# Patient Record
Sex: FEMALE | Race: WHITE | NOT HISPANIC OR LATINO | URBAN - METROPOLITAN AREA
[De-identification: names, ages, dates, MRNs, and addresses within clinical notes are randomized per-mention and may not be internally consistent; named-entity substitution may affect disease eponyms.]

---

## 2019-01-31 RX ADMIN — HYDROMORPHONE HYDROCHLORIDE 0.5 MILLIGRAM(S): 2 INJECTION INTRAMUSCULAR; INTRAVENOUS; SUBCUTANEOUS at 12:43

## 2019-12-29 ENCOUNTER — INPATIENT (INPATIENT)
Facility: HOSPITAL | Age: 69
LOS: 10 days | Discharge: NURSING, REHAB OR LONG-TERM CARE | DRG: 853 | End: 2020-01-09
Attending: SURGERY | Admitting: SURGERY
Payer: MEDICARE

## 2019-12-29 ENCOUNTER — RESULT REVIEW (OUTPATIENT)
Age: 69
End: 2019-12-29

## 2019-12-29 VITALS
OXYGEN SATURATION: 98 % | DIASTOLIC BLOOD PRESSURE: 73 MMHG | RESPIRATION RATE: 16 BRPM | WEIGHT: 149.91 LBS | HEART RATE: 73 BPM | SYSTOLIC BLOOD PRESSURE: 108 MMHG | TEMPERATURE: 98 F | HEIGHT: 62 IN

## 2019-12-29 LAB
ALBUMIN SERPL ELPH-MCNC: 2.1 G/DL — LOW (ref 3.3–5)
ALBUMIN SERPL ELPH-MCNC: 2.7 G/DL — LOW (ref 3.3–5)
ALBUMIN SERPL ELPH-MCNC: 2.8 G/DL — LOW (ref 3.3–5)
ALP SERPL-CCNC: 159 U/L — HIGH (ref 40–120)
ALP SERPL-CCNC: 191 U/L — HIGH (ref 40–120)
ALP SERPL-CCNC: SIGNIFICANT CHANGE UP U/L (ref 40–120)
ALT FLD-CCNC: 23 U/L — SIGNIFICANT CHANGE UP (ref 10–45)
ALT FLD-CCNC: 27 U/L — SIGNIFICANT CHANGE UP (ref 10–45)
ALT FLD-CCNC: SIGNIFICANT CHANGE UP U/L (ref 10–45)
ANION GAP SERPL CALC-SCNC: 13 MMOL/L — SIGNIFICANT CHANGE UP (ref 5–17)
ANION GAP SERPL CALC-SCNC: 14 MMOL/L — SIGNIFICANT CHANGE UP (ref 5–17)
ANION GAP SERPL CALC-SCNC: 19 MMOL/L — HIGH (ref 5–17)
ANION GAP SERPL CALC-SCNC: 20 MMOL/L — HIGH (ref 5–17)
APPEARANCE UR: CLEAR — SIGNIFICANT CHANGE UP
APTT BLD: 26.9 SEC — LOW (ref 27.5–36.3)
AST SERPL-CCNC: 26 U/L — SIGNIFICANT CHANGE UP (ref 10–40)
AST SERPL-CCNC: 35 U/L — SIGNIFICANT CHANGE UP (ref 10–40)
AST SERPL-CCNC: SIGNIFICANT CHANGE UP U/L (ref 10–40)
B-OH-BUTYR SERPL-SCNC: 1.1 MMOL/L — HIGH
BASE EXCESS BLDA CALC-SCNC: 1 MMOL/L — SIGNIFICANT CHANGE UP (ref -2–3)
BASOPHILS # BLD AUTO: 0.04 K/UL — SIGNIFICANT CHANGE UP (ref 0–0.2)
BASOPHILS # BLD AUTO: 0.05 K/UL — SIGNIFICANT CHANGE UP (ref 0–0.2)
BASOPHILS NFR BLD AUTO: 0.3 % — SIGNIFICANT CHANGE UP (ref 0–2)
BASOPHILS NFR BLD AUTO: 0.3 % — SIGNIFICANT CHANGE UP (ref 0–2)
BILIRUB SERPL-MCNC: 0.3 MG/DL — SIGNIFICANT CHANGE UP (ref 0.2–1.2)
BILIRUB SERPL-MCNC: 0.4 MG/DL — SIGNIFICANT CHANGE UP (ref 0.2–1.2)
BILIRUB SERPL-MCNC: 0.4 MG/DL — SIGNIFICANT CHANGE UP (ref 0.2–1.2)
BILIRUB UR-MCNC: ABNORMAL
BLD GP AB SCN SERPL QL: NEGATIVE — SIGNIFICANT CHANGE UP
BUN SERPL-MCNC: 23 MG/DL — SIGNIFICANT CHANGE UP (ref 7–23)
BUN SERPL-MCNC: 26 MG/DL — HIGH (ref 7–23)
BUN SERPL-MCNC: 28 MG/DL — HIGH (ref 7–23)
BUN SERPL-MCNC: 29 MG/DL — HIGH (ref 7–23)
CALCIUM SERPL-MCNC: 7.9 MG/DL — LOW (ref 8.4–10.5)
CALCIUM SERPL-MCNC: 8.2 MG/DL — LOW (ref 8.4–10.5)
CALCIUM SERPL-MCNC: 8.6 MG/DL — SIGNIFICANT CHANGE UP (ref 8.4–10.5)
CALCIUM SERPL-MCNC: 9.1 MG/DL — SIGNIFICANT CHANGE UP (ref 8.4–10.5)
CHLORIDE SERPL-SCNC: 102 MMOL/L — SIGNIFICANT CHANGE UP (ref 96–108)
CHLORIDE SERPL-SCNC: 104 MMOL/L — SIGNIFICANT CHANGE UP (ref 96–108)
CHLORIDE SERPL-SCNC: 88 MMOL/L — LOW (ref 96–108)
CHLORIDE SERPL-SCNC: 90 MMOL/L — LOW (ref 96–108)
CO2 SERPL-SCNC: 21 MMOL/L — LOW (ref 22–31)
CO2 SERPL-SCNC: 23 MMOL/L — SIGNIFICANT CHANGE UP (ref 22–31)
CO2 SERPL-SCNC: 23 MMOL/L — SIGNIFICANT CHANGE UP (ref 22–31)
CO2 SERPL-SCNC: 24 MMOL/L — SIGNIFICANT CHANGE UP (ref 22–31)
COLOR SPEC: YELLOW — SIGNIFICANT CHANGE UP
CREAT SERPL-MCNC: 1.16 MG/DL — SIGNIFICANT CHANGE UP (ref 0.5–1.3)
CREAT SERPL-MCNC: 1.21 MG/DL — SIGNIFICANT CHANGE UP (ref 0.5–1.3)
CREAT SERPL-MCNC: 1.32 MG/DL — HIGH (ref 0.5–1.3)
CREAT SERPL-MCNC: 1.34 MG/DL — HIGH (ref 0.5–1.3)
DIFF PNL FLD: ABNORMAL
EOSINOPHIL # BLD AUTO: 0.06 K/UL — SIGNIFICANT CHANGE UP (ref 0–0.5)
EOSINOPHIL # BLD AUTO: 0.25 K/UL — SIGNIFICANT CHANGE UP (ref 0–0.5)
EOSINOPHIL NFR BLD AUTO: 0.3 % — SIGNIFICANT CHANGE UP (ref 0–6)
EOSINOPHIL NFR BLD AUTO: 1.6 % — SIGNIFICANT CHANGE UP (ref 0–6)
GAS PNL BLDV: SIGNIFICANT CHANGE UP
GLUCOSE SERPL-MCNC: 224 MG/DL — HIGH (ref 70–99)
GLUCOSE SERPL-MCNC: 241 MG/DL — HIGH (ref 70–99)
GLUCOSE SERPL-MCNC: 457 MG/DL — CRITICAL HIGH (ref 70–99)
GLUCOSE SERPL-MCNC: 461 MG/DL — CRITICAL HIGH (ref 70–99)
GLUCOSE UR QL: 100
HBA1C BLD-MCNC: 11.2 % — HIGH (ref 4–5.6)
HCO3 BLDA-SCNC: 26 MMOL/L — SIGNIFICANT CHANGE UP (ref 21–28)
HCT VFR BLD CALC: 21.3 % — LOW (ref 34.5–45)
HCT VFR BLD CALC: 21.4 % — LOW (ref 34.5–45)
HCT VFR BLD CALC: 27 % — LOW (ref 34.5–45)
HGB BLD-MCNC: 6.5 G/DL — CRITICAL LOW (ref 11.5–15.5)
HGB BLD-MCNC: 6.7 G/DL — CRITICAL LOW (ref 11.5–15.5)
HGB BLD-MCNC: 8.2 G/DL — LOW (ref 11.5–15.5)
IMM GRANULOCYTES NFR BLD AUTO: 1 % — SIGNIFICANT CHANGE UP (ref 0–1.5)
IMM GRANULOCYTES NFR BLD AUTO: 1.1 % — SIGNIFICANT CHANGE UP (ref 0–1.5)
INR BLD: 1.33 — HIGH (ref 0.88–1.16)
KETONES UR-MCNC: ABNORMAL MG/DL
LACTATE SERPL-SCNC: 1.3 MMOL/L — SIGNIFICANT CHANGE UP (ref 0.5–2)
LACTATE SERPL-SCNC: 2.9 MMOL/L — HIGH (ref 0.5–2)
LEUKOCYTE ESTERASE UR-ACNC: ABNORMAL
LYMPHOCYTES # BLD AUTO: 1.57 K/UL — SIGNIFICANT CHANGE UP (ref 1–3.3)
LYMPHOCYTES # BLD AUTO: 15.9 % — SIGNIFICANT CHANGE UP (ref 13–44)
LYMPHOCYTES # BLD AUTO: 2.51 K/UL — SIGNIFICANT CHANGE UP (ref 1–3.3)
LYMPHOCYTES # BLD AUTO: 8.4 % — LOW (ref 13–44)
MAGNESIUM SERPL-MCNC: 1.6 MG/DL — SIGNIFICANT CHANGE UP (ref 1.6–2.6)
MAGNESIUM SERPL-MCNC: 1.6 MG/DL — SIGNIFICANT CHANGE UP (ref 1.6–2.6)
MCHC RBC-ENTMCNC: 23.3 PG — LOW (ref 27–34)
MCHC RBC-ENTMCNC: 23.4 PG — LOW (ref 27–34)
MCHC RBC-ENTMCNC: 24.1 PG — LOW (ref 27–34)
MCHC RBC-ENTMCNC: 30.4 GM/DL — LOW (ref 32–36)
MCHC RBC-ENTMCNC: 30.5 GM/DL — LOW (ref 32–36)
MCHC RBC-ENTMCNC: 31.3 GM/DL — LOW (ref 32–36)
MCV RBC AUTO: 76.6 FL — LOW (ref 80–100)
MCV RBC AUTO: 76.7 FL — LOW (ref 80–100)
MCV RBC AUTO: 77 FL — LOW (ref 80–100)
MONOCYTES # BLD AUTO: 0.99 K/UL — HIGH (ref 0–0.9)
MONOCYTES # BLD AUTO: 1.23 K/UL — HIGH (ref 0–0.9)
MONOCYTES NFR BLD AUTO: 5.3 % — SIGNIFICANT CHANGE UP (ref 2–14)
MONOCYTES NFR BLD AUTO: 7.8 % — SIGNIFICANT CHANGE UP (ref 2–14)
NEUTROPHILS # BLD AUTO: 11.56 K/UL — HIGH (ref 1.8–7.4)
NEUTROPHILS # BLD AUTO: 15.89 K/UL — HIGH (ref 1.8–7.4)
NEUTROPHILS NFR BLD AUTO: 73.4 % — SIGNIFICANT CHANGE UP (ref 43–77)
NEUTROPHILS NFR BLD AUTO: 84.6 % — HIGH (ref 43–77)
NITRITE UR-MCNC: NEGATIVE — SIGNIFICANT CHANGE UP
NRBC # BLD: 0 /100 WBCS — SIGNIFICANT CHANGE UP (ref 0–0)
PCO2 BLDA: 40 MMHG — SIGNIFICANT CHANGE UP (ref 32–45)
PH BLDA: 7.42 — SIGNIFICANT CHANGE UP (ref 7.35–7.45)
PH UR: 6 — SIGNIFICANT CHANGE UP (ref 5–8)
PHOSPHATE SERPL-MCNC: 3 MG/DL — SIGNIFICANT CHANGE UP (ref 2.5–4.5)
PLATELET # BLD AUTO: 686 K/UL — HIGH (ref 150–400)
PLATELET # BLD AUTO: 728 K/UL — HIGH (ref 150–400)
PLATELET # BLD AUTO: 792 K/UL — HIGH (ref 150–400)
PO2 BLDA: 124 MMHG — HIGH (ref 83–108)
POTASSIUM SERPL-MCNC: 2.8 MMOL/L — CRITICAL LOW (ref 3.5–5.3)
POTASSIUM SERPL-MCNC: 3.2 MMOL/L — LOW (ref 3.5–5.3)
POTASSIUM SERPL-MCNC: 4.6 MMOL/L — SIGNIFICANT CHANGE UP (ref 3.5–5.3)
POTASSIUM SERPL-MCNC: SIGNIFICANT CHANGE UP MMOL/L (ref 3.5–5.3)
POTASSIUM SERPL-SCNC: 2.8 MMOL/L — CRITICAL LOW (ref 3.5–5.3)
POTASSIUM SERPL-SCNC: 3.2 MMOL/L — LOW (ref 3.5–5.3)
POTASSIUM SERPL-SCNC: 4.6 MMOL/L — SIGNIFICANT CHANGE UP (ref 3.5–5.3)
POTASSIUM SERPL-SCNC: SIGNIFICANT CHANGE UP MMOL/L (ref 3.5–5.3)
PROT SERPL-MCNC: 6.3 G/DL — SIGNIFICANT CHANGE UP (ref 6–8.3)
PROT SERPL-MCNC: 7.7 G/DL — SIGNIFICANT CHANGE UP (ref 6–8.3)
PROT SERPL-MCNC: 7.9 G/DL — SIGNIFICANT CHANGE UP (ref 6–8.3)
PROT UR-MCNC: 100 MG/DL
PROTHROM AB SERPL-ACNC: 15.2 SEC — HIGH (ref 10–12.9)
RBC # BLD: 2.78 M/UL — LOW (ref 3.8–5.2)
RBC # BLD: 2.78 M/UL — LOW (ref 3.8–5.2)
RBC # BLD: 3.52 M/UL — LOW (ref 3.8–5.2)
RBC # FLD: 17.6 % — HIGH (ref 10.3–14.5)
RBC # FLD: 17.8 % — HIGH (ref 10.3–14.5)
RBC # FLD: 18.1 % — HIGH (ref 10.3–14.5)
RH IG SCN BLD-IMP: POSITIVE — SIGNIFICANT CHANGE UP
SAO2 % BLDA: 99 % — SIGNIFICANT CHANGE UP (ref 95–100)
SODIUM SERPL-SCNC: 130 MMOL/L — LOW (ref 135–145)
SODIUM SERPL-SCNC: 134 MMOL/L — LOW (ref 135–145)
SODIUM SERPL-SCNC: 138 MMOL/L — SIGNIFICANT CHANGE UP (ref 135–145)
SODIUM SERPL-SCNC: 139 MMOL/L — SIGNIFICANT CHANGE UP (ref 135–145)
SP GR SPEC: >=1.03 — SIGNIFICANT CHANGE UP (ref 1–1.03)
TROPONIN T SERPL-MCNC: 0.03 NG/ML — HIGH (ref 0–0.01)
UROBILINOGEN FLD QL: 0.2 E.U./DL — SIGNIFICANT CHANGE UP
WBC # BLD: 15.74 K/UL — HIGH (ref 3.8–10.5)
WBC # BLD: 16.73 K/UL — HIGH (ref 3.8–10.5)
WBC # BLD: 18.77 K/UL — HIGH (ref 3.8–10.5)
WBC # FLD AUTO: 15.74 K/UL — HIGH (ref 3.8–10.5)
WBC # FLD AUTO: 16.73 K/UL — HIGH (ref 3.8–10.5)
WBC # FLD AUTO: 18.77 K/UL — HIGH (ref 3.8–10.5)

## 2019-12-29 PROCEDURE — 88305 TISSUE EXAM BY PATHOLOGIST: CPT | Mod: 26

## 2019-12-29 PROCEDURE — 73590 X-RAY EXAM OF LOWER LEG: CPT | Mod: 26,RT

## 2019-12-29 PROCEDURE — 73630 X-RAY EXAM OF FOOT: CPT | Mod: 26,LT,RT

## 2019-12-29 PROCEDURE — 99232 SBSQ HOSP IP/OBS MODERATE 35: CPT | Mod: GC

## 2019-12-29 PROCEDURE — 71045 X-RAY EXAM CHEST 1 VIEW: CPT | Mod: 26

## 2019-12-29 PROCEDURE — 99291 CRITICAL CARE FIRST HOUR: CPT

## 2019-12-29 PROCEDURE — 99222 1ST HOSP IP/OBS MODERATE 55: CPT | Mod: GC

## 2019-12-29 RX ORDER — KETOROLAC TROMETHAMINE 30 MG/ML
15 SYRINGE (ML) INJECTION ONCE
Refills: 0 | Status: DISCONTINUED | OUTPATIENT
Start: 2019-12-29 | End: 2019-12-29

## 2019-12-29 RX ORDER — CHLORHEXIDINE GLUCONATE 213 G/1000ML
1 SOLUTION TOPICAL
Refills: 0 | Status: DISCONTINUED | OUTPATIENT
Start: 2019-12-29 | End: 2020-01-09

## 2019-12-29 RX ORDER — POTASSIUM CHLORIDE 20 MEQ
40 PACKET (EA) ORAL ONCE
Refills: 0 | Status: COMPLETED | OUTPATIENT
Start: 2019-12-29 | End: 2019-12-29

## 2019-12-29 RX ORDER — MAGNESIUM SULFATE 500 MG/ML
1 VIAL (ML) INJECTION ONCE
Refills: 0 | Status: COMPLETED | OUTPATIENT
Start: 2019-12-29 | End: 2019-12-29

## 2019-12-29 RX ORDER — ACETAMINOPHEN 500 MG
650 TABLET ORAL EVERY 6 HOURS
Refills: 0 | Status: DISCONTINUED | OUTPATIENT
Start: 2019-12-29 | End: 2020-01-05

## 2019-12-29 RX ORDER — HYDROMORPHONE HYDROCHLORIDE 2 MG/ML
1 INJECTION INTRAMUSCULAR; INTRAVENOUS; SUBCUTANEOUS EVERY 4 HOURS
Refills: 0 | Status: DISCONTINUED | OUTPATIENT
Start: 2019-12-29 | End: 2020-01-03

## 2019-12-29 RX ORDER — INSULIN HUMAN 100 [IU]/ML
10 INJECTION, SOLUTION SUBCUTANEOUS ONCE
Refills: 0 | Status: COMPLETED | OUTPATIENT
Start: 2019-12-29 | End: 2019-12-29

## 2019-12-29 RX ORDER — ENOXAPARIN SODIUM 100 MG/ML
40 INJECTION SUBCUTANEOUS EVERY 24 HOURS
Refills: 0 | Status: DISCONTINUED | OUTPATIENT
Start: 2019-12-29 | End: 2019-12-29

## 2019-12-29 RX ORDER — VANCOMYCIN HCL 1 G
1000 VIAL (EA) INTRAVENOUS ONCE
Refills: 0 | Status: COMPLETED | OUTPATIENT
Start: 2019-12-29 | End: 2019-12-29

## 2019-12-29 RX ORDER — PIPERACILLIN AND TAZOBACTAM 4; .5 G/20ML; G/20ML
3.38 INJECTION, POWDER, LYOPHILIZED, FOR SOLUTION INTRAVENOUS EVERY 6 HOURS
Refills: 0 | Status: DISCONTINUED | OUTPATIENT
Start: 2019-12-29 | End: 2020-01-02

## 2019-12-29 RX ORDER — INSULIN HUMAN 100 [IU]/ML
4 INJECTION, SOLUTION SUBCUTANEOUS
Qty: 100 | Refills: 0 | Status: DISCONTINUED | OUTPATIENT
Start: 2019-12-29 | End: 2019-12-30

## 2019-12-29 RX ORDER — SODIUM CHLORIDE 9 MG/ML
1000 INJECTION INTRAMUSCULAR; INTRAVENOUS; SUBCUTANEOUS ONCE
Refills: 0 | Status: COMPLETED | OUTPATIENT
Start: 2019-12-29 | End: 2019-12-29

## 2019-12-29 RX ORDER — SODIUM CHLORIDE 9 MG/ML
2000 INJECTION INTRAMUSCULAR; INTRAVENOUS; SUBCUTANEOUS ONCE
Refills: 0 | Status: COMPLETED | OUTPATIENT
Start: 2019-12-29 | End: 2019-12-29

## 2019-12-29 RX ORDER — PIPERACILLIN AND TAZOBACTAM 4; .5 G/20ML; G/20ML
3.38 INJECTION, POWDER, LYOPHILIZED, FOR SOLUTION INTRAVENOUS ONCE
Refills: 0 | Status: COMPLETED | OUTPATIENT
Start: 2019-12-29 | End: 2019-12-29

## 2019-12-29 RX ORDER — SODIUM CHLORIDE 9 MG/ML
1000 INJECTION, SOLUTION INTRAVENOUS
Refills: 0 | Status: DISCONTINUED | OUTPATIENT
Start: 2019-12-29 | End: 2019-12-30

## 2019-12-29 RX ORDER — ONDANSETRON 8 MG/1
4 TABLET, FILM COATED ORAL ONCE
Refills: 0 | Status: COMPLETED | OUTPATIENT
Start: 2019-12-29 | End: 2019-12-29

## 2019-12-29 RX ORDER — ASPIRIN/CALCIUM CARB/MAGNESIUM 324 MG
81 TABLET ORAL DAILY
Refills: 0 | Status: DISCONTINUED | OUTPATIENT
Start: 2019-12-29 | End: 2020-01-09

## 2019-12-29 RX ORDER — VANCOMYCIN HCL 1 G
1000 VIAL (EA) INTRAVENOUS EVERY 12 HOURS
Refills: 0 | Status: DISCONTINUED | OUTPATIENT
Start: 2019-12-29 | End: 2019-12-31

## 2019-12-29 RX ORDER — HYDROMORPHONE HYDROCHLORIDE 2 MG/ML
0.5 INJECTION INTRAMUSCULAR; INTRAVENOUS; SUBCUTANEOUS EVERY 4 HOURS
Refills: 0 | Status: DISCONTINUED | OUTPATIENT
Start: 2019-12-29 | End: 2020-01-03

## 2019-12-29 RX ADMIN — Medication 40 MILLIEQUIVALENT(S): at 22:11

## 2019-12-29 RX ADMIN — Medication 250 MILLIGRAM(S): at 11:27

## 2019-12-29 RX ADMIN — Medication 250 MILLIGRAM(S): at 23:28

## 2019-12-29 RX ADMIN — Medication 40 MILLIEQUIVALENT(S): at 13:53

## 2019-12-29 RX ADMIN — Medication 100 GRAM(S): at 21:07

## 2019-12-29 RX ADMIN — HYDROMORPHONE HYDROCHLORIDE 1 MILLIGRAM(S): 2 INJECTION INTRAMUSCULAR; INTRAVENOUS; SUBCUTANEOUS at 23:35

## 2019-12-29 RX ADMIN — Medication 40 MILLIEQUIVALENT(S): at 21:06

## 2019-12-29 RX ADMIN — Medication 81 MILLIGRAM(S): at 23:29

## 2019-12-29 RX ADMIN — PIPERACILLIN AND TAZOBACTAM 200 GRAM(S): 4; .5 INJECTION, POWDER, LYOPHILIZED, FOR SOLUTION INTRAVENOUS at 10:58

## 2019-12-29 RX ADMIN — SODIUM CHLORIDE 2000 MILLILITER(S): 9 INJECTION INTRAMUSCULAR; INTRAVENOUS; SUBCUTANEOUS at 10:30

## 2019-12-29 RX ADMIN — Medication 100 MILLIGRAM(S): at 22:45

## 2019-12-29 RX ADMIN — ONDANSETRON 4 MILLIGRAM(S): 8 TABLET, FILM COATED ORAL at 21:26

## 2019-12-29 RX ADMIN — Medication 100 MILLIGRAM(S): at 13:55

## 2019-12-29 RX ADMIN — SODIUM CHLORIDE 1000 MILLILITER(S): 9 INJECTION INTRAMUSCULAR; INTRAVENOUS; SUBCUTANEOUS at 15:01

## 2019-12-29 RX ADMIN — PIPERACILLIN AND TAZOBACTAM 200 GRAM(S): 4; .5 INJECTION, POWDER, LYOPHILIZED, FOR SOLUTION INTRAVENOUS at 19:36

## 2019-12-29 RX ADMIN — SODIUM CHLORIDE 1000 MILLILITER(S): 9 INJECTION INTRAMUSCULAR; INTRAVENOUS; SUBCUTANEOUS at 13:30

## 2019-12-29 RX ADMIN — INSULIN HUMAN 10 UNIT(S): 100 INJECTION, SOLUTION SUBCUTANEOUS at 14:10

## 2019-12-29 NOTE — ED PROVIDER NOTE - GASTROINTESTINAL NEGATIVE STATEMENT, MLM
[Patient] : patient
no abdominal pain, no bloating, no constipation, + loose stools, no nausea and no vomiting.

## 2019-12-29 NOTE — ED PROVIDER NOTE - SKIN, MLM
+ Ulceration to right foot along plantar surface, mid/distally with overlying tenderness to palpation and purulent drainage.  + Ulceration to toes with gangrenous appearing discoloration to distal 2nd toe.   + Ulceration to distal/mid foot along plantar aspect of left foot - no purulent drainage.  No palpable crepitance.

## 2019-12-29 NOTE — CONSULT NOTE ADULT - ASSESSMENT
69 year old F with PMH significant for DM complicated by b/l diabetic foot ulcers, HTN, HLD, asthma, MDD, who presents with RLE ulceration and purulent discharge. Hyperglycemic without DKA      Recommend  1. Diabetic foot ulcer  - C/w broad spectrum coverage vanc/zosyn and flagyl given gas gangrene  - Appreciate podiatry recs, plan for procedure with debridement/amputation  - F/u blood cultures, culture specimen from OR  - Monitor CBC, CRP/ESR  - F/u lactate, additional fluids as per trend    2. Hyperglycemia  - C/w insulin subcutaneous, f/u AG  - A1C    3. Troponin level elevated  - EKG chronic TWI lateral leads, f/u next troponin. No chest pain or acute symptoms to suggest ACS. Does have element of CKD vs ELBERT, may have decreased clearance    Dispo: 69 year old F with PMH significant for DM complicated by b/l diabetic foot ulcers, HTN, HLD, asthma, MDD, who presents with RLE ulceration and purulent discharge. Hyperglycemic without DKA      Recommend  1. Diabetic foot ulcer  - C/w broad spectrum coverage vanc/zosyn and flagyl given gas gangrene  - Appreciate podiatry recs, plan for procedure with debridement/amputation  - F/u blood cultures, culture specimen from OR  - Monitor CBC, CRP/ESR  - F/u lactate, additional fluids as per trend    2. Hyperglycemia  - C/w insulin subcutaneous, f/u AG  - A1C    3. Troponin level elevated  - EKG chronic TWI lateral leads, f/u next troponin. No chest pain or acute symptoms to suggest ACS. Does have element of CKD vs ELBERT, may have decreased clearance    Dispo: SICU for procedure (updated to vascular surgery service since consultation)

## 2019-12-29 NOTE — CONSULT NOTE ADULT - SUBJECTIVE AND OBJECTIVE BOX
69F hx DM, several left toe amps’ in past, presented to ED w/ 4-5days of R foot ulcer w/ purulent drainage. Pt says she started noticing redness/swelling/pain and ulcer to plantar surface of R foot 4-5 days ago, ulcer had gotten progressively worse w/ purulent drainage. Pt c/o subjective fevers yesterday, but did not take her temps. She usually follows w/ doctors at [facility], last seen 1 month ago. Pt also says she usually takes metformin and insulin, but stopped her insulin about 2 wks ago because she lost her insulin pen. In ED, pt found to have ’s w/ anion gap of 20, LA 2.5. otherwise afebrile. Hemodynamically stable.      PMH: DM, several left toe amps’ from different OSH in past, HTN, HL, asthma.      MEDS: meformin (unknown dosage) bid, insulin (?lantus?) 20U qd, unsure what other meds she takes.      ICU Vital Signs Last 24 Hrs  T(C): 36.8 (29 Dec 2019 14:51), Max: 36.8 (29 Dec 2019 09:57)  T(F): 98.2 (29 Dec 2019 14:51), Max: 98.2 (29 Dec 2019 09:57)  HR: 68 (29 Dec 2019 14:51) (68 - 73)  BP: 136/67 (29 Dec 2019 14:51) (108/73 - 136/67)  BP(mean): --  ABP: --  ABP(mean): --  RR: 18 (29 Dec 2019 14:51) (16 - 18)  SpO2: 95% (29 Dec 2019 14:51) (95% - 98%)    EXAM:   Neurological: AAOx3, CNII-XII intact, strength 5/5 b/l   Cardiovascular: RRR   Respiratory: CTA   Gastrointestinal: soft, NT, ND, BS+   Extremities: warm, no dependent edema, L foot w/previous 2-4 toes amputated. R foot plantar ulcer w/ purulent foul smelling drainage. R 3rd toe w/ ulcerations and pus to base of toe and necrosis to tip of toe. Ensure R foot is swollen, red and hot.      LABS:    12-29    134<L>  |  90<L>  |  29<H>  ----------------------------<  457<HH>  3.2<L>   |  24  |  1.34<H>    Ca    8.6      29 Dec 2019 12:42  Mg     1.6     12-29    TPro  7.7  /  Alb  2.7<L>  /  TBili  0.4  /  DBili  x   /  AST  35  /  ALT  27  /  AlkPhos  191<H>  12-29  LIVER FUNCTIONS - ( 29 Dec 2019 12:42 )  Alb: 2.7 g/dL / Pro: 7.7 g/dL / ALK PHOS: 191 U/L / ALT: 27 U/L / AST: 35 U/L / GGT: x                               8.2    18.77 )-----------( 792      ( 29 Dec 2019 10:32 )             27.0   PT/INR - ( 29 Dec 2019 10:32 )   PT: 15.2 sec;   INR: 1.33          PTT - ( 29 Dec 2019 10:32 )  PTT:26.9 secCARDIAC MARKERS ( 29 Dec 2019 10:32 )  x     / 0.03 ng/mL / x     / x     / x        Urinalysis Basic - ( 29 Dec 2019 12:32 )    Color: Yellow / Appearance: Clear / SG: >=1.030 / pH: x  Gluc: x / Ketone: Trace mg/dL  / Bili: Small / Urobili: 0.2 E.U./dL   Blood: x / Protein: 100 mg/dL / Nitrite: NEGATIVE   Leuk Esterase: Trace / RBC: < 5 /HPF / WBC > 10 /HPF   Sq Epi: x / Non Sq Epi: 5-10 /HPF / Bacteria: Present /HPF    CAPILLARY BLOOD GLUCOSE      POCT Blood Glucose.: 450 mg/dL (29 Dec 2019 14:04)  POCT Blood Glucose.: 444 mg/dL (29 Dec 2019 10:00) 69F hx DM, several left toe amps’ in past, presented to ED w/ 4-5days of R foot ulcer w/ purulent drainage. Pt says she started noticing redness/swelling/pain and ulcer to plantar surface of R foot 4-5 days ago, ulcer had gotten progressively worse w/ purulent drainage. Pt c/o subjective fevers yesterday, but did not take her temps. She usually follows w/ doctors at [facility], last seen 1 month ago. Pt also says she usually takes metformin and insulin, but stopped her insulin about 2 wks ago because she lost her insulin pen. In ED, pt found to have ’s w/ anion gap of 20, LA 2.5. otherwise afebrile. Hemodynamically stable.      Meds from ED: vanco 1grm @ 11am. zosyn 3.375@ 10am, clinda 900 at 1pm. regular insulin 10U at 2pm, KCL 40meq at 1pm, NS bolus total 3 liters.     PMH: DM, several left toe amps’ from different OSH in past, HTN, HL, asthma.      MEDS: meformin (unknown dosage) bid, insulin (?lantus?) 20U qd, unsure what other meds she takes.      ICU Vital Signs Last 24 Hrs  T(C): 36.8 (29 Dec 2019 14:51), Max: 36.8 (29 Dec 2019 09:57)  T(F): 98.2 (29 Dec 2019 14:51), Max: 98.2 (29 Dec 2019 09:57)  HR: 68 (29 Dec 2019 14:51) (68 - 73)  BP: 136/67 (29 Dec 2019 14:51) (108/73 - 136/67)  BP(mean): --  ABP: --  ABP(mean): --  RR: 18 (29 Dec 2019 14:51) (16 - 18)  SpO2: 95% (29 Dec 2019 14:51) (95% - 98%)    EXAM:   Neurological: AAOx3, CNII-XII intact, strength 5/5 b/l   Cardiovascular: RRR   Respiratory: CTA   Gastrointestinal: soft, NT, ND, BS+   Extremities: warm, no dependent edema, L foot w/previous 2-4 toes amputated. R foot plantar ulcer w/ purulent foul smelling drainage. R 3rd toe w/ ulcerations and pus to base of toe and necrosis to tip of toe. Ensure R foot is swollen, red and hot.      LABS:    12-29    134<L>  |  90<L>  |  29<H>  ----------------------------<  457<HH>  3.2<L>   |  24  |  1.34<H>    Ca    8.6      29 Dec 2019 12:42  Mg     1.6     12-29    TPro  7.7  /  Alb  2.7<L>  /  TBili  0.4  /  DBili  x   /  AST  35  /  ALT  27  /  AlkPhos  191<H>  12-29  LIVER FUNCTIONS - ( 29 Dec 2019 12:42 )  Alb: 2.7 g/dL / Pro: 7.7 g/dL / ALK PHOS: 191 U/L / ALT: 27 U/L / AST: 35 U/L / GGT: x                               8.2    18.77 )-----------( 792      ( 29 Dec 2019 10:32 )             27.0   PT/INR - ( 29 Dec 2019 10:32 )   PT: 15.2 sec;   INR: 1.33          PTT - ( 29 Dec 2019 10:32 )  PTT:26.9 secCARDIAC MARKERS ( 29 Dec 2019 10:32 )  x     / 0.03 ng/mL / x     / x     / x        Urinalysis Basic - ( 29 Dec 2019 12:32 )    Color: Yellow / Appearance: Clear / SG: >=1.030 / pH: x  Gluc: x / Ketone: Trace mg/dL  / Bili: Small / Urobili: 0.2 E.U./dL   Blood: x / Protein: 100 mg/dL / Nitrite: NEGATIVE   Leuk Esterase: Trace / RBC: < 5 /HPF / WBC > 10 /HPF   Sq Epi: x / Non Sq Epi: 5-10 /HPF / Bacteria: Present /HPF    CAPILLARY BLOOD GLUCOSE      POCT Blood Glucose.: 450 mg/dL (29 Dec 2019 14:04)  POCT Blood Glucose.: 444 mg/dL (29 Dec 2019 10:00)

## 2019-12-29 NOTE — ED PROVIDER NOTE - OBJECTIVE STATEMENT
69 year old female with history of DM, Diabetic foot ulcer presents to ED with concern for generalized weakness with pain to wound on right foot for "at least a month."  Patient states she was following for wound care at another area hospital, however is not happy with the care she was receiving and stopped going.  She notes intermittent loose stools over the past month, however denies abdominal pain, fever, chills, chest pain, shortness of breath, lateralizing weakness, headache, visual changes or any additional acute complaints or concerns at this time.  Patient is an unreliable historian and additional history is limited.

## 2019-12-29 NOTE — BRIEF OPERATIVE NOTE - NSICDXBRIEFPROCEDURE_GEN_ALL_CORE_FT
PROCEDURES:  Ray amputation of right third toe 29-Dec-2019 18:57:51  Edgardo Frost  Ray amputation of right second toe 29-Dec-2019 18:57:32  Edgardo Frost

## 2019-12-29 NOTE — H&P ADULT - NSHPREVIEWOFSYSTEMS_GEN_ALL_CORE
REVIEW OF SYSTEMS      General: reports feeling weak 	    	  Ophthalmologic: decreased vision for several years bilaterally   	      Respiratory and Thorax: denies SOB   	  Cardiovascular:	denies CP     Gastrointestinal:	admits to diarrhea     Musculoskeletal:	 denies pain in feet     Neurological:	decreased sensation in right foot     Endocrine:	elevated blood sugars

## 2019-12-29 NOTE — CONSULT NOTE ADULT - SUBJECTIVE AND OBJECTIVE BOX
Vascular Attending:  Dr. Hutchinson       HPI: 70 YO F with hx of  DM and bilateral diabetic foot wounds presents to ED with Right foot wound and purulent discharge. Patient reports that she has had wounds of her right foot for about 1 month and has been under the care of a wound care Doctor at outside hospital. She was been unhappy with the progress and the care she has been receiving. Recently she states she has been unwell for about 15 days and has not been able to leave her house for wound care. She also reports generalized weakness, decreased appetite, fevers and diarrhea for about two week. She has not been able to locate her insulin for several days and as a result has not taken it. Admits to feeling fatigue and chills. Denies CP, SOB, N,V, admits to decreased sensation in bilateral feet.       PAST MEDICAL & SURGICAL HISTORY:  Local infection of skin and subcutaneous tissue: Toe infection  Type 2 diabetes mellitus with neurological manifestations: Neuropathy in diabetes  Essential hypertension: HTN (hypertension)  Hyperlipidemia: HLD (hyperlipidemia)  Type 2 diabetes mellitus: DM (diabetes mellitus)  Asthma: Asthma  Depressive disorder: Depression  Cytomegalovirus infection not present: No significant past surgical history      REVIEW OF SYSTEMS      General: reports feeling weak 	    	  Ophthalmologic: decreased vision for several years bilaterally   	      Respiratory and Thorax: denies SOB   	  Cardiovascular:	denies CP     Gastrointestinal:	admits to diarrhea     Musculoskeletal:	 denies pain in feet     Neurological:	decreased sensation in right foot     Endocrine:	elevated blood sugars         MEDICATIONS  (STANDING):  clindamycin IVPB 900 milliGRAM(s) IV Intermittent Once  sodium chloride 0.9% Bolus 1000 milliLiter(s) IV Bolus once    MEDICATIONS  (PRN):      Allergies    No Known Allergies    Intolerances        SOCIAL HISTORY:  ex-smoker   FAMILY HISTORY:  Family history of diabetes mellitus: Family history of diabetes mellitus (DM)      Vital Signs Last 24 Hrs  T(C): 36.8 (29 Dec 2019 09:57), Max: 36.8 (29 Dec 2019 09:57)  T(F): 98.2 (29 Dec 2019 09:57), Max: 98.2 (29 Dec 2019 09:57)  HR: 68 (29 Dec 2019 11:08) (68 - 73)  BP: 134/65 (29 Dec 2019 11:08) (108/73 - 134/65)  BP(mean): --  RR: 16 (29 Dec 2019 11:08) (16 - 16)  SpO2: 97% (29 Dec 2019 11:08) (97% - 98%)    PHYSICAL EXAM:      Constitutional: AAOx3    Neck: supple     Respiratory: b/l clear     Cardiovascular: s1 s2 Regular     Extremities: ulceration of right distal to mid right foot (plantar aspect) purulent discharge from plantar ulcer and from between toes 2-3. erythema of right foot noted. edema of distal foot   no edema of right ankle   able to flex right ankle   able to move right 4/5th toes   Bilateral DP/PT pulses palpable   sensation decreased in distal right foot         LABS:                        8.2    18.77 )-----------( 792      ( 29 Dec 2019 10:32 )             27.0     12-29    130<L>  |  88<L>  |  28<H>  ----------------------------<  461<HH>  see note   |  23  |  1.32<H>    Ca    9.1      29 Dec 2019 10:32  Mg     1.6     12-29    TPro  7.9  /  Alb  2.8<L>  /  TBili  0.4  /  DBili  x   /  AST  see note  /  ALT  see note  /  AlkPhos  see note  12-29    PT/INR - ( 29 Dec 2019 10:32 )   PT: 15.2 sec;   INR: 1.33          PTT - ( 29 Dec 2019 10:32 )  PTT:26.9 sec      RADIOLOGY & ADDITIONAL STUDIES

## 2019-12-29 NOTE — PROGRESS NOTE ADULT - SUBJECTIVE AND OBJECTIVE BOX
POST OP NOTE    ZACK GUILLEN  MRN-[id number]    Procedure: R foot 2nd/3rd ray amputations  Surgeon: Swathi  Assistants: Margot    Patient tolerated procedure well without incident.  Patient transferred to PACU in stable condition.       PE / Post Op Check:       GEN: NAD, AAOx3, resting comfortably with pain controlled      LE Focused:  No strikethrough is appreciated on surgical dressings.  Dressings were dry, clean, and intact.  No neuromuscular deficits appreciated.

## 2019-12-29 NOTE — ED PROVIDER NOTE - NEUROLOGICAL, MLM
Alert and oriented, no focal deficits, no motor or sensory deficits. Alert and oriented, no focal deficits, no motor or sensory deficits.  Palpable dorsalis pedis pulses, bilaterally.

## 2019-12-29 NOTE — CONSULT NOTE ADULT - ASSESSMENT
69F PMHx DM presents w/ R foot ulcer. WBC 18.77. LRINEC score >6    Recommendations: 69F PMHx DM presents w/ R foot ulcer. WBC 18.77, Lactate 2.9. LRINEC score >6    Recommendations: 69F PMHx DM presents w/ R foot ulcer. WBC 18.77, Lactate 2.9. LRINEC score >6. Gas seen on X-ray.     Recommendations:  -Vancomycin, Zosyn, Clindamycin  -Added on to OR (class III)  -Pre-op clearance (T&S, CXR, EKG) 69F PMHx DM presents w/ R foot ulcer. WBC 18.77, Lactate 2.9. LRINEC score >6. Gas seen on X-ray.     Recommendations:  -Obtained culture in ED of purulence  -Vancomycin, Zosyn, Clindamycin  -Added on to OR   -Pre-op clearance (T&S, CXR, EKG) 69F PMHx DM presents w/ R foot ulcer. WBC 18.77, Lactate 2.9. LRINEC score >6. Gas seen on X-ray.     Recommendations:  -Obtained culture in ED of purulence  -Vancomycin, Zosyn, Clindamycin  -Added on to OR  -Pre-op clearance (T&S, CXR, EKG)  -Podiatry will follow  -Plan discussed w/ Dr. Echevarria 69F PMHx DM presents w/ R foot ulcer. WBC 18.77, Lactate 2.9. LRINEC score >6. Gas seen on X-ray.     Recommendations:  -Obtained culture in ED of purulence  -Vancomycin, Zosyn, Clindamycin  -Added on to OR for toe amputation(s)  -Pre-op clearance (T&S, CXR, EKG)  -Podiatry following  -Plan discussed w/ Dr. Echevarria

## 2019-12-29 NOTE — CONSULT NOTE ADULT - ASSESSMENT
A/P: 69F hx poorly controlled DM a/w R foot gas gangrene and DKA s/p R 2nd+3rd toe partial ray amputation (12/29) admitted to SICU for postop monitoring    NEURO: dilaudid PRN pain.    CV: stable   PULM: room air.    GI/FEN: CLD. LR@ 125   : voids.    ENDO: DKA , repeat postoperative labs an place on Insulin gtt  ID: R foot gas gangrene: Vanc (12/29--) zosyn (12/29--) clinda (12/29--) .    PPX: SCDs.    LINES: PIV.    WOUNDS/DRAINS: R plantar ulcer w/ pus and 3rd mid toe ulcer/pus/necrosis.

## 2019-12-29 NOTE — ED ADULT TRIAGE NOTE - CHIEF COMPLAINT QUOTE
Pt BIBA from home [place] Weakness x 2 weeks, with Diarrhea x 10 days, and Right Foot Ulcer.  Per EMS, FSBG in field >500.  Pt arrived to ED A&Ox3 and states "I lost my insulin."  Rpt FSBG and EKG in progress.  Pt upgraded to MD Rodriguez.

## 2019-12-29 NOTE — BRIEF OPERATIVE NOTE - OPERATION/FINDINGS
R 2nd and 3rd ray amputations and plantar ulcer excision w/ removal of all noted non-viable soft tissue and bone. Pus expressed from R medial plantar incision. Surgical site flushed w/ 2L of NS w/ pulse lavage. Dirty culture taken. 3rd metatarsal head sent for cx. Resected 2nd/3rd toes sent for pathology. Wound site packed w/ surgicel and iodoform packing. Applied bulky dressing/DSD.

## 2019-12-29 NOTE — CONSULT NOTE ADULT - SUBJECTIVE AND OBJECTIVE BOX
ICU Consult Note    HPI: Patient is a 69 year old F with PMH significant for DM complicated by b/l diabetic foot ulcers, HTN, HLD, asthma, MDD, who presents with RLE ulceration and purulent discharge. She was seen for the past month at an outside hospital for these wounds but was not happy with her care, and stopped getting wound care outside of her home for the past 2 weeks. She has also had 2 weeks of fevers, generalized weakness, decreased PO intake, and diarrhea. Additionally, she has not taken insulin for the past few days. 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 ED VS notable for T36.8, HR 73, 108/73, R16 sat 98% RA. WBC 19, 85% PMN. Hb 8.2, ESR >130, CRP 17.6, glc 444, AG 19, lactate 2.9. Got vanc, zosyn, clinda, 2L NS in ED.       REVIEW OF SYSTEMS:    CONSTITUTIONAL: Generalized weakness, fevers, chills as per HPI  EYES/ENT: Chronic visual changes;  No vertigo  NECK: No pain or stiffness  RESPIRATORY: No cough, wheezing, hemoptysis; No shortness of breath  CARDIOVASCULAR: No chest pain or palpitations  GASTROINTESTINAL: No abdominal or epigastric pain. Reports diarrhea, NB  GENITOURINARY: No dysuria, frequency  NEUROLOGICAL: No new numbness or weakness  SKIN: 1 month of LE ulceration R foot with purulent discharge as per HPI  All other review of systems is negative unless indicated above.    PAST MEDICAL & SURGICAL HISTORY:  Local infection of skin and subcutaneous tissue: Toe infection  Type 2 diabetes mellitus with neurological manifestations: Neuropathy in diabetes  Essential hypertension: HTN (hypertension)  Hyperlipidemia: HLD (hyperlipidemia)  Type 2 diabetes mellitus: DM (diabetes mellitus)  Asthma: Asthma  Depressive disorder: Depression  Cytomegalovirus infection not present: No significant past surgical history      FAMILY HISTORY:  Family history of diabetes mellitus: Family history of diabetes mellitus (DM)      SOCIAL HISTORY:      Home Medications:  insulin:  (29 Dec 2019 10:54)      MEDICATIONS  (STANDING):  clindamycin IVPB 900 milliGRAM(s) IV Intermittent Once  insulin regular  human recombinant. 10 Unit(s) SubCutaneous once  potassium chloride    Tablet ER 40 milliEquivalent(s) Oral once  sodium chloride 0.9% Bolus 1000 milliLiter(s) IV Bolus once  sodium chloride 0.9% Bolus 1000 milliLiter(s) IV Bolus once    MEDICATIONS  (PRN):      Allergies    No Known Allergies    Intolerances        PHYSICAL EXAM:    Constitutional: WDWN resting comfortably in bed; NAD  Head: NC/AT  Eyes: EOMI, anicteric sclera  ENT: no nasal discharge  Neck: supple; no JVD appreciated  Respiratory: CTAB; no W/R/R, no increased work of breathing  Cardiac: +S1/S2; regular rate  Gastrointestinal: soft, NT/ND; no rebound or guarding; +BSx4  Extremities: WWP, no cyanosis; no peripheral edema  Musculoskeletal: NROM x4  Vascular: 2+ radial pulses B/L  Dermatologic: skin warm, dry and intact  Neurologic: Alert and oriented, no focal deficits appreciated  Psychiatric: affect and characteristics of appearance, verbalizations, behaviors are appropriate          LABS:                        8.2    18.77 )-----------( 792      ( 29 Dec 2019 10:32 )             27.0     12-29    134<L>  |  90<L>  |  29<H>  ----------------------------<  457<HH>  3.2<L>   |  24  |  1.34<H>    Ca    8.6      29 Dec 2019 12:42  Mg     1.6     12-29    TPro  7.7  /  Alb  2.7<L>  /  TBili  0.4  /  DBili  x   /  AST  35  /  ALT  27  /  AlkPhos  191<H>  12-29    PT/INR - ( 29 Dec 2019 10:32 )   PT: 15.2 sec;   INR: 1.33          PTT - ( 29 Dec 2019 10:32 )  PTT:26.9 sec      Lactate, Blood: 2.9 mmol/L (12-29-19 @ 10:32)      CARDIAC MARKERS ( 29 Dec 2019 10:32 )  x     / 0.03 ng/mL / x     / x     / x            Urinalysis Basic - ( 29 Dec 2019 12:32 )    Color: Yellow / Appearance: Clear / SG: >=1.030 / pH: x  Gluc: x / Ketone: Trace mg/dL  / Bili: Small / Urobili: 0.2 E.U./dL   Blood: x / Protein: 100 mg/dL / Nitrite: NEGATIVE   Leuk Esterase: Trace / RBC: < 5 /HPF / WBC > 10 /HPF   Sq Epi: x / Non Sq Epi: 5-10 /HPF / Bacteria: Present /HPF            EKG:    RADIOLOGY & ADDITIONAL TESTS:      Assessment:      Plan: ICU Consult Note    HPI: Patient is a 69 year old F with PMH significant for DM complicated by b/l diabetic foot ulcers, HTN, HLD, asthma, MDD, who presents with RLE ulceration and purulent discharge. She was seen for the past month at [facility] for chronic DM wounds and received TMA LLE this year, but was not happy with her care, and stopped getting wound care outside of her home for the past 2 weeks. She has also had 2 weeks of fevers, generalized weakness, decreased PO intake, and diarrhea. She has also had several days of productive cough. Additionally, she has not taken insulin for the past few days. 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 ED VS notable for T36.8, HR 73, 108/73, R16 sat 98% RA. WBC 19, 85% PMN. Hb 8.2, ESR >130, CRP 17.6, glc 444, AG 19, lactate 2.9. Got vanc, zosyn, clinda, 2L NS in ED.       REVIEW OF SYSTEMS:    CONSTITUTIONAL: Generalized weakness, fevers, chills as per HPI  EYES/ENT: Chronic visual changes;  No vertigo  NECK: No pain or stiffness  RESPIRATORY: Cough, wheezing, no hemoptysis  CARDIOVASCULAR: No chest pain or palpitations  GASTROINTESTINAL: No abdominal or epigastric pain. Reports diarrhea, NB  GENITOURINARY: No dysuria, frequency  NEUROLOGICAL: No new numbness or weakness  SKIN: 1 month of LE ulceration R foot with purulent discharge as per HPI  All other review of systems is negative unless indicated above.    PAST MEDICAL & SURGICAL HISTORY:  Local infection of skin and subcutaneous tissue: Toe infection  Type 2 diabetes mellitus with neurological manifestations: Neuropathy in diabetes  Essential hypertension: HTN (hypertension)  Hyperlipidemia: HLD (hyperlipidemia)  Type 2 diabetes mellitus: DM (diabetes mellitus)  Asthma: Asthma  Depressive disorder: Depression  Cytomegalovirus infection not present: No significant past surgical history      FAMILY HISTORY:  Family history of diabetes mellitus: Family history of diabetes mellitus (DM)      SOCIAL HISTORY:      Home Medications:  insulin:  (29 Dec 2019 10:54)      MEDICATIONS  (STANDING):  clindamycin IVPB 900 milliGRAM(s) IV Intermittent Once  insulin regular  human recombinant. 10 Unit(s) SubCutaneous once  potassium chloride    Tablet ER 40 milliEquivalent(s) Oral once  sodium chloride 0.9% Bolus 1000 milliLiter(s) IV Bolus once  sodium chloride 0.9% Bolus 1000 milliLiter(s) IV Bolus once    MEDICATIONS  (PRN):      Allergies    No Known Allergies    Intolerances        PHYSICAL EXAM:    Constitutional: WDWN resting comfortably in bed; NAD  Head: NC/AT  Eyes: EOMI, anicteric sclera  ENT: no nasal discharge  Neck: supple  Respiratory: Diffuse wheeze b/l, no increased work of breathing  Cardiac: +S1/S2; regular rate  Gastrointestinal: soft, NT/ND; no rebound or guarding; +BS  Extremities: LLE s/p TMA, with cracked wound at plantar surface of foot. RLE wrapped, serosanguinous fluid on dressing, mild foul odor emanating from dressing  Musculoskeletal: NROM x4  Neurologic: Alert and oriented, no focal deficits appreciated  Psychiatric: affect and characteristics of appearance, verbalizations, behaviors are appropriate          LABS:                        8.2    18.77 )-----------( 792      ( 29 Dec 2019 10:32 )             27.0     12-29    134<L>  |  90<L>  |  29<H>  ----------------------------<  457<HH>  3.2<L>   |  24  |  1.34<H>    Ca    8.6      29 Dec 2019 12:42  Mg     1.6     12-29    TPro  7.7  /  Alb  2.7<L>  /  TBili  0.4  /  DBili  x   /  AST  35  /  ALT  27  /  AlkPhos  191<H>  12-29    PT/INR - ( 29 Dec 2019 10:32 )   PT: 15.2 sec;   INR: 1.33          PTT - ( 29 Dec 2019 10:32 )  PTT:26.9 sec      Lactate, Blood: 2.9 mmol/L (12-29-19 @ 10:32)      CARDIAC MARKERS ( 29 Dec 2019 10:32 )  x     / 0.03 ng/mL / x     / x     / x            Urinalysis Basic - ( 29 Dec 2019 12:32 )    Color: Yellow / Appearance: Clear / SG: >=1.030 / pH: x  Gluc: x / Ketone: Trace mg/dL  / Bili: Small / Urobili: 0.2 E.U./dL   Blood: x / Protein: 100 mg/dL / Nitrite: NEGATIVE   Leuk Esterase: Trace / RBC: < 5 /HPF / WBC > 10 /HPF   Sq Epi: x / Non Sq Epi: 5-10 /HPF / Bacteria: Present /HPF            EKG: TWI lateral leads, wide QRS LBB    RADIOLOGY & ADDITIONAL TESTS: CXR no acute pathology

## 2019-12-29 NOTE — ED ADULT NURSE NOTE - SKIN INTEGRITY
multiple foot diabetic ulcers , Rt oozing, second toe multiple foot diabetic ulcers , Rt oozing, 3 rd  toe, discoloration ( blue) 2 nd toe

## 2019-12-29 NOTE — CONSULT NOTE ADULT - SUBJECTIVE AND OBJECTIVE BOX
HPI: 69F hx DM (A1c 11.2), several left toe amps’ in past, presented to ED w/ 4-5days of R foot ulcer w/ purulent drainage. Pt says she started noticing redness/swelling/pain and ulcer to plantar surface of R foot 4-5 days ago, ulcer had gotten progressively worse w/ purulent drainage. Pt c/o subjective fevers yesterday, but did not take her temps. She usually follows w/ doctors at [facility], last seen 1 month ago. Pt also says she usually takes metformin and insulin, but stopped her insulin about 2 wks ago because she lost her insulin pen. In ED, pt found to have ’s w/ anion gap of 20, beta hydroxy 1.1, LA 2.9. otherwise afebrile. Hemodynamically stable.  In ED pt received vanco 1grm @ 11am. zosyn 3.375@ 10am, clinda 900 at 1pm. regular insulin 10U at 2pm, KCL 40meq at 1pm, NS bolus total 3 liters. Pt taken to OR for Right 2nd and 3rd toe ray amputation and returned to SICU extubated, AAOX3, afebrile, HD stable for further postoperative monitoring.    PMH: DM, several left toe amps’ from different OSH in past, HTN, HL, asthma.    MEDS: meformin (unknown dosage) bid, insulin (?lantus?) 20U qd, unsure what other meds she takes.      MEDICATIONS  (STANDING):  aspirin  chewable 81 milliGRAM(s) Oral daily  chlorhexidine 2% Cloths 1 Application(s) Topical <User Schedule>  clindamycin IVPB 900 milliGRAM(s) IV Intermittent every 8 hours  dextrose 5%. 1000 milliLiter(s) (50 mL/Hr) IV Continuous <Continuous>  dextrose 50% Injectable 12.5 Gram(s) IV Push once  dextrose 50% Injectable 25 Gram(s) IV Push once  dextrose 50% Injectable 25 Gram(s) IV Push once  insulin glargine Injectable (LANTUS) 30 Unit(s) SubCutaneous at bedtime  insulin lispro (HumaLOG) corrective regimen sliding scale   SubCutaneous three times a day before meals  insulin regular Infusion 4 Unit(s)/Hr (4 mL/Hr) IV Continuous <Continuous>  lactated ringers. 1000 milliLiter(s) (125 mL/Hr) IV Continuous <Continuous>  piperacillin/tazobactam IVPB.. 3.375 Gram(s) IV Intermittent every 6 hours  vancomycin  IVPB 1000 milliGRAM(s) IV Intermittent every 12 hours    MEDICATIONS  (PRN):  acetaminophen   Tablet .. 650 milliGRAM(s) Oral every 6 hours PRN Mild Pain (1 - 3)  dextrose 40% Gel 15 Gram(s) Oral once PRN Blood Glucose LESS THAN 70 milliGRAM(s)/deciliter  glucagon  Injectable 1 milliGRAM(s) IntraMuscular once PRN Glucose LESS THAN 70 milligrams/deciliter  HYDROmorphone  Injectable 1 milliGRAM(s) IV Push every 4 hours PRN Severe Pain (7 - 10)  HYDROmorphone  Injectable 0.5 milliGRAM(s) IV Push every 4 hours PRN Moderate Pain (4 - 6)      PHYSICAL EXAM:    ICU Vital Signs Last 24 Hrs  T(C): 36.1 (29 Dec 2019 22:11), Max: 37 (29 Dec 2019 18:55)  T(F): 97 (29 Dec 2019 22:11), Max: 98.6 (29 Dec 2019 18:55)  HR: 62 (30 Dec 2019 00:00) (58 - 73)  BP: 123/57 (29 Dec 2019 23:00) (108/73 - 136/67)  BP(mean): 75 (29 Dec 2019 23:00) (74 - 94)  ABP: 98/52 (30 Dec 2019 00:00) (98/52 - 126/52)  ABP(mean): 70 (30 Dec 2019 00:00) (62 - 76)  RR: 15 (30 Dec 2019 00:00) (15 - 18)  SpO2: 93% (30 Dec 2019 00:00) (90% - 99%)    I&O's Summary    29 Dec 2019 07:01  -  30 Dec 2019 00:29  --------------------------------------------------------  IN: 1437.5 mL / OUT: 350 mL / NET: 1087.5 mL        GENERAL: AAOX3, resting comfortably  EYES: EOMI, PERRLA, conjunctiva and sclera clear  ENT:  Moist mucous membranes  NECK: Supple, No JVD  CHEST/LUNG: B/L good air entry; No rales, rhonchi, or wheezing  HEART: S1S2 normal, no S3, RRR, No murmurs  ABDOMEN: Soft, Nontender, Nondistended; no rebound, no guarding  EXTREMITIES: WWP, R foot bandage c/d/i without evidence of bleeding or exudates.    LYMPH: No lymphadenopathy noted  SKIN: No rashes or lesions    LABS:                        6.7    16.73 )-----------( 728      ( 29 Dec 2019 23:10 )             21.4     12-29    138  |  104  |  23  ----------------------------<  241<H>  4.6   |  21<L>  |  1.16    Ca    7.9<L>      29 Dec 2019 23:10  Phos  3.0     12-29  Mg     1.6     12-29    TPro  6.3  /  Alb  2.1<L>  /  TBili  0.3  /  DBili  x   /  AST  26  /  ALT  23  /  AlkPhos  159<H>  12-29    PT/INR - ( 29 Dec 2019 10:32 )   PT: 15.2 sec;   INR: 1.33          PTT - ( 29 Dec 2019 10:32 )  PTT:26.9 sec  Urinalysis Basic - ( 29 Dec 2019 12:32 )    Color: Yellow / Appearance: Clear / SG: >=1.030 / pH: x  Gluc: x / Ketone: Trace mg/dL  / Bili: Small / Urobili: 0.2 E.U./dL   Blood: x / Protein: 100 mg/dL / Nitrite: NEGATIVE   Leuk Esterase: Trace / RBC: < 5 /HPF / WBC > 10 /HPF   Sq Epi: x / Non Sq Epi: 5-10 /HPF / Bacteria: Present /HPF      CAPILLARY BLOOD GLUCOSE      POCT Blood Glucose.: 266 mg/dL (30 Dec 2019 00:12)  POCT Blood Glucose.: 450 mg/dL (29 Dec 2019 14:04)  POCT Blood Glucose.: 444 mg/dL (29 Dec 2019 10:00)    ABG - ( 29 Dec 2019 19:43 )  pH, Arterial: 7.42  pH, Blood: x     /  pCO2: 40    /  pO2: 124   / HCO3: 26    / Base Excess: 1.0   /  SaO2: 99                  RADIOLOGY & ADDITIONAL TESTS:    Consultant(s) Notes Reviewed:  [x ] YES  [ ] NO    Care Discussed with Consultants/Other Providers [ x] YES  [ ] NO

## 2019-12-29 NOTE — H&P ADULT - NSHPPHYSICALEXAM_GEN_ALL_CORE
PHYSICAL EXAM:      Constitutional: AAOx3    Neck: supple     Respiratory: b/l clear     Cardiovascular: s1 s2 Regular     Extremities: ulceration of right distal to mid right foot (plantar aspect) purulent discharge from plantar ulcer and from between toes 2-3. erythema of right foot noted. edema of distal foot   no edema of right ankle   able to flex right ankle   able to move right 4/5th toes   Bilateral DP/PT pulses palpable   sensation decreased in distal right foot

## 2019-12-29 NOTE — H&P ADULT - ASSESSMENT
A/P: 70 YO F with right foot diabetic wound     - Admit to SICU under Dr. Hutchinson   - NPO and IVF now, Agree with Podiatry plan for OR today for right foot debridement n setting of foot x-ray with gas   -continue IV Vancomycin/Zosyn/ Clinda with IV abx   - No Vascular Surgery intervention at this time   -Trend CBC, Fevers, Lactate   -glucose control with Insulin drip per SICU

## 2019-12-29 NOTE — H&P ADULT - HISTORY OF PRESENT ILLNESS
68 YO F with hx of  DM and bilateral diabetic foot wounds presents to ED with Right foot wound and purulent discharge. Patient reports that she has had wounds of her right foot for about 1 month and has been under the care of a wound care Doctor at outside hospital. She was been unhappy with the progress and the care she has been receiving. Recently she states she has been unwell for about 15 days and has not been able to leave her house for wound care. She also reports generalized weakness, decreased appetite, fevers and diarrhea for about two week. She has not been able to locate her insulin for several days and as a result has not taken it. Admits to feeling fatigue and chills. Denies CP, SOB, N,V, admits to decreased sensation in bilateral feet. 70 YO F with hx of severe depression, DM and bilateral diabetic foot wounds presents to ED with Right foot wound and purulent discharge. Patient reports that she has had wounds of her right foot for about 1 month and has been under the care of a wound care Doctor at outside hospital. She was been unhappy with the progress and the care she has been receiving. Recently she states she has been unwell for about 15 days and has not been able to leave her house for wound care. She also reports generalized weakness, decreased appetite, fevers and diarrhea for about two week. She has not been able to locate her insulin for several days and as a result has not taken it. Admits to feeling fatigue and chills. Denies CP, SOB, N,V, admits to decreased sensation in bilateral feet.

## 2019-12-29 NOTE — H&P ADULT - ATTENDING COMMENTS
Patient seen and examined    I agree with the findings and statements as documented above    Plan as stated above

## 2019-12-29 NOTE — PROGRESS NOTE ADULT - SUBJECTIVE AND OBJECTIVE BOX
PRE-OP NOTE    Pre-Op Diagnosis: R foot gas gangrene  Planned Procedure: R foot debridement/partial amputation  Scheduled Date / Time: 12/29/31  Indication: R foot gas gangrene    Labs:                       8.2    18.77 )-----------( 792      ( 29 Dec 2019 10:32 )             27.0   12-29    134<L>  |  90<L>  |  29<H>  ----------------------------<  457<HH>  3.2<L>   |  24  |  1.34<H>    Ca    8.6      29 Dec 2019 12:42  Mg     1.6     12-29    TPro  7.7  /  Alb  2.7<L>  /  TBili  0.4  /  DBili  x   /  AST  35  /  ALT  27  /  AlkPhos  191<H>  12-29  LIVER FUNCTIONS - ( 29 Dec 2019 12:42 )  Alb: 2.7 g/dL / Pro: 7.7 g/dL / ALK PHOS: 191 U/L / ALT: 27 U/L / AST: 35 U/L / GGT: x           Vitals: Vital Signs Last 24 Hrs  T(C): 36.8 (29 Dec 2019 09:57), Max: 36.8 (29 Dec 2019 09:57)  T(F): 98.2 (29 Dec 2019 09:57), Max: 98.2 (29 Dec 2019 09:57)  HR: 68 (29 Dec 2019 11:08) (68 - 73)  BP: 134/65 (29 Dec 2019 11:08) (108/73 - 134/65)  BP(mean): --  RR: 16 (29 Dec 2019 11:08) (16 - 16)  SpO2: 97% (29 Dec 2019 11:08) (97% - 98%)  PE: See note  Official CXR reading: (On Chart)  Official EKG reading: (On Chart)  Type and Cross/Screen: Ordered  NPO  Antibiotics: Vanc/Clinda/Zosyn  Anesthesia evaluation - pending  Operative Consent (on chart) PRE-OP NOTE    Pre-Op Diagnosis: R foot gas gangrene  Planned Procedure: R foot debridement/partial amputation  Scheduled Date / Time: 12/29/19  Indication: R foot gas gangrene    Labs:                       8.2    18.77 )-----------( 792      ( 29 Dec 2019 10:32 )             27.0   12-29    134<L>  |  90<L>  |  29<H>  ----------------------------<  457<HH>  3.2<L>   |  24  |  1.34<H>    Ca    8.6      29 Dec 2019 12:42  Mg     1.6     12-29    TPro  7.7  /  Alb  2.7<L>  /  TBili  0.4  /  DBili  x   /  AST  35  /  ALT  27  /  AlkPhos  191<H>  12-29  LIVER FUNCTIONS - ( 29 Dec 2019 12:42 )  Alb: 2.7 g/dL / Pro: 7.7 g/dL / ALK PHOS: 191 U/L / ALT: 27 U/L / AST: 35 U/L / GGT: x           Vitals: Vital Signs Last 24 Hrs  T(C): 36.8 (29 Dec 2019 09:57), Max: 36.8 (29 Dec 2019 09:57)  T(F): 98.2 (29 Dec 2019 09:57), Max: 98.2 (29 Dec 2019 09:57)  HR: 68 (29 Dec 2019 11:08) (68 - 73)  BP: 134/65 (29 Dec 2019 11:08) (108/73 - 134/65)  BP(mean): --  RR: 16 (29 Dec 2019 11:08) (16 - 16)  SpO2: 97% (29 Dec 2019 11:08) (97% - 98%)  PE: See note  Official CXR reading: (On Chart)  Official EKG reading: (On Chart)  Type and Cross/Screen: Ordered  NPO  Antibiotics: Vanc/Clinda/Zosyn  Anesthesia evaluation - pending  Operative Consent (on chart)

## 2019-12-29 NOTE — ED PROVIDER NOTE - CLINICAL SUMMARY MEDICAL DECISION MAKING FREE TEXT BOX
Patient in ED w concern for worsening of right foot infection over the past month.  Afebrile, however patient noted to have significantly elevated WBC ct and purulent drainage from foot.  Vascular and podiatry teams in ED to eval and podiatry planning to take patient to OR.  Blood cx, abx ordered and patient is in agreement to plan for admission.  Will admit at this time. Patient in ED w concern for worsening of right foot infection over the past month.  Afebrile, however patient noted to have significantly elevated WBC ct and purulent drainage from foot.  Vascular and podiatry teams in ED to eval and podiatry planning to take patient to OR.  Blood cx, abx ordered and patient is in agreement to plan for admission.  Will admit at this time to Dr. Hutchinson, SICU.  Patient is aware of plan.

## 2019-12-29 NOTE — PRE-OP CHECKLIST - COMMENTS
Pt stated have not have anything to eat for 17 days , received sip of water with PO meds 2 hours ago 12;:50 pm

## 2019-12-29 NOTE — ED ADULT NURSE NOTE - OBJECTIVE STATEMENT
Pt c/o right foot pain chronic foot ulcer getting worse did not go back for wound care to [facility] , sick with cold like symptoms and diarrhea for 15 days lost medication, Insulin not taken for 15 days . , multiple amputation on left foot Pt c/o right foot pain chronic foot ulcer ( rt foot 3rd toe) getting worse did not go back for wound care to [facility] , sick with cold like symptoms and diarrhea for 15 days lost medication, Insulin not taken for 15 days . , multiple amputation on left foot, 2-4,

## 2019-12-29 NOTE — ED PROVIDER NOTE - PROGRESS NOTE DETAILS
Vascular surgery team consulted and in ED to evaluate patient. Leukocytosis noted.  Blood cultures and abx previously ordered.  [facility] surgery has evaluated patient and now awaiting podiatry evaluation.  Podiatry contacted and en route to ED.  Patient stable and aware of plan.

## 2019-12-29 NOTE — CONSULT NOTE ADULT - ASSESSMENT
A/P: 70 YO F with right foot diabetic wound     1. Patient noted to have 2+ pulses in right foot,   2. Agree with Podiatry plan for OR today for right foot debridement vs. Toe amputation in setting of foot x-ray with gas   3. Agree with IV abx   4. No Vascular Surgery intervention at this time   5. Vascular will continue to follow   6. monitor for fevers, consider MICU admission for elevated FS and lactate?     Case discussed with Chief Resident and Vascular Surgery Attending

## 2019-12-29 NOTE — CONSULT NOTE ADULT - SUBJECTIVE AND OBJECTIVE BOX
Attending: Swathi JOVEL PMx DM presents w/ R foot ulcer     Review of systems negative except per HPI    PAST MEDICAL & SURGICAL HISTORY:  Local infection of skin and subcutaneous tissue: Toe infection  Type 2 diabetes mellitus with neurological manifestations: Neuropathy in diabetes  Essential hypertension: HTN (hypertension)  Hyperlipidemia: HLD (hyperlipidemia)  Type 2 diabetes mellitus: DM (diabetes mellitus)  Asthma: Asthma  Depressive disorder: Depression  Cytomegalovirus infection not present: No significant past surgical history    Home Medications:  insulin:  (29 Dec 2019 10:54)    Allergies    No Known Allergies    Intolerances      FAMILY HISTORY:  Family history of diabetes mellitus: Family history of diabetes mellitus (DM)    Social History:       LABS                        8.2    18.77 )-----------( 792      ( 29 Dec 2019 10:32 )             27.0     12-29    130<L>  |  88<L>  |  28<H>  ----------------------------<  461<HH>  see note   |  23  |  1.32<H>    Ca    9.1      29 Dec 2019 10:32  Mg     1.6     12-29    TPro  7.9  /  Alb  2.8<L>  /  TBili  0.4  /  DBili  x   /  AST  see note  /  ALT  see note  /  AlkPhos  see note  12-29    PT/INR - ( 29 Dec 2019 10:32 )   PT: 15.2 sec;   INR: 1.33          PTT - ( 29 Dec 2019 10:32 )  PTT:26.9 sec    Vital Signs Last 24 Hrs  T(C): 36.8 (29 Dec 2019 09:57), Max: 36.8 (29 Dec 2019 09:57)  T(F): 98.2 (29 Dec 2019 09:57), Max: 98.2 (29 Dec 2019 09:57)  HR: 68 (29 Dec 2019 11:08) (68 - 73)  BP: 134/65 (29 Dec 2019 11:08) (108/73 - 134/65)  BP(mean): --  RR: 16 (29 Dec 2019 11:08) (16 - 16)  SpO2: 97% (29 Dec 2019 11:08) (97% - 98%)    PHYSICAL EXAM  General: NAD, AA0x3    Lower Extremity Focused:  Vasc:  Derm:  Neuro:  MSK:     RADIOLOGY Attending: Swathi JOVEL PMx DM presents w/ R foot ulcer     Review of systems negative except per HPI    PAST MEDICAL & SURGICAL HISTORY:  Local infection of skin and subcutaneous tissue: Toe infection  Type 2 diabetes mellitus with neurological manifestations: Neuropathy in diabetes  Essential hypertension: HTN (hypertension)  Hyperlipidemia: HLD (hyperlipidemia)  Type 2 diabetes mellitus: DM (diabetes mellitus)  Asthma: Asthma  Depressive disorder: Depression  Cytomegalovirus infection not present: No significant past surgical history    Home Medications:  insulin:  (29 Dec 2019 10:54)    Allergies    No Known Allergies    Intolerances      FAMILY HISTORY:  Family history of diabetes mellitus: Family history of diabetes mellitus (DM)    Social History:       LABS                        8.2    18.77 )-----------( 792      ( 29 Dec 2019 10:32 )             27.0     12-29    130<L>  |  88<L>  |  28<H>  ----------------------------<  461<HH>  see note   |  23  |  1.32<H>    Ca    9.1      29 Dec 2019 10:32  Mg     1.6     12-29    TPro  7.9  /  Alb  2.8<L>  /  TBili  0.4  /  DBili  x   /  AST  see note  /  ALT  see note  /  AlkPhos  see note  12-29    PT/INR - ( 29 Dec 2019 10:32 )   PT: 15.2 sec;   INR: 1.33          PTT - ( 29 Dec 2019 10:32 )  PTT:26.9 sec    Vital Signs Last 24 Hrs  T(C): 36.8 (29 Dec 2019 09:57), Max: 36.8 (29 Dec 2019 09:57)  T(F): 98.2 (29 Dec 2019 09:57), Max: 98.2 (29 Dec 2019 09:57)  HR: 68 (29 Dec 2019 11:08) (68 - 73)  BP: 134/65 (29 Dec 2019 11:08) (108/73 - 134/65)  BP(mean): --  RR: 16 (29 Dec 2019 11:08) (16 - 16)  SpO2: 97% (29 Dec 2019 11:08) (97% - 98%)    PHYSICAL EXAM  General: NAD, AA0x3    Lower Extremity Focused:  Vasc: DP & PT 2/4 B/L  Derm:  Neuro: Diminished  MSK: Ambulatory    RADIOLOGY  X-Ray: R foot w/ gas of the 2nd/3rd toes. Attending: Swathi JOVEL PMx DM presents w/ R foot diabetic ulcer who presented for generalized weakness and pain in the right foot for 1 month.  She states she was under care at another facility, but was unhappy with the care she received there so did not follow up.  She denies F/C/N/V/SOB. Endorses diarrhea.    Review of systems negative except per HPI    PAST MEDICAL & SURGICAL HISTORY:  Local infection of skin and subcutaneous tissue: Toe infection  Type 2 diabetes mellitus with neurological manifestations: Neuropathy in diabetes  Essential hypertension: HTN (hypertension)  Hyperlipidemia: HLD (hyperlipidemia)  Type 2 diabetes mellitus: DM (diabetes mellitus)  Asthma: Asthma  Depressive disorder: Depression  Cytomegalovirus infection not present: No significant past surgical history    Home Medications:  insulin:  (29 Dec 2019 10:54)    Allergies    No Known Allergies    Intolerances      FAMILY HISTORY:  Family history of diabetes mellitus: Family history of diabetes mellitus (DM)    Social History:       LABS                        8.2    18.77 )-----------( 792      ( 29 Dec 2019 10:32 )             27.0     12-29    130<L>  |  88<L>  |  28<H>  ----------------------------<  461<HH>  see note   |  23  |  1.32<H>    Ca    9.1      29 Dec 2019 10:32  Mg     1.6     12-29    TPro  7.9  /  Alb  2.8<L>  /  TBili  0.4  /  DBili  x   /  AST  see note  /  ALT  see note  /  AlkPhos  see note  12-29    PT/INR - ( 29 Dec 2019 10:32 )   PT: 15.2 sec;   INR: 1.33          PTT - ( 29 Dec 2019 10:32 )  PTT:26.9 sec    Vital Signs Last 24 Hrs  T(C): 36.8 (29 Dec 2019 09:57), Max: 36.8 (29 Dec 2019 09:57)  T(F): 98.2 (29 Dec 2019 09:57), Max: 98.2 (29 Dec 2019 09:57)  HR: 68 (29 Dec 2019 11:08) (68 - 73)  BP: 134/65 (29 Dec 2019 11:08) (108/73 - 134/65)  BP(mean): --  RR: 16 (29 Dec 2019 11:08) (16 - 16)  SpO2: 97% (29 Dec 2019 11:08) (97% - 98%)    PHYSICAL EXAM  General: NAD, AA0x3    Lower Extremity Focused:  Vasc: DP & PT 2/4 B/L  Derm: R foot submet 3 ulcer w/ probing to bone. Dorsal 2nd toe ulcer w/ probing to bone and 5cc of pus expressed bedside. Dusky appearance of plantar 2nd/3rd toes. Erythema extending to the dorsal midfoot.  Neuro: Diminished  MSK: Ambulatory    RADIOLOGY  X-Ray: R foot w/ gas of the 2nd/3rd toes. Attending: Swathi JOVEL PMx DM presents w/ R foot diabetic ulcer who presented for generalized weakness and pain in the right foot for 1 month.  She states she was under care at another facility, but was unhappy with the care she received there so did not follow up.  She denies F/C/N/V/SOB. Endorses diarrhea.    Review of systems negative except per HPI    PAST MEDICAL & SURGICAL HISTORY:  Local infection of skin and subcutaneous tissue: Toe infection  Type 2 diabetes mellitus with neurological manifestations: Neuropathy in diabetes  Essential hypertension: HTN (hypertension)  Hyperlipidemia: HLD (hyperlipidemia)  Type 2 diabetes mellitus: DM (diabetes mellitus)  Asthma: Asthma  Depressive disorder: Depression  Cytomegalovirus infection not present: No significant past surgical history    Home Medications:  insulin:  (29 Dec 2019 10:54)    Allergies    No Known Allergies    Intolerances      FAMILY HISTORY:  Family history of diabetes mellitus: Family history of diabetes mellitus (DM)    Social History:       LABS                        8.2    18.77 )-----------( 792      ( 29 Dec 2019 10:32 )             27.0     12-29    130<L>  |  88<L>  |  28<H>  ----------------------------<  461<HH>  see note   |  23  |  1.32<H>    Ca    9.1      29 Dec 2019 10:32  Mg     1.6     12-29    TPro  7.9  /  Alb  2.8<L>  /  TBili  0.4  /  DBili  x   /  AST  see note  /  ALT  see note  /  AlkPhos  see note  12-29    PT/INR - ( 29 Dec 2019 10:32 )   PT: 15.2 sec;   INR: 1.33          PTT - ( 29 Dec 2019 10:32 )  PTT:26.9 sec    Vital Signs Last 24 Hrs  T(C): 36.8 (29 Dec 2019 09:57), Max: 36.8 (29 Dec 2019 09:57)  T(F): 98.2 (29 Dec 2019 09:57), Max: 98.2 (29 Dec 2019 09:57)  HR: 68 (29 Dec 2019 11:08) (68 - 73)  BP: 134/65 (29 Dec 2019 11:08) (108/73 - 134/65)  BP(mean): --  RR: 16 (29 Dec 2019 11:08) (16 - 16)  SpO2: 97% (29 Dec 2019 11:08) (97% - 98%)    PHYSICAL EXAM  General: NAD, AA0x3    Lower Extremity Focused:  Vasc: DP & PT 2/4 B/L  Derm: R foot submet 3 ulcer w/ probing to bone. Dorsal 2nd toe ulcer w/ probing to bone and 5cc of pus expressed bedside. Dusky appearance of plantar 2nd/3rd toes. Erythema extending to the dorsal midfoot.  Neuro: Diminished  MSK: Ambulatory    RADIOLOGY  X-Ray: R foot w/ gas of the 2nd/3rd toes w/ destructive changes of the 2nd/3rd MPJ.

## 2019-12-29 NOTE — ED ADULT NURSE NOTE - PMH
Asthma  Asthma  Depressive disorder  Depression  Essential hypertension  HTN (hypertension)  Hyperlipidemia  HLD (hyperlipidemia)  Local infection of skin and subcutaneous tissue  Toe infection  Type 2 diabetes mellitus  DM (diabetes mellitus)  Type 2 diabetes mellitus with neurological manifestations  Neuropathy in diabetes

## 2019-12-29 NOTE — ED ADULT NURSE REASSESSMENT NOTE - NS ED NURSE REASSESS COMMENT FT1
Pt seen Podiatry . Pt signed consent for surgery, was seen ICU consult. Pt awaiting admittance. Pt not in distress

## 2019-12-29 NOTE — CONSULT NOTE ADULT - ASSESSMENT
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      NEURO: dilaudid PRN pain.    CV: hemodynamically stable at this time. Pt unsure what other meds she takes.    PULM: no resp distress. On room air.    GI/FEN: NPO for now pending R foot debridement w/ Podiatry tonight. LR@ 100   : voids.    ENDO: labs suggest DKA w/ gap of 20. Insulin gtts.    ID: R foot infected ulcers: OR w/ podiatry tonight. Vanc/zosyn/clinda.    PPX: SCDs.    LINES: PIV.    WOUNDS/DRAINS: R plantar ulcer w/ pus and 3rd mid toe ulcer/pus/necrosis. 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      NEURO: dilaudid PRN pain.    CV: hemodynamically stable at this time. Pt unsure what other meds she takes.    PULM: no resp distress. On room air.    GI/FEN: NPO for now pending R foot debridement w/ Podiatry tonight. LR@ 100   : voids.    ENDO: labs suggest DKA w/ gap of 20 and beta hydroxy of 1.1, replenish K and start Insulin gtts.    ID: R foot infected ulcers: OR w/ podiatry tonight. Vanc/zosyn/clinda.    PPX: SCDs.    LINES: PIV.    WOUNDS/DRAINS: R plantar ulcer w/ pus and 3rd mid toe ulcer/pus/necrosis.

## 2019-12-30 LAB
ANION GAP SERPL CALC-SCNC: 12 MMOL/L — SIGNIFICANT CHANGE UP (ref 5–17)
BASOPHILS # BLD AUTO: 0.06 K/UL — SIGNIFICANT CHANGE UP (ref 0–0.2)
BASOPHILS NFR BLD AUTO: 0.4 % — SIGNIFICANT CHANGE UP (ref 0–2)
BUN SERPL-MCNC: 23 MG/DL — SIGNIFICANT CHANGE UP (ref 7–23)
CALCIUM SERPL-MCNC: 8.1 MG/DL — LOW (ref 8.4–10.5)
CHLORIDE SERPL-SCNC: 103 MMOL/L — SIGNIFICANT CHANGE UP (ref 96–108)
CO2 SERPL-SCNC: 23 MMOL/L — SIGNIFICANT CHANGE UP (ref 22–31)
CREAT SERPL-MCNC: 1.31 MG/DL — HIGH (ref 0.5–1.3)
CULTURE RESULTS: SIGNIFICANT CHANGE UP
EOSINOPHIL # BLD AUTO: 0.26 K/UL — SIGNIFICANT CHANGE UP (ref 0–0.5)
EOSINOPHIL NFR BLD AUTO: 1.7 % — SIGNIFICANT CHANGE UP (ref 0–6)
GLUCOSE BLDC GLUCOMTR-MCNC: 118 MG/DL — HIGH (ref 70–99)
GLUCOSE BLDC GLUCOMTR-MCNC: 125 MG/DL — HIGH (ref 70–99)
GLUCOSE BLDC GLUCOMTR-MCNC: 169 MG/DL — HIGH (ref 70–99)
GLUCOSE BLDC GLUCOMTR-MCNC: 174 MG/DL — HIGH (ref 70–99)
GLUCOSE BLDC GLUCOMTR-MCNC: 204 MG/DL — HIGH (ref 70–99)
GLUCOSE BLDC GLUCOMTR-MCNC: 258 MG/DL — HIGH (ref 70–99)
GLUCOSE BLDC GLUCOMTR-MCNC: 266 MG/DL — HIGH (ref 70–99)
GLUCOSE BLDC GLUCOMTR-MCNC: 90 MG/DL — SIGNIFICANT CHANGE UP (ref 70–99)
GLUCOSE SERPL-MCNC: 116 MG/DL — HIGH (ref 70–99)
GRAM STN FLD: SIGNIFICANT CHANGE UP
HCT VFR BLD CALC: 23.5 % — LOW (ref 34.5–45)
HCV AB S/CO SERPL IA: 0.09 S/CO — SIGNIFICANT CHANGE UP
HCV AB SERPL-IMP: SIGNIFICANT CHANGE UP
HGB BLD-MCNC: 7.3 G/DL — LOW (ref 11.5–15.5)
IMM GRANULOCYTES NFR BLD AUTO: 1.4 % — SIGNIFICANT CHANGE UP (ref 0–1.5)
LACTATE SERPL-SCNC: 1.3 MMOL/L — SIGNIFICANT CHANGE UP (ref 0.5–2)
LYMPHOCYTES # BLD AUTO: 1.75 K/UL — SIGNIFICANT CHANGE UP (ref 1–3.3)
LYMPHOCYTES # BLD AUTO: 11.3 % — LOW (ref 13–44)
MAGNESIUM SERPL-MCNC: 1.7 MG/DL — SIGNIFICANT CHANGE UP (ref 1.6–2.6)
MCHC RBC-ENTMCNC: 24.2 PG — LOW (ref 27–34)
MCHC RBC-ENTMCNC: 31.1 GM/DL — LOW (ref 32–36)
MCV RBC AUTO: 77.8 FL — LOW (ref 80–100)
MONOCYTES # BLD AUTO: 1.18 K/UL — HIGH (ref 0–0.9)
MONOCYTES NFR BLD AUTO: 7.6 % — SIGNIFICANT CHANGE UP (ref 2–14)
NEUTROPHILS # BLD AUTO: 12.01 K/UL — HIGH (ref 1.8–7.4)
NEUTROPHILS NFR BLD AUTO: 77.6 % — HIGH (ref 43–77)
NRBC # BLD: 0 /100 WBCS — SIGNIFICANT CHANGE UP (ref 0–0)
PHOSPHATE SERPL-MCNC: 2.7 MG/DL — SIGNIFICANT CHANGE UP (ref 2.5–4.5)
PLATELET # BLD AUTO: 564 K/UL — HIGH (ref 150–400)
POTASSIUM SERPL-MCNC: 3.6 MMOL/L — SIGNIFICANT CHANGE UP (ref 3.5–5.3)
POTASSIUM SERPL-SCNC: 3.6 MMOL/L — SIGNIFICANT CHANGE UP (ref 3.5–5.3)
RBC # BLD: 3.02 M/UL — LOW (ref 3.8–5.2)
RBC # FLD: 17.6 % — HIGH (ref 10.3–14.5)
SODIUM SERPL-SCNC: 138 MMOL/L — SIGNIFICANT CHANGE UP (ref 135–145)
SPECIMEN SOURCE: SIGNIFICANT CHANGE UP
WBC # BLD: 15.48 K/UL — HIGH (ref 3.8–10.5)
WBC # FLD AUTO: 15.48 K/UL — HIGH (ref 3.8–10.5)

## 2019-12-30 PROCEDURE — 99233 SBSQ HOSP IP/OBS HIGH 50: CPT | Mod: GC

## 2019-12-30 PROCEDURE — 99231 SBSQ HOSP IP/OBS SF/LOW 25: CPT | Mod: GC

## 2019-12-30 PROCEDURE — 99222 1ST HOSP IP/OBS MODERATE 55: CPT | Mod: GC

## 2019-12-30 RX ORDER — INSULIN GLARGINE 100 [IU]/ML
30 INJECTION, SOLUTION SUBCUTANEOUS AT BEDTIME
Refills: 0 | Status: DISCONTINUED | OUTPATIENT
Start: 2019-12-30 | End: 2019-12-30

## 2019-12-30 RX ORDER — DEXTROSE 50 % IN WATER 50 %
25 SYRINGE (ML) INTRAVENOUS ONCE
Refills: 0 | Status: DISCONTINUED | OUTPATIENT
Start: 2019-12-30 | End: 2020-01-09

## 2019-12-30 RX ORDER — INSULIN LISPRO 100/ML
VIAL (ML) SUBCUTANEOUS
Refills: 0 | Status: DISCONTINUED | OUTPATIENT
Start: 2019-12-30 | End: 2020-01-09

## 2019-12-30 RX ORDER — INSULIN LISPRO 100/ML
6 VIAL (ML) SUBCUTANEOUS ONCE
Refills: 0 | Status: COMPLETED | OUTPATIENT
Start: 2019-12-30 | End: 2019-12-30

## 2019-12-30 RX ORDER — SODIUM CHLORIDE 9 MG/ML
1000 INJECTION, SOLUTION INTRAVENOUS
Refills: 0 | Status: DISCONTINUED | OUTPATIENT
Start: 2019-12-30 | End: 2020-01-09

## 2019-12-30 RX ORDER — INSULIN LISPRO 100/ML
2 VIAL (ML) SUBCUTANEOUS ONCE
Refills: 0 | Status: DISCONTINUED | OUTPATIENT
Start: 2019-12-30 | End: 2019-12-30

## 2019-12-30 RX ORDER — DEXTROSE 50 % IN WATER 50 %
12.5 SYRINGE (ML) INTRAVENOUS ONCE
Refills: 0 | Status: DISCONTINUED | OUTPATIENT
Start: 2019-12-30 | End: 2020-01-09

## 2019-12-30 RX ORDER — INSULIN LISPRO 100/ML
6 VIAL (ML) SUBCUTANEOUS
Refills: 0 | Status: DISCONTINUED | OUTPATIENT
Start: 2019-12-30 | End: 2019-12-31

## 2019-12-30 RX ORDER — INSULIN LISPRO 100/ML
VIAL (ML) SUBCUTANEOUS
Refills: 0 | Status: DISCONTINUED | OUTPATIENT
Start: 2019-12-30 | End: 2019-12-30

## 2019-12-30 RX ORDER — GLUCAGON INJECTION, SOLUTION 0.5 MG/.1ML
1 INJECTION, SOLUTION SUBCUTANEOUS ONCE
Refills: 0 | Status: DISCONTINUED | OUTPATIENT
Start: 2019-12-30 | End: 2020-01-09

## 2019-12-30 RX ORDER — MAGNESIUM SULFATE 500 MG/ML
2 VIAL (ML) INJECTION ONCE
Refills: 0 | Status: COMPLETED | OUTPATIENT
Start: 2019-12-30 | End: 2019-12-30

## 2019-12-30 RX ORDER — ENOXAPARIN SODIUM 100 MG/ML
40 INJECTION SUBCUTANEOUS EVERY 24 HOURS
Refills: 0 | Status: DISCONTINUED | OUTPATIENT
Start: 2019-12-30 | End: 2020-01-09

## 2019-12-30 RX ORDER — DEXTROSE 50 % IN WATER 50 %
15 SYRINGE (ML) INTRAVENOUS ONCE
Refills: 0 | Status: DISCONTINUED | OUTPATIENT
Start: 2019-12-30 | End: 2020-01-09

## 2019-12-30 RX ORDER — INSULIN GLARGINE 100 [IU]/ML
25 INJECTION, SOLUTION SUBCUTANEOUS AT BEDTIME
Refills: 0 | Status: DISCONTINUED | OUTPATIENT
Start: 2019-12-30 | End: 2019-12-31

## 2019-12-30 RX ORDER — POTASSIUM CHLORIDE 20 MEQ
40 PACKET (EA) ORAL ONCE
Refills: 0 | Status: COMPLETED | OUTPATIENT
Start: 2019-12-30 | End: 2019-12-30

## 2019-12-30 RX ADMIN — CHLORHEXIDINE GLUCONATE 1 APPLICATION(S): 213 SOLUTION TOPICAL at 06:32

## 2019-12-30 RX ADMIN — HYDROMORPHONE HYDROCHLORIDE 1 MILLIGRAM(S): 2 INJECTION INTRAMUSCULAR; INTRAVENOUS; SUBCUTANEOUS at 00:00

## 2019-12-30 RX ADMIN — INSULIN GLARGINE 30 UNIT(S): 100 INJECTION, SOLUTION SUBCUTANEOUS at 01:01

## 2019-12-30 RX ADMIN — PIPERACILLIN AND TAZOBACTAM 200 GRAM(S): 4; .5 INJECTION, POWDER, LYOPHILIZED, FOR SOLUTION INTRAVENOUS at 00:41

## 2019-12-30 RX ADMIN — Medication 2: at 22:44

## 2019-12-30 RX ADMIN — SODIUM CHLORIDE 125 MILLILITER(S): 9 INJECTION, SOLUTION INTRAVENOUS at 04:07

## 2019-12-30 RX ADMIN — Medication 6 UNIT(S): at 02:23

## 2019-12-30 RX ADMIN — Medication 50 GRAM(S): at 07:46

## 2019-12-30 RX ADMIN — HYDROMORPHONE HYDROCHLORIDE 0.5 MILLIGRAM(S): 2 INJECTION INTRAMUSCULAR; INTRAVENOUS; SUBCUTANEOUS at 17:08

## 2019-12-30 RX ADMIN — HYDROMORPHONE HYDROCHLORIDE 0.5 MILLIGRAM(S): 2 INJECTION INTRAMUSCULAR; INTRAVENOUS; SUBCUTANEOUS at 22:30

## 2019-12-30 RX ADMIN — PIPERACILLIN AND TAZOBACTAM 200 GRAM(S): 4; .5 INJECTION, POWDER, LYOPHILIZED, FOR SOLUTION INTRAVENOUS at 19:08

## 2019-12-30 RX ADMIN — Medication 100 MILLIGRAM(S): at 06:59

## 2019-12-30 RX ADMIN — Medication 2: at 17:42

## 2019-12-30 RX ADMIN — HYDROMORPHONE HYDROCHLORIDE 0.5 MILLIGRAM(S): 2 INJECTION INTRAMUSCULAR; INTRAVENOUS; SUBCUTANEOUS at 17:18

## 2019-12-30 RX ADMIN — PIPERACILLIN AND TAZOBACTAM 200 GRAM(S): 4; .5 INJECTION, POWDER, LYOPHILIZED, FOR SOLUTION INTRAVENOUS at 14:10

## 2019-12-30 RX ADMIN — Medication 6 UNIT(S): at 00:08

## 2019-12-30 RX ADMIN — PIPERACILLIN AND TAZOBACTAM 200 GRAM(S): 4; .5 INJECTION, POWDER, LYOPHILIZED, FOR SOLUTION INTRAVENOUS at 06:10

## 2019-12-30 RX ADMIN — Medication 250 MILLIGRAM(S): at 11:28

## 2019-12-30 RX ADMIN — Medication 81 MILLIGRAM(S): at 22:15

## 2019-12-30 RX ADMIN — HYDROMORPHONE HYDROCHLORIDE 0.5 MILLIGRAM(S): 2 INJECTION INTRAMUSCULAR; INTRAVENOUS; SUBCUTANEOUS at 22:15

## 2019-12-30 RX ADMIN — Medication 40 MILLIEQUIVALENT(S): at 11:30

## 2019-12-30 RX ADMIN — INSULIN GLARGINE 25 UNIT(S): 100 INJECTION, SOLUTION SUBCUTANEOUS at 22:44

## 2019-12-30 RX ADMIN — ENOXAPARIN SODIUM 40 MILLIGRAM(S): 100 INJECTION SUBCUTANEOUS at 19:07

## 2019-12-30 NOTE — PROGRESS NOTE ADULT - ASSESSMENT
69F PMHx DM presents w/ R foot ulcer. WBC 18.77, Lactate 2.9. LRINEC score >6. Gas seen on X-ray. S/p right partial 2nd and 4rd ray amputation 12/29.    -C/w broad spectrum IV abx  -F/u wound culture from ED and OR  -Wound flushed and packed with iodoform packing followed by DSD  -Podiatry following  -Plan discussed w/ Dr. Echevarria

## 2019-12-30 NOTE — CONSULT NOTE ADULT - ATTENDING COMMENTS
sp toe amputation for gangrene, on abx.  DKA, plan to transition to SQ from IV insulin infusion as per DKA protocol, after anion gap closed x 2, checking bmp q4h.
Pt seen on rounds this afternoon.  Has a 20-year history of insulin-requiring type 2 DM, complicated by retinopathy, neuropathy and recurrent diabetic foot infections which eventuated in multiple toe amputations.  Glycemic control is quite poor as outpatient (A1c 11%). partially due to an inadequate regimen (basal insulin plus Janumet) plus her tendency to eat one large meal a day.  Glucose was 450 on admission, but decreased to 118 this morning after correction doses in ED plus 30 units of Lantus.    Would decrease the Lantus to 25 units, start pre-meal lispro at 6 units.  Emphasized to her that she needs to change her eating patterns to 3 more modest meals daily, and likely needs standing pre-meal insulin

## 2019-12-30 NOTE — PROGRESS NOTE ADULT - ASSESSMENT
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      NEURO: dilaudid PRN pain.    CV: hemodynamically stable. Pt unsure what other meds she takes.    PULM: no resp distress. On room air.    GI/FEN: diabetic diet.   : voids.    ENDO: labs from ED suggest DKA w/ gap of 20 and beta hydroxy of 1.1, got regular insulin, then lantus 30 last night. anion gap closed. fingerstick improved. no need for insulin gtts.   ID: R foot infected ulcers: s/p OR w/ podiatry last night. Vanc/zosyn/clinda. stop clinda per vascular.   PPX: SCDs. hold off on SQH/SQL for now per vascular.   LINES: PIV.    WOUNDS/DRAINS: R foot wounds with bulky dsg and ACE.   transfer to Stepdown Unit

## 2019-12-30 NOTE — PROGRESS NOTE ADULT - SUBJECTIVE AND OBJECTIVE BOX
S: went to OR with Podiatry, s/p right foot debridement and R 2nd and 3rd ray amputations and plantar ulcer excision w/ removal of all noted non-viable soft tissue and bone. Pus expressed from R medial plantar incision. Surgical site flushed w/ 2L of NS w/ pulse lavage. Dirty culture taken. 3rd metatarsal head sent for cx. Resected 2nd/3rd toes sent for pathology. Wound site packed w/ surgicel and iodoform packing. Applied bulky dressing/DSD.    O: ICU Vital Signs Last 24 Hrs  T(F): 98.5 (12-30-19 @ 09:32), Max: 98.7 (12-30-19 @ 06:34)  HR: 58 (12-30-19 @ 09:00) (58 - 72)  BP: 108/57 (12-30-19 @ 09:00) (108/57 - 136/67)  BP(mean): 82 (12-30-19 @ 09:00) (74 - 101)  ABP: 102/76 (12-30-19 @ 09:00)  RR: 18 (12-30-19 @ 09:00) (15 - 18)  SpO2: 97% (12-30-19 @ 09:00) (90% - 99%)    PHYSICAL EXAM:   Neurological: AAOx3, CNII-XII intact,  strength 5/5 b/l  Cardiovascular: RRR  Respiratory: CTA  Gastrointestinal: soft, NT, ND, BS+  Extremities: warm, no dependent edema, L foot w/previous 2-4 toes amputated. right foot in bulky dressing with ACE.   Vascular: no cyanosis/erythema, palpable pulses.     LABS:    12-30    138  |  103  |  23  ----------------------------<  116<H>  3.6   |  23  |  1.31<H>    Ca    8.1<L>      30 Dec 2019 05:42  Phos  2.7     12-30  Mg     1.7     12-30    TPro  6.3  /  Alb  2.1<L>  /  TBili  0.3  /  DBili  x   /  AST  26  /  ALT  23  /  AlkPhos  159<H>  12-29  LIVER FUNCTIONS - ( 29 Dec 2019 19:28 )  Alb: 2.1 g/dL / Pro: 6.3 g/dL / ALK PHOS: 159 U/L / ALT: 23 U/L / AST: 26 U/L / GGT: x                               7.3    15.48 )-----------( 564      ( 30 Dec 2019 05:42 )             23.5   PT/INR - ( 29 Dec 2019 10:32 )   PT: 15.2 sec;   INR: 1.33          PTT - ( 29 Dec 2019 10:32 )  PTT:26.9 secCARDIAC MARKERS ( 29 Dec 2019 10:32 )  x     / 0.03 ng/mL / x     / x     / x        ABG - ( 29 Dec 2019 19:43 )  pH, Arterial: 7.42  pH, Blood: x     /  pCO2: 40    /  pO2: 124   / HCO3: 26    / Base Excess: 1.0   /  SaO2: 99              Urinalysis Basic - ( 29 Dec 2019 12:32 )    Color: Yellow / Appearance: Clear / SG: >=1.030 / pH: x  Gluc: x / Ketone: Trace mg/dL  / Bili: Small / Urobili: 0.2 E.U./dL   Blood: x / Protein: 100 mg/dL / Nitrite: NEGATIVE   Leuk Esterase: Trace / RBC: < 5 /HPF / WBC > 10 /HPF   Sq Epi: x / Non Sq Epi: 5-10 /HPF / Bacteria: Present /HPF    CAPILLARY BLOOD GLUCOSE      POCT Blood Glucose.: 118 mg/dL (30 Dec 2019 06:42)  POCT Blood Glucose.: 125 mg/dL (30 Dec 2019 05:25)  POCT Blood Glucose.: 169 mg/dL (30 Dec 2019 04:12)  POCT Blood Glucose.: 258 mg/dL (30 Dec 2019 02:07)  POCT Blood Glucose.: 266 mg/dL (30 Dec 2019 00:12)  POCT Blood Glucose.: 450 mg/dL (29 Dec 2019 14:04)    MEDICATIONS  (STANDING):  aspirin  chewable 81 milliGRAM(s) Oral daily  chlorhexidine 2% Cloths 1 Application(s) Topical <User Schedule>  insulin glargine Injectable (LANTUS) 30 Unit(s) SubCutaneous at bedtime  insulin lispro (HumaLOG) corrective regimen sliding scale   SubCutaneous three times a day before meals  piperacillin/tazobactam IVPB.. 3.375 Gram(s) IV Intermittent every 6 hours  potassium chloride    Tablet ER 40 milliEquivalent(s) Oral once  vancomycin  IVPB 1000 milliGRAM(s) IV Intermittent every 12 hours    MEDICATIONS  (PRN):  acetaminophen   Tablet .. 650 milliGRAM(s) Oral every 6 hours PRN Mild Pain (1 - 3)  dextrose 40% Gel 15 Gram(s) Oral once PRN Blood Glucose LESS THAN 70 milliGRAM(s)/deciliter  glucagon  Injectable 1 milliGRAM(s) IntraMuscular once PRN Glucose LESS THAN 70 milligrams/deciliter  HYDROmorphone  Injectable 1 milliGRAM(s) IV Push every 4 hours PRN Severe Pain (7 - 10)  HYDROmorphone  Injectable 0.5 milliGRAM(s) IV Push every 4 hours PRN Moderate Pain (4 - 6)      Hernández:	  [ x] None	[ ] Daily Hernández Order Placed	   Indication:	  [ ] Strict I and O's    [ ] Obstruction     [ ] Incontinence + Stage 3 or 4 Decubitus  Central Line:  [x ] None	   [ ]  Medication / TPN Administration     [ ] No Peripheral IV 08-Feb-2019 09:52

## 2019-12-30 NOTE — CONSULT NOTE ADULT - SUBJECTIVE AND OBJECTIVE BOX
HPI: 69yFemale    Age at Dx:  How dx:  Hx and duration of insulin:  Current Therapy:  Hx of hypoglycemia  Hx of DKA/HHS?    Home FSG:  Fasting  Lunch  Dinner  Bed    Hx of other regimens  Complications:  Outpatient Endo:    PMH & Surgical Hx:DIABETIC FOOT ULCER  ADVANCED ILLNESS  Family history of diabetes mellitus  Handoff  MEWS Score  Local infection of skin and subcutaneous tissue  Type 2 diabetes mellitus with neurological manifestations  Essential hypertension  Hyperlipidemia  Type 2 diabetes mellitus  Asthma  Depressive disorder  Gas gangrene of foot  Gas gangrene of foot  Diabetic foot ulcer  Ray amputation of right third toe  Ray amputation of right second toe  Ray amputation of toe  Cytomegalovirus infection not present  WEAKNESS  90+  Leukocytosis, unspecified type      FH:  DM:  Thyroid:  Autoimmune:  Other:    SH:  Smoking  Etoh:  Recreational Drugs:  Social Life:    Current Meds:  acetaminophen   Tablet .. 650 milliGRAM(s) Oral every 6 hours PRN  aspirin  chewable 81 milliGRAM(s) Oral daily  chlorhexidine 2% Cloths 1 Application(s) Topical <User Schedule>  dextrose 40% Gel 15 Gram(s) Oral once PRN  dextrose 5%. 1000 milliLiter(s) IV Continuous <Continuous>  dextrose 50% Injectable 12.5 Gram(s) IV Push once  dextrose 50% Injectable 25 Gram(s) IV Push once  dextrose 50% Injectable 25 Gram(s) IV Push once  glucagon  Injectable 1 milliGRAM(s) IntraMuscular once PRN  HYDROmorphone  Injectable 1 milliGRAM(s) IV Push every 4 hours PRN  HYDROmorphone  Injectable 0.5 milliGRAM(s) IV Push every 4 hours PRN  insulin glargine Injectable (LANTUS) 30 Unit(s) SubCutaneous at bedtime  insulin lispro (HumaLOG) corrective regimen sliding scale   SubCutaneous three times a day before meals  piperacillin/tazobactam IVPB.. 3.375 Gram(s) IV Intermittent every 6 hours  vancomycin  IVPB 1000 milliGRAM(s) IV Intermittent every 12 hours      Allergies:  No Known Allergies      ROS:  Denies the following except as indicated.    General: weight loss/weight gain, decreased appetite, fatigue  Eyes: Blurry vision, double vision, visual changes  ENT: Throat pain, changes in voice,   CV: palpitations, SOB, CP, cough  GI: NVD, difficulty swallowing, abdominal pain  : polyuria, dysuria  Endo: abnormal menses, temperature intolerance, decreased libido  MSK: weakness, joint pain  Skin: rash, dryness, diaphoresis  Heme: Easy bruising,bleeding  Neuro: HA, dizziness, lightheadedness, numbness tingling  Psych: Anxiety, Depression    Vital Signs Last 24 Hrs  T(C): 36.9 (30 Dec 2019 09:32), Max: 37.1 (30 Dec 2019 06:34)  T(F): 98.5 (30 Dec 2019 09:32), Max: 98.7 (30 Dec 2019 06:34)  HR: 64 (30 Dec 2019 12:00) (58 - 72)  BP: 122/68 (30 Dec 2019 12:00) (108/57 - 136/67)  BP(mean): 89 (30 Dec 2019 12:00) (74 - 101)  RR: 18 (30 Dec 2019 12:00) (15 - 18)  SpO2: 98% (30 Dec 2019 12:00) (90% - 100%)  Height (cm): 157.48 (12-29 @ 14:51)  Weight (kg): 68 (12-29 @ 14:51)  BMI (kg/m2): 27.4 (12-29 @ 14:51)      Constitutional: wn/wd in NAD.   HEENT: NCAT, MMM, OP clear, EOMI, , no proptosis or lid retraction  Neck: no thyromegaly or palpable thyroid nodules   Respiratory: lungs CTAB.  Cardiovascular: regular rhythm, normal S1 and S2, no audible murmurs, no peripheral edema  GI: soft, NT/ND, no masses/HSM appreciated.  Neurology: no tremors, DTR 2+  Skin: no visible rashes/lesions  Psychiatric: AAO x 3, normal affect/mood.  Ext: radial pulses intact, DP pulses intact, extremities warm, no cyanosis, clubbing or edema.       LABS:                        7.3    15.48 )-----------( 564      ( 30 Dec 2019 05:42 )             23.5     12-30    138  |  103  |  23  ----------------------------<  116<H>  3.6   |  23  |  1.31<H>    Ca    8.1<L>      30 Dec 2019 05:42  Phos  2.7     12-30  Mg     1.7     12-30    TPro  6.3  /  Alb  2.1<L>  /  TBili  0.3  /  DBili  x   /  AST  26  /  ALT  23  /  AlkPhos  159<H>  12-29    PT/INR - ( 29 Dec 2019 10:32 )   PT: 15.2 sec;   INR: 1.33          PTT - ( 29 Dec 2019 10:32 )  PTT:26.9 sec  Urinalysis Basic - ( 29 Dec 2019 12:32 )    Color: Yellow / Appearance: Clear / SG: >=1.030 / pH: x  Gluc: x / Ketone: Trace mg/dL  / Bili: Small / Urobili: 0.2 E.U./dL   Blood: x / Protein: 100 mg/dL / Nitrite: NEGATIVE   Leuk Esterase: Trace / RBC: < 5 /HPF / WBC > 10 /HPF   Sq Epi: x / Non Sq Epi: 5-10 /HPF / Bacteria: Present /HPF      Hemoglobin A1C, Whole Blood: 11.2 (12-29 @ 10:32)        RADIOLOGY & ADDITIONAL STUDIES:  CAPILLARY BLOOD GLUCOSE      POCT Blood Glucose.: 90 mg/dL (30 Dec 2019 11:44)  POCT Blood Glucose.: 118 mg/dL (30 Dec 2019 06:42)  POCT Blood Glucose.: 125 mg/dL (30 Dec 2019 05:25)  POCT Blood Glucose.: 169 mg/dL (30 Dec 2019 04:12)  POCT Blood Glucose.: 258 mg/dL (30 Dec 2019 02:07)  POCT Blood Glucose.: 266 mg/dL (30 Dec 2019 00:12)  POCT Blood Glucose.: 450 mg/dL (29 Dec 2019 14:04)        A/P:69y Female    1.  DM  Please continue lantus       units at night / morning.  Please continue lispro      units before each meal.  Please continue lispro moderate / low dose sliding scale four times daily with meals and at bedtime    Pt's fingerstick glucose goal is     Will continue to monitor     For discharge, pt can continue    Pt can follow up at discharge with [facility] by calling  to make an appointment.   Will discuss case with     and update primary team HPI: 69F hx DM (A1c 11.2), several left toe amps’ in past, presented to ED w/ 4-5days of R foot ulcer w/ purulent drainage. Pt says she started noticing redness/swelling/pain and ulcer to plantar surface of R foot 4-5 days ago, ulcer had gotten progressively worse w/ purulent drainage. Pt c/o subjective fevers yesterday, but did not take her temps. She usually follows w/ doctors at [place], last seen 1 month ago. Pt also says she usually takes metformin and insulin, but stopped her insulin about 2 wks ago because she lost her insulin pen. In ED, pt found to have ’s w/ anion gap of 20, beta hydroxy 1.1, LA 2.9. otherwise afebrile. Hemodynamically stable.  In ED pt received vanco 1grm @ 11am. zosyn 3.375@ 10am, clinda 900 at 1pm. regular insulin 10U at 2pm, KCL 40meq at 1pm, NS bolus total 3 liters. Pt taken to OR for Right 2nd and 3rd toe ray amputation and returned to SICU extubated, AAOX3, afebrile, HD stable for further postoperative monitoring.      Age at Dx:  How dx:  Hx and duration of insulin:  Current Therapy:  Hx of hypoglycemia  Hx of DKA/HHS?    Home FSG:  Fasting  Lunch  Dinner  Bed    Hx of other regimens  Complications:  Outpatient Endo:    PMH & Surgical Hx:DIABETIC FOOT ULCER  ADVANCED ILLNESS  Family history of diabetes mellitus  Handoff  MEWS Score  Local infection of skin and subcutaneous tissue  Type 2 diabetes mellitus with neurological manifestations  Essential hypertension  Hyperlipidemia  Type 2 diabetes mellitus  Asthma  Depressive disorder  Gas gangrene of foot  Gas gangrene of foot  Diabetic foot ulcer  Ray amputation of right third toe  Ray amputation of right second toe  Ray amputation of toe  Cytomegalovirus infection not present  WEAKNESS  90+  Leukocytosis, unspecified type      FH:  DM:  Thyroid:  Autoimmune:  Other:    SH:  Smoking  Etoh:  Recreational Drugs:  Social Life:    Current Meds:  acetaminophen   Tablet .. 650 milliGRAM(s) Oral every 6 hours PRN  aspirin  chewable 81 milliGRAM(s) Oral daily  chlorhexidine 2% Cloths 1 Application(s) Topical <User Schedule>  dextrose 40% Gel 15 Gram(s) Oral once PRN  dextrose 5%. 1000 milliLiter(s) IV Continuous <Continuous>  dextrose 50% Injectable 12.5 Gram(s) IV Push once  dextrose 50% Injectable 25 Gram(s) IV Push once  dextrose 50% Injectable 25 Gram(s) IV Push once  glucagon  Injectable 1 milliGRAM(s) IntraMuscular once PRN  HYDROmorphone  Injectable 1 milliGRAM(s) IV Push every 4 hours PRN  HYDROmorphone  Injectable 0.5 milliGRAM(s) IV Push every 4 hours PRN  insulin glargine Injectable (LANTUS) 30 Unit(s) SubCutaneous at bedtime  insulin lispro (HumaLOG) corrective regimen sliding scale   SubCutaneous three times a day before meals  piperacillin/tazobactam IVPB.. 3.375 Gram(s) IV Intermittent every 6 hours  vancomycin  IVPB 1000 milliGRAM(s) IV Intermittent every 12 hours      Allergies:  No Known Allergies      ROS:  Denies the following except as indicated.    General: weight loss/weight gain, decreased appetite, fatigue  Eyes: Blurry vision, double vision, visual changes  ENT: Throat pain, changes in voice,   CV: palpitations, SOB, CP, cough  GI: NVD, difficulty swallowing, abdominal pain  : polyuria, dysuria  Endo: abnormal menses, temperature intolerance, decreased libido  MSK: weakness, joint pain  Skin: rash, dryness, diaphoresis  Heme: Easy bruising,bleeding  Neuro: HA, dizziness, lightheadedness, numbness tingling  Psych: Anxiety, Depression    Vital Signs Last 24 Hrs  T(C): 36.9 (30 Dec 2019 09:32), Max: 37.1 (30 Dec 2019 06:34)  T(F): 98.5 (30 Dec 2019 09:32), Max: 98.7 (30 Dec 2019 06:34)  HR: 64 (30 Dec 2019 12:00) (58 - 72)  BP: 122/68 (30 Dec 2019 12:00) (108/57 - 136/67)  BP(mean): 89 (30 Dec 2019 12:00) (74 - 101)  RR: 18 (30 Dec 2019 12:00) (15 - 18)  SpO2: 98% (30 Dec 2019 12:00) (90% - 100%)  Height (cm): 157.48 (12-29 @ 14:51)  Weight (kg): 68 (12-29 @ 14:51)  BMI (kg/m2): 27.4 (12-29 @ 14:51)      Constitutional: wn/wd in NAD.   HEENT: NCAT, MMM, OP clear, EOMI, , no proptosis or lid retraction  Neck: no thyromegaly or palpable thyroid nodules   Respiratory: lungs CTAB.  Cardiovascular: regular rhythm, normal S1 and S2, no audible murmurs, no peripheral edema  GI: soft, NT/ND, no masses/HSM appreciated.  Neurology: no tremors, DTR 2+  Skin: no visible rashes/lesions  Psychiatric: AAO x 3, normal affect/mood.  Ext: radial pulses intact, DP pulses intact, extremities warm, no cyanosis, clubbing or edema.       LABS:                        7.3    15.48 )-----------( 564      ( 30 Dec 2019 05:42 )             23.5     12-30    138  |  103  |  23  ----------------------------<  116<H>  3.6   |  23  |  1.31<H>    Ca    8.1<L>      30 Dec 2019 05:42  Phos  2.7     12-30  Mg     1.7     12-30    TPro  6.3  /  Alb  2.1<L>  /  TBili  0.3  /  DBili  x   /  AST  26  /  ALT  23  /  AlkPhos  159<H>  12-29    PT/INR - ( 29 Dec 2019 10:32 )   PT: 15.2 sec;   INR: 1.33          PTT - ( 29 Dec 2019 10:32 )  PTT:26.9 sec  Urinalysis Basic - ( 29 Dec 2019 12:32 )    Color: Yellow / Appearance: Clear / SG: >=1.030 / pH: x  Gluc: x / Ketone: Trace mg/dL  / Bili: Small / Urobili: 0.2 E.U./dL   Blood: x / Protein: 100 mg/dL / Nitrite: NEGATIVE   Leuk Esterase: Trace / RBC: < 5 /HPF / WBC > 10 /HPF   Sq Epi: x / Non Sq Epi: 5-10 /HPF / Bacteria: Present /HPF      Hemoglobin A1C, Whole Blood: 11.2 (12-29 @ 10:32)        RADIOLOGY & ADDITIONAL STUDIES:  CAPILLARY BLOOD GLUCOSE      POCT Blood Glucose.: 90 mg/dL (30 Dec 2019 11:44)  POCT Blood Glucose.: 118 mg/dL (30 Dec 2019 06:42)  POCT Blood Glucose.: 125 mg/dL (30 Dec 2019 05:25)  POCT Blood Glucose.: 169 mg/dL (30 Dec 2019 04:12)  POCT Blood Glucose.: 258 mg/dL (30 Dec 2019 02:07)  POCT Blood Glucose.: 266 mg/dL (30 Dec 2019 00:12)  POCT Blood Glucose.: 450 mg/dL (29 Dec 2019 14:04)        A/P:69y Female    1.  DM  Please continue lantus       units at night / morning.  Please continue lispro      units before each meal.  Please continue lispro moderate / low dose sliding scale four times daily with meals and at bedtime    Pt's fingerstick glucose goal is     Will continue to monitor     For discharge, pt can continue    Pt can follow up at discharge with [facility] by calling  to make an appointment.   Will discuss case with     and update primary team HPI: 69F hx DM (A1c 11.2), several left toe amps’ in past, presented to ED w/ 4-5days of R foot ulcer w/ purulent drainage. Pt says she started noticing redness/swelling/pain and ulcer to plantar surface of R foot 4-5 days ago, ulcer had gotten progressively worse w/ purulent drainage. Pt c/o subjective fevers yesterday, but did not take her temps. She usually follows w/ doctors at [place], last seen 1 month ago. Pt also says she usually takes metformin and insulin, but stopped her insulin about 2 wks ago because she lost her insulin pen. In ED, pt found to have ’s w/ anion gap of 20, beta hydroxy 1.1, LA 2.9. otherwise afebrile. Hemodynamically stable.  In ED pt received vanco 1grm @ 11am. zosyn 3.375@ 10am, clinda 900 at 1pm. regular insulin 10U at 2pm, KCL 40meq at 1pm, NS bolus total 3 liters. Pt taken to OR for Right 2nd and 3rd toe ray amputation on  and was transferred to the floor. He is currently on vanc and zosyn.  Endocrine was consulted for her DM management.  hba1c was 11.2.. She does c/o polyuria, polydipsia as well but denies any weight loss. On admission, her FSg was 461 with hco3 23 and Ag 19., BOH 1.1, VBG showed PH 7.48/ Hco3 29/ Pco2 40. She was given 10 units regular insulin and was taken for procedure yesterday. She received 30 units Lantus art 100 AM yesterday along with 12 units ( 6+6) lispro at around 200 Am.   FSG & Insulin received:  Today:  pre-breakfast fs, no BF  pre-lunch fs, had salmon, potatoes, coffee    DM history  Age at Dx: She told that she was a diabetic when she was pregnant in  while she had a still birth - but was never on meds at that time. After that, she was diagnosed with DM more than 20 years ago  Hx and duration of insulin: more than 20 years  Current Therapy: Lantus 10 units bID, janumet  BID  Hx of hypoglycemia: denies  Hx of DKA/HHS - denies    Home FSG:  Checks FSG only when she feels high and finds her FSg is in 300s. Her low FSg was in between 200 to 300s.   BF - coffee with toast  Lunch - fried chicken, burgers  Dinner - none - will have crackers, with diet soda and some mixed dry fruits    Hx of other regimens  Complications: neuropathy, retinopathy  [facility]:DIABETIC FOOT ULCER  ADVANCED ILLNESS  Family history of diabetes mellitus  Local infection of skin and subcutaneous tissue  Type 2 diabetes mellitus with neurological manifestations  Essential hypertension  Hyperlipidemia  Type 2 diabetes mellitus  Asthma  Depressive disorder  Gas gangrene of foot  Diabetic foot ulcer  Ray amputation of right third toe  Ray amputation of right second toe      Current Meds:  acetaminophen   Tablet .. 650 milliGRAM(s) Oral every 6 hours PRN  aspirin  chewable 81 milliGRAM(s) Oral daily  chlorhexidine 2% Cloths 1 Application(s) Topical <User Schedule>  dextrose 40% Gel 15 Gram(s) Oral once PRN  dextrose 5%. 1000 milliLiter(s) IV Continuous <Continuous>  dextrose 50% Injectable 12.5 Gram(s) IV Push once  dextrose 50% Injectable 25 Gram(s) IV Push once  dextrose 50% Injectable 25 Gram(s) IV Push once  glucagon  Injectable 1 milliGRAM(s) IntraMuscular once PRN  HYDROmorphone  Injectable 1 milliGRAM(s) IV Push every 4 hours PRN  HYDROmorphone  Injectable 0.5 milliGRAM(s) IV Push every 4 hours PRN  insulin glargine Injectable (LANTUS) 30 Unit(s) SubCutaneous at bedtime  insulin lispro (HumaLOG) corrective regimen sliding scale   SubCutaneous three times a day before meals  piperacillin/tazobactam IVPB.. 3.375 Gram(s) IV Intermittent every 6 hours  vancomycin  IVPB 1000 milliGRAM(s) IV Intermittent every 12 hours      Allergies:  No Known Allergies      ROS:  Denies the following except as indicated.    General: weight loss/weight gain, decreased appetite, fatigue  Eyes: Blurry vision, double vision, visual changes  ENT: Throat pain, changes in voice,   CV: palpitations, SOB, CP, cough  GI: NVD, difficulty swallowing, abdominal pain  : dysuria  Endo: temperature intolerance, decreased libido  MSK: weakness, joint pain  Skin: rash, dryness, diaphoresis  Heme: Easy bruising,bleeding  Neuro: HA, dizziness, lightheadedness, numbness tingling  Psych: Anxiety, Depression    Vital Signs Last 24 Hrs  T(C): 36.9 (30 Dec 2019 09:32), Max: 37.1 (30 Dec 2019 06:34)  T(F): 98.5 (30 Dec 2019 09:32), Max: 98.7 (30 Dec 2019 06:34)  HR: 64 (30 Dec 2019 12:00) (58 - 72)  BP: 122/68 (30 Dec 2019 12:00) (108/57 - 136/67)  BP(mean): 89 (30 Dec 2019 12:00) (74 - 101)  RR: 18 (30 Dec 2019 12:00) (15 - 18)  SpO2: 98% (30 Dec 2019 12:00) (90% - 100%)  Height (cm): 157.48 (- @ 14:51)  Weight (kg): 68 ( @ 14:51)  BMI (kg/m2): 27.4 ( @ 14:51)      Constitutional: wn/wd in NAD.   HEENT: no proptosis or lid retraction  Neck: no thyromegaly or palpable thyroid nodules   Respiratory: lungs CTAB.  Cardiovascular: regular rhythm, normal S1 and S2, no peripheral edema  GI: soft, NT/ND, no masses/HSM appreciated.  Neurology: no tremors, DTR 2+, moves all 4 extremities  Skin: no visible rashes/lesions  Psychiatric: AAO x 3, normal affect/mood.  Ext: no cyanosis, clubbing or edema. Left toe amputation with dark callus on left fore foot. Right footwrapped in andage      LABS:                        7.3    15.48 )-----------( 564      ( 30 Dec 2019 05:42 )             23.5     12-30    138  |  103  |  23  ----------------------------<  116<H>  3.6   |  23  |  1.31<H>    Ca    8.1<L>      30 Dec 2019 05:42  Phos  2.7     12-30  Mg     1.7     12-30    TPro  6.3  /  Alb  2.1<L>  /  TBili  0.3  /  DBili  x   /  AST  26  /  ALT  23  /  AlkPhos  159<H>  12-29    PT/INR - ( 29 Dec 2019 10:32 )   PT: 15.2 sec;   INR: 1.33          PTT - ( 29 Dec 2019 10:32 )  PTT:26.9 sec  Urinalysis Basic - ( 29 Dec 2019 12:32 )    Color: Yellow / Appearance: Clear / SG: >=1.030 / pH: x  Gluc: x / Ketone: Trace mg/dL  / Bili: Small / Urobili: 0.2 E.U./dL   Blood: x / Protein: 100 mg/dL / Nitrite: NEGATIVE   Leuk Esterase: Trace / RBC: < 5 /HPF / WBC > 10 /HPF   Sq Epi: x / Non Sq Epi: 5-10 /HPF / Bacteria: Present /HPF      Hemoglobin A1C, Whole Blood: 11.2 ( @ 10:32)        RADIOLOGY & ADDITIONAL STUDIES:  CAPILLARY BLOOD GLUCOSE      POCT Blood Glucose.: 90 mg/dL (30 Dec 2019 11:44)  POCT Blood Glucose.: 118 mg/dL (30 Dec 2019 06:42)  POCT Blood Glucose.: 125 mg/dL (30 Dec 2019 05:25)  POCT Blood Glucose.: 169 mg/dL (30 Dec 2019 04:12)  POCT Blood Glucose.: 258 mg/dL (30 Dec 2019 02:07)  POCT Blood Glucose.: 266 mg/dL (30 Dec 2019 00:12)  POCT Blood Glucose.: 450 mg/dL (29 Dec 2019 14:04)        A/P: 69F hx DM (A1c 11.2), several left toe amps’ in past, HTN, HLD and asthma got 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  DM Type 2 - uncontrolled  - Hba1c 11.2  - Wt 68 kg with BMI 27.4  - Cr/GFR 1.31/48  No ECHO in system  Please decrease to lantus 25 units at night.  Please start on lispro 6    units before each meal.    Please continue lispro moderate dose sliding scale four times daily with meals and at bedtime  carb consistent diet    Pt's fingerstick glucose goal is 120 to 150    Will continue to monitor     For discharge, TBD    Pt can follow up at discharge with [facility] by calling  to make an appointment.   discussed case with Dr. Tran   and updated  primary team  To Make an appointment at [address] [place] [address] for the patient, either:  1. Send a STAT task via [employer] to Ernestina Wilson or Sumi Rossi (office managers)   OR  2. Call supervisor's line. P: [phone] (do not give this number to patient). VM is checked periodically  In the message, specify that this is a hospital discharge follow-up, and that the appt can be made with a NP if there is no timely MD availability    REMINDERS FOR INSULIN/DIABETES SUPPLIES at DISCHARGE:  INSULIN:   Long actin/Basal Insulin: Examples: Toujeo, Basaglar, Tresiba, Lantus   Short acting/Bolus Insulin: Humalog, Admelog, Novolog  Please ensure that BOTH short acting and long acting insulin are prescribed in the same preparation (Ex: PEN vs VIAL/SOLUTION)     TESTING SUPPLIES:   All glucometer supplies should be written as generic to avoid issues with insurance. Use the free text option in [employer] prescription writer, and type in glucometer test strips, lancets, etc to order.    If sending patient home on insulin PEN, please send:   •	BD paz insulin pen needles for use up to 4 times daily (total quantity 100)  •	Lancets for use up to 4 times daily (total quantity 100)  •	Glucometer Test strips for use up to 4 times daily (total quantity 100)  •	Alcohol swabs for use up to 4 times daily (total quantity 100)  •	Glucometer (If provided by hospital, still provide scripts for lancets, test strips, and swabs)  If sending patient home on insulin VIAL, please send:   •	Insulin syringes (6mm) - for use up to 4 times daily (total quantity 100)  •	Lancets for use up to 4 times daily (total quantity 100)  •	Generic Glucometer Test strips for use up to 4 times daily (total quantity 100)  •	Alcohol swabs for use up to 4 times daily (total quantity 100)  •	Generic Glucometer (If provided by hospital, still provide scripts for lancets, test strips, and swabs)  •	Do not specify brand for testing supplies (such as contour, freestyle, one touch etc) that way the pharmacy has the freedom to pick and change according to what the insurance dictates.  For patients without insurance:   •	Provide social work with appropriate scripts so they may obtain 1 week of samples  •	Provide with glucometer. Glucometers are located at various nursing stations, the nursing office, education, and endocrine fellows office.  •	Please make appointment with Nichole Jama NP or Kaycee Coates RN and JOSE G Bell at the [address] [place] [address] endocrinology clinic. They can see patients without insurance, provide appropriate samples, and assist in getting insurance coverage.     PREFERRED PHARMACY:  [facility] (located on 1st floor next to admitting)  P: [phone]  Hours: M – F 8AM – 8PM, Sat 8AM – 4PM, Sun—closed  If not using [employer], please follow up with chosen pharmacy to ensure insulin prescribed is covered.

## 2019-12-30 NOTE — PROGRESS NOTE ADULT - SUBJECTIVE AND OBJECTIVE BOX
Patient is a 69y old  Female who presents with a chief complaint of Rt Foot Diabetic foot infection (29 Dec 2019 19:31)      INTERVAL HPI/ OVERNIGHT EVENTS: Patient seen and examined bedside. Resting comfortable in chair alert and oriented. Denies f/c/n/v.      LABS                        7.3    15.48 )-----------( 564      ( 30 Dec 2019 05:42 )             23.5     12-30    138  |  103  |  23  ----------------------------<  116<H>  3.6   |  23  |  1.31<H>    Ca    8.1<L>      30 Dec 2019 05:42  Phos  2.7     12-30  Mg     1.7     12-30    TPro  6.3  /  Alb  2.1<L>  /  TBili  0.3  /  DBili  x   /  AST  26  /  ALT  23  /  AlkPhos  159<H>  12-29    PT/INR - ( 29 Dec 2019 10:32 )   PT: 15.2 sec;   INR: 1.33          PTT - ( 29 Dec 2019 10:32 )  PTT:26.9 sec  ESR: >130  CRP: --  12-29 @ 10:32    ICU Vital Signs Last 24 Hrs  T(C): 36.9 (30 Dec 2019 09:32), Max: 37.1 (30 Dec 2019 06:34)  T(F): 98.5 (30 Dec 2019 09:32), Max: 98.7 (30 Dec 2019 06:34)  HR: 58 (30 Dec 2019 09:00) (58 - 72)  BP: 108/57 (30 Dec 2019 09:00) (108/57 - 136/67)  BP(mean): 82 (30 Dec 2019 09:00) (74 - 101)  ABP: 102/76 (30 Dec 2019 09:00) (94/48 - 142/138)  ABP(mean): 86 (30 Dec 2019 09:00) (62 - 140)  RR: 18 (30 Dec 2019 09:00) (15 - 18)  SpO2: 97% (30 Dec 2019 09:00) (90% - 99%)      RADIOLOGY    MICROBIOLOGY    Culture - Blood (collected 29 Dec 2019 14:13)  Source: .Blood Blood  Preliminary Report (30 Dec 2019 03:00):    No growth at 12 hours    Culture - Blood (collected 29 Dec 2019 14:13)  Source: .Blood Blood  Preliminary Report (30 Dec 2019 03:00):    No growth at 12 hours    Culture - Abscess with Gram Stain (collected 29 Dec 2019 12:43)  Source: .Abscess Leg - Right  Gram Stain (30 Dec 2019 09:48):    Numerous Gram Positive Cocci in Pairs and Chains    Moderate Gram Positive Rods    Few Gram Negative Rods    Moderate White blood cells    Culture - Urine (collected 29 Dec 2019 12:42)  Source: .Urine Clean Catch (Midstream)  Final Report (30 Dec 2019 08:50):    Insignificant amount of mixed growth.        PHYSICAL EXAM  Lower Extremity Focused:  Vasc: DP & PT 1/4 B/L  Derm: Right foot s/p partial 2nd and 3rd ray amputation with large open defect. Wound edges appear viable with minor necrotic tissue. No malodor, no fluctuance.  Neuro: Protective sensation diminished b/l  MSK: 5/5 muscle strength in all crural compartments b/l

## 2019-12-30 NOTE — PROGRESS NOTE ADULT - ASSESSMENT
69F hx poorly controlled DM presents with R foot gas gangrene and DKA s/p R 2nd+3rd toe partial ray amputation (12/29) admitted to SICU for postop monitoring. Post operatively, anion gap normalized, glucose well controlled, on sliding scale insulin    Follow up wound cultures  Continue Vancomycin and Zosyn  Dressing changes by podiatry  Okay for stepdown to 5 UR telemetry

## 2019-12-30 NOTE — PROGRESS NOTE ADULT - SUBJECTIVE AND OBJECTIVE BOX
s/p 2nd+3rd toe partial ray amputation and 1 unit of prbc for Hb of 6.7      ICU Vital Signs Last 24 Hrs  T(C): 36.9 (30 Dec 2019 09:32), Max: 37.1 (30 Dec 2019 06:34)  T(F): 98.5 (30 Dec 2019 09:32), Max: 98.7 (30 Dec 2019 06:34)  HR: 58 (30 Dec 2019 09:00) (58 - 72)  BP: 108/57 (30 Dec 2019 09:00) (108/57 - 136/67)  BP(mean): 82 (30 Dec 2019 09:00) (74 - 101)  ABP: 102/76 (30 Dec 2019 09:00) (94/48 - 142/138)  ABP(mean): 86 (30 Dec 2019 09:00) (62 - 140)  RR: 18 (30 Dec 2019 09:00) (15 - 18)  SpO2: 97% (30 Dec 2019 09:00) (90% - 99%)      LABS/RADIOLOGY RESULTS:                          7.3    15.48 )-----------( 564      ( 30 Dec 2019 05:42 )             23.5   12-30    138  |  103  |  23  ----------------------------<  116<H>  3.6   |  23  |  1.31<H>    Ca    8.1<L>      30 Dec 2019 05:42  Phos  2.7     12-30  Mg     1.7     12-30    TPro  6.3  /  Alb  2.1<L>  /  TBili  0.3  /  DBili  x   /  AST  26  /  ALT  23  /  AlkPhos  159<H>  12-29  PT/INR - ( 29 Dec 2019 10:32 )   PT: 15.2 sec;   INR: 1.33          PTT - ( 29 Dec 2019 10:32 )  PTT:26.9 secBlood Cultures    Blood Culture--   12-29 @ 14:13    Results  No growth at 12 hours    Organism--    Organism ID--    Urine Culture   12-29 @ 14:13    --       Results  No growth at 12 hours    Organism--    Organism ID--  Blood Culture--   12-29 @ 12:42    Results  Insignificant amount of mixed growth.    Organism--    Organism ID--    Urine Culture   12-29 @ 12:42    --       Results  Insignificant amount of mixed growth.    Organism--    Organism ID--  Urinalysis Basic - ( 29 Dec 2019 12:32 )    Color: Yellow / Appearance: Clear / SG: >=1.030 / pH:   Gluc:  / Ketone: Trace mg/dL  / Bili: Small / Urobili: 0.2 E.U./dL   Blood:  / Protein: 100 mg/dL / Nitrite: NEGATIVE   Leuk Esterase: Trace / RBC: < 5 /HPF / WBC > 10 /HPF   Sq Epi:  / Non Sq Epi: 5-10 /HPF / Bacteria: Present /HPF    General: No acute distress  CV: RRR  Neurology: Awake  Extremities: right foot open wound with no alisia pus, right DP 2+, right PT biphasic signals

## 2019-12-31 DIAGNOSIS — F43.21 ADJUSTMENT DISORDER WITH DEPRESSED MOOD: ICD-10-CM

## 2019-12-31 LAB
ANION GAP SERPL CALC-SCNC: 10 MMOL/L — SIGNIFICANT CHANGE UP (ref 5–17)
BUN SERPL-MCNC: 20 MG/DL — SIGNIFICANT CHANGE UP (ref 7–23)
CALCIUM SERPL-MCNC: 8 MG/DL — LOW (ref 8.4–10.5)
CHLORIDE SERPL-SCNC: 103 MMOL/L — SIGNIFICANT CHANGE UP (ref 96–108)
CO2 SERPL-SCNC: 24 MMOL/L — SIGNIFICANT CHANGE UP (ref 22–31)
CREAT SERPL-MCNC: 1.54 MG/DL — HIGH (ref 0.5–1.3)
GLUCOSE BLDC GLUCOMTR-MCNC: 102 MG/DL — HIGH (ref 70–99)
GLUCOSE BLDC GLUCOMTR-MCNC: 136 MG/DL — HIGH (ref 70–99)
GLUCOSE BLDC GLUCOMTR-MCNC: 160 MG/DL — HIGH (ref 70–99)
GLUCOSE BLDC GLUCOMTR-MCNC: 161 MG/DL — HIGH (ref 70–99)
GLUCOSE SERPL-MCNC: 159 MG/DL — HIGH (ref 70–99)
HCT VFR BLD CALC: 27.1 % — LOW (ref 34.5–45)
HGB BLD-MCNC: 8.3 G/DL — LOW (ref 11.5–15.5)
MAGNESIUM SERPL-MCNC: 2 MG/DL — SIGNIFICANT CHANGE UP (ref 1.6–2.6)
MCHC RBC-ENTMCNC: 24.7 PG — LOW (ref 27–34)
MCHC RBC-ENTMCNC: 30.6 GM/DL — LOW (ref 32–36)
MCV RBC AUTO: 80.7 FL — SIGNIFICANT CHANGE UP (ref 80–100)
NRBC # BLD: 0 /100 WBCS — SIGNIFICANT CHANGE UP (ref 0–0)
PHOSPHATE SERPL-MCNC: 3.4 MG/DL — SIGNIFICANT CHANGE UP (ref 2.5–4.5)
PLATELET # BLD AUTO: 650 K/UL — HIGH (ref 150–400)
POTASSIUM SERPL-MCNC: 4 MMOL/L — SIGNIFICANT CHANGE UP (ref 3.5–5.3)
POTASSIUM SERPL-SCNC: 4 MMOL/L — SIGNIFICANT CHANGE UP (ref 3.5–5.3)
RBC # BLD: 3.36 M/UL — LOW (ref 3.8–5.2)
RBC # FLD: 18.3 % — HIGH (ref 10.3–14.5)
SODIUM SERPL-SCNC: 137 MMOL/L — SIGNIFICANT CHANGE UP (ref 135–145)
VANCOMYCIN TROUGH SERPL-MCNC: 18.8 UG/ML — SIGNIFICANT CHANGE UP (ref 10–20)
VANCOMYCIN TROUGH SERPL-MCNC: 25.8 UG/ML — CRITICAL HIGH (ref 10–20)
VANCOMYCIN TROUGH SERPL-MCNC: 31.3 UG/ML — CRITICAL HIGH (ref 10–20)
WBC # BLD: 12.36 K/UL — HIGH (ref 3.8–10.5)
WBC # FLD AUTO: 12.36 K/UL — HIGH (ref 3.8–10.5)

## 2019-12-31 PROCEDURE — 99223 1ST HOSP IP/OBS HIGH 75: CPT

## 2019-12-31 PROCEDURE — 99231 SBSQ HOSP IP/OBS SF/LOW 25: CPT | Mod: GC

## 2019-12-31 RX ORDER — INSULIN GLARGINE 100 [IU]/ML
28 INJECTION, SOLUTION SUBCUTANEOUS AT BEDTIME
Refills: 0 | Status: DISCONTINUED | OUTPATIENT
Start: 2019-12-31 | End: 2020-01-01

## 2019-12-31 RX ORDER — NIFEDIPINE 30 MG
90 TABLET, EXTENDED RELEASE 24 HR ORAL DAILY
Refills: 0 | Status: DISCONTINUED | OUTPATIENT
Start: 2019-12-31 | End: 2020-01-09

## 2019-12-31 RX ORDER — ATORVASTATIN CALCIUM 80 MG/1
40 TABLET, FILM COATED ORAL AT BEDTIME
Refills: 0 | Status: DISCONTINUED | OUTPATIENT
Start: 2019-12-31 | End: 2020-01-09

## 2019-12-31 RX ORDER — INSULIN LISPRO 100/ML
7 VIAL (ML) SUBCUTANEOUS
Refills: 0 | Status: DISCONTINUED | OUTPATIENT
Start: 2019-12-31 | End: 2020-01-01

## 2019-12-31 RX ORDER — VANCOMYCIN HCL 1 G
750 VIAL (EA) INTRAVENOUS ONCE
Refills: 0 | Status: COMPLETED | OUTPATIENT
Start: 2019-12-31 | End: 2020-01-01

## 2019-12-31 RX ORDER — DULOXETINE HYDROCHLORIDE 30 MG/1
60 CAPSULE, DELAYED RELEASE ORAL DAILY
Refills: 0 | Status: DISCONTINUED | OUTPATIENT
Start: 2019-12-31 | End: 2020-01-08

## 2019-12-31 RX ADMIN — Medication 90 MILLIGRAM(S): at 20:29

## 2019-12-31 RX ADMIN — HYDROMORPHONE HYDROCHLORIDE 1 MILLIGRAM(S): 2 INJECTION INTRAMUSCULAR; INTRAVENOUS; SUBCUTANEOUS at 19:22

## 2019-12-31 RX ADMIN — Medication 6 UNIT(S): at 15:01

## 2019-12-31 RX ADMIN — PIPERACILLIN AND TAZOBACTAM 200 GRAM(S): 4; .5 INJECTION, POWDER, LYOPHILIZED, FOR SOLUTION INTRAVENOUS at 06:03

## 2019-12-31 RX ADMIN — PIPERACILLIN AND TAZOBACTAM 200 GRAM(S): 4; .5 INJECTION, POWDER, LYOPHILIZED, FOR SOLUTION INTRAVENOUS at 12:25

## 2019-12-31 RX ADMIN — Medication 2: at 15:00

## 2019-12-31 RX ADMIN — PIPERACILLIN AND TAZOBACTAM 200 GRAM(S): 4; .5 INJECTION, POWDER, LYOPHILIZED, FOR SOLUTION INTRAVENOUS at 18:53

## 2019-12-31 RX ADMIN — HYDROMORPHONE HYDROCHLORIDE 0.5 MILLIGRAM(S): 2 INJECTION INTRAMUSCULAR; INTRAVENOUS; SUBCUTANEOUS at 04:25

## 2019-12-31 RX ADMIN — HYDROMORPHONE HYDROCHLORIDE 0.5 MILLIGRAM(S): 2 INJECTION INTRAMUSCULAR; INTRAVENOUS; SUBCUTANEOUS at 04:10

## 2019-12-31 RX ADMIN — HYDROMORPHONE HYDROCHLORIDE 1 MILLIGRAM(S): 2 INJECTION INTRAMUSCULAR; INTRAVENOUS; SUBCUTANEOUS at 19:43

## 2019-12-31 RX ADMIN — ENOXAPARIN SODIUM 40 MILLIGRAM(S): 100 INJECTION SUBCUTANEOUS at 18:53

## 2019-12-31 RX ADMIN — Medication 2: at 07:48

## 2019-12-31 RX ADMIN — HYDROMORPHONE HYDROCHLORIDE 0.5 MILLIGRAM(S): 2 INJECTION INTRAMUSCULAR; INTRAVENOUS; SUBCUTANEOUS at 12:27

## 2019-12-31 RX ADMIN — Medication 6 UNIT(S): at 07:49

## 2019-12-31 RX ADMIN — PIPERACILLIN AND TAZOBACTAM 200 GRAM(S): 4; .5 INJECTION, POWDER, LYOPHILIZED, FOR SOLUTION INTRAVENOUS at 00:41

## 2019-12-31 NOTE — PHYSICAL THERAPY INITIAL EVALUATION ADULT - PERTINENT HX OF CURRENT PROBLEM, REHAB EVAL
69F hx poorly controlled DM presents with R foot gas gangrene and DKA s/p R 2nd+3rd toe partial ray amputation (12/29) admitted to SICU for postop monitoring. Seen for PT Eval on [facility]. Partial WB (heel WB) per [facility] vascular PA.

## 2019-12-31 NOTE — BEHAVIORAL HEALTH ASSESSMENT NOTE - DETAILS
hx of passive SI but no past suicidal attempts or plan parents - DMII father - substance use disorder

## 2019-12-31 NOTE — BEHAVIORAL HEALTH ASSESSMENT NOTE - HPI (INCLUDE ILLNESS QUALITY, SEVERITY, DURATION, TIMING, CONTEXT, MODIFYING FACTORS, ASSOCIATED SIGNS AND SYMPTOMS)
68 yo female, domiciled alone, no children, with long history of MDD, several past inpatient hospitalizations, IPP admission to [place] for SI one month ago, BIB self for diabetic foot ulcer. Pt found to be in DKA and now s/p R 2nd and 3rd ray amputations and plantar ulcer excision w/ removal of all noted non-viable soft tissue and bone. Per vascular team, pt's PMD at [place] reported that pt has a long history of depression and is poorly adherent with meds. Psych service consulted for evaluation of depression.    Pt reports that she has been depressed "since Feb 20th", but cannot identify an inciting event. Pt reports that she has been seeing her outpatient psychiatrist, therapist and PMD at [place] and has been adherent with duloxetine as prescribed, but "it doesn't work as well as it used to". Pt reports that since the R 2nd and 3rd ray amputations, she has been feeling depressed, upset about her decreased mobility and quality of life, and worried for her health and future. Pt reports that she is having a difficult time accepting the amputation "never though life would hit me so hard in the face". However, she reports that she brought herself to the ED and "made a decision to live". Pt reports feeling pessimistic about the future but denies suicidal ideation, intent or plan, citing fear of death and spiritual beliefs as protective factors. Pt denies sx of psychosis or HI. Pt reports that she had an appt with her outpatient team at [place] "today" but plans to continue following up on her appts as scheduled. 68 yo female, domiciled alone, no children, with long history of MDD, several past inpatient hospitalizations, IPP admission to [place] for SI one month ago, BIB self for diabetic foot ulcer. Pt found to be in DKA and now s/p R 2nd and 3rd ray amputations and plantar ulcer excision w/ removal of all noted non-viable soft tissue and bone. Per vascular team, pt's PMD at [place] reported that pt has a long history of depression and is poorly adherent with meds. Psych service consulted for evaluation of depression.    Pt reports that she has been depressed "since Feb 20th", but cannot identify an inciting event. Pt reports that she has been seeing her outpatient psychiatrist, therapist and PMD at [place] and has been taking duloxetine, but "it doesn't work as well as it used to". Pt reports that since the R 2nd and 3rd ray amputations, she has been feeling depressed, upset about her decreased mobility and quality of life, and worried for her health and future. Pt reports that she is having a difficult time accepting the amputation "never though life would hit me so hard in the face" and because she has no family who will care for her "I have sisters but they don't care, I don't need family". However, she reports that she brought herself to the ED and "made a decision to live". Pt reports feeling pessimistic about the future but denies suicidal ideation, intent or plan, citing fear of death and spiritual beliefs as protective factors. Pt denies sx of psychosis or HI. Pt reports that she had an appt with her outpatient team at [place] "today" but plans to continue following up on her appts as scheduled. 68 yo female, domiciled alone, no children, with long history of MDD, several past inpatient hospitalizations, IPP admission to [place] for SI one month ago, BIB self for diabetic foot ulcer. Pt found to be in DKA and now s/p R 2nd and 3rd ray amputations and plantar ulcer excision w/ removal of all noted non-viable soft tissue and bone. Per vascular team, pt's PMD at [place] reported that pt has a long history of depression and is poorly adherent with meds. Psych service consulted for evaluation of depression.    Pt reports that she has been depressed "since Feb 20th", but cannot identify an inciting event. Pt reports that she has been seeing her outpatient psychiatrist, therapist and PMD at [place] and has been taking duloxetine, but "it doesn't work as well as it used to". Pt reports that since the R 2nd and 3rd ray amputations, she has been feeling depressed, upset about her decreased mobility and quality of life, and worried for her health and future. Pt reports that she is having a difficult time accepting the amputation "never though life would hit me so hard in the face" and because she has no family who will care for her "I have sisters but they don't care, I don't need family". However, she reports that she brought herself to the ED and "made a decision to live". Pt reports feeling pessimistic about the future but denies suicidal ideation, intent or plan, citing fear of death and spiritual beliefs as protective factors. Denies sleep or appetite disturbance. Pt denies sx of psychosis or HI. Pt reports that she had an appt with her outpatient team at [place] "today" but plans to continue following up on her appts as scheduled.

## 2019-12-31 NOTE — BEHAVIORAL HEALTH ASSESSMENT NOTE - ACTIVATING EVENTS/STRESSORS
Triggering events leading to humiliation, shame, and/or despair (e.g. Loss of relationship, financial or health status) (real or anticipated)/Inadequate social supports/Non-compliant or not receiving treatment/Acute medical problem

## 2019-12-31 NOTE — BEHAVIORAL HEALTH ASSESSMENT NOTE - DESCRIPTION
Type 2 diabetes mellitus with neurological manifestations, Essential hypertension, Hyperlipidemia, Type 2 diabetes mellitus, Asthma, Depressive disorder Type 2 diabetes mellitus with neurological manifestations, Essential hypertension, Hyperlipidemia, Asthma

## 2019-12-31 NOTE — BEHAVIORAL HEALTH ASSESSMENT NOTE - CASE SUMMARY
For Medical Surgical Hx obtained at Admission, Please see Provider H&P
66 yo female with long history of MDD, several past inpatient hospitalizations, no SA, BIB self for diabetic foot ulcer. Pt found to be in DKA and now s/p R 2nd and 3rd ray amputations and plantar ulcer excision w/ removal of all noted non-viable soft tissue and bone. Per vascular team, pt's PMD at [facility] reported that pt has a long history of depression and is poorly adherent with meds. Psych service consulted for evaluation of depression. Patient currently presents with low mood in context of her current stressors. She does not meet criteria for inpatient hospitalization.

## 2019-12-31 NOTE — PHYSICAL THERAPY INITIAL EVALUATION ADULT - DISCHARGE DISPOSITION, PT EVAL
Recommend home physical therapy however patient not in agreement with home physical therapy, does not want anyone in her home. Agreeable to outpatient PT with [facility].

## 2019-12-31 NOTE — PROGRESS NOTE ADULT - SUBJECTIVE AND OBJECTIVE BOX
Patient is a 69y old  Female who presents with a chief complaint of Rt Foot Diabetic foot infection (31 Dec 2019 05:12)      INTERVAL HPI/ OVERNIGHT EVENTS: Patient seen and examined bedside. Resting comfortably in bed in NAD. Denies f/c/n/v.      LABS                        8.3    12.36 )-----------( 650      ( 31 Dec 2019 06:50 )             27.1     12-31    137  |  103  |  20  ----------------------------<  159<H>  4.0   |  24  |  1.54<H>    Ca    8.0<L>      31 Dec 2019 06:50  Phos  3.4     12-31  Mg     2.0     12-31    TPro  6.3  /  Alb  2.1<L>  /  TBili  0.3  /  DBili  x   /  AST  26  /  ALT  23  /  AlkPhos  159<H>  12-29    PT/INR - ( 29 Dec 2019 10:32 )   PT: 15.2 sec;   INR: 1.33          PTT - ( 29 Dec 2019 10:32 )  PTT:26.9 sec    ICU Vital Signs Last 24 Hrs  T(C): 36.6 (31 Dec 2019 06:22), Max: 37.1 (30 Dec 2019 19:05)  T(F): 97.9 (31 Dec 2019 06:22), Max: 98.7 (30 Dec 2019 19:05)  HR: 74 (31 Dec 2019 08:56) (60 - 78)  BP: 151/70 (31 Dec 2019 08:56) (115/57 - 151/70)  BP(mean): 89 (30 Dec 2019 12:00) (85 - 89)  ABP: 116/78 (30 Dec 2019 12:00) (110/74 - 116/78)  ABP(mean): 92 (30 Dec 2019 12:00) (88 - 92)  RR: 18 (31 Dec 2019 08:56) (17 - 18)  SpO2: 95% (31 Dec 2019 08:56) (95% - 100%)      RADIOLOGY    MICROBIOLOGY    Culture - Surgical Swab (collected 30 Dec 2019 08:11)  Source: .Surgical Swab 3rd Metatarsal Head- Tissue OR  Gram Stain (30 Dec 2019 19:13):    Moderate Gram positive cocci in pairs, chains and clusters    Few Gram Negative Diplococci    Rare White blood cells    Culture - Surgical Swab (collected 30 Dec 2019 08:08)  Source: .Surgical Swab Rt. foot- OR  Gram Stain (30 Dec 2019 18:57):    Moderate Gram positive cocci in pairs, chains and clusters    Rare Gram Positive Rods    Few White blood cells    Culture - Blood (collected 29 Dec 2019 14:13)  Source: .Blood Blood  Preliminary Report (30 Dec 2019 15:00):    No growth at 1 day.    Culture - Blood (collected 29 Dec 2019 14:13)  Source: .Blood Blood  Preliminary Report (30 Dec 2019 15:00):    No growth at 1 day.    Culture - Abscess with Gram Stain (collected 29 Dec 2019 12:43)  Source: .Abscess Leg - Right  Gram Stain (30 Dec 2019 09:48):    Numerous Gram Positive Cocci in Pairs and Chains    Moderate Gram Positive Rods    Few Gram Negative Rods    Moderate White blood cells  Preliminary Report (30 Dec 2019 10:41):    Numerous Streptococcus dysgalactiae (Group C/G)    Numerous Streptococcus agalactiae    Rare Morganella morganii    Rare Staphylococcus aureus    Culture in progress    More than three types of organisms recovered may indicate colonization    and/or contamination.    Please call Microbiology immediately if identification and/or    suceptibility is indicated.    Culture - Urine (collected 29 Dec 2019 12:42)  Source: .Urine Clean Catch (Midstream)  Final Report (30 Dec 2019 08:50):    Insignificant amount of mixed growth.        PHYSICAL EXAM  Lower Extremity Focused:  Vasc: DP & PT 1/4 B/L  Derm: Right foot s/p partial 2nd and 3rd ray amputation with large open defect. Wound edges appear viable with minor necrotic tissue. No malodor, no fluctuance.  Neuro: Protective sensation diminished b/l  MSK: 5/5 muscle strength in all crural compartments b/l

## 2019-12-31 NOTE — PROGRESS NOTE ADULT - SUBJECTIVE AND OBJECTIVE BOX
24hr Events;  O/N: Vanc trough 31.3, dose held, decrease lantus from 30u to 25u and changed ISS from low to moderate per Endo rec, VSS  12/30: 1U pRBC for Hgb 7.3, stopped clindamycin. removed a line. endo consulted           Assessment/Plan;  69F hx poorly controlled DM presents with R foot gas gangrene and DKA s/p R 2nd+3rd toe partial ray amputation (12/29) admitted to SICU for postop monitoring. Post operatively, anion gap normalized, glucose well controlled, on sliding scale insulin    Follow up wound cultures  Continue Vancomycin and Zosyn  Dressing changes by podiatry  Glucose control  Pain control  F/u AM labs 24hr Events;  O/N: Vanc trough 31.3, dose held, decrease lantus from 30u to 25u and changed ISS from low to moderate per Endo rec, VSS  12/30: 1U pRBC for Hgb 7.3, stopped clindamycin. removed a line. endo consulted     piperacillin/tazobactam IVPB.. 3.375  aspirin  chewable 81  enoxaparin Injectable 40  piperacillin/tazobactam IVPB.. 3.375        Vital Signs Last 24 Hrs  T(C): 36.6 (31 Dec 2019 06:22), Max: 37.1 (30 Dec 2019 19:05)  T(F): 97.9 (31 Dec 2019 06:22), Max: 98.7 (30 Dec 2019 19:05)  HR: 67 (31 Dec 2019 04:55) (58 - 78)  BP: 115/57 (31 Dec 2019 04:55) (108/57 - 149/72)  BP(mean): 89 (30 Dec 2019 12:00) (82 - 89)  RR: 17 (31 Dec 2019 04:55) (15 - 18)  SpO2: 95% (31 Dec 2019 04:55) (95% - 100%)  I&O's Summary    30 Dec 2019 07:01  -  31 Dec 2019 07:00  --------------------------------------------------------  IN: 710 mL / OUT: 100 mL / NET: 610 mL        Physical Exam:  General: NAD, resting comfortably in bed  Pulmonary: Nonlabored breathing, no respiratory distress  Extremities: warm, no dependent edema, L foot w/previous 2-4 toes amputated. right foot has dressing with ACE- c/d/i.       LABS:                        8.3    12.36 )-----------( 650      ( 31 Dec 2019 06:50 )             27.1     12-31    137  |  103  |  20  ----------------------------<  159<H>  4.0   |  24  |  1.54<H>    Ca    8.0<L>      31 Dec 2019 06:50  Phos  3.4     12-31  Mg     2.0     12-31    TPro  6.3  /  Alb  2.1<L>  /  TBili  0.3  /  DBili  x   /  AST  26  /  ALT  23  /  AlkPhos  159<H>  12-29    PT/INR - ( 29 Dec 2019 10:32 )   PT: 15.2 sec;   INR: 1.33          PTT - ( 29 Dec 2019 10:32 )  PTT:26.9 sec      Assessment/Plan;  69F hx poorly controlled DM presents with R foot gas gangrene and DKA s/p R 2nd+3rd toe partial ray amputation (12/29) admitted to SICU for postop monitoring. Post operatively, anion gap normalized, glucose well controlled, on sliding scale insulin    Follow up wound cultures  Continue Vancomycin and Zosyn  Dressing changes by podiatry  Glucose control  Pain control  F/u AM labs

## 2019-12-31 NOTE — PHYSICAL THERAPY INITIAL EVALUATION ADULT - GENERAL OBSERVATIONS, REHAB EVAL
Patient received sitting at EOB with + heplock IV, tele, room air, surgical dressing to (R) foot. Patient in no apparent distress.

## 2019-12-31 NOTE — BEHAVIORAL HEALTH ASSESSMENT NOTE - OTHER PAST PSYCHIATRIC HISTORY (INCLUDE DETAILS REGARDING ONSET, COURSE OF ILLNESS, INPATIENT/OUTPATIENT TREATMENT)
Pt reports that she was dx with MDD at age 19-20, had "many" psych hospitalizations and was at "[address] [place] [address]" [[facility]] for 20+ years. Current outpatient at [facility], sees outpatient psych Dr Elizabeth, a therapist and PMD at [facility]

## 2019-12-31 NOTE — BEHAVIORAL HEALTH ASSESSMENT NOTE - AXIS III
Type 2 diabetes mellitus with neurological manifestations, Essential hypertension, Hyperlipidemia, Asthma

## 2019-12-31 NOTE — BEHAVIORAL HEALTH ASSESSMENT NOTE - SUMMARY
66 yo female with long history of MDD, several past inpatient hospitalizations, no SA, BIB self for diabetic foot ulcer. Pt found to be in DKA and now s/p R 2nd and 3rd ray amputations and plantar ulcer excision w/ removal of all noted non-viable soft tissue and bone. Per vascular team, pt's PMD at [facility] reported that pt has a long history of depression and is poorly adherent with meds. Psych service consulted for evaluation of depression.    Pt expresses low mood and worry about her decreased mobility and quality of life, health and future in context of recent amputation and poor social supports. However, she is well engaged, with spontaneous humor and future oriented. Pt adamantly denies suicidal ideation, intent or plan, citing fear of death and spiritual beliefs as protective factors. Pt does not meet criteria for inpatient psychiatric admission. Given her history of non adherence with medication and upcoming residential changes, would not recommend dramatic changes in medication. Pt may follow up with her outpatient psych team at [facility] after discharge.

## 2019-12-31 NOTE — PROGRESS NOTE ADULT - SUBJECTIVE AND OBJECTIVE BOX
INTERVAL HPI/OVERNIGHT EVENTS:    Patient is a 69y old  Female who presents with a chief complaint of Rt Foot Diabetic foot infection (31 Dec 2019 09:27)      Pt reports the following symptoms:    CONSTITUTIONAL:  Negative fever or chills, feels well, good appetite  EYES:  Negative  blurry vision or double vision  CARDIOVASCULAR:  Negative for chest pain or palpitations  RESPIRATORY:  Negative for cough, wheezing, or SOB   GASTROINTESTINAL:  Negative for nausea, vomiting, diarrhea, constipation, or abdominal pain  GENITOURINARY:  Negative frequency, urgency or dysuria  NEUROLOGIC:  No headache, confusion, dizziness, lightheadedness    MEDICATIONS  (STANDING):  aspirin  chewable 81 milliGRAM(s) Oral daily  atorvastatin 40 milliGRAM(s) Oral at bedtime  chlorhexidine 2% Cloths 1 Application(s) Topical <User Schedule>  dextrose 5%. 1000 milliLiter(s) (50 mL/Hr) IV Continuous <Continuous>  dextrose 50% Injectable 12.5 Gram(s) IV Push once  dextrose 50% Injectable 25 Gram(s) IV Push once  dextrose 50% Injectable 25 Gram(s) IV Push once  DULoxetine 60 milliGRAM(s) Oral daily  enoxaparin Injectable 40 milliGRAM(s) SubCutaneous every 24 hours  insulin glargine Injectable (LANTUS) 25 Unit(s) SubCutaneous at bedtime  insulin lispro (HumaLOG) corrective regimen sliding scale   SubCutaneous Before meals and at bedtime  insulin lispro Injectable (HumaLOG) 6 Unit(s) SubCutaneous three times a day before meals  NIFEdipine XL 90 milliGRAM(s) Oral daily  piperacillin/tazobactam IVPB.. 3.375 Gram(s) IV Intermittent every 6 hours    MEDICATIONS  (PRN):  acetaminophen   Tablet .. 650 milliGRAM(s) Oral every 6 hours PRN Mild Pain (1 - 3)  dextrose 40% Gel 15 Gram(s) Oral once PRN Blood Glucose LESS THAN 70 milliGRAM(s)/deciliter  glucagon  Injectable 1 milliGRAM(s) IntraMuscular once PRN Glucose LESS THAN 70 milligrams/deciliter  HYDROmorphone  Injectable 1 milliGRAM(s) IV Push every 4 hours PRN Severe Pain (7 - 10)  HYDROmorphone  Injectable 0.5 milliGRAM(s) IV Push every 4 hours PRN Moderate Pain (4 - 6)      PHYSICAL EXAM  Vital Signs Last 24 Hrs  T(C): 36.7 (31 Dec 2019 11:16), Max: 37.1 (30 Dec 2019 19:05)  T(F): 98 (31 Dec 2019 11:16), Max: 98.7 (30 Dec 2019 19:05)  HR: 74 (31 Dec 2019 08:56) (65 - 78)  BP: 151/70 (31 Dec 2019 08:56) (115/57 - 151/70)  BP(mean): --  RR: 18 (31 Dec 2019 08:56) (17 - 18)  SpO2: 95% (31 Dec 2019 08:56) (95% - 96%)    Constitutional: wn/wd in NAD.   HEENT: NCAT, MMM, OP clear, EOMI, no proptosis or lid retraction  Neck: no thyromegaly or palpable thyroid nodules   Respiratory: lungs CTAB.  Cardiovascular: regular rhythm, normal S1 and S2, no audible murmurs, no peripheral edema  GI: soft, NT/ND, no masses/HSM appreciated.  Neurology: no tremors, DTR 2+  Skin: no visible rashes/lesions  Psychiatric: AAO x 3, normal affect/mood.    LABS:                        8.3    12.36 )-----------( 650      ( 31 Dec 2019 06:50 )             27.1     12-31    137  |  103  |  20  ----------------------------<  159<H>  4.0   |  24  |  1.54<H>    Ca    8.0<L>      31 Dec 2019 06:50  Phos  3.4     12-31  Mg     2.0     12-31    TPro  6.3  /  Alb  2.1<L>  /  TBili  0.3  /  DBili  x   /  AST  26  /  ALT  23  /  AlkPhos  159<H>  12-29            HbA1C: 11.2 % (12-29 @ 10:32)    CAPILLARY BLOOD GLUCOSE      POCT Blood Glucose.: 161 mg/dL (31 Dec 2019 06:51)  POCT Blood Glucose.: 174 mg/dL (30 Dec 2019 22:14)  POCT Blood Glucose.: 204 mg/dL (30 Dec 2019 17:19)      Insulin Sliding Scale requirements X 24 Hours:    RADIOLOGY & ADDITIONAL TESTS:    A/P: 69y Female with history of DM type II presenting for       1.  DM -     Please continue           units lantus at bedtime  / in the morning and        units lispro with meals and lispro moderate / low dose sliding scale 4 times daily with meals and at bedtime.  Please continue consistent carbohydrate diet.      Goal FSG is   Will continue to monitor   For discharge, pt can continue    Pt can follow up at discharge with [facility] by calling  to make an appointment.   Will discuss case with     and update primary team INTERVAL HPI/OVERNIGHT EVENTS:    Patient seen and examined at the bedside. Her appetite was okay. She was tearful as per the primary team and was told that she was taking anti-depressants cymbalta - high dose.  FSG & Insulin received:  Yesterday:  pre-dinner fs, 2  units lispro SS  bedtime fs, 25 lantus   units + 2   units lispro SS    Today:  pre-breakfast fs, 6 nutritional lispro   units+2    units lispro SS  pre-lunch fsg: 160, 6 nutritional lispro   units+  2 units lispro SS      Pt reports the following symptoms:    CONSTITUTIONAL:  Negative fever or chills  EYES:  Negative  blurry vision or double vision  CARDIOVASCULAR:  Negative for chest pain or palpitations  RESPIRATORY:  Negative for cough, wheezing, or SOB   GASTROINTESTINAL:  Negative for nausea, vomiting, diarrhea, constipation, or abdominal pain  GENITOURINARY:  Negative frequency, urgency or dysuria  NEUROLOGIC:  No headache, confusion, dizziness, lightheadedness    MEDICATIONS  (STANDING):  aspirin  chewable 81 milliGRAM(s) Oral daily  atorvastatin 40 milliGRAM(s) Oral at bedtime  chlorhexidine 2% Cloths 1 Application(s) Topical <User Schedule>  dextrose 5%. 1000 milliLiter(s) (50 mL/Hr) IV Continuous <Continuous>  dextrose 50% Injectable 12.5 Gram(s) IV Push once  dextrose 50% Injectable 25 Gram(s) IV Push once  dextrose 50% Injectable 25 Gram(s) IV Push once  DULoxetine 60 milliGRAM(s) Oral daily  enoxaparin Injectable 40 milliGRAM(s) SubCutaneous every 24 hours  insulin glargine Injectable (LANTUS) 25 Unit(s) SubCutaneous at bedtime  insulin lispro (HumaLOG) corrective regimen sliding scale   SubCutaneous Before meals and at bedtime  insulin lispro Injectable (HumaLOG) 6 Unit(s) SubCutaneous three times a day before meals  NIFEdipine XL 90 milliGRAM(s) Oral daily  piperacillin/tazobactam IVPB.. 3.375 Gram(s) IV Intermittent every 6 hours    MEDICATIONS  (PRN):  acetaminophen   Tablet .. 650 milliGRAM(s) Oral every 6 hours PRN Mild Pain (1 - 3)  dextrose 40% Gel 15 Gram(s) Oral once PRN Blood Glucose LESS THAN 70 milliGRAM(s)/deciliter  glucagon  Injectable 1 milliGRAM(s) IntraMuscular once PRN Glucose LESS THAN 70 milligrams/deciliter  HYDROmorphone  Injectable 1 milliGRAM(s) IV Push every 4 hours PRN Severe Pain (7 - 10)  HYDROmorphone  Injectable 0.5 milliGRAM(s) IV Push every 4 hours PRN Moderate Pain (4 - 6)      PHYSICAL EXAM  Vital Signs Last 24 Hrs  T(C): 36.7 (31 Dec 2019 11:16), Max: 37.1 (30 Dec 2019 19:05)  T(F): 98 (31 Dec 2019 11:16), Max: 98.7 (30 Dec 2019 19:05)  HR: 74 (31 Dec 2019 08:56) (65 - 78)  BP: 151/70 (31 Dec 2019 08:56) (115/57 - 151/70)  BP(mean): --  RR: 18 (31 Dec 2019 08:56) (17 - 18)  SpO2: 95% (31 Dec 2019 08:56) (95% - 96%)    Constitutional: wn/wd in NAD.   Respiratory: lungs CTAB.  Cardiovascular: regular rhythm, normal S1 and S2, no peripheral edema  GI: soft, NT/ND, no masses/HSM appreciated.  Neurology: no tremors, DTR 2+, moves all 4 extremities      LABS:                        8.3    12.36 )-----------( 650      ( 31 Dec 2019 06:50 )             27.1         137  |  103  |  20  ----------------------------<  159<H>  4.0   |  24  |  1.54<H>    Ca    8.0<L>      31 Dec 2019 06:50  Phos  3.4       Mg     2.0         TPro  6.3  /  Alb  2.1<L>  /  TBili  0.3  /  DBili  x   /  AST  26  /  ALT  23  /  AlkPhos  159<H>              HbA1C: 11.2 % ( @ 10:32)    CAPILLARY BLOOD GLUCOSE      POCT Blood Glucose.: 161 mg/dL (31 Dec 2019 06:51)  POCT Blood Glucose.: 174 mg/dL (30 Dec 2019 22:14)  POCT Blood Glucose.: 204 mg/dL (30 Dec 2019 17:19)      Insulin Sliding Scale requirements X 24 Hours:    RADIOLOGY & ADDITIONAL TESTS:    A/P:  69F hx DM (A1c 11.2), several left toe amps’ in past, HTN, HLD and asthma got 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  DM Type 2 - uncontrolled  - Hba1c 11.2  - Wt 68 kg with BMI 27.4  - Cr/GFR 1.31/48  No ECHO in system  Please increase to lantus 28 units at night.  Please increase to lispro 7   units before each meal.    Please continue lispro moderate dose sliding scale four times daily with meals and at bedtime  carb consistent diet    Pt's fingerstick glucose goal is 120 to 150    Will continue to monitor     For discharge, TBD    Pt can follow up at discharge with [facility] by calling  to make an appointment.   discussed case with Dr. Tran   and updated  primary team  To Make an appointment at [address] [place] [address] for the patient, either:  1. Send a STAT task via Poken to Ernestina Wilson or Sumi Rossi (office managers)   OR  2. Call supervisor's line. P: [phone] (do not give this number to patient). VM is checked periodically  In the message, specify that this is a hospital discharge follow-up, and that the appt can be made with a NP if there is no timely MD availability    REMINDERS FOR INSULIN/DIABETES SUPPLIES at DISCHARGE:  INSULIN:   Long actin/Basal Insulin: Examples: Toujeo, Basaglar, Tresiba, Lantus   Short acting/Bolus Insulin: Humalog, Admelog, Novolog  Please ensure that BOTH short acting and long acting insulin are prescribed in the same preparation (Ex: PEN vs VIAL/SOLUTION)     TESTING SUPPLIES:   All glucometer supplies should be written as generic to avoid issues with insurance. Use the free text option in sunrise prescription writer, and type in glucometer test strips, lancets, etc to order.    If sending patient home on insulin PEN, please send:   •	BD paz insulin pen needles for use up to 4 times daily (total quantity 100)  •	Lancets for use up to 4 times daily (total quantity 100)  •	Glucometer Test strips for use up to 4 times daily (total quantity 100)  •	Alcohol swabs for use up to 4 times daily (total quantity 100)  •	Glucometer (If provided by hospital, still provide scripts for lancets, test strips, and swabs)  If sending patient home on insulin VIAL, please send:   •	Insulin syringes (6mm) - for use up to 4 times daily (total quantity 100)  •	Lancets for use up to 4 times daily (total quantity 100)  •	Generic Glucometer Test strips for use up to 4 times daily (total quantity 100)  •	Alcohol swabs for use up to 4 times daily (total quantity 100)  •	Generic Glucometer (If provided by hospital, still provide scripts for lancets, test strips, and swabs)  •	Do not specify brand for testing supplies (such as contour, freestyle, one touch etc) that way the pharmacy has the freedom to pick and change according to what the insurance dictates.  For patients without insurance:   •	Provide social work with appropriate scripts so they may obtain 1 week of samples  •	Provide with glucometer. Glucometers are located at various nursing stations, the nursing office, education, and endocrine fellows office.  •	Please make appointment with Nichole Jama NP or Kaycee Coates RN and JOSE G Bell at the [address] [place] [address] endocrinology clinic. They can see patients without insurance, provide appropriate samples, and assist in getting insurance coverage.     PREFERRED PHARMACY:  [facility] (located on 1st floor next to admitting)  P: [phone]  Hours: M – F 8AM – 8PM, Sat 8AM – 4PM, Sun—closed  If not using [facility], please follow up with chosen pharmacy to ensure insulin prescribed is covered.

## 2019-12-31 NOTE — BEHAVIORAL HEALTH ASSESSMENT NOTE - SUICIDE PROTECTIVE FACTORS
Cultural, spiritual and/or moral attitudes against suicide/Has future plans/Fear of death or the actual act of killing self/Positive therapeutic relationships

## 2019-12-31 NOTE — PHYSICAL THERAPY INITIAL EVALUATION ADULT - ADDITIONAL COMMENTS
Patient reports that she lives alone, (+) elevator building. Independent with ADLs and mobility, no assistive device, prior to admission.

## 2019-12-31 NOTE — BEHAVIORAL HEALTH ASSESSMENT NOTE - RISK ASSESSMENT
Low Acute Suicide Risk Pt adamantly denies suicidal ideation, intent or plan, citing fear of death and spiritual beliefs as protective factors

## 2019-12-31 NOTE — BEHAVIORAL HEALTH ASSESSMENT NOTE - NSBHCHARTREVIEWVS_PSY_A_CORE FT
Vital Signs Last 24 Hrs  T(C): 37.3 (31 Dec 2019 14:58), Max: 37.3 (31 Dec 2019 14:58)  T(F): 99.1 (31 Dec 2019 14:58), Max: 99.1 (31 Dec 2019 14:58)  HR: 74 (31 Dec 2019 08:56) (65 - 78)  BP: 151/70 (31 Dec 2019 08:56) (115/57 - 151/70)  BP(mean): --  RR: 18 (31 Dec 2019 08:56) (17 - 18)  SpO2: 95% (31 Dec 2019 08:56) (95% - 96%)

## 2019-12-31 NOTE — BEHAVIORAL HEALTH ASSESSMENT NOTE - NSBHCHARTREVIEWLAB_PSY_A_CORE FT
12-31    137  |  103  |  20  ----------------------------<  159<H>  4.0   |  24  |  1.54<H>    Ca    8.0<L>      31 Dec 2019 06:50  Phos  3.4     12-31  Mg     2.0     12-31    TPro  6.3  /  Alb  2.1<L>  /  TBili  0.3  /  DBili  x   /  AST  26  /  ALT  23  /  AlkPhos  159<H>  12-29 Dr. Guardado, peds pulmonary

## 2020-01-01 LAB
-  AMPICILLIN/SULBACTAM: SIGNIFICANT CHANGE UP
-  AMPICILLIN: SIGNIFICANT CHANGE UP
-  CEFAZOLIN: SIGNIFICANT CHANGE UP
-  CEFEPIME: SIGNIFICANT CHANGE UP
-  CEFTRIAXONE: SIGNIFICANT CHANGE UP
-  CLINDAMYCIN: SIGNIFICANT CHANGE UP
-  ERYTHROMYCIN: SIGNIFICANT CHANGE UP
-  GENTAMICIN: SIGNIFICANT CHANGE UP
-  LEVOFLOXACIN: SIGNIFICANT CHANGE UP
-  LEVOFLOXACIN: SIGNIFICANT CHANGE UP
-  LINEZOLID: SIGNIFICANT CHANGE UP
-  LINEZOLID: SIGNIFICANT CHANGE UP
-  MEROPENEM: SIGNIFICANT CHANGE UP
-  OXACILLIN: SIGNIFICANT CHANGE UP
-  OXACILLIN: SIGNIFICANT CHANGE UP
-  PENICILLIN: SIGNIFICANT CHANGE UP
-  PIPERACILLIN/TAZOBACTAM: SIGNIFICANT CHANGE UP
-  RIFAMPIN: SIGNIFICANT CHANGE UP
-  RIFAMPIN: SIGNIFICANT CHANGE UP
-  TOBRAMYCIN: SIGNIFICANT CHANGE UP
-  TRIMETHOPRIM/SULFAMETHOXAZOLE: SIGNIFICANT CHANGE UP
-  VANCOMYCIN: SIGNIFICANT CHANGE UP
ANION GAP SERPL CALC-SCNC: 14 MMOL/L — SIGNIFICANT CHANGE UP (ref 5–17)
BUN SERPL-MCNC: 15 MG/DL — SIGNIFICANT CHANGE UP (ref 7–23)
CALCIUM SERPL-MCNC: 9 MG/DL — SIGNIFICANT CHANGE UP (ref 8.4–10.5)
CHLORIDE SERPL-SCNC: 104 MMOL/L — SIGNIFICANT CHANGE UP (ref 96–108)
CO2 SERPL-SCNC: 22 MMOL/L — SIGNIFICANT CHANGE UP (ref 22–31)
CREAT SERPL-MCNC: 1.38 MG/DL — HIGH (ref 0.5–1.3)
CULTURE RESULTS: SIGNIFICANT CHANGE UP
GLUCOSE BLDC GLUCOMTR-MCNC: 181 MG/DL — HIGH (ref 70–99)
GLUCOSE BLDC GLUCOMTR-MCNC: 214 MG/DL — HIGH (ref 70–99)
GLUCOSE BLDC GLUCOMTR-MCNC: 232 MG/DL — HIGH (ref 70–99)
GLUCOSE BLDC GLUCOMTR-MCNC: 263 MG/DL — HIGH (ref 70–99)
GLUCOSE SERPL-MCNC: 167 MG/DL — HIGH (ref 70–99)
HCT VFR BLD CALC: 34.6 % — SIGNIFICANT CHANGE UP (ref 34.5–45)
HGB BLD-MCNC: 10.4 G/DL — LOW (ref 11.5–15.5)
MAGNESIUM SERPL-MCNC: 1.6 MG/DL — SIGNIFICANT CHANGE UP (ref 1.6–2.6)
MCHC RBC-ENTMCNC: 24.8 PG — LOW (ref 27–34)
MCHC RBC-ENTMCNC: 30.1 GM/DL — LOW (ref 32–36)
MCV RBC AUTO: 82.6 FL — SIGNIFICANT CHANGE UP (ref 80–100)
METHOD TYPE: SIGNIFICANT CHANGE UP
NRBC # BLD: 0 /100 WBCS — SIGNIFICANT CHANGE UP (ref 0–0)
ORGANISM # SPEC MICROSCOPIC CNT: SIGNIFICANT CHANGE UP
PHOSPHATE SERPL-MCNC: 3.6 MG/DL — SIGNIFICANT CHANGE UP (ref 2.5–4.5)
PLATELET # BLD AUTO: 757 K/UL — HIGH (ref 150–400)
POTASSIUM SERPL-MCNC: 5.5 MMOL/L — HIGH (ref 3.5–5.3)
POTASSIUM SERPL-SCNC: 5.5 MMOL/L — HIGH (ref 3.5–5.3)
RBC # BLD: 4.19 M/UL — SIGNIFICANT CHANGE UP (ref 3.8–5.2)
RBC # FLD: 18.6 % — HIGH (ref 10.3–14.5)
SODIUM SERPL-SCNC: 140 MMOL/L — SIGNIFICANT CHANGE UP (ref 135–145)
SPECIMEN SOURCE: SIGNIFICANT CHANGE UP
VANCOMYCIN TROUGH SERPL-MCNC: 18.9 UG/ML — SIGNIFICANT CHANGE UP (ref 10–20)
VANCOMYCIN TROUGH SERPL-MCNC: 22.2 UG/ML — HIGH (ref 10–20)
WBC # BLD: 11.44 K/UL — HIGH (ref 3.8–10.5)
WBC # FLD AUTO: 11.44 K/UL — HIGH (ref 3.8–10.5)

## 2020-01-01 PROCEDURE — 99231 SBSQ HOSP IP/OBS SF/LOW 25: CPT | Mod: GC

## 2020-01-01 PROCEDURE — 99232 SBSQ HOSP IP/OBS MODERATE 35: CPT

## 2020-01-01 RX ORDER — PANTOPRAZOLE SODIUM 20 MG/1
40 TABLET, DELAYED RELEASE ORAL
Refills: 0 | Status: DISCONTINUED | OUTPATIENT
Start: 2020-01-01 | End: 2020-01-09

## 2020-01-01 RX ORDER — MAGNESIUM SULFATE 500 MG/ML
2 VIAL (ML) INJECTION ONCE
Refills: 0 | Status: COMPLETED | OUTPATIENT
Start: 2020-01-01 | End: 2020-01-01

## 2020-01-01 RX ORDER — SODIUM POLYSTYRENE SULFONATE 4.1 MEQ/G
15 POWDER, FOR SUSPENSION ORAL ONCE
Refills: 0 | Status: COMPLETED | OUTPATIENT
Start: 2020-01-01 | End: 2020-01-01

## 2020-01-01 RX ORDER — VANCOMYCIN HCL 1 G
750 VIAL (EA) INTRAVENOUS ONCE
Refills: 0 | Status: COMPLETED | OUTPATIENT
Start: 2020-01-01 | End: 2020-01-01

## 2020-01-01 RX ORDER — INSULIN LISPRO 100/ML
9 VIAL (ML) SUBCUTANEOUS
Refills: 0 | Status: DISCONTINUED | OUTPATIENT
Start: 2020-01-01 | End: 2020-01-02

## 2020-01-01 RX ORDER — INSULIN GLARGINE 100 [IU]/ML
4 INJECTION, SOLUTION SUBCUTANEOUS ONCE
Refills: 0 | Status: COMPLETED | OUTPATIENT
Start: 2020-01-01 | End: 2020-01-01

## 2020-01-01 RX ORDER — HYDRALAZINE HCL 50 MG
5 TABLET ORAL ONCE
Refills: 0 | Status: COMPLETED | OUTPATIENT
Start: 2020-01-01 | End: 2020-01-01

## 2020-01-01 RX ORDER — INSULIN GLARGINE 100 [IU]/ML
32 INJECTION, SOLUTION SUBCUTANEOUS AT BEDTIME
Refills: 0 | Status: DISCONTINUED | OUTPATIENT
Start: 2020-01-01 | End: 2020-01-02

## 2020-01-01 RX ADMIN — HYDROMORPHONE HYDROCHLORIDE 0.5 MILLIGRAM(S): 2 INJECTION INTRAMUSCULAR; INTRAVENOUS; SUBCUTANEOUS at 22:30

## 2020-01-01 RX ADMIN — PIPERACILLIN AND TAZOBACTAM 200 GRAM(S): 4; .5 INJECTION, POWDER, LYOPHILIZED, FOR SOLUTION INTRAVENOUS at 18:13

## 2020-01-01 RX ADMIN — Medication 7 UNIT(S): at 12:34

## 2020-01-01 RX ADMIN — PIPERACILLIN AND TAZOBACTAM 200 GRAM(S): 4; .5 INJECTION, POWDER, LYOPHILIZED, FOR SOLUTION INTRAVENOUS at 00:02

## 2020-01-01 RX ADMIN — Medication 2: at 07:54

## 2020-01-01 RX ADMIN — Medication 50 GRAM(S): at 09:21

## 2020-01-01 RX ADMIN — Medication 81 MILLIGRAM(S): at 00:02

## 2020-01-01 RX ADMIN — Medication 4: at 22:11

## 2020-01-01 RX ADMIN — INSULIN GLARGINE 28 UNIT(S): 100 INJECTION, SOLUTION SUBCUTANEOUS at 00:02

## 2020-01-01 RX ADMIN — Medication 5 MILLIGRAM(S): at 01:52

## 2020-01-01 RX ADMIN — Medication 81 MILLIGRAM(S): at 22:11

## 2020-01-01 RX ADMIN — Medication 250 MILLIGRAM(S): at 01:23

## 2020-01-01 RX ADMIN — Medication 7 UNIT(S): at 17:41

## 2020-01-01 RX ADMIN — PANTOPRAZOLE SODIUM 40 MILLIGRAM(S): 20 TABLET, DELAYED RELEASE ORAL at 18:14

## 2020-01-01 RX ADMIN — ATORVASTATIN CALCIUM 40 MILLIGRAM(S): 80 TABLET, FILM COATED ORAL at 22:11

## 2020-01-01 RX ADMIN — Medication 7 UNIT(S): at 07:54

## 2020-01-01 RX ADMIN — ATORVASTATIN CALCIUM 40 MILLIGRAM(S): 80 TABLET, FILM COATED ORAL at 00:02

## 2020-01-01 RX ADMIN — HYDROMORPHONE HYDROCHLORIDE 0.5 MILLIGRAM(S): 2 INJECTION INTRAMUSCULAR; INTRAVENOUS; SUBCUTANEOUS at 01:45

## 2020-01-01 RX ADMIN — INSULIN GLARGINE 28 UNIT(S): 100 INJECTION, SOLUTION SUBCUTANEOUS at 23:14

## 2020-01-01 RX ADMIN — HYDROMORPHONE HYDROCHLORIDE 0.5 MILLIGRAM(S): 2 INJECTION INTRAMUSCULAR; INTRAVENOUS; SUBCUTANEOUS at 01:23

## 2020-01-01 RX ADMIN — SODIUM POLYSTYRENE SULFONATE 15 GRAM(S): 4.1 POWDER, FOR SUSPENSION ORAL at 12:18

## 2020-01-01 RX ADMIN — Medication 30 MILLILITER(S): at 18:55

## 2020-01-01 RX ADMIN — Medication 4: at 17:41

## 2020-01-01 RX ADMIN — PIPERACILLIN AND TAZOBACTAM 200 GRAM(S): 4; .5 INJECTION, POWDER, LYOPHILIZED, FOR SOLUTION INTRAVENOUS at 13:00

## 2020-01-01 RX ADMIN — PIPERACILLIN AND TAZOBACTAM 200 GRAM(S): 4; .5 INJECTION, POWDER, LYOPHILIZED, FOR SOLUTION INTRAVENOUS at 06:42

## 2020-01-01 RX ADMIN — HYDROMORPHONE HYDROCHLORIDE 0.5 MILLIGRAM(S): 2 INJECTION INTRAMUSCULAR; INTRAVENOUS; SUBCUTANEOUS at 22:11

## 2020-01-01 RX ADMIN — Medication 6: at 12:33

## 2020-01-01 RX ADMIN — DULOXETINE HYDROCHLORIDE 60 MILLIGRAM(S): 30 CAPSULE, DELAYED RELEASE ORAL at 15:25

## 2020-01-01 RX ADMIN — Medication 90 MILLIGRAM(S): at 06:42

## 2020-01-01 RX ADMIN — ENOXAPARIN SODIUM 40 MILLIGRAM(S): 100 INJECTION SUBCUTANEOUS at 18:14

## 2020-01-01 NOTE — PROGRESS NOTE ADULT - SUBJECTIVE AND OBJECTIVE BOX
24hr Events:  O/N: Hypertensive, systollic BP 120mg, given hydralazine 5mg, Vancomycin trough 18.8 so gave 750mg for pneumonia  12/31: vanco trough in AM 25.8, psych consulted- continue with current care; per endo increased lantus to 28 and lispro to 7        Assessment/Plan;  69F hx poorly controlled DM presents with R foot gas gangrene and DKA s/p R 2nd+3rd toe partial ray amputation (12/29) admitted to SICU for postop monitoring. Post operatively, anion gap normalized, glucose well controlled, on sliding scale insulin    Follow up wound cultures  Continue Vancomycin and Zosyn  Dressing changes by podiatry  Glucose control  Pain control  F/u AM labs  F/u Psych rec  Home medication as appropriate  Diabetic diet 24hr Events:  O/N: Hypertensive, systollic BP 120mg, given hydralazine 5mg, Vancomycin trough 18.8 so gave 750mg for pneumonia  12/31: vanco trough in AM 25.8, psych consulted- continue with current care; per endo increased lantus to 28 and lispro to 7    No acute complaints. Denies pain in legs. Denies CP/SOB, N/V.    Review of Systems: 12 point review of systems otherwise negative    MEDICATIONS  (STANDING):  aspirin  chewable 81 milliGRAM(s) Oral daily  atorvastatin 40 milliGRAM(s) Oral at bedtime  chlorhexidine 2% Cloths 1 Application(s) Topical <User Schedule>  dextrose 5%. 1000 milliLiter(s) (50 mL/Hr) IV Continuous <Continuous>  dextrose 50% Injectable 12.5 Gram(s) IV Push once  dextrose 50% Injectable 25 Gram(s) IV Push once  dextrose 50% Injectable 25 Gram(s) IV Push once  DULoxetine 60 milliGRAM(s) Oral daily  enoxaparin Injectable 40 milliGRAM(s) SubCutaneous every 24 hours  insulin glargine Injectable (LANTUS) 28 Unit(s) SubCutaneous at bedtime  insulin lispro (HumaLOG) corrective regimen sliding scale   SubCutaneous Before meals and at bedtime  insulin lispro Injectable (HumaLOG) 7 Unit(s) SubCutaneous three times a day before meals  NIFEdipine XL 90 milliGRAM(s) Oral daily  piperacillin/tazobactam IVPB.. 3.375 Gram(s) IV Intermittent every 6 hours  sodium polystyrene sulfonate Suspension 15 Gram(s) Oral once    MEDICATIONS  (PRN):  acetaminophen   Tablet .. 650 milliGRAM(s) Oral every 6 hours PRN Mild Pain (1 - 3)  dextrose 40% Gel 15 Gram(s) Oral once PRN Blood Glucose LESS THAN 70 milliGRAM(s)/deciliter  glucagon  Injectable 1 milliGRAM(s) IntraMuscular once PRN Glucose LESS THAN 70 milligrams/deciliter  HYDROmorphone  Injectable 1 milliGRAM(s) IV Push every 4 hours PRN Severe Pain (7 - 10)  HYDROmorphone  Injectable 0.5 milliGRAM(s) IV Push every 4 hours PRN Moderate Pain (4 - 6)    Allergies  No Known Allergies    Vital Signs Last 24 Hrs  T(C): 36.2 (01 Jan 2020 09:00), Max: 37.3 (31 Dec 2019 14:58)  T(F): 97.1 (01 Jan 2020 09:00), Max: 99.1 (31 Dec 2019 14:58)  HR: 83 (01 Jan 2020 08:30) (62 - 83)  BP: 141/69 (01 Jan 2020 08:30) (139/79 - 200/89)  BP(mean): --  RR: 18 (01 Jan 2020 08:30) (15 - 18)  SpO2: 96% (01 Jan 2020 08:30) (96% - 98%)    CAPILLARY BLOOD GLUCOSE  POCT Blood Glucose.: 181 mg/dL (01 Jan 2020 06:58)  POCT Blood Glucose.: 136 mg/dL (31 Dec 2019 21:47)  POCT Blood Glucose.: 102 mg/dL (31 Dec 2019 16:45)  POCT Blood Glucose.: 160 mg/dL (31 Dec 2019 14:52)      12-31 @ 07:01  -  01-01 @ 07:00  --------------------------------------------------------  IN: 420 mL / OUT: 0 mL / NET: 420 mL    Physical Exam:  General: NAD, resting comfortably in bed  Pulmonary: Nonlabored breathing, no respiratory distress  Extremities: warm, no dependent edema, L foot w/previous 2-4 toes amputated. right foot has dressing with ACE- c/d/i, will coordinate dressing change with podiatry    LABS:                      10.4   11.44 )-----------( 757      ( 01 Jan 2020 06:20 )             34.6     01-01    140  |  104  |  15  ----------------------------<  167<H>  5.5<H>   |  22  |  1.38<H>    Ca    9.0      01 Jan 2020 06:20  Phos  3.6     01-01  Mg     1.6     01-01    RADIOLOGY & ADDITIONAL TESTS:  ---------------------------------------------------------------------------  I personally reviewed: [  ]EKG   [  ]CXR    [  ] CT    [  ]Other  ---------------------------------------------------------------------------  PLEASE CHECK WHEN PRESENT:     [  ]Heart Failure     [  ] Acute     [  ] Acute on Chronic     [  ] Chronic  -------------------------------------------------------------------     [  ]Diastolic [HFpEF]     [  ]Systolic [HFrEF]     [  ]Combined [HFpEF & HFrEF]     [  ]Other:  -------------------------------------------------------------------  [ ] Respiratory failure  [ ] Acute cor pulmonale  [ ] Asthma/COPD Exacerbation  [ ] Pleural effusion  [ ] Aspiration pneumonia  -------------------------------------------------------------------  [x ]ELBERT     [x ]ATN     [  ]Reneal Medullary Necrosis     [  ]Renal Cortical Necrosis     [  ]Other Pathological Lesions:    [  ]CKD 1  [  ]CKD 2  [  ]CKD 3  [  ]CKD 4  [  ]CKD 5  [  ]Other  -------------------------------------------------------------------  [ x]Diabetes  [ x] Diabetic PVD Ulcer  [  ] Neuropathic ulcer to DM  [  ] Diabetes with Nephropathy  [ x] Osteomyelitis due to diabetes  --------------------------------------------------------------------  [  ]Malnutrition: See Nutrition Note  [  ]Cachexia  [  ]Other:   [  ]Supplement Ordered:  [  ]Morbid Obesity (BMI >=40]  ---------------------------------------------------------------------  [ ] Sepsis/severe sepsis/septic shock  [ ] UTI  [ ] Pneumonia  -----------------------------------------------------------------------  [ ] Acidosis/alkalosis  [ ] Fluid overload  [ ] Hypokalemia  [ ] Hyperkalemia  [x] Hypomagnesemia  [ ] Hypophosphatemia  [ ] Hyperphosphatemia  ------------------------------------------------------------------------  [ ] Acute blood loss anemia  [ ] Post op blood loss anemia  [ ] Iron deficiency anemia  [ ] Anemia due to chronic disease  [ ] Hypercoagulable state  ----------------------------------------------------------------------  [ ] Cerebral infarction  [ ] Transient ischemia attack  [ ] Encephalopathy      Assessment/Plan;  69F hx poorly controlled DM presents with R foot gas gangrene and DKA s/p R 2nd+3rd toe partial ray amputation (12/29) admitted to SICU for postop monitoring. Post operatively, anion gap normalized, glucose well controlled, on sliding scale insulin    Follow up wound cultures  Continue Vancomycin and Zosyn  Dressing changes by podiatry  Glucose control  Pain control  F/u AM labs  F/u Psych rec  Home medication as appropriate  Diabetic diet

## 2020-01-01 NOTE — PROGRESS NOTE ADULT - SUBJECTIVE AND OBJECTIVE BOX
Patient is a 69y old  Female who presents with a chief complaint of Rt Foot Diabetic foot infection (01 Jan 2020 06:44)      INTERVAL HPI/ OVERNIGHT EVENTS: Pt NAD resting comfortably in chair. Pt states that the pain in her foot is improving.      LABS                        10.4   11.44 )-----------( 757      ( 01 Jan 2020 06:20 )             34.6     01-01    140  |  104  |  15  ----------------------------<  167<H>  5.5<H>   |  22  |  1.38<H>    Ca    9.0      01 Jan 2020 06:20  Phos  3.6     01-01  Mg     1.6     01-01          ICU Vital Signs Last 24 Hrs  T(C): 36.2 (01 Jan 2020 09:00), Max: 37.3 (31 Dec 2019 14:58)  T(F): 97.1 (01 Jan 2020 09:00), Max: 99.1 (31 Dec 2019 14:58)  HR: 83 (01 Jan 2020 08:30) (62 - 83)  BP: 141/69 (01 Jan 2020 08:30) (139/79 - 200/89)  BP(mean): --  ABP: --  ABP(mean): --  RR: 18 (01 Jan 2020 08:30) (15 - 18)  SpO2: 96% (01 Jan 2020 08:30) (96% - 98%)      RADIOLOGY    MICROBIOLOGY  Culture - Surgical Swab (12.30.19 @ 08:11)    Gram Stain:   Moderate Gram positive cocci in pairs, chains and clusters  Few Gram Negative Diplococci  Rare White blood cells    -  Cefazolin: S <=4    -  Clindamycin: R 0.5 This isolate is presumed to be clindamycin resistant based on detection of inducible resistance. Clindamycin may still be effective in some patients.    -  Erythromycin: R >4    -  Linezolid: S 2    -  Oxacillin: S <=0.25    -  RIF- Rifampin: S <=1 Should not be used as monotherapy    -  Trimethoprim/Sulfamethoxazole: S <=0.5/9.5    Specimen Source: .Surgical Swab 3rd Metatarsal Head- Tissue OR    Culture Results:   Numerous Streptococcus dysgalactiae (Group C/G)  Few Streptococcus agalactiae  Moderate Staphylococcus aureus  Moderate Actinomyces species  See previous culture for sensitivity    Organism Identification: Staphylococcus aureus    Organism: Staphylococcus aureus    Method Type: DIANE        PE:  Vasc: DP & PT 1/4 B/L  Derm: Right foot s/p partial 2nd and 3rd ray amputation with large open defect. Wound edges appear viable with minor necrotic tissue. No malodor, no fluctuance.  Neuro: Protective sensation diminished b/l  MSK: 5/5 muscle strength in all crural compartments b/l

## 2020-01-01 NOTE — BEHAVIORAL HEALTH ASSESSMENT NOTE - PROFESSIONAL COLLATERAL NAME
[facility]
Cry with normal variation of amplitude and frequency/Global muscle tone and symmetry normal/Gag reflex present/Periods of alertness noted/Grossly responds to touch light and sound stimuli/Jamarcus and grasp reflexes acceptable

## 2020-01-01 NOTE — PROGRESS NOTE ADULT - SUBJECTIVE AND OBJECTIVE BOX
INTERVAL HPI/OVERNIGHT EVENTS:    Patient is a 69y old  Female who presents with a chief complaint of Rt Foot Diabetic foot infection (01 Jan 2020 12:12)    Was in marked distress from epigastric fullness and ?? heartburn at the time of my visit.  Ate supper, but unclear if it began before or after she ate.  Says that she "can't breathe," but seems to mean that she is unable to take a deep breath.  Abdominal discomfort is in the sub-xiphoid area.  Has minimal pain in her foot.  Per Vascular MDs, wounds apparently looked good at dressing change today.    Current insulin regimen is 28 units Lantus hs, 7 units lispro AC  Fingersticks were 102/136 yesterday evening, 181 pre-breakfast, but then elevated to 263/214 at lunch and supper today.  Pt apparently had a fish sandwich on a roll for lunch, veggie burger for supper.  She also admits to having had two small cans of apple juice today--one between breakfast and lunch, the other between lunch and supper      Pt reports the following symptoms:    CONSTITUTIONAL:  Negative fever or chills, good appetite  EYES:  Negative  blurry vision or double vision  CARDIOVASCULAR:  See comment in the HPI re: upper abdominal/chest pain.    RESPIRATORY:  Negative for cough, wheezing, or SOB   GASTROINTESTINAL:  Has mild nausea at present.  Denies constipation or diarrhea.     GENITOURINARY:  Negative frequency, urgency or dysuria  NEUROLOGIC:  No headache, confusion, dizziness, lightheadedness    MEDICATIONS  (STANDING):  aspirin  chewable 81 milliGRAM(s) Oral daily  atorvastatin 40 milliGRAM(s) Oral at bedtime  chlorhexidine 2% Cloths 1 Application(s) Topical <User Schedule>  dextrose 5%. 1000 milliLiter(s) (50 mL/Hr) IV Continuous <Continuous>  dextrose 50% Injectable 12.5 Gram(s) IV Push once  dextrose 50% Injectable 25 Gram(s) IV Push once  dextrose 50% Injectable 25 Gram(s) IV Push once  DULoxetine 60 milliGRAM(s) Oral daily  enoxaparin Injectable 40 milliGRAM(s) SubCutaneous every 24 hours  insulin glargine Injectable (LANTUS) 28 Unit(s) SubCutaneous at bedtime  insulin lispro (HumaLOG) corrective regimen sliding scale   SubCutaneous Before meals and at bedtime  insulin lispro Injectable (HumaLOG) 7 Unit(s) SubCutaneous three times a day before meals  NIFEdipine XL 90 milliGRAM(s) Oral daily  pantoprazole    Tablet 40 milliGRAM(s) Oral before breakfast  piperacillin/tazobactam IVPB.. 3.375 Gram(s) IV Intermittent every 6 hours    MEDICATIONS  (PRN):  acetaminophen   Tablet .. 650 milliGRAM(s) Oral every 6 hours PRN Mild Pain (1 - 3)  aluminum hydroxide/magnesium hydroxide/simethicone Suspension 30 milliLiter(s) Oral every 6 hours PRN Dyspepsia  dextrose 40% Gel 15 Gram(s) Oral once PRN Blood Glucose LESS THAN 70 milliGRAM(s)/deciliter  glucagon  Injectable 1 milliGRAM(s) IntraMuscular once PRN Glucose LESS THAN 70 milligrams/deciliter  HYDROmorphone  Injectable 1 milliGRAM(s) IV Push every 4 hours PRN Severe Pain (7 - 10)  HYDROmorphone  Injectable 0.5 milliGRAM(s) IV Push every 4 hours PRN Moderate Pain (4 - 6)      PHYSICAL EXAM  Vital Signs Last 24 Hrs  T(C): 36.2 (01 Jan 2020 13:42), Max: 36.7 (31 Dec 2019 22:31)  T(F): 97.2 (01 Jan 2020 13:42), Max: 98 (31 Dec 2019 22:31)  HR: 76 (01 Jan 2020 18:27) (62 - 88)  BP: 123/57 (01 Jan 2020 18:27) (116/54 - 200/89)  BP(mean): --  RR: 18 (01 Jan 2020 18:27) (15 - 18)  SpO2: 96% (01 Jan 2020 08:30) (96% - 98%)    Constitutional: wn/wd in NAD.   HEENT: NCAT, EOMI, no proptosis or lid retraction  Neck: no thyromegaly or palpable thyroid nodules.  No carotid bruits  Respiratory: lungs entirely clear  Cardiovascular: regular rhythm, normal S1 and S2, no audible murmurs, no peripheral edema  GI: Tenderness and mild fullness in the epigastric area.  Neurology: no tremors  Skin: no visible rashes/lesions  Psychiatric: AAO x 3, normal affect/mood.    LABS:                        10.4   11.44 )-----------( 757      ( 01 Jan 2020 06:20 )             34.6     01-01    140  |  104  |  15  ----------------------------<  167<H>  5.5<H>   |  22  |  1.38<H>    Ca    9.0      01 Jan 2020 06:20  Phos  3.6     01-01  Mg     1.6     01-01              HbA1C: 11.2 % (12-29 @ 10:32)    CAPILLARY BLOOD GLUCOSE      POCT Blood Glucose.: 214 mg/dL (01 Jan 2020 17:15)  POCT Blood Glucose.: 263 mg/dL (01 Jan 2020 12:22)  POCT Blood Glucose.: 181 mg/dL (01 Jan 2020 06:58)  POCT Blood Glucose.: 136 mg/dL (31 Dec 2019 21:47)      Insulin Sliding Scale requirements X 24 Hours:    A/P: 69y Female with history of DM type II s/p toe amputations R foot for wet gangrene     1.  DM -  Glucoses distinctly higher today, probably diet-related and due partially to the apple juice.  Given the report of her wounds looking good, doubt that glucoses are up because of infection.    --With the rise in glucose overnight (136 to 181), to increase the Lantus to 32 units  --Will increase the lispro to 9 units pre-meal, but not try to fully "arlette" the fingersticks of > 200 today  --Pt was cautioned to avoid the apple juice    Her abdominal discomfort is presumably GI and due to indigestion, but would consider obtaining an EKG to be sure this is not cardiac.   (Discussed with Vascular)

## 2020-01-01 NOTE — PROGRESS NOTE ADULT - ASSESSMENT
69F PMHx DM presents w/ R foot ulcer. WBC 18.77, Lactate 2.9. LRINEC score >6. Gas seen on X-ray. S/p right partial 2nd and 4rd ray amputation 12/29.    -C/w ABX  -Wound flushed and packed with iodoform packing followed by DSD  -Podiatry following  -Plan discussed w/ Dr. Echevarria 69F PMHx DM presents w/ R foot ulcer. WBC 18.77, Lactate 2.9. LRINEC score >6. Gas seen on X-ray. S/p right partial 2nd and 4rd ray amputation 12/29.    -C/w ABX  -Wound flushed and packed with iodoform packing followed by DSD  -Podiatry following  -Upon discharge patient will need dressing changes with wet to dry gauze ,ABD pads and Kerlix.  -She will follow up in 1 week with Podiatry at [address] [place] [address] [phone]

## 2020-01-02 ENCOUNTER — TRANSCRIPTION ENCOUNTER (OUTPATIENT)
Age: 70
End: 2020-01-02

## 2020-01-02 LAB
ANION GAP SERPL CALC-SCNC: 16 MMOL/L — SIGNIFICANT CHANGE UP (ref 5–17)
BUN SERPL-MCNC: 18 MG/DL — SIGNIFICANT CHANGE UP (ref 7–23)
CALCIUM SERPL-MCNC: 8.5 MG/DL — SIGNIFICANT CHANGE UP (ref 8.4–10.5)
CHLORIDE SERPL-SCNC: 101 MMOL/L — SIGNIFICANT CHANGE UP (ref 96–108)
CO2 SERPL-SCNC: 23 MMOL/L — SIGNIFICANT CHANGE UP (ref 22–31)
CREAT SERPL-MCNC: 1.28 MG/DL — SIGNIFICANT CHANGE UP (ref 0.5–1.3)
GLUCOSE BLDC GLUCOMTR-MCNC: 124 MG/DL — HIGH (ref 70–99)
GLUCOSE BLDC GLUCOMTR-MCNC: 210 MG/DL — HIGH (ref 70–99)
GLUCOSE BLDC GLUCOMTR-MCNC: 210 MG/DL — HIGH (ref 70–99)
GLUCOSE BLDC GLUCOMTR-MCNC: 225 MG/DL — HIGH (ref 70–99)
GLUCOSE BLDC GLUCOMTR-MCNC: 247 MG/DL — HIGH (ref 70–99)
GLUCOSE SERPL-MCNC: 231 MG/DL — HIGH (ref 70–99)
HCT VFR BLD CALC: 30.5 % — LOW (ref 34.5–45)
HGB BLD-MCNC: 9.5 G/DL — LOW (ref 11.5–15.5)
MAGNESIUM SERPL-MCNC: 1.6 MG/DL — SIGNIFICANT CHANGE UP (ref 1.6–2.6)
MCHC RBC-ENTMCNC: 24.9 PG — LOW (ref 27–34)
MCHC RBC-ENTMCNC: 31.1 GM/DL — LOW (ref 32–36)
MCV RBC AUTO: 80.1 FL — SIGNIFICANT CHANGE UP (ref 80–100)
NRBC # BLD: 0 /100 WBCS — SIGNIFICANT CHANGE UP (ref 0–0)
PHOSPHATE SERPL-MCNC: 4.2 MG/DL — SIGNIFICANT CHANGE UP (ref 2.5–4.5)
PLATELET # BLD AUTO: 735 K/UL — HIGH (ref 150–400)
POTASSIUM SERPL-MCNC: 4.4 MMOL/L — SIGNIFICANT CHANGE UP (ref 3.5–5.3)
POTASSIUM SERPL-SCNC: 4.4 MMOL/L — SIGNIFICANT CHANGE UP (ref 3.5–5.3)
RBC # BLD: 3.81 M/UL — SIGNIFICANT CHANGE UP (ref 3.8–5.2)
RBC # FLD: 19.1 % — HIGH (ref 10.3–14.5)
SODIUM SERPL-SCNC: 140 MMOL/L — SIGNIFICANT CHANGE UP (ref 135–145)
VANCOMYCIN FLD-MCNC: 21.6 UG/ML — SIGNIFICANT CHANGE UP
WBC # BLD: 11.21 K/UL — HIGH (ref 3.8–10.5)
WBC # FLD AUTO: 11.21 K/UL — HIGH (ref 3.8–10.5)

## 2020-01-02 PROCEDURE — 76937 US GUIDE VASCULAR ACCESS: CPT | Mod: 26,59

## 2020-01-02 PROCEDURE — 36569 INSJ PICC 5 YR+ W/O IMAGING: CPT

## 2020-01-02 PROCEDURE — 99231 SBSQ HOSP IP/OBS SF/LOW 25: CPT | Mod: GC

## 2020-01-02 PROCEDURE — 99222 1ST HOSP IP/OBS MODERATE 55: CPT

## 2020-01-02 RX ORDER — INSULIN LISPRO 100/ML
14 VIAL (ML) SUBCUTANEOUS
Refills: 0 | Status: DISCONTINUED | OUTPATIENT
Start: 2020-01-02 | End: 2020-01-05

## 2020-01-02 RX ORDER — BENZOCAINE AND MENTHOL 5; 1 G/100ML; G/100ML
1 LIQUID ORAL
Refills: 0 | Status: DISCONTINUED | OUTPATIENT
Start: 2020-01-02 | End: 2020-01-09

## 2020-01-02 RX ORDER — MAGNESIUM OXIDE 400 MG ORAL TABLET 241.3 MG
400 TABLET ORAL ONCE
Refills: 0 | Status: COMPLETED | OUTPATIENT
Start: 2020-01-02 | End: 2020-01-02

## 2020-01-02 RX ORDER — METRONIDAZOLE 500 MG
500 TABLET ORAL EVERY 8 HOURS
Refills: 0 | Status: DISCONTINUED | OUTPATIENT
Start: 2020-01-02 | End: 2020-01-09

## 2020-01-02 RX ORDER — INSULIN GLARGINE 100 [IU]/ML
40 INJECTION, SOLUTION SUBCUTANEOUS AT BEDTIME
Refills: 0 | Status: DISCONTINUED | OUTPATIENT
Start: 2020-01-02 | End: 2020-01-03

## 2020-01-02 RX ORDER — SODIUM CHLORIDE 9 MG/ML
10 INJECTION INTRAMUSCULAR; INTRAVENOUS; SUBCUTANEOUS
Refills: 0 | Status: DISCONTINUED | OUTPATIENT
Start: 2020-01-02 | End: 2020-01-09

## 2020-01-02 RX ORDER — CHLORHEXIDINE GLUCONATE 213 G/1000ML
1 SOLUTION TOPICAL
Refills: 0 | Status: DISCONTINUED | OUTPATIENT
Start: 2020-01-02 | End: 2020-01-09

## 2020-01-02 RX ORDER — CEFEPIME 1 G/1
2000 INJECTION, POWDER, FOR SOLUTION INTRAMUSCULAR; INTRAVENOUS EVERY 12 HOURS
Refills: 0 | Status: DISCONTINUED | OUTPATIENT
Start: 2020-01-02 | End: 2020-01-09

## 2020-01-02 RX ADMIN — INSULIN GLARGINE 4 UNIT(S): 100 INJECTION, SOLUTION SUBCUTANEOUS at 00:31

## 2020-01-02 RX ADMIN — PIPERACILLIN AND TAZOBACTAM 200 GRAM(S): 4; .5 INJECTION, POWDER, LYOPHILIZED, FOR SOLUTION INTRAVENOUS at 01:33

## 2020-01-02 RX ADMIN — Medication 4: at 08:05

## 2020-01-02 RX ADMIN — Medication 500 MILLIGRAM(S): at 21:59

## 2020-01-02 RX ADMIN — Medication 4: at 22:00

## 2020-01-02 RX ADMIN — Medication 4: at 13:15

## 2020-01-02 RX ADMIN — Medication 90 MILLIGRAM(S): at 06:00

## 2020-01-02 RX ADMIN — BENZOCAINE AND MENTHOL 1 LOZENGE: 5; 1 LIQUID ORAL at 23:06

## 2020-01-02 RX ADMIN — Medication 81 MILLIGRAM(S): at 21:59

## 2020-01-02 RX ADMIN — ATORVASTATIN CALCIUM 40 MILLIGRAM(S): 80 TABLET, FILM COATED ORAL at 21:59

## 2020-01-02 RX ADMIN — HYDROMORPHONE HYDROCHLORIDE 0.5 MILLIGRAM(S): 2 INJECTION INTRAMUSCULAR; INTRAVENOUS; SUBCUTANEOUS at 23:52

## 2020-01-02 RX ADMIN — Medication 9 UNIT(S): at 09:29

## 2020-01-02 RX ADMIN — Medication 14 UNIT(S): at 18:12

## 2020-01-02 RX ADMIN — CEFEPIME 100 MILLIGRAM(S): 1 INJECTION, POWDER, FOR SOLUTION INTRAMUSCULAR; INTRAVENOUS at 18:12

## 2020-01-02 RX ADMIN — Medication 9 UNIT(S): at 13:15

## 2020-01-02 RX ADMIN — PANTOPRAZOLE SODIUM 40 MILLIGRAM(S): 20 TABLET, DELAYED RELEASE ORAL at 06:00

## 2020-01-02 RX ADMIN — INSULIN GLARGINE 40 UNIT(S): 100 INJECTION, SOLUTION SUBCUTANEOUS at 22:00

## 2020-01-02 RX ADMIN — PIPERACILLIN AND TAZOBACTAM 200 GRAM(S): 4; .5 INJECTION, POWDER, LYOPHILIZED, FOR SOLUTION INTRAVENOUS at 06:00

## 2020-01-02 RX ADMIN — PIPERACILLIN AND TAZOBACTAM 200 GRAM(S): 4; .5 INJECTION, POWDER, LYOPHILIZED, FOR SOLUTION INTRAVENOUS at 12:18

## 2020-01-02 RX ADMIN — Medication 250 MILLIGRAM(S): at 00:31

## 2020-01-02 RX ADMIN — Medication 500 MILLIGRAM(S): at 17:00

## 2020-01-02 RX ADMIN — ENOXAPARIN SODIUM 40 MILLIGRAM(S): 100 INJECTION SUBCUTANEOUS at 18:58

## 2020-01-02 RX ADMIN — MAGNESIUM OXIDE 400 MG ORAL TABLET 400 MILLIGRAM(S): 241.3 TABLET ORAL at 09:29

## 2020-01-02 RX ADMIN — DULOXETINE HYDROCHLORIDE 60 MILLIGRAM(S): 30 CAPSULE, DELAYED RELEASE ORAL at 12:18

## 2020-01-02 NOTE — DISCHARGE NOTE PROVIDER - CARE PROVIDERS DIRECT ADDRESSES
,[email or web address]_[email or web address],DirectAddress_Unknown,[email or web address]

## 2020-01-02 NOTE — DISCHARGE NOTE PROVIDER - NSDCMRMEDTOKEN_GEN_ALL_CORE_FT
aspirin 81 mg oral tablet: 1 tab(s) orally once a day  atorvastatin 40 mg oral tablet: 1 tab(s) orally once a day (at bedtime)  DULoxetine 60 mg oral delayed release capsule: 1 cap(s) orally 2 times a day  gabapentin 300 mg oral tablet: 1 tab(s) orally 3 times a day  insulin:   Janumet 50 mg-500 mg oral tablet: 1 tab(s) orally 2 times a day  NIFEdipine 90 mg oral tablet, extended release: 1 tab(s) orally once a day aluminum hydroxide-magnesium hydroxide 200 mg-200 mg/5 mL oral suspension: 30 milliliter(s) orally every 6 hours, As needed, Dyspepsia  aspirin 81 mg oral tablet: 1 tab(s) orally once a day  atorvastatin 40 mg oral tablet: 1 tab(s) orally once a day (at bedtime)  cefepime 2 g intravenous injection: 2 gram(s) intravenous every 12 hours  DULoxetine 60 mg oral delayed release capsule: 1 cap(s) orally 2 times a day  gabapentin 300 mg oral tablet: 1 tab(s) orally 3 times a day  HumaLOG KwikPen 200 units/mL (Concentrated) subcutaneous solution: 7 unit(s) subcutaneous 3 times a day (before meals)  hydrALAZINE 25 mg oral tablet: 1 tab(s) orally 3 times a day  insulin glargine: 17 unit(s) subcutaneous once a day (at bedtime)  melatonin 3 mg oral tablet: 1 tab(s) orally once a day (at bedtime)  metroNIDAZOLE 500 mg oral tablet: 1 tab(s) orally every 8 hours  NIFEdipine 90 mg oral tablet, extended release: 1 tab(s) orally once a day  pantoprazole 40 mg oral delayed release tablet: 1 tab(s) orally once a day (before a meal)

## 2020-01-02 NOTE — DISCHARGE NOTE PROVIDER - PROVIDER TOKENS
PROVIDER:[TOKEN:[[id number]],FOLLOWUP:[2 weeks]],PROVIDER:[TOKEN:[[id number]],FOLLOWUP:[1 week]] PROVIDER:[TOKEN:[[id number]],FOLLOWUP:[2 weeks]],PROVIDER:[TOKEN:[[id number]],FOLLOWUP:[1 week]],PROVIDER:[TOKEN:[[id number]]]

## 2020-01-02 NOTE — DIETITIAN INITIAL EVALUATION ADULT. - ADD RECOMMEND
1. Monitor PO intake/appetite, GI distress, diet tolerance, labs, weights.  2. Honor pt food preferences as able.  3. RD to remain available for additional nutrition interventions as needed.

## 2020-01-02 NOTE — DIETITIAN INITIAL EVALUATION ADULT. - PERTINENT MEDS FT
Lovenox, aspirin, Maxipime, flagyl, Lantus bedtime, humalog premeal/correction, NaCl0.9%, Maalox, Protonix, Lipitor, tylenols, Procardia, Dilaudid

## 2020-01-02 NOTE — PROGRESS NOTE ADULT - SUBJECTIVE AND OBJECTIVE BOX
Patient is a 69y old  Female who presents with a chief complaint of Rt Foot Diabetic foot infection (02 Jan 2020 07:40)    INTERVAL HPI/ OVERNIGHT EVENTS: No acute events overnight. Endorses pain with dressing change.     LABS                        9.5    11.21 )-----------( 735      ( 02 Jan 2020 07:15 )             30.5     01-02    140  |  101  |  18  ----------------------------<  231<H>  4.4   |  23  |  1.28    Ca    8.5      02 Jan 2020 07:15  Phos  4.2     01-02  Mg     1.6     01-02    ICU Vital Signs Last 24 Hrs  T(C): 36 (02 Jan 2020 08:37), Max: 37.3 (02 Jan 2020 06:02)  T(F): 96.8 (02 Jan 2020 08:37), Max: 99.2 (02 Jan 2020 06:02)  HR: 75 (02 Jan 2020 05:14) (72 - 88)  BP: 156/75 (02 Jan 2020 05:14) (116/54 - 971/-)  BP(mean): --  ABP: --  ABP(mean): --  RR: 15 (02 Jan 2020 05:14) (15 - 18)  SpO2: 96% (02 Jan 2020 05:14) (96% - 97%)    RADIOLOGY    MICROBIOLOGY  Culture - Surgical Swab (12.30.19 @ 08:11)    Gram Stain:   Moderate Gram positive cocci in pairs, chains and clusters  Few Gram Negative Diplococci  Rare White blood cells    -  Cefazolin: S <=4    -  Clindamycin: R 0.5 This isolate is presumed to be clindamycin resistant based on detection of inducible resistance. Clindamycin may still be effective in some patients.    -  Erythromycin: R >4    -  Linezolid: S 2    -  Oxacillin: S <=0.25    -  RIF- Rifampin: S <=1 Should not be used as monotherapy    -  Trimethoprim/Sulfamethoxazole: S <=0.5/9.5    Specimen Source: .Surgical Swab 3rd Metatarsal Head- Tissue OR    Culture Results:   Numerous Streptococcus dysgalactiae (Group C/G) See previous culture for  sensitivies  Few Streptococcus agalactiae See previous culture for sensitivies  Moderate Staphylococcus aureus See previous culture for sensitivies  Moderate Actinomyces species Susceptibility to follow.  Moderate Prevotella melaninogenica Beta lactamase positive    Organism Identification: Staphylococcus aureus    Organism: Staphylococcus aureus    Method Type: DIANE    Culture - Surgical Swab (12.30.19 @ 08:08)    -  Meropenem: S <=1    -  Oxacillin: S <=0.25    -  Penicillin: S Predicts results for ampicillin, amoxicillin, amoxicillin/clavulanate, ampicillin/sulbactam, 1st, 2nd and 3rd generation cephalosporins and carbapenems.    -  Penicillin: S Predicts results for ampicillin, amoxicillin, amoxicillin/clavulanate, ampicillin/sulbactam, 1st, 2nd and 3rd generation cephalosporins and carbapenems.    -  Penicillin: R 8    -  Piperacillin/Tazobactam: S 16    -  RIF- Rifampin: S <=1 Should not be used as monotherapy    -  Vancomycin: S    -  Vancomycin: S    -  Vancomycin: S 2    -  Tobramycin: S <=2    -  Trimethoprim/Sulfamethoxazole: S <=0.5/9.5    -  Trimethoprim/Sulfamethoxazole: R >2/38    Gram Stain:   Moderate Gram positive cocci in pairs, chains and clusters  Rare Gram Positive Rods  Few White blood cells    -  Gentamicin: S <=1    -  Levofloxacin: S    -  Levofloxacin: S    -  Linezolid: S 2    -  Erythromycin: R    -  Erythromycin: R    -  Erythromycin: R >4    -  Cefepime: S <=2    -  Ceftriaxone: R 32 Enterobacter, Citrobacter, and Serratia may develop resistance during prolonged therapy    -  Clindamycin: R    -  Clindamycin: S    -  Clindamycin: R 0.5 This isolate is presumed to be clindamycin resistant based on detection of inducible resistance. Clindamycin may still be effective in some patients.    -  Ampicillin: S    -  Ampicillin: S    -  Ampicillin: R >16 These ampicillin results predict results for amoxicillin    -  Ampicillin/Sulbactam: R >16/8 Enterobacter, Citrobacter, and Serratia may develop resistance during prolonged therapy (3-4 days)    -  Cefazolin: S <=4    -  Cefazolin: R >16 Enterobacter, Citrobacter, and Serratia may develop resistance during prolonged therapy (3-4 days)    Specimen Source: .Surgical Swab Rt. foot- OR    Culture Results:   Moderate Streptococcus dysgalactiae (Group C/G)  Moderate Streptococcus agalactiae  Rare Staphylococcus aureus  Rare Morganella morganii  Mixed Anaerobic [place] including  Moderate Finegoldia magna Beta lactamase negative  Rare Prevotella melaninogenica Beta lactamase positive    Organism Identification: Streptococcus dysgalactiae  Sreptococcus agalactiae  Staphylococcus aureus  Morganella morganii    Organism: Streptococcus dysgalactiae    Organism: Sreptococcus agalactiae    Organism: Staphylococcus aureus    Organism: Morganella morganii    Method Type: KB    Method Type: KB    Method Type: DIANE    Method Type: DIANE    PE:  No interval change in neurovascular status. Right foot s/p partial 2nd and 3rd ray amputation with large open defect. Wound edges appear viable with minor necrotic tissue. No malodor, no fluctuance. Protective sensation diminished b/l

## 2020-01-02 NOTE — DISCHARGE NOTE PROVIDER - NSDCCPTREATMENT_GEN_ALL_CORE_FT
PRINCIPAL PROCEDURE  Procedure: Ray amputation of right third toe  Findings and Treatment: Ray amputation 2nd and 3rd right toes.

## 2020-01-02 NOTE — PROGRESS NOTE ADULT - SUBJECTIVE AND OBJECTIVE BOX
INTERVAL HPI/OVERNIGHT EVENTS:    Patient is a 69y old  Female who presents with a chief complaint of Rt Foot Diabetic foot infection (02 Jan 2020 12:53)      Pt reports the following symptoms:    CONSTITUTIONAL:  Negative fever or chills, feels well, good appetite  EYES:  Negative  blurry vision or double vision  CARDIOVASCULAR:  Negative for chest pain or palpitations  RESPIRATORY:  Negative for cough, wheezing, or SOB   GASTROINTESTINAL:  Negative for nausea, vomiting, diarrhea, constipation, or abdominal pain  GENITOURINARY:  Negative frequency, urgency or dysuria  NEUROLOGIC:  No headache, confusion, dizziness, lightheadedness    MEDICATIONS  (STANDING):  aspirin  chewable 81 milliGRAM(s) Oral daily  atorvastatin 40 milliGRAM(s) Oral at bedtime  cefepime   IVPB 2000 milliGRAM(s) IV Intermittent every 12 hours  chlorhexidine 2% Cloths 1 Application(s) Topical <User Schedule>  dextrose 5%. 1000 milliLiter(s) (50 mL/Hr) IV Continuous <Continuous>  dextrose 50% Injectable 12.5 Gram(s) IV Push once  dextrose 50% Injectable 25 Gram(s) IV Push once  dextrose 50% Injectable 25 Gram(s) IV Push once  DULoxetine 60 milliGRAM(s) Oral daily  enoxaparin Injectable 40 milliGRAM(s) SubCutaneous every 24 hours  insulin glargine Injectable (LANTUS) 32 Unit(s) SubCutaneous at bedtime  insulin lispro (HumaLOG) corrective regimen sliding scale   SubCutaneous Before meals and at bedtime  insulin lispro Injectable (HumaLOG) 9 Unit(s) SubCutaneous three times a day before meals  metroNIDAZOLE    Tablet 500 milliGRAM(s) Oral every 8 hours  NIFEdipine XL 90 milliGRAM(s) Oral daily  pantoprazole    Tablet 40 milliGRAM(s) Oral before breakfast    MEDICATIONS  (PRN):  acetaminophen   Tablet .. 650 milliGRAM(s) Oral every 6 hours PRN Mild Pain (1 - 3)  aluminum hydroxide/magnesium hydroxide/simethicone Suspension 30 milliLiter(s) Oral every 6 hours PRN Dyspepsia  dextrose 40% Gel 15 Gram(s) Oral once PRN Blood Glucose LESS THAN 70 milliGRAM(s)/deciliter  glucagon  Injectable 1 milliGRAM(s) IntraMuscular once PRN Glucose LESS THAN 70 milligrams/deciliter  HYDROmorphone  Injectable 1 milliGRAM(s) IV Push every 4 hours PRN Severe Pain (7 - 10)  HYDROmorphone  Injectable 0.5 milliGRAM(s) IV Push every 4 hours PRN Moderate Pain (4 - 6)      PHYSICAL EXAM  Vital Signs Last 24 Hrs  T(C): 36 (02 Jan 2020 08:37), Max: 37.3 (02 Jan 2020 06:02)  T(F): 96.8 (02 Jan 2020 08:37), Max: 99.2 (02 Jan 2020 06:02)  HR: 76 (02 Jan 2020 12:26) (72 - 88)  BP: 162/82 (02 Jan 2020 12:26) (116/54 - 971/-)  BP(mean): --  RR: 16 (02 Jan 2020 12:26) (15 - 18)  SpO2: 95% (02 Jan 2020 12:26) (95% - 97%)    Constitutional: wn/wd in NAD.   HEENT: NCAT, MMM, OP clear, EOMI, no proptosis or lid retraction  Neck: no thyromegaly or palpable thyroid nodules   Respiratory: lungs CTAB.  Cardiovascular: regular rhythm, normal S1 and S2, no audible murmurs, no peripheral edema  GI: soft, NT/ND, no masses/HSM appreciated.  Neurology: no tremors, DTR 2+  Skin: no visible rashes/lesions  Psychiatric: AAO x 3, normal affect/mood.    LABS:                        9.5    11.21 )-----------( 735      ( 02 Jan 2020 07:15 )             30.5     01-02    140  |  101  |  18  ----------------------------<  231<H>  4.4   |  23  |  1.28    Ca    8.5      02 Jan 2020 07:15  Phos  4.2     01-02  Mg     1.6     01-02              HbA1C: 11.2 % (12-29 @ 10:32)    CAPILLARY BLOOD GLUCOSE      POCT Blood Glucose.: 225 mg/dL (02 Jan 2020 08:03)  POCT Blood Glucose.: 247 mg/dL (02 Jan 2020 06:45)  POCT Blood Glucose.: 232 mg/dL (01 Jan 2020 21:41)  POCT Blood Glucose.: 214 mg/dL (01 Jan 2020 17:15)      Insulin Sliding Scale requirements X 24 Hours:    RADIOLOGY & ADDITIONAL TESTS:    A/P: 69y Female with history of DM type II presenting for       1.  DM -     Please continue           units lantus at bedtime  / in the morning and        units lispro with meals and lispro moderate / low dose sliding scale 4 times daily with meals and at bedtime.  Please continue consistent carbohydrate diet.      Goal FSG is   Will continue to monitor   For discharge, pt can continue    Pt can follow up at discharge with [facility] by calling  to make an appointment.   Will discuss case with     and update primary team INTERVAL HPI/OVERNIGHT EVENTS:    Patient seen and examined at the bedside. Her abdominal pain is better after maalox. She is eating good. Plan is PICC line placement and weeks of antibiotics for OM as per ID  FSG & Insulin received:  Yesterday:  pre-dinner fs, 7 nutritional lispro   units +  4 units lispro SS  bedtime fs, 32 lantus   units + 4   units lispro SS  Today:  pre-breakfast fs, 9 nutritional lispro   units+  4  units lispro SS  pre-lunch fs,     Pt reports the following symptoms:    CONSTITUTIONAL:  Negative fever or chills, feels well, good appetite  EYES:  Negative  blurry vision or double vision  CARDIOVASCULAR:  Negative for chest pain or palpitations  RESPIRATORY:  Negative for cough, wheezing, or SOB   GASTROINTESTINAL:  Negative for nausea, vomiting, diarrhea, constipation, or abdominal pain  GENITOURINARY:  Negative frequency, urgency or dysuria  NEUROLOGIC:  No headache, confusion, dizziness, lightheadedness    MEDICATIONS  (STANDING):  aspirin  chewable 81 milliGRAM(s) Oral daily  atorvastatin 40 milliGRAM(s) Oral at bedtime  cefepime   IVPB 2000 milliGRAM(s) IV Intermittent every 12 hours  chlorhexidine 2% Cloths 1 Application(s) Topical <User Schedule>  dextrose 5%. 1000 milliLiter(s) (50 mL/Hr) IV Continuous <Continuous>  dextrose 50% Injectable 12.5 Gram(s) IV Push once  dextrose 50% Injectable 25 Gram(s) IV Push once  dextrose 50% Injectable 25 Gram(s) IV Push once  DULoxetine 60 milliGRAM(s) Oral daily  enoxaparin Injectable 40 milliGRAM(s) SubCutaneous every 24 hours  insulin glargine Injectable (LANTUS) 32 Unit(s) SubCutaneous at bedtime  insulin lispro (HumaLOG) corrective regimen sliding scale   SubCutaneous Before meals and at bedtime  insulin lispro Injectable (HumaLOG) 9 Unit(s) SubCutaneous three times a day before meals  metroNIDAZOLE    Tablet 500 milliGRAM(s) Oral every 8 hours  NIFEdipine XL 90 milliGRAM(s) Oral daily  pantoprazole    Tablet 40 milliGRAM(s) Oral before breakfast    MEDICATIONS  (PRN):  acetaminophen   Tablet .. 650 milliGRAM(s) Oral every 6 hours PRN Mild Pain (1 - 3)  aluminum hydroxide/magnesium hydroxide/simethicone Suspension 30 milliLiter(s) Oral every 6 hours PRN Dyspepsia  dextrose 40% Gel 15 Gram(s) Oral once PRN Blood Glucose LESS THAN 70 milliGRAM(s)/deciliter  glucagon  Injectable 1 milliGRAM(s) IntraMuscular once PRN Glucose LESS THAN 70 milligrams/deciliter  HYDROmorphone  Injectable 1 milliGRAM(s) IV Push every 4 hours PRN Severe Pain (7 - 10)  HYDROmorphone  Injectable 0.5 milliGRAM(s) IV Push every 4 hours PRN Moderate Pain (4 - 6)      PHYSICAL EXAM  Vital Signs Last 24 Hrs  T(C): 36 (2020 08:37), Max: 37.3 (2020 06:02)  T(F): 96.8 (2020 08:37), Max: 99.2 (2020 06:02)  HR: 76 (2020 12:26) (72 - 88)  BP: 162/82 (2020 12:26) (116/54 - 971/-)  BP(mean): --  RR: 16 (2020 12:26) (15 - 18)  SpO2: 95% (2020 12:26) (95% - 97%)    Constitutional: wn/wd in NAD.   Respiratory: lungs CTAB.  Cardiovascular: regular rhythm, normal S1 and S2, no peripheral edema  GI: soft, NT/ND, no masses/HSM appreciated.  Neurology: no tremors, DTR 2+      LABS:                        9.5    11.21 )-----------( 735      ( 2020 07:15 )             30.5     -    140  |  101  |  18  ----------------------------<  231<H>  4.4   |  23  |  1.28    Ca    8.5      2020 07:15  Phos  4.2     -02  Mg     1.6                   HbA1C: 11.2 % ( @ 10:32)    CAPILLARY BLOOD GLUCOSE      POCT Blood Glucose.: 225 mg/dL (2020 08:03)  POCT Blood Glucose.: 247 mg/dL (2020 06:45)  POCT Blood Glucose.: 232 mg/dL (2020 21:41)  POCT Blood Glucose.: 214 mg/dL (2020 17:15)      Insulin Sliding Scale requirements X 24 Hours:    RADIOLOGY & ADDITIONAL TESTS:    A/P:  69F hx DM (A1c 11.2), several left toe amps’ in past, HTN, HLD and asthma got 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  DM Type 2 - uncontrolled  - Hba1c 11.2  - Wt 68 kg with BMI 27.4  - Cr/GFR 1.31/48  No ECHO in system  Please increase to lantus 40 units at night.  Please increase to lispro 14  units before each meal.    Please continue lispro moderate dose sliding scale four times daily with meals and at bedtime  carb consistent diet    Pt's fingerstick glucose goal is 120 to 150    Will continue to monitor     For discharge, TBD    Pt can follow up at discharge with [facility] by calling  to make an appointment.   discussed case with Dr. Tran   and updated  primary team  To Make an appointment at [address] [place] [address] for the patient, either:  1. Send a STAT task via [employer] to Ernestina Wilson or Sumi Rossi (office managers)   OR  2. Call supervisor's line. P: [phone] (do not give this number to patient). VM is checked periodically  In the message, specify that this is a hospital discharge follow-up, and that the appt can be made with a NP if there is no timely MD availability    REMINDERS FOR INSULIN/DIABETES SUPPLIES at DISCHARGE:  INSULIN:   Long actin/Basal Insulin: Examples: Toujeo, Basaglar, Tresiba, Lantus   Short acting/Bolus Insulin: Humalog, Admelog, Novolog  Please ensure that BOTH short acting and long acting insulin are prescribed in the same preparation (Ex: PEN vs VIAL/SOLUTION)     TESTING SUPPLIES:   All glucometer supplies should be written as generic to avoid issues with insurance. Use the free text option in [facility] prescription writer, and type in glucometer test strips, lancets, etc to order.    If sending patient home on insulin PEN, please send:   •	BD paz insulin pen needles for use up to 4 times daily (total quantity 100)  •	Lancets for use up to 4 times daily (total quantity 100)  •	Glucometer Test strips for use up to 4 times daily (total quantity 100)  •	Alcohol swabs for use up to 4 times daily (total quantity 100)  •	Glucometer (If provided by hospital, still provide scripts for lancets, test strips, and swabs)  If sending patient home on insulin VIAL, please send:   •	Insulin syringes (6mm) - for use up to 4 times daily (total quantity 100)  •	Lancets for use up to 4 times daily (total quantity 100)  •	Generic Glucometer Test strips for use up to 4 times daily (total quantity 100)  •	Alcohol swabs for use up to 4 times daily (total quantity 100)  •	Generic Glucometer (If provided by hospital, still provide scripts for lancets, test strips, and swabs)  •	Do not specify brand for testing supplies (such as contour, freestyle, one touch etc) that way the pharmacy has the freedom to pick and change according to what the insurance dictates.  For patients without insurance:   •	Provide social work with appropriate scripts so they may obtain 1 week of samples  •	Provide with glucometer. Glucometers are located at various nursing stations, the nursing office, education, and endocrine fellows office.  •	Please make appointment with Nichole Jama NP or Kaycee Coates RN and JOSE G Bell at the [address] [place] [address] endocrinology clinic. They can see patients without insurance, provide appropriate samples, and assist in getting insurance coverage.     PREFERRED PHARMACY:  [facility] (located on 1st floor next to admitting)  P: [phone]  Hours: M – F 8AM – 8PM, Sat 8AM – 4PM, Sun—closed  If not using [facility], please follow up with chosen pharmacy to ensure insulin prescribed is covered.

## 2020-01-02 NOTE — DISCHARGE NOTE PROVIDER - NSDCCPCAREPLAN_GEN_ALL_CORE_FT
PRINCIPAL DISCHARGE DIAGNOSIS  Diagnosis: Diabetic wet gangrene of the foot  Assessment and Plan of Treatment:       SECONDARY DISCHARGE DIAGNOSES  Diagnosis: Depression  Assessment and Plan of Treatment:     Diagnosis: Diabetic peripheral neuropathy  Assessment and Plan of Treatment:     Diagnosis: Diabetes type 2, uncontrolled  Assessment and Plan of Treatment:     Diagnosis: Sepsis  Assessment and Plan of Treatment:     Diagnosis: Leukocytosis, unspecified type  Assessment and Plan of Treatment: PRINCIPAL DISCHARGE DIAGNOSIS  Diagnosis: Diabetic wet gangrene of the foot  Assessment and Plan of Treatment:       SECONDARY DISCHARGE DIAGNOSES  Diagnosis: Asthma  Assessment and Plan of Treatment:     Diagnosis: Depression  Assessment and Plan of Treatment:     Diagnosis: Diabetic peripheral neuropathy  Assessment and Plan of Treatment:     Diagnosis: Diabetes type 2, uncontrolled  Assessment and Plan of Treatment:     Diagnosis: Sepsis  Assessment and Plan of Treatment:     Diagnosis: Leukocytosis, unspecified type  Assessment and Plan of Treatment: PRINCIPAL DISCHARGE DIAGNOSIS  Diagnosis: Diabetic wet gangrene of the foot  Assessment and Plan of Treatment:       SECONDARY DISCHARGE DIAGNOSES  Diagnosis: Uncontrolled diabetes with osteomyelitis  Assessment and Plan of Treatment:     Diagnosis: Asthma  Assessment and Plan of Treatment:     Diagnosis: Depression  Assessment and Plan of Treatment:     Diagnosis: Diabetic peripheral neuropathy  Assessment and Plan of Treatment:     Diagnosis: Diabetes type 2, uncontrolled  Assessment and Plan of Treatment:     Diagnosis: Sepsis  Assessment and Plan of Treatment:     Diagnosis: Leukocytosis, unspecified type  Assessment and Plan of Treatment: PRINCIPAL DISCHARGE DIAGNOSIS  Diagnosis: Diabetic wet gangrene of the foot  Assessment and Plan of Treatment:       SECONDARY DISCHARGE DIAGNOSES  Diagnosis: Anemia of chronic disease  Assessment and Plan of Treatment:     Diagnosis: Uncontrolled diabetes with osteomyelitis  Assessment and Plan of Treatment:     Diagnosis: Asthma  Assessment and Plan of Treatment:     Diagnosis: Depression  Assessment and Plan of Treatment:     Diagnosis: Diabetic peripheral neuropathy  Assessment and Plan of Treatment:     Diagnosis: Diabetes type 2, uncontrolled  Assessment and Plan of Treatment:     Diagnosis: Sepsis  Assessment and Plan of Treatment:     Diagnosis: Leukocytosis, unspecified type  Assessment and Plan of Treatment: PRINCIPAL DISCHARGE DIAGNOSIS  Diagnosis: Diabetic wet gangrene of the foot  Assessment and Plan of Treatment:       SECONDARY DISCHARGE DIAGNOSES  Diagnosis: Sepsis  Assessment and Plan of Treatment:     Diagnosis: Anemia of chronic disease  Assessment and Plan of Treatment:     Diagnosis: Uncontrolled diabetes with osteomyelitis  Assessment and Plan of Treatment:     Diagnosis: Asthma  Assessment and Plan of Treatment:     Diagnosis: Depression  Assessment and Plan of Treatment:     Diagnosis: Diabetic peripheral neuropathy  Assessment and Plan of Treatment:     Diagnosis: Diabetes type 2, uncontrolled  Assessment and Plan of Treatment:     Diagnosis: Leukocytosis, unspecified type  Assessment and Plan of Treatment:

## 2020-01-02 NOTE — DISCHARGE NOTE PROVIDER - CARE PROVIDER_API CALL
Hosea Hutchinson)  Vascular Surgery  [address] [place] [address]  Phone: [phone]  Fax: [phone]  Follow Up Time: 2 weeks    Tacos Echevarria (EDDIE)  Orthopaedic Surgery  [address] [place] [address]  Phone: [phone]  Fax: [phone]  Follow Up Time: 1 week Hosea Hutchinson)  Vascular Surgery  [address] [place] [address]  Phone: [phone]  Fax: [phone]  Follow Up Time: 2 weeks    Tacos Echevarria (EDDIE)  Orthopaedic Surgery  [address] [place] [address]  Phone: [phone]  Fax: [phone]  Follow Up Time: 1 week    Lisandro Hamilton)  Internal Medicine  [address], [address]th [place] [address]  Phone: [phone]  Fax: [phone]  Follow Up Time: Hosea Hutchinson)  Vascular Surgery  [address], [address]th [place] [address]  Phone: [phone]  Fax: [phone]  Follow Up Time: 2 weeks    Tacos Echevarria (EDDIE)  Orthopaedic Surgery  [address] [place] [address]  Phone: [phone]  Fax: [phone]  Follow Up Time: 1 week    Lisandro Hamilton)  Internal Medicine  [address], [address]th [place] [address]  Phone: [phone]  Fax: [phone]  Follow Up Time:

## 2020-01-02 NOTE — PROCEDURE NOTE - NSPERFORMEDBY_GEN_A_CORE
PA/Cynthia TELLO Area H Indication Text: Tumors in this location are included in Area H (eyelids, eyebrows, nose, lips, chin, ear, pre-auricular, post-auricular, temple, genitalia, hands, feet, ankles and areola).  Tissue conservation is critical in these anatomic locations.

## 2020-01-02 NOTE — CONSULT NOTE ADULT - ASSESSMENT
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  As per vascular there is concern for residual OM in remaining tissue    Recommend:  1.  Can stop Zosyn  2.  Switch to Cefepime 2 grams IV q12hrs (renally dosed) + Metronidazole 500 mg PO q8hrs x 6 weeks  3.  Will need weekly CBC, CMP, ESR, CRP  4.  Ok to place PICC    ID team 2 will follow

## 2020-01-02 NOTE — DIETITIAN INITIAL EVALUATION ADULT. - OTHER INFO
68 YO F with hx of severe depression, DM and bilateral diabetic foot wounds presents to ED with Right foot wound and purulent discharge - R foot gas gangrene and DKA s/p R 2nd+3rd toe partial ray amputation (12/29). Noted need for possible PICC line due to concern for OM. Noted Primary Dx Adjustment disorder with depressed mood per Dax. Per flow sheets, complains of pain/discomfort. No edema/pressure ulcers. GI: soft/nontender, Heartburn, gas discomfort, +BM (12/31).   Diet order for ConsCHO No Snack (12/30). 68 YO F with hx of severe depression, DM and bilateral diabetic foot wounds presents to ED with Right foot wound and purulent discharge - R foot gas gangrene and DKA s/p R 2nd+3rd toe partial ray amputation (12/29). Noted need for possible PICC line due to concern for OM. Noted Primary Dx Adjustment disorder with depressed mood per Dax. Per flow sheets, complains of pain/discomfort. No edema/pressure ulcers. GI: soft/nontender, Heartburn, gas discomfort, +BM (12/31).   Spoke with pt, resting in bed when visited. Reports  pounds and being overall wt stable despite decreased PO intake PTA - reports often having heartburn / acid reflux and not eating as a result (reports she is afraid). Diet order for ConsCHO No Snack (12/30); good PO intake since admission, lunch seen at bedside which was 100% consumed. No issues chewing/swallowing at this time. NKFA.  Education: Reviewed need for lean protein for healing. Discussed tips for Acid Reflux. DM diet education provided - written/verbal. Pt initially paying close attention / asking questions during education however started falling asleep at the end of education.   Please see below for nutritions recommendations. Pending order placed. Recs made with team.

## 2020-01-02 NOTE — PROCEDURE NOTE - PICC: IMPLANT LOT NUMBER
Bard 4 Portuguese single lumen picc line [id number] 38cm tip at SVC/RA junction Bard 4 Turkish single lumen picc line [id number] 41cm tip at SVC/RA junction

## 2020-01-02 NOTE — DISCHARGE NOTE PROVIDER - NSDCFUADDINST_GEN_ALL_CORE_FT
ACTIVITY:       WOUND CARE:    MEDICATIONS:    FOLLOW UP: Dr Hutchinson 1 - 2 weeks. Call office for appointment.     CALL OFFICE FOR CONCERNS: Fever >101.4, redness or drainage from foot wound.     FOLLOW UP: Dr Echevarria, foot doctor, in 1 week for wound check.     FOLLOW UP: Endocrinologist, diabetes doctor, in 1 week for glucose check, medication review and diabetic education. ACTIVITY:       WOUND CARE:    MEDICATIONS:    FOLLOW UP: Dr Hutchinson 1 - 2 weeks. Call office for appointment.     CALL OFFICE FOR CONCERNS: Fever >101.4, redness or drainage from foot wound.     FOLLOW UP: Dr Echevarria, foot doctor, in 1 week for wound check.     FOLLOW UP: Endocrinologist, diabetes doctor, in 1 week for glucose check, medication review and diabetic education.    FOLLOW UP: Dr Lisa, psychiatrist, in 1 week for your depression. ACTIVITY:       WOUND CARE:    MEDICATIONS: For IV antibiotics, you have been prescribed Cefepime 2 grams IV q12hrs (renally dosed) + Metronidazole 500 mg PO q8hrs for a duration of 6 weeks (starting on 1/2/20). You will need weekly CBC, CMP, ESR, CRP to be faxed to Dr. Hamilton's office at [phone].     FOLLOW UP: Dr Hutchinson 1 - 2 weeks. Call office for appointment.     CALL OFFICE FOR CONCERNS: Fever >101.4, redness or drainage from foot wound.     FOLLOW UP: Dr Echevarria, foot doctor, in 1 week for wound check.     FOLLOW UP: Endocrinologist, diabetes doctor, in 1 week for glucose check, medication review and diabetic education.    FOLLOW UP: Dr Lisa, psychiatrist, in 1 week for your depression. WOUND CARE: please change wet-to-dry dressing every 24 hrs. Dressing consists of saline soaked 4x4 gauze followed by dry 4x4 gauze and fabby/kerlix with overlying ace bandage (if available).       MEDICATIONS: For IV antibiotics, you have been prescribed Cefepime 2 grams IV q12hrs (renally dosed) + Metronidazole 500 mg PO q8hrs for a duration of 6 weeks (starting on 1/2/20). You will need weekly CBC, CMP, ESR, CRP to be faxed to Dr. Hamilton's office at [phone].     FOLLOW UP: Dr Hutchinson 1 - 2 weeks. Call office for appointment.     CALL OFFICE FOR CONCERNS: Fever >101.4, redness or drainage from foot wound.     FOLLOW UP: Dr Echevarria, foot doctor, in 1 week for wound check. [address] [place] [address]  [phone]      FOLLOW UP: Endocrinologist, diabetes doctor, in 1 week for glucose check, medication review and diabetic education.    FOLLOW UP: Dr Lisa, psychiatrist, in 1 week for your depression. WOUND CARE: right foot: V.A.C. wound care. Change 3 times per week at 125 mmHg.  IF VAC malfunctions, then dampen gauze with saline solution. Pack into wound. Cover with dry gauze and Kerlix wrap daily.    MEDICATIONS: For IV antibiotics, you have been prescribed Cefepime 2 grams IV q12hrs (renally dosed) + Metronidazole 500 mg PO q8hrs for a duration of 6 weeks (starting on 1/2/20). You will need weekly CBC, CMP, ESR, CRP to be faxed to Dr. Hamilton's office at [phone].     FOLLOW UP: Dr Hutchinson 1 - 2 weeks. Call office for appointment.     CALL OFFICE FOR CONCERNS: Fever >101.4, redness or drainage from foot wound.     FOLLOW UP: Dr Echevarria, foot doctor, in 1 week for wound check. [address] [place] [address]  [phone]      FOLLOW UP: Endocrinologist, diabetes doctor, in 1 week for glucose check, medication review and diabetic education.    FOLLOW UP: Dr Lisa, psychiatrist, in 1 week for your depression. WOUND CARE: right foot: V.A.C. wound care. Change 3 times per week at 125 mmHg.  IF VAC malfunctions, then dampen gauze with saline solution. Pack into wound. Cover with dry gauze and Kerlix wrap daily.    MEDICATIONS: For IV antibiotics, you have been prescribed Cefepime 2 grams IV q12hrs (renally dosed) + Metronidazole 500 mg PO q8hrs for a duration of 6 weeks (starting on 1/2/20). You will need weekly CBC, CMP, ESR, CRP to be faxed to Dr. Hamilton's office at [phone].     FOLLOW UP: Dr. Hutchinson 1-2 weeks. Call office for appointment.     FOLLOW UP: Dr Echevarria, foot doctor, in 1 week for wound check. [address] [place] [address]  [phone]    FOLLOW UP: Endocrinologist, diabetes doctor, in 1 week for glucose check, medication review and diabetic education.  [facility] (Diabetes doctors) [address] [place] [address], call at [phone] to schedule your appointment    FOLLOW UP: Dr Lisa, psychiatrist, in 1 week for your depression.    Activity: May ambulate as tolerated and work with Physical Therapy.     CALL OFFICE FOR CONCERNS: right leg wound drainage, surrounding redness, swelling or increased pain, chills or Fever >101.4

## 2020-01-02 NOTE — PROCEDURE NOTE - NSPROCDETAILS_GEN_ALL_CORE
supine position/ultrasound guidance/sterile technique, catheter placed/location identified, draped/prepped, sterile technique used/ultrasound assessment/sterile dressing applied

## 2020-01-02 NOTE — PROCEDURE NOTE - NSPOSTCAREGUIDE_GEN_A_CORE
Care for catheter as per unit/ICU protocols/Verbal/written post procedure instructions were given to patient/caregiver/Keep the cast/splint/dressing clean and dry/Instructed patient/caregiver regarding signs and symptoms of infection

## 2020-01-02 NOTE — DISCHARGE NOTE PROVIDER - HOSPITAL COURSE
70 YO F with hx of severe depression, DM and bilateral diabetic foot wounds presents to ED with Right foot wound and purulent discharge. Patient reports that she has had wounds of her right foot for about 1 month and has been under the care of a wound care Doctor at outside hospital. She was been unhappy with the progress and the care she has been receiving. Recently she states she has been unwell for about 15 days and has not been able to leave her house for wound care. She also reports generalized weakness, decreased appetite, fevers and diarrhea for about two week. She has not been able to locate her insulin for several days and as a result has not taken it. Admits to feeling fatigue and chills. Denies CP, SOB, N,V, admits to decreased sensation in bilateral feet.         Admitted 12/29/19 with uncontrolled DM and diabetic foot infection. On admission she met sepsis criteria with WBC 18,000 and glucose 461. She was started on vanco/zosyn and clinda.  She was taken to OR 12/29/19 and she underwent right 2nd and 3rd toe ray amputation by podiatry. 70 YO F with hx of severe depression, DM and bilateral diabetic foot wounds presents to ED with Right foot wound and purulent discharge. Patient reports that she has had wounds of her right foot for about 1 month and has been under the care of a wound care Doctor at outside hospital. She was been unhappy with the progress and the care she has been receiving. Recently she states she has been unwell for about 15 days and has not been able to leave her house for wound care. She also reports generalized weakness, decreased appetite, fevers and diarrhea for about two week. She has not been able to locate her insulin for several days and as a result has not taken it. Admits to feeling fatigue and chills. Denies CP, SOB, N,V, admits to decreased sensation in bilateral feet.         Admitted 12/29/19 with uncontrolled DM and diabetic foot infection. On admission she met sepsis criteria with WBC 18,000 and glucose 461. She was started on vanco/zosyn and clinda.  She was taken to OR 12/29/19 and she underwent right 2nd and 3rd toe ray amputation by podiatry. Wound Cx grew strep dysgalactiae, strep agalactiae, moganella, and staph aureus. Antibiotics were adjusted to vanc and zosyn.  Pt had psych evaluation. She has h/o of major depression but no suicidal ideations. 70 YO F with hx of severe depression, DM and bilateral diabetic foot wounds presented to ED with right foot wound and purulent discharge. Patient reported that she has had wounds on her right foot for about 1 month and has been under the care of a wound care doctor at an outside hospital. Recently she stated she has been unwell for about 15 days and has not been able to leave her house for wound care. She also reported generalized weakness, decreased appetite, fevers and diarrhea for about two week. She has not been able to locate her insulin for several days and as a result has not taken it. Admits to feeling fatigue and chills.  She was admitted 12/29/19 with uncontrolled DM and diabetic foot infection. On admission she met sepsis criteria with WBC 18,000 and glucose 461. She was started on vanco/zosyn and clinda.  She was taken to OR 12/29/19 and underwent right 2nd and 3rd toe ray amputation by podiatry. Wound Cx grew strep dysgalactiae, strep agalactiae, moganella, and staph aureus. Antibiotics were adjusted to vanc and zosyn.  Due to multi microbial infection and concern for residual OM in the tissue Infectious Disease was consulted and recommended switch to Cefepime 2 grams IV q12hrs (renally dosed) + Metronidazole 500 mg PO q8hrs x 6 weeks (per Dr. Hamilton). Also given her h/o of major depression history she had a psych evaluation which deemed her stable without suicidal ideations and recommended continuing Cymbalta BID on discharge. Endocrine was consulted and managed patient's insulin throughout the admission with most recent recommendations of Lantus 20u at bedtime and lispro 7u TID w/meals. Patient evaluated by Dr. Benjamin Physical Therapy and was recommended for ANT. 70 YO F with hx of severe depression, DM and bilateral diabetic foot wounds presented to ED with right foot wound and purulent discharge. Patient reported that she has had wounds on her right foot for about 1 month and has been under the care of a wound care doctor at an outside hospital. Recently she stated she has been unwell for about 15 days and has not been able to leave her house for wound care. She also reported generalized weakness, decreased appetite, fevers and diarrhea for about two week. She has not been able to locate her insulin for several days and as a result has not taken it. Admits to feeling fatigue and chills.  She was admitted 12/29/19 with uncontrolled DM and diabetic foot infection. On admission she met sepsis criteria with WBC 18,000 and glucose 461. She was started on vanco/zosyn and clinda.  She was taken to OR 12/29/19 and underwent right 2nd and 3rd toe ray amputation by podiatry. Wound Cx grew strep dysgalactiae, strep agalactiae, moganella, and staph aureus. Antibiotics were adjusted to vanc and zosyn.  Due to multi microbial infection and concern for residual OM in the tissue Infectious Disease was consulted and recommended switch to Cefepime 2 grams IV q12hrs (renally dosed) + Metronidazole 500 mg PO q8hrs x 6 weeks (per Dr. Hamilton). Also given her h/o of major depression history she had a psych evaluation which deemed her stable without suicidal ideations and recommended continuing Cymbalta BID on discharge. Endocrine was consulted and managed patient's insulin throughout the admission with most recent recommendations of Lantus 20u at bedtime and lispro 7u TID w/meals. Patient evaluated by Dr. Benjamin Physical Therapy and was recommended for ANT with 6 weeks of antibiotics and outpatient follow up. 70 YO F with hx of severe depression, DM and bilateral diabetic foot wounds presented to ED with right foot wound and purulent discharge. Patient reported that she has had wounds on her right foot for about 1 month and has been under the care of a wound care doctor at an outside hospital. Recently she stated she has been unwell for about 15 days and has not been able to leave her house for wound care. She also reported generalized weakness, decreased appetite, fevers and diarrhea for about two week. She has not been able to locate her insulin for several days and as a result has not taken it. Admits to feeling fatigue and chills.  She was admitted 12/29/19 with uncontrolled DM and diabetic foot infection. On admission she met sepsis criteria with WBC 18,000 and glucose 461. She was started on vanco/zosyn and clinda.  She was taken to OR 12/29/19 and underwent right 2nd and 3rd toe ray amputation by podiatry. Wound Cx grew strep dysgalactiae, strep agalactiae, moganella, and staph aureus. Antibiotics were adjusted to vanc and zosyn.  Due to multi microbial infection and concern for residual OM in the tissue Infectious Disease was consulted and recommended switch to Cefepime 2 grams IV q12hrs (renally dosed) + Metronidazole 500 mg PO q8hrs x 6 weeks (per Dr. Hamilton). Also given her h/o of major depression history she had a psych evaluation which deemed her stable without suicidal ideations and recommended continuing Cymbalta BID on discharge. Endocrine was consulted and managed patient's insulin throughout the admission with most recent recommendations of Lantus 15u at bedtime and lispro 6u TID w/meals. Patient evaluated by Dr. Benjamin Physical Therapy and was recommended for ANT with 6 weeks of antibiotics and outpatient follow up.

## 2020-01-02 NOTE — CONSULT NOTE ADULT - SUBJECTIVE AND OBJECTIVE BOX
Vascular Access Service Consult Note    69yFemaleHEAL ISSUES - PROBLEM Dx:  Adjustment disorder with depressed mood             Diagnosis: Foot infection    Indications for Vascular Access (Check all that apply)  [x  ]  Antibiotic Therapy       Antibiotic Prescribed:     cefepime                                                                        Expected Duration of Therapy:         x 6 weeks      [  ]  IV Hydration  [  ]  Total Parenteral Nutrition  [  ]  Chemotherapy  [  ]  Difficult Venous Access  [  ]  CVP monitoring  [  ]  Medications with high potential for tissue necrosis on extravasation  [  ]  Other    Screening (Check all that apply)  Previous Radiation to chest  [  ] Yes      [ x ]  No  Breast Cancer                          [  ] Left     [  ]  Right    [x  ]  No  Lymph Node Dissection         [  ] Left     [  ]  Right    [x  ]  No  Pacemaker or ICD                   [  ] Left     [  ]  Right    [x  ]  No  Upper Extremity DVT             [  ] Left     [  ]  Right    [ x ]  No  Chronic Kidney Disease         [  ]  Yes     [x  ]  No  Hemodialysis                           [  ]  Yes     [ x ]  No  AV Fistula/ Graft                     [  ]  Left    [  ]  Right    [x  ]  No  Temp>101F in past 24 H       [  ]  Yes     [  x]  No  H/O PICC/Midline                   [ x ]  Yes     [  ]  No    Lab data:                        9.5    11.21 )-----------( 735      ( 02 Jan 2020 07:15 )             30.5     01-02    140  |  101  |  18  ----------------------------<  231<H>  4.4   |  23  |  1.28    Ca    8.5      02 Jan 2020 07:15  Phos  4.2     01-02  Mg     1.6     01-02                I have reviewed the chart, interviewed and examined the patient and determined that this patient:  [x  ] Is a candidate for a PICC line  [  ] Is a candidate for a Midline  [  ] Is not a candidate for vascular access device (reason)    Lumens:    [x  ] Single  [  ] Double

## 2020-01-02 NOTE — CONSULT NOTE ADULT - SUBJECTIVE AND OBJECTIVE BOX
HPI:  68 YO F with hx of severe depression, DM and bilateral diabetic foot wounds presents to ED with Right foot wound and purulent discharge. Patient reports that she has had wounds of her right foot for about 1 month and has been under the care of a wound care Doctor at outside hospital. She was been unhappy with the progress and the care she has been receiving. Recently she states she has been unwell for about 15 days and has not been able to leave her house for wound care. She also reports generalized weakness, decreased appetite, fevers and diarrhea for about two week. She has not been able to locate her insulin for several days and as a result has not taken it. Admits to feeling fatigue and chills. Denies CP, SOB, N,V, admits to decreased sensation in bilateral feet.     Patient went to the OR on 12/29 for right second and third toe amputations and plantar ulcer excision with removal of all non-viable tissue and bone.  5-10 cc of pus was noted.  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  Essential hypertension: HTN (hypertension)  Hyperlipidemia: HLD (hyperlipidemia)  Type 2 diabetes mellitus: DM (diabetes mellitus)  Asthma: Asthma  Depressive disorder: Depression  Cytomegalovirus infection not present: No significant past surgical history        REVIEW OF SYSTEMS:    General:	 no weakness; no fevers, no chills  Skin/Breast: no rash  Respiratory and Thorax: no SOB, no cough  Cardiovascular:	No chest pain  Gastrointestinal:	 no nausea, vomiting , diarrhea  Genitourinary:	no dysuria, no difficulty urinating, no hematuria  Musculoskeletal:	no weakness, no joint swelling/pain  Neurological:	no focal weakness/numbness  Endocrine:	no polyuria, no polydipsia      ANTIBIOTICS:  MEDICATIONS  (STANDING):  aspirin  chewable 81 milliGRAM(s) Oral daily  atorvastatin 40 milliGRAM(s) Oral at bedtime  chlorhexidine 2% Cloths 1 Application(s) Topical <User Schedule>  dextrose 5%. 1000 milliLiter(s) (50 mL/Hr) IV Continuous <Continuous>  dextrose 50% Injectable 12.5 Gram(s) IV Push once  dextrose 50% Injectable 25 Gram(s) IV Push once  dextrose 50% Injectable 25 Gram(s) IV Push once  DULoxetine 60 milliGRAM(s) Oral daily  enoxaparin Injectable 40 milliGRAM(s) SubCutaneous every 24 hours  insulin glargine Injectable (LANTUS) 32 Unit(s) SubCutaneous at bedtime  insulin lispro (HumaLOG) corrective regimen sliding scale   SubCutaneous Before meals and at bedtime  insulin lispro Injectable (HumaLOG) 9 Unit(s) SubCutaneous three times a day before meals  NIFEdipine XL 90 milliGRAM(s) Oral daily  pantoprazole    Tablet 40 milliGRAM(s) Oral before breakfast  piperacillin/tazobactam IVPB.. 3.375 Gram(s) IV Intermittent every 6 hours    MEDICATIONS  (PRN):  acetaminophen   Tablet .. 650 milliGRAM(s) Oral every 6 hours PRN Mild Pain (1 - 3)  aluminum hydroxide/magnesium hydroxide/simethicone Suspension 30 milliLiter(s) Oral every 6 hours PRN Dyspepsia  dextrose 40% Gel 15 Gram(s) Oral once PRN Blood Glucose LESS THAN 70 milliGRAM(s)/deciliter  glucagon  Injectable 1 milliGRAM(s) IntraMuscular once PRN Glucose LESS THAN 70 milligrams/deciliter  HYDROmorphone  Injectable 1 milliGRAM(s) IV Push every 4 hours PRN Severe Pain (7 - 10)  HYDROmorphone  Injectable 0.5 milliGRAM(s) IV Push every 4 hours PRN Moderate Pain (4 - 6)      Allergies    No Known Allergies    Intolerances        SOCIAL HISTORY:    FAMILY HISTORY:  Family history of diabetes mellitus: Family history of diabetes mellitus (DM)      Vital Signs Last 24 Hrs  T(C): 36 (02 Jan 2020 08:37), Max: 37.3 (02 Jan 2020 06:02)  T(F): 96.8 (02 Jan 2020 08:37), Max: 99.2 (02 Jan 2020 06:02)  HR: 75 (02 Jan 2020 05:14) (72 - 88)  BP: 156/75 (02 Jan 2020 05:14) (116/54 - 971/-)  BP(mean): --  RR: 15 (02 Jan 2020 05:14) (15 - 18)  SpO2: 96% (02 Jan 2020 05:14) (96% - 97%)    PHYSICAL EXAM:  Constitutional:  non-toxic, no distress  Eyes:  no icterus   Ear/Nose/Throat: no sinus tenderness on percussion	  Neck:  supple  Respiratory: CTA vicenta  Cardiovascular: S1S2 RRR, no murmurs  Gastrointestinal:soft, (+) BS, no HSM  Extremities:no e/e/c  Vascular: DP Pulse:	right normal; left normal            LABS:                        9.5    11.21 )-----------( 735      ( 02 Jan 2020 07:15 )             30.5     01-02    140  |  101  |  18  ----------------------------<  231<H>  4.4   |  23  |  1.28    Ca    8.5      02 Jan 2020 07:15  Phos  4.2     01-02  Mg     1.6     01-02            MICROBIOLOGY:    Culture - Surgical Swab (12.30.19 @ 08:11)    Gram Stain:   Moderate Gram positive cocci in pairs, chains and clusters  Few Gram Negative Diplococci  Rare White blood cells    -  Cefazolin: S <=4    -  Clindamycin: R 0.5 This isolate is presumed to be clindamycin resistant based on detection of inducible resistance. Clindamycin may still be effective in some patients.    -  Erythromycin: R >4    -  Oxacillin: S <=0.25    -  RIF- Rifampin: S <=1 Should not be used as monotherapy    -  Trimethoprim/Sulfamethoxazole: S <=0.5/9.5    -  Linezolid: S 2    Specimen Source: .Surgical Swab 3rd Metatarsal Head- Tissue OR    Culture Results:   Numerous Streptococcus dysgalactiae (Group C/G) See previous culture for  sensitivies  Few Streptococcus agalactiae See previous culture for sensitivies  Moderate Staphylococcus aureus See previous culture for sensitivies  Moderate Actinomyces species Susceptibility to follow.  Moderate Prevotella melaninogenica Beta lactamase positive    Organism Identification: Staphylococcus aureus    Organism: Staphylococcus aureus    Method Type: DIANE      Culture - Surgical Swab (12.30.19 @ 08:08)    -  Ampicillin: S    -  Ampicillin: S    -  Ampicillin: R >16 These ampicillin results predict results for amoxicillin    -  Ampicillin/Sulbactam: R >16/8 Enterobacter, Citrobacter, and Serratia may develop resistance during prolonged therapy (3-4 days)    -  Cefazolin: S <=4    -  Cefazolin: R >16 Enterobacter, Citrobacter, and Serratia may develop resistance during prolonged therapy (3-4 days)    -  Erythromycin: R    -  Erythromycin: R    -  Erythromycin: R >4    -  Cefepime: S <=2    -  Ceftriaxone: R 32 Enterobacter, Citrobacter, and Serratia may develop resistance during prolonged therapy    -  Clindamycin: R    -  Clindamycin: S    -  Clindamycin: R 0.5 This isolate is presumed to be clindamycin resistant based on detection of inducible resistance. Clindamycin may still be effective in some patients.    -  Meropenem: S <=1    -  Oxacillin: S <=0.25    Gram Stain:   Moderate Gram positive cocci in pairs, chains and clusters  Rare Gram Positive Rods  Few White blood cells    -  Vancomycin: S    -  Vancomycin: S    -  Vancomycin: S 2    -  Tobramycin: S <=2    -  Trimethoprim/Sulfamethoxazole: S <=0.5/9.5    -  Trimethoprim/Sulfamethoxazole: R >2/38    -  Penicillin: S Predicts results for ampicillin, amoxicillin, amoxicillin/clavulanate, ampicillin/sulbactam, 1st, 2nd and 3rd generation cephalosporins and carbapenems.    -  Penicillin: S Predicts results for ampicillin, amoxicillin, amoxicillin/clavulanate, ampicillin/sulbactam, 1st, 2nd and 3rd generation cephalosporins and carbapenems.    -  Penicillin: R 8    -  Piperacillin/Tazobactam: S 16    -  RIF- Rifampin: S <=1 Should not be used as monotherapy    -  Gentamicin: S <=1    -  Levofloxacin: S    -  Levofloxacin: S    -  Linezolid: S 2    Specimen Source: .Surgical Swab Rt. foot- OR    Culture Results:   Moderate Streptococcus dysgalactiae (Group C/G)  Moderate Streptococcus agalactiae  Rare Staphylococcus aureus  Rare Morganella morganii  Mixed Anaerobic Jaycee including  Moderate Finegoldia magna Beta lactamase negative  Rare Prevotella melaninogenica Beta lactamase positive    Organism Identification: Streptococcus dysgalactiae  Sreptococcus agalactiae  Staphylococcus aureus  Morganella morganii    Organism: Streptococcus dysgalactiae    Organism: Sreptococcus agalactiae    Organism: Staphylococcus aureus    Organism: Morganella morganii    Method Type: KB    Method Type: KB    Method Type: DIANE    Method Type: DIANE    RADIOLOGY & ADDITIONAL STUDIES:    < from: Xray Foot AP + Lateral + Oblique, Bilat (12.29.19 @ 11:33) >  Subcutaneous emphysema and destructive changes involving the right second and third metatarsophalangeal joints. Exit osteomyelitis in this area cannot be excluded.    Amputation of the distal phalanx of the first digit as well as the second and third digits. No plain film evidence of osteomyelitis.    < end of copied text >

## 2020-01-02 NOTE — DIETITIAN INITIAL EVALUATION ADULT. - ENERGY NEEDS
Height: 5 feet 2 inches, Weight (Current): 149 pounds,  pounds +/-10%, %IBW %135, BMI 27.4  IBW used to calculate energy needs due to pt's current body weight exceeding 120% of IBW; adjusted for age, post op healing

## 2020-01-02 NOTE — PROGRESS NOTE ADULT - ASSESSMENT
69F PMHx DM presents w/ R foot ulcer. WBC 18.77, Lactate 2.9. LRINEC score >6. Gas seen on X-ray. S/p right partial 2nd and 4rd ray amputation 12/29.    -C/w ABX  -Wound flushed and packed with iodoform packing followed by DSD  -Podiatry following  -Upon discharge patient will need dressing changes with wet to dry gauze ,ABD pads and Kerlix.  -She will follow up in 1 week with Podiatry at [address] [place] [address]. [phone]    Will follow up with Attending and update primary team 69F PMHx DM presents w/ R foot ulcer. WBC 18.77, Lactate 2.9. LRINEC score >6. Gas seen on X-ray. S/p right partial 2nd and 4rd ray amputation 12/29.    -C/w ABX  -Recommend ID consult. Possibly consider PICC line due to concern for OM  -Wound flushed and packed with iodoform packing followed by DSD  - Heel WB to RLE  - If discharged today then follow up with Podiatry at [address] [place] [address]  [phone]  - Plan for WTD dressings changed q24  Podiatry following

## 2020-01-02 NOTE — PROGRESS NOTE ADULT - SUBJECTIVE AND OBJECTIVE BOX
O/N: RASTA, VSS vanc  level 18,  750mg given, Lantus incr to 32 and 8 premeal, chest discomfort after eating, EKG no chg's relieved by maalox                                      Assessment/Plan;  69F hx poorly controlled DM presents with R foot gas gangrene and DKA s/p R 2nd+3rd toe partial ray amputation (12/29) admitted to SICU for postop monitoring. Post operatively, anion gap normalized, glucose well controlled, on sliding scale insulin    Follow up wound cultures  Continue Vancomycin and Zosyn  Dressing changes by podiatry  Glucose control  Pain control  F/u AM labs  F/u Psych rec  Home medication as appropriate  Diabetic diet O/N: RASTA, VSS vanc  level 18,  750mg given, Lantus incr to 32 and 9 premeal, chest discomfort after eating, EKG no chg's relieved by maalox    Denies Right foot pain feves or chills. Denies Chest pain this morning     piperacillin/tazobactam IVPB.. 3.375  aspirin  chewable 81  enoxaparin Injectable 40  NIFEdipine XL 90  piperacillin/tazobactam IVPB.. 3.375      Allergies    No Known Allergies    Intolerances        Vital Signs Last 24 Hrs  T(C): 36 (02 Jan 2020 08:37), Max: 37.3 (02 Jan 2020 06:02)  T(F): 96.8 (02 Jan 2020 08:37), Max: 99.2 (02 Jan 2020 06:02)  HR: 75 (02 Jan 2020 05:14) (72 - 88)  BP: 156/75 (02 Jan 2020 05:14) (116/54 - 971/-)  BP(mean): --  RR: 15 (02 Jan 2020 05:14) (15 - 18)  SpO2: 96% (02 Jan 2020 05:14) (96% - 97%)  I&O's Summary    01 Jan 2020 07:01  -  02 Jan 2020 07:00  --------------------------------------------------------  IN: 220 mL / OUT: 0 mL / NET: 220 mL        Physical Exam:  General: NAD   Pulmonary: b/l Clear   Cardiovascular: s1s2  Extremities: Rt foot wound dressed/clean and dry       LABS:                        9.5    11.21 )-----------( 735      ( 02 Jan 2020 07:15 )             30.5     01-02    140  |  101  |  18  ----------------------------<  231<H>  4.4   |  23  |  1.28    Ca    8.5      02 Jan 2020 07:15  Phos  4.2     01-02  Mg     1.6     01-02          Radiology and Additional Studies:      RADIOLOGY & ADDITIONAL TESTS:  ---------------------------------------------------------------------------  I personally reviewed: [  ]EKG   [  ]CXR    [  ] CT    [  ]Other  ---------------------------------------------------------------------------  PLEASE CHECK WHEN PRESENT:     [  ]Heart Failure     [  ] Acute     [  ] Acute on Chronic     [  ] Chronic  -------------------------------------------------------------------     [  ]Diastolic [HFpEF]     [  ]Systolic [HFrEF]     [  ]Combined [HFpEF & HFrEF]     [  ]Other:  -------------------------------------------------------------------  [ ] Respiratory failure  [ ] Acute cor pulmonale  [ ] Asthma/COPD Exacerbation  [ ] Pleural effusion  [ ] Aspiration pneumonia  -------------------------------------------------------------------  [x ]ELBERT     [x ]ATN     [  ]Reneal Medullary Necrosis     [  ]Renal Cortical Necrosis     [  ]Other Pathological Lesions:    [  ]CKD 1  [  ]CKD 2  [  ]CKD 3  [  ]CKD 4  [  ]CKD 5  [  ]Other  -------------------------------------------------------------------  [ x]Diabetes  [ x] Diabetic PVD Ulcer  [  ] Neuropathic ulcer to DM  [  ] Diabetes with Nephropathy  [ x] Osteomyelitis due to diabetes  --------------------------------------------------------------------  [  ]Malnutrition: See Nutrition Note  [  ]Cachexia  [  ]Other:   [  ]Supplement Ordered:  [  ]Morbid Obesity (BMI >=40]  ---------------------------------------------------------------------  [ ] Sepsis/severe sepsis/septic shock  [ ] UTI  [ ] Pneumonia  -----------------------------------------------------------------------  [ ] Acidosis/alkalosis  [ ] Fluid overload  [ ] Hypokalemia  [ ] Hyperkalemia  [x] Hypomagnesemia  [ ] Hypophosphatemia  [ ] Hyperphosphatemia  ------------------------------------------------------------------------  [ ] Acute blood loss anemia  [ ] Post op blood loss anemia  [ ] Iron deficiency anemia  [ ] Anemia due to chronic disease  [ ] Hypercoagulable state  ----------------------------------------------------------------------  [ ] Cerebral infarction  [ ] Transient ischemia attack  [ ] Encephalopathy            Assessment/Plan;  69F hx poorly controlled DM presents with R foot gas gangrene and DKA s/p R 2nd+3rd toe partial ray amputation (12/29) admitted to SICU for postop monitoring. Post operatively, anion gap normalized, glucose well controlled, on sliding scale insulin    ID:   -Final Abx plan pending   -Continue Vancomycin and Zosyn  -will f/u 11 am Vancomycin level     Wound:   -Dressing changes by podiatry  -will follow up wound care plan for discharge     Endo:   Glucose control  -Insulin increased to lantus 32 and Nutritional 9 units   -will f/u Endocrine for insulin regimen for discharge     psych:   - Psych rec  -patient with known hx of depression     Dispo:   -based on Podiatry plan will arrange for Home PT and Home Wound care

## 2020-01-02 NOTE — DISCHARGE NOTE PROVIDER - NSDCFUADDAPPT_GEN_ALL_CORE_FT
[facility] (Diabetes doctors)  [phone]  [address] [place] [address] [facility] (Diabetes doctors)  [phone]  [address] [place] [address]    Follow up with Dr. Crow Echevarria - Podiatry at [address] [place] [address]  [phone]

## 2020-01-03 LAB
-  AMOXICILLIN/CLAVULANIC ACID: SIGNIFICANT CHANGE UP
-  AMOXICILLIN/CLAVULANIC ACID: SIGNIFICANT CHANGE UP
-  CLINDAMYCIN: SIGNIFICANT CHANGE UP
-  ERTAPENEM: SIGNIFICANT CHANGE UP
-  MEROPENEM: SIGNIFICANT CHANGE UP
-  PIPERACILLIN/TAZOBACTAM: SIGNIFICANT CHANGE UP
-  PIPERACILLIN/TAZOBACTAM: SIGNIFICANT CHANGE UP
ANION GAP SERPL CALC-SCNC: 17 MMOL/L — SIGNIFICANT CHANGE UP (ref 5–17)
BUN SERPL-MCNC: 17 MG/DL — SIGNIFICANT CHANGE UP (ref 7–23)
CALCIUM SERPL-MCNC: 9.3 MG/DL — SIGNIFICANT CHANGE UP (ref 8.4–10.5)
CHLORIDE SERPL-SCNC: 102 MMOL/L — SIGNIFICANT CHANGE UP (ref 96–108)
CO2 SERPL-SCNC: 23 MMOL/L — SIGNIFICANT CHANGE UP (ref 22–31)
CREAT SERPL-MCNC: 1.24 MG/DL — SIGNIFICANT CHANGE UP (ref 0.5–1.3)
CULTURE RESULTS: SIGNIFICANT CHANGE UP
GLUCOSE BLDC GLUCOMTR-MCNC: 105 MG/DL — HIGH (ref 70–99)
GLUCOSE BLDC GLUCOMTR-MCNC: 146 MG/DL — HIGH (ref 70–99)
GLUCOSE BLDC GLUCOMTR-MCNC: 150 MG/DL — HIGH (ref 70–99)
GLUCOSE BLDC GLUCOMTR-MCNC: 170 MG/DL — HIGH (ref 70–99)
GLUCOSE BLDC GLUCOMTR-MCNC: 220 MG/DL — HIGH (ref 70–99)
GLUCOSE SERPL-MCNC: 150 MG/DL — HIGH (ref 70–99)
HCT VFR BLD CALC: 33.6 % — LOW (ref 34.5–45)
HGB BLD-MCNC: 10.1 G/DL — LOW (ref 11.5–15.5)
MAGNESIUM SERPL-MCNC: 1.5 MG/DL — LOW (ref 1.6–2.6)
MCHC RBC-ENTMCNC: 24.3 PG — LOW (ref 27–34)
MCHC RBC-ENTMCNC: 30.1 GM/DL — LOW (ref 32–36)
MCV RBC AUTO: 80.8 FL — SIGNIFICANT CHANGE UP (ref 80–100)
METHOD TYPE: SIGNIFICANT CHANGE UP
METHOD TYPE: SIGNIFICANT CHANGE UP
NRBC # BLD: 0 /100 WBCS — SIGNIFICANT CHANGE UP (ref 0–0)
ORGANISM # SPEC MICROSCOPIC CNT: SIGNIFICANT CHANGE UP
PHOSPHATE SERPL-MCNC: 4.7 MG/DL — HIGH (ref 2.5–4.5)
PLATELET # BLD AUTO: 808 K/UL — HIGH (ref 150–400)
POTASSIUM SERPL-MCNC: 4.3 MMOL/L — SIGNIFICANT CHANGE UP (ref 3.5–5.3)
POTASSIUM SERPL-SCNC: 4.3 MMOL/L — SIGNIFICANT CHANGE UP (ref 3.5–5.3)
RBC # BLD: 4.16 M/UL — SIGNIFICANT CHANGE UP (ref 3.8–5.2)
RBC # FLD: 19.4 % — HIGH (ref 10.3–14.5)
SODIUM SERPL-SCNC: 142 MMOL/L — SIGNIFICANT CHANGE UP (ref 135–145)
SPECIMEN SOURCE: SIGNIFICANT CHANGE UP
WBC # BLD: 11.77 K/UL — HIGH (ref 3.8–10.5)
WBC # FLD AUTO: 11.77 K/UL — HIGH (ref 3.8–10.5)

## 2020-01-03 PROCEDURE — 99231 SBSQ HOSP IP/OBS SF/LOW 25: CPT | Mod: GC

## 2020-01-03 PROCEDURE — 99232 SBSQ HOSP IP/OBS MODERATE 35: CPT

## 2020-01-03 RX ORDER — INSULIN GLARGINE 100 [IU]/ML
44 INJECTION, SOLUTION SUBCUTANEOUS AT BEDTIME
Refills: 0 | Status: DISCONTINUED | OUTPATIENT
Start: 2020-01-03 | End: 2020-01-06

## 2020-01-03 RX ORDER — HYDROMORPHONE HYDROCHLORIDE 2 MG/ML
1 INJECTION INTRAMUSCULAR; INTRAVENOUS; SUBCUTANEOUS EVERY 4 HOURS
Refills: 0 | Status: DISCONTINUED | OUTPATIENT
Start: 2020-01-03 | End: 2020-01-05

## 2020-01-03 RX ORDER — HYDROMORPHONE HYDROCHLORIDE 2 MG/ML
0.5 INJECTION INTRAMUSCULAR; INTRAVENOUS; SUBCUTANEOUS EVERY 4 HOURS
Refills: 0 | Status: DISCONTINUED | OUTPATIENT
Start: 2020-01-03 | End: 2020-01-05

## 2020-01-03 RX ORDER — SODIUM CHLORIDE 0.65 %
1 AEROSOL, SPRAY (ML) NASAL ONCE
Refills: 0 | Status: COMPLETED | OUTPATIENT
Start: 2020-01-03 | End: 2020-01-03

## 2020-01-03 RX ORDER — MAGNESIUM SULFATE 500 MG/ML
2 VIAL (ML) INJECTION ONCE
Refills: 0 | Status: COMPLETED | OUTPATIENT
Start: 2020-01-03 | End: 2020-01-03

## 2020-01-03 RX ADMIN — HYDROMORPHONE HYDROCHLORIDE 0.5 MILLIGRAM(S): 2 INJECTION INTRAMUSCULAR; INTRAVENOUS; SUBCUTANEOUS at 07:40

## 2020-01-03 RX ADMIN — HYDROMORPHONE HYDROCHLORIDE 0.5 MILLIGRAM(S): 2 INJECTION INTRAMUSCULAR; INTRAVENOUS; SUBCUTANEOUS at 00:07

## 2020-01-03 RX ADMIN — Medication 2: at 07:20

## 2020-01-03 RX ADMIN — Medication 4: at 22:10

## 2020-01-03 RX ADMIN — Medication 14 UNIT(S): at 12:53

## 2020-01-03 RX ADMIN — Medication 81 MILLIGRAM(S): at 21:21

## 2020-01-03 RX ADMIN — Medication 14 UNIT(S): at 10:05

## 2020-01-03 RX ADMIN — INSULIN GLARGINE 44 UNIT(S): 100 INJECTION, SOLUTION SUBCUTANEOUS at 22:10

## 2020-01-03 RX ADMIN — DULOXETINE HYDROCHLORIDE 60 MILLIGRAM(S): 30 CAPSULE, DELAYED RELEASE ORAL at 12:53

## 2020-01-03 RX ADMIN — CEFEPIME 100 MILLIGRAM(S): 1 INJECTION, POWDER, FOR SOLUTION INTRAMUSCULAR; INTRAVENOUS at 05:45

## 2020-01-03 RX ADMIN — BENZOCAINE AND MENTHOL 1 LOZENGE: 5; 1 LIQUID ORAL at 05:45

## 2020-01-03 RX ADMIN — BENZOCAINE AND MENTHOL 1 LOZENGE: 5; 1 LIQUID ORAL at 21:22

## 2020-01-03 RX ADMIN — Medication 500 MILLIGRAM(S): at 05:45

## 2020-01-03 RX ADMIN — ATORVASTATIN CALCIUM 40 MILLIGRAM(S): 80 TABLET, FILM COATED ORAL at 21:16

## 2020-01-03 RX ADMIN — ENOXAPARIN SODIUM 40 MILLIGRAM(S): 100 INJECTION SUBCUTANEOUS at 18:42

## 2020-01-03 RX ADMIN — HYDROMORPHONE HYDROCHLORIDE 0.5 MILLIGRAM(S): 2 INJECTION INTRAMUSCULAR; INTRAVENOUS; SUBCUTANEOUS at 07:24

## 2020-01-03 RX ADMIN — Medication 500 MILLIGRAM(S): at 12:53

## 2020-01-03 RX ADMIN — Medication 90 MILLIGRAM(S): at 05:45

## 2020-01-03 RX ADMIN — PANTOPRAZOLE SODIUM 40 MILLIGRAM(S): 20 TABLET, DELAYED RELEASE ORAL at 05:45

## 2020-01-03 RX ADMIN — CEFEPIME 100 MILLIGRAM(S): 1 INJECTION, POWDER, FOR SOLUTION INTRAMUSCULAR; INTRAVENOUS at 18:42

## 2020-01-03 RX ADMIN — Medication 500 MILLIGRAM(S): at 21:16

## 2020-01-03 RX ADMIN — Medication 50 GRAM(S): at 10:05

## 2020-01-03 RX ADMIN — CHLORHEXIDINE GLUCONATE 1 APPLICATION(S): 213 SOLUTION TOPICAL at 07:20

## 2020-01-03 NOTE — PROGRESS NOTE ADULT - SUBJECTIVE AND OBJECTIVE BOX
INTERVAL HPI/OVERNIGHT EVENTS:    Patient was seen and examined at bedside.  Feels tired today.  No complaints    CONSTITUTIONAL:  Negative fever or chills, feels tired, good appetite  EYES:  Negative  blurry vision or double vision  CARDIOVASCULAR:  Negative for chest pain or palpitations  RESPIRATORY:  Negative for cough, wheezing, or SOB   GASTROINTESTINAL:  Negative for nausea, vomiting, diarrhea, constipation, or abdominal pain  GENITOURINARY:  Negative frequency, urgency or dysuria  NEUROLOGIC:  No headache, confusion, dizziness, lightheadedness      ANTIBIOTICS/RELEVANT:    MEDICATIONS  (STANDING):  aspirin  chewable 81 milliGRAM(s) Oral daily  atorvastatin 40 milliGRAM(s) Oral at bedtime  cefepime   IVPB 2000 milliGRAM(s) IV Intermittent every 12 hours  chlorhexidine 2% Cloths 1 Application(s) Topical <User Schedule>  chlorhexidine 2% Cloths 1 Application(s) Topical <User Schedule>  dextrose 5%. 1000 milliLiter(s) (50 mL/Hr) IV Continuous <Continuous>  dextrose 50% Injectable 12.5 Gram(s) IV Push once  dextrose 50% Injectable 25 Gram(s) IV Push once  dextrose 50% Injectable 25 Gram(s) IV Push once  DULoxetine 60 milliGRAM(s) Oral daily  enoxaparin Injectable 40 milliGRAM(s) SubCutaneous every 24 hours  insulin glargine Injectable (LANTUS) 40 Unit(s) SubCutaneous at bedtime  insulin lispro (HumaLOG) corrective regimen sliding scale   SubCutaneous Before meals and at bedtime  insulin lispro Injectable (HumaLOG) 14 Unit(s) SubCutaneous three times a day before meals  metroNIDAZOLE    Tablet 500 milliGRAM(s) Oral every 8 hours  NIFEdipine XL 90 milliGRAM(s) Oral daily  pantoprazole    Tablet 40 milliGRAM(s) Oral before breakfast    MEDICATIONS  (PRN):  acetaminophen   Tablet .. 650 milliGRAM(s) Oral every 6 hours PRN Mild Pain (1 - 3)  aluminum hydroxide/magnesium hydroxide/simethicone Suspension 30 milliLiter(s) Oral every 6 hours PRN Dyspepsia  benzocaine 15 mG/menthol 3.6 mG (Sugar-Free) Lozenge 1 Lozenge Oral every 2 hours PRN Sore Throat  dextrose 40% Gel 15 Gram(s) Oral once PRN Blood Glucose LESS THAN 70 milliGRAM(s)/deciliter  glucagon  Injectable 1 milliGRAM(s) IntraMuscular once PRN Glucose LESS THAN 70 milligrams/deciliter  HYDROmorphone  Injectable 1 milliGRAM(s) IV Push every 4 hours PRN Severe Pain (7 - 10)  HYDROmorphone  Injectable 0.5 milliGRAM(s) IV Push every 4 hours PRN Moderate Pain (4 - 6)  sodium chloride 0.9% lock flush 10 milliLiter(s) IV Push every 1 hour PRN Pre/post blood products, medications, blood draw, and to maintain line patency        Vital Signs Last 24 Hrs  T(C): 36.4 (03 Jan 2020 09:28), Max: 37.1 (02 Jan 2020 14:27)  T(F): 97.6 (03 Jan 2020 09:28), Max: 98.7 (02 Jan 2020 14:27)  HR: 76 (03 Jan 2020 08:40) (76 - 93)  BP: 151/79 (03 Jan 2020 08:40) (147/79 - 174/84)  BP(mean): --  RR: 18 (03 Jan 2020 08:40) (16 - 18)  SpO2: 96% (03 Jan 2020 08:40) (95% - 96%)    PHYSICAL EXAM:  Constitutional:  non-toxic, no distress  Eyes:  no icterus   Ear/Nose/Throat: no oral lesion  Neck:no JVD, no lymphadenopathy, supple  Respiratory: clear to auscultation   Cardiovascular: S1S2 RRR, no murmurs  Gastrointestinal:soft, (+) BS, no HSM  Extremities:  right foot with dressing in place   Vascular: DP Pulse:	right normal; left normal      LABS:                        10.1   11.77 )-----------( 808      ( 03 Jan 2020 06:16 )             33.6     01-03    142  |  102  |  17  ----------------------------<  150<H>  4.3   |  23  |  1.24    Ca    9.3      03 Jan 2020 06:16  Phos  4.7     01-03  Mg     1.5     01-03            MICROBIOLOGY:    Culture - Surgical Swab (12.30.19 @ 08:11)    Gram Stain:   Moderate Gram positive cocci in pairs, chains and clusters  Few Gram Negative Diplococci  Rare White blood cells    -  Cefazolin: S <=4    -  Clindamycin: R 0.5 This isolate is presumed to be clindamycin resistant based on detection of inducible resistance. Clindamycin may still be effective in some patients.    -  Erythromycin: R >4    -  Linezolid: S 2    -  Oxacillin: S <=0.25    -  RIF- Rifampin: S <=1 Should not be used as monotherapy    -  Trimethoprim/Sulfamethoxazole: S <=0.5/9.5    Specimen Source: .Surgical Swab 3rd Metatarsal Head- Tissue OR    Culture Results:   Numerous Streptococcus dysgalactiae (Group C/G) See previous culture for  sensitivies  Few Streptococcus agalactiae See previous culture for sensitivies  Moderate Staphylococcus aureus See previous culture for sensitivies  Moderate Actinomyces species Susceptibility to follow.  Moderate Prevotella melaninogenica Beta lactamase positive    Organism Identification: Staphylococcus aureus  Culture - Surgical Swab (12.30.19 @ 08:11)    Gram Stain:   Moderate Gram positive cocci in pairs, chains and clusters  Few Gram Negative Diplococci  Rare White blood cells    -  Cefazolin: S <=4    -  Clindamycin: R 0.5 This isolate is presumed to be clindamycin resistant based on detection of inducible resistance. Clindamycin may still be effective in some patients.    -  Erythromycin: R >4    -  Linezolid: S 2    -  Oxacillin: S <=0.25    -  RIF- Rifampin: S <=1 Should not be used as monotherapy    -  Trimethoprim/Sulfamethoxazole: S <=0.5/9.5    Specimen Source: .Surgical Swab 3rd Metatarsal Head- Tissue OR    Culture Results:   Numerous Streptococcus dysgalactiae (Group C/G) See previous culture for  sensitivies  Few Streptococcus agalactiae See previous culture for sensitivies  Moderate Staphylococcus aureus See previous culture for sensitivies  Moderate Actinomyces species Susceptibility to follow.  Moderate Prevotella melaninogenica Beta lactamase positive    Organism Identification: Staphylococcus aureus    Organism: Staphylococcus aureus    Method Type: DIANE      Organism: Staphylococcus aureus    Method Type: DIANE        RADIOLOGY & ADDITIONAL STUDIES:

## 2020-01-03 NOTE — PROGRESS NOTE ADULT - SUBJECTIVE AND OBJECTIVE BOX
Patient is a 69y old  Female who presents with a chief complaint of Rt Foot Diabetic foot infection (02 Jan 2020 13:08)      INTERVAL HPI/ OVERNIGHT EVENTS: Pt is seen and examined bedside. No complaints at this time. Denies n/v/f/sob/chest pain.       LABS                        10.1   11.77 )-----------( 808      ( 03 Jan 2020 06:16 )             33.6     01-03    142  |  102  |  17  ----------------------------<  150<H>  4.3   |  23  |  1.24    Ca    9.3      03 Jan 2020 06:16  Phos  4.7     01-03  Mg     1.5     01-03          ICU Vital Signs Last 24 Hrs  T(C): 36.6 (03 Jan 2020 06:08), Max: 37.1 (02 Jan 2020 14:27)  T(F): 97.8 (03 Jan 2020 06:08), Max: 98.7 (02 Jan 2020 14:27)  HR: 76 (03 Jan 2020 08:40) (76 - 93)  BP: 151/79 (03 Jan 2020 08:40) (147/79 - 174/84)  BP(mean): --  ABP: --  ABP(mean): --  RR: 18 (03 Jan 2020 08:40) (16 - 18)  SpO2: 96% (03 Jan 2020 08:40) (95% - 96%)      MICROBIOLOGY  Culture - Surgical Swab (12.30.19 @ 08:11)    Gram Stain:   Moderate Gram positive cocci in pairs, chains and clusters  Few Gram Negative Diplococci  Rare White blood cells    -  Cefazolin: S <=4    -  Clindamycin: R 0.5 This isolate is presumed to be clindamycin resistant based on detection of inducible resistance. Clindamycin may still be effective in some patients.    -  Erythromycin: R >4    -  Linezolid: S 2    -  Oxacillin: S <=0.25    -  RIF- Rifampin: S <=1 Should not be used as monotherapy    -  Trimethoprim/Sulfamethoxazole: S <=0.5/9.5    Specimen Source: .Surgical Swab 3rd Metatarsal Head- Tissue OR    Culture Results:   Numerous Streptococcus dysgalactiae (Group C/G) See previous culture for  sensitivies  Few Streptococcus agalactiae See previous culture for sensitivies  Moderate Staphylococcus aureus See previous culture for sensitivies  Moderate Actinomyces species Susceptibility to follow.  Moderate Prevotella melaninogenica Beta lactamase positive    Organism Identification: Staphylococcus aureus    Organism: Staphylococcus aureus    Method Type: DIANE    Culture - Surgical Swab (12.30.19 @ 08:08)    -  Meropenem: S <=1    -  Oxacillin: S <=0.25    -  Penicillin: S Predicts results for ampicillin, amoxicillin, amoxicillin/clavulanate, ampicillin/sulbactam, 1st, 2nd and 3rd generation cephalosporins and carbapenems.    -  Penicillin: S Predicts results for ampicillin, amoxicillin, amoxicillin/clavulanate, ampicillin/sulbactam, 1st, 2nd and 3rd generation cephalosporins and carbapenems.    -  Penicillin: R 8    -  Piperacillin/Tazobactam: S 16    -  RIF- Rifampin: S <=1 Should not be used as monotherapy    -  Vancomycin: S    -  Vancomycin: S    -  Vancomycin: S 2    -  Tobramycin: S <=2    -  Trimethoprim/Sulfamethoxazole: S <=0.5/9.5    -  Trimethoprim/Sulfamethoxazole: R >2/38    Gram Stain:   Moderate Gram positive cocci in pairs, chains and clusters  Rare Gram Positive Rods  Few White blood cells    -  Gentamicin: S <=1    -  Levofloxacin: S    -  Levofloxacin: S    -  Linezolid: S 2    -  Erythromycin: R    -  Erythromycin: R    -  Erythromycin: R >4    -  Cefepime: S <=2    -  Ceftriaxone: R 32 Enterobacter, Citrobacter, and Serratia may develop resistance during prolonged therapy    -  Clindamycin: R    -  Clindamycin: S    -  Clindamycin: R 0.5 This isolate is presumed to be clindamycin resistant based on detection of inducible resistance. Clindamycin may still be effective in some patients.    -  Ampicillin: S    -  Ampicillin: S    -  Ampicillin: R >16 These ampicillin results predict results for amoxicillin    -  Ampicillin/Sulbactam: R >16/8 Enterobacter, Citrobacter, and Serratia may develop resistance during prolonged therapy (3-4 days)    -  Cefazolin: S <=4    -  Cefazolin: R >16 Enterobacter, Citrobacter, and Serratia may develop resistance during prolonged therapy (3-4 days)    Specimen Source: .Surgical Swab Rt. foot- OR    Culture Results:   Moderate Streptococcus dysgalactiae (Group C/G)  Moderate Streptococcus agalactiae  Rare Staphylococcus aureus  Rare Morganella morganii  Mixed Anaerobic Jaycee including  Moderate Finegoldia magna Beta lactamase negative  Rare Prevotella melaninogenica Beta lactamase positive    Organism Identification: Streptococcus dysgalactiae  Sreptococcus agalactiae  Staphylococcus aureus  Morganella morganii    Organism: Streptococcus dysgalactiae    Organism: Sreptococcus agalactiae    Organism: Staphylococcus aureus    Organism: Morganella morganii    Method Type: KB    Method Type: KB    Method Type: DIANE    Method Type: DIANE    PE:  No interval change in neurovascular status. Right foot s/p partial 2nd and 3rd ray amputation with large open defect. Wound edges appear viable with minor necrotic tissue. No malodor, no fluctuance. Protective sensation diminished b/l

## 2020-01-03 NOTE — PROGRESS NOTE ADULT - ASSESSMENT
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  As per vascular there is concern for residual OM in remaining tissue    Recommend:  1.  Continue Cefepime 2 grams IV q12hrs (renally dosed) + Metronidazole 500 mg PO q8hrs x 6 weeks total  2.  Will need weekly CBC, CMP, ESR, CRP  3,  Can follow up with me:    [address], [address]  [place], [place]  [phone], option 2  fax:  [phone]    ID team 2 will sign off. Reconsult as needed

## 2020-01-03 NOTE — PROGRESS NOTE ADULT - ASSESSMENT
69F PMHx DM presents w/ R foot ulcer. WBC 18.77, Lactate 2.9. LRINEC score >6. Gas seen on X-ray. S/p right partial 2nd and 4rd ray amputation 12/29.    -C/w IV ABX per ID.   - Wound flushed and packed with iodoform packing followed by DSD  - Heel WB to RLE  - Plan for WTD dressings changed q24.    - When discharged, patient can follow up with Podiatry at [address] [place] [address]  [phone]    Instructions for VNS/rehab; please change wet-to-dry dressing every 24 hrs. Dressing consists of saline soaked 4x4 gauze followed by dry 4x4 gauze and fabby/kerlix with overlying ace bandage (if available).

## 2020-01-03 NOTE — PROGRESS NOTE ADULT - SUBJECTIVE AND OBJECTIVE BOX
INTERVAL HPI/OVERNIGHT EVENTS:    Patient is a 69y old  Female who presents with a chief complaint of Rt Foot Diabetic foot infection (03 Jan 2020 09:07)      Pt reports the following symptoms:    CONSTITUTIONAL:  Negative fever or chills, feels well, good appetite  EYES:  Negative  blurry vision or double vision  CARDIOVASCULAR:  Negative for chest pain or palpitations  RESPIRATORY:  Negative for cough, wheezing, or SOB   GASTROINTESTINAL:  Negative for nausea, vomiting, diarrhea, constipation, or abdominal pain  GENITOURINARY:  Negative frequency, urgency or dysuria  NEUROLOGIC:  No headache, confusion, dizziness, lightheadedness    MEDICATIONS  (STANDING):  aspirin  chewable 81 milliGRAM(s) Oral daily  atorvastatin 40 milliGRAM(s) Oral at bedtime  cefepime   IVPB 2000 milliGRAM(s) IV Intermittent every 12 hours  chlorhexidine 2% Cloths 1 Application(s) Topical <User Schedule>  chlorhexidine 2% Cloths 1 Application(s) Topical <User Schedule>  dextrose 5%. 1000 milliLiter(s) (50 mL/Hr) IV Continuous <Continuous>  dextrose 50% Injectable 12.5 Gram(s) IV Push once  dextrose 50% Injectable 25 Gram(s) IV Push once  dextrose 50% Injectable 25 Gram(s) IV Push once  DULoxetine 60 milliGRAM(s) Oral daily  enoxaparin Injectable 40 milliGRAM(s) SubCutaneous every 24 hours  insulin glargine Injectable (LANTUS) 40 Unit(s) SubCutaneous at bedtime  insulin lispro (HumaLOG) corrective regimen sliding scale   SubCutaneous Before meals and at bedtime  insulin lispro Injectable (HumaLOG) 14 Unit(s) SubCutaneous three times a day before meals  metroNIDAZOLE    Tablet 500 milliGRAM(s) Oral every 8 hours  NIFEdipine XL 90 milliGRAM(s) Oral daily  pantoprazole    Tablet 40 milliGRAM(s) Oral before breakfast    MEDICATIONS  (PRN):  acetaminophen   Tablet .. 650 milliGRAM(s) Oral every 6 hours PRN Mild Pain (1 - 3)  aluminum hydroxide/magnesium hydroxide/simethicone Suspension 30 milliLiter(s) Oral every 6 hours PRN Dyspepsia  benzocaine 15 mG/menthol 3.6 mG (Sugar-Free) Lozenge 1 Lozenge Oral every 2 hours PRN Sore Throat  dextrose 40% Gel 15 Gram(s) Oral once PRN Blood Glucose LESS THAN 70 milliGRAM(s)/deciliter  glucagon  Injectable 1 milliGRAM(s) IntraMuscular once PRN Glucose LESS THAN 70 milligrams/deciliter  HYDROmorphone  Injectable 1 milliGRAM(s) IV Push every 4 hours PRN Severe Pain (7 - 10)  HYDROmorphone  Injectable 0.5 milliGRAM(s) IV Push every 4 hours PRN Moderate Pain (4 - 6)  sodium chloride 0.9% lock flush 10 milliLiter(s) IV Push every 1 hour PRN Pre/post blood products, medications, blood draw, and to maintain line patency      PHYSICAL EXAM  Vital Signs Last 24 Hrs  T(C): 36.4 (03 Jan 2020 09:28), Max: 37.1 (02 Jan 2020 14:27)  T(F): 97.6 (03 Jan 2020 09:28), Max: 98.7 (02 Jan 2020 14:27)  HR: 76 (03 Jan 2020 08:40) (76 - 93)  BP: 151/79 (03 Jan 2020 08:40) (147/79 - 174/84)  BP(mean): --  RR: 18 (03 Jan 2020 08:40) (16 - 18)  SpO2: 96% (03 Jan 2020 08:40) (95% - 96%)    Constitutional: wn/wd in NAD.   HEENT: NCAT, MMM, OP clear, EOMI, no proptosis or lid retraction  Neck: no thyromegaly or palpable thyroid nodules   Respiratory: lungs CTAB.  Cardiovascular: regular rhythm, normal S1 and S2, no audible murmurs, no peripheral edema  GI: soft, NT/ND, no masses/HSM appreciated.  Neurology: no tremors, DTR 2+  Skin: no visible rashes/lesions  Psychiatric: AAO x 3, normal affect/mood.    LABS:                        10.1   11.77 )-----------( 808      ( 03 Jan 2020 06:16 )             33.6     01-03    142  |  102  |  17  ----------------------------<  150<H>  4.3   |  23  |  1.24    Ca    9.3      03 Jan 2020 06:16  Phos  4.7     01-03  Mg     1.5     01-03              HbA1C: 11.2 % (12-29 @ 10:32)    CAPILLARY BLOOD GLUCOSE      POCT Blood Glucose.: 146 mg/dL (03 Jan 2020 09:57)  POCT Blood Glucose.: 170 mg/dL (03 Jan 2020 06:56)  POCT Blood Glucose.: 210 mg/dL (02 Jan 2020 21:42)  POCT Blood Glucose.: 124 mg/dL (02 Jan 2020 17:46)  POCT Blood Glucose.: 210 mg/dL (02 Jan 2020 13:12)      Insulin Sliding Scale requirements X 24 Hours:    RADIOLOGY & ADDITIONAL TESTS:    A/P: 69y Female with history of DM type II presenting for       1.  DM -     Please continue           units lantus at bedtime  / in the morning and        units lispro with meals and lispro moderate / low dose sliding scale 4 times daily with meals and at bedtime.  Please continue consistent carbohydrate diet.      Goal FSG is   Will continue to monitor   For discharge, pt can continue    Pt can follow up at discharge with [facility] by calling  to make an appointment.   Will discuss case with     and update primary team INTERVAL HPI/OVERNIGHT EVENTS:    Patient seen and examined at the bedside. She was sleepy after the pain meds she received today.  FSG & Insulin received:  Yesterday:  pre-dinner fs, 14 nutritional lispro   units  bedtime fs, 40 lantus   units + 4   units lispro SS    Today:  pre-breakfast fs, 14 nutritional lispro units+  2   units lispro SS  pre-lunch fs       Pt reports the following symptoms:    CONSTITUTIONAL:  Negative fever or chills,   EYES:  Negative  blurry vision or double vision  CARDIOVASCULAR:  Negative for chest pain or palpitations  RESPIRATORY:  Negative for cough, wheezing, or SOB   GASTROINTESTINAL:  Negative for nausea, vomiting, diarrhea, constipation, or abdominal pain  GENITOURINARY:  Negative frequency, urgency or dysuria  NEUROLOGIC:  No headache, confusion, dizziness, lightheadedness    MEDICATIONS  (STANDING):  aspirin  chewable 81 milliGRAM(s) Oral daily  atorvastatin 40 milliGRAM(s) Oral at bedtime  cefepime   IVPB 2000 milliGRAM(s) IV Intermittent every 12 hours  chlorhexidine 2% Cloths 1 Application(s) Topical <User Schedule>  chlorhexidine 2% Cloths 1 Application(s) Topical <User Schedule>  dextrose 5%. 1000 milliLiter(s) (50 mL/Hr) IV Continuous <Continuous>  dextrose 50% Injectable 12.5 Gram(s) IV Push once  dextrose 50% Injectable 25 Gram(s) IV Push once  dextrose 50% Injectable 25 Gram(s) IV Push once  DULoxetine 60 milliGRAM(s) Oral daily  enoxaparin Injectable 40 milliGRAM(s) SubCutaneous every 24 hours  insulin glargine Injectable (LANTUS) 40 Unit(s) SubCutaneous at bedtime  insulin lispro (HumaLOG) corrective regimen sliding scale   SubCutaneous Before meals and at bedtime  insulin lispro Injectable (HumaLOG) 14 Unit(s) SubCutaneous three times a day before meals  metroNIDAZOLE    Tablet 500 milliGRAM(s) Oral every 8 hours  NIFEdipine XL 90 milliGRAM(s) Oral daily  pantoprazole    Tablet 40 milliGRAM(s) Oral before breakfast    MEDICATIONS  (PRN):  acetaminophen   Tablet .. 650 milliGRAM(s) Oral every 6 hours PRN Mild Pain (1 - 3)  aluminum hydroxide/magnesium hydroxide/simethicone Suspension 30 milliLiter(s) Oral every 6 hours PRN Dyspepsia  benzocaine 15 mG/menthol 3.6 mG (Sugar-Free) Lozenge 1 Lozenge Oral every 2 hours PRN Sore Throat  dextrose 40% Gel 15 Gram(s) Oral once PRN Blood Glucose LESS THAN 70 milliGRAM(s)/deciliter  glucagon  Injectable 1 milliGRAM(s) IntraMuscular once PRN Glucose LESS THAN 70 milligrams/deciliter  HYDROmorphone  Injectable 1 milliGRAM(s) IV Push every 4 hours PRN Severe Pain (7 - 10)  HYDROmorphone  Injectable 0.5 milliGRAM(s) IV Push every 4 hours PRN Moderate Pain (4 - 6)  sodium chloride 0.9% lock flush 10 milliLiter(s) IV Push every 1 hour PRN Pre/post blood products, medications, blood draw, and to maintain line patency      PHYSICAL EXAM  Vital Signs Last 24 Hrs  T(C): 36.4 (2020 09:28), Max: 37.1 (2020 14:27)  T(F): 97.6 (2020 09:28), Max: 98.7 (2020 14:27)  HR: 76 (2020 08:40) (76 - 93)  BP: 151/79 (2020 08:40) (147/79 - 174/84)  BP(mean): --  RR: 18 (2020 08:40) (16 - 18)  SpO2: 96% (2020 08:40) (95% - 96%)    Constitutional: wn/wd in NAD.   Respiratory: lungs CTAB.  Cardiovascular: regular rhythm, normal S1 and S2, no peripheral edema  GI: soft, NT/ND, no masses/HSM appreciated.  Neurology: no tremors, DTR 2+      LABS:                        10.1   11.77 )-----------( 808      ( 2020 06:16 )             33.6     01-03    142  |  102  |  17  ----------------------------<  150<H>  4.3   |  23  |  1.24    Ca    9.3      2020 06:16  Phos  4.7     01-03  Mg     1.5     01-03              HbA1C: 11.2 % (12-29 @ 10:32)    CAPILLARY BLOOD GLUCOSE      POCT Blood Glucose.: 146 mg/dL (2020 09:57)  POCT Blood Glucose.: 170 mg/dL (2020 06:56)  POCT Blood Glucose.: 210 mg/dL (2020 21:42)  POCT Blood Glucose.: 124 mg/dL (2020 17:46)  POCT Blood Glucose.: 210 mg/dL (2020 13:12)      Insulin Sliding Scale requirements X 24 Hours:    RADIOLOGY & ADDITIONAL TESTS:    A/P:69F hx DM (A1c 11.2), several left toe amps’ in past, HTN, HLD and asthma got 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  DM Type 2 - uncontrolled  - Hba1c 11.2  - Wt 68 kg with BMI 27.4  - Cr/GFR 1.31/48  No ECHO in system  Please increase to lantus 44 units at night.  Please continue lispro 14  units before each meal.    Please continue lispro moderate dose sliding scale four times daily with meals and at bedtime  carb consistent diet    Pt's fingerstick glucose goal is 120 to 150    Will continue to monitor     For discharge, TBD    Pt can follow up at discharge with [facility] by calling  to make an appointment.   discussed case with Dr. Tran   and updated  primary team    To Make an appointment at [address] [place] [address] for the patient, either:  1. Send a STAT task via [employer] to Ernestina Wilson or Sumi Rossi (office managers)   OR  2. Call supervisor's line. P: [phone] (do not give this number to patient). VM is checked periodically  In the message, specify that this is a hospital discharge follow-up, and that the appt can be made with a NP if there is no timely MD availability    REMINDERS FOR INSULIN/DIABETES SUPPLIES at DISCHARGE:  INSULIN:   Long actin/Basal Insulin: Examples: Toujeo, Basaglar, Tresiba, Lantus   Short acting/Bolus Insulin: Humalog, Admelog, Novolog  Please ensure that BOTH short acting and long acting insulin are prescribed in the same preparation (Ex: PEN vs VIAL/SOLUTION)     TESTING SUPPLIES:   All glucometer supplies should be written as generic to avoid issues with insurance. Use the free text option in sunrise prescription writer, and type in glucometer test strips, lancets, etc to order.    If sending patient home on insulin PEN, please send:   •	BD paz insulin pen needles for use up to 4 times daily (total quantity 100)  •	Lancets for use up to 4 times daily (total quantity 100)  •	Glucometer Test strips for use up to 4 times daily (total quantity 100)  •	Alcohol swabs for use up to 4 times daily (total quantity 100)  •	Glucometer (If provided by hospital, still provide scripts for lancets, test strips, and swabs)  If sending patient home on insulin VIAL, please send:   •	Insulin syringes (6mm) - for use up to 4 times daily (total quantity 100)  •	Lancets for use up to 4 times daily (total quantity 100)  •	Generic Glucometer Test strips for use up to 4 times daily (total quantity 100)  •	Alcohol swabs for use up to 4 times daily (total quantity 100)  •	Generic Glucometer (If provided by hospital, still provide scripts for lancets, test strips, and swabs)  •	Do not specify brand for testing supplies (such as contour, freestyle, one touch etc) that way the pharmacy has the freedom to pick and change according to what the insurance dictates.  For patients without insurance:   •	Provide social work with appropriate scripts so they may obtain 1 week of samples  •	Provide with glucometer. Glucometers are located at various nursing stations, the nursing office, education, and endocrine fellows office.  •	Please make appointment with Nichole Jama NP or Kaycee Coates RN and JOSE G Bell at the [address] [place] [address] endocrinology clinic. They can see patients without insurance, provide appropriate samples, and assist in getting insurance coverage.     PREFERRED PHARMACY:  [facility] (located on 1st floor next to admitting)  P: [phone]  Hours: M – F 8AM – 8PM, Sat 8AM – 4PM, Sun—closed  If not using [facility], please follow up with chosen pharmacy to ensure insulin prescribed is covered.

## 2020-01-03 NOTE — PROGRESS NOTE ADULT - SUBJECTIVE AND OBJECTIVE BOX
POD: 5  Procedure: R 2nd and 3rd toe ray amp    SUBJECTIVE: Patient seen and examined by chief resident on morning rounds. Resting comfortably with no complaints at this time.       Vital Signs Last 24 Hrs  T(C): 36.6 (03 Jan 2020 13:27), Max: 37.1 (02 Jan 2020 14:27)  T(F): 97.9 (03 Jan 2020 13:27), Max: 98.7 (02 Jan 2020 14:27)  HR: 83 (03 Jan 2020 13:14) (76 - 93)  BP: 117/73 (03 Jan 2020 13:14) (117/73 - 174/84)  BP(mean): --  RR: 18 (03 Jan 2020 13:14) (16 - 18)  SpO2: 96% (03 Jan 2020 13:14) (95% - 96%)    Physical Exam:  General: NAD  Pulmonary: Nonlabored breathing, no respiratory distress  Abdominal: soft, nondistended, nontender with no rebound or guarding  Extremities: R foot dressing CDI, to be changed with podiatry later this am   Neuro: A/O x3    Lines/drains/tubes:    I&O's Summary    02 Jan 2020 07:01  -  03 Jan 2020 07:00  --------------------------------------------------------  IN: 180 mL / OUT: 0 mL / NET: 180 mL        LABS:                        10.1   11.77 )-----------( 808      ( 03 Jan 2020 06:16 )             33.6     01-03    142  |  102  |  17  ----------------------------<  150<H>  4.3   |  23  |  1.24    Ca    9.3      03 Jan 2020 06:16  Phos  4.7     01-03  Mg     1.5     01-03          CAPILLARY BLOOD GLUCOSE      POCT Blood Glucose.: 150 mg/dL (03 Jan 2020 12:47)  POCT Blood Glucose.: 146 mg/dL (03 Jan 2020 09:57)  POCT Blood Glucose.: 170 mg/dL (03 Jan 2020 06:56)  POCT Blood Glucose.: 210 mg/dL (02 Jan 2020 21:42)  POCT Blood Glucose.: 124 mg/dL (02 Jan 2020 17:46)        RADIOLOGY & ADDITIONAL STUDIES:      PLEASE CHECK WHEN PRESENT:     [  ]Heart Failure     [  ] Acute     [  ] Acute on Chronic     [  ] Chronic  -------------------------------------------------------------------     [  ]Diastolic [HFpEF]     [  ]Systolic [HFrEF]     [  ]Combined [HFpEF & HFrEF]     [  ]Other:  -------------------------------------------------------------------  [ ] Respiratory failure  [ ] Acute cor pulmonale  [ ] Asthma/COPD Exacerbation  [ ] Pleural effusion  [ ] Aspiration pneumonia  -------------------------------------------------------------------  [x ]ELBERT     [x ]ATN     [  ]Reneal Medullary Necrosis     [  ]Renal Cortical Necrosis     [  ]Other Pathological Lesions:    [  ]CKD 1  [  ]CKD 2  [  ]CKD 3  [  ]CKD 4  [  ]CKD 5  [  ]Other  -------------------------------------------------------------------  [ x]Diabetes  [ x] Diabetic PVD Ulcer  [  ] Neuropathic ulcer to DM  [  ] Diabetes with Nephropathy  [ x] Osteomyelitis due to diabetes  --------------------------------------------------------------------  [  ]Malnutrition: See Nutrition Note  [  ]Cachexia  [  ]Other:   [  ]Supplement Ordered:  [  ]Morbid Obesity (BMI >=40]  ---------------------------------------------------------------------  [ ] Sepsis/severe sepsis/septic shock  [ ] UTI  [ ] Pneumonia  -----------------------------------------------------------------------  [ ] Acidosis/alkalosis  [ ] Fluid overload  [ ] Hypokalemia  [ ] Hyperkalemia  [x] Hypomagnesemia  [ ] Hypophosphatemia  [ ] Hyperphosphatemia  ------------------------------------------------------------------------  [ ] Acute blood loss anemia  [ ] Post op blood loss anemia  [ ] Iron deficiency anemia  [ ] Anemia due to chronic disease  [ ] Hypercoagulable state  ----------------------------------------------------------------------  [ ] Cerebral infarction  [ ] Transient ischemia attack  [ ] Encephalopathy      Assessment/Plan;  69F hx poorly controlled DM presents with R foot gas gangrene and DKA s/p R 2nd+3rd toe partial ray amputation (12/29) admitted to SICU for postop monitoring. Post operatively, anion gap normalized, glucose well controlled, on sliding scale insulin    ID:   -Final Abx plan pending   -Continue Vancomycin and Zosyn      Wound:   -Dressing changes by podiatry  -will follow up wound care plan for discharge     Endo:   Glucose control  -Insulin increased to lantus 32 and Nutritional 9 units   -will f/u Endocrine for insulin regimen for discharge     psych:   - Psych rec  -patient with known hx of depression

## 2020-01-04 LAB
CULTURE RESULTS: SIGNIFICANT CHANGE UP
GLUCOSE BLDC GLUCOMTR-MCNC: 121 MG/DL — HIGH (ref 70–99)
GLUCOSE BLDC GLUCOMTR-MCNC: 128 MG/DL — HIGH (ref 70–99)
GLUCOSE BLDC GLUCOMTR-MCNC: 229 MG/DL — HIGH (ref 70–99)
GLUCOSE BLDC GLUCOMTR-MCNC: 95 MG/DL — SIGNIFICANT CHANGE UP (ref 70–99)
SPECIMEN SOURCE: SIGNIFICANT CHANGE UP

## 2020-01-04 RX ORDER — ONDANSETRON 8 MG/1
4 TABLET, FILM COATED ORAL ONCE
Refills: 0 | Status: COMPLETED | OUTPATIENT
Start: 2020-01-04 | End: 2020-01-04

## 2020-01-04 RX ADMIN — CEFEPIME 100 MILLIGRAM(S): 1 INJECTION, POWDER, FOR SOLUTION INTRAMUSCULAR; INTRAVENOUS at 05:46

## 2020-01-04 RX ADMIN — BENZOCAINE AND MENTHOL 1 LOZENGE: 5; 1 LIQUID ORAL at 22:05

## 2020-01-04 RX ADMIN — Medication 4: at 22:06

## 2020-01-04 RX ADMIN — BENZOCAINE AND MENTHOL 1 LOZENGE: 5; 1 LIQUID ORAL at 17:48

## 2020-01-04 RX ADMIN — Medication 81 MILLIGRAM(S): at 22:04

## 2020-01-04 RX ADMIN — Medication 90 MILLIGRAM(S): at 05:47

## 2020-01-04 RX ADMIN — Medication 500 MILLIGRAM(S): at 13:24

## 2020-01-04 RX ADMIN — ATORVASTATIN CALCIUM 40 MILLIGRAM(S): 80 TABLET, FILM COATED ORAL at 22:04

## 2020-01-04 RX ADMIN — HYDROMORPHONE HYDROCHLORIDE 0.5 MILLIGRAM(S): 2 INJECTION INTRAMUSCULAR; INTRAVENOUS; SUBCUTANEOUS at 22:05

## 2020-01-04 RX ADMIN — Medication 1 SPRAY(S): at 05:48

## 2020-01-04 RX ADMIN — INSULIN GLARGINE 44 UNIT(S): 100 INJECTION, SOLUTION SUBCUTANEOUS at 22:05

## 2020-01-04 RX ADMIN — CHLORHEXIDINE GLUCONATE 1 APPLICATION(S): 213 SOLUTION TOPICAL at 05:51

## 2020-01-04 RX ADMIN — CEFEPIME 100 MILLIGRAM(S): 1 INJECTION, POWDER, FOR SOLUTION INTRAMUSCULAR; INTRAVENOUS at 17:47

## 2020-01-04 RX ADMIN — Medication 14 UNIT(S): at 13:24

## 2020-01-04 RX ADMIN — PANTOPRAZOLE SODIUM 40 MILLIGRAM(S): 20 TABLET, DELAYED RELEASE ORAL at 07:14

## 2020-01-04 RX ADMIN — HYDROMORPHONE HYDROCHLORIDE 0.5 MILLIGRAM(S): 2 INJECTION INTRAMUSCULAR; INTRAVENOUS; SUBCUTANEOUS at 01:19

## 2020-01-04 RX ADMIN — HYDROMORPHONE HYDROCHLORIDE 0.5 MILLIGRAM(S): 2 INJECTION INTRAMUSCULAR; INTRAVENOUS; SUBCUTANEOUS at 01:04

## 2020-01-04 RX ADMIN — HYDROMORPHONE HYDROCHLORIDE 0.5 MILLIGRAM(S): 2 INJECTION INTRAMUSCULAR; INTRAVENOUS; SUBCUTANEOUS at 22:22

## 2020-01-04 RX ADMIN — DULOXETINE HYDROCHLORIDE 60 MILLIGRAM(S): 30 CAPSULE, DELAYED RELEASE ORAL at 17:48

## 2020-01-04 RX ADMIN — Medication 500 MILLIGRAM(S): at 05:47

## 2020-01-04 RX ADMIN — Medication 500 MILLIGRAM(S): at 22:04

## 2020-01-04 RX ADMIN — ENOXAPARIN SODIUM 40 MILLIGRAM(S): 100 INJECTION SUBCUTANEOUS at 17:50

## 2020-01-04 NOTE — PROGRESS NOTE ADULT - ASSESSMENT
69F PMHx DM presents w/ R foot ulcer. WBC 18.77, Lactate 2.9. LRINEC score >6. Gas seen on X-ray. S/p right partial 2nd and 4rd ray amputation 12/29.    - C/w IV ABX per ID.   - Wound flushed and packed with iodoform packing followed by DSD  - Heel WB to RLE  - Plan for WTD dressings changed q24.    - When discharged, patient can follow up with Podiatry at [address] [place] [address]  [phone]    Instructions for VNS/rehab; please change wet-to-dry dressing every 24 hrs. Dressing consists of saline soaked 4x4 gauze followed by dry 4x4 gauze and fabby/kerlix with overlying ace bandage (if available).     Will follow  up with Attending and update primary team

## 2020-01-04 NOTE — PROGRESS NOTE ADULT - SUBJECTIVE AND OBJECTIVE BOX
Patient is a 69y old  Female who presents with a chief complaint of Rt Foot Diabetic foot infection (04 Jan 2020 07:59)    INTERVAL HPI/ OVERNIGHT EVENTS: Pt resting comfortably in bed in NAD. Denies SOB/CP/F/C. Endorses nausea. Afebrile.     LABS                        10.1   11.77 )-----------( 808      ( 03 Jan 2020 06:16 )             33.6     01-03    142  |  102  |  17  ----------------------------<  150<H>  4.3   |  23  |  1.24    Ca    9.3      03 Jan 2020 06:16  Phos  4.7     01-03  Mg     1.5     01-03    ICU Vital Signs Last 24 Hrs  T(C): 37.1 (04 Jan 2020 05:04), Max: 37.1 (04 Jan 2020 05:04)  T(F): 98.7 (04 Jan 2020 05:04), Max: 98.7 (04 Jan 2020 05:04)  HR: 79 (04 Jan 2020 08:19) (74 - 101)  BP: 159/87 (04 Jan 2020 08:19) (110/61 - 161/80)  BP(mean): 98 (04 Jan 2020 04:36) (98 - 98)  ABP: --  ABP(mean): --  RR: 18 (04 Jan 2020 08:19) (16 - 18)  SpO2: 97% (04 Jan 2020 08:19) (95% - 97%)    RADIOLOGY    MICROBIOLOGY  Culture - Surgical Swab (12.30.19 @ 08:11)    Gram Stain:   Moderate Gram positive cocci in pairs, chains and clusters  Few Gram Negative Diplococci  Rare White blood cells    -  Amoxicillin/Clavulanic Acid: S 0.5    -  Amoxicillin/Clavulanic Acid: S 0.5    -  Cefazolin: S <=4    -  Clindamycin: R 0.5 This isolate is presumed to be clindamycin resistant based on detection of inducible resistance. Clindamycin may still be effective in some patients.    -  Clindamycin: S 0.25    -  Ertapenem: S 0.5    -  Erythromycin: R >4    -  Linezolid: S 2    -  Oxacillin: S <=0.25    -  Meropenem: S 0.25    -  Piperacillin/Tazobactam: S 0.75    -  Piperacillin/Tazobactam: S 1.5    -  RIF- Rifampin: S <=1 Should not be used as monotherapy    -  Trimethoprim/Sulfamethoxazole: S <=0.5/9.5    Specimen Source: .Surgical Swab 3rd Metatarsal Head- Tissue OR    Culture Results:   Numerous Streptococcus dysgalactiae (Group C/G) See previous culture for  sensitivies  Few Streptococcus agalactiae See previous culture for sensitivies  Moderate Staphylococcus aureus See previous culture for sensitivies  Moderate Actinomyces species Beta lactamase negative  Moderate Prevotella melaninogenica Beta lactamase positive    Organism Identification: Staphylococcus aureus  Actinomyces species  Prevotella melaninogenica    Organism: Staphylococcus aureus    Organism: Actinomyces species    Organism: Prevotella melaninogenica    Method Type: DIANE    Method Type: ETEST    Method Type: ETEST    Culture - Surgical Swab (12.30.19 @ 08:08)    -  Cefepime: S <=2    -  Ceftriaxone: R 32 Enterobacter, Citrobacter, and Serratia may develop resistance during prolonged therapy    -  Clindamycin: R    -  Clindamycin: S    -  Clindamycin: R 0.5 This isolate is presumed to be clindamycin resistant based on detection of inducible resistance. Clindamycin may still be effective in some patients.    -  Ampicillin: S    -  Ampicillin: S    -  Ampicillin: R >16 These ampicillin results predict results for amoxicillin    -  Ampicillin/Sulbactam: R >16/8 Enterobacter, Citrobacter, and Serratia may develop resistance during prolonged therapy (3-4 days)    -  Cefazolin: S <=4    -  Cefazolin: R >16 Enterobacter, Citrobacter, and Serratia may develop resistance during prolonged therapy (3-4 days)    -  Penicillin: S Predicts results for ampicillin, amoxicillin, amoxicillin/clavulanate, ampicillin/sulbactam, 1st, 2nd and 3rd generation cephalosporins and carbapenems.    -  Penicillin: S Predicts results for ampicillin, amoxicillin, amoxicillin/clavulanate, ampicillin/sulbactam, 1st, 2nd and 3rd generation cephalosporins and carbapenems.    -  Penicillin: R 8    -  Piperacillin/Tazobactam: S 16    -  Meropenem: S <=1    Gram Stain:   Moderate Gram positive cocci in pairs, chains and clusters  Rare Gram Positive Rods  Few White blood cells    -  Gentamicin: S <=1    -  Levofloxacin: S    -  Levofloxacin: S    -  Linezolid: S 2    -  Erythromycin: R    -  Erythromycin: R    -  Erythromycin: R >4    -  Vancomycin: S    -  Vancomycin: S    -  Vancomycin: S 2    -  Tobramycin: S <=2    -  Trimethoprim/Sulfamethoxazole: S <=0.5/9.5    -  Trimethoprim/Sulfamethoxazole: R >2/38    -  RIF- Rifampin: S <=1 Should not be used as monotherapy    -  Oxacillin: S <=0.25    Specimen Source: .Surgical Swab Rt. foot- OR    Culture Results:   Moderate Streptococcus dysgalactiae (Group C/G)  Moderate Streptococcus agalactiae  Rare Staphylococcus aureus  Rare Morganella morganii  Mixed Anaerobic Jaycee including  Moderate Finegoldia magna Beta lactamase negative  Rare Prevotella melaninogenica Beta lactamase positive    Organism Identification: Streptococcus dysgalactiae  Sreptococcus agalactiae  Staphylococcus aureus  Morganella morganii    Organism: Streptococcus dysgalactiae    Organism: Sreptococcus agalactiae    Organism: Staphylococcus aureus    Organism: Morganella morganii    Method Type: KB    Method Type: KB    Method Type: DIANE    Method Type: DIANE    PE:  No interval change in neurovascular status. Right foot s/p partial 2nd and 3rd ray amputation with large open defect.  Serosanguinous drainage on layer of gauze dressing. Wound edges appear viable with fibrotic tissue. No malodor, no fluctuance. Protective sensation diminished b/l

## 2020-01-04 NOTE — PROGRESS NOTE ADULT - SUBJECTIVE AND OBJECTIVE BOX
INTERVAL HPI/OVERNIGHT EVENTS:    Patient is a 69y old  Female who presents with a chief complaint of Rt Foot Diabetic foot infection (04 Jan 2020 10:03)      Pt reports the following symptoms:    CONSTITUTIONAL:  Negative fever or chills, feels well, good appetite  EYES:  Negative  blurry vision or double vision  CARDIOVASCULAR:  Negative for chest pain or palpitations  RESPIRATORY:  Negative for cough, wheezing, or SOB   GASTROINTESTINAL:  Negative for nausea, vomiting, diarrhea, constipation, or abdominal pain  GENITOURINARY:  Negative frequency, urgency or dysuria  NEUROLOGIC:  No headache, confusion, dizziness, lightheadedness    MEDICATIONS  (STANDING):  aspirin  chewable 81 milliGRAM(s) Oral daily  atorvastatin 40 milliGRAM(s) Oral at bedtime  cefepime   IVPB 2000 milliGRAM(s) IV Intermittent every 12 hours  chlorhexidine 2% Cloths 1 Application(s) Topical <User Schedule>  chlorhexidine 2% Cloths 1 Application(s) Topical <User Schedule>  dextrose 5%. 1000 milliLiter(s) (50 mL/Hr) IV Continuous <Continuous>  dextrose 50% Injectable 12.5 Gram(s) IV Push once  dextrose 50% Injectable 25 Gram(s) IV Push once  dextrose 50% Injectable 25 Gram(s) IV Push once  DULoxetine 60 milliGRAM(s) Oral daily  enoxaparin Injectable 40 milliGRAM(s) SubCutaneous every 24 hours  insulin glargine Injectable (LANTUS) 44 Unit(s) SubCutaneous at bedtime  insulin lispro (HumaLOG) corrective regimen sliding scale   SubCutaneous Before meals and at bedtime  insulin lispro Injectable (HumaLOG) 14 Unit(s) SubCutaneous three times a day before meals  metroNIDAZOLE    Tablet 500 milliGRAM(s) Oral every 8 hours  NIFEdipine XL 90 milliGRAM(s) Oral daily  ondansetron Injectable 4 milliGRAM(s) IV Push once  pantoprazole    Tablet 40 milliGRAM(s) Oral before breakfast    MEDICATIONS  (PRN):  acetaminophen   Tablet .. 650 milliGRAM(s) Oral every 6 hours PRN Mild Pain (1 - 3)  aluminum hydroxide/magnesium hydroxide/simethicone Suspension 30 milliLiter(s) Oral every 6 hours PRN Dyspepsia  benzocaine 15 mG/menthol 3.6 mG (Sugar-Free) Lozenge 1 Lozenge Oral every 2 hours PRN Sore Throat  dextrose 40% Gel 15 Gram(s) Oral once PRN Blood Glucose LESS THAN 70 milliGRAM(s)/deciliter  glucagon  Injectable 1 milliGRAM(s) IntraMuscular once PRN Glucose LESS THAN 70 milligrams/deciliter  HYDROmorphone  Injectable 1 milliGRAM(s) IV Push every 4 hours PRN Severe Pain (7 - 10)  HYDROmorphone  Injectable 0.5 milliGRAM(s) IV Push every 4 hours PRN Moderate Pain (4 - 6)  sodium chloride 0.9% lock flush 10 milliLiter(s) IV Push every 1 hour PRN Pre/post blood products, medications, blood draw, and to maintain line patency      PHYSICAL EXAM  Vital Signs Last 24 Hrs  T(C): 36.9 (04 Jan 2020 10:11), Max: 37.1 (04 Jan 2020 05:04)  T(F): 98.5 (04 Jan 2020 10:11), Max: 98.7 (04 Jan 2020 05:04)  HR: 79 (04 Jan 2020 08:19) (74 - 101)  BP: 159/87 (04 Jan 2020 08:19) (110/61 - 161/80)  BP(mean): 98 (04 Jan 2020 04:36) (98 - 98)  RR: 18 (04 Jan 2020 08:19) (16 - 18)  SpO2: 97% (04 Jan 2020 08:19) (95% - 97%)    Constitutional: wn/wd in NAD.   HEENT: NCAT, MMM, OP clear, EOMI, no proptosis or lid retraction  Neck: no thyromegaly or palpable thyroid nodules   Respiratory: lungs CTAB.  Cardiovascular: regular rhythm, normal S1 and S2, no audible murmurs, no peripheral edema  GI: soft, NT/ND, no masses/HSM appreciated.  Neurology: no tremors, DTR 2+  Skin: no visible rashes/lesions  Psychiatric: AAO x 3, normal affect/mood.    LABS:                        10.1   11.77 )-----------( 808      ( 03 Jan 2020 06:16 )             33.6     01-03    142  |  102  |  17  ----------------------------<  150<H>  4.3   |  23  |  1.24    Ca    9.3      03 Jan 2020 06:16  Phos  4.7     01-03  Mg     1.5     01-03              HbA1C: 11.2 % (12-29 @ 10:32)    CAPILLARY BLOOD GLUCOSE      POCT Blood Glucose.: 95 mg/dL (04 Jan 2020 06:38)  POCT Blood Glucose.: 220 mg/dL (03 Jan 2020 21:20)  POCT Blood Glucose.: 105 mg/dL (03 Jan 2020 17:40)  POCT Blood Glucose.: 150 mg/dL (03 Jan 2020 12:47)      Insulin Sliding Scale requirements X 24 Hours:    RADIOLOGY & ADDITIONAL TESTS:    A/P: 69y Female with history of DM type II presenting for       1.  DM -     Please continue           units lantus at bedtime  / in the morning and        units lispro with meals and lispro moderate / low dose sliding scale 4 times daily with meals and at bedtime.  Please continue consistent carbohydrate diet.      Goal FSG is   Will continue to monitor   For discharge, pt can continue    Pt can follow up at discharge with [facility] by calling  to make an appointment.   Will discuss case with     and update primary team

## 2020-01-04 NOTE — PROGRESS NOTE ADULT - SUBJECTIVE AND OBJECTIVE BOX
O/N: RASTA VSS                                      Assessment/Plan;  69F hx poorly controlled DM presents with R foot gas gangrene and DKA s/p R 2nd+3rd toe partial ray amputation (12/29) admitted to SICU for postop monitoring. Post operatively, anion gap normalized, glucose well controlled, on sliding scale insulin    ID:   -Final Abx plan pending   -Continue Vancomycin and Zosyn    Wound:   -Dressing changes by podiatry  -will follow up wound care plan for discharge     Endo:   Glucose control  -Insulin increased to lantus 32 and Nutritional 9 units   -will f/u Endocrine for insulin regimen for discharge     psych:   - Psych rec  -patient with known hx of depression O/N: RASTA VSS    Patient was seen and examined at bedside. No acute event overnight. No complaints.      Vital Signs Last 24 Hrs  T(C): 36.9 (04 Jan 2020 10:11), Max: 37.1 (04 Jan 2020 05:04)  T(F): 98.5 (04 Jan 2020 10:11), Max: 98.7 (04 Jan 2020 05:04)  HR: 79 (04 Jan 2020 08:19) (74 - 101)  BP: 159/87 (04 Jan 2020 08:19) (110/61 - 161/80)  BP(mean): 98 (04 Jan 2020 04:36) (98 - 98)  RR: 18 (04 Jan 2020 08:19) (16 - 18)  SpO2: 97% (04 Jan 2020 08:19) (95% - 97%)    Physical Exam:  General: NAD  Pulmonary: Nonlabored breathing, no respiratory distress  Cardiovascular: NSR  Abdominal: soft, NT/ND, no organomegaly  Extremities: WWP, normal strength, podietry did dressing change. Wound may need further debridement   Neuro: A/O x3, no focal deficits, normal sensation    Lines/drains/tubes:    I&O's Summary    04 Jan 2020 07:01  -  04 Jan 2020 11:21  --------------------------------------------------------  IN: 280 mL / OUT: 0 mL / NET: 280 mL        LABS:                        10.1   11.77 )-----------( 808      ( 03 Jan 2020 06:16 )             33.6     01-03    142  |  102  |  17  ----------------------------<  150<H>  4.3   |  23  |  1.24    Ca    9.3      03 Jan 2020 06:16  Phos  4.7     01-03  Mg     1.5     01-03          CAPILLARY BLOOD GLUCOSE      POCT Blood Glucose.: 95 mg/dL (04 Jan 2020 06:38)  POCT Blood Glucose.: 220 mg/dL (03 Jan 2020 21:20)  POCT Blood Glucose.: 105 mg/dL (03 Jan 2020 17:40)  POCT Blood Glucose.: 150 mg/dL (03 Jan 2020 12:47)        RADIOLOGY & ADDITIONAL STUDIES:                                      Assessment/Plan;  69F hx poorly controlled DM presents with R foot gas gangrene and DKA s/p R 2nd+3rd toe partial ray amputation (12/29) admitted to SICU for postop monitoring. Post operatively, anion gap normalized, glucose well controlled, on sliding scale insulin    ID:   -Final Abx plan pending   -Continue Vancomycin and Zosyn    Wound:   -Dressing changes by podiatry  -will follow up wound care plan for discharge     Endo:   Glucose control  -Insulin increased to lantus 32 and Nutritional 9 units   -will f/u Endocrine for insulin regimen for discharge     psych:   - Psych rec  -patient with known hx of depression   Cymbalta BID      Instructions for VNS/rehab; please change wet-to-dry dressing every 24 hrs. Dressing consists of saline soaked 4x4 gauze followed by dry 4x4 gauze and fabby/kerlix with overlying ace bandage (if available).

## 2020-01-05 LAB
GLUCOSE BLDC GLUCOMTR-MCNC: 116 MG/DL — HIGH (ref 70–99)
GLUCOSE BLDC GLUCOMTR-MCNC: 153 MG/DL — HIGH (ref 70–99)
GLUCOSE BLDC GLUCOMTR-MCNC: 239 MG/DL — HIGH (ref 70–99)
GLUCOSE BLDC GLUCOMTR-MCNC: 55 MG/DL — LOW (ref 70–99)
GLUCOSE BLDC GLUCOMTR-MCNC: 82 MG/DL — SIGNIFICANT CHANGE UP (ref 70–99)
GLUCOSE BLDC GLUCOMTR-MCNC: 84 MG/DL — SIGNIFICANT CHANGE UP (ref 70–99)
GLUCOSE BLDC GLUCOMTR-MCNC: 89 MG/DL — SIGNIFICANT CHANGE UP (ref 70–99)
GLUCOSE BLDC GLUCOMTR-MCNC: 95 MG/DL — SIGNIFICANT CHANGE UP (ref 70–99)

## 2020-01-05 RX ORDER — ACETAMINOPHEN 500 MG
650 TABLET ORAL EVERY 6 HOURS
Refills: 0 | Status: DISCONTINUED | OUTPATIENT
Start: 2020-01-05 | End: 2020-01-09

## 2020-01-05 RX ORDER — ONDANSETRON 8 MG/1
4 TABLET, FILM COATED ORAL EVERY 6 HOURS
Refills: 0 | Status: DISCONTINUED | OUTPATIENT
Start: 2020-01-05 | End: 2020-01-09

## 2020-01-05 RX ORDER — LANOLIN ALCOHOL/MO/W.PET/CERES
3 CREAM (GRAM) TOPICAL AT BEDTIME
Refills: 0 | Status: DISCONTINUED | OUTPATIENT
Start: 2020-01-05 | End: 2020-01-09

## 2020-01-05 RX ORDER — INSULIN LISPRO 100/ML
7 VIAL (ML) SUBCUTANEOUS
Refills: 0 | Status: DISCONTINUED | OUTPATIENT
Start: 2020-01-05 | End: 2020-01-06

## 2020-01-05 RX ADMIN — Medication 3 MILLIGRAM(S): at 23:15

## 2020-01-05 RX ADMIN — Medication 4: at 22:02

## 2020-01-05 RX ADMIN — CHLORHEXIDINE GLUCONATE 1 APPLICATION(S): 213 SOLUTION TOPICAL at 05:42

## 2020-01-05 RX ADMIN — Medication 650 MILLIGRAM(S): at 22:03

## 2020-01-05 RX ADMIN — Medication 2: at 14:08

## 2020-01-05 RX ADMIN — ENOXAPARIN SODIUM 40 MILLIGRAM(S): 100 INJECTION SUBCUTANEOUS at 17:41

## 2020-01-05 RX ADMIN — DULOXETINE HYDROCHLORIDE 60 MILLIGRAM(S): 30 CAPSULE, DELAYED RELEASE ORAL at 11:11

## 2020-01-05 RX ADMIN — Medication 650 MILLIGRAM(S): at 22:33

## 2020-01-05 RX ADMIN — INSULIN GLARGINE 44 UNIT(S): 100 INJECTION, SOLUTION SUBCUTANEOUS at 22:03

## 2020-01-05 RX ADMIN — PANTOPRAZOLE SODIUM 40 MILLIGRAM(S): 20 TABLET, DELAYED RELEASE ORAL at 05:45

## 2020-01-05 RX ADMIN — Medication 81 MILLIGRAM(S): at 23:15

## 2020-01-05 RX ADMIN — CEFEPIME 100 MILLIGRAM(S): 1 INJECTION, POWDER, FOR SOLUTION INTRAMUSCULAR; INTRAVENOUS at 17:41

## 2020-01-05 RX ADMIN — Medication 14 UNIT(S): at 14:09

## 2020-01-05 RX ADMIN — Medication 500 MILLIGRAM(S): at 05:37

## 2020-01-05 RX ADMIN — Medication 500 MILLIGRAM(S): at 22:03

## 2020-01-05 RX ADMIN — Medication 90 MILLIGRAM(S): at 05:37

## 2020-01-05 RX ADMIN — CEFEPIME 100 MILLIGRAM(S): 1 INJECTION, POWDER, FOR SOLUTION INTRAMUSCULAR; INTRAVENOUS at 05:38

## 2020-01-05 RX ADMIN — ATORVASTATIN CALCIUM 40 MILLIGRAM(S): 80 TABLET, FILM COATED ORAL at 22:02

## 2020-01-05 RX ADMIN — Medication 500 MILLIGRAM(S): at 14:09

## 2020-01-05 NOTE — PROGRESS NOTE ADULT - ASSESSMENT
69F PMHx DM presents w/ R foot ulcer. WBC 18.77, Lactate 2.9. LRINEC score >6. Gas seen on X-ray. S/p right partial 2nd and 4rd ray amputation 12/29.    - C/w IV ABX per ID.   -Excisional debridement of fibrotic tissue down to viable subcutaneous bleeding tissue with #10 blade. Pt tolerated procedure well. Flushed wound with sterile saline and applied wound vac dressing with black granufoam. VAC operating well.   - Heel WB to RLE  - Plan for ANT dispo with wound vac changes  - Next vac change 1/7 if still inhouse   - When discharged, patient can follow up with Podiatry at [address] [place] [address]  [phone]

## 2020-01-05 NOTE — PROGRESS NOTE ADULT - SUBJECTIVE AND OBJECTIVE BOX
1/4: Dressing change by podiatry. No plans for further debridement before VAC placement. Ondasetron for nausea.                                                 69F hx DM a/w R foot gas gangrene and DKA s/p R 2nd+3rd toe partial ray amputation (12/29)    NEURO: dilaudid PRN pain  CV: normotensive goals, aspirin  PULM: room air.  GI/FEN: regular  : voids.    ENDO: ISS, lantus  ID: R foot gas gangrene: Vanc (12/29--) zosyn (12/29--) clinda (12/29-12/30)  PPX: SCDs. hold SQH as per vascular  LINES: PIV,   WOUNDS/DRAINS: R foot debridement 1/4: Dressing change by podiatry. No plans for further debridement before VAC placement. Ondasetron for nausea.     SUBJECTIVE: Patient seen and examined by chief resident on morning rounds. Reports intermittent nausea throughout the night. No emesis.        Vital Signs Last 24 Hrs  T(C): 36.6 (05 Jan 2020 11:53), Max: 37.1 (04 Jan 2020 21:40)  T(F): 97.9 (05 Jan 2020 11:53), Max: 98.8 (04 Jan 2020 21:40)  HR: 82 (05 Jan 2020 08:41) (71 - 82)  BP: 151/85 (05 Jan 2020 08:41) (123/71 - 152/71)  BP(mean): 93 (05 Jan 2020 05:33) (92 - 93)  RR: 16 (05 Jan 2020 08:41) (16 - 18)  SpO2: 97% (05 Jan 2020 08:41) (94% - 99%)    Physical Exam:  General: NAD  Pulmonary: Nonlabored breathing, no respiratory distress  Abdominal: soft, nondistended, nontender with no rebound or guarding  Extremities: WWP, podiatry did beside debridement of wound down to viable tissue and placed a vac, vac with good seal  Neuro: A/O x3    Lines/drains/tubes:    I&O's Summary    04 Jan 2020 07:01  -  05 Jan 2020 07:00  --------------------------------------------------------  IN: 570 mL / OUT: 0 mL / NET: 570 mL    05 Jan 2020 07:01  -  05 Jan 2020 13:17  --------------------------------------------------------  IN: 180 mL / OUT: 0 mL / NET: 180 mL        LABS:              CAPILLARY BLOOD GLUCOSE      POCT Blood Glucose.: 89 mg/dL (05 Jan 2020 08:51)  POCT Blood Glucose.: 82 mg/dL (05 Jan 2020 06:49)  POCT Blood Glucose.: 229 mg/dL (04 Jan 2020 21:33)  POCT Blood Glucose.: 128 mg/dL (04 Jan 2020 17:40)        RADIOLOGY & ADDITIONAL STUDIES:      69F hx DM a/w R foot gas gangrene and DKA s/p R 2nd+3rd toe partial ray amputation (12/29)    NEURO: dilaudid PRN pain  CV: normotensive goals, aspirin  PULM: room air.  GI/FEN: regular, zofran for nausea   : voids.    ENDO: ISS, lantus  ID: R foot gas gangrene: Vanc (12/29--) zosyn (12/29--) clinda (12/29-12/30)  PPX: SCDs. hold SQH as per vascular  LINES: PIV,   WOUNDS/DRAINS: R foot debridement

## 2020-01-05 NOTE — PROGRESS NOTE ADULT - SUBJECTIVE AND OBJECTIVE BOX
Patient is a 69y old  Female who presents with a chief complaint of Rt Foot Diabetic foot infection (04 Jan 2020 07:59)    INTERVAL HPI/ OVERNIGHT EVENTS: Pt resting comfortably in bed in NAD. Denies SOB/CP/F/C. Nausea resolved.     LABS                        10.1   11.77 )-----------( 808      ( 03 Jan 2020 06:16 )             33.6     01-03    142  |  102  |  17  ----------------------------<  150<H>  4.3   |  23  |  1.24    Ca    9.3      03 Jan 2020 06:16  Phos  4.7     01-03  Mg     1.5     01-03    ICU Vital Signs Last 24 Hrs  T(C): 37.1 (04 Jan 2020 05:04), Max: 37.1 (04 Jan 2020 05:04)  T(F): 98.7 (04 Jan 2020 05:04), Max: 98.7 (04 Jan 2020 05:04)  HR: 79 (04 Jan 2020 08:19) (74 - 101)  BP: 159/87 (04 Jan 2020 08:19) (110/61 - 161/80)  BP(mean): 98 (04 Jan 2020 04:36) (98 - 98)  ABP: --  ABP(mean): --  RR: 18 (04 Jan 2020 08:19) (16 - 18)  SpO2: 97% (04 Jan 2020 08:19) (95% - 97%)    RADIOLOGY    MICROBIOLOGY  Culture - Surgical Swab (12.30.19 @ 08:11)    Gram Stain:   Moderate Gram positive cocci in pairs, chains and clusters  Few Gram Negative Diplococci  Rare White blood cells    -  Amoxicillin/Clavulanic Acid: S 0.5    -  Amoxicillin/Clavulanic Acid: S 0.5    -  Cefazolin: S <=4    -  Clindamycin: R 0.5 This isolate is presumed to be clindamycin resistant based on detection of inducible resistance. Clindamycin may still be effective in some patients.    -  Clindamycin: S 0.25    -  Ertapenem: S 0.5    -  Erythromycin: R >4    -  Linezolid: S 2    -  Oxacillin: S <=0.25    -  Meropenem: S 0.25    -  Piperacillin/Tazobactam: S 0.75    -  Piperacillin/Tazobactam: S 1.5    -  RIF- Rifampin: S <=1 Should not be used as monotherapy    -  Trimethoprim/Sulfamethoxazole: S <=0.5/9.5    Specimen Source: .Surgical Swab 3rd Metatarsal Head- Tissue OR    Culture Results:   Numerous Streptococcus dysgalactiae (Group C/G) See previous culture for  sensitivies  Few Streptococcus agalactiae See previous culture for sensitivies  Moderate Staphylococcus aureus See previous culture for sensitivies  Moderate Actinomyces species Beta lactamase negative  Moderate Prevotella melaninogenica Beta lactamase positive    Organism Identification: Staphylococcus aureus  Actinomyces species  Prevotella melaninogenica    Organism: Staphylococcus aureus    Organism: Actinomyces species    Organism: Prevotella melaninogenica    Method Type: DIANE    Method Type: ETEST    Method Type: ETEST    Culture - Surgical Swab (12.30.19 @ 08:08)    -  Cefepime: S <=2    -  Ceftriaxone: R 32 Enterobacter, Citrobacter, and Serratia may develop resistance during prolonged therapy    -  Clindamycin: R    -  Clindamycin: S    -  Clindamycin: R 0.5 This isolate is presumed to be clindamycin resistant based on detection of inducible resistance. Clindamycin may still be effective in some patients.    -  Ampicillin: S    -  Ampicillin: S    -  Ampicillin: R >16 These ampicillin results predict results for amoxicillin    -  Ampicillin/Sulbactam: R >16/8 Enterobacter, Citrobacter, and Serratia may develop resistance during prolonged therapy (3-4 days)    -  Cefazolin: S <=4    -  Cefazolin: R >16 Enterobacter, Citrobacter, and Serratia may develop resistance during prolonged therapy (3-4 days)    -  Penicillin: S Predicts results for ampicillin, amoxicillin, amoxicillin/clavulanate, ampicillin/sulbactam, 1st, 2nd and 3rd generation cephalosporins and carbapenems.    -  Penicillin: S Predicts results for ampicillin, amoxicillin, amoxicillin/clavulanate, ampicillin/sulbactam, 1st, 2nd and 3rd generation cephalosporins and carbapenems.    -  Penicillin: R 8    -  Piperacillin/Tazobactam: S 16    -  Meropenem: S <=1    Gram Stain:   Moderate Gram positive cocci in pairs, chains and clusters  Rare Gram Positive Rods  Few White blood cells    -  Gentamicin: S <=1    -  Levofloxacin: S    -  Levofloxacin: S    -  Linezolid: S 2    -  Erythromycin: R    -  Erythromycin: R    -  Erythromycin: R >4    -  Vancomycin: S    -  Vancomycin: S    -  Vancomycin: S 2    -  Tobramycin: S <=2    -  Trimethoprim/Sulfamethoxazole: S <=0.5/9.5    -  Trimethoprim/Sulfamethoxazole: R >2/38    -  RIF- Rifampin: S <=1 Should not be used as monotherapy    -  Oxacillin: S <=0.25    Specimen Source: .Surgical Swab Rt. foot- OR    Culture Results:   Moderate Streptococcus dysgalactiae (Group C/G)  Moderate Streptococcus agalactiae  Rare Staphylococcus aureus  Rare Morganella morganii  Mixed Anaerobic Jaycee including  Moderate Finegoldia magna Beta lactamase negative  Rare Prevotella melaninogenica Beta lactamase positive    Organism Identification: Streptococcus dysgalactiae  Sreptococcus agalactiae  Staphylococcus aureus  Morganella morganii    Organism: Streptococcus dysgalactiae    Organism: Sreptococcus agalactiae    Organism: Staphylococcus aureus    Organism: Morganella morganii    Method Type: KB    Method Type: KB    Method Type: DIANE    Method Type: DIANE    PE:  No interval change in neurovascular status. Right foot s/p partial 2nd and 3rd ray amputation with large open defect.  Serosanguinous drainage on layer of gauze dressing. Wound edges appear viable with fibrotic tissue. No malodor, no fluctuance. Protective sensation diminished b/l

## 2020-01-05 NOTE — PROGRESS NOTE ADULT - SUBJECTIVE AND OBJECTIVE BOX
INTERVAL HPI/OVERNIGHT EVENTS:    Patient is a 69y old  Female who presents with a chief complaint of Rt Foot Diabetic foot infection (05 Jan 2020 11:09)      Pt reports the following symptoms:    CONSTITUTIONAL:  Negative fever or chills, feels well, good appetite  EYES:  Negative  blurry vision or double vision  CARDIOVASCULAR:  Negative for chest pain or palpitations  RESPIRATORY:  Negative for cough, wheezing, or SOB   GASTROINTESTINAL:  Negative for nausea, vomiting, diarrhea, constipation, or abdominal pain  GENITOURINARY:  Negative frequency, urgency or dysuria  NEUROLOGIC:  No headache, confusion, dizziness, lightheadedness    MEDICATIONS  (STANDING):  aspirin  chewable 81 milliGRAM(s) Oral daily  atorvastatin 40 milliGRAM(s) Oral at bedtime  cefepime   IVPB 2000 milliGRAM(s) IV Intermittent every 12 hours  chlorhexidine 2% Cloths 1 Application(s) Topical <User Schedule>  chlorhexidine 2% Cloths 1 Application(s) Topical <User Schedule>  dextrose 5%. 1000 milliLiter(s) (50 mL/Hr) IV Continuous <Continuous>  dextrose 50% Injectable 12.5 Gram(s) IV Push once  dextrose 50% Injectable 25 Gram(s) IV Push once  dextrose 50% Injectable 25 Gram(s) IV Push once  DULoxetine 60 milliGRAM(s) Oral daily  enoxaparin Injectable 40 milliGRAM(s) SubCutaneous every 24 hours  insulin glargine Injectable (LANTUS) 44 Unit(s) SubCutaneous at bedtime  insulin lispro (HumaLOG) corrective regimen sliding scale   SubCutaneous Before meals and at bedtime  insulin lispro Injectable (HumaLOG) 14 Unit(s) SubCutaneous three times a day before meals  metroNIDAZOLE    Tablet 500 milliGRAM(s) Oral every 8 hours  NIFEdipine XL 90 milliGRAM(s) Oral daily  pantoprazole    Tablet 40 milliGRAM(s) Oral before breakfast    MEDICATIONS  (PRN):  acetaminophen   Tablet .. 650 milliGRAM(s) Oral every 6 hours PRN Mild Pain (1 - 3)  aluminum hydroxide/magnesium hydroxide/simethicone Suspension 30 milliLiter(s) Oral every 6 hours PRN Dyspepsia  benzocaine 15 mG/menthol 3.6 mG (Sugar-Free) Lozenge 1 Lozenge Oral every 2 hours PRN Sore Throat  dextrose 40% Gel 15 Gram(s) Oral once PRN Blood Glucose LESS THAN 70 milliGRAM(s)/deciliter  glucagon  Injectable 1 milliGRAM(s) IntraMuscular once PRN Glucose LESS THAN 70 milligrams/deciliter  HYDROmorphone  Injectable 1 milliGRAM(s) IV Push every 4 hours PRN Severe Pain (7 - 10)  HYDROmorphone  Injectable 0.5 milliGRAM(s) IV Push every 4 hours PRN Moderate Pain (4 - 6)  ondansetron    Tablet 4 milliGRAM(s) Oral every 6 hours PRN Nausea  sodium chloride 0.9% lock flush 10 milliLiter(s) IV Push every 1 hour PRN Pre/post blood products, medications, blood draw, and to maintain line patency      PHYSICAL EXAM  Vital Signs Last 24 Hrs  T(C): 35.9 (05 Jan 2020 06:31), Max: 37.1 (04 Jan 2020 21:40)  T(F): 96.6 (05 Jan 2020 06:31), Max: 98.8 (04 Jan 2020 21:40)  HR: 82 (05 Jan 2020 08:41) (71 - 82)  BP: 151/85 (05 Jan 2020 08:41) (123/71 - 152/71)  BP(mean): 93 (05 Jan 2020 05:33) (92 - 93)  RR: 16 (05 Jan 2020 08:41) (16 - 18)  SpO2: 97% (05 Jan 2020 08:41) (94% - 99%)    Constitutional: wn/wd in NAD.   HEENT: NCAT, MMM, OP clear, EOMI, no proptosis or lid retraction  Neck: no thyromegaly or palpable thyroid nodules   Respiratory: lungs CTAB.  Cardiovascular: regular rhythm, normal S1 and S2, no audible murmurs, no peripheral edema  GI: soft, NT/ND, no masses/HSM appreciated.  Neurology: no tremors, DTR 2+  Skin: no visible rashes/lesions  Psychiatric: AAO x 3, normal affect/mood.    LABS:                  HbA1C: 11.2 % (12-29 @ 10:32)    CAPILLARY BLOOD GLUCOSE      POCT Blood Glucose.: 89 mg/dL (05 Jan 2020 08:51)  POCT Blood Glucose.: 82 mg/dL (05 Jan 2020 06:49)  POCT Blood Glucose.: 229 mg/dL (04 Jan 2020 21:33)  POCT Blood Glucose.: 128 mg/dL (04 Jan 2020 17:40)  POCT Blood Glucose.: 121 mg/dL (04 Jan 2020 12:50)      Insulin Sliding Scale requirements X 24 Hours:    RADIOLOGY & ADDITIONAL TESTS:    A/P: 69y Female with history of DM type II presenting for       1.  DM -     Please continue           units lantus at bedtime  / in the morning and        units lispro with meals and lispro moderate / low dose sliding scale 4 times daily with meals and at bedtime.  Please continue consistent carbohydrate diet.      Goal FSG is   Will continue to monitor   For discharge, pt can continue    Pt can follow up at discharge with [facility] by calling  to make an appointment.   Will discuss case with     and update primary team

## 2020-01-05 NOTE — PROVIDER CONTACT NOTE (CRITICAL VALUE NOTIFICATION) - BACKGROUND
69 year old female S/p right foot 2nd and 3rd toe amputation. PMH diabetes type II, asthma, depression on insulin regiment for type II diabetes

## 2020-01-06 LAB
GLUCOSE BLDC GLUCOMTR-MCNC: 113 MG/DL — HIGH (ref 70–99)
GLUCOSE BLDC GLUCOMTR-MCNC: 152 MG/DL — HIGH (ref 70–99)
GLUCOSE BLDC GLUCOMTR-MCNC: 157 MG/DL — HIGH (ref 70–99)
GLUCOSE BLDC GLUCOMTR-MCNC: 99 MG/DL — SIGNIFICANT CHANGE UP (ref 70–99)

## 2020-01-06 PROCEDURE — 99231 SBSQ HOSP IP/OBS SF/LOW 25: CPT | Mod: GC

## 2020-01-06 RX ORDER — INSULIN GLARGINE 100 [IU]/ML
35 INJECTION, SOLUTION SUBCUTANEOUS AT BEDTIME
Refills: 0 | Status: DISCONTINUED | OUTPATIENT
Start: 2020-01-06 | End: 2020-01-07

## 2020-01-06 RX ORDER — INSULIN LISPRO 100/ML
9 VIAL (ML) SUBCUTANEOUS
Refills: 0 | Status: DISCONTINUED | OUTPATIENT
Start: 2020-01-06 | End: 2020-01-07

## 2020-01-06 RX ADMIN — Medication 3 MILLIGRAM(S): at 22:21

## 2020-01-06 RX ADMIN — Medication 90 MILLIGRAM(S): at 05:52

## 2020-01-06 RX ADMIN — Medication 500 MILLIGRAM(S): at 05:48

## 2020-01-06 RX ADMIN — INSULIN GLARGINE 35 UNIT(S): 100 INJECTION, SOLUTION SUBCUTANEOUS at 22:21

## 2020-01-06 RX ADMIN — CHLORHEXIDINE GLUCONATE 1 APPLICATION(S): 213 SOLUTION TOPICAL at 11:12

## 2020-01-06 RX ADMIN — PANTOPRAZOLE SODIUM 40 MILLIGRAM(S): 20 TABLET, DELAYED RELEASE ORAL at 05:49

## 2020-01-06 RX ADMIN — Medication 2: at 13:03

## 2020-01-06 RX ADMIN — Medication 650 MILLIGRAM(S): at 06:23

## 2020-01-06 RX ADMIN — Medication 9 UNIT(S): at 17:20

## 2020-01-06 RX ADMIN — Medication 650 MILLIGRAM(S): at 05:53

## 2020-01-06 RX ADMIN — CEFEPIME 100 MILLIGRAM(S): 1 INJECTION, POWDER, FOR SOLUTION INTRAMUSCULAR; INTRAVENOUS at 17:20

## 2020-01-06 RX ADMIN — Medication 81 MILLIGRAM(S): at 22:21

## 2020-01-06 RX ADMIN — ENOXAPARIN SODIUM 40 MILLIGRAM(S): 100 INJECTION SUBCUTANEOUS at 18:00

## 2020-01-06 RX ADMIN — ATORVASTATIN CALCIUM 40 MILLIGRAM(S): 80 TABLET, FILM COATED ORAL at 22:21

## 2020-01-06 RX ADMIN — Medication 2: at 22:20

## 2020-01-06 RX ADMIN — Medication 7 UNIT(S): at 13:03

## 2020-01-06 RX ADMIN — Medication 500 MILLIGRAM(S): at 13:08

## 2020-01-06 RX ADMIN — CEFEPIME 100 MILLIGRAM(S): 1 INJECTION, POWDER, FOR SOLUTION INTRAMUSCULAR; INTRAVENOUS at 05:54

## 2020-01-06 RX ADMIN — DULOXETINE HYDROCHLORIDE 60 MILLIGRAM(S): 30 CAPSULE, DELAYED RELEASE ORAL at 11:12

## 2020-01-06 RX ADMIN — Medication 7 UNIT(S): at 09:02

## 2020-01-06 RX ADMIN — Medication 500 MILLIGRAM(S): at 22:21

## 2020-01-06 NOTE — PROGRESS NOTE ADULT - SUBJECTIVE AND OBJECTIVE BOX
INTERVAL HPI/OVERNIGHT EVENTS:    Patient is a 69y old  Female who presents with a chief complaint of Rt Foot Diabetic foot infection (06 Jan 2020 08:18)      Pt reports the following symptoms:    CONSTITUTIONAL:  Negative fever or chills, feels well, good appetite  EYES:  Negative  blurry vision or double vision  CARDIOVASCULAR:  Negative for chest pain or palpitations  RESPIRATORY:  Negative for cough, wheezing, or SOB   GASTROINTESTINAL:  Negative for nausea, vomiting, diarrhea, constipation, or abdominal pain  GENITOURINARY:  Negative frequency, urgency or dysuria  NEUROLOGIC:  No headache, confusion, dizziness, lightheadedness    MEDICATIONS  (STANDING):  aspirin  chewable 81 milliGRAM(s) Oral daily  atorvastatin 40 milliGRAM(s) Oral at bedtime  cefepime   IVPB 2000 milliGRAM(s) IV Intermittent every 12 hours  chlorhexidine 2% Cloths 1 Application(s) Topical <User Schedule>  chlorhexidine 2% Cloths 1 Application(s) Topical <User Schedule>  dextrose 5%. 1000 milliLiter(s) (50 mL/Hr) IV Continuous <Continuous>  dextrose 50% Injectable 12.5 Gram(s) IV Push once  dextrose 50% Injectable 25 Gram(s) IV Push once  dextrose 50% Injectable 25 Gram(s) IV Push once  DULoxetine 60 milliGRAM(s) Oral daily  enoxaparin Injectable 40 milliGRAM(s) SubCutaneous every 24 hours  insulin glargine Injectable (LANTUS) 44 Unit(s) SubCutaneous at bedtime  insulin lispro (HumaLOG) corrective regimen sliding scale   SubCutaneous Before meals and at bedtime  insulin lispro Injectable (HumaLOG) 7 Unit(s) SubCutaneous three times a day before meals  melatonin 3 milliGRAM(s) Oral at bedtime  metroNIDAZOLE    Tablet 500 milliGRAM(s) Oral every 8 hours  NIFEdipine XL 90 milliGRAM(s) Oral daily  pantoprazole    Tablet 40 milliGRAM(s) Oral before breakfast    MEDICATIONS  (PRN):  acetaminophen   Tablet .. 650 milliGRAM(s) Oral every 6 hours PRN Moderate Pain (4 - 6)  aluminum hydroxide/magnesium hydroxide/simethicone Suspension 30 milliLiter(s) Oral every 6 hours PRN Dyspepsia  benzocaine 15 mG/menthol 3.6 mG (Sugar-Free) Lozenge 1 Lozenge Oral every 2 hours PRN Sore Throat  dextrose 40% Gel 15 Gram(s) Oral once PRN Blood Glucose LESS THAN 70 milliGRAM(s)/deciliter  glucagon  Injectable 1 milliGRAM(s) IntraMuscular once PRN Glucose LESS THAN 70 milligrams/deciliter  ondansetron    Tablet 4 milliGRAM(s) Oral every 6 hours PRN Nausea  sodium chloride 0.9% lock flush 10 milliLiter(s) IV Push every 1 hour PRN Pre/post blood products, medications, blood draw, and to maintain line patency      PHYSICAL EXAM  Vital Signs Last 24 Hrs  T(C): 36.4 (06 Jan 2020 08:54), Max: 36.6 (05 Jan 2020 11:53)  T(F): 97.5 (06 Jan 2020 08:54), Max: 97.9 (05 Jan 2020 11:53)  HR: 72 (06 Jan 2020 09:04) (72 - 83)  BP: 148/77 (06 Jan 2020 09:04) (128/77 - 153/82)  BP(mean): --  RR: 18 (06 Jan 2020 09:04) (16 - 18)  SpO2: 100% (06 Jan 2020 09:04) (96% - 100%)    Constitutional: wn/wd in NAD.   HEENT: NCAT, MMM, OP clear, EOMI, no proptosis or lid retraction  Neck: no thyromegaly or palpable thyroid nodules   Respiratory: lungs CTAB.  Cardiovascular: regular rhythm, normal S1 and S2, no audible murmurs, no peripheral edema  GI: soft, NT/ND, no masses/HSM appreciated.  Neurology: no tremors, DTR 2+  Skin: no visible rashes/lesions  Psychiatric: AAO x 3, normal affect/mood.    LABS:                  HbA1C: 11.2 % (12-29 @ 10:32)    CAPILLARY BLOOD GLUCOSE      POCT Blood Glucose.: 99 mg/dL (06 Jan 2020 08:56)  POCT Blood Glucose.: 239 mg/dL (05 Jan 2020 21:25)  POCT Blood Glucose.: 116 mg/dL (05 Jan 2020 16:54)  POCT Blood Glucose.: 95 mg/dL (05 Jan 2020 16:34)  POCT Blood Glucose.: 84 mg/dL (05 Jan 2020 16:17)  POCT Blood Glucose.: 55 mg/dL (05 Jan 2020 15:55)  POCT Blood Glucose.: 153 mg/dL (05 Jan 2020 13:44)      Insulin Sliding Scale requirements X 24 Hours:    RADIOLOGY & ADDITIONAL TESTS:    A/P: 69y Female with history of DM type II presenting for       1.  DM -     Please continue           units lantus at bedtime  / in the morning and        units lispro with meals and lispro moderate / low dose sliding scale 4 times daily with meals and at bedtime.  Please continue consistent carbohydrate diet.      Goal FSG is   Will continue to monitor   For discharge, pt can continue    Pt can follow up at discharge with [facility] by calling  to make an appointment.   Will discuss case with     and update primary team INTERVAL HPI/OVERNIGHT EVENTS:    Patient seen and examined at the bedside. She was feeling little nauseous but her appetite is better today. She is pending ANT placement.  FSG & Insulin received:  Yesterday:  pre-dinner fs - improved to 95 and 116 after juice, no insulin given  bedtime fs, 44 lantus   units + 4   units lispro SS  Today:  pre-breakfast fs, 7 nutritional lispro   units  pre-lunch fs      Pt reports the following symptoms:  CONSTITUTIONAL:  Negative fever or chills  EYES:  Negative  blurry vision or double vision  CARDIOVASCULAR:  Negative for chest pain or palpitations  RESPIRATORY:  Negative for cough, wheezing, or SOB   GASTROINTESTINAL:  Negative for vomiting, diarrhea, constipation  GENITOURINARY:  Negative frequency, urgency or dysuria  NEUROLOGIC:  No headache, confusion, dizziness, lightheadedness    MEDICATIONS  (STANDING):  aspirin  chewable 81 milliGRAM(s) Oral daily  atorvastatin 40 milliGRAM(s) Oral at bedtime  cefepime   IVPB 2000 milliGRAM(s) IV Intermittent every 12 hours  chlorhexidine 2% Cloths 1 Application(s) Topical <User Schedule>  chlorhexidine 2% Cloths 1 Application(s) Topical <User Schedule>  dextrose 5%. 1000 milliLiter(s) (50 mL/Hr) IV Continuous <Continuous>  dextrose 50% Injectable 12.5 Gram(s) IV Push once  dextrose 50% Injectable 25 Gram(s) IV Push once  dextrose 50% Injectable 25 Gram(s) IV Push once  DULoxetine 60 milliGRAM(s) Oral daily  enoxaparin Injectable 40 milliGRAM(s) SubCutaneous every 24 hours  insulin glargine Injectable (LANTUS) 44 Unit(s) SubCutaneous at bedtime  insulin lispro (HumaLOG) corrective regimen sliding scale   SubCutaneous Before meals and at bedtime  insulin lispro Injectable (HumaLOG) 7 Unit(s) SubCutaneous three times a day before meals  melatonin 3 milliGRAM(s) Oral at bedtime  metroNIDAZOLE    Tablet 500 milliGRAM(s) Oral every 8 hours  NIFEdipine XL 90 milliGRAM(s) Oral daily  pantoprazole    Tablet 40 milliGRAM(s) Oral before breakfast    MEDICATIONS  (PRN):  acetaminophen   Tablet .. 650 milliGRAM(s) Oral every 6 hours PRN Moderate Pain (4 - 6)  aluminum hydroxide/magnesium hydroxide/simethicone Suspension 30 milliLiter(s) Oral every 6 hours PRN Dyspepsia  benzocaine 15 mG/menthol 3.6 mG (Sugar-Free) Lozenge 1 Lozenge Oral every 2 hours PRN Sore Throat  dextrose 40% Gel 15 Gram(s) Oral once PRN Blood Glucose LESS THAN 70 milliGRAM(s)/deciliter  glucagon  Injectable 1 milliGRAM(s) IntraMuscular once PRN Glucose LESS THAN 70 milligrams/deciliter  ondansetron    Tablet 4 milliGRAM(s) Oral every 6 hours PRN Nausea  sodium chloride 0.9% lock flush 10 milliLiter(s) IV Push every 1 hour PRN Pre/post blood products, medications, blood draw, and to maintain line patency      PHYSICAL EXAM  Vital Signs Last 24 Hrs  T(C): 36.4 (2020 08:54), Max: 36.6 (2020 11:53)  T(F): 97.5 (2020 08:54), Max: 97.9 (2020 11:53)  HR: 72 (2020 09:04) (72 - 83)  BP: 148/77 (2020 09:04) (128/77 - 153/82)  BP(mean): --  RR: 18 (2020 09:04) (16 - 18)  SpO2: 100% (2020 09:04) (96% - 100%)    Constitutional: wn/wd in NAD.   Respiratory: lungs CTAB.  Cardiovascular: regular rhythm, normal S1 and S2, no peripheral edema  GI: soft, NT/ND, no masses/HSM appreciated.  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  DM Type 2 - uncontrolled  - Hba1c 11.2  - Wt 68 kg with BMI 27.4  - Cr/GFR 1.31/48  No ECHO in system  Please decrease to lantus 35 units at night.  Please increase to lispro 9  units before each meal.    Please continue lispro moderate dose sliding scale four times daily with meals and at bedtime  carb consistent diet    Pt's fingerstick glucose goal is 120 to 150    Will continue to monitor     For discharge, TBD    Pt can follow up at discharge with [facility] by calling  to make an appointment.   discussed case with Dr. Tran   and updated  primary team    To Make an appointment at [address] [place] [address] for the patient, either:  1. Send a STAT task via Dittit to Ernestina Wilson or Sumi Rossi (office managers)   OR  2. Call supervisor's line. P: [phone] (do not give this number to patient). VM is checked periodically  In the message, specify that this is a hospital discharge follow-up, and that the appt can be made with a NP if there is no timely MD availability    REMINDERS FOR INSULIN/DIABETES SUPPLIES at DISCHARGE:  INSULIN:   Long actin/Basal Insulin: Examples: Toujeo, Basaglar, Tresiba, Lantus   Short acting/Bolus Insulin: Humalog, Admelog, Novolog  Please ensure that BOTH short acting and long acting insulin are prescribed in the same preparation (Ex: PEN vs VIAL/SOLUTION)     TESTING SUPPLIES:   All glucometer supplies should be written as generic to avoid issues with insurance. Use the free text option in sunrise prescription writer, and type in glucometer test strips, lancets, etc to order.    If sending patient home on insulin PEN, please send:   •	BD paz insulin pen needles for use up to 4 times daily (total quantity 100)  •	Lancets for use up to 4 times daily (total quantity 100)  •	Glucometer Test strips for use up to 4 times daily (total quantity 100)  •	Alcohol swabs for use up to 4 times daily (total quantity 100)  •	Glucometer (If provided by hospital, still provide scripts for lancets, test strips, and swabs)  If sending patient home on insulin VIAL, please send:   •	Insulin syringes (6mm) - for use up to 4 times daily (total quantity 100)  •	Lancets for use up to 4 times daily (total quantity 100)  •	Generic Glucometer Test strips for use up to 4 times daily (total quantity 100)  •	Alcohol swabs for use up to 4 times daily (total quantity 100)  •	Generic Glucometer (If provided by hospital, still provide scripts for lancets, test strips, and swabs)  •	Do not specify brand for testing supplies (such as contour, freestyle, one touch etc) that way the pharmacy has the freedom to pick and change according to what the insurance dictates.  For patients without insurance:   •	Provide social work with appropriate scripts so they may obtain 1 week of samples  •	Provide with glucometer. Glucometers are located at various nursing stations, the nursing office, education, and endocrine fellows office.  •	Please make appointment with Nichole Jama NP or Kaycee Coates RN and JOSE G Bell at the [address] [place] [address] endocrinology clinic. They can see patients without insurance, provide appropriate samples, and assist in getting insurance coverage.     PREFERRED PHARMACY:  [facility] (located on 1st floor next to admitting)  P: [phone]  Hours: M – F 8AM – 8PM, Sat 8AM – 4PM, Sun—closed  If not using [facility], please follow up with chosen pharmacy to ensure insulin prescribed is covered.

## 2020-01-06 NOTE — PROGRESS NOTE ADULT - SUBJECTIVE AND OBJECTIVE BOX
Patient is a 69y old  Female who presents with a chief complaint of Rt Foot Diabetic foot infection (06 Jan 2020 01:58)    INTERVAL HPI/ OVERNIGHT EVENTS: No acute events. Resting well. Denies N/V/F/C/SOB    LABS    ICU Vital Signs Last 24 Hrs  T(C): 36.6 (06 Jan 2020 06:28), Max: 36.6 (05 Jan 2020 11:53)  T(F): 97.8 (06 Jan 2020 06:28), Max: 97.9 (05 Jan 2020 11:53)  HR: 83 (06 Jan 2020 05:08) (72 - 83)  BP: 128/77 (06 Jan 2020 05:08) (128/77 - 153/82)  BP(mean): --  ABP: --  ABP(mean): --  RR: 18 (06 Jan 2020 05:08) (16 - 18)  SpO2: 100% (06 Jan 2020 05:08) (96% - 100%)    RADIOLOGY    MICROBIOLOGY  Culture - Surgical Swab (12.30.19 @ 08:11)    Gram Stain:   Moderate Gram positive cocci in pairs, chains and clusters  Few Gram Negative Diplococci  Rare White blood cells    -  Amoxicillin/Clavulanic Acid: S 0.5    -  Amoxicillin/Clavulanic Acid: S 0.5    -  Cefazolin: S <=4    -  Clindamycin: R 0.5 This isolate is presumed to be clindamycin resistant based on detection of inducible resistance. Clindamycin may still be effective in some patients.    -  Clindamycin: S 0.25    -  Ertapenem: S 0.5    -  Erythromycin: R >4    -  Linezolid: S 2    -  Oxacillin: S <=0.25    -  Meropenem: S 0.25    -  Piperacillin/Tazobactam: S 0.75    -  Piperacillin/Tazobactam: S 1.5    -  RIF- Rifampin: S <=1 Should not be used as monotherapy    -  Trimethoprim/Sulfamethoxazole: S <=0.5/9.5    Specimen Source: .Surgical Swab 3rd Metatarsal Head- Tissue OR    Culture Results:   Numerous Streptococcus dysgalactiae (Group C/G) See previous culture for  sensitivies  Few Streptococcus agalactiae See previous culture for sensitivies  Moderate Staphylococcus aureus See previous culture for sensitivies  Moderate Actinomyces species Beta lactamase negative  Moderate Prevotella melaninogenica Beta lactamase positive    Organism Identification: Staphylococcus aureus  Actinomyces species  Prevotella melaninogenica    Organism: Staphylococcus aureus    Organism: Actinomyces species    Organism: Prevotella melaninogenica    Method Type: DIANE    Method Type: ETEST    Method Type: ETEST    Culture - Surgical Swab (12.30.19 @ 08:08)    -  Ampicillin: S    -  Ampicillin: S    -  Ampicillin: R >16 These ampicillin results predict results for amoxicillin    -  Ampicillin/Sulbactam: R >16/8 Enterobacter, Citrobacter, and Serratia may develop resistance during prolonged therapy (3-4 days)    -  Cefazolin: S <=4    -  Cefazolin: R >16 Enterobacter, Citrobacter, and Serratia may develop resistance during prolonged therapy (3-4 days)    -  Meropenem: S <=1    -  Gentamicin: S <=1    -  Levofloxacin: S    -  Levofloxacin: S    -  Linezolid: S 2    -  Erythromycin: R    -  Erythromycin: R    -  Erythromycin: R >4    -  Cefepime: S <=2    -  Ceftriaxone: R 32 Enterobacter, Citrobacter, and Serratia may develop resistance during prolonged therapy    -  Clindamycin: R    -  Clindamycin: S    -  Clindamycin: R 0.5 This isolate is presumed to be clindamycin resistant based on detection of inducible resistance. Clindamycin may still be effective in some patients.    Gram Stain:   Moderate Gram positive cocci in pairs, chains and clusters  Rare Gram Positive Rods  Few White blood cells    -  Vancomycin: S    -  Vancomycin: S    -  Vancomycin: S 2    -  Tobramycin: S <=2    -  Trimethoprim/Sulfamethoxazole: S <=0.5/9.5    -  Trimethoprim/Sulfamethoxazole: R >2/38    -  RIF- Rifampin: S <=1 Should not be used as monotherapy    -  Oxacillin: S <=0.25    -  Penicillin: S Predicts results for ampicillin, amoxicillin, amoxicillin/clavulanate, ampicillin/sulbactam, 1st, 2nd and 3rd generation cephalosporins and carbapenems.    -  Penicillin: S Predicts results for ampicillin, amoxicillin, amoxicillin/clavulanate, ampicillin/sulbactam, 1st, 2nd and 3rd generation cephalosporins and carbapenems.    -  Penicillin: R 8    -  Piperacillin/Tazobactam: S 16    Specimen Source: .Surgical Swab Rt. foot- OR    Culture Results:   Moderate Streptococcus dysgalactiae (Group C/G)  Moderate Streptococcus agalactiae  Rare Staphylococcus aureus  Rare Morganella morganii  Mixed Anaerobic Jaycee including  Moderate Finegoldia magna Beta lactamase negative  Rare Prevotella melaninogenica Beta lactamase positive    Organism Identification: Streptococcus dysgalactiae  Sreptococcus agalactiae  Staphylococcus aureus  Morganella morganii    Organism: Streptococcus dysgalactiae    Organism: Sreptococcus agalactiae    Organism: Staphylococcus aureus    Organism: Morganella morganii    Method Type: KB    Method Type: KB    Method Type: DIANE    Method Type: DIANE    PHYSICAL EXAM  Wound vac intact and operating well. No strikethrough on dressing

## 2020-01-06 NOTE — CHART NOTE - NSCHARTNOTEFT_GEN_A_CORE
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  Essential hypertension: HTN (hypertension)  Hyperlipidemia: HLD (hyperlipidemia)  Type 2 diabetes mellitus: DM (diabetes mellitus)  Asthma: Asthma  Depressive disorder: Depression  Cytomegalovirus infection not present: No significant past surgical history      Current Nutrition Order: Cst Cho w Snack        PO Intake: Good (%) [   ]  Fair (50-75%) [  x ] Poor (<25%) [   ]    GI Issues: no noted n/v/d/c    Pain: no pain noted     Skin Integrity: VAC to foot     Labs: no new labs     CAPILLARY BLOOD GLUCOSE      POCT Blood Glucose.: 157 mg/dL (06 Jan 2020 13:01)  POCT Blood Glucose.: 99 mg/dL (06 Jan 2020 08:56)  POCT Blood Glucose.: 239 mg/dL (05 Jan 2020 21:25)  POCT Blood Glucose.: 116 mg/dL (05 Jan 2020 16:54)  POCT Blood Glucose.: 95 mg/dL (05 Jan 2020 16:34)  POCT Blood Glucose.: 84 mg/dL (05 Jan 2020 16:17)  POCT Blood Glucose.: 55 mg/dL (05 Jan 2020 15:55)      Medications:  MEDICATIONS  (STANDING):  aspirin  chewable 81 milliGRAM(s) Oral daily  atorvastatin 40 milliGRAM(s) Oral at bedtime  cefepime   IVPB 2000 milliGRAM(s) IV Intermittent every 12 hours  chlorhexidine 2% Cloths 1 Application(s) Topical <User Schedule>  chlorhexidine 2% Cloths 1 Application(s) Topical <User Schedule>  dextrose 5%. 1000 milliLiter(s) (50 mL/Hr) IV Continuous <Continuous>  dextrose 50% Injectable 12.5 Gram(s) IV Push once  dextrose 50% Injectable 25 Gram(s) IV Push once  dextrose 50% Injectable 25 Gram(s) IV Push once  DULoxetine 60 milliGRAM(s) Oral daily  enoxaparin Injectable 40 milliGRAM(s) SubCutaneous every 24 hours  insulin glargine Injectable (LANTUS) 44 Unit(s) SubCutaneous at bedtime  insulin lispro (HumaLOG) corrective regimen sliding scale   SubCutaneous Before meals and at bedtime  insulin lispro Injectable (HumaLOG) 7 Unit(s) SubCutaneous three times a day before meals  melatonin 3 milliGRAM(s) Oral at bedtime  metroNIDAZOLE    Tablet 500 milliGRAM(s) Oral every 8 hours  NIFEdipine XL 90 milliGRAM(s) Oral daily  pantoprazole    Tablet 40 milliGRAM(s) Oral before breakfast    MEDICATIONS  (PRN):  acetaminophen   Tablet .. 650 milliGRAM(s) Oral every 6 hours PRN Moderate Pain (4 - 6)  aluminum hydroxide/magnesium hydroxide/simethicone Suspension 30 milliLiter(s) Oral every 6 hours PRN Dyspepsia  benzocaine 15 mG/menthol 3.6 mG (Sugar-Free) Lozenge 1 Lozenge Oral every 2 hours PRN Sore Throat  dextrose 40% Gel 15 Gram(s) Oral once PRN Blood Glucose LESS THAN 70 milliGRAM(s)/deciliter  glucagon  Injectable 1 milliGRAM(s) IntraMuscular once PRN Glucose LESS THAN 70 milligrams/deciliter  ondansetron    Tablet 4 milliGRAM(s) Oral every 6 hours PRN Nausea  sodium chloride 0.9% lock flush 10 milliLiter(s) IV Push every 1 hour PRN Pre/post blood products, medications, blood draw, and to maintain line patency      Weight: 68kg (12/29)     Weight Change: no new wts, please trend    Nutrition Focused Physical Exam: Completed [   ]  Not Pertinent [ x  ]    Estimated energy needs:   Height: 5 feet 2 inches, Weight (Current): 149 pounds,  pounds +/-10%, %IBW %135, BMI 27.4  IBW used to calculate energy needs due to pt's current body weight exceeding 120% of IBW; adjusted for age, post op healing  1245-1494kcal (25-30kcal/kg)   59-69g pro (1.2-1.4g/kg pro)     Subjective:   70 YO F with hx of severe depression, DM and bilateral diabetic foot wounds presents to ED with Right foot wound and purulent discharge - R foot gas gangrene and DKA s/p R 2nd+3rd toe partial ray amputation (12/29). S/p PICC for IV abx + VAC placement. Education provided to pt upon initial assessment no further questions noted at this time. Continues to work with PT and pend ANT for wound care, now pending auth per report. Pt lethargic at time of visit but appearing to be resting comfortably not in pain. Continues with wound care at this time. Will continue to follow per protocol.     Previous Nutrition Diagnosis: food and nutrition related deficit r/t limited prior diet ed AEB pt in need for diet ed   Active [ x  ]  Resolved [   ]    More pertinent: Increased nutrient needs r/t amputation AEB hypermetabolic state/ increased demand for healing     If resolved, new PES:     Goal: meet >75% EER     Recommendations:  1. Monitor PO intake/appetite, GI distress, diet tolerance, labs, weights.  2. Honor pt food preferences as able.  3. RD to remain available for additional nutrition interventions as needed.    Education: encouraged intake, reinforced DM ed    Risk Level: High [ x  ] Moderate [   ] Low [   ]

## 2020-01-06 NOTE — PROGRESS NOTE ADULT - ASSESSMENT
69F PMHx DM presents w/ R foot ulcer. WBC 18.77, Lactate 2.9. LRINEC score >6. Gas seen on X-ray. S/p right partial 2nd and 4rd ray amputation 12/29.    - C/w IV ABX per ID.   -Excisional debridement of fibrotic tissue down to viable subcutaneous bleeding tissue with #10 blade. Pt tolerated procedure well. Flushed wound with sterile saline and applied wound vac dressing with black granufoam. VAC operating well.   - Heel WB to RLE  - Plan for ANT dispo with wound vac changes  - Next vac change 1/7 if still inhouse   - When discharged, patient can follow up with Podiatry at [address] [place] [address]  [phone]

## 2020-01-06 NOTE — PROGRESS NOTE ADULT - SUBJECTIVE AND OBJECTIVE BOX
24hr Events:  O/N: Given melatonin for sleep, VSS, reduced pre-meal lispro from 14u to 7u  1/5: still with intermittent nausea today so added standing zofran which is helping, podiatry placed vac today and debrided more tissue at bedside         Assessment/Plan;  69F hx poorly controlled DM presents with R foot gas gangrene and DKA s/p R 2nd+3rd toe partial ray amputation (12/29) admitted to SICU for postop monitoring. Post operatively, anion gap normalized, glucose well controlled, on sliding scale insulin    ID:   -Final Abx plan pending   -Continue Cefepime/flagyl    Wound:   -Wound vac change by podiatry  -will follow up wound care plan for discharge     Endo:   Glucose control  -Insulin increased to lantus 32 and Nutritional 7 units   -will f/u Endocrine for insulin regimen for discharge     psych:   - Psych rec  -patient with known hx of depression   Cymbalta BID      Instructions for VNS/rehab; please change wet-to-dry dressing every 24 hrs. Dressing consists of saline soaked 4x4 gauze followed by dry 4x4 gauze and fabby/kerlix with overlying ace bandage (if available). 24hr Events:  O/N: Given melatonin for sleep, VSS, reduced pre-meal lispro from 14u to 7u  1/5: still with intermittent nausea today so added standing zofran which is helping, podiatry placed vac today and debrided more tissue at bedside     cefepime   IVPB 2000  metroNIDAZOLE    Tablet 500  aspirin  chewable 81  cefepime   IVPB 2000  enoxaparin Injectable 40  metroNIDAZOLE    Tablet 500  NIFEdipine XL 90        Vital Signs Last 24 Hrs  T(C): 36.6 (06 Jan 2020 06:28), Max: 36.6 (05 Jan 2020 11:53)  T(F): 97.8 (06 Jan 2020 06:28), Max: 97.9 (05 Jan 2020 11:53)  HR: 83 (06 Jan 2020 05:08) (72 - 83)  BP: 128/77 (06 Jan 2020 05:08) (128/77 - 151/85)  BP(mean): --  RR: 18 (06 Jan 2020 05:08) (16 - 18)  SpO2: 100% (06 Jan 2020 05:08) (96% - 100%)  I&O's Summary    05 Jan 2020 07:01  -  06 Jan 2020 07:00  --------------------------------------------------------  IN: 420 mL / OUT: 0 mL / NET: 420 mL        Physical Exam:  General: NAD, resting comfortably in bed  Pulmonary: Nonlabored breathing, no respiratory distress  Extremities: WWP, podiatry did beside debridement of wound down to viable tissue and placed a vac, vac with good seal      LABS:        PLEASE CHECK WHEN PRESENT:     [  ]Heart Failure     [  ] Acute     [  ] Acute on Chronic     [  ] Chronic  -------------------------------------------------------------------     [  ]Diastolic [HFpEF]     [  ]Systolic [HFrEF]     [  ]Combined [HFpEF & HFrEF]     [  ]Other:  -------------------------------------------------------------------  [ ] Respiratory failure  [ ] Acute cor pulmonale  [ ] Asthma/COPD Exacerbation  [ ] Pleural effusion  [ ] Aspiration pneumonia  -------------------------------------------------------------------  [x ]ELBERT     [x ]ATN     [  ]Reneal Medullary Necrosis     [  ]Renal Cortical Necrosis     [  ]Other Pathological Lesions:    [  ]CKD 1  [  ]CKD 2  [  ]CKD 3  [  ]CKD 4  [  ]CKD 5  [  ]Other  -------------------------------------------------------------------  [ x]Diabetes  [ x] Diabetic PVD Ulcer  [  ] Neuropathic ulcer to DM  [  ] Diabetes with Nephropathy  [ x] Osteomyelitis due to diabetes  --------------------------------------------------------------------  [  ]Malnutrition: See Nutrition Note  [  ]Cachexia  [  ]Other:   [  ]Supplement Ordered:  [  ]Morbid Obesity (BMI >=40]  ---------------------------------------------------------------------  [ ] Sepsis/severe sepsis/septic shock  [ ] UTI  [ ] Pneumonia  -----------------------------------------------------------------------  [ ] Acidosis/alkalosis  [ ] Fluid overload  [ ] Hypokalemia  [ ] Hyperkalemia  [] Hypomagnesemia  [ ] Hypophosphatemia  [ ] Hyperphosphatemia  ------------------------------------------------------------------------  [ ] Acute blood loss anemia  [ ] Post op blood loss anemia  [ ] Iron deficiency anemia  [ ] Anemia due to chronic disease  [ ] Hypercoagulable state  ----------------------------------------------------------------------  [ ] Cerebral infarction  [ ] Transient ischemia attack  [ ] Encephalopathy        Assessment/Plan;  69F hx poorly controlled DM presents with R foot gas gangrene and DKA s/p R 2nd+3rd toe partial ray amputation (12/29) admitted to SICU for postop monitoring. Post operatively, anion gap normalized, glucose well controlled, on sliding scale insulin    ID:   -Final Abx plan  -Continue Cefepime/flagyl x6wks    Wound:   -Wound vac change by podiatry  -will follow up wound care plan for discharge     Endo:   Glucose control  -Insulin increased to lantus 44 and Nutritional 7 units   -will f/u Endocrine for insulin regimen for discharge     psych:   - Psych rec  -patient with known hx of depression   Cymbalta BID      Instructions for VNS/rehab; please change wet-to-dry dressing every 24 hrs. Dressing consists of saline soaked 4x4 gauze followed by dry 4x4 gauze and fabby/kerlix with overlying ace bandage (if available).

## 2020-01-07 LAB
GLUCOSE BLDC GLUCOMTR-MCNC: 119 MG/DL — HIGH (ref 70–99)
GLUCOSE BLDC GLUCOMTR-MCNC: 133 MG/DL — HIGH (ref 70–99)
GLUCOSE BLDC GLUCOMTR-MCNC: 209 MG/DL — HIGH (ref 70–99)
GLUCOSE BLDC GLUCOMTR-MCNC: 56 MG/DL — LOW (ref 70–99)
GLUCOSE BLDC GLUCOMTR-MCNC: 75 MG/DL — SIGNIFICANT CHANGE UP (ref 70–99)
GLUCOSE BLDC GLUCOMTR-MCNC: 76 MG/DL — SIGNIFICANT CHANGE UP (ref 70–99)
GLUCOSE BLDC GLUCOMTR-MCNC: 77 MG/DL — SIGNIFICANT CHANGE UP (ref 70–99)
GLUCOSE BLDC GLUCOMTR-MCNC: 98 MG/DL — SIGNIFICANT CHANGE UP (ref 70–99)

## 2020-01-07 PROCEDURE — 99231 SBSQ HOSP IP/OBS SF/LOW 25: CPT | Mod: GC

## 2020-01-07 RX ORDER — INSULIN GLARGINE 100 [IU]/ML
26 INJECTION, SOLUTION SUBCUTANEOUS AT BEDTIME
Refills: 0 | Status: DISCONTINUED | OUTPATIENT
Start: 2020-01-07 | End: 2020-01-08

## 2020-01-07 RX ORDER — INSULIN LISPRO 100/ML
8 VIAL (ML) SUBCUTANEOUS
Refills: 0 | Status: DISCONTINUED | OUTPATIENT
Start: 2020-01-07 | End: 2020-01-08

## 2020-01-07 RX ORDER — HYDRALAZINE HCL 50 MG
25 TABLET ORAL THREE TIMES A DAY
Refills: 0 | Status: DISCONTINUED | OUTPATIENT
Start: 2020-01-07 | End: 2020-01-09

## 2020-01-07 RX ADMIN — Medication 90 MILLIGRAM(S): at 06:17

## 2020-01-07 RX ADMIN — Medication 500 MILLIGRAM(S): at 15:10

## 2020-01-07 RX ADMIN — Medication 8 UNIT(S): at 15:09

## 2020-01-07 RX ADMIN — Medication 4: at 15:09

## 2020-01-07 RX ADMIN — CEFEPIME 100 MILLIGRAM(S): 1 INJECTION, POWDER, FOR SOLUTION INTRAMUSCULAR; INTRAVENOUS at 18:46

## 2020-01-07 RX ADMIN — DULOXETINE HYDROCHLORIDE 60 MILLIGRAM(S): 30 CAPSULE, DELAYED RELEASE ORAL at 15:10

## 2020-01-07 RX ADMIN — CEFEPIME 100 MILLIGRAM(S): 1 INJECTION, POWDER, FOR SOLUTION INTRAMUSCULAR; INTRAVENOUS at 06:17

## 2020-01-07 RX ADMIN — CHLORHEXIDINE GLUCONATE 1 APPLICATION(S): 213 SOLUTION TOPICAL at 15:12

## 2020-01-07 RX ADMIN — Medication 500 MILLIGRAM(S): at 06:17

## 2020-01-07 RX ADMIN — Medication 25 MILLIGRAM(S): at 21:21

## 2020-01-07 RX ADMIN — Medication 8 UNIT(S): at 18:31

## 2020-01-07 RX ADMIN — Medication 25 MILLIGRAM(S): at 18:11

## 2020-01-07 RX ADMIN — Medication 500 MILLIGRAM(S): at 21:21

## 2020-01-07 RX ADMIN — Medication 3 MILLIGRAM(S): at 21:21

## 2020-01-07 RX ADMIN — ENOXAPARIN SODIUM 40 MILLIGRAM(S): 100 INJECTION SUBCUTANEOUS at 18:11

## 2020-01-07 RX ADMIN — ATORVASTATIN CALCIUM 40 MILLIGRAM(S): 80 TABLET, FILM COATED ORAL at 21:21

## 2020-01-07 RX ADMIN — Medication 81 MILLIGRAM(S): at 21:21

## 2020-01-07 RX ADMIN — PANTOPRAZOLE SODIUM 40 MILLIGRAM(S): 20 TABLET, DELAYED RELEASE ORAL at 06:17

## 2020-01-07 NOTE — PROVIDER CONTACT NOTE (CRITICAL VALUE NOTIFICATION) - ASSESSMENT
Pt appears weak and sleepy. Pt denies nausea, diaphoresis or headache. Pt complaining of inability to sleep- 3mg melatonin given at 10pm. Pt appears weak and sleepy. Pt denies nausea, diaphoresis or headache. Pt complaining of inability to sleep- 3mg melatonin given at 10pm. Other VSS.

## 2020-01-07 NOTE — PROGRESS NOTE ADULT - SUBJECTIVE AND OBJECTIVE BOX
Patient is a 69y old  Female who presents with a chief complaint of Rt Foot Diabetic foot infection (07 Jan 2020 05:42)    INTERVAL HPI/ OVERNIGHT EVENTS: Pt resting comfortably in bed in NAD. Denies constitutional symptoms. RASTA o/n.    LABS    ICU Vital Signs Last 24 Hrs  T(C): 36.1 (07 Jan 2020 05:50), Max: 37.1 (06 Jan 2020 21:53)  T(F): 97 (07 Jan 2020 05:50), Max: 98.8 (06 Jan 2020 21:53)  HR: 67 (07 Jan 2020 05:32) (67 - 120)  BP: 170/81 (07 Jan 2020 05:32) (112/67 - 172/86)  BP(mean): --  ABP: --  ABP(mean): --  RR: 17 (07 Jan 2020 05:32) (17 - 18)  SpO2: 100% (07 Jan 2020 05:32) (99% - 100%)    RADIOLOGY    MICROBIOLOGY  Culture - Surgical Swab (12.30.19 @ 08:11)    Gram Stain:   Moderate Gram positive cocci in pairs, chains and clusters  Few Gram Negative Diplococci  Rare White blood cells    -  Amoxicillin/Clavulanic Acid: S 0.5    -  Amoxicillin/Clavulanic Acid: S 0.5    -  Cefazolin: S <=4    -  Clindamycin: R 0.5 This isolate is presumed to be clindamycin resistant based on detection of inducible resistance. Clindamycin may still be effective in some patients.    -  Clindamycin: S 0.25    -  Ertapenem: S 0.5    -  Erythromycin: R >4    -  Linezolid: S 2    -  Oxacillin: S <=0.25    -  Meropenem: S 0.25    -  Piperacillin/Tazobactam: S 0.75    -  Piperacillin/Tazobactam: S 1.5    -  RIF- Rifampin: S <=1 Should not be used as monotherapy    -  Trimethoprim/Sulfamethoxazole: S <=0.5/9.5    Specimen Source: .Surgical Swab 3rd Metatarsal Head- Tissue OR    Culture Results:   Numerous Streptococcus dysgalactiae (Group C/G) See previous culture for  sensitivies  Few Streptococcus agalactiae See previous culture for sensitivies  Moderate Staphylococcus aureus See previous culture for sensitivies  Moderate Actinomyces species Beta lactamase negative  Moderate Prevotella melaninogenica Beta lactamase positive    Organism Identification: Staphylococcus aureus  Actinomyces species  Prevotella melaninogenica    Organism: Staphylococcus aureus    Organism: Actinomyces species    Organism: Prevotella melaninogenica    Method Type: DIANE    Method Type: ETEST    Method Type: ETEST    Culture - Surgical Swab (12.30.19 @ 08:08)    -  Erythromycin: R    -  Erythromycin: R    -  Erythromycin: R >4    -  Cefepime: S <=2    -  Ceftriaxone: R 32 Enterobacter, Citrobacter, and Serratia may develop resistance during prolonged therapy    -  Clindamycin: R    -  Clindamycin: S    -  Clindamycin: R 0.5 This isolate is presumed to be clindamycin resistant based on detection of inducible resistance. Clindamycin may still be effective in some patients.    -  Ampicillin: S    -  Ampicillin: S    -  Ampicillin: R >16 These ampicillin results predict results for amoxicillin    -  Ampicillin/Sulbactam: R >16/8 Enterobacter, Citrobacter, and Serratia may develop resistance during prolonged therapy (3-4 days)    -  Cefazolin: S <=4    -  Cefazolin: R >16 Enterobacter, Citrobacter, and Serratia may develop resistance during prolonged therapy (3-4 days)    Gram Stain:   Moderate Gram positive cocci in pairs, chains and clusters  Rare Gram Positive Rods  Few White blood cells    -  Vancomycin: S    -  Vancomycin: S    -  Vancomycin: S 2    -  Tobramycin: S <=2    -  Trimethoprim/Sulfamethoxazole: S <=0.5/9.5    -  Trimethoprim/Sulfamethoxazole: R >2/38    -  RIF- Rifampin: S <=1 Should not be used as monotherapy    -  Oxacillin: S <=0.25    -  Penicillin: S Predicts results for ampicillin, amoxicillin, amoxicillin/clavulanate, ampicillin/sulbactam, 1st, 2nd and 3rd generation cephalosporins and carbapenems.    -  Penicillin: S Predicts results for ampicillin, amoxicillin, amoxicillin/clavulanate, ampicillin/sulbactam, 1st, 2nd and 3rd generation cephalosporins and carbapenems.    -  Penicillin: R 8    -  Piperacillin/Tazobactam: S 16    -  Meropenem: S <=1    -  Gentamicin: S <=1    -  Levofloxacin: S    -  Levofloxacin: S    -  Linezolid: S 2    Specimen Source: .Surgical Swab Rt. foot- OR    Culture Results:   Moderate Streptococcus dysgalactiae (Group C/G)  Moderate Streptococcus agalactiae  Rare Staphylococcus aureus  Rare Morganella morganii  Mixed Anaerobic [place] including  Moderate Finegoldia magna Beta lactamase negative  Rare Prevotella melaninogenica Beta lactamase positive    Organism Identification: Streptococcus dysgalactiae  Sreptococcus agalactiae  Staphylococcus aureus  Morganella morganii    Organism: Streptococcus dysgalactiae    Organism: Sreptococcus agalactiae    Organism: Staphylococcus aureus    Organism: Morganella morganii    Method Type: KB    Method Type: KB    Method Type: DIANE    Method Type: DIANE    PHYSICAL EXAM  Wound vac intact and operating well. No strikethrough on dressing. Upon removal of vac, granular tissue appreciated at wound base. No clinical signs of infection

## 2020-01-07 NOTE — PROVIDER CONTACT NOTE (CRITICAL VALUE NOTIFICATION) - RECOMMENDATIONS
hold vanco until repeat trough at 10 AM
- Give pt juice with sugar  - monitor for status changes  - Recheck blood sugar
PO 6oz juice and 15 minute recheck until fs above 100

## 2020-01-07 NOTE — PROGRESS NOTE ADULT - SUBJECTIVE AND OBJECTIVE BOX
24hr Events;  O/N: Hypoglycemia, FSG 56, given orange juice, VSS  1/6: ID recommend Cefapime and Flagyl. Endo - Recommend decreasing Lantus to 35 units. Increasing Lispro to 9 units. Awaiting insurance authorization, accepted to [place].            Assessment/Plan;    69F hx poorly controlled DM presents with R foot gas gangrene and DKA s/p R 2nd+3rd toe partial ray amputation (12/29) admitted to SICU for postop monitoring. Post operatively, anion gap normalized, glucose well controlled, on sliding scale insulin    ID:   -Final Abx plan  -Continue Cefepime/flagyl x6wks    Wound:   -Wound vac change by podiatry  -will follow up wound care plan for discharge     Endo:   Glucose control  -Insulin increased to lantus 44 and Nutritional 7 units   -will f/u Endocrine for insulin regimen for discharge     psych:   - Psych rec  -patient with known hx of depression   Cymbalta BID 24hr Events;  O/N: Hypoglycemia, FSG 56, given orange juice, VSS  1/6: ID recommend Cefapime and Flagyl. Endo - Recommend decreasing Lantus to 35 units. Increasing Lispro to 9 units. Awaiting insurance authorization, accepted to [place].      cefepime   IVPB 2000  metroNIDAZOLE    Tablet 500  aspirin  chewable 81  cefepime   IVPB 2000  enoxaparin Injectable 40  metroNIDAZOLE    Tablet 500  NIFEdipine XL 90        Vital Signs Last 24 Hrs  T(C): 36.1 (07 Jan 2020 05:50), Max: 37.1 (06 Jan 2020 21:53)  T(F): 97 (07 Jan 2020 05:50), Max: 98.8 (06 Jan 2020 21:53)  HR: 67 (07 Jan 2020 05:32) (67 - 120)  BP: 170/81 (07 Jan 2020 05:32) (112/67 - 172/86)  BP(mean): --  RR: 17 (07 Jan 2020 05:32) (17 - 18)  SpO2: 100% (07 Jan 2020 05:32) (99% - 100%)  I&O's Summary    06 Jan 2020 07:01  -  07 Jan 2020 07:00  --------------------------------------------------------  IN: 0 mL / OUT: 0 mL / NET: 0 mL        Physical Exam:  General: NAD, resting comfortably in bed, AOAx3  Pulmonary: Nonlabored breathing, no respiratory distress  Cardiovascular: RRR  Abdominal: s/nt/nd  Extremities: WWP, Full ROM, no sensory abnormalities. 2nd and 3rd toe partial amputation w/ VAC in place.   Vascular:           Assessment/Plan;    69F hx poorly controlled DM presents with R foot gas gangrene and DKA s/p R 2nd+3rd toe partial ray amputation (12/29) admitted to SICU for postop monitoring. Post operatively, anion gap normalized, glucose well controlled, on sliding scale insulin    Pending insurance authorization for [facility].   Appreciate endo recommendations    ID:   -Final Abx plan  -Continue Cefepime/flagyl x6wks    Wound:   -Wound vac change by podiatry  -will follow up wound care plan for discharge     Endo:   Glucose control  -Insulin increased to lantus 44 and Nutritional 7 units   -will f/u Endocrine for insulin regimen for discharge     psych:   - Psych rec  -patient with known hx of depression   Cymbalta BID

## 2020-01-07 NOTE — PROGRESS NOTE ADULT - SUBJECTIVE AND OBJECTIVE BOX
INTERVAL HPI/OVERNIGHT EVENTS:    Patient is a 69y old  Female who presents with a chief complaint of Rt Foot Diabetic foot infection (07 Jan 2020 08:18)      Pt reports the following symptoms:    CONSTITUTIONAL:  Negative fever or chills, feels well, good appetite  EYES:  Negative  blurry vision or double vision  CARDIOVASCULAR:  Negative for chest pain or palpitations  RESPIRATORY:  Negative for cough, wheezing, or SOB   GASTROINTESTINAL:  Negative for nausea, vomiting, diarrhea, constipation, or abdominal pain  GENITOURINARY:  Negative frequency, urgency or dysuria  NEUROLOGIC:  No headache, confusion, dizziness, lightheadedness    MEDICATIONS  (STANDING):  aspirin  chewable 81 milliGRAM(s) Oral daily  atorvastatin 40 milliGRAM(s) Oral at bedtime  cefepime   IVPB 2000 milliGRAM(s) IV Intermittent every 12 hours  chlorhexidine 2% Cloths 1 Application(s) Topical <User Schedule>  chlorhexidine 2% Cloths 1 Application(s) Topical <User Schedule>  dextrose 5%. 1000 milliLiter(s) (50 mL/Hr) IV Continuous <Continuous>  dextrose 50% Injectable 12.5 Gram(s) IV Push once  dextrose 50% Injectable 25 Gram(s) IV Push once  dextrose 50% Injectable 25 Gram(s) IV Push once  DULoxetine 60 milliGRAM(s) Oral daily  enoxaparin Injectable 40 milliGRAM(s) SubCutaneous every 24 hours  insulin glargine Injectable (LANTUS) 26 Unit(s) SubCutaneous at bedtime  insulin lispro (HumaLOG) corrective regimen sliding scale   SubCutaneous Before meals and at bedtime  insulin lispro Injectable (HumaLOG) 8 Unit(s) SubCutaneous three times a day before meals  melatonin 3 milliGRAM(s) Oral at bedtime  metroNIDAZOLE    Tablet 500 milliGRAM(s) Oral every 8 hours  NIFEdipine XL 90 milliGRAM(s) Oral daily  pantoprazole    Tablet 40 milliGRAM(s) Oral before breakfast    MEDICATIONS  (PRN):  acetaminophen   Tablet .. 650 milliGRAM(s) Oral every 6 hours PRN Moderate Pain (4 - 6)  aluminum hydroxide/magnesium hydroxide/simethicone Suspension 30 milliLiter(s) Oral every 6 hours PRN Dyspepsia  benzocaine 15 mG/menthol 3.6 mG (Sugar-Free) Lozenge 1 Lozenge Oral every 2 hours PRN Sore Throat  dextrose 40% Gel 15 Gram(s) Oral once PRN Blood Glucose LESS THAN 70 milliGRAM(s)/deciliter  glucagon  Injectable 1 milliGRAM(s) IntraMuscular once PRN Glucose LESS THAN 70 milligrams/deciliter  ondansetron    Tablet 4 milliGRAM(s) Oral every 6 hours PRN Nausea  sodium chloride 0.9% lock flush 10 milliLiter(s) IV Push every 1 hour PRN Pre/post blood products, medications, blood draw, and to maintain line patency      PHYSICAL EXAM  Vital Signs Last 24 Hrs  T(C): 36.6 (07 Jan 2020 10:39), Max: 37.1 (06 Jan 2020 21:53)  T(F): 97.9 (07 Jan 2020 10:39), Max: 98.8 (06 Jan 2020 21:53)  HR: 82 (07 Jan 2020 08:43) (67 - 120)  BP: 135/81 (07 Jan 2020 08:43) (112/67 - 172/86)  BP(mean): --  RR: 18 (07 Jan 2020 08:43) (17 - 18)  SpO2: 100% (07 Jan 2020 08:43) (99% - 100%)    Constitutional: wn/wd in NAD.   HEENT: NCAT, MMM, OP clear, EOMI, no proptosis or lid retraction  Neck: no thyromegaly or palpable thyroid nodules   Respiratory: lungs CTAB.  Cardiovascular: regular rhythm, normal S1 and S2, no audible murmurs, no peripheral edema  GI: soft, NT/ND, no masses/HSM appreciated.  Neurology: no tremors, DTR 2+  Skin: no visible rashes/lesions  Psychiatric: AAO x 3, normal affect/mood.    LABS:                  HbA1C: 11.2 % (12-29 @ 10:32)    CAPILLARY BLOOD GLUCOSE      POCT Blood Glucose.: 75 mg/dL (07 Jan 2020 07:03)  POCT Blood Glucose.: 76 mg/dL (07 Jan 2020 01:00)  POCT Blood Glucose.: 56 mg/dL (07 Jan 2020 00:46)  POCT Blood Glucose.: 152 mg/dL (06 Jan 2020 21:22)  POCT Blood Glucose.: 113 mg/dL (06 Jan 2020 16:27)  POCT Blood Glucose.: 157 mg/dL (06 Jan 2020 13:01)      Insulin Sliding Scale requirements X 24 Hours:    RADIOLOGY & ADDITIONAL TESTS:    A/P: 69y Female with history of DM type II presenting for       1.  DM -     Please continue           units lantus at bedtime  / in the morning and        units lispro with meals and lispro moderate / low dose sliding scale 4 times daily with meals and at bedtime.  Please continue consistent carbohydrate diet.      Goal FSG is   Will continue to monitor   For discharge, pt can continue    Pt can follow up at discharge with [facility] by calling  to make an appointment.   Will discuss case with     and update primary team INTERVAL HPI/OVERNIGHT EVENTS:    Patient seen and examined at the bedside. She has the wound vac in place. She is being planned for discharge to a rehab with PO abx today.  overnight, she had a low FSG of 56 around midnight for which she was given juice and her repeat FSg was 76.  FSG & Insulin received:  Yesterday:  pre-dinner fs, 9 nutritional lispro   units  bedtime fs, 35 lantus   units + 2   units lispro SS  Today:  100 Am FSg 56 - gave juice - repeat FSg was 76 - was feeling weak  pre-breakfast fs, no insulin given      Pt reports the following symptoms:    CONSTITUTIONAL:  Negative fever or chills  EYES:  Negative  blurry vision or double vision  CARDIOVASCULAR:  Negative for chest pain or palpitations  RESPIRATORY:  Negative for cough, wheezing, or SOB   GASTROINTESTINAL:  Negative for vomiting, diarrhea, constipation, or abdominal pain  GENITOURINARY:  Negative frequency, urgency or dysuria  NEUROLOGIC:  No headache,  lightheadedness    MEDICATIONS  (STANDING):  aspirin  chewable 81 milliGRAM(s) Oral daily  atorvastatin 40 milliGRAM(s) Oral at bedtime  cefepime   IVPB 2000 milliGRAM(s) IV Intermittent every 12 hours  chlorhexidine 2% Cloths 1 Application(s) Topical <User Schedule>  chlorhexidine 2% Cloths 1 Application(s) Topical <User Schedule>  dextrose 5%. 1000 milliLiter(s) (50 mL/Hr) IV Continuous <Continuous>  dextrose 50% Injectable 12.5 Gram(s) IV Push once  dextrose 50% Injectable 25 Gram(s) IV Push once  dextrose 50% Injectable 25 Gram(s) IV Push once  DULoxetine 60 milliGRAM(s) Oral daily  enoxaparin Injectable 40 milliGRAM(s) SubCutaneous every 24 hours  insulin glargine Injectable (LANTUS) 26 Unit(s) SubCutaneous at bedtime  insulin lispro (HumaLOG) corrective regimen sliding scale   SubCutaneous Before meals and at bedtime  insulin lispro Injectable (HumaLOG) 8 Unit(s) SubCutaneous three times a day before meals  melatonin 3 milliGRAM(s) Oral at bedtime  metroNIDAZOLE    Tablet 500 milliGRAM(s) Oral every 8 hours  NIFEdipine XL 90 milliGRAM(s) Oral daily  pantoprazole    Tablet 40 milliGRAM(s) Oral before breakfast    MEDICATIONS  (PRN):  acetaminophen   Tablet .. 650 milliGRAM(s) Oral every 6 hours PRN Moderate Pain (4 - 6)  aluminum hydroxide/magnesium hydroxide/simethicone Suspension 30 milliLiter(s) Oral every 6 hours PRN Dyspepsia  benzocaine 15 mG/menthol 3.6 mG (Sugar-Free) Lozenge 1 Lozenge Oral every 2 hours PRN Sore Throat  dextrose 40% Gel 15 Gram(s) Oral once PRN Blood Glucose LESS THAN 70 milliGRAM(s)/deciliter  glucagon  Injectable 1 milliGRAM(s) IntraMuscular once PRN Glucose LESS THAN 70 milligrams/deciliter  ondansetron    Tablet 4 milliGRAM(s) Oral every 6 hours PRN Nausea  sodium chloride 0.9% lock flush 10 milliLiter(s) IV Push every 1 hour PRN Pre/post blood products, medications, blood draw, and to maintain line patency      PHYSICAL EXAM  Vital Signs Last 24 Hrs  T(C): 36.6 (2020 10:39), Max: 37.1 (2020 21:53)  T(F): 97.9 (2020 10:39), Max: 98.8 (2020 21:53)  HR: 82 (2020 08:43) (67 - 120)  BP: 135/81 (2020 08:43) (112/67 - 172/86)  BP(mean): --  RR: 18 (2020 08:43) (17 - 18)  SpO2: 100% (2020 08:43) (99% - 100%)    Constitutional: wn/wd in NAD.   Respiratory: lungs CTAB.  Cardiovascular: regular rhythm, normal S1 and S2, no peripheral edema  GI: soft, NT/ND, no masses/HSM appreciated.  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  DM Type 2 - uncontrolled  - Hba1c 11.2  - Wt 68 kg with BMI 27.4  - Cr/GFR 1.31/48  No ECHO in system  Please decrease to lantus 26 units at night.  Please decrease to lispro 8 units before each meal.    Please continue lispro moderate dose sliding scale four times daily with meals and at bedtime  carb consistent diet    Pt's fingerstick glucose goal is 120 to 150    Will continue to monitor     For discharge, Patient can be discharged on Lantus 26 units at bedtime, and lispro 8 units before each meal along with a lispro moderate dose sliding scale four times a day before meals and bedtime.    Pt can follow up at discharge with [facility] by calling  to make an appointment.   discussed case with Dr. Tran   and updated  primary team    To Make an appointment at [address] [place] [address] for the patient, either:  1. Send a STAT task via [employer] to Ernestina Wilson or Sumi Rossi (office managers)   OR  2. Call supervisor's line. P: [phone] (do not give this number to patient). VM is checked periodically  In the message, specify that this is a hospital discharge follow-up, and that the appt can be made with a NP if there is no timely MD availability    REMINDERS FOR INSULIN/DIABETES SUPPLIES at DISCHARGE:  INSULIN:   Long actin/Basal Insulin: Examples: Toujeo, Basaglar, Tresiba, Lantus   Short acting/Bolus Insulin: Humalog, Admelog, Novolog  Please ensure that BOTH short acting and long acting insulin are prescribed in the same preparation (Ex: PEN vs VIAL/SOLUTION)     TESTING SUPPLIES:   All glucometer supplies should be written as generic to avoid issues with insurance. Use the free text option in sunrise prescription writer, and type in glucometer test strips, lancets, etc to order.    If sending patient home on insulin PEN, please send:   •	BD paz insulin pen needles for use up to 4 times daily (total quantity 100)  •	Lancets for use up to 4 times daily (total quantity 100)  •	Glucometer Test strips for use up to 4 times daily (total quantity 100)  •	Alcohol swabs for use up to 4 times daily (total quantity 100)  •	Glucometer (If provided by hospital, still provide scripts for lancets, test strips, and swabs)  If sending patient home on insulin VIAL, please send:   •	Insulin syringes (6mm) - for use up to 4 times daily (total quantity 100)  •	Lancets for use up to 4 times daily (total quantity 100)  •	Generic Glucometer Test strips for use up to 4 times daily (total quantity 100)  •	Alcohol swabs for use up to 4 times daily (total quantity 100)  •	Generic Glucometer (If provided by hospital, still provide scripts for lancets, test strips, and swabs)  •	Do not specify brand for testing supplies (such as contour, freestyle, one touch etc) that way the pharmacy has the freedom to pick and change according to what the insurance dictates.  For patients without insurance:   •	Provide social work with appropriate scripts so they may obtain 1 week of samples  •	Provide with glucometer. Glucometers are located at various nursing stations, the nursing office, education, and endocrine fellows office.  •	Please make appointment with Nichole Jama NP or Kaycee Coates RN and JOSE G Bell at the [address] [place] [address] endocrinology clinic. They can see patients without insurance, provide appropriate samples, and assist in getting insurance coverage.     PREFERRED PHARMACY:  [facility] (located on 1st floor next to admitting)  P: [phone]  Hours: M – F 8AM – 8PM, Sat 8AM – 4PM, Sun—closed  If not using [facility], please follow up with chosen pharmacy to ensure insulin prescribed is covered.

## 2020-01-07 NOTE — PROGRESS NOTE ADULT - ASSESSMENT
1F PMHx DM presents w/ R foot ulcer. WBC 18.77, Lactate 2.9. LRINEC score >6. Gas seen on X-ray. S/p right partial 2nd and 4rd ray amputation 12/29.    - C/w IV ABX per ID.   -Flushed wound with sterile saline and applied wound vac dressing with black granufoam. VAC operating well.   - Heel WB to RLE  - Plan for ANT dispo with wound vac changes  - When discharged, patient can follow up with Podiatry at [address] [place] [address]  [phone]

## 2020-01-07 NOTE — PROVIDER CONTACT NOTE (CRITICAL VALUE NOTIFICATION) - BACKGROUND
69F with DM2, last FS at 9:22 152- 2 units Humolog and 35 units of Lantus given as per sliding scale.

## 2020-01-07 NOTE — PROVIDER CONTACT NOTE (CRITICAL VALUE NOTIFICATION) - ACTION/TREATMENT ORDERED:
cancelled dose; repeat trough at 10 AM
- Give pt juice  - Monitor for changes  - Recheck blood sugar  - If no improvement, give oral glucose and call provider back
hypoglycemia protocol initiated. pt received 6 oz juice. 15 minute recheck fs 84. repeat checks of fs95 and fs116 results.

## 2020-01-08 LAB
GLUCOSE BLDC GLUCOMTR-MCNC: 120 MG/DL — HIGH (ref 70–99)
GLUCOSE BLDC GLUCOMTR-MCNC: 129 MG/DL — HIGH (ref 70–99)
GLUCOSE BLDC GLUCOMTR-MCNC: 149 MG/DL — HIGH (ref 70–99)
GLUCOSE BLDC GLUCOMTR-MCNC: 158 MG/DL — HIGH (ref 70–99)
SURGICAL PATHOLOGY STUDY: SIGNIFICANT CHANGE UP

## 2020-01-08 PROCEDURE — 99231 SBSQ HOSP IP/OBS SF/LOW 25: CPT | Mod: GC

## 2020-01-08 RX ORDER — INSULIN LISPRO 100/ML
6 VIAL (ML) SUBCUTANEOUS
Refills: 0 | Status: DISCONTINUED | OUTPATIENT
Start: 2020-01-08 | End: 2020-01-09

## 2020-01-08 RX ORDER — DULOXETINE HYDROCHLORIDE 30 MG/1
60 CAPSULE, DELAYED RELEASE ORAL
Refills: 0 | Status: DISCONTINUED | OUTPATIENT
Start: 2020-01-08 | End: 2020-01-09

## 2020-01-08 RX ORDER — INSULIN GLARGINE 100 [IU]/ML
15 INJECTION, SOLUTION SUBCUTANEOUS AT BEDTIME
Refills: 0 | Status: DISCONTINUED | OUTPATIENT
Start: 2020-01-08 | End: 2020-01-09

## 2020-01-08 RX ORDER — INSULIN GLARGINE 100 [IU]/ML
20 INJECTION, SOLUTION SUBCUTANEOUS AT BEDTIME
Refills: 0 | Status: DISCONTINUED | OUTPATIENT
Start: 2020-01-08 | End: 2020-01-08

## 2020-01-08 RX ORDER — INSULIN LISPRO 100/ML
7 VIAL (ML) SUBCUTANEOUS
Refills: 0 | Status: DISCONTINUED | OUTPATIENT
Start: 2020-01-08 | End: 2020-01-08

## 2020-01-08 RX ADMIN — Medication 25 MILLIGRAM(S): at 06:19

## 2020-01-08 RX ADMIN — PANTOPRAZOLE SODIUM 40 MILLIGRAM(S): 20 TABLET, DELAYED RELEASE ORAL at 06:19

## 2020-01-08 RX ADMIN — Medication 500 MILLIGRAM(S): at 06:19

## 2020-01-08 RX ADMIN — Medication 500 MILLIGRAM(S): at 13:52

## 2020-01-08 RX ADMIN — Medication 25 MILLIGRAM(S): at 13:52

## 2020-01-08 RX ADMIN — Medication 90 MILLIGRAM(S): at 06:20

## 2020-01-08 RX ADMIN — Medication 3 MILLIGRAM(S): at 21:58

## 2020-01-08 RX ADMIN — Medication 81 MILLIGRAM(S): at 21:58

## 2020-01-08 RX ADMIN — Medication 6 UNIT(S): at 16:55

## 2020-01-08 RX ADMIN — CEFEPIME 100 MILLIGRAM(S): 1 INJECTION, POWDER, FOR SOLUTION INTRAMUSCULAR; INTRAVENOUS at 20:03

## 2020-01-08 RX ADMIN — ENOXAPARIN SODIUM 40 MILLIGRAM(S): 100 INJECTION SUBCUTANEOUS at 21:58

## 2020-01-08 RX ADMIN — Medication 7 UNIT(S): at 13:52

## 2020-01-08 RX ADMIN — DULOXETINE HYDROCHLORIDE 60 MILLIGRAM(S): 30 CAPSULE, DELAYED RELEASE ORAL at 16:55

## 2020-01-08 RX ADMIN — ATORVASTATIN CALCIUM 40 MILLIGRAM(S): 80 TABLET, FILM COATED ORAL at 21:58

## 2020-01-08 RX ADMIN — INSULIN GLARGINE 15 UNIT(S): 100 INJECTION, SOLUTION SUBCUTANEOUS at 21:59

## 2020-01-08 RX ADMIN — CHLORHEXIDINE GLUCONATE 1 APPLICATION(S): 213 SOLUTION TOPICAL at 06:21

## 2020-01-08 RX ADMIN — Medication 500 MILLIGRAM(S): at 21:58

## 2020-01-08 RX ADMIN — Medication 25 MILLIGRAM(S): at 21:58

## 2020-01-08 RX ADMIN — Medication 2: at 21:58

## 2020-01-08 RX ADMIN — CEFEPIME 100 MILLIGRAM(S): 1 INJECTION, POWDER, FOR SOLUTION INTRAMUSCULAR; INTRAVENOUS at 06:20

## 2020-01-08 NOTE — PROGRESS NOTE ADULT - SUBJECTIVE AND OBJECTIVE BOX
Patient is a 69y old  Female who presents with a chief complaint of Rt Foot Diabetic foot infection (08 Jan 2020 05:47)      INTERVAL HPI/ OVERNIGHT EVENTS: Pt seen and examined bedside. Pending ANT placement. Wound vac operational.    ICU Vital Signs Last 24 Hrs  T(C): 36.3 (08 Jan 2020 07:13), Max: 36.7 (07 Jan 2020 14:28)  T(F): 97.3 (08 Jan 2020 07:13), Max: 98.1 (07 Jan 2020 14:28)  HR: 66 (08 Jan 2020 05:44) (66 - 87)  BP: 136/70 (08 Jan 2020 05:44) (129/68 - 174/81)  BP(mean): --  ABP: --  ABP(mean): --  RR: 16 (08 Jan 2020 05:44) (16 - 16)  SpO2: 97% (08 Jan 2020 05:44) (96% - 98%)    MICROBIOLOGY  Culture - Surgical Swab (12.30.19 @ 08:11)    Gram Stain:   Moderate Gram positive cocci in pairs, chains and clusters  Few Gram Negative Diplococci  Rare White blood cells    -  Amoxicillin/Clavulanic Acid: S 0.5    -  Amoxicillin/Clavulanic Acid: S 0.5    -  Cefazolin: S <=4    -  Clindamycin: R 0.5 This isolate is presumed to be clindamycin resistant based on detection of inducible resistance. Clindamycin may still be effective in some patients.    -  Clindamycin: S 0.25    -  Ertapenem: S 0.5    -  Erythromycin: R >4    -  Linezolid: S 2    -  Meropenem: S 0.25    -  Oxacillin: S <=0.25    -  RIF- Rifampin: S <=1 Should not be used as monotherapy    -  Trimethoprim/Sulfamethoxazole: S <=0.5/9.5    -  Piperacillin/Tazobactam: S 0.75    -  Piperacillin/Tazobactam: S 1.5    Specimen Source: .Surgical Swab 3rd Metatarsal Head- Tissue OR    Culture Results:   Numerous Streptococcus dysgalactiae (Group C/G) See previous culture for  sensitivies  Few Streptococcus agalactiae See previous culture for sensitivies  Moderate Staphylococcus aureus See previous culture for sensitivies  Moderate Actinomyces species Beta lactamase negative  Moderate Prevotella melaninogenica Beta lactamase positive    Organism Identification: Staphylococcus aureus  Actinomyces species  Prevotella melaninogenica    Organism: Staphylococcus aureus    Organism: Actinomyces species    Organism: Prevotella melaninogenica    Method Type: DIANE    Method Type: ETEST    Method Type: ETEST    PHYSICAL EXAM  Lower Extremity Focused  No interval change in neurovascular status. Wound vac intact and operating well. Dressing c/d/i.

## 2020-01-08 NOTE — CONSULT NOTE ADULT - PROVIDER SPECIALTY LIST ADULT
Critical Care
Endocrinology
Intervent Radiology
Podiatry
SICU
Vascular Surgery
Rehab Medicine
SICU
Infectious Disease

## 2020-01-08 NOTE — PROGRESS NOTE ADULT - ASSESSMENT
69F PMHx DM presents w/ R foot ulcer. WBC 18.77, Lactate 2.9. LRINEC score >6. Gas seen on X-ray. S/p right partial 2nd and 4rd ray amputation 12/29.    Recommendations  - C/w IV ABX per ID.   - Heel WB to RLE  - Plan for ANT dispo with wound vac changes  - When discharged, patient can follow up with Podiatry at [address] [place] [address]  [phone]

## 2020-01-08 NOTE — PROGRESS NOTE ADULT - SUBJECTIVE AND OBJECTIVE BOX
INTERVAL HPI/OVERNIGHT EVENTS:    Patient is a 69y old  Female who presents with a chief complaint of Rt Foot Diabetic foot infection (08 Jan 2020 09:19)      Pt reports the following symptoms:    CONSTITUTIONAL:  Negative fever or chills, feels well, good appetite  EYES:  Negative  blurry vision or double vision  CARDIOVASCULAR:  Negative for chest pain or palpitations  RESPIRATORY:  Negative for cough, wheezing, or SOB   GASTROINTESTINAL:  Negative for nausea, vomiting, diarrhea, constipation, or abdominal pain  GENITOURINARY:  Negative frequency, urgency or dysuria  NEUROLOGIC:  No headache, confusion, dizziness, lightheadedness    MEDICATIONS  (STANDING):  aspirin  chewable 81 milliGRAM(s) Oral daily  atorvastatin 40 milliGRAM(s) Oral at bedtime  cefepime   IVPB 2000 milliGRAM(s) IV Intermittent every 12 hours  chlorhexidine 2% Cloths 1 Application(s) Topical <User Schedule>  chlorhexidine 2% Cloths 1 Application(s) Topical <User Schedule>  dextrose 5%. 1000 milliLiter(s) (50 mL/Hr) IV Continuous <Continuous>  dextrose 50% Injectable 12.5 Gram(s) IV Push once  dextrose 50% Injectable 25 Gram(s) IV Push once  dextrose 50% Injectable 25 Gram(s) IV Push once  DULoxetine 60 milliGRAM(s) Oral two times a day  enoxaparin Injectable 40 milliGRAM(s) SubCutaneous every 24 hours  hydrALAZINE 25 milliGRAM(s) Oral three times a day  insulin glargine Injectable (LANTUS) 20 Unit(s) SubCutaneous at bedtime  insulin lispro (HumaLOG) corrective regimen sliding scale   SubCutaneous Before meals and at bedtime  insulin lispro Injectable (HumaLOG) 7 Unit(s) SubCutaneous three times a day before meals  melatonin 3 milliGRAM(s) Oral at bedtime  metroNIDAZOLE    Tablet 500 milliGRAM(s) Oral every 8 hours  NIFEdipine XL 90 milliGRAM(s) Oral daily  pantoprazole    Tablet 40 milliGRAM(s) Oral before breakfast    MEDICATIONS  (PRN):  acetaminophen   Tablet .. 650 milliGRAM(s) Oral every 6 hours PRN Moderate Pain (4 - 6)  aluminum hydroxide/magnesium hydroxide/simethicone Suspension 30 milliLiter(s) Oral every 6 hours PRN Dyspepsia  benzocaine 15 mG/menthol 3.6 mG (Sugar-Free) Lozenge 1 Lozenge Oral every 2 hours PRN Sore Throat  dextrose 40% Gel 15 Gram(s) Oral once PRN Blood Glucose LESS THAN 70 milliGRAM(s)/deciliter  glucagon  Injectable 1 milliGRAM(s) IntraMuscular once PRN Glucose LESS THAN 70 milligrams/deciliter  ondansetron    Tablet 4 milliGRAM(s) Oral every 6 hours PRN Nausea  sodium chloride 0.9% lock flush 10 milliLiter(s) IV Push every 1 hour PRN Pre/post blood products, medications, blood draw, and to maintain line patency      PHYSICAL EXAM  Vital Signs Last 24 Hrs  T(C): 36.2 (08 Jan 2020 11:58), Max: 36.7 (07 Jan 2020 14:28)  T(F): 97.2 (08 Jan 2020 11:58), Max: 98.1 (07 Jan 2020 14:28)  HR: 75 (08 Jan 2020 12:40) (66 - 87)  BP: 140/84 (08 Jan 2020 12:40) (129/68 - 174/81)  BP(mean): --  RR: 16 (08 Jan 2020 12:40) (16 - 16)  SpO2: 98% (08 Jan 2020 12:40) (96% - 98%)    Constitutional: wn/wd in NAD.   HEENT: NCAT, MMM, OP clear, EOMI, no proptosis or lid retraction  Neck: no thyromegaly or palpable thyroid nodules   Respiratory: lungs CTAB.  Cardiovascular: regular rhythm, normal S1 and S2, no audible murmurs, no peripheral edema  GI: soft, NT/ND, no masses/HSM appreciated.  Neurology: no tremors, DTR 2+  Skin: no visible rashes/lesions  Psychiatric: AAO x 3, normal affect/mood.    LABS:                  HbA1C: 11.2 % (12-29 @ 10:32)    CAPILLARY BLOOD GLUCOSE      POCT Blood Glucose.: 129 mg/dL (08 Jan 2020 07:03)  POCT Blood Glucose.: 119 mg/dL (07 Jan 2020 22:55)  POCT Blood Glucose.: 98 mg/dL (07 Jan 2020 21:50)  POCT Blood Glucose.: 77 mg/dL (07 Jan 2020 21:26)  POCT Blood Glucose.: 133 mg/dL (07 Jan 2020 18:14)  POCT Blood Glucose.: 209 mg/dL (07 Jan 2020 15:00)      Insulin Sliding Scale requirements X 24 Hours:    RADIOLOGY & ADDITIONAL TESTS:    A/P: 69y Female with history of DM type II presenting for       1.  DM -     Please continue           units lantus at bedtime  / in the morning and        units lispro with meals and lispro moderate / low dose sliding scale 4 times daily with meals and at bedtime.  Please continue consistent carbohydrate diet.      Goal FSG is   Will continue to monitor   For discharge, pt can continue    Pt can follow up at discharge with [facility] by calling  to make an appointment.   Will discuss case with     and update primary team INTERVAL HPI/OVERNIGHT EVENTS:    Patient seen and examined at the bedside. She has the wound vac in place. She denies feeling nauseous or any emesis. She just had 2 scoops of potatoes for dinner yesterday and lunch today  FSG & Insulin received:  Yesterday:  pre-dinner fs, 8 nutritional lispro   units  bedtime fs, no lantus  Today:  pre-breakfast fs, no insulin given, No BF  pre-lunch fs, 7 nutritional lispro   units    Pt reports the following symptoms:    CONSTITUTIONAL:  Negative fever or chills  EYES:  Negative  blurry vision or double vision  CARDIOVASCULAR:  Negative for chest pain or palpitations  RESPIRATORY:  Negative for cough, wheezing, or SOB   GASTROINTESTINAL:  Negative for vomiting, diarrhea, constipation, or abdominal pain  GENITOURINARY:  Negative frequency, urgency or dysuria  NEUROLOGIC:  No headache, confusion, dizziness, lightheadedness    MEDICATIONS  (STANDING):  aspirin  chewable 81 milliGRAM(s) Oral daily  atorvastatin 40 milliGRAM(s) Oral at bedtime  cefepime   IVPB 2000 milliGRAM(s) IV Intermittent every 12 hours  chlorhexidine 2% Cloths 1 Application(s) Topical <User Schedule>  chlorhexidine 2% Cloths 1 Application(s) Topical <User Schedule>  dextrose 5%. 1000 milliLiter(s) (50 mL/Hr) IV Continuous <Continuous>  dextrose 50% Injectable 12.5 Gram(s) IV Push once  dextrose 50% Injectable 25 Gram(s) IV Push once  dextrose 50% Injectable 25 Gram(s) IV Push once  DULoxetine 60 milliGRAM(s) Oral two times a day  enoxaparin Injectable 40 milliGRAM(s) SubCutaneous every 24 hours  hydrALAZINE 25 milliGRAM(s) Oral three times a day  insulin glargine Injectable (LANTUS) 20 Unit(s) SubCutaneous at bedtime  insulin lispro (HumaLOG) corrective regimen sliding scale   SubCutaneous Before meals and at bedtime  insulin lispro Injectable (HumaLOG) 7 Unit(s) SubCutaneous three times a day before meals  melatonin 3 milliGRAM(s) Oral at bedtime  metroNIDAZOLE    Tablet 500 milliGRAM(s) Oral every 8 hours  NIFEdipine XL 90 milliGRAM(s) Oral daily  pantoprazole    Tablet 40 milliGRAM(s) Oral before breakfast    MEDICATIONS  (PRN):  acetaminophen   Tablet .. 650 milliGRAM(s) Oral every 6 hours PRN Moderate Pain (4 - 6)  aluminum hydroxide/magnesium hydroxide/simethicone Suspension 30 milliLiter(s) Oral every 6 hours PRN Dyspepsia  benzocaine 15 mG/menthol 3.6 mG (Sugar-Free) Lozenge 1 Lozenge Oral every 2 hours PRN Sore Throat  dextrose 40% Gel 15 Gram(s) Oral once PRN Blood Glucose LESS THAN 70 milliGRAM(s)/deciliter  glucagon  Injectable 1 milliGRAM(s) IntraMuscular once PRN Glucose LESS THAN 70 milligrams/deciliter  ondansetron    Tablet 4 milliGRAM(s) Oral every 6 hours PRN Nausea  sodium chloride 0.9% lock flush 10 milliLiter(s) IV Push every 1 hour PRN Pre/post blood products, medications, blood draw, and to maintain line patency      PHYSICAL EXAM  Vital Signs Last 24 Hrs  T(C): 36.2 (2020 11:58), Max: 36.7 (2020 14:28)  T(F): 97.2 (2020 11:58), Max: 98.1 (2020 14:28)  HR: 75 (2020 12:40) (66 - 87)  BP: 140/84 (2020 12:40) (129/68 - 174/81)  BP(mean): --  RR: 16 (2020 12:40) (16 - 16)  SpO2: 98% (2020 12:40) (96% - 98%)    Constitutional: wn/wd in NAD.   Respiratory: lungs CTAB.  Cardiovascular: regular rhythm, normal S1 and S2, no peripheral edema  GI: soft, NT/ND, no masses/HSM appreciated.  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  DM Type 2 - uncontrolled  - Hba1c 11.2  - Wt 68 kg with BMI 27.4  - Cr/GFR 1.31/48  No ECHO in system  Please decrease to lantus 15 units at night.  Please decrease to lispro 6 units before each meal.    Please continue lispro moderate dose sliding scale four times daily with meals and at bedtime  carb consistent diet    Pt's fingerstick glucose goal is 120 to 150    Will continue to monitor     For discharge, Patient can be discharged on Lantus 15 units at bedtime, and lispro 6 units before each meal along with a lispro moderate dose sliding scale four times a day before meals and bedtime.    Pt can follow up at discharge with [facility] by calling  to make an appointment.   discussed case with Dr. Tran   and updated  primary team    To Make an appointment at [address] [place] [address] for the patient, either:  1. Send a STAT task via Vot[employer] to Ernestina Wilson or Sumi Rossi (office managers)   OR  2. Call supervisor's line. P: [phone] (do not give this number to patient). VM is checked periodically  In the message, specify that this is a hospital discharge follow-up, and that the appt can be made with a NP if there is no timely MD availability    REMINDERS FOR INSULIN/DIABETES SUPPLIES at DISCHARGE:  INSULIN:   Long actin/Basal Insulin: Examples: Toujeo, Basaglar, Tresiba, Lantus   Short acting/Bolus Insulin: Humalog, Admelog, Novolog  Please ensure that BOTH short acting and long acting insulin are prescribed in the same preparation (Ex: PEN vs VIAL/SOLUTION)     TESTING SUPPLIES:   All glucometer supplies should be written as generic to avoid issues with insurance. Use the free text option in sunrise prescription writer, and type in glucometer test strips, lancets, etc to order.    If sending patient home on insulin PEN, please send:   •	BD paz insulin pen needles for use up to 4 times daily (total quantity 100)  •	Lancets for use up to 4 times daily (total quantity 100)  •	Glucometer Test strips for use up to 4 times daily (total quantity 100)  •	Alcohol swabs for use up to 4 times daily (total quantity 100)  •	Glucometer (If provided by hospital, still provide scripts for lancets, test strips, and swabs)  If sending patient home on insulin VIAL, please send:   •	Insulin syringes (6mm) - for use up to 4 times daily (total quantity 100)  •	Lancets for use up to 4 times daily (total quantity 100)  •	Generic Glucometer Test strips for use up to 4 times daily (total quantity 100)  •	Alcohol swabs for use up to 4 times daily (total quantity 100)  •	Generic Glucometer (If provided by hospital, still provide scripts for lancets, test strips, and swabs)  •	Do not specify brand for testing supplies (such as contour, freestyle, one touch etc) that way the pharmacy has the freedom to pick and change according to what the insurance dictates.  For patients without insurance:   •	Provide social work with appropriate scripts so they may obtain 1 week of samples  •	Provide with glucometer. Glucometers are located at various nursing stations, the nursing office, education, and endocrine fellows office.  •	Please make appointment with Nichole Jama NP or Kaycee Coates RN and JOSE G Bell at the [address] [place] [address] endocrinology clinic. They can see patients without insurance, provide appropriate samples, and assist in getting insurance coverage.     PREFERRED PHARMACY:  [facility] (located on 1st floor next to admitting)  P: [phone]  Hours: M – F 8AM – 8PM, Sat 8AM – 4PM, Sun—closed  If not using [facility], please follow up with chosen pharmacy to ensure insulin prescribed is covered.

## 2020-01-08 NOTE — PROGRESS NOTE ADULT - SUBJECTIVE AND OBJECTIVE BOX
24hr Events:  O/N: Given 18u lantus instead of 26u, SBP 160s  1/7: Lantus dec to 26 (35), Lispro dec 8 (9) as per Endo. Hydralazine 25 TID started per Sarah.           Assessment/Plan:  69F hx poorly controlled DM presents with R foot gas gangrene and DKA s/p R 2nd+3rd toe partial ray amputation (12/29) admitted to SICU for postop monitoring. Post operatively, anion gap normalized, glucose well controlled, on sliding scale insulin    Pending insurance authorization for Torres Cabezas.   Appreciate endo recommendations    ID:   -Final Abx plan  -Continue Cefepime/flagyl x6wks    Wound:   -Wound vac change by podiatry  -will follow up wound care plan for discharge     Endo:   Glucose control  -Insulin increased to lantus 44 and Nutritional 7 units   -will f/u Endocrine for insulin regimen for discharge     psych:   - Psych rec  -patient with known hx of depression   Cymbalta BID 24hr Events:  O/N: Given 18u lantus instead of 26u, SBP 160s  1/7: Lantus dec to 26 (35), Lispro dec 8 (9) as per Endo. Hydralazine 25 TID started per Sarah.     Refused Lantus overnight. denies headaches blurred vision this morning. Denies CP, SOB       Last VAC change by podiatry: 1/7  FS down to 77 1/7 pm. patient refused pm dose of Lantus   BP elevated to 160/170 ordered for Hydralazine 25 TID        cefepime   IVPB 2000  metroNIDAZOLE    Tablet 500  aspirin  chewable 81  cefepime   IVPB 2000  enoxaparin Injectable 40  hydrALAZINE 25  metroNIDAZOLE    Tablet 500  NIFEdipine XL 90      Allergies    No Known Allergies    Intolerances        Vital Signs Last 24 Hrs  T(C): 36.3 (08 Jan 2020 07:13), Max: 36.7 (07 Jan 2020 14:28)  T(F): 97.3 (08 Jan 2020 07:13), Max: 98.1 (07 Jan 2020 14:28)  HR: 66 (08 Jan 2020 05:44) (66 - 87)  BP: 136/70 (08 Jan 2020 05:44) (135/81 - 174/81)  BP(mean): --  RR: 16 (08 Jan 2020 05:44) (16 - 18)  SpO2: 97% (08 Jan 2020 05:44) (97% - 100%)  I&O's Summary    07 Jan 2020 07:01  -  08 Jan 2020 07:00  --------------------------------------------------------  IN: 480 mL / OUT: 0 mL / NET: 480 mL        Physical Exam:  General:NAD   Pulmonary: b/l Clear   Cardiovascular: s1S2 regular   Abdominal:  Extremities:Rt Foot with wound VAC in place   surrounding tissue without erythema or drainage  Palpable DP pulse in right foot     LABS:        PLEASE CHECK WHEN PRESENT:     [  ]Heart Failure     [  ] Acute     [  ] Acute on Chronic     [  ] Chronic  -------------------------------------------------------------------     [  ]Diastolic [HFpEF]     [  ]Systolic [HFrEF]     [  ]Combined [HFpEF & HFrEF]     [  ]Other:  -------------------------------------------------------------------  [ ] Respiratory failure  [ ] Acute cor pulmonale  [ ] Asthma/COPD Exacerbation  [ ] Pleural effusion  [ ] Aspiration pneumonia  -------------------------------------------------------------------  [x ]ELBERT     [x ]ATN     [  ]Reneal Medullary Necrosis     [  ]Renal Cortical Necrosis     [  ]Other Pathological Lesions:    [  ]CKD 1  [  ]CKD 2  [  ]CKD 3  [  ]CKD 4  [  ]CKD 5  [  ]Other  -------------------------------------------------------------------  [ x]Diabetes  [ x] Diabetic PVD Ulcer  [  ] Neuropathic ulcer to DM  [  ] Diabetes with Nephropathy  [ x] Osteomyelitis due to diabetes  --------------------------------------------------------------------  [  ]Malnutrition: See Nutrition Note  [  ]Cachexia  [  ]Other:   [  ]Supplement Ordered:  [  ]Morbid Obesity (BMI >=40]  ---------------------------------------------------------------------  [ ] Sepsis/severe sepsis/septic shock  [ ] UTI  [ ] Pneumonia  -----------------------------------------------------------------------  [ ] Acidosis/alkalosis  [ ] Fluid overload  [ ] Hypokalemia  [ ] Hyperkalemia  [] Hypomagnesemia  [ ] Hypophosphatemia  [ ] Hyperphosphatemia  ------------------------------------------------------------------------  [ ] Acute blood loss anemia  [ ] Post op blood loss anemia  [ ] Iron deficiency anemia  [ ] Anemia due to chronic disease  [ ] Hypercoagulable state  ----------------------------------------------------------------------  [ ] Cerebral infarction  [ ] Transient ischemia attack  [ ] Encephalopathy        Radiology and Additional Studies:    Assessment/Plan:  69F hx poorly controlled DM presents with R foot gas gangrene and DKA s/p R 2nd+3rd toe partial ray amputation (12/29) admitted to SICU for postop monitoring. Post operatively, anion gap normalized, glucose well controlled, on sliding scale insulin    Pending insurance authorization for Torres Cabezas.   Appreciate endo recommendations    Cards:   -BP elevated to 160/170  -started Hydralazine 25 TID 1/7  -Continue home dose of Nifedipine 90 QD   -will monitor BP     ID:   -Final Abx plan  -Continue Cefepime/flagyl x6wks  -Appreciate ID recommendations (Dr. Hinojosa)   -PICC in place LUE for IV abx     Wound:   -Wound vac change by podiatry  -Last VAC change 1/7  -will follow up wound care plan for discharge     Endo:   Glucose control  -Insulin Decreased to lantus 26 and Nutritional 8 units   -Patient FS noted to be 77 1/7 PM. insulin dose decreased by Vascular service but patient refused to take any lantus 1/7 evening   -will follow Finger sticks today   -will f/u Endocrine for insulin regimen for discharge     psych:   - Psych rec  -patient with known hx of depression   Cymbalta BID

## 2020-01-08 NOTE — CHART NOTE - NSCHARTNOTEFT_GEN_A_CORE
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  Essential hypertension: HTN (hypertension)  Hyperlipidemia: HLD (hyperlipidemia)  Type 2 diabetes mellitus: DM (diabetes mellitus)  Asthma: Asthma  Depressive disorder: Depression  Cytomegalovirus infection not present: No significant past surgical history      Current Nutrition Order: Cst Cho w Snack        PO Intake: Good (%) [   ]  Fair (50-75%) [ x  ] Poor (<25%) [ x  ]- refusing to eat at times    GI Issues: no noted n/v/d/c    Pain: no pain noted     Skin Integrity: VAC in place to foot, feet wrapped, surgical incisions noted    Labs: Glu 120   CAPILLARY BLOOD GLUCOSE      POCT Blood Glucose.: 120 mg/dL (08 Jan 2020 13:44)  POCT Blood Glucose.: 129 mg/dL (08 Jan 2020 07:03)  POCT Blood Glucose.: 119 mg/dL (07 Jan 2020 22:55)  POCT Blood Glucose.: 98 mg/dL (07 Jan 2020 21:50)  POCT Blood Glucose.: 77 mg/dL (07 Jan 2020 21:26)  POCT Blood Glucose.: 133 mg/dL (07 Jan 2020 18:14)  POCT Blood Glucose.: 209 mg/dL (07 Jan 2020 15:00)      Medications:  MEDICATIONS  (STANDING):  aspirin  chewable 81 milliGRAM(s) Oral daily  atorvastatin 40 milliGRAM(s) Oral at bedtime  cefepime   IVPB 2000 milliGRAM(s) IV Intermittent every 12 hours  chlorhexidine 2% Cloths 1 Application(s) Topical <User Schedule>  chlorhexidine 2% Cloths 1 Application(s) Topical <User Schedule>  dextrose 5%. 1000 milliLiter(s) (50 mL/Hr) IV Continuous <Continuous>  dextrose 50% Injectable 12.5 Gram(s) IV Push once  dextrose 50% Injectable 25 Gram(s) IV Push once  dextrose 50% Injectable 25 Gram(s) IV Push once  DULoxetine 60 milliGRAM(s) Oral two times a day  enoxaparin Injectable 40 milliGRAM(s) SubCutaneous every 24 hours  hydrALAZINE 25 milliGRAM(s) Oral three times a day  insulin glargine Injectable (LANTUS) 20 Unit(s) SubCutaneous at bedtime  insulin lispro (HumaLOG) corrective regimen sliding scale   SubCutaneous Before meals and at bedtime  insulin lispro Injectable (HumaLOG) 7 Unit(s) SubCutaneous three times a day before meals  melatonin 3 milliGRAM(s) Oral at bedtime  metroNIDAZOLE    Tablet 500 milliGRAM(s) Oral every 8 hours  NIFEdipine XL 90 milliGRAM(s) Oral daily  pantoprazole    Tablet 40 milliGRAM(s) Oral before breakfast    MEDICATIONS  (PRN):  acetaminophen   Tablet .. 650 milliGRAM(s) Oral every 6 hours PRN Moderate Pain (4 - 6)  aluminum hydroxide/magnesium hydroxide/simethicone Suspension 30 milliLiter(s) Oral every 6 hours PRN Dyspepsia  benzocaine 15 mG/menthol 3.6 mG (Sugar-Free) Lozenge 1 Lozenge Oral every 2 hours PRN Sore Throat  dextrose 40% Gel 15 Gram(s) Oral once PRN Blood Glucose LESS THAN 70 milliGRAM(s)/deciliter  glucagon  Injectable 1 milliGRAM(s) IntraMuscular once PRN Glucose LESS THAN 70 milligrams/deciliter  ondansetron    Tablet 4 milliGRAM(s) Oral every 6 hours PRN Nausea  sodium chloride 0.9% lock flush 10 milliLiter(s) IV Push every 1 hour PRN Pre/post blood products, medications, blood draw, and to maintain line patency      Weight: 68kg     Weight Change: no new wts, please trend     Nutrition Focused Physical Exam: Completed [   ]  Not Pertinent [ x  ]    Estimated energy needs:   Height: 5 feet 2 inches, Weight (Current): 149 pounds,  pounds +/-10%, %IBW %135, BMI 27.4  IBW used to calculate energy needs due to pt's current body weight exceeding 120% of IBW; adjusted for age, post op healing  1245-1494kcal (25-30kcal/kg)   59-69g pro (1.2-1.4g/kg pro)     Subjective:   68 YO F with hx of severe depression, DM (A1C 11.2) and bilateral diabetic foot wounds presents to ED with Right foot wound and purulent discharge - R foot gas gangrene and DKA s/p R 2nd+3rd toe partial ray amputation (12/29). S/p PICC for IV abx + VAC placement, changed 1/7. Education provided to pt upon initial and prior assessment (x2 times), consult received for further education as pt not comprehending prior ed + RN education, stating she is refusing to eat to cleanse her body of DM, with episodes of hyper/hypoglycermia. Continues to work with PT and pend ANT for wound care, continues pending insurance auth per report. Pt again lethargic at time of visit but appearing to be resting comfortably not in pain. Menu noted at bedside to be crossed out and scribbled on. Will continue to follow per protocol and attempt education.     Previous Nutrition Diagnosis:  food and nutrition related deficit r/t limited prior diet ed AEB pt in need for diet ed, A1C 11.2  Active [ x  ]  Resolved [   ]    Increased nutrient needs r/t amputation AEB hypermetabolic state/ increased demand for healing   Active [ x  ]  Resolved [   ]    If resolved, new PES:     Goal: meet >75% EER and verbalize 2-3 diet recs     Recommendations:  1. Monitor PO intake/appetite, GI distress, diet tolerance, labs, weights.  2. Honor pt food preferences as able.  3. RD to remain available for additional nutrition interventions as needed.    Education: did not awaken to name, education provided prior assessments, will follow     Risk Level: High [  x ] Moderate [   ] Low [   ]

## 2020-01-08 NOTE — CONSULT NOTE ADULT - REASON FOR ADMISSION
Rt Foot Diabetic foot infection

## 2020-01-08 NOTE — CONSULT NOTE ADULT - SUBJECTIVE AND OBJECTIVE BOX
Patient is a 69y old  Female who presents with a chief complaint of Rt Foot Diabetic foot infection (08 Jan 2020 09:00)       HPI:  68 YO F with hx of severe depression, DM and bilateral diabetic foot wounds presents to ED with Right foot wound and purulent discharge. Patient reports that she has had wounds of her right foot for about 1 month and has been under the care of a wound care Doctor at outside hospital. She was been unhappy with the progress and the care she has been receiving. Recently she states she has been unwell for about 15 days and has not been able to leave her house for wound care. She also reports generalized weakness, decreased appetite, fevers and diarrhea for about two week. She has not been able to locate her insulin for several days and as a result has not taken it. Admits to feeling fatigue and chills. Denies CP, SOB, N,V, admits to decreased sensation in bilateral feet. 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 1000 milliLiter(s) (50 mL/Hr) IV Continuous <Continuous>  dextrose 50% Injectable 12.5 Gram(s) IV Push once  dextrose 50% Injectable 25 Gram(s) IV Push once  dextrose 50% Injectable 25 Gram(s) IV Push once  DULoxetine 60 milliGRAM(s) Oral daily  enoxaparin Injectable 40 milliGRAM(s) SubCutaneous every 24 hours  hydrALAZINE 25 milliGRAM(s) Oral three times a day  insulin glargine Injectable (LANTUS) 26 Unit(s) SubCutaneous at bedtime  insulin lispro (HumaLOG) corrective regimen sliding scale   SubCutaneous Before meals and at bedtime  insulin lispro Injectable (HumaLOG) 8 Unit(s) SubCutaneous three times a day before meals  melatonin 3 milliGRAM(s) Oral at bedtime  metroNIDAZOLE    Tablet 500 milliGRAM(s) Oral every 8 hours  NIFEdipine XL 90 milliGRAM(s) Oral daily  pantoprazole    Tablet 40 milliGRAM(s) Oral before breakfast    MEDICATIONS  (PRN):  acetaminophen   Tablet .. 650 milliGRAM(s) Oral every 6 hours PRN Moderate Pain (4 - 6)  aluminum hydroxide/magnesium hydroxide/simethicone Suspension 30 milliLiter(s) Oral every 6 hours PRN Dyspepsia  benzocaine 15 mG/menthol 3.6 mG (Sugar-Free) Lozenge 1 Lozenge Oral every 2 hours PRN Sore Throat  dextrose 40% Gel 15 Gram(s) Oral once PRN Blood Glucose LESS THAN 70 milliGRAM(s)/deciliter  glucagon  Injectable 1 milliGRAM(s) IntraMuscular once PRN Glucose LESS THAN 70 milligrams/deciliter  ondansetron    Tablet 4 milliGRAM(s) Oral every 6 hours PRN Nausea  sodium chloride 0.9% lock flush 10 milliLiter(s) IV Push every 1 hour PRN Pre/post blood products, medications, blood draw, and to maintain line patency      Social History:           -  Occupation: X           -  Home Living Status: lives  alone in an elevator accessible apartment           -  Prior Home Care Services:  denied    Functional Level Prior to Admission:           - ADL's: states she was independent           - ambulated without assistive devices    FAMILY HISTORY:  Family history of diabetes mellitus: Family history of diabetes mellitus (DM)        Radiology:    < from: Xray Foot AP + Lateral + Oblique, Bilat (12.29.19 @ 11:33) >    EXAM:  XR FOOT BILATERAL MIN 3 VIEWS                          PROCEDURE DATE:  12/29/2019          INTERPRETATION:    XR FOOT 3 VIEWS BILATERAL dated 12/29/2019 11:33 AM    CLINICAL INFORMATION: Female, 69 years old.  diabetic foot infection, r/o osteo, GAS.    PRIOR STUDIES: None    FINDINGS: There are amputations of the second through fourth digits with residual proximal phalanges of the second digit on the left. There is no plain film evidence of osteomyelitis on the left. There is gas within the subcutaneous fat planes as well as structure changes involving the metatarsophalangeal joint spaces of the first second and possibly third digits.    IMPRESSION:  Subcutaneous emphysema and destructive changes involving the right second and third metatarsophalangeal joints. Exit osteomyelitis in this area cannot be excluded.    Amputation of the distal phalanx of the first digit as well as the second and third digits. No plain film evidence of osteomyelitis.          Vital Signs Last 24 Hrs  T(C): 36.3 (08 Jan 2020 07:13), Max: 36.7 (07 Jan 2020 14:28)  T(F): 97.3 (08 Jan 2020 07:13), Max: 98.1 (07 Jan 2020 14:28)  HR: 66 (08 Jan 2020 05:44) (66 - 87)  BP: 136/70 (08 Jan 2020 05:44) (129/68 - 174/81)  BP(mean): --  RR: 16 (08 Jan 2020 05:44) (16 - 16)  SpO2: 97% (08 Jan 2020 05:44) (96% - 98%)    REVIEW OF SYSTEMS:    CONSTITUTIONAL: fatigue  EYES: No eye pain, visual disturbances, or discharge  ENMT:  No difficulty hearing, tinnitus, vertigo; No sinus or throat pain  NECK: No pain or stiffness  BREASTS: No pain, masses, or nipple discharge  RESPIRATORY: No cough, wheezing, chills or hemoptysis; No shortness of breath  CARDIOVASCULAR: No chest pain, palpitations, dizziness, or leg swelling  GASTROINTESTINAL: No abdominal or epigastric pain. No nausea, vomiting, or hematemesis; No diarrhea or constipation. No melena or hematochezia.  GENITOURINARY: No dysuria, frequency, hematuria, or incontinence  NEUROLOGICAL: No headaches, memory loss, loss of strength, numbness, or tremors  SKIN: No itching, burning, rashes, or lesions   LYMPH NODES: No enlarged glands  ENDOCRINE: No heat or cold intolerance; No hair loss  MUSCULOSKELETAL: No joint pain or swelling; No muscle, back, or extremity pain  PSYCHIATRIC: depressed  HEME/LYMPH: No easy bruising, or bleeding gums  ALLERGY AND IMMUNOLOGIC: No hives or eczema  VASCULAR: no swelling, erythema        Physical Exam: frail 70 yo  woman lying in bed, c/o feeling depressed    Head: normocephalic, atraumatic    Eyes: PERRLA, EOMI, no nystagmus, sclera anicteric    ENT: nasal discharge, uvula midline, no oropharyngeal erythema/exudate    Neck: supple, negative JVD, negative carotid bruits, no thyromegaly    Chest: coarse breath sounds    Cardiovascular: regular rate and rhythm, neg murmurs/rubs/gallops    Abdomen: soft, non distended, non tender, negative rebound/guarding, normal bowel sounds, neg hepatosplenomegaly    Extremities: Left foot wound dressing/ VAC, Right 1-3 distal phalanx amputation, LUE PICC line    :     Neurologic Exam:    Alert and oriented to person, place, date/year, speech fluent w/o dysarthria    Motor Exam:    Upper Extremities:     RIght:   4/5   /intrinsics               5/5  wrist flexors/extensors               5/5  biceps/triceps               4/5  deltoid                 Left :     4/5   /intrinsics               5/5  wrist flexors/extensors               5/5  biceps/triceps               4/5  deltoid    Lower Extremities:                 Right:      4/5  DF/PF/ evertors/ invertors                5/5  Quad/ hamstrings                3+ to 4-/5  hip flexors/adductors/abductors                 Left:        DF/PF/ evertors/ invertors not tested secondary to VAC/ dressing                5/5  Quad/ hamstrings                3+ to 4-/5  hip flexors/adductors/abductors                                      Gait:  not tested    Current Functional Assessment: 1/7/2020    Therapeutic Interventions      Bed Mobility  Bed Mobility Training Sit-to-Supine: independent  Bed Mobility Training Supine-to-Sit: independent    Sit-Stand Transfer Training  Transfer Training Sit-to-Stand Transfer: heel R WBing   rolling walker;  supervision;  supervision  Transfer Training Stand-to-Sit Transfer: supervision;  supervision;  rolling walker;  R heel WBing  Sit-to-Stand Transfer Training Transfer Safety Analysis: decreased balance;  decreased strength;  impaired balance    Toilet Transfer Training  Transfer Training Toilet Transfer: 1 person assist;  contact guard;  rolling walker  Toilet Transfer Training Transfer Safety Analysis: decreased balance;  decreased strength;  impaired balance    Gait Training  Gait Training: contact guard;  1 person assist;  rolling walker;  50 feet;  RLE heel WBing  Gait Analysis: swing-through gait   decreased hip/knee flexion;  decreased velocity of limb motion;  decreased stride length;  decreased step length;  decreased gait speed;  impaired balance;  decreased strength              PM&R Impression:    1) deconditioned  2) no focal weakness  3) R foot gas gangrene and DKA s/p R 2nd+3rd toe partial ray amputation (12/29), s/p PICC line  4) depression          Recommendations: strongly recommend subacute rehab placement for the following reasons:    1) Despite her relative independence in bed mobility and transfers , she requires 1 person assistance in ambulation with a rolling walker. The goal would be to maintain her bed mobility and transfer status and increase her endurance enabling her to walk longer distances independently with assistive devices.  2) She requires IV antibiotics and wound care which would be best served in a subacute rehab setting  3) Patient is clearly depressed and I anticipate she would decompensate psychologically and physically if she were to be discharged home even with home care services, home physical therapy and home wound care and ultimately would be readmitted to [facility].

## 2020-01-09 ENCOUNTER — INBOUND DOCUMENT (OUTPATIENT)
Age: 70
End: 2020-01-09

## 2020-01-09 ENCOUNTER — TRANSCRIPTION ENCOUNTER (OUTPATIENT)
Age: 70
End: 2020-01-09

## 2020-01-09 VITALS — TEMPERATURE: 98 F

## 2020-01-09 LAB
GLUCOSE BLDC GLUCOMTR-MCNC: 151 MG/DL — HIGH (ref 70–99)
GLUCOSE BLDC GLUCOMTR-MCNC: 231 MG/DL — HIGH (ref 70–99)

## 2020-01-09 PROCEDURE — 81001 URINALYSIS AUTO W/SCOPE: CPT

## 2020-01-09 PROCEDURE — 86900 BLOOD TYPING SEROLOGIC ABO: CPT

## 2020-01-09 PROCEDURE — 86922 COMPATIBILITY TEST ANTIGLOB: CPT

## 2020-01-09 PROCEDURE — 83036 HEMOGLOBIN GLYCOSYLATED A1C: CPT

## 2020-01-09 PROCEDURE — 97116 GAIT TRAINING THERAPY: CPT

## 2020-01-09 PROCEDURE — 85025 COMPLETE CBC W/AUTO DIFF WBC: CPT

## 2020-01-09 PROCEDURE — 86923 COMPATIBILITY TEST ELECTRIC: CPT

## 2020-01-09 PROCEDURE — 82010 KETONE BODYS QUAN: CPT

## 2020-01-09 PROCEDURE — 87205 SMEAR GRAM STAIN: CPT

## 2020-01-09 PROCEDURE — 86850 RBC ANTIBODY SCREEN: CPT

## 2020-01-09 PROCEDURE — 88305 TISSUE EXAM BY PATHOLOGIST: CPT

## 2020-01-09 PROCEDURE — 84100 ASSAY OF PHOSPHORUS: CPT

## 2020-01-09 PROCEDURE — 99238 HOSP IP/OBS DSCHRG MGMT 30/<: CPT

## 2020-01-09 PROCEDURE — 97530 THERAPEUTIC ACTIVITIES: CPT

## 2020-01-09 PROCEDURE — 71045 X-RAY EXAM CHEST 1 VIEW: CPT

## 2020-01-09 PROCEDURE — 96375 TX/PRO/DX INJ NEW DRUG ADDON: CPT

## 2020-01-09 PROCEDURE — 82803 BLOOD GASES ANY COMBINATION: CPT

## 2020-01-09 PROCEDURE — 86803 HEPATITIS C AB TEST: CPT

## 2020-01-09 PROCEDURE — 87184 SC STD DISK METHOD PER PLATE: CPT

## 2020-01-09 PROCEDURE — 96374 THER/PROPH/DIAG INJ IV PUSH: CPT

## 2020-01-09 PROCEDURE — 83735 ASSAY OF MAGNESIUM: CPT

## 2020-01-09 PROCEDURE — 87086 URINE CULTURE/COLONY COUNT: CPT

## 2020-01-09 PROCEDURE — 80053 COMPREHEN METABOLIC PANEL: CPT

## 2020-01-09 PROCEDURE — 73590 X-RAY EXAM OF LOWER LEG: CPT

## 2020-01-09 PROCEDURE — 86921 COMPATIBILITY TEST INCUBATE: CPT

## 2020-01-09 PROCEDURE — 36430 TRANSFUSION BLD/BLD COMPNT: CPT

## 2020-01-09 PROCEDURE — 84484 ASSAY OF TROPONIN QUANT: CPT

## 2020-01-09 PROCEDURE — 85652 RBC SED RATE AUTOMATED: CPT

## 2020-01-09 PROCEDURE — 85027 COMPLETE CBC AUTOMATED: CPT

## 2020-01-09 PROCEDURE — 87186 SC STD MICRODIL/AGAR DIL: CPT

## 2020-01-09 PROCEDURE — 82962 GLUCOSE BLOOD TEST: CPT

## 2020-01-09 PROCEDURE — P9016: CPT

## 2020-01-09 PROCEDURE — 80202 ASSAY OF VANCOMYCIN: CPT

## 2020-01-09 PROCEDURE — 36415 COLL VENOUS BLD VENIPUNCTURE: CPT

## 2020-01-09 PROCEDURE — 99285 EMERGENCY DEPT VISIT HI MDM: CPT | Mod: 25

## 2020-01-09 PROCEDURE — 73630 X-RAY EXAM OF FOOT: CPT

## 2020-01-09 PROCEDURE — 84132 ASSAY OF SERUM POTASSIUM: CPT

## 2020-01-09 PROCEDURE — 86901 BLOOD TYPING SEROLOGIC RH(D): CPT

## 2020-01-09 PROCEDURE — 36573 INSJ PICC RS&I 5 YR+: CPT

## 2020-01-09 PROCEDURE — 80048 BASIC METABOLIC PNL TOTAL CA: CPT

## 2020-01-09 PROCEDURE — 85610 PROTHROMBIN TIME: CPT

## 2020-01-09 PROCEDURE — 87070 CULTURE OTHR SPECIMN AEROBIC: CPT

## 2020-01-09 PROCEDURE — 87040 BLOOD CULTURE FOR BACTERIA: CPT

## 2020-01-09 PROCEDURE — 84295 ASSAY OF SERUM SODIUM: CPT

## 2020-01-09 PROCEDURE — 82330 ASSAY OF CALCIUM: CPT

## 2020-01-09 PROCEDURE — 87075 CULTR BACTERIA EXCEPT BLOOD: CPT

## 2020-01-09 PROCEDURE — 85730 THROMBOPLASTIN TIME PARTIAL: CPT

## 2020-01-09 PROCEDURE — 97161 PT EVAL LOW COMPLEX 20 MIN: CPT

## 2020-01-09 PROCEDURE — 86140 C-REACTIVE PROTEIN: CPT

## 2020-01-09 PROCEDURE — 99231 SBSQ HOSP IP/OBS SF/LOW 25: CPT | Mod: GC

## 2020-01-09 PROCEDURE — 83605 ASSAY OF LACTIC ACID: CPT

## 2020-01-09 RX ORDER — SITAGLIPTIN AND METFORMIN HYDROCHLORIDE 500; 50 MG/1; MG/1
1 TABLET, FILM COATED ORAL
Qty: 0 | Refills: 0 | DISCHARGE

## 2020-01-09 RX ORDER — INSULIN GLARGINE 100 [IU]/ML
17 INJECTION, SOLUTION SUBCUTANEOUS
Qty: 0 | Refills: 0 | DISCHARGE
Start: 2020-01-09

## 2020-01-09 RX ORDER — LANOLIN ALCOHOL/MO/W.PET/CERES
1 CREAM (GRAM) TOPICAL
Qty: 0 | Refills: 0 | DISCHARGE
Start: 2020-01-09

## 2020-01-09 RX ORDER — HYDRALAZINE HCL 50 MG
1 TABLET ORAL
Qty: 0 | Refills: 0 | DISCHARGE
Start: 2020-01-09

## 2020-01-09 RX ORDER — HYDRALAZINE HCL 50 MG
1 TABLET ORAL
Qty: 42 | Refills: 0
Start: 2020-01-09 | End: 2020-01-22

## 2020-01-09 RX ORDER — PANTOPRAZOLE SODIUM 20 MG/1
1 TABLET, DELAYED RELEASE ORAL
Qty: 0 | Refills: 0 | DISCHARGE
Start: 2020-01-09

## 2020-01-09 RX ORDER — CEFEPIME 1 G/1
2 INJECTION, POWDER, FOR SOLUTION INTRAMUSCULAR; INTRAVENOUS
Qty: 0 | Refills: 0 | DISCHARGE
Start: 2020-01-09 | End: 2020-02-17

## 2020-01-09 RX ORDER — METRONIDAZOLE 500 MG
1 TABLET ORAL
Qty: 0 | Refills: 0 | DISCHARGE
Start: 2020-01-09 | End: 2020-02-10

## 2020-01-09 RX ORDER — INSULIN LISPRO 100/ML
7 VIAL (ML) SUBCUTANEOUS
Qty: 0 | Refills: 0 | DISCHARGE
Start: 2020-01-09

## 2020-01-09 RX ADMIN — DULOXETINE HYDROCHLORIDE 60 MILLIGRAM(S): 30 CAPSULE, DELAYED RELEASE ORAL at 05:47

## 2020-01-09 RX ADMIN — Medication 6 UNIT(S): at 10:10

## 2020-01-09 RX ADMIN — Medication 500 MILLIGRAM(S): at 15:06

## 2020-01-09 RX ADMIN — Medication 6 UNIT(S): at 14:17

## 2020-01-09 RX ADMIN — Medication 4: at 14:17

## 2020-01-09 RX ADMIN — Medication 500 MILLIGRAM(S): at 05:47

## 2020-01-09 RX ADMIN — CEFEPIME 100 MILLIGRAM(S): 1 INJECTION, POWDER, FOR SOLUTION INTRAMUSCULAR; INTRAVENOUS at 05:47

## 2020-01-09 RX ADMIN — Medication 25 MILLIGRAM(S): at 15:06

## 2020-01-09 RX ADMIN — Medication 25 MILLIGRAM(S): at 05:47

## 2020-01-09 RX ADMIN — CHLORHEXIDINE GLUCONATE 1 APPLICATION(S): 213 SOLUTION TOPICAL at 05:47

## 2020-01-09 RX ADMIN — PANTOPRAZOLE SODIUM 40 MILLIGRAM(S): 20 TABLET, DELAYED RELEASE ORAL at 05:47

## 2020-01-09 RX ADMIN — Medication 90 MILLIGRAM(S): at 05:47

## 2020-01-09 RX ADMIN — Medication 2: at 07:53

## 2020-01-09 NOTE — PROGRESS NOTE ADULT - SUBJECTIVE AND OBJECTIVE BOX
Patient is a 69y old  Female who presents with a chief complaint of Rt Foot Diabetic foot infection (09 Jan 2020 05:24)      INTERVAL HPI/ OVERNIGHT EVENTS: Pt seen and examined bedside. NAEO. VSS. Wound vac functional.    ICU Vital Signs Last 24 Hrs  T(C): 36.3 (09 Jan 2020 06:26), Max: 36.8 (08 Jan 2020 21:13)  T(F): 97.4 (09 Jan 2020 06:26), Max: 98.3 (08 Jan 2020 21:13)  HR: 76 (09 Jan 2020 05:45) (75 - 78)  BP: 142/80 (09 Jan 2020 05:45) (140/84 - 159/85)  BP(mean): --  ABP: --  ABP(mean): --  RR: 16 (09 Jan 2020 05:45) (16 - 16)  SpO2: 98% (09 Jan 2020 00:24) (98% - 98%)    MICROBIOLOGY  Culture - Surgical Swab (12.30.19 @ 08:11)    Gram Stain:   Moderate Gram positive cocci in pairs, chains and clusters  Few Gram Negative Diplococci  Rare White blood cells    -  Amoxicillin/Clavulanic Acid: S 0.5    -  Amoxicillin/Clavulanic Acid: S 0.5    -  Cefazolin: S <=4    -  Clindamycin: R 0.5 This isolate is presumed to be clindamycin resistant based on detection of inducible resistance. Clindamycin may still be effective in some patients.    -  Clindamycin: S 0.25    -  Ertapenem: S 0.5    -  Erythromycin: R >4    -  Linezolid: S 2    -  Oxacillin: S <=0.25    -  Meropenem: S 0.25    -  Piperacillin/Tazobactam: S 0.75    -  Piperacillin/Tazobactam: S 1.5    -  RIF- Rifampin: S <=1 Should not be used as monotherapy    -  Trimethoprim/Sulfamethoxazole: S <=0.5/9.5    Specimen Source: .Surgical Swab 3rd Metatarsal Head- Tissue OR    Culture Results:   Numerous Streptococcus dysgalactiae (Group C/G) See previous culture for  sensitivies  Few Streptococcus agalactiae See previous culture for sensitivies  Moderate Staphylococcus aureus See previous culture for sensitivies  Moderate Actinomyces species Beta lactamase negative  Moderate Prevotella melaninogenica Beta lactamase positive    Organism Identification: Staphylococcus aureus  Actinomyces species  Prevotella melaninogenica    Organism: Staphylococcus aureus    Organism: Actinomyces species    Organism: Prevotella melaninogenica    Method Type: DIANE    Method Type: ETEST    Method Type: ETEST    PHYSICAL EXAM  Lower Extremity Focused  No interval change in neurovascular status. Wound vac intact and operating well. Dressing c/d/i. Wound base granular with healthy tissue noted throughout the amputation site.

## 2020-01-09 NOTE — PROGRESS NOTE ADULT - REASON FOR ADMISSION
Rt Foot Diabetic foot infection

## 2020-01-09 NOTE — DISCHARGE NOTE NURSING/CASE MANAGEMENT/SOCIAL WORK - PATIENT PORTAL LINK FT
You can access the FollowMyHealth Patient Portal offered by [facility] by registering at the following website: [email or web address]. By joining [facility]’s FollowMyHealth portal, you will also be able to view your health information using other applications (apps) compatible with our system.

## 2020-01-09 NOTE — PROGRESS NOTE ADULT - ASSESSMENT
69F PMHx DM presents w/ R foot ulcer. WBC 18.77, Lactate 2.9. LRINEC score >6. Gas seen on X-ray. S/p right partial 2nd and 4rd ray amputation 12/29.    Recommendations  - C/w IV ABX per ID.   - Heel WB to RLE  - Wound vacuum changes 3x/week upon discharge (next day: 1/10/20)  - When discharged, patient can follow up with Podiatry at [address] [place] [address]  [phone]

## 2020-01-09 NOTE — PROGRESS NOTE ADULT - SUBJECTIVE AND OBJECTIVE BOX
O/N: RASTA, VSS                                Assessment/Plan:  69F hx poorly controlled DM presents with R foot gas gangrene and DKA s/p R 2nd+3rd toe partial ray amputation (12/29) admitted to SICU for postop monitoring. Post operatively, anion gap normalized, glucose well controlled, on sliding scale insulin    Pending insurance authorization for [place].   Appreciate endo recommendations    Cards:   -BP elevated to 160/170  -started Hydralazine 25 TID 1/7  -Continue home dose of Nifedipine 90 QD   -will monitor BP     ID:   -Final Abx plan  -Continue Cefepime/flagyl x6wks  -Appreciate ID recommendations (Dr. Hinojosa)   -PICC in place LUE for IV abx     Wound:   -Wound vac change by podiatry  -Last VAC change 1/7  -will follow up wound care plan for discharge     Endo:   Glucose control  -Insulin Decreased to lantus 26 and Nutritional 8 units   -Patient FS noted to be 77 1/7 PM. insulin dose decreased by Vascular service but patient refused to take any lantus 1/7 evening   -will follow Finger sticks today   -will f/u Endocrine for insulin regimen for discharge     psych:   - Psych rec  -patient with known hx of depression   Cymbalta BID O/N: RASTA, VSS    cefepime   IVPB 2000  metroNIDAZOLE    Tablet 500  aspirin  chewable 81  cefepime   IVPB 2000  enoxaparin Injectable 40  hydrALAZINE 25  metroNIDAZOLE    Tablet 500  NIFEdipine XL 90      Vital Signs Last 24 Hrs  T(C): 36.3 (09 Jan 2020 06:26), Max: 36.8 (08 Jan 2020 21:13)  T(F): 97.4 (09 Jan 2020 06:26), Max: 98.3 (08 Jan 2020 21:13)  HR: 76 (09 Jan 2020 05:45) (75 - 78)  BP: 142/80 (09 Jan 2020 05:45) (140/84 - 159/85)  BP(mean): --  RR: 16 (09 Jan 2020 05:45) (16 - 16)  SpO2: 98% (09 Jan 2020 00:24) (98% - 98%)  I&O's Summary    08 Jan 2020 07:01  -  09 Jan 2020 07:00  --------------------------------------------------------  IN: 530 mL / OUT: 0 mL / NET: 530 mL          Physical Exam:  General:NAD   Pulmonary: b/l Clear   Cardiovascular: s1S2 regular   Abdominal:  Extremities:Rt Foot with wound VAC in place   surrounding tissue without erythema or drainage  Palpable DP pulse in right foot         PLEASE CHECK WHEN PRESENT:     [  ]Heart Failure     [  ] Acute     [  ] Acute on Chronic     [  ] Chronic  -------------------------------------------------------------------     [  ]Diastolic [HFpEF]     [  ]Systolic [HFrEF]     [  ]Combined [HFpEF & HFrEF]     [  ]Other:  -------------------------------------------------------------------  [ ] Respiratory failure  [ ] Acute cor pulmonale  [ ] Asthma/COPD Exacerbation  [ ] Pleural effusion  [ ] Aspiration pneumonia  -------------------------------------------------------------------  [x ]ELBERT     [x ]ATN     [  ]Reneal Medullary Necrosis     [  ]Renal Cortical Necrosis     [  ]Other Pathological Lesions:    [  ]CKD 1  [  ]CKD 2  [  ]CKD 3  [  ]CKD 4  [  ]CKD 5  [  ]Other  -------------------------------------------------------------------  [ x]Diabetes  [ x] Diabetic PVD Ulcer  [  ] Neuropathic ulcer to DM  [  ] Diabetes with Nephropathy  [ x] Osteomyelitis due to diabetes  --------------------------------------------------------------------  [  ]Malnutrition: See Nutrition Note  [  ]Cachexia  [  ]Other:   [  ]Supplement Ordered:  [  ]Morbid Obesity (BMI >=40]  ---------------------------------------------------------------------  [ ] Sepsis/severe sepsis/septic shock  [ ] UTI  [ ] Pneumonia  -----------------------------------------------------------------------  [ ] Acidosis/alkalosis  [ ] Fluid overload  [ ] Hypokalemia  [ ] Hyperkalemia  [] Hypomagnesemia  [ ] Hypophosphatemia  [ ] Hyperphosphatemia  ------------------------------------------------------------------------  [ ] Acute blood loss anemia  [ ] Post op blood loss anemia  [ ] Iron deficiency anemia  [ ] Anemia due to chronic disease  [ ] Hypercoagulable state  ----------------------------------------------------------------------  [ ] Cerebral infarction  [ ] Transient ischemia attack  [ ] Encephalopathy          Assessment/Plan:  69F hx poorly controlled DM presents with R foot gas gangrene and DKA s/p R 2nd+3rd toe partial ray amputation (12/29) admitted to SICU for postop monitoring. Post operatively, anion gap normalized, glucose well controlled, on sliding scale insulin    Pending insurance authorization for [place].     Cards:   -BP well controlled  -started Hydralazine 25 TID 1/7  -Continue home dose of Nifedipine 90 QD   -will monitor BP     ID:   -Final Abx plan  -Continue Cefepime/flagyl x6wks  -PICC in place LUE for IV abx     Wound:   -Wound vac change by podiatry  -Last VAC change 1/7  -will follow up wound care plan for discharge     Endo:   Glucose control  -Insulin Decreased to lantus 15 and Nutritional 6 units     psych:   - Psych rec  -patient with known hx of depression   Cymbalta BID

## 2020-01-09 NOTE — PROGRESS NOTE ADULT - SUBJECTIVE AND OBJECTIVE BOX
INTERVAL HPI/OVERNIGHT EVENTS:    Patient is a 69y old  Female who presents with a chief complaint of Rt Foot Diabetic foot infection (09 Jan 2020 08:25)      Pt reports the following symptoms:    CONSTITUTIONAL:  Negative fever or chills, feels well, good appetite  EYES:  Negative  blurry vision or double vision  CARDIOVASCULAR:  Negative for chest pain or palpitations  RESPIRATORY:  Negative for cough, wheezing, or SOB   GASTROINTESTINAL:  Negative for nausea, vomiting, diarrhea, constipation, or abdominal pain  GENITOURINARY:  Negative frequency, urgency or dysuria  NEUROLOGIC:  No headache, confusion, dizziness, lightheadedness    MEDICATIONS  (STANDING):  aspirin  chewable 81 milliGRAM(s) Oral daily  atorvastatin 40 milliGRAM(s) Oral at bedtime  cefepime   IVPB 2000 milliGRAM(s) IV Intermittent every 12 hours  chlorhexidine 2% Cloths 1 Application(s) Topical <User Schedule>  chlorhexidine 2% Cloths 1 Application(s) Topical <User Schedule>  dextrose 5%. 1000 milliLiter(s) (50 mL/Hr) IV Continuous <Continuous>  dextrose 50% Injectable 12.5 Gram(s) IV Push once  dextrose 50% Injectable 25 Gram(s) IV Push once  dextrose 50% Injectable 25 Gram(s) IV Push once  DULoxetine 60 milliGRAM(s) Oral two times a day  enoxaparin Injectable 40 milliGRAM(s) SubCutaneous every 24 hours  hydrALAZINE 25 milliGRAM(s) Oral three times a day  insulin glargine Injectable (LANTUS) 15 Unit(s) SubCutaneous at bedtime  insulin lispro (HumaLOG) corrective regimen sliding scale   SubCutaneous Before meals and at bedtime  insulin lispro Injectable (HumaLOG) 6 Unit(s) SubCutaneous three times a day before meals  melatonin 3 milliGRAM(s) Oral at bedtime  metroNIDAZOLE    Tablet 500 milliGRAM(s) Oral every 8 hours  NIFEdipine XL 90 milliGRAM(s) Oral daily  pantoprazole    Tablet 40 milliGRAM(s) Oral before breakfast    MEDICATIONS  (PRN):  acetaminophen   Tablet .. 650 milliGRAM(s) Oral every 6 hours PRN Moderate Pain (4 - 6)  aluminum hydroxide/magnesium hydroxide/simethicone Suspension 30 milliLiter(s) Oral every 6 hours PRN Dyspepsia  benzocaine 15 mG/menthol 3.6 mG (Sugar-Free) Lozenge 1 Lozenge Oral every 2 hours PRN Sore Throat  dextrose 40% Gel 15 Gram(s) Oral once PRN Blood Glucose LESS THAN 70 milliGRAM(s)/deciliter  glucagon  Injectable 1 milliGRAM(s) IntraMuscular once PRN Glucose LESS THAN 70 milligrams/deciliter  ondansetron    Tablet 4 milliGRAM(s) Oral every 6 hours PRN Nausea  sodium chloride 0.9% lock flush 10 milliLiter(s) IV Push every 1 hour PRN Pre/post blood products, medications, blood draw, and to maintain line patency      PHYSICAL EXAM  Vital Signs Last 24 Hrs  T(C): 36.4 (09 Jan 2020 09:16), Max: 36.8 (08 Jan 2020 21:13)  T(F): 97.5 (09 Jan 2020 09:16), Max: 98.3 (08 Jan 2020 21:13)  HR: 73 (09 Jan 2020 09:00) (73 - 78)  BP: 149/83 (09 Jan 2020 09:00) (140/84 - 159/85)  BP(mean): --  RR: 17 (09 Jan 2020 09:00) (16 - 17)  SpO2: 98% (09 Jan 2020 09:00) (98% - 98%)    Constitutional: wn/wd in NAD.   HEENT: NCAT, MMM, OP clear, EOMI, no proptosis or lid retraction  Neck: no thyromegaly or palpable thyroid nodules   Respiratory: lungs CTAB.  Cardiovascular: regular rhythm, normal S1 and S2, no audible murmurs, no peripheral edema  GI: soft, NT/ND, no masses/HSM appreciated.  Neurology: no tremors, DTR 2+  Skin: no visible rashes/lesions  Psychiatric: AAO x 3, normal affect/mood.    LABS:                  HbA1C: 11.2 % (12-29 @ 10:32)    CAPILLARY BLOOD GLUCOSE      POCT Blood Glucose.: 151 mg/dL (09 Jan 2020 06:54)  POCT Blood Glucose.: 158 mg/dL (08 Jan 2020 21:31)  POCT Blood Glucose.: 149 mg/dL (08 Jan 2020 16:49)  POCT Blood Glucose.: 120 mg/dL (08 Jan 2020 13:44)      Insulin Sliding Scale requirements X 24 Hours:    RADIOLOGY & ADDITIONAL TESTS:    A/P: 69y Female with history of DM type II presenting for       1.  DM -     Please continue           units lantus at bedtime  / in the morning and        units lispro with meals and lispro moderate / low dose sliding scale 4 times daily with meals and at bedtime.  Please continue consistent carbohydrate diet.      Goal FSG is   Will continue to monitor   For discharge, pt can continue    Pt can follow up at discharge with [facility] by calling  to make an appointment.   Will discuss case with     and update primary team INTERVAL HPI/OVERNIGHT EVENTS:    Patient seen and examine at the bedside. She is being planned for discharge today to rehab    FSG & Insulin received:  Yesterday:  pre-dinner fs, 6 nutritional lispro   units  bedtime fs, 15 lantus   units + 2   units lispro SS  Today:  pre-breakfast fs, 6 nutritional lispro   units+   2 units lispro SS  pre-lunch fs      Pt reports the following symptoms:    CONSTITUTIONAL:  Negative fever or chills  EYES:  Negative  blurry vision or double vision  CARDIOVASCULAR:  Negative for chest pain or palpitations  RESPIRATORY:  Negative for cough, wheezing, or SOB   GASTROINTESTINAL:  Negative for vomiting, diarrhea, constipation, or abdominal pain  GENITOURINARY:  Negative frequency, urgency or dysuria  NEUROLOGIC:  No headache, confusion, dizziness, lightheadedness    MEDICATIONS  (STANDING):  aspirin  chewable 81 milliGRAM(s) Oral daily  atorvastatin 40 milliGRAM(s) Oral at bedtime  cefepime   IVPB 2000 milliGRAM(s) IV Intermittent every 12 hours  chlorhexidine 2% Cloths 1 Application(s) Topical <User Schedule>  chlorhexidine 2% Cloths 1 Application(s) Topical <User Schedule>  dextrose 5%. 1000 milliLiter(s) (50 mL/Hr) IV Continuous <Continuous>  dextrose 50% Injectable 12.5 Gram(s) IV Push once  dextrose 50% Injectable 25 Gram(s) IV Push once  dextrose 50% Injectable 25 Gram(s) IV Push once  DULoxetine 60 milliGRAM(s) Oral two times a day  enoxaparin Injectable 40 milliGRAM(s) SubCutaneous every 24 hours  hydrALAZINE 25 milliGRAM(s) Oral three times a day  insulin glargine Injectable (LANTUS) 15 Unit(s) SubCutaneous at bedtime  insulin lispro (HumaLOG) corrective regimen sliding scale   SubCutaneous Before meals and at bedtime  insulin lispro Injectable (HumaLOG) 6 Unit(s) SubCutaneous three times a day before meals  melatonin 3 milliGRAM(s) Oral at bedtime  metroNIDAZOLE    Tablet 500 milliGRAM(s) Oral every 8 hours  NIFEdipine XL 90 milliGRAM(s) Oral daily  pantoprazole    Tablet 40 milliGRAM(s) Oral before breakfast    MEDICATIONS  (PRN):  acetaminophen   Tablet .. 650 milliGRAM(s) Oral every 6 hours PRN Moderate Pain (4 - 6)  aluminum hydroxide/magnesium hydroxide/simethicone Suspension 30 milliLiter(s) Oral every 6 hours PRN Dyspepsia  benzocaine 15 mG/menthol 3.6 mG (Sugar-Free) Lozenge 1 Lozenge Oral every 2 hours PRN Sore Throat  dextrose 40% Gel 15 Gram(s) Oral once PRN Blood Glucose LESS THAN 70 milliGRAM(s)/deciliter  glucagon  Injectable 1 milliGRAM(s) IntraMuscular once PRN Glucose LESS THAN 70 milligrams/deciliter  ondansetron    Tablet 4 milliGRAM(s) Oral every 6 hours PRN Nausea  sodium chloride 0.9% lock flush 10 milliLiter(s) IV Push every 1 hour PRN Pre/post blood products, medications, blood draw, and to maintain line patency      PHYSICAL EXAM  Vital Signs Last 24 Hrs  T(C): 36.4 (2020 09:16), Max: 36.8 (2020 21:13)  T(F): 97.5 (2020 09:16), Max: 98.3 (2020 21:13)  HR: 73 (2020 09:00) (73 - 78)  BP: 149/83 (2020 09:00) (140/84 - 159/85)  BP(mean): --  RR: 17 (2020 09:00) (16 - 17)  SpO2: 98% (2020 09:00) (98% - 98%)    Constitutional: wn/wd in NAD.   Respiratory: lungs CTAB.  Cardiovascular: regular rhythm, normal S1 and S2, no peripheral edema  GI: soft, NT/ND, no masses/HSM appreciated.  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  DM Type 2 - uncontrolled  - Hba1c 11.2  - Wt 68 kg with BMI 27.4  - Cr/GFR 1.31/48  No ECHO in system  Please increase to lantus 17 units at night.  Please increase to lispro 7 units before each meal.    Please continue lispro moderate dose sliding scale four times daily with meals and at bedtime  carb consistent diet    Pt's fingerstick glucose goal is 120 to 150    Will continue to monitor     For discharge, Patient can be discharged on Lantus 17 units at bedtime, and lispro 7 units before each meal along with a lispro moderate dose sliding scale four times a day before meals and bedtime.    Pt can follow up at discharge with [facility] by calling  to make an appointment.   discussed case with Dr. Tran   and updated  primary team    To Make an appointment at [address] [place] [address] for the patient, either:  1. Send a STAT task via Meas[employer] to Ernestina Wilson or Sumi Rossi (office managers)   OR  2. Call supervisor's line. P: [phone] (do not give this number to patient). VM is checked periodically  In the message, specify that this is a hospital discharge follow-up, and that the appt can be made with a NP if there is no timely MD availability    REMINDERS FOR INSULIN/DIABETES SUPPLIES at DISCHARGE:  INSULIN:   Long actin/Basal Insulin: Examples: Toujeo, Basaglar, Tresiba, Lantus   Short acting/Bolus Insulin: Humalog, Admelog, Novolog  Please ensure that BOTH short acting and long acting insulin are prescribed in the same preparation (Ex: PEN vs VIAL/SOLUTION)     TESTING SUPPLIES:   All glucometer supplies should be written as generic to avoid issues with insurance. Use the free text option in sunrise prescription writer, and type in glucometer test strips, lancets, etc to order.    If sending patient home on insulin PEN, please send:   •	BD paz insulin pen needles for use up to 4 times daily (total quantity 100)  •	Lancets for use up to 4 times daily (total quantity 100)  •	Glucometer Test strips for use up to 4 times daily (total quantity 100)  •	Alcohol swabs for use up to 4 times daily (total quantity 100)  •	Glucometer (If provided by hospital, still provide scripts for lancets, test strips, and swabs)  If sending patient home on insulin VIAL, please send:   •	Insulin syringes (6mm) - for use up to 4 times daily (total quantity 100)  •	Lancets for use up to 4 times daily (total quantity 100)  •	Generic Glucometer Test strips for use up to 4 times daily (total quantity 100)  •	Alcohol swabs for use up to 4 times daily (total quantity 100)  •	Generic Glucometer (If provided by hospital, still provide scripts for lancets, test strips, and swabs)  •	Do not specify brand for testing supplies (such as contour, freestyle, one touch etc) that way the pharmacy has the freedom to pick and change according to what the insurance dictates.  For patients without insurance:   •	Provide social work with appropriate scripts so they may obtain 1 week of samples  •	Provide with glucometer. Glucometers are located at various nursing stations, the nursing office, education, and endocrine fellows office.  •	Please make appointment with Nichole Jama NP or Kaycee Coates RN and JOSE G Bell at the [address] [place] [address] endocrinology clinic. They can see patients without insurance, provide appropriate samples, and assist in getting insurance coverage.     PREFERRED PHARMACY:  [facility] (located on 1st floor next to admitting)  P: [phone]  Hours: M – F 8AM – 8PM, Sat 8AM – 4PM, Sun—closed  If not using [facility], please follow up with chosen pharmacy to ensure insulin prescribed is covered.

## 2020-01-09 NOTE — PROGRESS NOTE ADULT - NSHPATTENDINGPLANDISCUSS_GEN_ALL_CORE
Dr. Manley and Vascular team
vascular

## 2020-01-09 NOTE — PROGRESS NOTE ADULT - ATTENDING COMMENTS
Pt seen on rounds this afternoon prior to leaving for ANT.  Glucoses have stabilized, though also risen slightly.    With AM fingerstick mildly above target at 151, to increase the Lantus back to 17 units.  Will also increase the premeal slightly to 7 units given the elevated post-prandials
Pt seen on rounds this afternoon.  Additional info provided by the pt's PCP apparently included a history of significant depression, and that pt was on  a rather high dose of Cymbalta at home (though she took this only intermittently).  She was briefly tearful this morning, but affect and mood seemed normal at the time of our visit.  Has been eating quite well, and glucoses have been in target range since late yesterday (174/161/160)  Will increase insulin slightly to 28 units Lantus, 7 units pre-meal
Above noted    Patient seen and examined    Plan as documented above
Agree with above findings as documented    Patient is medically ready for discharge
Patient seen and examined    I agree with the findings and statements as documented above    Plan as stated above
Patient seen and examined    I agree with the findings and statements as documented above    Plan as stated above
Patient seen and examined    Remains medically ready for discharge
Patient seen and examined with house-staff during bedside rounds.  Resident note read, including vitals, physical findings, laboratory data, and radiological reports.   Revisions included below.  Direct personal management at bed side and extensive interpretation of the data.  Plan was outlined and discussed in details with the housestaff.  Decision making of high complexity  Action taken for acute disease activity to reflect the level of care provided:  - medication reconciliation  - review laboratory data  she is not in DKA  BS is better controlled  endo consult  continue antibiotics
Pt seen on rounds this afternoon and events of the weekend were reviewed.  Glucoses have been somewhat erratic, mostly because of pt's nausea and intermittently poor intake, with holding or decrease in dose of pre-meal lispro.  Her PO intake today, however, has been excellent.  She denies any pain in her foot, which is still Ace-wrapped  Given the sharp overnight drop in her blood sugar last night (239 to 99 this AM), will decrease the Lantus to 35 units  Increase the pre-meal lispro to 8 units TID/AC
Pt seen on rounds this afternoon prior to discharge to [facility].  Fingersticks had been in the 110-160 range for most of yesterday, but dropped to 75 this morning despite the sharp decrease in Lantus dose.  Will decrease the Lantus further to 26 units, decrease the pre-meal lispro to 8 units.
Pt seen on rounds this afternoon.  Glucoses are distinctly better, with improvement which is out of proportion to the increase in insulin doses.  Explanation unclear.  With the AM fingerstick still elevated at 170, to increase the Lantus to 44 units.  Keep pre-meal at 14 units
Pt seen on rounds this afternoon.  Her epigastric distress from last night resolved quickly after one dose of Maalox.  Glucoses are distinctly higher, however, with all values > 200 despite proper adherence to her diet and yesterday's increase in insulin.  She is afebrile, and denies any increase in pain in her foot.  WBC is 11,000.  Will increase the Lantus to 40 units and the pre-meal lispro to 14 units, but would be concerned that the hyperglycemia may reflect persistent infection in the foot, perhaps even an incipient collection/abscess.  Discussed this with Vascular team, and pt may need additional imaging
Pt seen on rounds this afternoon.  Still awaiting transfer to [facility].   Nausea seems to have remitted and her PO intake has been excellent.  Her insulin requirements nonetheless continue to rapidly drop.  FS dropped to 77 at 10 PM last night despite adequate carb intake at supper, and kiki overnight to 129 this morning even though Lantus was held.  Will decrease the standing Lantus to 15 units, and the pre-meal to 6 units lispro.
Patient seen and examined    I agree with the findings and statements as documented above    Plan as stated above

## 2020-01-09 NOTE — PROGRESS NOTE ADULT - PROVIDER SPECIALTY LIST ADULT
Endocrinology
Infectious Disease
Podiatry
SICU
Vascular Surgery
Podiatry

## 2020-01-10 LAB
-  CEFAZOLIN: SIGNIFICANT CHANGE UP
-  CLINDAMYCIN: SIGNIFICANT CHANGE UP
-  ERYTHROMYCIN: SIGNIFICANT CHANGE UP
-  LINEZOLID: SIGNIFICANT CHANGE UP
-  OXACILLIN: SIGNIFICANT CHANGE UP
-  PENICILLIN: SIGNIFICANT CHANGE UP
-  RIFAMPIN: SIGNIFICANT CHANGE UP
-  TRIMETHOPRIM/SULFAMETHOXAZOLE: SIGNIFICANT CHANGE UP
-  VANCOMYCIN: SIGNIFICANT CHANGE UP
METHOD TYPE: SIGNIFICANT CHANGE UP
ORGANISM # SPEC MICROSCOPIC CNT: SIGNIFICANT CHANGE UP
ORGANISM # SPEC MICROSCOPIC CNT: SIGNIFICANT CHANGE UP

## 2020-01-13 DIAGNOSIS — A41.9 SEPSIS, UNSPECIFIED ORGANISM: ICD-10-CM

## 2020-01-13 DIAGNOSIS — E11.10 TYPE 2 DIABETES MELLITUS WITH KETOACIDOSIS WITHOUT COMA: ICD-10-CM

## 2020-01-13 DIAGNOSIS — N17.0 ACUTE KIDNEY FAILURE WITH TUBULAR NECROSIS: ICD-10-CM

## 2020-01-13 DIAGNOSIS — E83.42 HYPOMAGNESEMIA: ICD-10-CM

## 2020-01-13 DIAGNOSIS — E11.52 TYPE 2 DIABETES MELLITUS WITH DIABETIC PERIPHERAL ANGIOPATHY WITH GANGRENE: ICD-10-CM

## 2020-01-13 DIAGNOSIS — J45.909 UNSPECIFIED ASTHMA, UNCOMPLICATED: ICD-10-CM

## 2020-01-13 DIAGNOSIS — Z79.4 LONG TERM (CURRENT) USE OF INSULIN: ICD-10-CM

## 2020-01-13 DIAGNOSIS — E78.5 HYPERLIPIDEMIA, UNSPECIFIED: ICD-10-CM

## 2020-01-13 DIAGNOSIS — E11.319 TYPE 2 DIABETES MELLITUS WITH UNSPECIFIED DIABETIC RETINOPATHY WITHOUT MACULAR EDEMA: ICD-10-CM

## 2020-01-13 DIAGNOSIS — I10 ESSENTIAL (PRIMARY) HYPERTENSION: ICD-10-CM

## 2020-01-13 DIAGNOSIS — A48.0 GAS GANGRENE: ICD-10-CM

## 2020-01-13 DIAGNOSIS — L97.519 NON-PRESSURE CHRONIC ULCER OF OTHER PART OF RIGHT FOOT WITH UNSPECIFIED SEVERITY: ICD-10-CM

## 2020-01-13 DIAGNOSIS — E11.40 TYPE 2 DIABETES MELLITUS WITH DIABETIC NEUROPATHY, UNSPECIFIED: ICD-10-CM

## 2020-01-13 DIAGNOSIS — E11.65 TYPE 2 DIABETES MELLITUS WITH HYPERGLYCEMIA: ICD-10-CM

## 2020-01-13 DIAGNOSIS — E11.649 TYPE 2 DIABETES MELLITUS WITH HYPOGLYCEMIA WITHOUT COMA: ICD-10-CM

## 2020-01-13 DIAGNOSIS — E11.621 TYPE 2 DIABETES MELLITUS WITH FOOT ULCER: ICD-10-CM

## 2020-01-13 DIAGNOSIS — F43.21 ADJUSTMENT DISORDER WITH DEPRESSED MOOD: ICD-10-CM

## 2020-01-13 DIAGNOSIS — Z89.422 ACQUIRED ABSENCE OF OTHER LEFT TOE(S): ICD-10-CM

## 2020-01-17 LAB — CULTURE RESULTS: SIGNIFICANT CHANGE UP

## 2020-01-27 ENCOUNTER — LABORATORY RESULT (OUTPATIENT)
Age: 70
End: 2020-01-27

## 2020-01-27 ENCOUNTER — APPOINTMENT (OUTPATIENT)
Dept: INFECTIOUS DISEASE | Facility: CLINIC | Age: 70
End: 2020-01-27
Payer: MEDICARE

## 2020-01-27 VITALS
RESPIRATION RATE: 14 BRPM | TEMPERATURE: 97.6 F | OXYGEN SATURATION: 99 % | HEART RATE: 85 BPM | WEIGHT: 148 LBS | DIASTOLIC BLOOD PRESSURE: 79 MMHG | BODY MASS INDEX: 27.23 KG/M2 | HEIGHT: 62 IN | SYSTOLIC BLOOD PRESSURE: 139 MMHG

## 2020-01-27 PROCEDURE — 99214 OFFICE O/P EST MOD 30 MIN: CPT

## 2020-01-27 NOTE — REVIEW OF SYSTEMS
[Depression] : depression [Negative] : Heme/Lymph [As Noted in HPI] : as noted in HPI [Fever] : no fever [Chills] : no chills

## 2020-01-27 NOTE — HISTORY OF PRESENT ILLNESS
[FreeTextEntry1] : 68 yo F with hx of severe depression, DM and bilateral diabetic foot wounds admitted to [facility] 12/29-1/8 for uncontrolled DM, diabetic foot ulcer/OM.  On 12/29 she underwent right second and third toe amputations and plantar ulcer excision with removal of all non-viable tissue and bone.  Avery pus was noted.  After the procedure patient was placed on IV vancomycin and IV zosyn. OR cultures polymicrobial: Staph aureus, Actinomyces, prevotella, Step dysgalactiae/agalactiae, Morganella mornganii. Pip-tazo/vanc discontinued and she was started on cefepime 2 g IV q 12 h (renal dosing) and metronidazole 500 mg PO q 8 h x 6 weeks (tentative end date 2/10).  She was discharged to nursing home.  Per patient no labs have been drawn, no lab results were provided by nursing home.  They are doing daily wound care wet-to-dry dressings. \par

## 2020-01-27 NOTE — PHYSICAL EXAM
[General Appearance - Alert] : alert [Sclera] : the sclera and conjunctiva were normal [Outer Ear] : the ears and nose were normal in appearance [Neck Appearance] : the appearance of the neck was normal [Heart Rate And Rhythm] : heart rate was normal and rhythm regular [Edema] : there was no peripheral edema [Bowel Sounds] : normal bowel sounds [No Palpable Adenopathy] : no palpable adenopathy [] : no rash [Motor Exam] : the motor exam was normal [Oriented To Time, Place, And Person] : oriented to person, place, and time [FreeTextEntry1] : right foot s/p 2nd and 3rd toe amputation; amputation site is clean without pus draining or foul smell

## 2020-01-28 LAB
ALBUMIN SERPL ELPH-MCNC: 4 G/DL
ALP BLD-CCNC: 92 U/L
ALT SERPL-CCNC: 11 U/L
ANION GAP SERPL CALC-SCNC: 12 MMOL/L
AST SERPL-CCNC: 15 U/L
BASOPHILS # BLD AUTO: 0.07 K/UL
BASOPHILS NFR BLD AUTO: 0.8 %
BILIRUB SERPL-MCNC: 0.2 MG/DL
BUN SERPL-MCNC: 31 MG/DL
CALCIUM SERPL-MCNC: 9.9 MG/DL
CHLORIDE SERPL-SCNC: 107 MMOL/L
CO2 SERPL-SCNC: 23 MMOL/L
CREAT SERPL-MCNC: 1.01 MG/DL
CRP SERPL-MCNC: 0.1 MG/DL
EOSINOPHIL # BLD AUTO: 0.29 K/UL
EOSINOPHIL NFR BLD AUTO: 3.3 %
ERYTHROCYTE [SEDIMENTATION RATE] IN BLOOD BY WESTERGREN METHOD: 118 MM/HR
GLUCOSE SERPL-MCNC: 189 MG/DL
HCT VFR BLD CALC: 37.4 %
HGB BLD-MCNC: 11 G/DL
IMM GRANULOCYTES NFR BLD AUTO: 0.2 %
LYMPHOCYTES # BLD AUTO: 2.88 K/UL
LYMPHOCYTES NFR BLD AUTO: 32.6 %
MAN DIFF?: NORMAL
MCHC RBC-ENTMCNC: 24.9 PG
MCHC RBC-ENTMCNC: 29.4 GM/DL
MCV RBC AUTO: 84.8 FL
MONOCYTES # BLD AUTO: 0.61 K/UL
MONOCYTES NFR BLD AUTO: 6.9 %
NEUTROPHILS # BLD AUTO: 4.96 K/UL
NEUTROPHILS NFR BLD AUTO: 56.2 %
PLATELET # BLD AUTO: 234 K/UL
POTASSIUM SERPL-SCNC: 4.7 MMOL/L
PROT SERPL-MCNC: 7.2 G/DL
RBC # BLD: 4.41 M/UL
RBC # FLD: 20.2 %
SODIUM SERPL-SCNC: 142 MMOL/L
WBC # FLD AUTO: 8.83 K/UL

## 2020-02-10 ENCOUNTER — APPOINTMENT (OUTPATIENT)
Dept: INFECTIOUS DISEASE | Facility: CLINIC | Age: 70
End: 2020-02-10
Payer: MEDICARE

## 2020-02-10 VITALS
TEMPERATURE: 97.7 F | BODY MASS INDEX: 27.23 KG/M2 | HEIGHT: 62 IN | DIASTOLIC BLOOD PRESSURE: 74 MMHG | OXYGEN SATURATION: 98 % | HEART RATE: 75 BPM | WEIGHT: 148 LBS | SYSTOLIC BLOOD PRESSURE: 132 MMHG

## 2020-02-10 PROCEDURE — 99214 OFFICE O/P EST MOD 30 MIN: CPT

## 2020-02-10 NOTE — HISTORY OF PRESENT ILLNESS
[FreeTextEntry1] : 70 yo F with hx of severe depression, DM and bilateral diabetic foot wounds admitted to [facility] 12/29-1/8 for uncontrolled DM, diabetic foot ulcer/OM.  On 12/29 she underwent right second and third toe amputations and plantar ulcer excision with removal of all non-viable tissue and bone.  OR cultures polymicrobial: Staph aureus, Actinomyces, prevotella, Step dysgalactiae/agalactiae, Morganella mornganii. She continues on cefepime 2 g IV q 12 h (renal dosing) and metronidazole 500 mg PO q 8 h x 6 weeks (tentative end date 2/10).  She reports drainage from wound.  Has daily wet-to-dry dressings at rehab. She has not seen surgery since discharge, f/u with Dr. Hutchinson is scheduled for 2/18. \par

## 2020-02-10 NOTE — REVIEW OF SYSTEMS
[As Noted in HPI] : as noted in HPI [Depression] : depression [Negative] : Heme/Lymph [Fever] : no fever [Chills] : no chills

## 2020-02-10 NOTE — PHYSICAL EXAM
[General Appearance - In No Acute Distress] : in no acute distress [General Appearance - Alert] : alert [Outer Ear] : the ears and nose were normal in appearance [Sclera] : the sclera and conjunctiva were normal [Respiration, Rhythm And Depth] : normal respiratory rhythm and effort [Neck Appearance] : the appearance of the neck was normal [Heart Rate And Rhythm] : heart rate was normal and rhythm regular [Edema] : there was no peripheral edema [Abdomen Soft] : soft [Bowel Sounds] : normal bowel sounds [No Palpable Adenopathy] : no palpable adenopathy [Musculoskeletal - Swelling] : no joint swelling [] : no rash [Motor Exam] : the motor exam was normal [Oriented To Time, Place, And Person] : oriented to person, place, and time

## 2020-02-13 LAB — BACTERIA SPEC CULT: ABNORMAL

## 2020-02-18 ENCOUNTER — APPOINTMENT (OUTPATIENT)
Dept: VASCULAR SURGERY | Facility: CLINIC | Age: 70
End: 2020-02-18
Payer: MEDICARE

## 2020-02-18 ENCOUNTER — LABORATORY RESULT (OUTPATIENT)
Age: 70
End: 2020-02-18

## 2020-02-18 DIAGNOSIS — E11.628 TYPE 2 DIABETES MELLITUS WITH OTHER SKIN COMPLICATIONS: ICD-10-CM

## 2020-02-18 DIAGNOSIS — L08.9 TYPE 2 DIABETES MELLITUS WITH OTHER SKIN COMPLICATIONS: ICD-10-CM

## 2020-02-18 PROCEDURE — 99213 OFFICE O/P EST LOW 20 MIN: CPT

## 2020-02-19 PROBLEM — E11.628 DIABETIC FOOT INFECTION: Status: ACTIVE | Noted: 2020-02-19

## 2020-02-19 NOTE — HISTORY OF PRESENT ILLNESS
[FreeTextEntry1] : 68 yo F with PMH of diabetes, HTN, HLD that comes to office for the 1st regular follow up visit after a 2 toe amputation by podiatry back in late December. She was admitted back then with diabetic foot infection and DKA. Podiatry did a primary amputation of the 2nd and 3rd toe of the R foot. After she was discharged she is staying in a nursing home and has daily wound care. She admits to be consistent with follow up and still being on IV abx therapy via PICC line. [place] any fevers chills or SOB. Denies chest, abdominal pain or pain to the foot. She tries to walk more but still needs wheelchair most of the time.

## 2020-02-19 NOTE — PHYSICAL EXAM
[Normal Heart Sounds] : normal heart sounds [0] : right 0 [2+] : right 2+ [No HSM] : no hepatosplenomegaly [No Rash or Lesion] : No rash or lesion [Calm] : calm [JVD] : no jugular venous distention  [Ankle Swelling (On Exam)] : not present [Varicose Veins Of Lower Extremities] : not present [] : not present [Abdomen Masses] : No abdominal masses [de-identified] : NAD [de-identified] : NC/AT [de-identified] : normal breath sound b/l  [de-identified] : equal muscle strength b/l [de-identified] : Wound seems to has good healing. minimal clear discharge on the dressing. pink granulation tissue with no signs of surrounding erythema or infection.

## 2020-02-19 NOTE — ASSESSMENT
[FreeTextEntry1] : 70 yo F with diabetic foot infection - podiatry 2nd and 3rd toe ray amputation that heals well and foot has good perfusion. No systemic symptoms and pt is  consistent with IV abx and regular follow ups with ID doctors. FU with podiatry for wound care and FU here as needed.  [Arterial/Venous Disease] : arterial/venous disease

## 2020-03-09 ENCOUNTER — APPOINTMENT (OUTPATIENT)
Dept: INFECTIOUS DISEASE | Facility: CLINIC | Age: 70
End: 2020-03-09
Payer: MEDICARE

## 2020-03-09 VITALS
OXYGEN SATURATION: 96 % | HEART RATE: 75 BPM | SYSTOLIC BLOOD PRESSURE: 128 MMHG | HEIGHT: 62 IN | BODY MASS INDEX: 27.23 KG/M2 | TEMPERATURE: 98.3 F | WEIGHT: 148 LBS | DIASTOLIC BLOOD PRESSURE: 72 MMHG

## 2020-03-09 PROCEDURE — 99214 OFFICE O/P EST MOD 30 MIN: CPT

## 2020-03-09 NOTE — PHYSICAL EXAM
[General Appearance - In No Acute Distress] : in no acute distress [Sclera] : the sclera and conjunctiva were normal [Outer Ear] : the ears and nose were normal in appearance [Neck Appearance] : the appearance of the neck was normal [Respiration, Rhythm And Depth] : normal respiratory rhythm and effort [Heart Rate And Rhythm] : heart rate was normal and rhythm regular [Edema] : there was no peripheral edema [Bowel Sounds] : normal bowel sounds [Abdomen Soft] : soft [No Palpable Adenopathy] : no palpable adenopathy [] : no rash [Motor Exam] : the motor exam was normal [No Focal Deficits] : no focal deficits [Oriented To Time, Place, And Person] : oriented to person, place, and time [FreeTextEntry1] : right foot ulcer without drainage; no erythema or tenderness

## 2020-03-09 NOTE — HISTORY OF PRESENT ILLNESS
[FreeTextEntry1] : 70 yo F with hx of severe depression, DM and bilateral diabetic foot wounds admitted to [facility] 12/29-1/8 for uncontrolled DM, diabetic foot ulcer/OM.  On 12/29 she underwent right second and third toe amputations and plantar ulcer excision with removal of all non-viable tissue and bone.  OR cultures polymicrobial: Staph aureus, Actinomyces, prevotella, Step dysgalactiae/agalactiae, Morganella mornganii. She continues on cefepime 2 g IV q 12 h (renal dosing) and metronidazole 500 mg PO q 8 h.  Tentative end date was 2/10, antibiotics extended due to open wound, slow wound healing. She had f/u with Dr. Hutchinson 2/18 who noted foot to be healing well.  She sees podiatry regularly at [facility].

## 2020-04-27 ENCOUNTER — APPOINTMENT (OUTPATIENT)
Dept: INFECTIOUS DISEASE | Facility: CLINIC | Age: 70
End: 2020-04-27

## 2022-05-06 VITALS
HEART RATE: 91 BPM | RESPIRATION RATE: 16 BRPM | WEIGHT: 149.91 LBS | HEIGHT: 62 IN | SYSTOLIC BLOOD PRESSURE: 180 MMHG | OXYGEN SATURATION: 98 % | TEMPERATURE: 98 F | DIASTOLIC BLOOD PRESSURE: 112 MMHG

## 2022-05-06 LAB
ALBUMIN SERPL ELPH-MCNC: 3 G/DL — LOW (ref 3.3–5)
ALP SERPL-CCNC: 114 U/L — SIGNIFICANT CHANGE UP (ref 40–120)
ALT FLD-CCNC: 13 U/L — SIGNIFICANT CHANGE UP (ref 10–45)
ANION GAP SERPL CALC-SCNC: 13 MMOL/L — SIGNIFICANT CHANGE UP (ref 5–17)
AST SERPL-CCNC: 12 U/L — SIGNIFICANT CHANGE UP (ref 10–40)
BASOPHILS # BLD AUTO: 0.04 K/UL — SIGNIFICANT CHANGE UP (ref 0–0.2)
BASOPHILS NFR BLD AUTO: 0.3 % — SIGNIFICANT CHANGE UP (ref 0–2)
BILIRUB SERPL-MCNC: <0.2 MG/DL — SIGNIFICANT CHANGE UP (ref 0.2–1.2)
BUN SERPL-MCNC: 39 MG/DL — HIGH (ref 7–23)
CALCIUM SERPL-MCNC: 8.7 MG/DL — SIGNIFICANT CHANGE UP (ref 8.4–10.5)
CHLORIDE SERPL-SCNC: 104 MMOL/L — SIGNIFICANT CHANGE UP (ref 96–108)
CO2 SERPL-SCNC: 21 MMOL/L — LOW (ref 22–31)
CREAT SERPL-MCNC: 2.36 MG/DL — HIGH (ref 0.5–1.3)
EGFR: 21 ML/MIN/1.73M2 — LOW
EOSINOPHIL # BLD AUTO: 0.1 K/UL — SIGNIFICANT CHANGE UP (ref 0–0.5)
EOSINOPHIL NFR BLD AUTO: 0.8 % — SIGNIFICANT CHANGE UP (ref 0–6)
GLUCOSE SERPL-MCNC: 476 MG/DL — CRITICAL HIGH (ref 70–99)
HCT VFR BLD CALC: 27.6 % — LOW (ref 34.5–45)
HGB BLD-MCNC: 8.9 G/DL — LOW (ref 11.5–15.5)
IMM GRANULOCYTES NFR BLD AUTO: 0.3 % — SIGNIFICANT CHANGE UP (ref 0–1.5)
LACTATE SERPL-SCNC: 1.7 MMOL/L — SIGNIFICANT CHANGE UP (ref 0.5–2)
LYMPHOCYTES # BLD AUTO: 18.4 % — SIGNIFICANT CHANGE UP (ref 13–44)
LYMPHOCYTES # BLD AUTO: 2.25 K/UL — SIGNIFICANT CHANGE UP (ref 1–3.3)
MCHC RBC-ENTMCNC: 27.6 PG — SIGNIFICANT CHANGE UP (ref 27–34)
MCHC RBC-ENTMCNC: 32.2 GM/DL — SIGNIFICANT CHANGE UP (ref 32–36)
MCV RBC AUTO: 85.4 FL — SIGNIFICANT CHANGE UP (ref 80–100)
MONOCYTES # BLD AUTO: 0.9 K/UL — SIGNIFICANT CHANGE UP (ref 0–0.9)
MONOCYTES NFR BLD AUTO: 7.4 % — SIGNIFICANT CHANGE UP (ref 2–14)
NEUTROPHILS # BLD AUTO: 8.91 K/UL — HIGH (ref 1.8–7.4)
NEUTROPHILS NFR BLD AUTO: 72.8 % — SIGNIFICANT CHANGE UP (ref 43–77)
NRBC # BLD: 0 /100 WBCS — SIGNIFICANT CHANGE UP (ref 0–0)
PLATELET # BLD AUTO: 379 K/UL — SIGNIFICANT CHANGE UP (ref 150–400)
POTASSIUM SERPL-MCNC: 4.8 MMOL/L — SIGNIFICANT CHANGE UP (ref 3.5–5.3)
POTASSIUM SERPL-SCNC: 4.8 MMOL/L — SIGNIFICANT CHANGE UP (ref 3.5–5.3)
PROT SERPL-MCNC: 6.9 G/DL — SIGNIFICANT CHANGE UP (ref 6–8.3)
RBC # BLD: 3.23 M/UL — LOW (ref 3.8–5.2)
RBC # FLD: 13.8 % — SIGNIFICANT CHANGE UP (ref 10.3–14.5)
SARS-COV-2 RNA SPEC QL NAA+PROBE: NEGATIVE — SIGNIFICANT CHANGE UP
SODIUM SERPL-SCNC: 138 MMOL/L — SIGNIFICANT CHANGE UP (ref 135–145)
WBC # BLD: 12.24 K/UL — HIGH (ref 3.8–10.5)
WBC # FLD AUTO: 12.24 K/UL — HIGH (ref 3.8–10.5)

## 2022-05-06 PROCEDURE — 73620 X-RAY EXAM OF FOOT: CPT | Mod: 26,LT,RT

## 2022-05-06 PROCEDURE — 99285 EMERGENCY DEPT VISIT HI MDM: CPT

## 2022-05-06 RX ORDER — INSULIN HUMAN 100 [IU]/ML
5 INJECTION, SOLUTION SUBCUTANEOUS ONCE
Refills: 0 | Status: COMPLETED | OUTPATIENT
Start: 2022-05-06 | End: 2022-05-06

## 2022-05-06 RX ORDER — PIPERACILLIN AND TAZOBACTAM 4; .5 G/20ML; G/20ML
3.38 INJECTION, POWDER, LYOPHILIZED, FOR SOLUTION INTRAVENOUS ONCE
Refills: 0 | Status: COMPLETED | OUTPATIENT
Start: 2022-05-06 | End: 2022-05-06

## 2022-05-06 RX ORDER — VANCOMYCIN HCL 1 G
1000 VIAL (EA) INTRAVENOUS ONCE
Refills: 0 | Status: DISCONTINUED | OUTPATIENT
Start: 2022-05-06 | End: 2022-05-06

## 2022-05-06 RX ORDER — VANCOMYCIN HCL 1 G
1000 VIAL (EA) INTRAVENOUS ONCE
Refills: 0 | Status: COMPLETED | OUTPATIENT
Start: 2022-05-06 | End: 2022-05-07

## 2022-05-06 RX ORDER — SODIUM CHLORIDE 9 MG/ML
1000 INJECTION INTRAMUSCULAR; INTRAVENOUS; SUBCUTANEOUS ONCE
Refills: 0 | Status: COMPLETED | OUTPATIENT
Start: 2022-05-06 | End: 2022-05-06

## 2022-05-06 RX ORDER — MORPHINE SULFATE 50 MG/1
2 CAPSULE, EXTENDED RELEASE ORAL ONCE
Refills: 0 | Status: DISCONTINUED | OUTPATIENT
Start: 2022-05-06 | End: 2022-05-06

## 2022-05-06 RX ADMIN — MORPHINE SULFATE 2 MILLIGRAM(S): 50 CAPSULE, EXTENDED RELEASE ORAL at 23:11

## 2022-05-06 RX ADMIN — PIPERACILLIN AND TAZOBACTAM 200 GRAM(S): 4; .5 INJECTION, POWDER, LYOPHILIZED, FOR SOLUTION INTRAVENOUS at 23:56

## 2022-05-06 RX ADMIN — SODIUM CHLORIDE 1000 MILLILITER(S): 9 INJECTION INTRAMUSCULAR; INTRAVENOUS; SUBCUTANEOUS at 22:02

## 2022-05-06 RX ADMIN — INSULIN HUMAN 5 UNIT(S): 100 INJECTION, SOLUTION SUBCUTANEOUS at 22:02

## 2022-05-06 NOTE — ED PROVIDER NOTE - CLINICAL SUMMARY MEDICAL DECISION MAKING FREE TEXT BOX
Pt with infected ulcer, uncontrolled DM. Will check labs, XRay foot- bilateral done. CXR. Pt given normal saline IV, Insulin IV, IV Vancomycin and Zosyn given.  Podiatry resident in ED to evaluate pt. Recommends admission.  Vascular resident in ED to evaluate pt. No vascular intervention needed at this time. Pt with pulses intact.

## 2022-05-06 NOTE — ED PROVIDER NOTE - CARE PLAN
Principal Discharge DX:	Infected wound  Secondary Diagnosis:	Uncontrolled diabetes mellitus with hyperglycemia   1

## 2022-05-06 NOTE — ED PROVIDER NOTE - OBJECTIVE STATEMENT
Pt is a 72F who presents to ED for worsening pain of her right foot. Pt has significant history of chronic poor healing wounds as well as uncontrolled DM. Pt presents to ED for worsening pain as well as foul smelling discharge to wound. Pt states she now noticed development of wound to left foot as well. Pt has no fever. No numbness or tingling.

## 2022-05-06 NOTE — ED ADULT TRIAGE NOTE - CHIEF COMPLAINT QUOTE
worsening foot ulcer - I have this ulcer for over a year and it's not getting better; this past few weeks my right foot wound is getting worst, draining with foul smelling discharge; denies fever, chills

## 2022-05-06 NOTE — ED ADULT NURSE NOTE - OBJECTIVE STATEMENT
Pt arrived to ED AAOX4, spont unlab breathing on RA,NAD, Ambulatory with roll walker. Pt is c/o chronic diabetic bilat foot ulcers. Pt has ulcer on R bottom of foot which is worse than L bottom of foot. Foul smell, and discharge noted. Pt reports her podiatrist recommended amputation but she refused. Pt also reports she stopped taking her diabetes medication because it makes her nauseous.  on arrival.

## 2022-05-06 NOTE — ED ADULT NURSE NOTE - CHIEF COMPLAINT QUOTE
Chief Complaint   Patient presents with   • Follow-up      Elver Jennings was admitted on January 18th to [facility] for right arm and right leg weakness, impaired sensation, slurred speech. [facility] after tPA administered. Diagnosed with acute left pontine infarct. He was started on dual antiplatelet therapy with ASA 81 mg and Plavix 75 mg for three weeks, then switched to just Plavix which he reports that he has continued to take. Stroke was thought to be secondary to small vessel disease. [facility] He was started on Lipitor 80 mg daily but this was discontinued when she was seen in follow up. . Discharged on home Losartan 50 mg and HCTZ 12.5 mg for blood pressure control. He had significant right sided weakness and dysarthria.     He has been able to walk now. He still feels some weakness on the right side. He was cleared by the Neurologist last week to drive.     He also reports a rash, that are red dots that itch, that he got on his forearms while he was in a wheelchair. He now has the rash on his left lower leg ankle to calf. He put triamcinolone cream on it and it got better and now it comes back. It is very itchy. He is wondering if there is anything else that he should use.      His balance is off per his wife. He is having trouble with his right arm still. He is having pain with rotator cuffs when he is doing the therapy. He had trouble swallowing for a while.     He reports that his appetite is way down. He feels full all of the time. He feels slightly bloated. He denies abdominal pain. He denies diarrhea, nausea. He denies constipation has bowel movements daily. He reports that he had to have an EGD a few months ago due to lesions/ polyps on the gall bladder. He was supposed to have a biopsy. There was a nodule on his stomach.     EXAM:   Visit Vitals  /70 (BP Location: NABEEL, Patient Position: Sitting, Cuff Size:  Regular)   Pulse 80   Temp 98.2 °F (36.8 °C) (Oral)   Ht 5' 8\" (1.727 m)   Wt 84.3 kg   SpO2 96%   BMI 28.27 kg/m²   Neuro: Right facial droop, mildly dysarthria.  strength, biceps, triceps, hip flexor.   Skin: Left lateral leg with excoriations in a line. Mild erythematous papules.   CV: Regular rate, and rhythm, no murmurs, rubs or gallops. No pretibial edema.   Lungs: Normal respiratory effort, no wheezing, rales or rhonchi. Good aeration.    Assessment and Plan:   Patient presents after a left pontine stroke causing right upper and lower extremity weakness and dysarthria. He has been in in patient rehab and continues in therapy as an outpatient. He is now ambulating, still working on balance per his wife. He has mild dysarthria but good upper and lower body strength on today's exam. [facility] Taken off of the Statin. On Plavix and another drug. He was advised to call the clinic with the other drugs prescribed so that we may update our records as unable to review [facility] records. Advised to continue with therapies.     Also reports pruritic rash with excoriation on the left lateral shin/ ankle. No rash elsewhere on the body although he reports similar rash on his forearms while in the hospital. His wife does not have a similar rash. Most likely contact dermatitis, vs. Insect bit, bed bugs, but would not expect to be focal. Consider drug reaction but it should not be localized but more general. He will continue with the triamcinolone twice daily and if it generalizes or worsens he was advised to get it reassessed.     He also reports early satiety and recent finding of polyps on the gallbladder and nodule in the stomach per wife. Unable to [facility] records from the [facility]. Denies nausea, vomiting, diarrhea, GERD, abdominal pain. Discussed if symptoms persist beyond three more days he should follow up with GI.     Follow up: Return in about 3 months (around 7/8/2019) for Extended follow up, 40  minutes.      Adeline Guerrero MD  4/8/2019  11:11 AM      worsening foot ulcer - I have this ulcer for over a year and it's not getting better; this past few weeks my right foot wound is getting worst, draining with foul smelling discharge; denies fever, chills

## 2022-05-07 ENCOUNTER — INPATIENT (INPATIENT)
Facility: HOSPITAL | Age: 72
LOS: 7 days | Discharge: NURSING, REHAB OR LONG-TERM CARE | DRG: 629 | End: 2022-05-15
Attending: HOSPITALIST | Admitting: HOSPITALIST
Payer: MEDICARE

## 2022-05-07 DIAGNOSIS — D64.9 ANEMIA, UNSPECIFIED: ICD-10-CM

## 2022-05-07 DIAGNOSIS — F32.9 MAJOR DEPRESSIVE DISORDER, SINGLE EPISODE, UNSPECIFIED: ICD-10-CM

## 2022-05-07 DIAGNOSIS — E78.5 HYPERLIPIDEMIA, UNSPECIFIED: ICD-10-CM

## 2022-05-07 DIAGNOSIS — I10 ESSENTIAL (PRIMARY) HYPERTENSION: ICD-10-CM

## 2022-05-07 DIAGNOSIS — R63.8 OTHER SYMPTOMS AND SIGNS CONCERNING FOOD AND FLUID INTAKE: ICD-10-CM

## 2022-05-07 DIAGNOSIS — E11.621 TYPE 2 DIABETES MELLITUS WITH FOOT ULCER: ICD-10-CM

## 2022-05-07 DIAGNOSIS — N17.9 ACUTE KIDNEY FAILURE, UNSPECIFIED: ICD-10-CM

## 2022-05-07 DIAGNOSIS — E11.9 TYPE 2 DIABETES MELLITUS WITHOUT COMPLICATIONS: ICD-10-CM

## 2022-05-07 LAB
A1C WITH ESTIMATED AVERAGE GLUCOSE RESULT: 9.7 % — HIGH (ref 4–5.6)
ALBUMIN SERPL ELPH-MCNC: 3 G/DL — LOW (ref 3.3–5)
ALP SERPL-CCNC: 98 U/L — SIGNIFICANT CHANGE UP (ref 40–120)
ALT FLD-CCNC: 10 U/L — SIGNIFICANT CHANGE UP (ref 10–45)
ANION GAP SERPL CALC-SCNC: 12 MMOL/L — SIGNIFICANT CHANGE UP (ref 5–17)
ANION GAP SERPL CALC-SCNC: 14 MMOL/L — SIGNIFICANT CHANGE UP (ref 5–17)
AST SERPL-CCNC: 10 U/L — SIGNIFICANT CHANGE UP (ref 10–40)
BASOPHILS # BLD AUTO: 0.03 K/UL — SIGNIFICANT CHANGE UP (ref 0–0.2)
BASOPHILS NFR BLD AUTO: 0.2 % — SIGNIFICANT CHANGE UP (ref 0–2)
BILIRUB SERPL-MCNC: 0.2 MG/DL — SIGNIFICANT CHANGE UP (ref 0.2–1.2)
BLD GP AB SCN SERPL QL: NEGATIVE — SIGNIFICANT CHANGE UP
BUN SERPL-MCNC: 36 MG/DL — HIGH (ref 7–23)
BUN SERPL-MCNC: 40 MG/DL — HIGH (ref 7–23)
CALCIUM SERPL-MCNC: 8.6 MG/DL — SIGNIFICANT CHANGE UP (ref 8.4–10.5)
CALCIUM SERPL-MCNC: 8.6 MG/DL — SIGNIFICANT CHANGE UP (ref 8.4–10.5)
CHLORIDE SERPL-SCNC: 103 MMOL/L — SIGNIFICANT CHANGE UP (ref 96–108)
CHLORIDE SERPL-SCNC: 106 MMOL/L — SIGNIFICANT CHANGE UP (ref 96–108)
CO2 SERPL-SCNC: 20 MMOL/L — LOW (ref 22–31)
CO2 SERPL-SCNC: 21 MMOL/L — LOW (ref 22–31)
CREAT SERPL-MCNC: 2.24 MG/DL — HIGH (ref 0.5–1.3)
CREAT SERPL-MCNC: 2.4 MG/DL — HIGH (ref 0.5–1.3)
EGFR: 21 ML/MIN/1.73M2 — LOW
EGFR: 23 ML/MIN/1.73M2 — LOW
EOSINOPHIL # BLD AUTO: 0.21 K/UL — SIGNIFICANT CHANGE UP (ref 0–0.5)
EOSINOPHIL NFR BLD AUTO: 1.6 % — SIGNIFICANT CHANGE UP (ref 0–6)
ESTIMATED AVERAGE GLUCOSE: 232 MG/DL — HIGH (ref 68–114)
FERRITIN SERPL-MCNC: 217 NG/ML — HIGH (ref 15–150)
GLUCOSE BLDC GLUCOMTR-MCNC: 158 MG/DL — HIGH (ref 70–99)
GLUCOSE BLDC GLUCOMTR-MCNC: 198 MG/DL — HIGH (ref 70–99)
GLUCOSE BLDC GLUCOMTR-MCNC: 251 MG/DL — HIGH (ref 70–99)
GLUCOSE BLDC GLUCOMTR-MCNC: 286 MG/DL — HIGH (ref 70–99)
GLUCOSE SERPL-MCNC: 202 MG/DL — HIGH (ref 70–99)
GLUCOSE SERPL-MCNC: 245 MG/DL — HIGH (ref 70–99)
GRAM STN FLD: SIGNIFICANT CHANGE UP
HCT VFR BLD CALC: 29.8 % — LOW (ref 34.5–45)
HGB BLD-MCNC: 9.2 G/DL — LOW (ref 11.5–15.5)
IMM GRANULOCYTES NFR BLD AUTO: 0.7 % — SIGNIFICANT CHANGE UP (ref 0–1.5)
IRON SATN MFR SERPL: 20 % — SIGNIFICANT CHANGE UP (ref 14–50)
IRON SATN MFR SERPL: 30 UG/DL — SIGNIFICANT CHANGE UP (ref 30–160)
LYMPHOCYTES # BLD AUTO: 24.9 % — SIGNIFICANT CHANGE UP (ref 13–44)
LYMPHOCYTES # BLD AUTO: 3.34 K/UL — HIGH (ref 1–3.3)
MAGNESIUM SERPL-MCNC: 1.3 MG/DL — LOW (ref 1.6–2.6)
MCHC RBC-ENTMCNC: 26.4 PG — LOW (ref 27–34)
MCHC RBC-ENTMCNC: 30.9 GM/DL — LOW (ref 32–36)
MCV RBC AUTO: 85.4 FL — SIGNIFICANT CHANGE UP (ref 80–100)
MONOCYTES # BLD AUTO: 1.16 K/UL — HIGH (ref 0–0.9)
MONOCYTES NFR BLD AUTO: 8.6 % — SIGNIFICANT CHANGE UP (ref 2–14)
NEUTROPHILS # BLD AUTO: 8.59 K/UL — HIGH (ref 1.8–7.4)
NEUTROPHILS NFR BLD AUTO: 64 % — SIGNIFICANT CHANGE UP (ref 43–77)
NRBC # BLD: 0 /100 WBCS — SIGNIFICANT CHANGE UP (ref 0–0)
OSMOLALITY UR: 339 MOSM/KG — SIGNIFICANT CHANGE UP (ref 300–900)
PHOSPHATE SERPL-MCNC: 4.2 MG/DL — SIGNIFICANT CHANGE UP (ref 2.5–4.5)
PLATELET # BLD AUTO: 373 K/UL — SIGNIFICANT CHANGE UP (ref 150–400)
POTASSIUM SERPL-MCNC: 3.7 MMOL/L — SIGNIFICANT CHANGE UP (ref 3.5–5.3)
POTASSIUM SERPL-MCNC: 3.9 MMOL/L — SIGNIFICANT CHANGE UP (ref 3.5–5.3)
POTASSIUM SERPL-SCNC: 3.7 MMOL/L — SIGNIFICANT CHANGE UP (ref 3.5–5.3)
POTASSIUM SERPL-SCNC: 3.9 MMOL/L — SIGNIFICANT CHANGE UP (ref 3.5–5.3)
PROT SERPL-MCNC: 6.8 G/DL — SIGNIFICANT CHANGE UP (ref 6–8.3)
RBC # BLD: 3.49 M/UL — LOW (ref 3.8–5.2)
RBC # FLD: 14 % — SIGNIFICANT CHANGE UP (ref 10.3–14.5)
RH IG SCN BLD-IMP: POSITIVE — SIGNIFICANT CHANGE UP
SODIUM SERPL-SCNC: 137 MMOL/L — SIGNIFICANT CHANGE UP (ref 135–145)
SODIUM SERPL-SCNC: 139 MMOL/L — SIGNIFICANT CHANGE UP (ref 135–145)
SPECIMEN SOURCE: SIGNIFICANT CHANGE UP
TIBC SERPL-MCNC: 152 UG/DL — LOW (ref 220–430)
TRANSFERRIN SERPL-MCNC: 120 MG/DL — LOW (ref 200–360)
UIBC SERPL-MCNC: 122 UG/DL — SIGNIFICANT CHANGE UP (ref 110–370)
VANCOMYCIN FLD-MCNC: 15.4 UG/ML — SIGNIFICANT CHANGE UP
VANCOMYCIN FLD-MCNC: 9.5 UG/ML — SIGNIFICANT CHANGE UP
WBC # BLD: 13.42 K/UL — HIGH (ref 3.8–10.5)
WBC # FLD AUTO: 13.42 K/UL — HIGH (ref 3.8–10.5)

## 2022-05-07 PROCEDURE — 99223 1ST HOSP IP/OBS HIGH 75: CPT

## 2022-05-07 PROCEDURE — 99221 1ST HOSP IP/OBS SF/LOW 40: CPT | Mod: GC

## 2022-05-07 RX ORDER — ATORVASTATIN CALCIUM 80 MG/1
40 TABLET, FILM COATED ORAL AT BEDTIME
Refills: 0 | Status: DISCONTINUED | OUTPATIENT
Start: 2022-05-07 | End: 2022-05-15

## 2022-05-07 RX ORDER — HEPARIN SODIUM 5000 [USP'U]/ML
5000 INJECTION INTRAVENOUS; SUBCUTANEOUS EVERY 8 HOURS
Refills: 0 | Status: DISCONTINUED | OUTPATIENT
Start: 2022-05-07 | End: 2022-05-08

## 2022-05-07 RX ORDER — DULOXETINE HYDROCHLORIDE 30 MG/1
60 CAPSULE, DELAYED RELEASE ORAL DAILY
Refills: 0 | Status: DISCONTINUED | OUTPATIENT
Start: 2022-05-07 | End: 2022-05-10

## 2022-05-07 RX ORDER — VANCOMYCIN HCL 1 G
1250 VIAL (EA) INTRAVENOUS ONCE
Refills: 0 | Status: COMPLETED | OUTPATIENT
Start: 2022-05-07 | End: 2022-05-07

## 2022-05-07 RX ORDER — PIPERACILLIN AND TAZOBACTAM 4; .5 G/20ML; G/20ML
2.25 INJECTION, POWDER, LYOPHILIZED, FOR SOLUTION INTRAVENOUS EVERY 6 HOURS
Refills: 0 | Status: DISCONTINUED | OUTPATIENT
Start: 2022-05-07 | End: 2022-05-11

## 2022-05-07 RX ORDER — HYDRALAZINE HCL 50 MG
25 TABLET ORAL EVERY 8 HOURS
Refills: 0 | Status: DISCONTINUED | OUTPATIENT
Start: 2022-05-07 | End: 2022-05-13

## 2022-05-07 RX ORDER — INSULIN GLARGINE 100 [IU]/ML
20 INJECTION, SOLUTION SUBCUTANEOUS AT BEDTIME
Refills: 0 | Status: DISCONTINUED | OUTPATIENT
Start: 2022-05-07 | End: 2022-05-08

## 2022-05-07 RX ORDER — NIFEDIPINE 30 MG
90 TABLET, EXTENDED RELEASE 24 HR ORAL ONCE
Refills: 0 | Status: COMPLETED | OUTPATIENT
Start: 2022-05-07 | End: 2022-05-07

## 2022-05-07 RX ORDER — NIFEDIPINE 30 MG
90 TABLET, EXTENDED RELEASE 24 HR ORAL EVERY 24 HOURS
Refills: 0 | Status: DISCONTINUED | OUTPATIENT
Start: 2022-05-08 | End: 2022-05-15

## 2022-05-07 RX ORDER — HEPARIN SODIUM 5000 [USP'U]/ML
5000 INJECTION INTRAVENOUS; SUBCUTANEOUS EVERY 12 HOURS
Refills: 0 | Status: DISCONTINUED | OUTPATIENT
Start: 2022-05-07 | End: 2022-05-07

## 2022-05-07 RX ORDER — PIPERACILLIN AND TAZOBACTAM 4; .5 G/20ML; G/20ML
3.38 INJECTION, POWDER, LYOPHILIZED, FOR SOLUTION INTRAVENOUS EVERY 8 HOURS
Refills: 0 | Status: DISCONTINUED | OUTPATIENT
Start: 2022-05-07 | End: 2022-05-07

## 2022-05-07 RX ORDER — MAGNESIUM SULFATE 500 MG/ML
2 VIAL (ML) INJECTION ONCE
Refills: 0 | Status: COMPLETED | OUTPATIENT
Start: 2022-05-07 | End: 2022-05-07

## 2022-05-07 RX ORDER — NIFEDIPINE 30 MG
90 TABLET, EXTENDED RELEASE 24 HR ORAL EVERY 24 HOURS
Refills: 0 | Status: DISCONTINUED | OUTPATIENT
Start: 2022-05-07 | End: 2022-05-07

## 2022-05-07 RX ORDER — HYDROMORPHONE HYDROCHLORIDE 2 MG/ML
0.25 INJECTION INTRAMUSCULAR; INTRAVENOUS; SUBCUTANEOUS DAILY
Refills: 0 | Status: DISCONTINUED | OUTPATIENT
Start: 2022-05-07 | End: 2022-05-10

## 2022-05-07 RX ORDER — INSULIN LISPRO 100/ML
VIAL (ML) SUBCUTANEOUS
Refills: 0 | Status: DISCONTINUED | OUTPATIENT
Start: 2022-05-07 | End: 2022-05-08

## 2022-05-07 RX ORDER — ACETAMINOPHEN 500 MG
650 TABLET ORAL EVERY 6 HOURS
Refills: 0 | Status: DISCONTINUED | OUTPATIENT
Start: 2022-05-07 | End: 2022-05-15

## 2022-05-07 RX ORDER — MORPHINE SULFATE 50 MG/1
4 CAPSULE, EXTENDED RELEASE ORAL ONCE
Refills: 0 | Status: DISCONTINUED | OUTPATIENT
Start: 2022-05-07 | End: 2022-05-07

## 2022-05-07 RX ORDER — ASPIRIN/CALCIUM CARB/MAGNESIUM 324 MG
81 TABLET ORAL DAILY
Refills: 0 | Status: DISCONTINUED | OUTPATIENT
Start: 2022-05-07 | End: 2022-05-15

## 2022-05-07 RX ORDER — INSULIN LISPRO 100/ML
5 VIAL (ML) SUBCUTANEOUS
Refills: 0 | Status: DISCONTINUED | OUTPATIENT
Start: 2022-05-07 | End: 2022-05-08

## 2022-05-07 RX ORDER — GABAPENTIN 400 MG/1
100 CAPSULE ORAL EVERY 8 HOURS
Refills: 0 | Status: DISCONTINUED | OUTPATIENT
Start: 2022-05-07 | End: 2022-05-15

## 2022-05-07 RX ORDER — INSULIN GLARGINE 100 [IU]/ML
17 INJECTION, SOLUTION SUBCUTANEOUS AT BEDTIME
Refills: 0 | Status: DISCONTINUED | OUTPATIENT
Start: 2022-05-07 | End: 2022-05-07

## 2022-05-07 RX ADMIN — PIPERACILLIN AND TAZOBACTAM 200 GRAM(S): 4; .5 INJECTION, POWDER, LYOPHILIZED, FOR SOLUTION INTRAVENOUS at 06:07

## 2022-05-07 RX ADMIN — Medication 25 MILLIGRAM(S): at 06:07

## 2022-05-07 RX ADMIN — PIPERACILLIN AND TAZOBACTAM 200 GRAM(S): 4; .5 INJECTION, POWDER, LYOPHILIZED, FOR SOLUTION INTRAVENOUS at 18:48

## 2022-05-07 RX ADMIN — Medication 25 GRAM(S): at 11:04

## 2022-05-07 RX ADMIN — Medication 25 MILLIGRAM(S): at 02:08

## 2022-05-07 RX ADMIN — Medication 650 MILLIGRAM(S): at 13:20

## 2022-05-07 RX ADMIN — Medication 81 MILLIGRAM(S): at 11:55

## 2022-05-07 RX ADMIN — PIPERACILLIN AND TAZOBACTAM 200 GRAM(S): 4; .5 INJECTION, POWDER, LYOPHILIZED, FOR SOLUTION INTRAVENOUS at 12:31

## 2022-05-07 RX ADMIN — Medication 5 UNIT(S): at 11:31

## 2022-05-07 RX ADMIN — HEPARIN SODIUM 5000 UNIT(S): 5000 INJECTION INTRAVENOUS; SUBCUTANEOUS at 22:24

## 2022-05-07 RX ADMIN — GABAPENTIN 100 MILLIGRAM(S): 400 CAPSULE ORAL at 14:59

## 2022-05-07 RX ADMIN — Medication 166.67 MILLIGRAM(S): at 23:54

## 2022-05-07 RX ADMIN — GABAPENTIN 100 MILLIGRAM(S): 400 CAPSULE ORAL at 06:07

## 2022-05-07 RX ADMIN — Medication 5 UNIT(S): at 17:28

## 2022-05-07 RX ADMIN — DULOXETINE HYDROCHLORIDE 60 MILLIGRAM(S): 30 CAPSULE, DELAYED RELEASE ORAL at 11:55

## 2022-05-07 RX ADMIN — Medication 5 UNIT(S): at 08:45

## 2022-05-07 RX ADMIN — Medication 3: at 11:32

## 2022-05-07 RX ADMIN — GABAPENTIN 100 MILLIGRAM(S): 400 CAPSULE ORAL at 22:24

## 2022-05-07 RX ADMIN — ATORVASTATIN CALCIUM 40 MILLIGRAM(S): 80 TABLET, FILM COATED ORAL at 22:24

## 2022-05-07 RX ADMIN — Medication 1: at 22:23

## 2022-05-07 RX ADMIN — Medication 1: at 17:29

## 2022-05-07 RX ADMIN — Medication 3: at 08:45

## 2022-05-07 RX ADMIN — Medication 250 MILLIGRAM(S): at 01:47

## 2022-05-07 RX ADMIN — Medication 90 MILLIGRAM(S): at 09:53

## 2022-05-07 RX ADMIN — HEPARIN SODIUM 5000 UNIT(S): 5000 INJECTION INTRAVENOUS; SUBCUTANEOUS at 14:59

## 2022-05-07 RX ADMIN — INSULIN GLARGINE 20 UNIT(S): 100 INJECTION, SOLUTION SUBCUTANEOUS at 22:24

## 2022-05-07 RX ADMIN — Medication 90 MILLIGRAM(S): at 02:18

## 2022-05-07 RX ADMIN — MORPHINE SULFATE 4 MILLIGRAM(S): 50 CAPSULE, EXTENDED RELEASE ORAL at 00:20

## 2022-05-07 RX ADMIN — Medication 650 MILLIGRAM(S): at 14:20

## 2022-05-07 RX ADMIN — HEPARIN SODIUM 5000 UNIT(S): 5000 INJECTION INTRAVENOUS; SUBCUTANEOUS at 06:07

## 2022-05-07 NOTE — H&P ADULT - PROBLEM SELECTOR PLAN 5
On Hydralzine 25mg  TID and Nifedipine 90mg at home.   -Continue Hydralazine 25mg TID   -Continue Nifedipine 90mg daily

## 2022-05-07 NOTE — PROGRESS NOTE ADULT - ATTENDING COMMENTS
Patient was seen and examined with the resident team today.  I agree with Dr. Titus's assessment and plan with the following exceptions/additions:     Briefly, this is a 73yo woman with a PMH of HTN, HLD, poorly controlled NIDDM2 (not open to insulin) c/b neuropathy, DM foot infections and ulcers, as well as mild recurrent MDD who p/w concerns for R foot OM.    #DM foot infection, OM R/O - c/w Vanc and Zosyn, MRI pending, Podiatry and Vascular following; pain control  #Poorly controlled NIDDM2 - start inpatient insulin for FS goal <180, will need Endo and DM education, good candidate for continuous glucometer as does not like to check her FS at home   #ELBERT on CKD - trend Cr and encourage PO intake   #Essential HTN - resume home BP now but will need to assess why she is not on an ACEi/ARB   #HLD - c/w ASA and statin   #Mild recurrent MDD - c/w Cymbalta for MDD and neuropathic pain   #DVT PPx - SQH  #Dispo - TBD     Kaitlin Wei  [phone]

## 2022-05-07 NOTE — H&P ADULT - ASSESSMENT
72 year old female with a past medical history of multiple amputations of the second, third, and fourth digits of left foot and osteomyelitis of the right foot with right partial second and fourth toe ray amputation, chronic right foot wound and left foot plantar wound, DM, HTN, HLD, Depression who presents with to ED with concerns of osteomyelitis.

## 2022-05-07 NOTE — H&P ADULT - NSHPPHYSICALEXAM_GEN_ALL_CORE
Constitutional: WDWN resting comfortably in bed; NAD  Head: NC/AT  Eyes:  EOMI,   ENT: MMM  Neck: supple; no JVD   Respiratory: CTA B/L; no W/R/R, no retractions  Cardiac: +S1/S2; RRR; no M/R/G  Gastrointestinal: soft, NT/ND; no rebound or guarding; +BSx4  Back: spine midline, no bony tenderness or step-offs; no CVAT B/L  Extremities: Amputation of the right 2 and 4 digit with chronic wound on lateral portion of right foot with purulent drainage staing bandages no erythema with black appearing center, left foot with amputation of 2,3,4 with plantar  Vascular: 2+ radial, femoral, DP/PT pulses B/L  Dermatologic: skin warm, dry and intact; no rashes, wounds, or scars  Psychiatric: affect and characteristics of appearance, verbalizations, behaviors are appropriate

## 2022-05-07 NOTE — PROGRESS NOTE ADULT - ASSESSMENT
ASSESSMENT  Ms. George is a 72yF with hx of severe depression, DM and bilateral diabetic foot wounds presenting to the ED with cc of worsening R foot wound. Labs demonstrating leukocytosis of 12k and exam notable for open diabetic wound that probes to bone with palpable pedal pulses.    PLAN  1. No acute vascular surgical intervention indicated at this time.  2. Recommend antibiotics for treatment of probable osteomyelitis.   3. Appreciate podiatry recs for wound care and debridement if indicated.  4. Please call as needed with any further questions

## 2022-05-07 NOTE — PROGRESS NOTE ADULT - PROBLEM SELECTOR PLAN 2
Baseline creatinine 1.1 to 1.2. Elevated creatinine may represent vipul or worsening of kidney function secondary to history of diabetes and hypertension. Now status post 1L of NS.  -Urine lytes  - Trend Cr.

## 2022-05-07 NOTE — H&P ADULT - PROBLEM SELECTOR PLAN 8
F: None  N: Dash/TLC Carb Consistent   E: K>4 and Mg>2  DVT: Lovenox   Dispo: RMF F: None  N: Dash/TLC Carb Consistent   E: K>4 and Mg>2  DVT: Heparin  Dispo: RMF

## 2022-05-07 NOTE — H&P ADULT - PROBLEM SELECTOR PLAN 1
Long history of right foot ulcer with recent offer to [facility].  The physical exam is concerning of OM due to positive probe to bone in the setting of elevated ESR, CRP with high blood sugar levels. X-ray does not reveal subcutaneous emphysema. Imaging not revealing subcutaneous emphysema, and patient is now status post vancomycin and zosyn.  Podiatry following with cultures taken from wound site. During last admission in [facility] patient was treated with extended course of Cefepime and Metronidazole for polymicrobial infection, by Dr. Hamilton.    -Vancomycin 750 q24hrs   -Zosyn 4.5grams q8hrs  -F/U Wound Culture   -F/U Podiatry   -F/U Vascular   -MR of right foot without contrast  -Tylenol for mild to moderate pain   -Dilaudid 0.25 for severe pain  -Renally dose Gabapentin to 100mg TID Long history of right foot ulcer with recent offer to TMA at [facility].  The physical exam is concerning of OM due to positive probe to bone in the setting of elevated ESR, CRP with high blood sugar levels. X-ray does not reveal subcutaneous emphysema. Imaging not revealing subcutaneous emphysema, and patient is now status post vancomycin and zosyn.  Podiatry following with cultures taken from wound site. During last admission in [facility] patient was treated with extended course of Cefepime and Metronidazole for polymicrobial infection, by Dr. Hamilton.    -Vancomycin dosing by level. Follow up morning random level if less than 15 give 1gram of vancomycin otherwise hold  -Zosyn 2.25grams q6hrs  -F/U Wound Culture   -F/U Podiatry   -F/U Vascular   -MR of right foot without contrast  -Tylenol for mild to moderate pain   -Dilaudid 0.25 for severe pain  -Renally dose Gabapentin to 100mg TID

## 2022-05-07 NOTE — PATIENT PROFILE ADULT - HAVE YOU HAD A FIRST COVID-19 BOOSTER?
Discussed patient with Onc team.   Sharron with Coagulase negative line related infection, likely to be a relapse from prior infection in May. Patient stable. No e/o cellulitis around the port. No more fevers.   Due to upcoming stem cell transplant recommend the following  1. Agree with removing the medi port  2. To treat with IV vancomycin for at least 7 days post line removal, provided blood cx clears after line removal  3. Continue vanco locks while awaiting line removal.   4. Obtain peripheral culture at the time of line removal. No

## 2022-05-07 NOTE — H&P ADULT - PROBLEM SELECTOR PLAN 3
History of diabetes, last reported A1c of 11.9 in 2019, currently on Metformin at home.  Patient refused to take insulin in outpatint setting.   -Lantus 17 units at bed time   -Lispro 5 units premeal    -Sliding Scale   -Carb Consistent Diet   -A1c in AM

## 2022-05-07 NOTE — PATIENT PROFILE ADULT - FALL HARM RISK - HARM RISK INTERVENTIONS
Assistance with ambulation/Assistance OOB with selected safe patient handling equipment/Communicate Risk of Fall with Harm to all staff/Discuss with provider need for PT consult/Monitor gait and stability/Provide patient with walking aids - walker, cane, crutches/Reinforce activity limits and safety measures with patient and family/Tailored Fall Risk Interventions/Use of alarms - bed, chair and/or voice tab/Visual Cue: Yellow wristband and red socks/Bed in lowest position, wheels locked, appropriate side rails in place/Call bell, personal items and telephone in reach/Instruct patient to call for assistance before getting out of bed or chair/Non-slip footwear when patient is out of bed/[place] to call system/Physically safe environment - no spills, clutter or unnecessary equipment/Purposeful Proactive Rounding/Room/bathroom lighting operational, light cord in reach

## 2022-05-07 NOTE — H&P ADULT - NSICDXPASTMEDICALHX_GEN_ALL_CORE_FT
PAST MEDICAL HISTORY:  Asthma Asthma    Depressive disorder Depression    Essential hypertension HTN (hypertension)    Hyperlipidemia HLD (hyperlipidemia)    Local infection of skin and subcutaneous tissue Toe infection    Type 2 diabetes mellitus DM (diabetes mellitus)    Type 2 diabetes mellitus with neurological manifestations Neuropathy in diabetes

## 2022-05-07 NOTE — H&P ADULT - NSHPLABSRESULTS_GEN_ALL_CORE
.  LABS:                         8.9    12.24 )-----------( 379      ( 06 May 2022 21:51 )             27.6     05-06    138  |  104  |  39<H>  ----------------------------<  476<HH>  4.8   |  21<L>  |  2.36<H>    Ca    8.7      06 May 2022 21:51    TPro  6.9  /  Alb  3.0<L>  /  TBili  <0.2  /  DBili  x   /  AST  12  /  ALT  13  /  AlkPhos  114  05-06              Lactate, Blood: 1.7 mmol/L (05-06 @ 21:50)      RADIOLOGY, EKG & ADDITIONAL TESTS: Reviewed.

## 2022-05-07 NOTE — PROGRESS NOTE ADULT - PROBLEM SELECTOR PLAN 1
Long history of right foot ulcer with recent offer to TMA at [facility].  The physical exam is concerning of OM due to positive probe to bone in the setting of elevated ESR, CRP with high blood sugar levels. X-ray does not reveal subcutaneous emphysema. Imaging not revealing subcutaneous emphysema, and patient is now status post vancomycin and zosyn.  Podiatry following with cultures taken from wound site. During last admission in [facility] patient was treated with extended course of Cefepime and Metronidazole for polymicrobial infection, by Dr. Hamilton.    -Vancomycin dosing by level. Follow up morning random level if less than 15 give 1gram of vancomycin otherwise hold  -Zosyn 2.25grams q6hrs  -F/U Wound Culture   -F/U Podiatry   -MR of right foot without contrast  -Tylenol for mild to moderate pain   -Dilaudid 0.25 for severe pain  -Renally dose Gabapentin to 100mg TID

## 2022-05-07 NOTE — PATIENT PROFILE ADULT - FUNCTIONAL ASSESSMENT - BASIC MOBILITY 6.
November 9, 2020       [place]  [address] [facility] [address]  Via In Basket      Patient: Coral Woodward   YOB: 1956   Date of Visit: 11/9/2020       Dear Dr. Nix:    Thank you for referring Coral Woodward to me for evaluation. Below are my notes for this visit with her.    If you have questions, please do not hesitate to call me. I look forward to following your patient along with you.      Sincerely,        Clint Stein MD        CC: No Recipients  Clint Stein MD  11/9/2020  9:46 AM  Sign when Signing Visit  Cardiology Office Visit    Chief Complaint/Reason for Visit:   Chief Complaint   Patient presents with   • Office Visit     patient is here for clearance for endoscopy scheduled for 11-10-20 at [facility]. patient denies any cardiac complaints.          HISTORY OF PRESENT ILLNESS: Coral Woodward is a 64 year old female who requires preop clearance. Bariatric patient. Hx of negative stress test in past. Carotid doppler in past negative. LDL 84.  STUDY CONCLUSIONS  SUMMARY:     1. Stress echo: Left ventricular ejection fraction was normal at rest and     with stress. There is no evidence for stress-induced ischemia.  2. Procedure narrative: Treadmill exercise testing was performed using the     Tevin protocol. The patient exercised for 4 min 20 sec, to protocol     stage 2, to a maximal work rate of 6.2mets. Exercise was terminated due     to hypertension. Post-stress images obtained within 90 seconds of peak     stress.  3. Stress: Maximal heart rate during stress was 151bpm (94% of maximal     predicted heart rate). The maximal predicted heart rate was 160bpm.The     target heart rate was achieved. The heart rate response to stress is     normal. There is a normal resting blood pressure with a hypertensive     response to stress. Functional capacity is decreased (greater than     40%).  4. Stress ECG conclusions: The stress ECG is  normal.    No angin sx.   ECG incomplete RBBB and mild sinus arin, benign.          REVIEW OF SYSTEMS:  Review of Systems   Constitution: Negative.   HENT: Negative.    Eyes: Negative.    Cardiovascular: Negative.    Respiratory: Negative.    Endocrine: Negative.    Skin: Negative.    Musculoskeletal: Negative.    Gastrointestinal: Negative.    Genitourinary: Negative.    Neurological: Negative.    Psychiatric/Behavioral: Negative.        PAST MEDICAL HISTORY:   Past Medical History:   Diagnosis Date   • Essential (primary) hypertension        PAST SURGICAL HISTORY:   Past Surgical History:   Procedure Laterality Date   • Breast lumpectomy Left    • Gastric bypass     • Hysterectomy         FAMILY HISTORY:   Family History   Problem Relation Age of Onset   • Cancer, Prostate Father    • Cancer, Prostate Brother    • Congestive Heart Failure Brother    • Liver Disease Sister         Liver Transopant , blood cloths       SOCIAL HISTORY:   Social History     Tobacco Use   • Smoking status: Never Smoker   • Smokeless tobacco: Never Used   Substance Use Topics   • Alcohol use: Yes     Comment: social   • Drug use: Never       Drug Use:    Never             ALLERGIES:   ALLERGIES:   Allergen Reactions   • Dicyclomine RASH and PRURITUS   • Adhesive   (Environmental) Other (See Comments)       MEDICATIONS:   Current Outpatient Medications   Medication Sig Dispense Refill   • losartan (COZAAR) 100 MG tablet TAKE ONE TABLET BY MOUTH ONE TIME DAILY  90 tablet 0   • hydrochlorothiazide (HYDRODIURIL) 12.5 MG tablet TAKE ONE TABLET BY MOUTH ONE TIME DAILY  90 tablet 0   • bisoprolol (ZEBETA) 5 MG tablet TAKE ONE TABLET BY MOUTH ONE TIME DAILY  (Patient taking differently: Take 2.5 mg by mouth daily. ) 90 tablet 0   • calcium carbonate-vitamin D (CALTRATE+D) 600-400 MG-UNIT per tablet TAKE ONE TABLET BY MOUTH TWICE DAILY  60 tablet 0   • baclofen (LIORESAL) 10 MG tablet Take 1 tablet by mouth 3 times daily. 60 tablet 1   •  levothyroxine (SYNTHROID, LEVOTHROID) 125 MCG tablet Take 125 mcg by mouth daily.     • Thiamine HCl (VITAMIN B-1) 100 MG tablet Take 100 mg by mouth daily.     • clotrimazole (LOTRIMIN) 1 % cream Apply to affected area twice daily 30 g 1   • aspirin 81 MG tablet      • scopolamine (TRANSDERM_SCOP) 1 MG/3DAYS patch Place 1 patch onto the skin every 72 hours. 5 each 0   • topiramate (TOPAMAX) 50 MG tablet Take 50 mg by mouth at bedtime.       No current facility-administered medications for this visit.          PHYSICAL  EXAMINATION  Visit Vitals  /74 (BP Location: RUE - Right upper extremity, Patient Position: Sitting)   Pulse (!) 56 Comment: apical, regular   Ht 5' 6\" (1.676 m)   Wt 131.5 kg (290 lb)   LMP  (LMP Unknown)   BMI 46.81 kg/m²       Physical Exam   Constitutional: She appears well-developed and well-nourished.   HENT:   Head: Normocephalic.   Eyes: EOM are normal.   Neck: No JVD present. Carotid bruit is not present.   Cardiovascular: Normal rate, regular rhythm, S1 normal, S2 normal, normal heart sounds and intact distal pulses.   Pulmonary/Chest: Effort normal and breath sounds normal.   Abdominal: Soft.   Musculoskeletal:         General: No edema.   Neurological: She is alert.   Skin: Skin is intact.   Psychiatric: She has a normal mood and affect.       {Testin}    Labs:    Lab Results   Component Value Date    SODIUM 143 2020    SODIUM 144 2020    POTASSIUM 4.0 2020    POTASSIUM 4.1 2020    CHLORIDE 109 (H) 2020    CHLORIDE 109 (H) 2020    CO2 27 2020    CO2 28 2020    BUN 21 (H) 2020    BUN 19 2020    CREATININE 0.93 2020    CREATININE 0.93 2020    GLUCOSE 81 2020    GLUCOSE 76 2020       No results found for: HGBA1C  Lab Results   Component Value Date    CHOLESTEROL 140 2020    CALCLDL 84 2020       Lab Results   Component Value Date    TSH 2.069 2020       Lab Results   Component  Value Date    INR 1 07/12/2018       Lab Results   Component Value Date    WBC 5.5 09/25/2020    RBC 4.24 09/25/2020    HGB 11.9 (L) 09/25/2020     09/25/2020         IMPRESSION/PLAN:    Patient Active Problem List   Diagnosis   • Benign essential hypertension   • Hypothyroidism   • Coronary artery disease   • Adenocarcinoma of breast (CMS/HCC)   • Arteriosclerosis of coronary artery   • Osteopenia   • Status post gastric bypass for obesity   • Thyroid nodule   • Tinnitus of right ear   • Vitamin D deficiency   • Morbid obesity with BMI of 40.0-44.9, adult (CMS/HCC)   • Need for hepatitis C screening test   • Pre-op examination   • Lumbar spondylosis   • SUSANA (obstructive sleep apnea)   • Breast cancer primary tumor staging category T0: no evidence of primary tumor (CMS/HCC)   • Chronic stable angina (CMS/HCC)   • Atherosclerosis of native coronary artery of native heart without angina pectoris     Normal cardiac exam. BP good.   Low CV risk for bariatric surgery.      11/9/2020      Clint Stein MD  Cardiology              2 = A lot of assistance

## 2022-05-07 NOTE — H&P ADULT - PROBLEM SELECTOR PLAN 6
History of HLD  -Continue home atorvastatin 40mg History of HLD  -Continue home atorvastatin 40mg  -Aspirin 81mg

## 2022-05-07 NOTE — PROGRESS NOTE ADULT - PROBLEM SELECTOR PLAN 3
History of diabetes, last reported A1c of 11.9 in 2019, currently on Metformin at home.  Patient refused to take insulin in outpatint setting.  A1c 9.0%  -Lantus 17 units at bed time --> increased to 20U based on sugars.   -Lispro 5 units pre meal  --> Monitor coverage insulin today. Make adjustments for pre meal tomorrow based on requirements - likely Increase by 2   -Sliding Scale   -Carb Consistent Diet   - Monday reach out to diabetes educator. Patient would be good candidate for continuous insulin monitoring.

## 2022-05-07 NOTE — H&P ADULT - HISTORY OF PRESENT ILLNESS
ED Course:   Vitals: Afebrile, HR 76-91, -188/, RR 16, SPO2 98% RA   Labs: WBC: 12.2, Hgb: 8.9, Cr: 2.36, CRP 57.6,   EKG: NSR  Imaging: XR Foot bilateral no subcutaneous emphysema noted   Intervention: Vascular Consult - No acute surgical intervention, Vancomycin 1000mg, Zoysn 3.375grams, morphine 6mg, 5 units of regular insulin, 1L NS  72 year old female with a past medical history of multiple amputation of the second, third, and fourth digits of left foot and osteomyelitis of the right foot with right partial second and fourth toe ray amputation, chronic right foot wound and left foot plantar wound, DM, HTN, HLD, Depression,  who presented to the ED with a chief compliant of, "I want surgery on my right foot."  The patient has been in her usual state of health before coming to the hospital.  She states she has seen multiple physicians since her last admission to [facility].  She has been to [facility] where she had surgical intervention for toe wounds. For the past five weeks she has been following up with wound care at [facility].  During those vists the chronic right foot wound was be managed by vascular surgeons.  She has been unhappy with the care she received at [facility] because she states that her wound was not getting better.  [facility] offered the patient a TMA procedure of the right foot.  She was scheduled to get the procedure this past Monday, she arrived to the hospital and then left because she had second thoughts about the amputation.  She states that she has had a good experience with [facility] in the past so she decided to come to the ED for a surgical evaluation.  She went on to further state that since getting wound care she had increasing amount of pain in her right foot.  She has not noticed any erythema at that site, but noticed slight, "sticky drainage," from the wound.  She has not been experiencing any fever or night sweats, but has chills.     ED Course:   Vitals: Afebrile, HR 76-91, -188/, RR 16, SPO2 98% RA   Labs: WBC: 12.2, Hgb: 8.9, Cr: 2.36, CRP 57.6,   EKG: NSR  Imaging: XR Foot bilateral no subcutaneous emphysema noted   Intervention: Vascular Consult - No acute surgical intervention, Vancomycin 1000mg, Zoysn 3.375grams, morphine 6mg, 5 units of regular insulin, 1L NS

## 2022-05-07 NOTE — PROGRESS NOTE ADULT - SUBJECTIVE AND OBJECTIVE BOX
S. Pt doing well. NAEON. Denies foot pain. No purulence or discharge noted. Denies f/c.    piperacillin/tazobactam IVPB.. 2.25  aspirin enteric coated 81  heparin   Injectable 5000  hydrALAZINE 25  NIFEdipine XL 90  piperacillin/tazobactam IVPB.. 2.25      Allergies    No Known Allergies    Intolerances      Vital Signs Last 24 Hrs  T(C): 36.4 (07 May 2022 06:04), Max: 37.6 (07 May 2022 01:28)  T(F): 97.6 (07 May 2022 06:04), Max: 99.7 (07 May 2022 01:28)  HR: 84 (07 May 2022 09:53) (76 - 91)  BP: 132/70 (07 May 2022 09:53) (132/70 - 225/96)  BP(mean): --  RR: 20 (07 May 2022 06:04) (16 - 20)  SpO2: 97% (07 May 2022 06:04) (95% - 98%)    Physical Exam:  Constitutional: Pleasant, conversational woman  Cardiac: NSR  Respiratory: Equal and bilateral chest rise.  Abdomen: Soft, NT, ND  Extremities: RLE with 2nd and 3rd well healed prior amputations. R plantar aspect of foot with [id number] in diameter shallow diabetic ulcer which probes to bone. Foul smell appreciated. No erythema. Left 1st-5th toes with well healed amputations. 2nd-4th toes on left side with shallow emacerated skin ulceration. No signs of any ischemic ulcerations or alisia necrosis.  Vascular: 2+ DP/PT pulses      LABS:                        9.2    13.42 )-----------( 373      ( 07 May 2022 08:51 )             29.8     05-07    139  |  106  |  36<H>  ----------------------------<  245<H>  3.7   |  21<L>  |  2.24<H>    Ca    8.6      07 May 2022 08:51  Phos  4.2     05-07  Mg     1.3     05-07    TPro  6.8  /  Alb  3.0<L>  /  TBili  0.2  /  DBili  x   /  AST  10  /  ALT  10  /  AlkPhos  98  05-07

## 2022-05-07 NOTE — PROGRESS NOTE ADULT - SUBJECTIVE AND OBJECTIVE BOX
OVERNIGHT EVENTS: NAEO    SUBJECTIVE / INTERVAL HPI: Patient seen and examined at bedside. Patient c/o this morning of difficulty sleeping do to feeling of anxiety but states that she feels improved this morning. Mild pain overnight however this has also improved. Denies fever, chills, nausea, vomiting, or diarrhea.     Remaining ROS negative       PHYSICAL EXAM:    General: NAD, non-toxic appearing    HEENT: NC/AT; PERRL, anicteric sclera; MMM  Neck: supple   Cardiovascular: +S1/S2, RRR  Respiratory: CTA B/L; no W/R/R  Gastrointestinal: soft, NT/ND; +BSx4  Extremities: Amputation of the right 2 and 4 digit with chronic wound on lateral portion of right foot with purulent drainage, no erythema with black appearing center, left foot with amputation of 2,3,4 with planta  Vascular: 2+ radial, DP/PT pulses B/L  Neurological: AAOx3; no focal deficits  Psychiatric: pleasant mood and affect  Dermatologic: no appreciable wounds or damage to the skin     VITAL SIGNS:  Vital Signs Last 24 Hrs  T(C): 36.4 (07 May 2022 06:04), Max: 37.6 (07 May 2022 01:28)  T(F): 97.6 (07 May 2022 06:04), Max: 99.7 (07 May 2022 01:28)  HR: 84 (07 May 2022 09:53) (76 - 91)  BP: 132/70 (07 May 2022 09:53) (132/70 - 225/96)  BP(mean): --  RR: 20 (07 May 2022 06:04) (16 - 20)  SpO2: 97% (07 May 2022 06:04) (95% - 98%)      MEDICATIONS:  MEDICATIONS  (STANDING):  aspirin enteric coated 81 milliGRAM(s) Oral daily  atorvastatin 40 milliGRAM(s) Oral at bedtime  DULoxetine 60 milliGRAM(s) Oral daily  gabapentin 100 milliGRAM(s) Oral every 8 hours  heparin   Injectable 5000 Unit(s) SubCutaneous every 8 hours  hydrALAZINE 25 milliGRAM(s) Oral every 8 hours  insulin glargine Injectable (LANTUS) 20 Unit(s) SubCutaneous at bedtime  insulin lispro (ADMELOG) corrective regimen sliding scale   SubCutaneous Before meals and at bedtime  insulin lispro Injectable (ADMELOG) 5 Unit(s) SubCutaneous three times a day before meals  magnesium sulfate  IVPB 2 Gram(s) IV Intermittent once  NIFEdipine XL 90 milliGRAM(s) Oral every 24 hours  piperacillin/tazobactam IVPB.. 2.25 Gram(s) IV Intermittent every 6 hours    MEDICATIONS  (PRN):  acetaminophen     Tablet .. 650 milliGRAM(s) Oral every 6 hours PRN Mild Pain (1 - 3), Moderate Pain (4 - 6)  HYDROmorphone  Injectable 0.25 milliGRAM(s) IV Push daily PRN Severe Pain (7 - 10)      ALLERGIES:  Allergies    No Known Allergies    Intolerances        LABS:                        9.2    13.42 )-----------( 373      ( 07 May 2022 08:51 )             29.8     05-07    139  |  106  |  36<H>  ----------------------------<  245<H>  3.7   |  21<L>  |  2.24<H>    Ca    8.6      07 May 2022 08:51  Phos  4.2     05-07  Mg     1.3     05-07    TPro  6.8  /  Alb  3.0<L>  /  TBili  0.2  /  DBili  x   /  AST  10  /  ALT  10  /  AlkPhos  98  05-07        CAPILLARY BLOOD GLUCOSE      POCT Blood Glucose.: 251 mg/dL (07 May 2022 08:40)      RADIOLOGY & ADDITIONAL TESTS: Reviewed.

## 2022-05-07 NOTE — H&P ADULT - PROBLEM SELECTOR PLAN 4
No signs of alisia blood loss. 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  -Transferrin   -TIBC  -Serum Iron  -Ferritin

## 2022-05-07 NOTE — H&P ADULT - PROBLEM SELECTOR PLAN 2
Baseline creatinine 1.1 to 1.2. Elevated creatinine may represent vipul or worsening of kidney function secondary to history of diabetes and hypertension. Baseline creatinine 1.1 to 1.2. Elevated creatinine may represent vipul or worsening of kidney function secondary to history of diabetes and hypertension. Now status post 1L of NS.  -Urine lytes

## 2022-05-07 NOTE — H&P ADULT - PROBLEM SELECTOR PROBLEM 4
----- Message from Salma Chappell sent at 7/8/2019  8:34 AM CDT -----  Contact: patient  Type: Needs Medical Advice    Who Called:  patient  Symptoms (please be specific):    How long has patient had these symptoms:    Pharmacy name and phone #:    Best Call Back Number: [phone]  Additional Information: requesting a call back wanted to know has Rx for cpap machine been faxed to [facility] [phone] order#[id number]?   Anemia

## 2022-05-08 ENCOUNTER — TRANSCRIPTION ENCOUNTER (OUTPATIENT)
Age: 72
End: 2022-05-08

## 2022-05-08 LAB
ALBUMIN SERPL ELPH-MCNC: 2.6 G/DL — LOW (ref 3.3–5)
ALP SERPL-CCNC: 91 U/L — SIGNIFICANT CHANGE UP (ref 40–120)
ALT FLD-CCNC: 11 U/L — SIGNIFICANT CHANGE UP (ref 10–45)
ANION GAP SERPL CALC-SCNC: 16 MMOL/L — SIGNIFICANT CHANGE UP (ref 5–17)
AST SERPL-CCNC: 16 U/L — SIGNIFICANT CHANGE UP (ref 10–40)
BASOPHILS # BLD AUTO: 0.05 K/UL — SIGNIFICANT CHANGE UP (ref 0–0.2)
BASOPHILS NFR BLD AUTO: 0.5 % — SIGNIFICANT CHANGE UP (ref 0–2)
BILIRUB SERPL-MCNC: <0.2 MG/DL — SIGNIFICANT CHANGE UP (ref 0.2–1.2)
BUN SERPL-MCNC: 43 MG/DL — HIGH (ref 7–23)
CALCIUM SERPL-MCNC: 8.5 MG/DL — SIGNIFICANT CHANGE UP (ref 8.4–10.5)
CHLORIDE SERPL-SCNC: 103 MMOL/L — SIGNIFICANT CHANGE UP (ref 96–108)
CO2 SERPL-SCNC: 16 MMOL/L — LOW (ref 22–31)
CREAT ?TM UR-MCNC: 77 MG/DL — SIGNIFICANT CHANGE UP
CREAT SERPL-MCNC: 2.77 MG/DL — HIGH (ref 0.5–1.3)
EGFR: 18 ML/MIN/1.73M2 — LOW
EOSINOPHIL # BLD AUTO: 0.26 K/UL — SIGNIFICANT CHANGE UP (ref 0–0.5)
EOSINOPHIL NFR BLD AUTO: 2.6 % — SIGNIFICANT CHANGE UP (ref 0–6)
GLUCOSE BLDC GLUCOMTR-MCNC: 167 MG/DL — HIGH (ref 70–99)
GLUCOSE BLDC GLUCOMTR-MCNC: 195 MG/DL — HIGH (ref 70–99)
GLUCOSE BLDC GLUCOMTR-MCNC: 220 MG/DL — HIGH (ref 70–99)
GLUCOSE BLDC GLUCOMTR-MCNC: 228 MG/DL — HIGH (ref 70–99)
GLUCOSE SERPL-MCNC: 209 MG/DL — HIGH (ref 70–99)
HCT VFR BLD CALC: 26.6 % — LOW (ref 34.5–45)
HGB BLD-MCNC: 8.4 G/DL — LOW (ref 11.5–15.5)
IMM GRANULOCYTES NFR BLD AUTO: 0.4 % — SIGNIFICANT CHANGE UP (ref 0–1.5)
LYMPHOCYTES # BLD AUTO: 3.06 K/UL — SIGNIFICANT CHANGE UP (ref 1–3.3)
LYMPHOCYTES # BLD AUTO: 30.5 % — SIGNIFICANT CHANGE UP (ref 13–44)
MAGNESIUM SERPL-MCNC: 1.8 MG/DL — SIGNIFICANT CHANGE UP (ref 1.6–2.6)
MCHC RBC-ENTMCNC: 26.9 PG — LOW (ref 27–34)
MCHC RBC-ENTMCNC: 31.6 GM/DL — LOW (ref 32–36)
MCV RBC AUTO: 85.3 FL — SIGNIFICANT CHANGE UP (ref 80–100)
MONOCYTES # BLD AUTO: 0.76 K/UL — SIGNIFICANT CHANGE UP (ref 0–0.9)
MONOCYTES NFR BLD AUTO: 7.6 % — SIGNIFICANT CHANGE UP (ref 2–14)
NEUTROPHILS # BLD AUTO: 5.85 K/UL — SIGNIFICANT CHANGE UP (ref 1.8–7.4)
NEUTROPHILS NFR BLD AUTO: 58.4 % — SIGNIFICANT CHANGE UP (ref 43–77)
NRBC # BLD: 0 /100 WBCS — SIGNIFICANT CHANGE UP (ref 0–0)
PHOSPHATE SERPL-MCNC: 7.4 MG/DL — HIGH (ref 2.5–4.5)
PLATELET # BLD AUTO: 366 K/UL — SIGNIFICANT CHANGE UP (ref 150–400)
POTASSIUM SERPL-MCNC: 4.5 MMOL/L — SIGNIFICANT CHANGE UP (ref 3.5–5.3)
POTASSIUM SERPL-SCNC: 4.5 MMOL/L — SIGNIFICANT CHANGE UP (ref 3.5–5.3)
PROT SERPL-MCNC: 6.6 G/DL — SIGNIFICANT CHANGE UP (ref 6–8.3)
RBC # BLD: 3.12 M/UL — LOW (ref 3.8–5.2)
RBC # FLD: 13.7 % — SIGNIFICANT CHANGE UP (ref 10.3–14.5)
SARS-COV-2 RNA SPEC QL NAA+PROBE: SIGNIFICANT CHANGE UP
SODIUM SERPL-SCNC: 135 MMOL/L — SIGNIFICANT CHANGE UP (ref 135–145)
SODIUM UR-SCNC: 22 MMOL/L — SIGNIFICANT CHANGE UP
VANCOMYCIN TROUGH SERPL-MCNC: 21.8 UG/ML — HIGH (ref 10–20)
WBC # BLD: 10.02 K/UL — SIGNIFICANT CHANGE UP (ref 3.8–10.5)
WBC # FLD AUTO: 10.02 K/UL — SIGNIFICANT CHANGE UP (ref 3.8–10.5)

## 2022-05-08 PROCEDURE — 99233 SBSQ HOSP IP/OBS HIGH 50: CPT | Mod: GC

## 2022-05-08 PROCEDURE — 73718 MRI LOWER EXTREMITY W/O DYE: CPT | Mod: 26,RT

## 2022-05-08 RX ORDER — INSULIN GLARGINE 100 [IU]/ML
10 INJECTION, SOLUTION SUBCUTANEOUS AT BEDTIME
Refills: 0 | Status: DISCONTINUED | OUTPATIENT
Start: 2022-05-08 | End: 2022-05-15

## 2022-05-08 RX ORDER — SODIUM CHLORIDE 9 MG/ML
1000 INJECTION, SOLUTION INTRAVENOUS
Refills: 0 | Status: DISCONTINUED | OUTPATIENT
Start: 2022-05-08 | End: 2022-05-12

## 2022-05-08 RX ORDER — VANCOMYCIN HCL 1 G
1250 VIAL (EA) INTRAVENOUS EVERY 24 HOURS
Refills: 0 | Status: DISCONTINUED | OUTPATIENT
Start: 2022-05-08 | End: 2022-05-09

## 2022-05-08 RX ORDER — INSULIN LISPRO 100/ML
VIAL (ML) SUBCUTANEOUS EVERY 6 HOURS
Refills: 0 | Status: DISCONTINUED | OUTPATIENT
Start: 2022-05-08 | End: 2022-05-10

## 2022-05-08 RX ORDER — MAGNESIUM SULFATE 500 MG/ML
1 VIAL (ML) INJECTION ONCE
Refills: 0 | Status: COMPLETED | OUTPATIENT
Start: 2022-05-08 | End: 2022-05-08

## 2022-05-08 RX ADMIN — ATORVASTATIN CALCIUM 40 MILLIGRAM(S): 80 TABLET, FILM COATED ORAL at 22:42

## 2022-05-08 RX ADMIN — Medication 5 UNIT(S): at 12:42

## 2022-05-08 RX ADMIN — HEPARIN SODIUM 5000 UNIT(S): 5000 INJECTION INTRAVENOUS; SUBCUTANEOUS at 22:41

## 2022-05-08 RX ADMIN — Medication 1: at 22:40

## 2022-05-08 RX ADMIN — Medication 650 MILLIGRAM(S): at 01:38

## 2022-05-08 RX ADMIN — Medication 166.67 MILLIGRAM(S): at 22:42

## 2022-05-08 RX ADMIN — HEPARIN SODIUM 5000 UNIT(S): 5000 INJECTION INTRAVENOUS; SUBCUTANEOUS at 14:45

## 2022-05-08 RX ADMIN — Medication 81 MILLIGRAM(S): at 12:25

## 2022-05-08 RX ADMIN — Medication 5 UNIT(S): at 17:21

## 2022-05-08 RX ADMIN — Medication 5 UNIT(S): at 08:50

## 2022-05-08 RX ADMIN — Medication 25 MILLIGRAM(S): at 22:42

## 2022-05-08 RX ADMIN — Medication 2: at 17:21

## 2022-05-08 RX ADMIN — Medication 100 GRAM(S): at 10:28

## 2022-05-08 RX ADMIN — Medication 25 MILLIGRAM(S): at 14:44

## 2022-05-08 RX ADMIN — PIPERACILLIN AND TAZOBACTAM 200 GRAM(S): 4; .5 INJECTION, POWDER, LYOPHILIZED, FOR SOLUTION INTRAVENOUS at 12:25

## 2022-05-08 RX ADMIN — GABAPENTIN 100 MILLIGRAM(S): 400 CAPSULE ORAL at 22:41

## 2022-05-08 RX ADMIN — DULOXETINE HYDROCHLORIDE 60 MILLIGRAM(S): 30 CAPSULE, DELAYED RELEASE ORAL at 12:25

## 2022-05-08 RX ADMIN — GABAPENTIN 100 MILLIGRAM(S): 400 CAPSULE ORAL at 06:44

## 2022-05-08 RX ADMIN — PIPERACILLIN AND TAZOBACTAM 200 GRAM(S): 4; .5 INJECTION, POWDER, LYOPHILIZED, FOR SOLUTION INTRAVENOUS at 01:35

## 2022-05-08 RX ADMIN — Medication 25 MILLIGRAM(S): at 06:50

## 2022-05-08 RX ADMIN — Medication 90 MILLIGRAM(S): at 12:27

## 2022-05-08 RX ADMIN — GABAPENTIN 100 MILLIGRAM(S): 400 CAPSULE ORAL at 14:44

## 2022-05-08 RX ADMIN — SODIUM CHLORIDE 90 MILLILITER(S): 9 INJECTION, SOLUTION INTRAVENOUS at 19:30

## 2022-05-08 RX ADMIN — Medication 1: at 12:42

## 2022-05-08 RX ADMIN — Medication 650 MILLIGRAM(S): at 02:38

## 2022-05-08 RX ADMIN — HEPARIN SODIUM 5000 UNIT(S): 5000 INJECTION INTRAVENOUS; SUBCUTANEOUS at 06:44

## 2022-05-08 RX ADMIN — PIPERACILLIN AND TAZOBACTAM 200 GRAM(S): 4; .5 INJECTION, POWDER, LYOPHILIZED, FOR SOLUTION INTRAVENOUS at 18:09

## 2022-05-08 RX ADMIN — Medication 2: at 08:50

## 2022-05-08 RX ADMIN — PIPERACILLIN AND TAZOBACTAM 200 GRAM(S): 4; .5 INJECTION, POWDER, LYOPHILIZED, FOR SOLUTION INTRAVENOUS at 07:29

## 2022-05-08 RX ADMIN — INSULIN GLARGINE 10 UNIT(S): 100 INJECTION, SOLUTION SUBCUTANEOUS at 22:41

## 2022-05-08 NOTE — PROGRESS NOTE ADULT - SUBJECTIVE AND OBJECTIVE BOX
INTERVAL EVENTS:  -- NAEO    SUBJECTIVE:  -- no new complaints; pain controlled at present; good appetite; anxious about potential surgery (if indicated)  -- Review of Systems: 12 point review of systems otherwise negative    MEDICATIONS:  MEDICATIONS  (STANDING):  aspirin enteric coated 81 milliGRAM(s) Oral daily  atorvastatin 40 milliGRAM(s) Oral at bedtime  DULoxetine 60 milliGRAM(s) Oral daily  gabapentin 100 milliGRAM(s) Oral every 8 hours  heparin   Injectable 5000 Unit(s) SubCutaneous every 8 hours  hydrALAZINE 25 milliGRAM(s) Oral every 8 hours  insulin glargine Injectable (LANTUS) 20 Unit(s) SubCutaneous at bedtime  insulin lispro (ADMELOG) corrective regimen sliding scale   SubCutaneous Before meals and at bedtime  insulin lispro Injectable (ADMELOG) 5 Unit(s) SubCutaneous three times a day before meals  NIFEdipine XL 90 milliGRAM(s) Oral every 24 hours  piperacillin/tazobactam IVPB.. 2.25 Gram(s) IV Intermittent every 6 hours    MEDICATIONS  (PRN):  acetaminophen     Tablet .. 650 milliGRAM(s) Oral every 6 hours PRN Mild Pain (1 - 3), Moderate Pain (4 - 6)  HYDROmorphone  Injectable 0.25 milliGRAM(s) IV Push daily PRN Severe Pain (7 - 10)    Allergies  No Known Allergies    OBJECTIVE:  Vital Signs Last 24 Hrs  T(C): 36.7 (08 May 2022 06:33), Max: 36.8 (07 May 2022 15:00)  T(F): 98.1 (08 May 2022 06:33), Max: 98.3 (07 May 2022 15:00)  HR: 56 (08 May 2022 06:33) (56 - 84)  BP: 118/65 (08 May 2022 06:33) (100/53 - 132/70)  BP(mean): --  RR: 18 (08 May 2022 06:33) (17 - 18)  SpO2: 96% (08 May 2022 06:33) (94% - 96%)  I&O's Summary    PHYSICAL EXAM:  Gen: NAD, lying flat in bed  HEENT: NCAT, MMM, clear OP  CV: RRR, no m/g/r appreciated  Pulm: CTA B, no w/r/r; no increase in WOB  Abd: normoactive BS, soft, NTND  Ext: WWP, bilateral feet bandaged  Neuro: AOx3,nonfocal  Psych: pleasant, conversant and appropriate    LABS:                        9.2    13.42 )-----------( 373      ( 07 May 2022 08:51 )             29.8     05-08    135  |  103  |  43<H>  ----------------------------<  209<H>  4.5   |  16<L>  |  2.77<H>    Ca    8.5      08 May 2022 08:03  Phos  7.4     05-08  Mg     1.8     05-08    TPro  6.6  /  Alb  2.6<L>  /  TBili  <0.2  /  DBili  x   /  AST  16  /  ALT  11  /  AlkPhos  91  05-08    CAPILLARY BLOOD GLUCOSE  POCT Blood Glucose.: 228 mg/dL (08 May 2022 08:47)  POCT Blood Glucose.: 158 mg/dL (07 May 2022 22:06)  POCT Blood Glucose.: 198 mg/dL (07 May 2022 17:22)  POCT Blood Glucose.: 286 mg/dL (07 May 2022 11:21)    MICRODATA:  Culture - Other (collected 07 May 2022 12:12)  Source: Wound Wound  Gram Stain (07 May 2022 16:40):    Few-moderate Gram positive cocci in pairs    Rare WBC's    Culture - Blood (collected 07 May 2022 12:05)  Source: .Blood Blood-Peripheral  Preliminary Report (08 May 2022 01:00):    No growth at 12 hours    Culture - Blood (collected 07 May 2022 12:05)  Source: .Blood Blood-Peripheral  Preliminary Report (08 May 2022 01:00):    No growth at 12 hours    RADIOLOGY/OTHER STUDIES:  No new imaging.

## 2022-05-08 NOTE — PROGRESS NOTE ADULT - ASSESSMENT
71 yo F pmhx severe depression, DM, HTN and chronic right foot wound; improved left foot plantar wound, p/w worsening right foot ulcer. Podiatry consulted r/o OM, gas. Of note, pt is afebrile, w/ WBC elevated to 12.2, ESR of <130, and CRP of 57.6. XR wet read positive for signs of cortical erosion and osteopenia of right 5th MT head and proximal phalanx base. With +PTB and XR changes, high suspicion for OM. MRI confirms OM of 5th MT.     Plan:  - local wound care: copiously irrigated wound w/ betadine mixed w/ + 1L sterile saline solution. Dressed w/ DSD + Kerlix wrap  - will f/u wound Cx; Prelim read: polymicrobial (Morganella morganii, Proteus mirabilis, E. faecalis, Strep. dysgalactiae (Group C/G), Strep. constellatus)  - recc heel WB to RLE  - Plan for OR debridement of right 5th metatarsal and 5th phalanx; add on for 5/9 evening or may schedule for sometime early this week pending attending availability  - please obtain pre-op lab testing (COVID, T&S, Coags, CBC, BMP, CXR, EKG)  - rest of care per primary team    Podiatry following. Plan d/w attending.

## 2022-05-08 NOTE — PROGRESS NOTE ADULT - SUBJECTIVE AND OBJECTIVE BOX
Patient is a 72y old  Female who presents with a chief complaint of r/o OM (07 May 2022 10:34)      INTERVAL HPI/ OVERNIGHT EVENTS: Pt evaluated at bedside. Pt got her right foot MRI done. Pt notes feelings of depression and loneliness.       LABS                        8.4    10.02 )-----------( 366      ( 08 May 2022 11:33 )             26.6     05-08    135  |  103  |  43<H>  ----------------------------<  209<H>  4.5   |  16<L>  |  2.77<H>    Ca    8.5      08 May 2022 08:03  Phos  7.4     05-08  Mg     1.8     05-08    TPro  6.6  /  Alb  2.6<L>  /  TBili  <0.2  /  DBili  x   /  AST  16  /  ALT  11  /  AlkPhos  91  05-08        ICU Vital Signs Last 24 Hrs  T(C): 36.3 (08 May 2022 12:24), Max: 36.8 (07 May 2022 15:00)  T(F): 97.4 (08 May 2022 12:24), Max: 98.3 (07 May 2022 15:00)  HR: 69 (08 May 2022 12:24) (53 - 69)  BP: 133/69 (08 May 2022 12:24) (100/53 - 133/69)  BP(mean): --  ABP: --  ABP(mean): --  RR: 18 (08 May 2022 12:24) (17 - 18)  SpO2: 96% (08 May 2022 12:24) (94% - 96%)      RADIOLOGY    MICROBIOLOGY    PHYSICAL EXAM  Lower Extremity Focused  Vasc: 2/4 DP + PT b/l. CFT < 3 sec. Edema present to Right foot  Derm: Chambers grade 3 ulcer present to lateral aspect of right 5th MT head. +PTB. Positive for malodor, tunneling medially, w/ seropurulent drainage.   Neuro: Protective sensation diminished  MSK: MMT 5/5 strength. Pt has digit amputation of right 2nd and 3rd digits

## 2022-05-08 NOTE — PROGRESS NOTE ADULT - ASSESSMENT
Briefly, this is a 73yo woman with a PMH of HTN, HLD, poorly controlled NIDDM2 (not open to insulin) c/b neuropathy, DM foot infections and ulcers, as well as mild recurrent MDD who p/w concerns for R foot OM.    #DM foot infection, OM R/O - c/w Vanc and Zosyn, MRI pending, Podiatry and Vascular following, can consult ID pending culture data and MRI; pain control  #Poorly controlled NIDDM2 - continue to titrate insulin for FS goal <180, will need Endo and DM education, good candidate for continuous glucometer as does not like to check her FS at home   #ELBERT on CKD - trend Cr, check urine studies to see if current Cr just progression of CKD  #Essential HTN - c/w Nifedepine will need to assess why she is not on an ACEi/ARB (PCP at [facility]  #HLD - c/w ASA and statin   #Mild recurrent MDD - c/w Cymbalta for MDD and neuropathic pain   #DVT PPx - SQH  #Dispo - TBD     Kaitlin Wei  [phone]

## 2022-05-09 ENCOUNTER — TRANSCRIPTION ENCOUNTER (OUTPATIENT)
Age: 72
End: 2022-05-09

## 2022-05-09 ENCOUNTER — RESULT REVIEW (OUTPATIENT)
Age: 72
End: 2022-05-09

## 2022-05-09 LAB
ALBUMIN SERPL ELPH-MCNC: 2.6 G/DL — LOW (ref 3.3–5)
ALP SERPL-CCNC: 85 U/L — SIGNIFICANT CHANGE UP (ref 40–120)
ALT FLD-CCNC: 9 U/L — LOW (ref 10–45)
ANION GAP SERPL CALC-SCNC: 14 MMOL/L — SIGNIFICANT CHANGE UP (ref 5–17)
APTT BLD: 29.4 SEC — SIGNIFICANT CHANGE UP (ref 27.5–35.5)
AST SERPL-CCNC: 8 U/L — LOW (ref 10–40)
BASOPHILS # BLD AUTO: 0.05 K/UL — SIGNIFICANT CHANGE UP (ref 0–0.2)
BASOPHILS NFR BLD AUTO: 0.5 % — SIGNIFICANT CHANGE UP (ref 0–2)
BILIRUB SERPL-MCNC: <0.2 MG/DL — SIGNIFICANT CHANGE UP (ref 0.2–1.2)
BLD GP AB SCN SERPL QL: NEGATIVE — SIGNIFICANT CHANGE UP
BUN SERPL-MCNC: 43 MG/DL — HIGH (ref 7–23)
CALCIUM SERPL-MCNC: 8.5 MG/DL — SIGNIFICANT CHANGE UP (ref 8.4–10.5)
CHLORIDE SERPL-SCNC: 106 MMOL/L — SIGNIFICANT CHANGE UP (ref 96–108)
CO2 SERPL-SCNC: 19 MMOL/L — LOW (ref 22–31)
CREAT SERPL-MCNC: 2.81 MG/DL — HIGH (ref 0.5–1.3)
EGFR: 17 ML/MIN/1.73M2 — LOW
EOSINOPHIL # BLD AUTO: 0.29 K/UL — SIGNIFICANT CHANGE UP (ref 0–0.5)
EOSINOPHIL NFR BLD AUTO: 3.1 % — SIGNIFICANT CHANGE UP (ref 0–6)
GLUCOSE BLDC GLUCOMTR-MCNC: 125 MG/DL — HIGH (ref 70–99)
GLUCOSE BLDC GLUCOMTR-MCNC: 127 MG/DL — HIGH (ref 70–99)
GLUCOSE BLDC GLUCOMTR-MCNC: 142 MG/DL — HIGH (ref 70–99)
GLUCOSE BLDC GLUCOMTR-MCNC: 147 MG/DL — HIGH (ref 70–99)
GLUCOSE BLDC GLUCOMTR-MCNC: 156 MG/DL — HIGH (ref 70–99)
GLUCOSE BLDC GLUCOMTR-MCNC: 170 MG/DL — HIGH (ref 70–99)
GLUCOSE BLDC GLUCOMTR-MCNC: 182 MG/DL — HIGH (ref 70–99)
GLUCOSE SERPL-MCNC: 139 MG/DL — HIGH (ref 70–99)
GRAM STN FLD: SIGNIFICANT CHANGE UP
GRAM STN FLD: SIGNIFICANT CHANGE UP
HCT VFR BLD CALC: 24.1 % — LOW (ref 34.5–45)
HGB BLD-MCNC: 7.4 G/DL — LOW (ref 11.5–15.5)
IMM GRANULOCYTES NFR BLD AUTO: 0.4 % — SIGNIFICANT CHANGE UP (ref 0–1.5)
INR BLD: 0.99 — SIGNIFICANT CHANGE UP (ref 0.88–1.16)
LYMPHOCYTES # BLD AUTO: 2.9 K/UL — SIGNIFICANT CHANGE UP (ref 1–3.3)
LYMPHOCYTES # BLD AUTO: 30.9 % — SIGNIFICANT CHANGE UP (ref 13–44)
MAGNESIUM SERPL-MCNC: 2.2 MG/DL — SIGNIFICANT CHANGE UP (ref 1.6–2.6)
MCHC RBC-ENTMCNC: 26.3 PG — LOW (ref 27–34)
MCHC RBC-ENTMCNC: 30.7 GM/DL — LOW (ref 32–36)
MCV RBC AUTO: 85.8 FL — SIGNIFICANT CHANGE UP (ref 80–100)
MONOCYTES # BLD AUTO: 0.7 K/UL — SIGNIFICANT CHANGE UP (ref 0–0.9)
MONOCYTES NFR BLD AUTO: 7.5 % — SIGNIFICANT CHANGE UP (ref 2–14)
NEUTROPHILS # BLD AUTO: 5.4 K/UL — SIGNIFICANT CHANGE UP (ref 1.8–7.4)
NEUTROPHILS NFR BLD AUTO: 57.6 % — SIGNIFICANT CHANGE UP (ref 43–77)
NRBC # BLD: 0 /100 WBCS — SIGNIFICANT CHANGE UP (ref 0–0)
PHOSPHATE SERPL-MCNC: 6.8 MG/DL — HIGH (ref 2.5–4.5)
PLATELET # BLD AUTO: 313 K/UL — SIGNIFICANT CHANGE UP (ref 150–400)
POTASSIUM SERPL-MCNC: 4 MMOL/L — SIGNIFICANT CHANGE UP (ref 3.5–5.3)
POTASSIUM SERPL-SCNC: 4 MMOL/L — SIGNIFICANT CHANGE UP (ref 3.5–5.3)
PROT SERPL-MCNC: 6.1 G/DL — SIGNIFICANT CHANGE UP (ref 6–8.3)
PROTHROM AB SERPL-ACNC: 11.8 SEC — SIGNIFICANT CHANGE UP (ref 10.5–13.4)
RBC # BLD: 2.81 M/UL — LOW (ref 3.8–5.2)
RBC # FLD: 13.6 % — SIGNIFICANT CHANGE UP (ref 10.3–14.5)
RH IG SCN BLD-IMP: POSITIVE — SIGNIFICANT CHANGE UP
SODIUM SERPL-SCNC: 139 MMOL/L — SIGNIFICANT CHANGE UP (ref 135–145)
SPECIMEN SOURCE: SIGNIFICANT CHANGE UP
SPECIMEN SOURCE: SIGNIFICANT CHANGE UP
VANCOMYCIN FLD-MCNC: 36.8 UG/ML
WBC # BLD: 9.38 K/UL — SIGNIFICANT CHANGE UP (ref 3.8–10.5)
WBC # FLD AUTO: 9.38 K/UL — SIGNIFICANT CHANGE UP (ref 3.8–10.5)

## 2022-05-09 PROCEDURE — 99223 1ST HOSP IP/OBS HIGH 75: CPT

## 2022-05-09 PROCEDURE — 71045 X-RAY EXAM CHEST 1 VIEW: CPT | Mod: 26

## 2022-05-09 PROCEDURE — 99233 SBSQ HOSP IP/OBS HIGH 50: CPT | Mod: GC

## 2022-05-09 PROCEDURE — 88304 TISSUE EXAM BY PATHOLOGIST: CPT | Mod: 26

## 2022-05-09 PROCEDURE — 93010 ELECTROCARDIOGRAM REPORT: CPT

## 2022-05-09 RX ORDER — SODIUM CHLORIDE 9 MG/ML
1000 INJECTION, SOLUTION INTRAVENOUS
Refills: 0 | Status: DISCONTINUED | OUTPATIENT
Start: 2022-05-09 | End: 2022-05-09

## 2022-05-09 RX ORDER — ONDANSETRON 8 MG/1
4 TABLET, FILM COATED ORAL EVERY 6 HOURS
Refills: 0 | Status: DISCONTINUED | OUTPATIENT
Start: 2022-05-09 | End: 2022-05-14

## 2022-05-09 RX ADMIN — PIPERACILLIN AND TAZOBACTAM 200 GRAM(S): 4; .5 INJECTION, POWDER, LYOPHILIZED, FOR SOLUTION INTRAVENOUS at 00:47

## 2022-05-09 RX ADMIN — GABAPENTIN 100 MILLIGRAM(S): 400 CAPSULE ORAL at 23:46

## 2022-05-09 RX ADMIN — Medication 1: at 12:56

## 2022-05-09 RX ADMIN — Medication 25 MILLIGRAM(S): at 06:25

## 2022-05-09 RX ADMIN — INSULIN GLARGINE 10 UNIT(S): 100 INJECTION, SOLUTION SUBCUTANEOUS at 22:58

## 2022-05-09 RX ADMIN — PIPERACILLIN AND TAZOBACTAM 200 GRAM(S): 4; .5 INJECTION, POWDER, LYOPHILIZED, FOR SOLUTION INTRAVENOUS at 06:26

## 2022-05-09 RX ADMIN — PIPERACILLIN AND TAZOBACTAM 200 GRAM(S): 4; .5 INJECTION, POWDER, LYOPHILIZED, FOR SOLUTION INTRAVENOUS at 23:46

## 2022-05-09 RX ADMIN — Medication 1: at 06:25

## 2022-05-09 RX ADMIN — Medication 90 MILLIGRAM(S): at 09:03

## 2022-05-09 RX ADMIN — PIPERACILLIN AND TAZOBACTAM 200 GRAM(S): 4; .5 INJECTION, POWDER, LYOPHILIZED, FOR SOLUTION INTRAVENOUS at 19:38

## 2022-05-09 RX ADMIN — GABAPENTIN 100 MILLIGRAM(S): 400 CAPSULE ORAL at 13:47

## 2022-05-09 RX ADMIN — DULOXETINE HYDROCHLORIDE 60 MILLIGRAM(S): 30 CAPSULE, DELAYED RELEASE ORAL at 11:43

## 2022-05-09 RX ADMIN — Medication 25 MILLIGRAM(S): at 13:47

## 2022-05-09 RX ADMIN — PIPERACILLIN AND TAZOBACTAM 200 GRAM(S): 4; .5 INJECTION, POWDER, LYOPHILIZED, FOR SOLUTION INTRAVENOUS at 11:42

## 2022-05-09 RX ADMIN — ATORVASTATIN CALCIUM 40 MILLIGRAM(S): 80 TABLET, FILM COATED ORAL at 23:46

## 2022-05-09 RX ADMIN — Medication 25 MILLIGRAM(S): at 23:46

## 2022-05-09 RX ADMIN — GABAPENTIN 100 MILLIGRAM(S): 400 CAPSULE ORAL at 06:25

## 2022-05-09 RX ADMIN — Medication 81 MILLIGRAM(S): at 11:43

## 2022-05-09 NOTE — PROGRESS NOTE ADULT - PROBLEM SELECTOR PLAN 3
History of diabetes, last reported A1c of 11.9 in 2019, currently on Metformin at home.  Patient refused to take insulin in outpatint setting.  A1c 9.0%  -Lantus 17 units at bed time --> increased to 20U based on sugars.   -Lispro 5 units pre meal  --> Monitor coverage insulin today. Make adjustments for pre meal tomorrow based on requirements - likely Increase by 2   -Sliding Scale   -Carb Consistent Diet   - Monday reach out to diabetes educator. Patient would be good candidate for continuous insulin monitoring. History of diabetes, last reported A1c of 11.9 in 2019, currently on Metformin at home.  Patient refused to take insulin in outpatint setting.  A1c 9.0%  -Lantus 20  -Lispro 5  -Sliding Scale   -Carb Consistent Diet

## 2022-05-09 NOTE — BH CONSULTATION LIAISON ASSESSMENT NOTE - SUMMARY
Ms. George is a 72F with PPH of depression, seen by outpatient psych provider at [facility], one past psychiatric hospitalization at [facility] earlier this year, no known history of SI/SA/NSSIB and PMH of poorly controlled DM, multiple amputations of digits from bilateral feet, HTN, HLD, and osteomyelitis. Admitted on 5/7/22 for a second opinion regarding the need for an additional surgery of the right foot. Psych consulted to assess reported depressive symptoms and management of antidepressant medication.     Patient reports worsening depressive symptoms in the context of multiple psychosocial stressors and chronic medical conditions. Patient requesting adjustment to her anti-depressant medication due to poor efficacy. As patient's primary concern is her fatigue and low energy, suggest slow cross-taper of Wellbutrin XR. Patient would also benefit from psychotherapy upon discharge to address adverse childhood events as well as traumatic events in adulthood. Patient denies current SI, nor any history of suicidality. Will continue to monitor and reach out to outpatient psychiatrist at [facility]. Patient poor historian, potential for any neurocognitive disorder/decline will be further explored during future encounters.       RECOMMENDATIONS  -- Decrease duloxetine to 40mg PO daily  -- Initiate Wellbutrin XR 100mg PO daily.   -- Suggest checking thyroid panel, folate, vitamin B12, vitamin D levels.   -- Will attempt to gain collateral from patient's outpatient psych provider.  -- Psych will continue to follow.  Ms. George is a 72F with PPH of depression, seen by outpatient psych provider at [facility], one past psychiatric hospitalization at [facility] earlier this year, no known history of SI/SA/NSSIB and PMH of poorly controlled DM, multiple amputations of digits from bilateral feet, HTN, HLD, and osteomyelitis. Admitted on 5/7/22 for a second opinion regarding the need for an additional surgery of the right foot. Psych consulted to assess reported depressive symptoms and management of antidepressant medication.     Patient reports worsening depressive symptoms in the context of multiple psychosocial stressors and chronic medical conditions. Patient requesting adjustment to her anti-depressant medication due to poor efficacy. As patient's primary concern is her fatigue and low energy, suggest slow cross-taper of Wellbutrin XR. Patient would also benefit from psychotherapy upon discharge to address adverse childhood events as well as traumatic events in adulthood. Patient denies current SI, nor any history of suicidality. Will continue to monitor and reach out to outpatient psychiatrist at [facility]. Patient poor historian, potential for any neurocognitive disorder/decline will be further explored during future encounters.       RECOMMENDATIONS  -- Decrease duloxetine to 40mg PO daily  -- Initiate Wellbutrin SR 100mg PO daily.   -- Suggest checking thyroid panel, folate, vitamin B12, vitamin D levels.   -- Will attempt to gain collateral from patient's outpatient psych provider.  -- Psych will continue to follow.  Ms. George is a 72F with PPH of depression, seen by outpatient psych provider at [facility], one past psychiatric hospitalization at [facility] earlier this year, no known history of SI/SA/NSSIB and PMH of poorly controlled DM, multiple amputations of digits from bilateral feet, HTN, HLD, and osteomyelitis. Admitted on 5/7/22 for a second opinion regarding the need for an additional surgery of the right foot. Psych consulted to assess reported depressive symptoms and management of antidepressant medication.     Patient reports worsening depressive symptoms in the context of multiple psychosocial stressors and chronic medical conditions. Patient requesting adjustment to her anti-depressant medication due to poor efficacy. As patient's primary concern is her fatigue and low energy, suggest slow cross-taper of Wellbutrin XR. Patient would also benefit from psychotherapy upon discharge to address adverse childhood events as well as traumatic events in adulthood. Patient denies current SI, nor any history of suicidality. Will continue to monitor and reach out to outpatient psychiatrist at [facility]. Patient poor historian, potential for any neurocognitive disorder/decline will be further explored during future encounters. Patient would likely require home health services for assistance in managing appointments and medications.       RECOMMENDATIONS  -- Decrease duloxetine to 40mg PO daily  -- Initiate Wellbutrin SR 100mg PO daily.   -- Suggest checking thyroid panel, folate, vitamin B12, vitamin D levels.   -- Will attempt to gain collateral from patient's outpatient psych provider.  -- Psych will continue to follow.

## 2022-05-09 NOTE — BH CONSULTATION LIAISON ASSESSMENT NOTE - CURRENT MEDICATION
MEDICATIONS  (STANDING):  aspirin enteric coated 81 milliGRAM(s) Oral daily  atorvastatin 40 milliGRAM(s) Oral at bedtime  DULoxetine 60 milliGRAM(s) Oral daily  gabapentin 100 milliGRAM(s) Oral every 8 hours  hydrALAZINE 25 milliGRAM(s) Oral every 8 hours  insulin glargine Injectable (LANTUS) 10 Unit(s) SubCutaneous at bedtime  insulin lispro (ADMELOG) corrective regimen sliding scale   SubCutaneous every 6 hours  lactated ringers. 1000 milliLiter(s) (90 mL/Hr) IV Continuous <Continuous>  NIFEdipine XL 90 milliGRAM(s) Oral every 24 hours  piperacillin/tazobactam IVPB.. 2.25 Gram(s) IV Intermittent every 6 hours    MEDICATIONS  (PRN):  acetaminophen     Tablet .. 650 milliGRAM(s) Oral every 6 hours PRN Mild Pain (1 - 3), Moderate Pain (4 - 6)  HYDROmorphone  Injectable 0.25 milliGRAM(s) IV Push daily PRN Severe Pain (7 - 10)

## 2022-05-09 NOTE — PHYSICAL THERAPY INITIAL EVALUATION ADULT - IMPAIRMENTS FOUND, PT EVAL
gait, locomotion, and balance/gross motor/muscle strength/neuromotor development and sensory integration/posture/ROM

## 2022-05-09 NOTE — PROGRESS NOTE ADULT - ASSESSMENT
71 yo F pmhx severe depression, DM, HTN and chronic right foot wound; improved left foot plantar wound, p/w worsening right foot ulcer. Podiatry consulted r/o OM, gas. Of note, pt is afebrile, w/ WBC elevated to 12.2, ESR of <130, and CRP of 57.6. XR wet read positive for signs of cortical erosion and osteopenia of right 5th MT head and proximal phalanx base. With +PTB and XR changes, high suspicion for OM. MRI confirms OM of 5th MT. Wound Cx shows poly microbial growth.    Plan:  - local wound care: irrigated w/ sterile saline flush. Dressed w/ DSD + Kerlix wrap  - will f/u wound Cx; Prelim read: polymicrobial (Morganella morganii, Proteus mirabilis, E. faecalis, Strep. dysgalactiae (Group C/G), Strep. constellatus); pending susceptibilities  - recc heel WB to RLE  - Plan for OR debridement of right 5th metatarsal and 5th phalanx, pt is added on for 5/9 after 5pm (has been NPO since midnight previous night)  - please obtain pre-op lab testing (COVID, T&S, Coags, CBC, BMP, CXR, EKG)  - rest of care per primary team    Podiatry following. Plan d/w attending.

## 2022-05-09 NOTE — PROGRESS NOTE ADULT - PROBLEM SELECTOR PLAN 1
Long history of right foot ulcer with recent offer to TMA at [facility].  The physical exam is concerning of OM due to positive probe to bone in the setting of elevated ESR, CRP with high blood sugar levels. X-ray does not reveal subcutaneous emphysema. Imaging not revealing subcutaneous emphysema, and patient is now status post vancomycin and zosyn.  Podiatry following with cultures taken from wound site. During last admission in [facility] patient was treated with extended course of Cefepime and Metronidazole for polymicrobial infection, by Dr. Hamilton.    -Vancomycin dosing by level. Follow up morning random level if less than 15 give 1gram of vancomycin otherwise hold  -Zosyn 2.25grams q6hrs  -F/U Wound Culture   -F/U Podiatry   -MR of right foot without contrast  -Tylenol for mild to moderate pain   -Dilaudid 0.25 for severe pain  -Renally dose Gabapentin to 100mg TID Long history of right foot ulcer with recent offer to TMA at [facility].  The physical exam is concerning of OM due to positive probe to bone in the setting of elevated ESR, CRP with high blood sugar levels. X-ray does not reveal subcutaneous emphysema. Imaging not revealing subcutaneous emphysema, and patient is now status post vancomycin and zosyn.  Podiatry following with cultures taken from wound site. During last admission in [facility] patient was treated with extended course of Cefepime and Metronidazole for polymicrobial infection, by Dr. Hamilton.    - monitor PM random vanc level, if less than 15 give 1gram of vancomycin otherwise hold, consider dapto given worsening renal function  - Zosyn 2.25grams q6hrs  - f/u Wound Culture   - podiatry following, plan for OR this evening  - MR of right foot showing osteo  -Tylenol for mild to moderate pain   -Dilaudid 0.25 for severe pain  -Renally dose Gabapentin to 100mg TID

## 2022-05-09 NOTE — PROGRESS NOTE ADULT - SUBJECTIVE AND OBJECTIVE BOX
POST OP NOTE    ZACK GUILLEN  MRN-[id number]    Procedure: Right foot partial 5th met head resection   Surgeon: Dr. Tacos Echevarria, JOSIANEM  Assistants: Nadege Baker, DOMI    Patient refused an amputation of the 5th ray as advised and planned prior to surgery, and instead opted for a 5th partial met head resection in the OR. Patient tolerated procedure well without incident.  Patient transferred to PACU in stable condition.       PE / Post Op Check:       GEN: NAD, AAOx3, resting comfortably with pain controlled      LE Focused: CFT shows adequate perfusion b/l with no signs of ischemic compromise. 1/2" iodoform packing at ulcer site on the lateral aspect of the R foot and dressed with DSD and ACE wrap. No strikethrough is appreciated on surgical dressings.  Dressings were dry, clean, and intact.  No neuromuscular deficits appreciated.

## 2022-05-09 NOTE — BH CONSULTATION LIAISON ASSESSMENT NOTE - NSICDXBHTERTIARYDX_PSY_ALL_CORE
R/O Dysthymia   F34.1  R/O Depressive disorder due to another medical condition with depressive features   F06.31
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
## 2022-05-09 NOTE — PROGRESS NOTE ADULT - PROBLEM SELECTOR PLAN 7
History of Depression not actively suidicidal.   -Continue Home Cymbalta 60mg daily History of Depression not actively suicidal   - c/w home Cymbalta 60mg daily

## 2022-05-09 NOTE — BH CONSULTATION LIAISON ASSESSMENT NOTE - PATIENT'S CHIEF COMPLAINT
"I don't have the energy to care for myself."
Patient with one or more new problems requiring additional work-up/treatment.

## 2022-05-09 NOTE — PROGRESS NOTE ADULT - ASSESSMENT
71 yo F pmhx severe depression, DM, HTN and chronic right foot wound; improved left foot plantar wound, p/w worsening right foot ulcer. Podiatry consulted r/o OM, gas. Of note, pt is afebrile, w/ WBC elevated to 12.2, ESR of <130, and CRP of 57.6. XR wet read positive for signs of cortical erosion and osteopenia of right 5th MT head and proximal phalanx base. With +PTB and XR changes, high suspicion for OM. MRI confirms OM of 5th MT. Wound Cx shows poly microbial growth. Of note, pt is now s/p partial resection of the Right 5th met head.     Plan:  - F/U post- op xrays  - F/U pathology and culture of the R 5th met head bone and soft tissue   - right foot packed with 1/2" iodoform packing and dressed with DSD and ACE  - rest of care per primary team    Podiatry following. Plan d/w attending.    71 yo F pmhx severe depression, DM, HTN and chronic right foot wound; improved left foot plantar wound, p/w worsening right foot ulcer. Podiatry consulted r/o OM, gas. Of note, pt is afebrile, w/ WBC elevated to 12.2, ESR of <130, and CRP of 57.6. XR wet read positive for signs of cortical erosion and osteopenia of right 5th MT head and proximal phalanx base. With +PTB and XR changes, high suspicion for OM. MRI confirms OM of 5th MT. Wound Cx shows poly microbial growth. Of note, pt is now s/p partial resection of the Right 5th met head.     Plan:  - F/U post- op xrays  - F/U pathology and culture of the R 5th met head bone and soft tissue   - right foot packed with 1/2" iodoform packing and dressed with DSD and ACE  - Recommend 6 weeks of IV abx via PICC   - rest of care per primary team    Podiatry following. Plan d/w attending.

## 2022-05-09 NOTE — PHYSICAL THERAPY INITIAL EVALUATION ADULT - GAIT DEVIATIONS NOTED, PT EVAL
RLE heel WB only; complian w/ WB status. Slightly hunched posture. VC given to increase step length as tolerated for energy conservation. Maneuvers rollator well through obstacles without collision. No LOB/knee buckling or falls noted./decreased cornelius/decreased weight-shifting ability

## 2022-05-09 NOTE — CHART NOTE - NSCHARTNOTEFT_GEN_A_CORE
Infectious Diseases Anti-infective Approval Note    Medication:  Daptomycin  Dose:  500 mg  Route:  IV  Frequency:  q48h  Duration**:  X 4 d starting 5/10 (5/10-5/13)    Dose may be adjusted as needed for alterations in renal function.    *THIS IS NOT AN INFECTIOUS DISEASES CONSULTATION*    **Indicates duration of approval, not necessarily duration of treatment

## 2022-05-09 NOTE — PROGRESS NOTE ADULT - SUBJECTIVE AND OBJECTIVE BOX
CHIEF COMPLAINT:   Chief Complaint   Patient presents with   • Gyn Exam     pt c/o right breast pain, below nipple and radiating to the back, denies feeling any lumps, sx started few days after last menses, 10/12/2020        HPI: Has had this pain in past.  It did resolve.  Now pain started again after menses as written above.  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      Menses regular;  Not heavy    + sexually active;  No problems;  Uses condoms for contraception.      Exercises daily for 30 minutes    Diet discussed    Depression Screening:  PHQ2/9:  Initial depression screening score:: 0            ROS:   General/Constitutional Denies.    Urology Denies.   Integumentary Denies.   Women Only Denies.    Genitourinary Denies.    Skin Denies.    Gastrointestinal Denies.    Endocrine Denies.    Psychiatric Denies.    Health Education Admits.         OB/GYN HISTORY:  OB History    Para Term  AB Living   3 3 3 0 0 3   SAB TAB Ectopic Molar Multiple Live Births   0 0 0 0 0 3       MENSTRUAL HISTORY :  Patient's last menstrual period was 10/12/2020 (exact date).        SEXUAL ACTIVITY:  Social History     Substance and Sexual Activity   Sexual Activity Yes    Comment: none       HISTORY:  Past Medical History:   Diagnosis Date   • No known problems       Past Surgical History:   Procedure Laterality Date   •  section, low transverse       Current Outpatient Medications   Medication Sig Dispense Refill   • VITAMIN D, ERGOCALCIFEROL, PO        No current facility-administered medications for this visit.      ALLERGIES:   Allergen Reactions   • Penicillins HIVES     History reviewed. No pertinent family history.  Social History     Tobacco Use   • Smoking status: Never Smoker   • Smokeless tobacco: Never Used   Substance Use Topics   • Alcohol use: Never     Frequency: Never   • Drug use: Never      The following EMR sections were reviewed and updated during today's visit: Allergies, Medication List,      INTERVAL HPI/OVERNIGHT EVENTS:  As per night team, no overnight events. Patient seen and examined at bedside. Patient has no complaints, denies pain in RLE, SOB, chest pain, difficulty with BM      VITALS  Vital Signs Last 24 Hrs  T(C): 36.5 (09 May 2022 06:21), Max: 36.5 (09 May 2022 06:21)  T(F): 97.7 (09 May 2022 06:21), Max: 97.7 (09 May 2022 06:21)  HR: 74 (09 May 2022 09:11) (64 - 74)  BP: 124/78 (09 May 2022 09:11) (124/78 - 151/72)  BP(mean): --  RR: 18 (09 May 2022 06:21) (18 - 18)  SpO2: 94% (09 May 2022 06:21) (94% - 97%)    CAPILLARY BLOOD GLUCOSE      POCT Blood Glucose.: 125 mg/dL (09 May 2022 09:02)  POCT Blood Glucose.: 156 mg/dL (09 May 2022 06:12)  POCT Blood Glucose.: 170 mg/dL (09 May 2022 00:25)  POCT Blood Glucose.: 195 mg/dL (08 May 2022 22:00)  POCT Blood Glucose.: 220 mg/dL (08 May 2022 17:07)  POCT Blood Glucose.: 167 mg/dL (08 May 2022 12:20)      PHYSICAL EXAM  General: NAD, sitting comfortably in bed   HEENT: PERRL/ EOMI, no scleral icterus, MMM  Neck: Supple, no JVD  Respiratory: lungs CTA b/l, no wheezes/crackles, no accessory muscle use  Cardiovascular: Regular rhythm/rate; +S1 +S2, no murmurs  Gastrointestinal: Soft, NTND, normoactive BS, no rebound, no guarding  Extremities:  Amputation of the right 2 and 4 digit with chronic wound on lateral portion of right foot with purulent drainage, no erythema with black appearing center, left foot with amputation of 2,3,4 with planta  Neurological: A&Ox3, no gross focal deficits, follows commands  Skin: Normal temperature, warm, dry        MEDICATIONS  (STANDING):  aspirin enteric coated 81 milliGRAM(s) Oral daily  atorvastatin 40 milliGRAM(s) Oral at bedtime  DULoxetine 60 milliGRAM(s) Oral daily  gabapentin 100 milliGRAM(s) Oral every 8 hours  hydrALAZINE 25 milliGRAM(s) Oral every 8 hours  insulin glargine Injectable (LANTUS) 10 Unit(s) SubCutaneous at bedtime  insulin lispro (ADMELOG) corrective regimen sliding scale   SubCutaneous every 6 hours  lactated ringers. 1000 milliLiter(s) (90 mL/Hr) IV Continuous <Continuous>  NIFEdipine XL 90 milliGRAM(s) Oral every 24 hours  piperacillin/tazobactam IVPB.. 2.25 Gram(s) IV Intermittent every 6 hours    MEDICATIONS  (PRN):  acetaminophen     Tablet .. 650 milliGRAM(s) Oral every 6 hours PRN Mild Pain (1 - 3), Moderate Pain (4 - 6)  HYDROmorphone  Injectable 0.25 milliGRAM(s) IV Push daily PRN Severe Pain (7 - 10)      No Known Allergies      LABS                        7.4    9.38  )-----------( 313      ( 09 May 2022 07:25 )             24.1     05-09    139  |  106  |  43<H>  ----------------------------<  139<H>  4.0   |  19<L>  |  2.81<H>    Ca    8.5      09 May 2022 07:25  Phos  6.8     05-09  Mg     2.2     05-09    TPro  6.1  /  Alb  2.6<L>  /  TBili  <0.2  /  DBili  x   /  AST  8<L>  /  ALT  9<L>  /  AlkPhos  85  05-09    PT/INR - ( 09 May 2022 07:25 )   PT: 11.8 sec;   INR: 0.99          PTT - ( 09 May 2022 07:25 )  PTT:29.4 sec          RADIOLOGY & ADDITIONAL TESTS: Reviewed Problem List, Past Medical History, Past Surgical History, Social History and Family History    PHYSICAL EXAM:  Visit Vitals  /60 (BP Location: LUE - Left upper extremity, Patient Position: Sitting, Cuff Size: Regular)   Pulse 84   Temp 98.3 °F (36.8 °C) (Temporal)   Resp 14   Ht 5' 6.5\" (1.689 m)   Wt 54.7 kg (120 lb 9.6 oz)   LMP 10/12/2020 (Exact Date)   BMI 19.17 kg/m²     Vital signs reviewed for today's visit.  GENERAL APEARANCE:  The patient is a pleasant, normal appearing female with normal affect and in no distress.  BREASTS: Breast exam performed seated and supine.  No masses, non-tender, no nipple discharge or lymphadenopathy. Exquisitely tender with exam;  Normal exam  ABDOMEN: Soft, non-tender, non-distended.  No hepatosplenomegaly.  Normal bowel sounds.  No umbilical or inguinal hernias.  SKIN:  Warm and dry to touch.  No lesions or rashes noted.    PSYCHIATRIC/NEUROLOGIC:  Appropriate mood and affect, normal recall, alert and oriented x 3  EXTREMITIES:  Warm and well perfused.  No edema noted.  Muscle strength and sensation are normal bilaterally 5/5 in both upper and lower extremities.     :  Vulva: Inspection of her external genitalia reveals normal mons pubis, labia minora and labia majora.  Normal appearing     Clitoris, urethral meatus and Clay's glands.    Bladder:  No evidence of urethral or bladder tenderness.    Vagina:  Speculum exam reveals pink and moist vaginal mucosa.  Bartholin gland is normal to palpation.  Cervix:  Cervix is normal in appearance with no lesions.  There is no cervical motion tenderness. Anteflexed severely  Uterus:  Uterus is mobile, retroverted and non-tender,  No adnexal masses are palpated.  Adnexa are non-tender to palpation  Perineum:  Perineum appears normal. No lesions or masses.  Rectal Exam: not done    ASSESSMENT:  Problem List Items Addressed This Visit     None      Visit Diagnoses     Gynecologic exam normal    -  Primary    Screening for cervical  cancer        Relevant Orders    PAPHPV WITH GENOTYPE PATIENTS OVER 30 YEARS OLD    Screening for HPV (human papillomavirus)        Relevant Orders    PAPHPV WITH GENOTYPE PATIENTS OVER 30 YEARS OLD    Breast pain, right        Relevant Orders    US BREAST DIAGNOSTIC ALL 4 QUADRANTS RIGHT            PLAN:   Sbe, ca++, Vit d, Exercise    See breast specialist once has diagnostic US    No follow-ups on file.      Abena Chery MD  11/3/2020

## 2022-05-09 NOTE — BH CONSULTATION LIAISON ASSESSMENT NOTE - RISK ASSESSMENT
RF: depressive presentation, age, medical problems, social isolation  PF: no history of suicidal behavior, denies SI, sobriety, current hospital disposition, identifies reasons for living, future oriented, able to advocate for her needs (sought second opinion on medical procedure)   Acute/chronic risk of harm to self/others is LOW

## 2022-05-09 NOTE — BH CONSULTATION LIAISON ASSESSMENT NOTE - DIFFERENTIAL
working dx: MDD   r/o dysthymia  r/o depressive disorder due to another medical condition  r/o neurocognitive disorder

## 2022-05-09 NOTE — BH CONSULTATION LIAISON ASSESSMENT NOTE - NSBHCHARTREVIEWVS_PSY_A_CORE FT
Vital Signs Last 24 Hrs  T(C): 36.3 (09 May 2022 15:37), Max: 36.5 (09 May 2022 06:21)  T(F): 97.4 (09 May 2022 15:37), Max: 97.7 (09 May 2022 06:21)  HR: 68 (09 May 2022 15:37) (64 - 74)  BP: 125/60 (09 May 2022 15:37) (124/78 - 151/72)  BP(mean): --  RR: 14 (09 May 2022 15:37) (14 - 18)  SpO2: 94% (09 May 2022 15:37) (94% - 97%)

## 2022-05-09 NOTE — BRIEF OPERATIVE NOTE - OPERATION/FINDINGS
Partial 5th metatarsal head resection with removal of all non- viable soft tissue surrounding the ulcer site around the lateral aspect of the Right foot. No purulence or pus encountered throughout the procedure. Surgical site copiously irrigated with NA via pulse lavage. Wound site left open and packed with 1/2" iodoform packing and dressed with DSD and ACE. 5th metatarsal head sent for pathology and culture. High suspicion for residual infection in soft tissue and bone.

## 2022-05-09 NOTE — PHYSICAL THERAPY INITIAL EVALUATION ADULT - PERTINENT HX OF CURRENT PROBLEM, REHAB EVAL
72 year old female with a past medical history of multiple amputation of the second, third, and fourth digits of left foot and osteomyelitis of the right foot with right partial second and fourth toe ray amputation who presented to the ED with a chief compliant of, "I want surgery on my right foot."  The patient has been in her usual state of health before coming to the hospital.

## 2022-05-09 NOTE — PROGRESS NOTE ADULT - SUBJECTIVE AND OBJECTIVE BOX
Patient is a 72y old  Female who presents with a chief complaint of r foot infection (09 May 2022 08:35)      INTERVAL HPI/ OVERNIGHT EVENTS: Pt evaluated at bedside this morning. Notes continual feelings of depression and concern that she is losing the outside of her foot with the need for debridement of the bone infection. D/w pt the importance of the wound debridement w/ possible resection of her right 5th ray as that bone is seeding a bacterial infection that can spread to her other bones and further up her leg. Pt noted she was amenable to the surgery, but is still having a hard time dealing w/ the reality of her situation.       LABS                        7.4    9.38  )-----------( 313      ( 09 May 2022 07:25 )             24.1     05-09    139  |  106  |  43<H>  ----------------------------<  139<H>  4.0   |  19<L>  |  2.81<H>    Ca    8.5      09 May 2022 07:25  Phos  6.8     05-09  Mg     2.2     05-09    TPro  6.1  /  Alb  2.6<L>  /  TBili  <0.2  /  DBili  x   /  AST  8<L>  /  ALT  9<L>  /  AlkPhos  85  05-09    PT/INR - ( 09 May 2022 07:25 )   PT: 11.8 sec;   INR: 0.99          PTT - ( 09 May 2022 07:25 )  PTT:29.4 sec    ICU Vital Signs Last 24 Hrs  T(C): 36.5 (09 May 2022 06:21), Max: 36.5 (09 May 2022 06:21)  T(F): 97.7 (09 May 2022 06:21), Max: 97.7 (09 May 2022 06:21)  HR: 74 (09 May 2022 09:11) (64 - 74)  BP: 124/78 (09 May 2022 09:11) (124/78 - 151/72)  BP(mean): --  ABP: --  ABP(mean): --  RR: 18 (09 May 2022 06:21) (18 - 18)  SpO2: 94% (09 May 2022 06:21) (94% - 97%)      RADIOLOGY    MICROBIOLOGY    PHYSICAL EXAM  Lower Extremity Focused  Vasc: 2/4 DP + PT b/l. CFT < 3 sec. Edema present to Right foot  Derm: Chambers grade 3 ulcer present to lateral aspect of right 5th MT head. +PTB. Positive for malodor, tunneling medially, w/ seropurulent drainage.   Neuro: Protective sensation diminished  MSK: MMT 5/5 strength. Pt has digit amputation of right 2nd and 3rd digits

## 2022-05-09 NOTE — BH CONSULTATION LIAISON ASSESSMENT NOTE - CASE SUMMARY
Pt is a 71 y/o woman with PPH o f depression and PMH of poorly controlled DM, multiple amputations of digits from bilateral feet, HTN, HLD, and osteomyelitis. Admitted on 5/7/22 for a second opinion regarding the need for an additional surgery of the right foot. Psychiatry was consulted for recommendations for medications management. Pt currently endorses depressive sx in context of several stressors, would like to change her current SNRI to another antidepressant that could help with improving her fatigue. No SI/HI or other safety concerns. Plan: -crosstitration from duloxetine to wellbutrin; -psychoeducation and supportive psychotherapy;- see above other recommendations.

## 2022-05-09 NOTE — PACU DISCHARGE NOTE - COMMENTS
pt met PACU discharge criteria, report given to Rosario PALMER, pt transferred to [facility] window

## 2022-05-09 NOTE — PHYSICAL THERAPY INITIAL EVALUATION ADULT - ADDITIONAL COMMENTS
Pt currently resides alone in elevator apt, no HALINA. Primarily amb w/ rollator. Denies falling with in past 6 months. Denies HHA. Independent w/ showering and dressing. Pt also states suffering from depression.

## 2022-05-09 NOTE — PROGRESS NOTE ADULT - PROBLEM SELECTOR PLAN 2
Baseline creatinine 1.1 to 1.2. Elevated creatinine may represent vipul or worsening of kidney function secondary to history of diabetes and hypertension. Now status post 1L of NS.  -Urine lytes  - Trend Cr. Baseline creatinine 1.1 to 1.2. Now status post 1L of NS. Likely multifactorial worsening kidney function 2/2 comorbidities vs zosyn/vanc kidney injury   - cont to hold vanc given supratherapeutic level  - consider dapto post op if concern for MRSA remains high  - Trend Cr.

## 2022-05-09 NOTE — BH CONSULTATION LIAISON ASSESSMENT NOTE - DETAILS
see HPI see HPI; declined to discuss in detail reports IPP at [facility] in "March or April" of 2022.

## 2022-05-10 LAB
-  AMPICILLIN: SIGNIFICANT CHANGE UP
-  VANCOMYCIN: SIGNIFICANT CHANGE UP
ANION GAP SERPL CALC-SCNC: 11 MMOL/L — SIGNIFICANT CHANGE UP (ref 5–17)
BASOPHILS # BLD AUTO: 0.05 K/UL — SIGNIFICANT CHANGE UP (ref 0–0.2)
BASOPHILS NFR BLD AUTO: 0.5 % — SIGNIFICANT CHANGE UP (ref 0–2)
BUN SERPL-MCNC: 37 MG/DL — HIGH (ref 7–23)
CALCIUM SERPL-MCNC: 8.7 MG/DL — SIGNIFICANT CHANGE UP (ref 8.4–10.5)
CHLORIDE SERPL-SCNC: 113 MMOL/L — HIGH (ref 96–108)
CO2 SERPL-SCNC: 20 MMOL/L — LOW (ref 22–31)
CREAT SERPL-MCNC: 2.54 MG/DL — HIGH (ref 0.5–1.3)
CULTURE RESULTS: SIGNIFICANT CHANGE UP
EGFR: 19 ML/MIN/1.73M2 — LOW
EOSINOPHIL # BLD AUTO: 0.3 K/UL — SIGNIFICANT CHANGE UP (ref 0–0.5)
EOSINOPHIL NFR BLD AUTO: 3 % — SIGNIFICANT CHANGE UP (ref 0–6)
GLUCOSE BLDC GLUCOMTR-MCNC: 115 MG/DL — HIGH (ref 70–99)
GLUCOSE BLDC GLUCOMTR-MCNC: 175 MG/DL — HIGH (ref 70–99)
GLUCOSE BLDC GLUCOMTR-MCNC: 194 MG/DL — HIGH (ref 70–99)
GLUCOSE BLDC GLUCOMTR-MCNC: 316 MG/DL — HIGH (ref 70–99)
GLUCOSE SERPL-MCNC: 104 MG/DL — HIGH (ref 70–99)
HCT VFR BLD CALC: 25.5 % — LOW (ref 34.5–45)
HGB BLD-MCNC: 7.9 G/DL — LOW (ref 11.5–15.5)
IMM GRANULOCYTES NFR BLD AUTO: 0.5 % — SIGNIFICANT CHANGE UP (ref 0–1.5)
LYMPHOCYTES # BLD AUTO: 2.3 K/UL — SIGNIFICANT CHANGE UP (ref 1–3.3)
LYMPHOCYTES # BLD AUTO: 22.9 % — SIGNIFICANT CHANGE UP (ref 13–44)
MAGNESIUM SERPL-MCNC: 2 MG/DL — SIGNIFICANT CHANGE UP (ref 1.6–2.6)
MCHC RBC-ENTMCNC: 26.3 PG — LOW (ref 27–34)
MCHC RBC-ENTMCNC: 31 GM/DL — LOW (ref 32–36)
MCV RBC AUTO: 85 FL — SIGNIFICANT CHANGE UP (ref 80–100)
METHOD TYPE: SIGNIFICANT CHANGE UP
METHOD TYPE: SIGNIFICANT CHANGE UP
MONOCYTES # BLD AUTO: 0.77 K/UL — SIGNIFICANT CHANGE UP (ref 0–0.9)
MONOCYTES NFR BLD AUTO: 7.7 % — SIGNIFICANT CHANGE UP (ref 2–14)
NEUTROPHILS # BLD AUTO: 6.57 K/UL — SIGNIFICANT CHANGE UP (ref 1.8–7.4)
NEUTROPHILS NFR BLD AUTO: 65.4 % — SIGNIFICANT CHANGE UP (ref 43–77)
NRBC # BLD: 0 /100 WBCS — SIGNIFICANT CHANGE UP (ref 0–0)
ORGANISM # SPEC MICROSCOPIC CNT: SIGNIFICANT CHANGE UP
PHOSPHATE SERPL-MCNC: 5.2 MG/DL — HIGH (ref 2.5–4.5)
PLATELET # BLD AUTO: 341 K/UL — SIGNIFICANT CHANGE UP (ref 150–400)
POTASSIUM SERPL-MCNC: 4.3 MMOL/L — SIGNIFICANT CHANGE UP (ref 3.5–5.3)
POTASSIUM SERPL-SCNC: 4.3 MMOL/L — SIGNIFICANT CHANGE UP (ref 3.5–5.3)
RBC # BLD: 3 M/UL — LOW (ref 3.8–5.2)
RBC # FLD: 14 % — SIGNIFICANT CHANGE UP (ref 10.3–14.5)
SODIUM SERPL-SCNC: 144 MMOL/L — SIGNIFICANT CHANGE UP (ref 135–145)
SPECIMEN SOURCE: SIGNIFICANT CHANGE UP
TSH SERPL-MCNC: 3.06 UIU/ML — SIGNIFICANT CHANGE UP (ref 0.27–4.2)
VANCOMYCIN FLD-MCNC: 30.3 UG/ML
WBC # BLD: 10.04 K/UL — SIGNIFICANT CHANGE UP (ref 3.8–10.5)
WBC # FLD AUTO: 10.04 K/UL — SIGNIFICANT CHANGE UP (ref 3.8–10.5)

## 2022-05-10 PROCEDURE — 76937 US GUIDE VASCULAR ACCESS: CPT | Mod: 26

## 2022-05-10 PROCEDURE — 73620 X-RAY EXAM OF FOOT: CPT | Mod: 26,RT

## 2022-05-10 PROCEDURE — 99231 SBSQ HOSP IP/OBS SF/LOW 25: CPT

## 2022-05-10 PROCEDURE — 99233 SBSQ HOSP IP/OBS HIGH 50: CPT | Mod: GC

## 2022-05-10 PROCEDURE — 36000 PLACE NEEDLE IN VEIN: CPT

## 2022-05-10 RX ORDER — HEPARIN SODIUM 5000 [USP'U]/ML
5000 INJECTION INTRAVENOUS; SUBCUTANEOUS EVERY 8 HOURS
Refills: 0 | Status: DISCONTINUED | OUTPATIENT
Start: 2022-05-10 | End: 2022-05-15

## 2022-05-10 RX ORDER — INSULIN LISPRO 100/ML
VIAL (ML) SUBCUTANEOUS
Refills: 0 | Status: DISCONTINUED | OUTPATIENT
Start: 2022-05-10 | End: 2022-05-15

## 2022-05-10 RX ORDER — HYDROMORPHONE HYDROCHLORIDE 2 MG/ML
0.25 INJECTION INTRAMUSCULAR; INTRAVENOUS; SUBCUTANEOUS EVERY 6 HOURS
Refills: 0 | Status: DISCONTINUED | OUTPATIENT
Start: 2022-05-10 | End: 2022-05-14

## 2022-05-10 RX ORDER — BUPROPION HYDROCHLORIDE 150 MG/1
100 TABLET, EXTENDED RELEASE ORAL DAILY
Refills: 0 | Status: DISCONTINUED | OUTPATIENT
Start: 2022-05-10 | End: 2022-05-12

## 2022-05-10 RX ORDER — BUPROPION HYDROCHLORIDE 150 MG/1
100 TABLET, EXTENDED RELEASE ORAL DAILY
Refills: 0 | Status: DISCONTINUED | OUTPATIENT
Start: 2022-05-10 | End: 2022-05-10

## 2022-05-10 RX ORDER — DULOXETINE HYDROCHLORIDE 30 MG/1
40 CAPSULE, DELAYED RELEASE ORAL EVERY 24 HOURS
Refills: 0 | Status: DISCONTINUED | OUTPATIENT
Start: 2022-05-10 | End: 2022-05-12

## 2022-05-10 RX ADMIN — Medication 25 MILLIGRAM(S): at 06:34

## 2022-05-10 RX ADMIN — Medication 90 MILLIGRAM(S): at 10:00

## 2022-05-10 RX ADMIN — DULOXETINE HYDROCHLORIDE 40 MILLIGRAM(S): 30 CAPSULE, DELAYED RELEASE ORAL at 13:18

## 2022-05-10 RX ADMIN — Medication 8: at 18:27

## 2022-05-10 RX ADMIN — Medication 25 MILLIGRAM(S): at 13:17

## 2022-05-10 RX ADMIN — PIPERACILLIN AND TAZOBACTAM 200 GRAM(S): 4; .5 INJECTION, POWDER, LYOPHILIZED, FOR SOLUTION INTRAVENOUS at 13:16

## 2022-05-10 RX ADMIN — HYDROMORPHONE HYDROCHLORIDE 0.25 MILLIGRAM(S): 2 INJECTION INTRAMUSCULAR; INTRAVENOUS; SUBCUTANEOUS at 00:15

## 2022-05-10 RX ADMIN — HYDROMORPHONE HYDROCHLORIDE 0.25 MILLIGRAM(S): 2 INJECTION INTRAMUSCULAR; INTRAVENOUS; SUBCUTANEOUS at 10:10

## 2022-05-10 RX ADMIN — HYDROMORPHONE HYDROCHLORIDE 0.25 MILLIGRAM(S): 2 INJECTION INTRAMUSCULAR; INTRAVENOUS; SUBCUTANEOUS at 10:25

## 2022-05-10 RX ADMIN — BUPROPION HYDROCHLORIDE 100 MILLIGRAM(S): 150 TABLET, EXTENDED RELEASE ORAL at 13:17

## 2022-05-10 RX ADMIN — GABAPENTIN 100 MILLIGRAM(S): 400 CAPSULE ORAL at 13:17

## 2022-05-10 RX ADMIN — Medication 2: at 22:01

## 2022-05-10 RX ADMIN — Medication 650 MILLIGRAM(S): at 22:21

## 2022-05-10 RX ADMIN — GABAPENTIN 100 MILLIGRAM(S): 400 CAPSULE ORAL at 22:20

## 2022-05-10 RX ADMIN — HEPARIN SODIUM 5000 UNIT(S): 5000 INJECTION INTRAVENOUS; SUBCUTANEOUS at 13:18

## 2022-05-10 RX ADMIN — GABAPENTIN 100 MILLIGRAM(S): 400 CAPSULE ORAL at 06:33

## 2022-05-10 RX ADMIN — Medication 650 MILLIGRAM(S): at 07:51

## 2022-05-10 RX ADMIN — HEPARIN SODIUM 5000 UNIT(S): 5000 INJECTION INTRAVENOUS; SUBCUTANEOUS at 22:20

## 2022-05-10 RX ADMIN — PIPERACILLIN AND TAZOBACTAM 200 GRAM(S): 4; .5 INJECTION, POWDER, LYOPHILIZED, FOR SOLUTION INTRAVENOUS at 06:34

## 2022-05-10 RX ADMIN — INSULIN GLARGINE 10 UNIT(S): 100 INJECTION, SOLUTION SUBCUTANEOUS at 22:02

## 2022-05-10 RX ADMIN — ATORVASTATIN CALCIUM 40 MILLIGRAM(S): 80 TABLET, FILM COATED ORAL at 22:20

## 2022-05-10 RX ADMIN — HYDROMORPHONE HYDROCHLORIDE 0.25 MILLIGRAM(S): 2 INJECTION INTRAMUSCULAR; INTRAVENOUS; SUBCUTANEOUS at 00:00

## 2022-05-10 RX ADMIN — Medication 650 MILLIGRAM(S): at 06:51

## 2022-05-10 RX ADMIN — PIPERACILLIN AND TAZOBACTAM 200 GRAM(S): 4; .5 INJECTION, POWDER, LYOPHILIZED, FOR SOLUTION INTRAVENOUS at 18:28

## 2022-05-10 RX ADMIN — Medication 25 MILLIGRAM(S): at 22:20

## 2022-05-10 RX ADMIN — Medication 81 MILLIGRAM(S): at 13:17

## 2022-05-10 RX ADMIN — Medication 650 MILLIGRAM(S): at 23:20

## 2022-05-10 NOTE — PROVIDER CONTACT NOTE (OTHER) - RECOMMENDATIONS
Podiatry Carl Garcia paged, [phone], awaiting response. Dressing reenforced with 2 ace bandage. MD Tanner aware.

## 2022-05-10 NOTE — PROGRESS NOTE ADULT - SUBJECTIVE AND OBJECTIVE BOX
INTERVAL HPI/OVERNIGHT EVENTS:  As per night team, pt returned from OR, VSS. Patient seen and examined at bedside. Patient c/o 8/10 pain at foot, recent dressing change by podiatry.       VITALS  Vital Signs Last 24 Hrs  T(C): 36.7 (10 May 2022 05:58), Max: 36.7 (10 May 2022 05:58)  T(F): 98.1 (10 May 2022 05:58), Max: 98.1 (10 May 2022 05:58)  HR: 70 (10 May 2022 05:58) (66 - 70)  BP: 153/76 (10 May 2022 05:58) (125/60 - 165/74)  BP(mean): 99 (09 May 2022 20:48) (96 - 108)  RR: 18 (10 May 2022 05:58) (12 - 18)  SpO2: 94% (10 May 2022 05:58) (94% - 100%)    CAPILLARY BLOOD GLUCOSE      POCT Blood Glucose.: 115 mg/dL (10 May 2022 06:55)  POCT Blood Glucose.: 127 mg/dL (09 May 2022 21:59)  POCT Blood Glucose.: 142 mg/dL (09 May 2022 19:03)  POCT Blood Glucose.: 147 mg/dL (09 May 2022 17:33)  POCT Blood Glucose.: 182 mg/dL (09 May 2022 12:19)      PHYSICAL EXAM  General: NAD, sitting comfortably in bed   HEENT: PERRL/ EOMI, no scleral icterus, MMM  Neck: Supple, no JVD  Respiratory: lungs CTA b/l, no wheezes/crackles, no accessory muscle use  Cardiovascular: Regular rhythm/rate; +S1 +S2, no murmurs  Gastrointestinal: Soft, NTND, normoactive BS, no rebound, no guarding  Extremities:  Amputation of the right 2 and 4 digit. R foot wrapped with gauze and ace bandage, not saturated. Able to move toes but with pain, sensation intact.   Neurological: A&Ox3, no gross focal deficits, follows commands        MEDICATIONS  (STANDING):  aspirin enteric coated 81 milliGRAM(s) Oral daily  atorvastatin 40 milliGRAM(s) Oral at bedtime  buPROPion SR (12-Hour) 100 milliGRAM(s) Oral daily  DULoxetine 40 milliGRAM(s) Oral every 24 hours  gabapentin 100 milliGRAM(s) Oral every 8 hours  heparin   Injectable 5000 Unit(s) SubCutaneous every 8 hours  hydrALAZINE 25 milliGRAM(s) Oral every 8 hours  insulin glargine Injectable (LANTUS) 10 Unit(s) SubCutaneous at bedtime  insulin lispro (ADMELOG) corrective regimen sliding scale   SubCutaneous every 6 hours  lactated ringers. 1000 milliLiter(s) (90 mL/Hr) IV Continuous <Continuous>  NIFEdipine XL 90 milliGRAM(s) Oral every 24 hours  piperacillin/tazobactam IVPB.. 2.25 Gram(s) IV Intermittent every 6 hours    MEDICATIONS  (PRN):  acetaminophen     Tablet .. 650 milliGRAM(s) Oral every 6 hours PRN Mild Pain (1 - 3), Moderate Pain (4 - 6)  HYDROmorphone  Injectable 0.25 milliGRAM(s) IV Push every 6 hours PRN Severe Pain (7 - 10)  ondansetron Injectable 4 milliGRAM(s) IV Push every 6 hours PRN Nausea      No Known Allergies      LABS                        7.9    10.04 )-----------( 341      ( 10 May 2022 07:30 )             25.5     05-10    144  |  113<H>  |  37<H>  ----------------------------<  104<H>  4.3   |  20<L>  |  2.54<H>    Ca    8.7      10 May 2022 07:31  Phos  5.2     05-10  Mg     2.0     05-10    TPro  6.1  /  Alb  2.6<L>  /  TBili  <0.2  /  DBili  x   /  AST  8<L>  /  ALT  9<L>  /  AlkPhos  85  05-09    PT/INR - ( 09 May 2022 07:25 )   PT: 11.8 sec;   INR: 0.99          PTT - ( 09 May 2022 07:25 )  PTT:29.4 sec          RADIOLOGY & ADDITIONAL TESTS: Reviewed

## 2022-05-10 NOTE — BH CONSULTATION LIAISON PROGRESS NOTE - NSBHASSESSMENTFT_PSY_ALL_CORE
Ms. 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 Admitted on 5/7/22 for a second opinion regarding the need for an additional surgery of the right foot. Psych consulted to assess reported depressive symptoms and management of antidepressant medication.       Plan  -- start cross-titration from duloxetine to bupropion : decrease duloxetine today to 40mg PO daily and start bupropion SR 100mg PO daily.   -- Suggest checking thyroid panel, folate, vitamin B12, vitamin D levels.   -- Will attempt to gain collateral from patient's outpatient psych provider.  -- Psych will continue to follow. Ms. George is a 72F with PPH of depression, seen by outpatient psych provider at [facility], one past psychiatric hospitalization at [facility] [place] earlier this year, no known history of SI/SA/NSSIB and PMH of poorly controlled DM, multiple amputations of digits from bilateral feet, HTN, HLD, and osteomyelitis. Admitted on 5/7/22 for a second opinion regarding the need for an additional surgery of the right foot. Psychiatry was consulted for recommendations for depression. Pt currently endorsing low mood and requesting another psychotropic with effects on improving her energy level. Patient presents with mild cognitive impairment.     Plan  -- start cross-titration from duloxetine to bupropion : decrease duloxetine today to 40mg PO daily and start bupropion SR 100mg PO daily.   -- Suggest checking thyroid panel, folate, vitamin B12, vitamin D levels.   -- Will attempt to gain collateral from patient's outpatient psych provider.  -- Psych will continue to follow.

## 2022-05-10 NOTE — PROVIDER CONTACT NOTE (OTHER) - ASSESSMENT
patient does not complain of chest pain or difficulty breathing
Pt AOx4, dressing  saturated with bright red blood. VS Temp 98.4, NC 66, 144/70, RR 16 POx 95% on RA.

## 2022-05-10 NOTE — PROGRESS NOTE ADULT - PROBLEM SELECTOR PLAN 7
History of Depression not actively suicidal   - decrease Cymbalta to 40mg qd  - start wellbutrin 100 SR qd  - psych following, recs appreciated

## 2022-05-10 NOTE — BH CONSULTATION LIAISON PROGRESS NOTE - NSBHFUPINTERVALHXFT_PSY_A_CORE
Patient seen at bedside.  Patient seen at bedside. Reports low mood and frustration in context of her current medical complications and feeling frustrated for being on "contact precautions" due to covid exposure. Continues to endorse low energy level. denies SI/HI and presents future oriented. Agrees with the plan to cross-titrate to another SNRI (bupropion). Presents AAOx2-3. Admits mild changes in cognition prior to admission.

## 2022-05-10 NOTE — PROGRESS NOTE ADULT - PROBLEM SELECTOR PLAN 2
Baseline creatinine 1.1 to 1.2. Now status post 1L of NS. Likely multifactorial worsening kidney function 2/2 comorbidities vs zosyn/vanc kidney injury   - 5/9 AM vanc level 30, continue vanc level q24hr until <20, then start dapto (pending cultures)  - f/u 5/9 wound cultures (prelim mod gram+ cocci)  - Trend Cr.

## 2022-05-10 NOTE — PROGRESS NOTE ADULT - SUBJECTIVE AND OBJECTIVE BOX
Patient is a 72y old  Female who presents with a chief complaint of r foot infection (09 May 2022 08:35)      INTERVAL HPI/ OVERNIGHT EVENTS: pt is POD#1 s/p right 5th MT head resection. Pt endorses pain to the foot, but is controlled w/ tylenol.       LABS                        7.9    10.04 )-----------( 341      ( 10 May 2022 07:30 )             25.5     05-10    x   |  x   |  x   ----------------------------<  104<H>  4.3   |  x   |  x     Ca    8.7      10 May 2022 07:31  Phos  6.8     05-09  Mg     2.0     05-10    TPro  6.1  /  Alb  2.6<L>  /  TBili  <0.2  /  DBili  x   /  AST  8<L>  /  ALT  9<L>  /  AlkPhos  85  05-09    PT/INR - ( 09 May 2022 07:25 )   PT: 11.8 sec;   INR: 0.99          PTT - ( 09 May 2022 07:25 )  PTT:29.4 sec    ICU Vital Signs Last 24 Hrs  T(C): 36.7 (10 May 2022 05:58), Max: 36.7 (10 May 2022 05:58)  T(F): 98.1 (10 May 2022 05:58), Max: 98.1 (10 May 2022 05:58)  HR: 70 (10 May 2022 05:58) (66 - 74)  BP: 153/76 (10 May 2022 05:58) (124/78 - 165/74)  BP(mean): 99 (09 May 2022 20:48) (96 - 108)  ABP: --  ABP(mean): --  RR: 18 (10 May 2022 05:58) (12 - 18)  SpO2: 94% (10 May 2022 05:58) (94% - 100%)      RADIOLOGY    MICROBIOLOGY    PHYSICAL EXAM  Lower Extremity Focused  Vasc: 2/4 DP + PT b/l. CFT < 3 sec. Edema present to Right foot  Derm: Chambers grade 3 ulcer present to lateral aspect of right 5th MT head. +PTB. Positive for malodor, tunneling medially, w/ seropurulent drainage.   5/9 - pt is s/p right 5th MT head resection w/ debridement and wash out  Neuro: Protective sensation diminished  MSK: MMT 5/5 strength. Pt has digit amputation of right 2nd and 3rd digits

## 2022-05-10 NOTE — PROGRESS NOTE ADULT - PROBLEM SELECTOR PLAN 1
Long history of right foot ulcer with recent offer to TMA at [facility].  The physical exam is concerning of OM due to positive probe to bone in the setting of elevated ESR, CRP with high blood sugar levels. X-ray does not reveal subcutaneous emphysema. Imaging not revealing subcutaneous emphysema, and patient is now status post vancomycin and zosyn.  Podiatry following with cultures taken from wound site.    - 5/9: partial resection of 5th metatarsal with residual infected bone/tissue  - f/u 5/9 wound cultures (prelim mod gram+ cocci)  - Zosyn 2.25grams q6hrs  - 5/9 AM vanc level 30, continue vanc level q24hr until <20, then start dapto (pending cultures)  -Tylenol for mild to moderate pain   -Dilaudid 0.25 for severe pain  -Renally dose Gabapentin to 100mg TID

## 2022-05-10 NOTE — PROGRESS NOTE ADULT - PROBLEM SELECTOR PLAN 3
History of diabetes, last reported A1c of 11.9 in 2019, currently on Metformin at home.  Patient refused to take insulin in outpatint setting.  A1c 9.0%  -Lantus 20  -Lispro 5  -Sliding Scale   -Carb Consistent Diet

## 2022-05-10 NOTE — PROGRESS NOTE ADULT - ASSESSMENT
71 yo F pmhx severe depression, DM, HTN and chronic right foot wound; improved left foot plantar wound, p/w worsening right foot ulcer. Podiatry consulted r/o OM, gas. Of note, pt is afebrile, w/ WBC elevated to 12.2, ESR of <130, and CRP of 57.6. XR wet read positive for signs of cortical erosion and osteopenia of right 5th MT head and proximal phalanx base. With +PTB and XR changes, high suspicion for OM. MRI confirms OM of 5th MT. Wound Cx shows poly microbial growth. Of note, pt is now s/p partial resection of the Right 5th met head (5/9).     Plan:  - F/U post- op xrays  - F/U pathology and culture of the R 5th met head bone and soft tissue; NGTD   - Local wound care: right foot packed with 1/2" iodoform packing, surgicell for hemostasis and dressed with DSD and ACE  - Recommend 6 weeks of IV abx via PICC   - rest of care per primary team    Podiatry following. Plan d/w attending.

## 2022-05-11 DIAGNOSIS — M86.9 OSTEOMYELITIS, UNSPECIFIED: ICD-10-CM

## 2022-05-11 LAB
-  AMPICILLIN/SULBACTAM: SIGNIFICANT CHANGE UP
-  AMPICILLIN/SULBACTAM: SIGNIFICANT CHANGE UP
-  AMPICILLIN: SIGNIFICANT CHANGE UP
-  AMPICILLIN: SIGNIFICANT CHANGE UP
-  CEFAZOLIN: SIGNIFICANT CHANGE UP
-  CEFAZOLIN: SIGNIFICANT CHANGE UP
-  CEFEPIME: SIGNIFICANT CHANGE UP
-  CEFEPIME: SIGNIFICANT CHANGE UP
-  CEFTRIAXONE: SIGNIFICANT CHANGE UP
-  CEFTRIAXONE: SIGNIFICANT CHANGE UP
-  CIPROFLOXACIN: SIGNIFICANT CHANGE UP
-  CIPROFLOXACIN: SIGNIFICANT CHANGE UP
-  CLINDAMYCIN: SIGNIFICANT CHANGE UP
-  ERTAPENEM: SIGNIFICANT CHANGE UP
-  ERTAPENEM: SIGNIFICANT CHANGE UP
-  ERYTHROMYCIN: SIGNIFICANT CHANGE UP
-  GENTAMICIN: SIGNIFICANT CHANGE UP
-  GENTAMICIN: SIGNIFICANT CHANGE UP
-  LEVOFLOXACIN: SIGNIFICANT CHANGE UP
-  MEROPENEM: SIGNIFICANT CHANGE UP
-  PENICILLIN: SIGNIFICANT CHANGE UP
-  PIPERACILLIN/TAZOBACTAM: SIGNIFICANT CHANGE UP
-  PIPERACILLIN/TAZOBACTAM: SIGNIFICANT CHANGE UP
-  TOBRAMYCIN: SIGNIFICANT CHANGE UP
-  TOBRAMYCIN: SIGNIFICANT CHANGE UP
-  TRIMETHOPRIM/SULFAMETHOXAZOLE: SIGNIFICANT CHANGE UP
-  TRIMETHOPRIM/SULFAMETHOXAZOLE: SIGNIFICANT CHANGE UP
-  VANCOMYCIN: SIGNIFICANT CHANGE UP
ANION GAP SERPL CALC-SCNC: 11 MMOL/L — SIGNIFICANT CHANGE UP (ref 5–17)
BASOPHILS # BLD AUTO: 0.03 K/UL — SIGNIFICANT CHANGE UP (ref 0–0.2)
BASOPHILS NFR BLD AUTO: 0.3 % — SIGNIFICANT CHANGE UP (ref 0–2)
BUN SERPL-MCNC: 42 MG/DL — HIGH (ref 7–23)
CALCIUM SERPL-MCNC: 8.1 MG/DL — LOW (ref 8.4–10.5)
CHLORIDE SERPL-SCNC: 110 MMOL/L — HIGH (ref 96–108)
CO2 SERPL-SCNC: 19 MMOL/L — LOW (ref 22–31)
CREAT SERPL-MCNC: 2.62 MG/DL — HIGH (ref 0.5–1.3)
EGFR: 19 ML/MIN/1.73M2 — LOW
EOSINOPHIL # BLD AUTO: 0.23 K/UL — SIGNIFICANT CHANGE UP (ref 0–0.5)
EOSINOPHIL NFR BLD AUTO: 2.6 % — SIGNIFICANT CHANGE UP (ref 0–6)
GLUCOSE BLDC GLUCOMTR-MCNC: 131 MG/DL — HIGH (ref 70–99)
GLUCOSE BLDC GLUCOMTR-MCNC: 187 MG/DL — HIGH (ref 70–99)
GLUCOSE BLDC GLUCOMTR-MCNC: 192 MG/DL — HIGH (ref 70–99)
GLUCOSE BLDC GLUCOMTR-MCNC: 204 MG/DL — HIGH (ref 70–99)
GLUCOSE BLDC GLUCOMTR-MCNC: 205 MG/DL — HIGH (ref 70–99)
GLUCOSE SERPL-MCNC: 111 MG/DL — HIGH (ref 70–99)
HCT VFR BLD CALC: 22.6 % — LOW (ref 34.5–45)
HCT VFR BLD CALC: 26.2 % — LOW (ref 34.5–45)
HGB BLD-MCNC: 6.9 G/DL — CRITICAL LOW (ref 11.5–15.5)
HGB BLD-MCNC: 8.3 G/DL — LOW (ref 11.5–15.5)
IMM GRANULOCYTES NFR BLD AUTO: 0.4 % — SIGNIFICANT CHANGE UP (ref 0–1.5)
LYMPHOCYTES # BLD AUTO: 2.72 K/UL — SIGNIFICANT CHANGE UP (ref 1–3.3)
LYMPHOCYTES # BLD AUTO: 30.4 % — SIGNIFICANT CHANGE UP (ref 13–44)
MAGNESIUM SERPL-MCNC: 2 MG/DL — SIGNIFICANT CHANGE UP (ref 1.6–2.6)
MCHC RBC-ENTMCNC: 26 PG — LOW (ref 27–34)
MCHC RBC-ENTMCNC: 28.4 PG — SIGNIFICANT CHANGE UP (ref 27–34)
MCHC RBC-ENTMCNC: 30.5 GM/DL — LOW (ref 32–36)
MCHC RBC-ENTMCNC: 31.7 GM/DL — LOW (ref 32–36)
MCV RBC AUTO: 85.3 FL — SIGNIFICANT CHANGE UP (ref 80–100)
MCV RBC AUTO: 89.7 FL — SIGNIFICANT CHANGE UP (ref 80–100)
METHOD TYPE: SIGNIFICANT CHANGE UP
MONOCYTES # BLD AUTO: 0.59 K/UL — SIGNIFICANT CHANGE UP (ref 0–0.9)
MONOCYTES NFR BLD AUTO: 6.6 % — SIGNIFICANT CHANGE UP (ref 2–14)
NEUTROPHILS # BLD AUTO: 5.33 K/UL — SIGNIFICANT CHANGE UP (ref 1.8–7.4)
NEUTROPHILS NFR BLD AUTO: 59.7 % — SIGNIFICANT CHANGE UP (ref 43–77)
NRBC # BLD: 0 /100 WBCS — SIGNIFICANT CHANGE UP (ref 0–0)
NRBC # BLD: 0 /100 WBCS — SIGNIFICANT CHANGE UP (ref 0–0)
PHOSPHATE SERPL-MCNC: 5.8 MG/DL — HIGH (ref 2.5–4.5)
PLATELET # BLD AUTO: 340 K/UL — SIGNIFICANT CHANGE UP (ref 150–400)
PLATELET # BLD AUTO: 342 K/UL — SIGNIFICANT CHANGE UP (ref 150–400)
POTASSIUM SERPL-MCNC: 4.1 MMOL/L — SIGNIFICANT CHANGE UP (ref 3.5–5.3)
POTASSIUM SERPL-SCNC: 4.1 MMOL/L — SIGNIFICANT CHANGE UP (ref 3.5–5.3)
RBC # BLD: 2.65 M/UL — LOW (ref 3.8–5.2)
RBC # BLD: 2.92 M/UL — LOW (ref 3.8–5.2)
RBC # FLD: 14 % — SIGNIFICANT CHANGE UP (ref 10.3–14.5)
RBC # FLD: 14.1 % — SIGNIFICANT CHANGE UP (ref 10.3–14.5)
SODIUM SERPL-SCNC: 140 MMOL/L — SIGNIFICANT CHANGE UP (ref 135–145)
VANCOMYCIN FLD-MCNC: 22.4 UG/ML — SIGNIFICANT CHANGE UP
WBC # BLD: 8.85 K/UL — SIGNIFICANT CHANGE UP (ref 3.8–10.5)
WBC # BLD: 8.94 K/UL — SIGNIFICANT CHANGE UP (ref 3.8–10.5)
WBC # FLD AUTO: 8.85 K/UL — SIGNIFICANT CHANGE UP (ref 3.8–10.5)
WBC # FLD AUTO: 8.94 K/UL — SIGNIFICANT CHANGE UP (ref 3.8–10.5)

## 2022-05-11 PROCEDURE — 99233 SBSQ HOSP IP/OBS HIGH 50: CPT | Mod: GC

## 2022-05-11 PROCEDURE — 99231 SBSQ HOSP IP/OBS SF/LOW 25: CPT

## 2022-05-11 PROCEDURE — 99222 1ST HOSP IP/OBS MODERATE 55: CPT

## 2022-05-11 RX ORDER — POLYETHYLENE GLYCOL 3350 17 G/17G
17 POWDER, FOR SOLUTION ORAL DAILY
Refills: 0 | Status: DISCONTINUED | OUTPATIENT
Start: 2022-05-11 | End: 2022-05-15

## 2022-05-11 RX ORDER — AMPICILLIN TRIHYDRATE 250 MG
2 CAPSULE ORAL EVERY 8 HOURS
Refills: 0 | Status: DISCONTINUED | OUTPATIENT
Start: 2022-05-11 | End: 2022-05-15

## 2022-05-11 RX ORDER — SENNA PLUS 8.6 MG/1
1 TABLET ORAL AT BEDTIME
Refills: 0 | Status: DISCONTINUED | OUTPATIENT
Start: 2022-05-11 | End: 2022-05-15

## 2022-05-11 RX ORDER — ERTAPENEM SODIUM 1 G/1
500 INJECTION, POWDER, LYOPHILIZED, FOR SOLUTION INTRAMUSCULAR; INTRAVENOUS EVERY 24 HOURS
Refills: 0 | Status: DISCONTINUED | OUTPATIENT
Start: 2022-05-11 | End: 2022-05-15

## 2022-05-11 RX ADMIN — HEPARIN SODIUM 5000 UNIT(S): 5000 INJECTION INTRAVENOUS; SUBCUTANEOUS at 06:35

## 2022-05-11 RX ADMIN — GABAPENTIN 100 MILLIGRAM(S): 400 CAPSULE ORAL at 13:57

## 2022-05-11 RX ADMIN — PIPERACILLIN AND TAZOBACTAM 200 GRAM(S): 4; .5 INJECTION, POWDER, LYOPHILIZED, FOR SOLUTION INTRAVENOUS at 06:35

## 2022-05-11 RX ADMIN — BUPROPION HYDROCHLORIDE 100 MILLIGRAM(S): 150 TABLET, EXTENDED RELEASE ORAL at 12:57

## 2022-05-11 RX ADMIN — ATORVASTATIN CALCIUM 40 MILLIGRAM(S): 80 TABLET, FILM COATED ORAL at 22:52

## 2022-05-11 RX ADMIN — GABAPENTIN 100 MILLIGRAM(S): 400 CAPSULE ORAL at 22:51

## 2022-05-11 RX ADMIN — HEPARIN SODIUM 5000 UNIT(S): 5000 INJECTION INTRAVENOUS; SUBCUTANEOUS at 13:57

## 2022-05-11 RX ADMIN — Medication 25 MILLIGRAM(S): at 22:51

## 2022-05-11 RX ADMIN — GABAPENTIN 100 MILLIGRAM(S): 400 CAPSULE ORAL at 06:37

## 2022-05-11 RX ADMIN — INSULIN GLARGINE 10 UNIT(S): 100 INJECTION, SOLUTION SUBCUTANEOUS at 22:49

## 2022-05-11 RX ADMIN — PIPERACILLIN AND TAZOBACTAM 200 GRAM(S): 4; .5 INJECTION, POWDER, LYOPHILIZED, FOR SOLUTION INTRAVENOUS at 00:00

## 2022-05-11 RX ADMIN — Medication 90 MILLIGRAM(S): at 08:58

## 2022-05-11 RX ADMIN — Medication 650 MILLIGRAM(S): at 11:40

## 2022-05-11 RX ADMIN — Medication 216 GRAM(S): at 22:50

## 2022-05-11 RX ADMIN — Medication 81 MILLIGRAM(S): at 12:56

## 2022-05-11 RX ADMIN — ERTAPENEM SODIUM 100 MILLIGRAM(S): 1 INJECTION, POWDER, LYOPHILIZED, FOR SOLUTION INTRAMUSCULAR; INTRAVENOUS at 19:15

## 2022-05-11 RX ADMIN — Medication 650 MILLIGRAM(S): at 12:40

## 2022-05-11 RX ADMIN — Medication 25 MILLIGRAM(S): at 06:37

## 2022-05-11 RX ADMIN — Medication 4: at 22:49

## 2022-05-11 RX ADMIN — Medication 4: at 13:00

## 2022-05-11 RX ADMIN — Medication 2: at 19:20

## 2022-05-11 RX ADMIN — Medication 216 GRAM(S): at 17:30

## 2022-05-11 RX ADMIN — DULOXETINE HYDROCHLORIDE 40 MILLIGRAM(S): 30 CAPSULE, DELAYED RELEASE ORAL at 12:58

## 2022-05-11 RX ADMIN — POLYETHYLENE GLYCOL 3350 17 GRAM(S): 17 POWDER, FOR SOLUTION ORAL at 12:58

## 2022-05-11 RX ADMIN — Medication 25 MILLIGRAM(S): at 13:57

## 2022-05-11 RX ADMIN — HEPARIN SODIUM 5000 UNIT(S): 5000 INJECTION INTRAVENOUS; SUBCUTANEOUS at 22:51

## 2022-05-11 NOTE — BH CONSULTATION LIAISON PROGRESS NOTE - NSBHFUPINTERVALHXFT_PSY_A_CORE
Writer confirmed with [facility] ([phone], [phone], [phone]) that Ms. George is a patient of Dr. Menjivar and has an in-person follow up appointment on May 24th at 9:30am.     Interval history reviewed. Spoke with nursing staff, reports patient has been calm, no issues.    Patient seen at bedside. Presents as calm, Ox2-3, notably improved affect/mood compared to previous interview. Expresses gratitude for the nursing staff stating "they are great, they make me laugh!" Reviewed psychoeducation on taper of Cymbalta and initiation of Wellbutrin. Patient denies side effects/SI/HI/AVH/PI. Denies questions or concerns.

## 2022-05-11 NOTE — CONSULT NOTE ADULT - ATTENDING COMMENTS
Case reviewed with Chief Resident.  Above noted.  Palpable pulses with diabetic toe ulcerations.  Management as per podiatry.
Agree with above.  She is an uncontrolled diabetic.  Tissue controls are polymicrobial.  There is high suspicion for osteomyelitis.   Can stop Zosyn and treat with Ertapenem 1 gram IV daily + Ampicillin 2 grams IV q8hrs.  Will need 6 weeks of antibiotics.  Follow up remaining susceptibilities and cultures (they are not finalized yet)

## 2022-05-11 NOTE — PROCEDURE NOTE - NSICDXPROCEDURE_GEN_ALL_CORE_FT
PROCEDURES:  Insertion of intravenous catheter with ultrasound guidance 11-May-2022 00:41:59  Hayden Cho

## 2022-05-11 NOTE — PROGRESS NOTE ADULT - PROBLEM SELECTOR PLAN 2
Baseline creatinine 1.1 to 1.2. Now status post 1L of NS. Likely multifactorial worsening kidney function 2/2 comorbidities vs zosyn/vanc kidney injury   - 5/9 AM vanc level 30, continue vanc level q24hr until <20, then start dapto (pending cultures)  - f/u 5/9 wound cultures (prelim mod gram+ cocci)  - Trend Cr. Baseline creatinine 1.1 to 1.2. Now status post 1L of NS. Likely multifactorial worsening kidney function 2/2 comorbidities vs zosyn/vanc kidney injury   - 5/10 AM vanc level 22  - Cr stable, cont to trend

## 2022-05-11 NOTE — PROGRESS NOTE ADULT - PROBLEM SELECTOR PLAN 1
Long history of right foot ulcer with recent offer to TMA at [facility].  The physical exam is concerning of OM due to positive probe to bone in the setting of elevated ESR, CRP with high blood sugar levels. X-ray does not reveal subcutaneous emphysema. Imaging not revealing subcutaneous emphysema, and patient is now status post vancomycin and zosyn.  Podiatry following with cultures taken from wound site.    - 5/9: partial resection of 5th metatarsal with residual infected bone/tissue  - wound cultures + strep & morganella, resistant to zoysn   - ID consulted, appreciate recs   - Zosyn 2.25grams q6hrs  - Tylenol for mild to moderate pain   - Dilaudid 0.25 for severe pain  -Renally dose Gabapentin to 100mg TID

## 2022-05-11 NOTE — PROGRESS NOTE ADULT - SUBJECTIVE AND OBJECTIVE BOX
Patient is a 72y old  Female who presents with a chief complaint of r foot infection (09 May 2022 08:35)      INTERVAL HPI/ OVERNIGHT EVENTS: Pt seen and examined bedside. No strikethrough noted today on dressing change.      LABS                        6.9    8.94  )-----------( 342      ( 11 May 2022 05:51 )             22.6     05-11    140  |  110<H>  |  42<H>  ----------------------------<  111<H>  4.1   |  19<L>  |  2.62<H>    Ca    8.1<L>      11 May 2022 05:51  Phos  5.8     05-11  Mg     2.0     05-11    ICU Vital Signs Last 24 Hrs  T(C): 36.6 (11 May 2022 05:06), Max: 36.9 (10 May 2022 10:00)  T(F): 97.8 (11 May 2022 05:06), Max: 98.4 (10 May 2022 10:00)  HR: 71 (11 May 2022 05:06) (64 - 78)  BP: 141/63 (11 May 2022 05:06) (124/68 - 148/69)  BP(mean): --  ABP: --  ABP(mean): --  RR: 19 (11 May 2022 05:06) (16 - 19)  SpO2: 96% (11 May 2022 05:06) (91% - 96%)    RADIOLOGY  < from: Xray Foot AP + Lateral, Bilateral (05.06.22 @ 22:03) >    IMPRESSION: Postoperative changes. Erosive bony changes noted region of   the head and neck of the fifth metatarsal base of the proximal phalanx   fifth toe. Findings consistent with osteomyelitis.    < end of copied text >    MICROBIOLOGY  Culture - Tissue with Gram Stain (05.09.22 @ 21:19)    Gram Stain:   Moderate White blood cells  Moderate Gram Positive Cocci    Specimen Source: .Tissue 5th metatarsal bone superficial or spec    Culture Results:   Numerous Morganella morganii  Numerous Streptococcus constellatus  Few Streptococcus dysgalactiae (Group C/G)  Susceptibility to follow.    Culture - Tissue with Gram Stain (05.09.22 @ 21:20)    Gram Stain:   No WBC's seen.  No organisms seen    Specimen Source: .Tissue 5th metatarsal bone deep or spec    Culture Results:   Rare Proteus mirabilis  Susceptibility to follow.    Culture - Fungal, Tissue (05.10.22 @ 02:05)    Specimen Source: .Tissue 5th metatarsal bone deep or spec    Culture Results:   Testing in progress    Culture - Fungal, Tissue (05.10.22 @ 02:06)    Specimen Source: .Tissue 5th metatarsal bone superficial or spec    Culture Results:   Testing in progress    PHYSICAL EXAM  Lower Extremity Focused  Vasc: 2/4 DP + PT b/l. CFT < 3 sec. Edema present to Right foot  Neuro: Protective sensation diminished  MSK: MMT 5/5 strength. Pt has digit amputation of right 2nd and 3rd digits   -Pt is s/p right 5th MT head resection w/ debridement and wash out

## 2022-05-11 NOTE — BH CONSULTATION LIAISON PROGRESS NOTE - NSBHCONSULTWORKUPLABSFT_PSY_ALL_CORE
-- Suggest checking thyroid panel, folate, vitamin B12, vitamin D levels. 
-- Suggest checking thyroid panel, folate, vitamin B12, vitamin D levels.

## 2022-05-11 NOTE — PROGRESS NOTE ADULT - ASSESSMENT
73 yo F pmhx severe depression, DM, HTN and chronic right foot wound; improved left foot plantar wound, p/w worsening right foot ulcer. Podiatry consulted r/o OM, gas. Of note, pt is afebrile, w/ WBC elevated to 12.2, ESR of <130, and CRP of 57.6. XR wet read positive for signs of cortical erosion and osteopenia of right 5th MT head and proximal phalanx base. With +PTB and XR changes, high suspicion for OM. MRI confirms OM of 5th MT. Wound Cx shows poly microbial growth. Of note, pt is now s/p partial resection of the Right 5th met head (5/9).     Plan:  - F/U pathology and culture of the R 5th met head bone and soft tissue; NGTD   - Local wound care: right foot packed with 1/2" iodoform packing, surgicel  for hemostasis and dressed with DSD and ACE  - Abx  - rest of care per primary team    Podiatry following. Plan d/w attending.

## 2022-05-11 NOTE — CONSULT NOTE ADULT - ASSESSMENT
per Internal Medicine     73yo woman with a PMH of HTN, HLD, poorly controlled NIDDM2 (not open to insulin) c/b neuropathy, DM foot infections and ulcers, as well as mild recurrent MDD who p/w concerns for R foot OM.    #DM foot infection, OM R/O - c/w Vanc and Zosyn, MRI pending, Podiatry and Vascular following, can consult ID pending culture data and MRI; pain control  #Poorly controlled NIDDM2 - continue to titrate insulin for FS goal <180, will need Endo and DM education, good candidate for continuous glucometer as does not like to check her FS at home   #ELBERT on CKD - trend Cr, check urine studies to see if current Cr just progression of CKD  #Essential HTN - c/w Nifedepine will need to assess why she is not on an ACEi/ARB (PCP at [facility])  #HLD - c/w ASA and statin   #Mild recurrent MDD - c/w Cymbalta for MDD and neuropathic pain   #DVT PPx - SQH  #Dispo - TBD   
73 yo F pmhx severe depression, DM, HTN and chronic right foot wound; improved left foot plantar wound, p/w worsening right foot ulcer. Podiatry consulted r/o OM, gas. Of note, pt is afebrile, w/ WBC elevated to 12.2, ESR of <130, and CRP of 57.6. XR wet read positive for signs of cortical erosion and osteopenia of right 5th MT head and proximal phalanx base. With +PTB and XR changes, high suspicion for OM.    Plan:  - Pt evaluated and chart reviewed  - Recc admit to medicine and starting broad spectrum IV ABX  - local wound care: flushed wound w/ sterile saline, applied WTD dressing + DSD  - will f/u wound Cx  - f/u XR final read  - recc heel WB to RLE  - surgical planning pending d/w attending  - rest of care per primary team    Podiatry following. 
73 y/o Female with PMH poorly controlled DM (not on insulin, A1c 9.7%) c/b neuropathy, HTN, HLD,  c/b neuropathy, DM foot infections and ulcers who p/w concerns for R toe OM, now s/p partial resection 5/9 by podiatry with polymicrobial cultures (morganella morganii, strep constellate, group C strep, proteus mirabilis) from superficial and deep wounds. She is currently hemodynamically stable without leukocytosis. ID team consulted for antibiotic recommendations.     Recommendations:  1. Would start ertapenem 500mg qD (renally dosed) to cover morganella morganii  2. Would start ampicillin 2g q8h (renally dosed) to cover strep and enterococcus faecalis   3. Please adjust doses based on CrCl. She may need 6 weeks of IV antibiotics  4. f/up remaining OR culture data    D/W primary team. Starting tomorrow, 5/12, ID Team 2 to follow under Dr. Russell.   Note not final until attending attestation.

## 2022-05-11 NOTE — PROGRESS NOTE ADULT - PROBLEM SELECTOR PLAN 7
History of Depression not actively suicidal   - decrease Cymbalta to 40mg qd  - start wellbutrin 100 SR qd  - psych following, recs appreciated History of Depression not actively suicidal   - c/w Cymbalta to 40mg qd  - c/w wellbutrin 100 SR qd  - psych following, recs appreciated

## 2022-05-11 NOTE — PROGRESS NOTE ADULT - SUBJECTIVE AND OBJECTIVE BOX
INTERVAL HPI/OVERNIGHT EVENTS:  As per night team, no overnight events. Patient seen and examined at bedside. AM Hb 6.9, ordered 1 pRBC. Patient denies pain in RLE. Overall feeling down but no suicidal ideation. C/o constipation    VITALS  Vital Signs Last 24 Hrs  T(C): 36.6 (11 May 2022 09:50), Max: 36.6 (11 May 2022 05:06)  T(F): 97.8 (11 May 2022 09:50), Max: 97.8 (11 May 2022 05:06)  HR: 72 (11 May 2022 09:50) (64 - 78)  BP: 159/71 (11 May 2022 09:50) (124/68 - 159/71)  BP(mean): --  RR: 18 (11 May 2022 09:50) (17 - 19)  SpO2: 99% (11 May 2022 09:50) (91% - 99%)    CAPILLARY BLOOD GLUCOSE      POCT Blood Glucose.: 131 mg/dL (11 May 2022 08:17)  POCT Blood Glucose.: 175 mg/dL (10 May 2022 21:24)  POCT Blood Glucose.: 316 mg/dL (10 May 2022 17:44)  POCT Blood Glucose.: 194 mg/dL (10 May 2022 12:24)      PHYSICAL EXAM  General: NAD, sitting comfortably in bed   HEENT: PERRL/ EOMI, no scleral icterus, MMM  Neck: Supple, no JVD  Respiratory: lungs CTA b/l, no wheezes/crackles, no accessory muscle use  Cardiovascular: Regular rhythm/rate; +S1 +S2, no murmurs  Gastrointestinal: Soft, NTND, normoactive BS, no rebound, no guarding  Extremities:  Amputation of the right 2 and 4 digit. R foot wrapped with gauze and ace bandage, not saturated, no blood. Able to move toes but with pain, sensation intact.   Neurological: A&Ox3, no gross focal deficits, follows commands      MEDICATIONS  (STANDING):  aspirin enteric coated 81 milliGRAM(s) Oral daily  atorvastatin 40 milliGRAM(s) Oral at bedtime  buPROPion SR (12-Hour) 100 milliGRAM(s) Oral daily  DULoxetine 40 milliGRAM(s) Oral every 24 hours  gabapentin 100 milliGRAM(s) Oral every 8 hours  heparin   Injectable 5000 Unit(s) SubCutaneous every 8 hours  hydrALAZINE 25 milliGRAM(s) Oral every 8 hours  insulin glargine Injectable (LANTUS) 10 Unit(s) SubCutaneous at bedtime  insulin lispro (ADMELOG) corrective regimen sliding scale   SubCutaneous Before meals and at bedtime  lactated ringers. 1000 milliLiter(s) (90 mL/Hr) IV Continuous <Continuous>  NIFEdipine XL 90 milliGRAM(s) Oral every 24 hours  piperacillin/tazobactam IVPB.. 2.25 Gram(s) IV Intermittent every 6 hours    MEDICATIONS  (PRN):  acetaminophen     Tablet .. 650 milliGRAM(s) Oral every 6 hours PRN Mild Pain (1 - 3), Moderate Pain (4 - 6)  HYDROmorphone  Injectable 0.25 milliGRAM(s) IV Push every 6 hours PRN Severe Pain (7 - 10)  ondansetron Injectable 4 milliGRAM(s) IV Push every 6 hours PRN Nausea      No Known Allergies      LABS                        6.9    8.94  )-----------( 342      ( 11 May 2022 05:51 )             22.6     05-11    140  |  110<H>  |  42<H>  ----------------------------<  111<H>  4.1   |  19<L>  |  2.62<H>    Ca    8.1<L>      11 May 2022 05:51  Phos  5.8     05-11  Mg     2.0     05-11                RADIOLOGY & ADDITIONAL TESTS: Reviewed

## 2022-05-11 NOTE — PROGRESS NOTE ADULT - PROBLEM SELECTOR PLAN 3
No signs of alisia blood loss. 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  - Likely i/s/o AOCD based on iron studies   - monitor   - Transfuse <7   - Active T&S No signs of alisia blood loss. Likely AOCD due to history of infection, however acute episode of bleeding from foot on 5/10, redressed by podiatry.   - AM Hb 6.9, will give 1 pRBC  - post transfusion CBC in PM   - Likely i/s/o AOCD based on iron studies   - Active T&S

## 2022-05-11 NOTE — BH CONSULTATION LIAISON PROGRESS NOTE - NSBHASSESSMENTFT_PSY_ALL_CORE
Ms. George is a 72F with PPH of depression, seen by outpatient psych provider at [facility], one past psychiatric hospitalization at [facility] earlier this year, no known history of SI/SA/NSSIB and PMH of poorly controlled DM, multiple amputations of digits from bilateral feet, HTN, HLD, and osteomyelitis. Admitted on 5/7/22 for a second opinion regarding the need for an additional surgery of the right foot. Psychiatry was consulted for recommendations for depression.     Patient reports worsening depressive symptoms in the context of multiple psychosocial stressors and chronic medical conditions, with debilitating low energy. Requests change in medication after being on Cymbalta for reportedly 20+ years, but now poor efficacy. Cross-tapering Cymbalta with Wellbutrin.     Plan  -- Continue duloxetine 40mg PO daily  -- Continue bupropion SR 100mg PO daily.   -- Suggest checking folate, vitamin B12, vitamin D levels.   -- Psych will continue to follow.

## 2022-05-12 ENCOUNTER — TRANSCRIPTION ENCOUNTER (OUTPATIENT)
Age: 72
End: 2022-05-12

## 2022-05-12 LAB
-  AMPICILLIN: SIGNIFICANT CHANGE UP
-  CLINDAMYCIN: SIGNIFICANT CHANGE UP
-  LEVOFLOXACIN: SIGNIFICANT CHANGE UP
-  PENICILLIN: SIGNIFICANT CHANGE UP
-  VANCOMYCIN: SIGNIFICANT CHANGE UP
-  VANCOMYCIN: SIGNIFICANT CHANGE UP
ANION GAP SERPL CALC-SCNC: 11 MMOL/L — SIGNIFICANT CHANGE UP (ref 5–17)
BUN SERPL-MCNC: 35 MG/DL — HIGH (ref 7–23)
CALCIUM SERPL-MCNC: 8.3 MG/DL — LOW (ref 8.4–10.5)
CHLORIDE SERPL-SCNC: 112 MMOL/L — HIGH (ref 96–108)
CO2 SERPL-SCNC: 19 MMOL/L — LOW (ref 22–31)
CREAT SERPL-MCNC: 2.42 MG/DL — HIGH (ref 0.5–1.3)
CULTURE RESULTS: SIGNIFICANT CHANGE UP
EGFR: 21 ML/MIN/1.73M2 — LOW
GLUCOSE BLDC GLUCOMTR-MCNC: 153 MG/DL — HIGH (ref 70–99)
GLUCOSE BLDC GLUCOMTR-MCNC: 157 MG/DL — HIGH (ref 70–99)
GLUCOSE BLDC GLUCOMTR-MCNC: 233 MG/DL — HIGH (ref 70–99)
GLUCOSE BLDC GLUCOMTR-MCNC: 265 MG/DL — HIGH (ref 70–99)
GLUCOSE SERPL-MCNC: 137 MG/DL — HIGH (ref 70–99)
HCT VFR BLD CALC: 28 % — LOW (ref 34.5–45)
HGB BLD-MCNC: 8.8 G/DL — LOW (ref 11.5–15.5)
MAGNESIUM SERPL-MCNC: 2 MG/DL — SIGNIFICANT CHANGE UP (ref 1.6–2.6)
MCHC RBC-ENTMCNC: 27.5 PG — SIGNIFICANT CHANGE UP (ref 27–34)
MCHC RBC-ENTMCNC: 31.4 GM/DL — LOW (ref 32–36)
MCV RBC AUTO: 87.5 FL — SIGNIFICANT CHANGE UP (ref 80–100)
METHOD TYPE: SIGNIFICANT CHANGE UP
NRBC # BLD: 0 /100 WBCS — SIGNIFICANT CHANGE UP (ref 0–0)
ORGANISM # SPEC MICROSCOPIC CNT: SIGNIFICANT CHANGE UP
PHOSPHATE SERPL-MCNC: 4.7 MG/DL — HIGH (ref 2.5–4.5)
PLATELET # BLD AUTO: 360 K/UL — SIGNIFICANT CHANGE UP (ref 150–400)
POTASSIUM SERPL-MCNC: 3.7 MMOL/L — SIGNIFICANT CHANGE UP (ref 3.5–5.3)
POTASSIUM SERPL-SCNC: 3.7 MMOL/L — SIGNIFICANT CHANGE UP (ref 3.5–5.3)
RBC # BLD: 3.2 M/UL — LOW (ref 3.8–5.2)
RBC # FLD: 14.1 % — SIGNIFICANT CHANGE UP (ref 10.3–14.5)
SODIUM SERPL-SCNC: 142 MMOL/L — SIGNIFICANT CHANGE UP (ref 135–145)
SPECIMEN SOURCE: SIGNIFICANT CHANGE UP
WBC # BLD: 8.66 K/UL — SIGNIFICANT CHANGE UP (ref 3.8–10.5)
WBC # FLD AUTO: 8.66 K/UL — SIGNIFICANT CHANGE UP (ref 3.8–10.5)

## 2022-05-12 PROCEDURE — 99232 SBSQ HOSP IP/OBS MODERATE 35: CPT

## 2022-05-12 RX ORDER — BUPROPION HYDROCHLORIDE 150 MG/1
150 TABLET, EXTENDED RELEASE ORAL DAILY
Refills: 0 | Status: DISCONTINUED | OUTPATIENT
Start: 2022-05-13 | End: 2022-05-15

## 2022-05-12 RX ORDER — DULOXETINE HYDROCHLORIDE 30 MG/1
20 CAPSULE, DELAYED RELEASE ORAL DAILY
Refills: 0 | Status: COMPLETED | OUTPATIENT
Start: 2022-05-13 | End: 2022-05-15

## 2022-05-12 RX ADMIN — HEPARIN SODIUM 5000 UNIT(S): 5000 INJECTION INTRAVENOUS; SUBCUTANEOUS at 13:15

## 2022-05-12 RX ADMIN — Medication 216 GRAM(S): at 14:22

## 2022-05-12 RX ADMIN — HEPARIN SODIUM 5000 UNIT(S): 5000 INJECTION INTRAVENOUS; SUBCUTANEOUS at 21:49

## 2022-05-12 RX ADMIN — Medication 25 MILLIGRAM(S): at 21:48

## 2022-05-12 RX ADMIN — GABAPENTIN 100 MILLIGRAM(S): 400 CAPSULE ORAL at 21:49

## 2022-05-12 RX ADMIN — POLYETHYLENE GLYCOL 3350 17 GRAM(S): 17 POWDER, FOR SOLUTION ORAL at 13:15

## 2022-05-12 RX ADMIN — Medication 216 GRAM(S): at 06:13

## 2022-05-12 RX ADMIN — Medication 6: at 18:07

## 2022-05-12 RX ADMIN — Medication 90 MILLIGRAM(S): at 09:25

## 2022-05-12 RX ADMIN — BUPROPION HYDROCHLORIDE 100 MILLIGRAM(S): 150 TABLET, EXTENDED RELEASE ORAL at 13:15

## 2022-05-12 RX ADMIN — Medication 2: at 13:14

## 2022-05-12 RX ADMIN — GABAPENTIN 100 MILLIGRAM(S): 400 CAPSULE ORAL at 06:10

## 2022-05-12 RX ADMIN — Medication 81 MILLIGRAM(S): at 13:16

## 2022-05-12 RX ADMIN — HEPARIN SODIUM 5000 UNIT(S): 5000 INJECTION INTRAVENOUS; SUBCUTANEOUS at 06:09

## 2022-05-12 RX ADMIN — SENNA PLUS 1 TABLET(S): 8.6 TABLET ORAL at 21:50

## 2022-05-12 RX ADMIN — Medication 25 MILLIGRAM(S): at 13:16

## 2022-05-12 RX ADMIN — Medication 2: at 09:24

## 2022-05-12 RX ADMIN — DULOXETINE HYDROCHLORIDE 40 MILLIGRAM(S): 30 CAPSULE, DELAYED RELEASE ORAL at 13:15

## 2022-05-12 RX ADMIN — GABAPENTIN 100 MILLIGRAM(S): 400 CAPSULE ORAL at 13:15

## 2022-05-12 RX ADMIN — Medication 25 MILLIGRAM(S): at 06:11

## 2022-05-12 RX ADMIN — ERTAPENEM SODIUM 100 MILLIGRAM(S): 1 INJECTION, POWDER, LYOPHILIZED, FOR SOLUTION INTRAMUSCULAR; INTRAVENOUS at 16:05

## 2022-05-12 RX ADMIN — ATORVASTATIN CALCIUM 40 MILLIGRAM(S): 80 TABLET, FILM COATED ORAL at 21:49

## 2022-05-12 RX ADMIN — INSULIN GLARGINE 10 UNIT(S): 100 INJECTION, SOLUTION SUBCUTANEOUS at 21:50

## 2022-05-12 RX ADMIN — Medication 216 GRAM(S): at 21:48

## 2022-05-12 RX ADMIN — Medication 4: at 21:51

## 2022-05-12 NOTE — PROGRESS NOTE ADULT - ASSESSMENT
71 yo F pmhx severe depression, DM, HTN and chronic right foot wound; improved left foot plantar wound, p/w worsening right foot ulcer. Podiatry consulted r/o OM, gas. Of note, pt is afebrile, w/ WBC elevated to 12.2, ESR of <130, and CRP of 57.6. XR wet read positive for signs of cortical erosion and osteopenia of right 5th MT head and proximal phalanx base. With +PTB and XR changes, high suspicion for OM. MRI confirms OM of 5th MT. Wound Cx shows poly microbial growth. Of note, pt is now s/p partial resection of the Right 5th met head (5/9).     Plan:  - F/U pathology and culture of the R 5th met head bone and soft tissue; NGTD   - Local wound care: right foot packed with DSD and wrapped w/ ACE  - Wound Vac tomorrow  - Abx  - Rest of care per primary team    Podiatry following. Plan d/w attending.

## 2022-05-12 NOTE — PROGRESS NOTE ADULT - PROBLEM SELECTOR PLAN 1
Long history of right foot ulcer with recent offer to TMA at [facility].  The physical exam is concerning of OM due to positive probe to bone in the setting of elevated ESR, CRP with high blood sugar levels. X-ray does not reveal subcutaneous emphysema. Imaging not revealing subcutaneous emphysema, and patient is now status post vancomycin and zosyn.  Podiatry following with cultures taken from wound site.    - 5/9: partial resection of 5th metatarsal with residual infected bone/tissue  - c/w Ertapenem & ampicillin for 6 weeks   - ID following, appreciate recs   - Tylenol for mild to moderate pain   - Dilaudid 0.25 for severe pain  -Renally dose Gabapentin to 100mg TID

## 2022-05-12 NOTE — BH CONSULTATION LIAISON PROGRESS NOTE - NSBHCONSULTFOLLOWAFTERCARE_PSY_A_CORE FT
•	[facility]  o	[facility]  o	Geared towards an aging population, with the understanding that their clients will need increasingly comprehensive services.  Must be over 55.    o	[address] [id number] [phone]

## 2022-05-12 NOTE — BH CONSULTATION LIAISON PROGRESS NOTE - NSBHASSESSMENTFT_PSY_ALL_CORE
Ms. 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 Admitted on 5/7/22 for a second opinion regarding the need for an additional surgery of the right foot. Psychiatry was consulted for recommendations for depression.     Patient reports worsening depressive symptoms in the context of multiple psychosocial stressors and chronic medical conditions, with debilitating low energy. Requests change in medication after being on Cymbalta for reportedly 20+ years, but now poor efficacy. Cross-tapering Cymbalta with Wellbutrin.     PLAN  -- Starting 5/13, decrease duloxetine to 20mg PO daily x 3 days, then discontinue.   -- Stop bupropion SR 100mg PO daily.   -- Starting 5/13, begin Wellbutrin XL 150mg PO daily. To be titrated by ANT/outpatient psychiatrist as appropriate.   -- No psychiatric contraindications for discharge. Call with questions.

## 2022-05-12 NOTE — BH CONSULTATION LIAISON PROGRESS NOTE - NSBHFUPINTERVALHXFT_PSY_A_CORE
Writer confirmed with [facility] ([phone], [phone], [phone]) that Ms. George is a patient of Dr. Menjivar and has an in-person follow up appointment on May 24th at 9:30am.     Interval history reviewed. Patient likely for discharge to [facility] tomorrow per primary team.     Patient seen at bedside, upon approach sitting up in a chair. Presents as calm, Ox3, some memory deficits noted regarding content of previous interview. Expresses annoyance at being in isolation due to possible COVID exposure, reports it is causing her some anxiety. Reviewed psychoeducation on continued taper of Cymbalta and increase of Wellbutrin. Patient denies side effects/SI/HI/AVH/PI. Denies questions or concerns. Psychoeducation provided on the importance of following up outpatient with psychiatry due to medication changes. Patient only partially receptive. Also encouraged to pursue psychotherapy to assist with depression, again partially receptive.

## 2022-05-12 NOTE — BH CONSULTATION LIAISON PROGRESS NOTE - NSICDXBHPRIMARYDX_PSY_ALL_CORE
MDD (major depressive disorder)   F32.9  

## 2022-05-12 NOTE — DISCHARGE NOTE PROVIDER - NSDCCPCAREPLAN_GEN_ALL_CORE_FT
PRINCIPAL DISCHARGE DIAGNOSIS  Diagnosis: Infected wound  Assessment and Plan of Treatment:       SECONDARY DISCHARGE DIAGNOSES  Diagnosis: Major depression  Assessment and Plan of Treatment: You were previously diagnosed with depression for which you were seeing an outpatient psychiatrist and were started on medication. During your admission our psychiatry team saw you and spoke with you, and some medication adjustments were made. Please continue the medications and taper as follows.  PLEASE CONTINUE DULOXETINE 20MG ONCE A DAY FOR 2 MORE DAYS (LAST DAY MAY 15TH) THEN DISCONTINUE THIS MEDICATION  PLEASE CONTINUE WELLBUTRIN 150MG XL ONCE A DAY. PLEASE CONTINUE THIS UNTIL YOU ARE ABLE TO SEE YOUR PSYCHIATRIST FOR ANOTHER FURTHER ADJUSTEMENTS     PRINCIPAL DISCHARGE DIAGNOSIS  Diagnosis: Infected wound  Assessment and Plan of Treatment: You presented with a right foot ulcer concerning for infection. Imaging revealed osteomyelitis which is infection of the bone. The podiatry team (foot doctors) evaluated you and took you to the OR to remove some of this infection and take cultures. They did a partial resection with residual tissue and bone infected. Because of this, you were started on antibiotics. Based off the specific bugs the wound cultures grew, the infectious disease team helped us pick a specific antibiotic regimen.   PLEASE CONTINUE ERTAPENEM 500MG ONCE EVERY 24 HOURS AND AMPICILLIN 2G EVERY 8 HOURS UNTIL JUNE 16TH 2022. PLEASE FOLLOW UP WITH INFECTIOUS DISEASE AND PODIATRY OUTPATIENT.  PLEASE FAX weekly CBC, CMP, ESR, CRP to be faxed to Dr. Hamilton [phone]      SECONDARY DISCHARGE DIAGNOSES  Diagnosis: Major depression  Assessment and Plan of Treatment: You were previously diagnosed with depression for which you were seeing an outpatient psychiatrist and were started on medication. During your admission our psychiatry team saw you and spoke with you, and some medication adjustments were made. Please continue the medications and taper as follows.  PLEASE CONTINUE DULOXETINE 20MG ONCE A DAY FOR 2 MORE DAYS (LAST DAY MAY 15TH) THEN DISCONTINUE THIS MEDICATION  PLEASE CONTINUE WELLBUTRIN 150MG XL ONCE A DAY. PLEASE CONTINUE THIS UNTIL YOU ARE ABLE TO SEE YOUR PSYCHIATRIST FOR ANOTHER FURTHER ADJUSTEMENTS     PRINCIPAL DISCHARGE DIAGNOSIS  Diagnosis: Infected wound  Assessment and Plan of Treatment: You presented with a right foot ulcer concerning for infection. Imaging revealed osteomyelitis which is infection of the bone. The podiatry team (foot doctors) evaluated you and took you to the OR to remove some of this infection and take cultures. They did a partial resection with residual tissue and bone infected. Because of this, you were started on antibiotics. Based off the specific bugs the wound cultures grew, the infectious disease team helped us pick a specific antibiotic regimen.   PLEASE CONTINUE ERTAPENEM 500MG ONCE EVERY 24 HOURS AND AMPICILLIN 2G EVERY 8 HOURS UNTIL JUNE 16TH 2022. PLEASE FOLLOW UP WITH INFECTIOUS DISEASE AND PODIATRY OUTPATIENT.  PLEASE FAX weekly CBC, CMP, ESR, CRP to be faxed to Dr. Hamilton [phone]  ATTN ANT: PLEASE PLACE WOUND VAC UPON ARRIVAL  SETTINGS: BLACK FLOAM, 125MMHG CONTINIOUS PRESSURE      SECONDARY DISCHARGE DIAGNOSES  Diagnosis: Major depression  Assessment and Plan of Treatment: You were previously diagnosed with depression for which you were seeing an outpatient psychiatrist and were started on medication. During your admission our psychiatry team saw you and spoke with you, and some medication adjustments were made. Please continue the medications and taper as follows.  PLEASE CONTINUE DULOXETINE 20MG ONCE A DAY FOR 2 MORE DAYS (LAST DAY MAY 15TH) THEN DISCONTINUE THIS MEDICATION  PLEASE CONTINUE WELLBUTRIN 150MG XL ONCE A DAY. PLEASE CONTINUE THIS UNTIL YOU ARE ABLE TO SEE YOUR PSYCHIATRIST FOR ANOTHER FURTHER ADJUSTEMENTS     PRINCIPAL DISCHARGE DIAGNOSIS  Diagnosis: Infected wound  Assessment and Plan of Treatment: You presented with a right foot ulcer concerning for infection. Imaging revealed osteomyelitis which is infection of the bone. The podiatry team (foot doctors) evaluated you and took you to the OR to remove some of this infection and take cultures. They did a partial resection with residual tissue and bone infected. Because of this, you were started on antibiotics. Based off the specific bugs the wound cultures grew, the infectious disease team helped us pick a specific antibiotic regimen.   PLEASE CONTINUE ERTAPENEM 500MG ONCE EVERY 24 HOURS AND AMPICILLIN 2G EVERY 8 HOURS UNTIL JUNE 16TH 2022. PLEASE FOLLOW UP WITH INFECTIOUS DISEASE AND PODIATRY OUTPATIENT.  PLEASE FAX weekly CBC, CMP, ESR, CRP to be faxed to Dr. Hamilton [phone]  ATTN ANT: PLEASE PLACE WOUND VAC UPON ARRIVAL  SETTINGS: BLACK FLOAM, 125MMHG CONTINIOUS PRESSURE      SECONDARY DISCHARGE DIAGNOSES  Diagnosis: Major depression  Assessment and Plan of Treatment: You were previously diagnosed with depression for which you were seeing an outpatient psychiatrist and were started on medication. During your admission our psychiatry team saw you and spoke with you, and some medication adjustments were made. Please continue the medications and taper as follows.  PLEASE CONTINUE DULOXETINE 20MG ONCE A DAY FOR 1 MORE DAY1 (LAST DAY MAY 15TH) THEN DISCONTINUE THIS MEDICATION  PLEASE CONTINUE WELLBUTRIN 150MG XL ONCE A DAY. PLEASE CONTINUE THIS UNTIL YOU ARE ABLE TO SEE YOUR PSYCHIATRIST FOR ANOTHER FURTHER ADJUSTEMENTS     PRINCIPAL DISCHARGE DIAGNOSIS  Diagnosis: Infected wound  Assessment and Plan of Treatment: You presented with a right foot ulcer concerning for infection. Imaging revealed osteomyelitis which is infection of the bone. The podiatry team (foot doctors) evaluated you and took you to the OR to remove some of this infection and take cultures. They did a partial resection with residual tissue and bone infected. Because of this, you were started on antibiotics. Based off the specific bugs the wound cultures grew, the infectious disease team helped us pick a specific antibiotic regimen.   PLEASE CONTINUE ERTAPENEM 500MG ONCE EVERY 24 HOURS AND AMPICILLIN 2G EVERY 8 HOURS UNTIL JUNE 16TH 2022. PLEASE FOLLOW UP WITH INFECTIOUS DISEASE AND PODIATRY OUTPATIENT.  PLEASE FAX weekly CBC, CMP, ESR, CRP to be faxed to Dr. Hamilton [phone]  ATTN ANT: PLEASE PLACE WOUND VAC UPON ARRIVAL  SETTINGS: BLACK FLOAM, 125MMHG CONTINIOUS PRESSURE      SECONDARY DISCHARGE DIAGNOSES  Diagnosis: Major depression  Assessment and Plan of Treatment: You were previously diagnosed with depression for which you were seeing an outpatient psychiatrist and were started on medication. During your admission our psychiatry team saw you and spoke with you, and some medication adjustments were made. Please continue the medications and taper as follows.  PLEASE CONTINUE DULOXETINE 20MG ONCE A DAY FOR 1 MORE DAY1 (LAST DAY MAY 15TH) THEN DISCONTINUE THIS MEDICATION  PLEASE CONTINUE WELLBUTRIN 150MG XL ONCE A DAY. PLEASE CONTINUE THIS UNTIL YOU ARE ABLE TO SEE YOUR PSYCHIATRIST FOR ANOTHER FURTHER ADJUSTEMENTS    Diagnosis: Diabetes mellitus  Assessment and Plan of Treatment: You were put on 10 unit of lantus at night and 2 units of premeal insulin while inpatient. Please continue this at [facility]. The doctor there will decide if you should continue insulin outpatient.    Diagnosis: Hypertension  Assessment and Plan of Treatment: We increased your hydralazine from 25mg three times per day to 50mg three times per day.

## 2022-05-12 NOTE — DISCHARGE NOTE PROVIDER - PROVIDER TOKENS
PROVIDER:[TOKEN:[[id number]],FOLLOWUP:[1 week]] PROVIDER:[TOKEN:[[id number]],SCHEDULEDAPPT:[06/01/2022],SCHEDULEDAPPTTIME:[09:30 AM]]

## 2022-05-12 NOTE — PROGRESS NOTE ADULT - SUBJECTIVE AND OBJECTIVE BOX
Patient is a 72y old  Female who presents with a chief complaint of Foot infection (11 May 2022 14:08)    INTERVAL HPI/ OVERNIGHT EVENTS: Pt seen and examined bedside.    LABS                        8.8    8.66  )-----------( 360      ( 12 May 2022 07:15 )             28.0     05-12    142  |  112<H>  |  35<H>  ----------------------------<  137<H>  3.7   |  19<L>  |  2.42<H>    Ca    8.3<L>      12 May 2022 07:15  Phos  4.7     05-12  Mg     2.0     05-12    ICU Vital Signs Last 24 Hrs  T(C): 36.8 (12 May 2022 06:00), Max: 36.8 (12 May 2022 06:00)  T(F): 98.2 (12 May 2022 06:00), Max: 98.2 (12 May 2022 06:00)  HR: 75 (12 May 2022 06:00) (65 - 75)  BP: 170/76 (12 May 2022 09:25) (129/74 - 172/77)  BP(mean): --  ABP: --  ABP(mean): --  RR: 18 (12 May 2022 06:00) (18 - 18)  SpO2: 94% (12 May 2022 06:00) (94% - 99%)    RADIOLOGY  < from: Xray Foot AP + Lateral, Bilateral (05.06.22 @ 22:03) >  IMPRESSION: Postoperative changes. Erosive bony changes noted region of   the head and neck of the fifth metatarsal base of the proximal phalanx   fifth toe. Findings consistent with osteomyelitis.    < end of copied text >    MICROBIOLOGY  Culture - Fungal, Tissue (05.10.22 @ 02:06)    Specimen Source: .Tissue 5th metatarsal bone superficial or spec    Culture Results:   Testing in progress    Culture - Fungal, Tissue (05.10.22 @ 02:05)    Specimen Source: .Tissue 5th metatarsal bone deep or spec    Culture Results:   Testing in progress    PHYSICAL EXAM  Lower Extremity Focused  Vasc: 2/4 DP + PT b/l. CFT < 3 sec. Edema present to Right foot  Neuro: Protective sensation diminished  MSK: MMT 5/5 strength. Pt has digit amputation of right 2nd and 3rd digits   -Pt is s/p right 5th MT head resection w/ debridement and wash out

## 2022-05-12 NOTE — PROGRESS NOTE ADULT - PROBLEM SELECTOR PLAN 2
Baseline creatinine 1.1 to 1.2. Now status post 1L of NS. Likely multifactorial worsening kidney function 2/2 comorbidities vs zosyn/vanc kidney injury   - Cr cont to downtrend  - Cont to trend

## 2022-05-12 NOTE — PROGRESS NOTE ADULT - SUBJECTIVE AND OBJECTIVE BOX
INTERVAL HPI/OVERNIGHT EVENTS: Had a panic attack earlier this afternoon because she felt confined in isolation room.    CONSTITUTIONAL:  Negative fever or chills, feels well, good appetite  EYES:  Negative  blurry vision or double vision  CARDIOVASCULAR:  Negative for chest pain or palpitations  RESPIRATORY:  Negative for cough, wheezing, or SOB   GASTROINTESTINAL:  Negative for nausea, vomiting, diarrhea, constipation, or abdominal pain  GENITOURINARY:  Negative frequency, urgency or dysuria  NEUROLOGIC:  No headache, confusion, dizziness, lightheadedness      ANTIBIOTICS/RELEVANT:    MEDICATIONS  (STANDING):  ampicillin  IVPB 2 Gram(s) IV Intermittent every 8 hours  aspirin enteric coated 81 milliGRAM(s) Oral daily  atorvastatin 40 milliGRAM(s) Oral at bedtime  ertapenem  IVPB 500 milliGRAM(s) IV Intermittent every 24 hours  gabapentin 100 milliGRAM(s) Oral every 8 hours  heparin   Injectable 5000 Unit(s) SubCutaneous every 8 hours  hydrALAZINE 25 milliGRAM(s) Oral every 8 hours  insulin glargine Injectable (LANTUS) 10 Unit(s) SubCutaneous at bedtime  insulin lispro (ADMELOG) corrective regimen sliding scale   SubCutaneous Before meals and at bedtime  NIFEdipine XL 90 milliGRAM(s) Oral every 24 hours  polyethylene glycol 3350 17 Gram(s) Oral daily  senna 1 Tablet(s) Oral at bedtime    MEDICATIONS  (PRN):  acetaminophen     Tablet .. 650 milliGRAM(s) Oral every 6 hours PRN Mild Pain (1 - 3), Moderate Pain (4 - 6)  HYDROmorphone  Injectable 0.25 milliGRAM(s) IV Push every 6 hours PRN Severe Pain (7 - 10)  ondansetron Injectable 4 milliGRAM(s) IV Push every 6 hours PRN Nausea        Vital Signs Last 24 Hrs  T(C): 37 (12 May 2022 13:15), Max: 37 (12 May 2022 13:15)  T(F): 98.6 (12 May 2022 13:15), Max: 98.6 (12 May 2022 13:15)  HR: 71 (12 May 2022 13:15) (69 - 75)  BP: 158/61 (12 May 2022 13:15) (129/74 - 170/76)  BP(mean): --  RR: 18 (12 May 2022 13:15) (18 - 18)  SpO2: 95% (12 May 2022 13:15) (94% - 95%)    PHYSICAL EXAM:  Constitutional: NAD  Eyes: HANNAH, EOMI  Ear/Nose/Throat: no oral lesion, no sinus tenderness on percussion	  Neck: no JVD, no lymphadenopathy, supple  Respiratory: CTA vicenta  Cardiovascular: S1S2 RRR, no murmurs  Gastrointestinal:soft, (+) BS, no HSM  Extremities:no e/e/c; R foot dressing   Vascular: DP Pulse:	right normal; left normal      LABS:                        8.8    8.66  )-----------( 360      ( 12 May 2022 07:15 )             28.0     05-12    142  |  112<H>  |  35<H>  ----------------------------<  137<H>  3.7   |  19<L>  |  2.42<H>    Ca    8.3<L>      12 May 2022 07:15  Phos  4.7     05-12  Mg     2.0     05-12            MICROBIOLOGY: reviewed    RADIOLOGY & ADDITIONAL STUDIES: reviewed

## 2022-05-12 NOTE — PROGRESS NOTE ADULT - ASSESSMENT
71 yo female with DM, here with R foot osteomyelitis; OR cultures with polymicrobial growth including strep, Enterococcus, Morganella, anaerobes.   - continue ampicillin 2g IV q8h plus ertapenem 500mg IV q24h (CrCl borderline ~ 30)--anticipate 6 week course through 6/16/22  - PICC (if needed for ANT in the event they cannot administer via PIV)  - weekly CBC, CMP, ESR, CRP to be faxed to Dr. Hamilton [phone]    d/w Dr. Rodríguez     Please reconsult with ?    ID Team 2

## 2022-05-12 NOTE — PROGRESS NOTE ADULT - SUBJECTIVE AND OBJECTIVE BOX
*INCOMPLETE NOTE*    Hospital course:    INTERVAL HPI/OVERNIGHT EVENTS:  As per night team, no overnight events. Patient seen and examined at bedside. Patient denies fever, chills, dizziness, weakness, HA, CP, SOB, N/V/D/C    VITALS  Vital Signs Last 24 Hrs  T(C): 36.8 (12 May 2022 06:00), Max: 36.8 (12 May 2022 06:00)  T(F): 98.2 (12 May 2022 06:00), Max: 98.2 (12 May 2022 06:00)  HR: 75 (12 May 2022 06:00) (65 - 75)  BP: 170/76 (12 May 2022 09:25) (129/74 - 170/76)  BP(mean): --  RR: 18 (12 May 2022 06:00) (18 - 18)  SpO2: 94% (12 May 2022 06:00) (94% - 99%)    CAPILLARY BLOOD GLUCOSE      POCT Blood Glucose.: 153 mg/dL (12 May 2022 09:09)  POCT Blood Glucose.: 204 mg/dL (11 May 2022 22:13)  POCT Blood Glucose.: 192 mg/dL (11 May 2022 19:19)  POCT Blood Glucose.: 187 mg/dL (11 May 2022 18:04)  POCT Blood Glucose.: 205 mg/dL (11 May 2022 12:27)      PHYSICAL EXAM  General: NAD, sitting comfortably in bed   HEENT: PERRL/ EOMI, no scleral icterus, MMM  Neck: Supple, no JVD  Respiratory: lungs CTA b/l, no wheezes/crackles, no accessory muscle use  Cardiovascular: Regular rhythm/rate; +S1 +S2, no murmurs  Gastrointestinal: Soft, NTND, normoactive BS, no rebound, no guarding  Genitourinary: no suprapubic tenderness  Extremities: WWP, no cyanosis, no clubbing, no edema, pulses equal  Neurological: A&Ox3, no gross focal deficits, follows commands  Skin: Normal temperature, warm, dry    MEDICATIONS  (STANDING):  ampicillin  IVPB 2 Gram(s) IV Intermittent every 8 hours  aspirin enteric coated 81 milliGRAM(s) Oral daily  atorvastatin 40 milliGRAM(s) Oral at bedtime  buPROPion SR (12-Hour) 100 milliGRAM(s) Oral daily  DULoxetine 40 milliGRAM(s) Oral every 24 hours  ertapenem  IVPB 500 milliGRAM(s) IV Intermittent every 24 hours  gabapentin 100 milliGRAM(s) Oral every 8 hours  heparin   Injectable 5000 Unit(s) SubCutaneous every 8 hours  hydrALAZINE 25 milliGRAM(s) Oral every 8 hours  insulin glargine Injectable (LANTUS) 10 Unit(s) SubCutaneous at bedtime  insulin lispro (ADMELOG) corrective regimen sliding scale   SubCutaneous Before meals and at bedtime  NIFEdipine XL 90 milliGRAM(s) Oral every 24 hours  polyethylene glycol 3350 17 Gram(s) Oral daily  senna 1 Tablet(s) Oral at bedtime    MEDICATIONS  (PRN):  acetaminophen     Tablet .. 650 milliGRAM(s) Oral every 6 hours PRN Mild Pain (1 - 3), Moderate Pain (4 - 6)  HYDROmorphone  Injectable 0.25 milliGRAM(s) IV Push every 6 hours PRN Severe Pain (7 - 10)  ondansetron Injectable 4 milliGRAM(s) IV Push every 6 hours PRN Nausea      No Known Allergies      LABS                        8.8    8.66  )-----------( 360      ( 12 May 2022 07:15 )             28.0     05-12    142  |  112<H>  |  35<H>  ----------------------------<  137<H>  3.7   |  19<L>  |  2.42<H>    Ca    8.3<L>      12 May 2022 07:15  Phos  4.7     05-12  Mg     2.0     05-12                RADIOLOGY & ADDITIONAL TESTS: Reviewed     INTERVAL HPI/OVERNIGHT EVENTS:  As per night team, no overnight events. Patient seen and examined at bedside. Patient reports no further bleeding of LLE, having BM.     VITALS  Vital Signs Last 24 Hrs  T(C): 36.8 (12 May 2022 06:00), Max: 36.8 (12 May 2022 06:00)  T(F): 98.2 (12 May 2022 06:00), Max: 98.2 (12 May 2022 06:00)  HR: 75 (12 May 2022 06:00) (65 - 75)  BP: 170/76 (12 May 2022 09:25) (129/74 - 170/76)  BP(mean): --  RR: 18 (12 May 2022 06:00) (18 - 18)  SpO2: 94% (12 May 2022 06:00) (94% - 99%)    CAPILLARY BLOOD GLUCOSE      POCT Blood Glucose.: 153 mg/dL (12 May 2022 09:09)  POCT Blood Glucose.: 204 mg/dL (11 May 2022 22:13)  POCT Blood Glucose.: 192 mg/dL (11 May 2022 19:19)  POCT Blood Glucose.: 187 mg/dL (11 May 2022 18:04)  POCT Blood Glucose.: 205 mg/dL (11 May 2022 12:27)      PHYSICAL EXAM  General: NAD, sitting comfortably in bed   HEENT: PERRL/ EOMI, no scleral icterus, MMM  Neck: Supple, no JVD  Respiratory: lungs CTA b/l, no wheezes/crackles, no accessory muscle use  Cardiovascular: Regular rhythm/rate; +S1 +S2, no murmurs  Gastrointestinal: Soft, NTND, normoactive BS, no rebound, no guarding  Extremities:  Amputation of the right 2 and 4 digit. R foot wrapped with gauze and ace bandage, not saturated, no blood. Able to move toes but with pain, sensation intact.   Neurological: A&Ox3, no gross focal deficits, follows commands      MEDICATIONS  (STANDING):  ampicillin  IVPB 2 Gram(s) IV Intermittent every 8 hours  aspirin enteric coated 81 milliGRAM(s) Oral daily  atorvastatin 40 milliGRAM(s) Oral at bedtime  buPROPion SR (12-Hour) 100 milliGRAM(s) Oral daily  DULoxetine 40 milliGRAM(s) Oral every 24 hours  ertapenem  IVPB 500 milliGRAM(s) IV Intermittent every 24 hours  gabapentin 100 milliGRAM(s) Oral every 8 hours  heparin   Injectable 5000 Unit(s) SubCutaneous every 8 hours  hydrALAZINE 25 milliGRAM(s) Oral every 8 hours  insulin glargine Injectable (LANTUS) 10 Unit(s) SubCutaneous at bedtime  insulin lispro (ADMELOG) corrective regimen sliding scale   SubCutaneous Before meals and at bedtime  NIFEdipine XL 90 milliGRAM(s) Oral every 24 hours  polyethylene glycol 3350 17 Gram(s) Oral daily  senna 1 Tablet(s) Oral at bedtime    MEDICATIONS  (PRN):  acetaminophen     Tablet .. 650 milliGRAM(s) Oral every 6 hours PRN Mild Pain (1 - 3), Moderate Pain (4 - 6)  HYDROmorphone  Injectable 0.25 milliGRAM(s) IV Push every 6 hours PRN Severe Pain (7 - 10)  ondansetron Injectable 4 milliGRAM(s) IV Push every 6 hours PRN Nausea      No Known Allergies      LABS                        8.8    8.66  )-----------( 360      ( 12 May 2022 07:15 )             28.0     05-12    142  |  112<H>  |  35<H>  ----------------------------<  137<H>  3.7   |  19<L>  |  2.42<H>    Ca    8.3<L>      12 May 2022 07:15  Phos  4.7     05-12  Mg     2.0     05-12                RADIOLOGY & ADDITIONAL TESTS: Reviewed

## 2022-05-12 NOTE — PROGRESS NOTE ADULT - PROBLEM SELECTOR PLAN 3
No signs of alisia blood loss. Likely AOCD due to history of infection, however acute episode of bleeding from foot on 5/10, redressed by podiatry.   - AM Hb 6.9, will give 1 pRBC  - post transfusion CBC in PM   - Likely i/s/o AOCD based on iron studies   - Active T&S

## 2022-05-12 NOTE — BH CONSULTATION LIAISON PROGRESS NOTE - CURRENT MEDICATION
MEDICATIONS  (STANDING):  ampicillin  IVPB 2 Gram(s) IV Intermittent every 8 hours  aspirin enteric coated 81 milliGRAM(s) Oral daily  atorvastatin 40 milliGRAM(s) Oral at bedtime  ertapenem  IVPB 500 milliGRAM(s) IV Intermittent every 24 hours  gabapentin 100 milliGRAM(s) Oral every 8 hours  heparin   Injectable 5000 Unit(s) SubCutaneous every 8 hours  hydrALAZINE 25 milliGRAM(s) Oral every 8 hours  insulin glargine Injectable (LANTUS) 10 Unit(s) SubCutaneous at bedtime  insulin lispro (ADMELOG) corrective regimen sliding scale   SubCutaneous Before meals and at bedtime  NIFEdipine XL 90 milliGRAM(s) Oral every 24 hours  polyethylene glycol 3350 17 Gram(s) Oral daily  senna 1 Tablet(s) Oral at bedtime    MEDICATIONS  (PRN):  acetaminophen     Tablet .. 650 milliGRAM(s) Oral every 6 hours PRN Mild Pain (1 - 3), Moderate Pain (4 - 6)  HYDROmorphone  Injectable 0.25 milliGRAM(s) IV Push every 6 hours PRN Severe Pain (7 - 10)  ondansetron Injectable 4 milliGRAM(s) IV Push every 6 hours PRN Nausea  
MEDICATIONS  (STANDING):  aspirin enteric coated 81 milliGRAM(s) Oral daily  atorvastatin 40 milliGRAM(s) Oral at bedtime  buPROPion SR (12-Hour) 100 milliGRAM(s) Oral daily  DULoxetine 40 milliGRAM(s) Oral every 24 hours  gabapentin 100 milliGRAM(s) Oral every 8 hours  heparin   Injectable 5000 Unit(s) SubCutaneous every 8 hours  hydrALAZINE 25 milliGRAM(s) Oral every 8 hours  insulin glargine Injectable (LANTUS) 10 Unit(s) SubCutaneous at bedtime  insulin lispro (ADMELOG) corrective regimen sliding scale   SubCutaneous Before meals and at bedtime  lactated ringers. 1000 milliLiter(s) (90 mL/Hr) IV Continuous <Continuous>  NIFEdipine XL 90 milliGRAM(s) Oral every 24 hours  piperacillin/tazobactam IVPB.. 2.25 Gram(s) IV Intermittent every 6 hours    MEDICATIONS  (PRN):  acetaminophen     Tablet .. 650 milliGRAM(s) Oral every 6 hours PRN Mild Pain (1 - 3), Moderate Pain (4 - 6)  HYDROmorphone  Injectable 0.25 milliGRAM(s) IV Push every 6 hours PRN Severe Pain (7 - 10)  ondansetron Injectable 4 milliGRAM(s) IV Push every 6 hours PRN Nausea  
MEDICATIONS  (STANDING):  ampicillin  IVPB 2 Gram(s) IV Intermittent every 8 hours  aspirin enteric coated 81 milliGRAM(s) Oral daily  atorvastatin 40 milliGRAM(s) Oral at bedtime  buPROPion SR (12-Hour) 100 milliGRAM(s) Oral daily  DULoxetine 40 milliGRAM(s) Oral every 24 hours  ertapenem  IVPB 500 milliGRAM(s) IV Intermittent every 24 hours  gabapentin 100 milliGRAM(s) Oral every 8 hours  heparin   Injectable 5000 Unit(s) SubCutaneous every 8 hours  hydrALAZINE 25 milliGRAM(s) Oral every 8 hours  insulin glargine Injectable (LANTUS) 10 Unit(s) SubCutaneous at bedtime  insulin lispro (ADMELOG) corrective regimen sliding scale   SubCutaneous Before meals and at bedtime  lactated ringers. 1000 milliLiter(s) (90 mL/Hr) IV Continuous <Continuous>  NIFEdipine XL 90 milliGRAM(s) Oral every 24 hours  polyethylene glycol 3350 17 Gram(s) Oral daily  senna 1 Tablet(s) Oral at bedtime    MEDICATIONS  (PRN):  acetaminophen     Tablet .. 650 milliGRAM(s) Oral every 6 hours PRN Mild Pain (1 - 3), Moderate Pain (4 - 6)  HYDROmorphone  Injectable 0.25 milliGRAM(s) IV Push every 6 hours PRN Severe Pain (7 - 10)  ondansetron Injectable 4 milliGRAM(s) IV Push every 6 hours PRN Nausea

## 2022-05-12 NOTE — DISCHARGE NOTE PROVIDER - NSDCFUADDAPPT_GEN_ALL_CORE_FT
Please bring your Insurance card, Photo ID and Discharge paperwork to the following appointment:    (1) Please follow up with your Infectious Disease Provider, Dr. Lisandro Hamilton at [address] [place] [address], [address] [place] [address] on 06/01/2022 at 9:30am.    Appointment was scheduled by Ms. VARINDER Dowd, Referral Coordinator.

## 2022-05-12 NOTE — DISCHARGE NOTE PROVIDER - NSDCMRMEDTOKEN_GEN_ALL_CORE_FT
aspirin 81 mg oral tablet: 1 tab(s) orally once a day  atorvastatin 40 mg oral tablet: 1 tab(s) orally once a day (at bedtime)  DULoxetine 60 mg oral delayed release capsule: 1 cap(s) orally 2 times a day  gabapentin 300 mg oral tablet: 1 tab(s) orally 3 times a day  hydrALAZINE 25 mg oral tablet: 1 tab(s) orally 3 times a day  melatonin 3 mg oral tablet: 1 tab(s) orally once a day (at bedtime)  metFORMIN 1000 mg oral tablet: 1 tab(s) orally 2 times a day  NIFEdipine 90 mg oral tablet, extended release: 1 tab(s) orally once a day   ampicillin: 2 gram(s) intravenous every 8 hours UNTIL JUNE 16TH 2022  aspirin 81 mg oral tablet: 1 tab(s) orally once a day  atorvastatin 40 mg oral tablet: 1 tab(s) orally once a day (at bedtime)  buPROPion 150 mg/24 hours (XL) oral tablet, extended release: 1 tab(s) orally once a day  DULoxetine 60 mg oral delayed release capsule: 1 cap(s) orally 2 times a day  ertapenem 1 g injection: 1 gram(s) intravenous every 24 hours UNTIL JUNE 16TH 2022  gabapentin 300 mg oral tablet: 1 tab(s) orally 3 times a day  hydrALAZINE 25 mg oral tablet: 1 tab(s) orally 3 times a day  melatonin 3 mg oral tablet: 1 tab(s) orally once a day (at bedtime)  metFORMIN 1000 mg oral tablet: 1 tab(s) orally 2 times a day  NIFEdipine 90 mg oral tablet, extended release: 1 tab(s) orally once a day   ampicillin: 2 gram(s) intravenous every 8 hours UNTIL JUNE 16TH 2022  aspirin 81 mg oral tablet: 1 tab(s) orally once a day  atorvastatin 40 mg oral tablet: 1 tab(s) orally once a day (at bedtime)  buPROPion 150 mg/24 hours (XL) oral tablet, extended release: 1 tab(s) orally once a day  DULoxetine 20 mg oral delayed release capsule: 1 cap(s) orally once a day  ertapenem 1 g injection: 1 gram(s) intravenous every 24 hours UNTIL JUNE 16TH 2022  gabapentin 300 mg oral tablet: 1 tab(s) orally 3 times a day  hydrALAZINE 50 mg oral tablet: 1 tab(s) orally every 8 hours  insulin lispro 100 units/mL injectable solution: 2  injectable 3 times a day (with meals)  Lantus 100 units/mL subcutaneous solution: 10 unit(s) subcutaneous once a day (at bedtime)  melatonin 3 mg oral tablet: 1 tab(s) orally once a day (at bedtime)  metFORMIN 1000 mg oral tablet: 1 tab(s) orally 2 times a day  NIFEdipine 90 mg oral tablet, extended release: 1 tab(s) orally once a day

## 2022-05-12 NOTE — BH CONSULTATION LIAISON PROGRESS NOTE - NSBHCHARTREVIEWVS_PSY_A_CORE FT
Vital Signs Last 24 Hrs  T(C): 36.4 (10 May 2022 12:47), Max: 36.9 (10 May 2022 10:00)  T(F): 97.6 (10 May 2022 12:47), Max: 98.4 (10 May 2022 10:00)  HR: 64 (10 May 2022 12:47) (64 - 70)  BP: 148/69 (10 May 2022 12:47) (125/60 - 165/74)  BP(mean): 99 (09 May 2022 20:48) (96 - 108)  RR: 17 (10 May 2022 12:47) (12 - 18)  SpO2: 94% (10 May 2022 12:47) (94% - 100%)
Vital Signs Last 24 Hrs  T(C): 36.7 (11 May 2022 10:40), Max: 36.7 (11 May 2022 10:40)  T(F): 98.1 (11 May 2022 10:40), Max: 98.1 (11 May 2022 10:40)  HR: 65 (11 May 2022 13:40) (65 - 78)  BP: 133/70 (11 May 2022 13:40) (124/68 - 172/77)  BP(mean): --  RR: 18 (11 May 2022 13:40) (18 - 19)  SpO2: 99% (11 May 2022 13:40) (91% - 99%)
Vital Signs Last 24 Hrs  T(C): 37 (12 May 2022 13:15), Max: 37 (12 May 2022 13:15)  T(F): 98.6 (12 May 2022 13:15), Max: 98.6 (12 May 2022 13:15)  HR: 71 (12 May 2022 13:15) (69 - 75)  BP: 158/61 (12 May 2022 13:15) (129/74 - 170/76)  BP(mean): --  RR: 18 (12 May 2022 13:15) (18 - 18)  SpO2: 95% (12 May 2022 13:15) (94% - 95%)

## 2022-05-12 NOTE — DISCHARGE NOTE PROVIDER - CARE PROVIDER_API CALL
Lisandro Hamilton)  Internal Medicine  [address], [address] [place] [address]  Phone: [phone]  Fax: [phone]  Follow Up Time: 1 week   Lisandro Hamilton)  Internal Medicine  [address], [address] [place] [address]  Phone: [phone]  Fax: [phone]  Scheduled Appointment: 06/01/2022 09:30 AM

## 2022-05-12 NOTE — DISCHARGE NOTE PROVIDER - NSDCFUSCHEDAPPT_GEN_ALL_CORE_FT
Lisandro Hamilton  [facility]  INFDISEASE [address]  Scheduled Appointment: 06/01/2022

## 2022-05-12 NOTE — DISCHARGE NOTE PROVIDER - HOSPITAL COURSE
#Discharge: do not delete    Patient is __ yo M/F with past medical history of _____ presented with _____, found to have _____ (one liner)    Hospital course (by problem):     Patient was discharged to: (home/ANT/acute rehab/hospice, etc, and with what services – home health PT/RN? Home O2?)    New medications:   Changes to old medications:  Medications that were stopped:    Items to follow up as outpatient:    Physical exam at the time of discharge:       #Discharge: do not delete    Patient is a 72 year old female with a past medical history of multiple amputations of the second, third, and fourth digits of left foot and osteomyelitis of the right foot with right partial second and fourth toe ray amputation, chronic right foot wound and left foot plantar wound, DM, HTN, HLD, Depression who presents with R foot ulcer found to have osteomyelitis.     Hospital course (by problem):     #Osteomyelitis: Hx of right foot ulcer with recent offer to [facility] at [facility]. (+) positive probe to bone in the setting of elevated ESR, CRP with high blood sugar levels. MRI showed osteomyelitis. Now s/p partial resection. Initially on vanc/zoysn but wound cultures + enterococcus and strep, no MRSA. ID consulted. C/w Ertapenem [id number] and Ampicillin 2gh q8hr until 6/16. F/u infectious disease and podiatry outpatient.       #ELBERT (acute kidney injury): Baseline creatinine 1.1 to 1.2. Now status post 1L of NS. Likely multifactorial worsening kidney function 2/2 comorbidities vs zosyn/vanc kidney injury. Cr continues to downtrend daily.     #Anemia: No signs of alisia blood loss. Likely AOCD due to history of infection, however acute episode of bleeding from foot on 5/10 resulting in Hb 6.9 with adequate response s/p 1pRBC. Redressed by podiatry with no further episodes of bleeding. Hb stable    #Diabetes mellitus: History of diabetes, last reported A1c of 11.9 in 2019, currently on Metformin at home. A1c 9.0%. continue with _______      #Hypertension: Hydralzine 25mg  TID and Nifedipine 90mg at home. C/w nifedipine 90mg. Increase hydralazine to _______ given elevated BPs inpatient.       #Hyperlipidemia: c/w atorvastatin 40mg & aspirin 81mg.     #Depression, major: History of Depression not actively suicidal. Has outpatient psychiatrist. Seen by psych inpatient and began cymbalat taper and started wellbutrin. Continue cymbalta for ____ (3 day course total) then discontinue. Continue wellbutrin 100 SR qd. Follow up with psychiatrist outpatient.     Patient was discharged to: [facility]    New medications: Ertapenem, Ampicillin   Changes to old medications:  Medications that were stopped:    Items to follow up as outpatient:    Physical exam at the time of discharge:  General: NAD, sitting comfortably in bed   HEENT: PERRL/ EOMI, no scleral icterus, MMM  Neck: Supple, no JVD  Respiratory: lungs CTA b/l, no wheezes/crackles, no accessory muscle use  Cardiovascular: Regular rhythm/rate; +S1 +S2, no murmurs  Gastrointestinal: Soft, NTND, normoactive BS, no rebound, no guarding  Extremities:  Amputation of the right 2 and 4 digit. R foot wrapped with gauze and ace bandage, not saturated, no blood. Able to move toes but with pain, sensation intact.   Neurological: A&Ox3, no gross focal deficits, follows commands     #Discharge: do not delete    Patient is a 72 year old female with a past medical history of multiple amputations of the second, third, and fourth digits of left foot and osteomyelitis of the right foot with right partial second and fourth toe ray amputation, chronic right foot wound and left foot plantar wound, DM, HTN, HLD, Depression who presents with R foot ulcer found to have osteomyelitis.     Hospital course (by problem):     #Osteomyelitis: Hx of right foot ulcer with recent offer to [facility]. (+) positive probe to bone in the setting of elevated ESR, CRP with high blood sugar levels. MRI showed osteomyelitis. Now s/p partial resection. Initially on vanc/zoysn but wound cultures + enterococcus and strep, no MRSA. ID consulted. C/w Ertapenem [id number] and Ampicillin 2gh q8hr until 6/16. F/u infectious disease and podiatry outpatient.       #ELBERT (acute kidney injury): Baseline creatinine 1.1 to 1.2. Now status post 1L of NS. Likely multifactorial worsening kidney function 2/2 comorbidities vs zosyn/vanc kidney injury. Cr continues to downtrend daily.     #Anemia: No signs of alisia blood loss. Likely AOCD due to history of infection, however acute episode of bleeding from foot on 5/10 resulting in Hb 6.9 with adequate response s/p 1pRBC. Redressed by podiatry with no further episodes of bleeding. Hb stable    #Diabetes mellitus: History of diabetes, last reported A1c of 11.9 in 2019, currently on Metformin at home. A1c 9.0%. continue with metformin    #Hypertension: Hydralzine 25mg  TID and Nifedipine 90mg at home. C/w nifedipine 90mg. Increase hydralazine to 37.5mg TID given elevated BPs inpatient.       #Hyperlipidemia: c/w atorvastatin 40mg & aspirin 81mg.     #Depression, major: History of Depression not actively suicidal. Has outpatient psychiatrist. Seen by psych inpatient and began cymbalat taper and started wellbutrin. Continue cymbalta for 1 more day (3 day course total) then discontinue. Continue wellbutrin 100 SR qd. Follow up with psychiatrist outpatient.     Patient was discharged to: [facility]    New medications: Ertapenem, Ampicillin   Changes to old medications: Hydralazine 37.5mg  Medications that were stopped:    Items to follow up as outpatient:    Physical exam at the time of discharge:  General: NAD, sitting comfortably in bed   HEENT: PERRL/ EOMI, no scleral icterus, MMM  Neck: Supple, no JVD  Respiratory: lungs CTA b/l, no wheezes/crackles, no accessory muscle use  Cardiovascular: Regular rhythm/rate; +S1 +S2, no murmurs  Gastrointestinal: Soft, NTND, normoactive BS, no rebound, no guarding  Extremities:  Amputation of the right 2 and 4 digit. R foot wrapped with gauze and ace bandage, not saturated, no blood. Able to move toes but with pain, sensation intact.   Neurological: A&Ox3, no gross focal deficits, follows commands     #Discharge: do not delete    Patient is a 72 year old female with a past medical history of multiple amputations of the second, third, and fourth digits of left foot and osteomyelitis of the right foot with right partial second and fourth toe ray amputation, chronic right foot wound and left foot plantar wound, DM, HTN, HLD, Depression who presents with R foot ulcer found to have osteomyelitis.     Hospital course (by problem):     #Osteomyelitis: Hx of right foot ulcer with recent offer to [facility]. (+) positive probe to bone in the setting of elevated ESR, CRP with high blood sugar levels. MRI showed osteomyelitis. Now s/p partial resection. Initially on vanc/zoysn but wound cultures + enterococcus and strep, no MRSA. ID consulted. C/w Ertapenem [id number] and Ampicillin 2gh q8hr until 6/16. F/u infectious disease and podiatry outpatient.       #ELBERT (acute kidney injury): Baseline creatinine 1.1 to 1.2. Now status post 1L of NS. Likely multifactorial worsening kidney function 2/2 comorbidities vs zosyn/vanc kidney injury. Cr continues to downtrend daily.     #Anemia: No signs of alisia blood loss. Likely AOCD due to history of infection, however acute episode of bleeding from foot on 5/10 resulting in Hb 6.9 with adequate response s/p 1pRBC. Redressed by podiatry with no further episodes of bleeding. Hb stable    #Diabetes mellitus: History of diabetes, last reported A1c of 11.9 in 2019, currently on Metformin at home. A1c 9.0%.   -started on basal/bolus inpatient with 10 lantus and 2 premeal, continue at [facility]    #Hypertension: Hydralzine 25mg  TID and Nifedipine 90mg at home. C/w nifedipine 90mg. Increase hydralazine to 50mg TID given elevated BPs inpatient.       #Hyperlipidemia: c/w atorvastatin 40mg & aspirin 81mg.     #Depression, major: History of Depression not actively suicidal. Has outpatient psychiatrist. Seen by psych inpatient and began cymbalat taper and started wellbutrin. Continue cymbalta for 1 more day (3 day course total) then discontinue. Continue wellbutrin 100 SR qd. Follow up with psychiatrist outpatient.     Patient was discharged to: [facility]    New medications: Ertapenem, Ampicillin, insulin 10 basal/2 premeal  Changes to old medications: Hydralazine 50mg TID  Medications that were stopped: metformin    Items to follow up as outpatient:    Physical exam at the time of discharge:  General: NAD, sitting comfortably in bed   HEENT: PERRL/ EOMI, no scleral icterus, MMM  Neck: Supple, no JVD  Respiratory: lungs CTA b/l, no wheezes/crackles, no accessory muscle use  Cardiovascular: Regular rhythm/rate; +S1 +S2, no murmurs  Gastrointestinal: Soft, NTND, normoactive BS, no rebound, no guarding  Extremities:  Amputation of the right 2 and 4 digit. R foot wrapped with gauze and ace bandage, not saturated, no blood. Able to move toes but with pain, sensation intact.   Neurological: A&Ox3, no gross focal deficits, follows commands

## 2022-05-12 NOTE — PROGRESS NOTE ADULT - PROBLEM SELECTOR PLAN 7
History of Depression not actively suicidal   - c/w Cymbalta to 40mg qd  - c/w wellbutrin 100 SR qd  - psych following, recs appreciated

## 2022-05-13 LAB
ANION GAP SERPL CALC-SCNC: 12 MMOL/L — SIGNIFICANT CHANGE UP (ref 5–17)
BASOPHILS # BLD AUTO: 0.04 K/UL — SIGNIFICANT CHANGE UP (ref 0–0.2)
BASOPHILS NFR BLD AUTO: 0.4 % — SIGNIFICANT CHANGE UP (ref 0–2)
BUN SERPL-MCNC: 34 MG/DL — HIGH (ref 7–23)
CALCIUM SERPL-MCNC: 8.8 MG/DL — SIGNIFICANT CHANGE UP (ref 8.4–10.5)
CHLORIDE SERPL-SCNC: 111 MMOL/L — HIGH (ref 96–108)
CO2 SERPL-SCNC: 21 MMOL/L — LOW (ref 22–31)
CREAT SERPL-MCNC: 2.37 MG/DL — HIGH (ref 0.5–1.3)
EGFR: 21 ML/MIN/1.73M2 — LOW
EOSINOPHIL # BLD AUTO: 0.31 K/UL — SIGNIFICANT CHANGE UP (ref 0–0.5)
EOSINOPHIL NFR BLD AUTO: 2.9 % — SIGNIFICANT CHANGE UP (ref 0–6)
GLUCOSE BLDC GLUCOMTR-MCNC: 104 MG/DL — HIGH (ref 70–99)
GLUCOSE BLDC GLUCOMTR-MCNC: 165 MG/DL — HIGH (ref 70–99)
GLUCOSE BLDC GLUCOMTR-MCNC: 166 MG/DL — HIGH (ref 70–99)
GLUCOSE BLDC GLUCOMTR-MCNC: 230 MG/DL — HIGH (ref 70–99)
GLUCOSE SERPL-MCNC: 111 MG/DL — HIGH (ref 70–99)
HCT VFR BLD CALC: 27 % — LOW (ref 34.5–45)
HGB BLD-MCNC: 8.4 G/DL — LOW (ref 11.5–15.5)
IMM GRANULOCYTES NFR BLD AUTO: 0.7 % — SIGNIFICANT CHANGE UP (ref 0–1.5)
LYMPHOCYTES # BLD AUTO: 3.64 K/UL — HIGH (ref 1–3.3)
LYMPHOCYTES # BLD AUTO: 34.4 % — SIGNIFICANT CHANGE UP (ref 13–44)
MAGNESIUM SERPL-MCNC: 1.8 MG/DL — SIGNIFICANT CHANGE UP (ref 1.6–2.6)
MCHC RBC-ENTMCNC: 27.3 PG — SIGNIFICANT CHANGE UP (ref 27–34)
MCHC RBC-ENTMCNC: 31.1 GM/DL — LOW (ref 32–36)
MCV RBC AUTO: 87.7 FL — SIGNIFICANT CHANGE UP (ref 80–100)
MONOCYTES # BLD AUTO: 0.8 K/UL — SIGNIFICANT CHANGE UP (ref 0–0.9)
MONOCYTES NFR BLD AUTO: 7.6 % — SIGNIFICANT CHANGE UP (ref 2–14)
NEUTROPHILS # BLD AUTO: 5.73 K/UL — SIGNIFICANT CHANGE UP (ref 1.8–7.4)
NEUTROPHILS NFR BLD AUTO: 54 % — SIGNIFICANT CHANGE UP (ref 43–77)
NRBC # BLD: 0 /100 WBCS — SIGNIFICANT CHANGE UP (ref 0–0)
PHOSPHATE SERPL-MCNC: 4.7 MG/DL — HIGH (ref 2.5–4.5)
PLATELET # BLD AUTO: 350 K/UL — SIGNIFICANT CHANGE UP (ref 150–400)
POTASSIUM SERPL-MCNC: 4.7 MMOL/L — SIGNIFICANT CHANGE UP (ref 3.5–5.3)
POTASSIUM SERPL-SCNC: 4.7 MMOL/L — SIGNIFICANT CHANGE UP (ref 3.5–5.3)
RBC # BLD: 3.08 M/UL — LOW (ref 3.8–5.2)
RBC # FLD: 14.6 % — HIGH (ref 10.3–14.5)
SARS-COV-2 RNA SPEC QL NAA+PROBE: SIGNIFICANT CHANGE UP
SODIUM SERPL-SCNC: 144 MMOL/L — SIGNIFICANT CHANGE UP (ref 135–145)
WBC # BLD: 10.59 K/UL — HIGH (ref 3.8–10.5)
WBC # FLD AUTO: 10.59 K/UL — HIGH (ref 3.8–10.5)

## 2022-05-13 PROCEDURE — 99233 SBSQ HOSP IP/OBS HIGH 50: CPT | Mod: GC

## 2022-05-13 PROCEDURE — 36573 INSJ PICC RS&I 5 YR+: CPT

## 2022-05-13 RX ORDER — ERTAPENEM SODIUM 1 G/1
1 INJECTION, POWDER, LYOPHILIZED, FOR SOLUTION INTRAMUSCULAR; INTRAVENOUS
Qty: 0 | Refills: 0 | DISCHARGE
Start: 2022-05-13 | End: 2022-06-16

## 2022-05-13 RX ORDER — BUPROPION HYDROCHLORIDE 150 MG/1
1 TABLET, EXTENDED RELEASE ORAL
Qty: 0 | Refills: 0 | DISCHARGE
Start: 2022-05-13

## 2022-05-13 RX ORDER — SODIUM CHLORIDE 9 MG/ML
10 INJECTION INTRAMUSCULAR; INTRAVENOUS; SUBCUTANEOUS
Refills: 0 | Status: DISCONTINUED | OUTPATIENT
Start: 2022-05-13 | End: 2022-05-15

## 2022-05-13 RX ORDER — CHLORHEXIDINE GLUCONATE 213 G/1000ML
1 SOLUTION TOPICAL
Refills: 0 | Status: DISCONTINUED | OUTPATIENT
Start: 2022-05-13 | End: 2022-05-15

## 2022-05-13 RX ORDER — HYDRALAZINE HCL 50 MG
37.5 TABLET ORAL EVERY 8 HOURS
Refills: 0 | Status: DISCONTINUED | OUTPATIENT
Start: 2022-05-13 | End: 2022-05-14

## 2022-05-13 RX ORDER — AMPICILLIN TRIHYDRATE 250 MG
2 CAPSULE ORAL
Qty: 0 | Refills: 0 | DISCHARGE
Start: 2022-05-13 | End: 2022-06-16

## 2022-05-13 RX ADMIN — HEPARIN SODIUM 5000 UNIT(S): 5000 INJECTION INTRAVENOUS; SUBCUTANEOUS at 06:50

## 2022-05-13 RX ADMIN — Medication 25 MILLIGRAM(S): at 06:50

## 2022-05-13 RX ADMIN — Medication 216 GRAM(S): at 22:22

## 2022-05-13 RX ADMIN — HEPARIN SODIUM 5000 UNIT(S): 5000 INJECTION INTRAVENOUS; SUBCUTANEOUS at 22:21

## 2022-05-13 RX ADMIN — Medication 90 MILLIGRAM(S): at 09:03

## 2022-05-13 RX ADMIN — Medication 37.5 MILLIGRAM(S): at 13:34

## 2022-05-13 RX ADMIN — Medication 4: at 17:33

## 2022-05-13 RX ADMIN — INSULIN GLARGINE 10 UNIT(S): 100 INJECTION, SOLUTION SUBCUTANEOUS at 22:24

## 2022-05-13 RX ADMIN — DULOXETINE HYDROCHLORIDE 20 MILLIGRAM(S): 30 CAPSULE, DELAYED RELEASE ORAL at 13:33

## 2022-05-13 RX ADMIN — Medication 37.5 MILLIGRAM(S): at 23:20

## 2022-05-13 RX ADMIN — Medication 216 GRAM(S): at 06:50

## 2022-05-13 RX ADMIN — Medication 216 GRAM(S): at 13:33

## 2022-05-13 RX ADMIN — ERTAPENEM SODIUM 100 MILLIGRAM(S): 1 INJECTION, POWDER, LYOPHILIZED, FOR SOLUTION INTRAMUSCULAR; INTRAVENOUS at 15:55

## 2022-05-13 RX ADMIN — GABAPENTIN 100 MILLIGRAM(S): 400 CAPSULE ORAL at 06:49

## 2022-05-13 RX ADMIN — ATORVASTATIN CALCIUM 40 MILLIGRAM(S): 80 TABLET, FILM COATED ORAL at 22:21

## 2022-05-13 RX ADMIN — Medication 2: at 22:27

## 2022-05-13 RX ADMIN — BUPROPION HYDROCHLORIDE 150 MILLIGRAM(S): 150 TABLET, EXTENDED RELEASE ORAL at 13:33

## 2022-05-13 RX ADMIN — SENNA PLUS 1 TABLET(S): 8.6 TABLET ORAL at 22:21

## 2022-05-13 RX ADMIN — GABAPENTIN 100 MILLIGRAM(S): 400 CAPSULE ORAL at 22:21

## 2022-05-13 RX ADMIN — GABAPENTIN 100 MILLIGRAM(S): 400 CAPSULE ORAL at 13:33

## 2022-05-13 RX ADMIN — Medication 2: at 13:18

## 2022-05-13 RX ADMIN — HEPARIN SODIUM 5000 UNIT(S): 5000 INJECTION INTRAVENOUS; SUBCUTANEOUS at 13:33

## 2022-05-13 RX ADMIN — Medication 81 MILLIGRAM(S): at 13:33

## 2022-05-13 NOTE — PROCEDURE NOTE - PICC: IMPLANT LOT NUMBER
Completed [employer] PA form for Pertzye 4k. Faxed to [phone] with 2019  pancreatic elastase results.    [id number] bard power picc

## 2022-05-13 NOTE — PROGRESS NOTE ADULT - ATTENDING COMMENTS
Patient was seen and examined at bedside. Case discuss with resident.     OBJECTIVE:  Vital Signs Last 24 Hrs  T(C): 36.6 (13 May 2022 06:08), Max: 37 (12 May 2022 13:15)  T(F): 97.9 (13 May 2022 06:08), Max: 98.6 (12 May 2022 13:15)  HR: 68 (13 May 2022 06:08) (68 - 73)  BP: 169/68 (13 May 2022 06:08) (130/79 - 169/68)  BP(mean): --  RR: 19 (13 May 2022 06:08) (18 - 19)  SpO2: 95% (13 May 2022 06:08) (95% - 96%)    PHYSICAL EXAM:  Gen: Reclining in bed at time of exam, appears stated age  CV: RRR, +S1/S2  Pulm: CTA b/l no wheezing or crackles   Abd: soft, NTND    LABS:                        8.4    10.59 )-----------( 350      ( 13 May 2022 08:56 )             27.0     05-13    144  |  111<H>  |  34<H>  ----------------------------<  111<H>  4.7   |  21<L>  |  2.37<H>    Ca    8.8      13 May 2022 08:56  Phos  4.7     05-13  Mg     1.8     05-13    A/P: 71yo woman with a PMH of HTN, HLD, poorly controlled NIDDM2 (not open to insulin) c/b neuropathy, DM foot infections and ulcers, as well as mild recurrent MDD whofound t ohave  R toe osteomyelitis s/p partial resection 5/9 by podiatry.      #DM foot infection  #Osteomyelitis of 5th toe s/p partial resection on 5/9 by podiatry   - ID consult appreciated and per ID recommendation the pt will be on  ampicillin 2g IV q8h plus ertapenem 500mg IV q24h  through 6/16/22  - Will continue to monitor H/H pt s/p transfusion of 1 unit PRBcs postop    #ELBERT on CKD stage IV 2/2 vancomycin  - Will continue to monitor renal fx    #Poorly controlled NIDDM2   -Pt with HbA1C 9.7  - c/w lantus and ISS  - Per previous attendting the pt is  refusing idea of insulin at home but amenable while healing at [facility]     #HTN   #HLD  -Pt with elevated BP  -Will increase Hydralazine dose   -Continue nifedepine  -Continue ASA and statin     #Mild recurrent MDD   -Will continue current medication    FEN/DVT prop/DISPO  -Continue current diet  - Continue DVT prop   -Pt is awaiting  [facility] pending authorization

## 2022-05-13 NOTE — PROGRESS NOTE ADULT - PROBLEM SELECTOR PLAN 1
Long history of right foot ulcer with recent offer to TMA at [facility] [place].  The physical exam is concerning of OM due to positive probe to bone in the setting of elevated ESR, CRP with high blood sugar levels. X-ray does not reveal subcutaneous emphysema. Imaging not revealing subcutaneous emphysema, and patient is now status post vancomycin and zosyn.  Podiatry following with cultures taken from wound site.    - 5/9: partial resection of 5th metatarsal with residual infected bone/tissue  - c/w Ertapenem & ampicillin for 6 weeks (6/16), pending PICC  - ID following, appreciate recs   - Tylenol for mild to moderate pain   - Dilaudid 0.25 for severe pain  -Renally dose Gabapentin to 100mg TID

## 2022-05-13 NOTE — PROCEDURE NOTE - NSTIMEOUT_GEN_A_CORE
Prescription eprescribed via computer as listed on medical record. Patient needs to make an appointment with physician before any additional refills.
Patient's first and last name, , procedure, and correct site confirmed prior to the start of procedure.
Patient's first and last name, , procedure, and correct site confirmed prior to the start of procedure.

## 2022-05-13 NOTE — PROCEDURE NOTE - NSSIZEINFR_GEN_A_CORE
Weeks 30 to 32 of Your Pregnancy: Care Instructions  Your Care Instructions    You have made it to the final months of your pregnancy. By now, your baby is really starting to look like a baby, with hair and plump skin. As you enter the final weeks of pregnancy, the reality of having a baby may start to set in. This is the time to settle on a name, get your household in order, set up a safe nursery, and find quality  if needed. Doing these things in advance will allow you to focus on caring for and enjoying your new baby. You may also want to have a tour of your hospital's labor and delivery unit to get a better idea of what to expect while you are in the hospital.  During these last months, it is very important to take good care of yourself and pay attention to what your body needs. If your doctor says it is okay for you to work, don't push yourself too hard. Use the tips provided in this care sheet to ease heartburn and care for varicose veins. If you haven't already had the Tdap shot during this pregnancy, talk to your doctor about getting it. It will help protect your  against pertussis infection. Follow-up care is a key part of your treatment and safety. Be sure to make and go to all appointments, and call your doctor if you are having problems. It's also a good idea to know your test results and keep a list of the medicines you take. How can you care for yourself at home? Pay attention to your baby's movements  · You should feel your baby move several times every day. · Your baby now turns less, and kicks and jabs more. · Your baby sleeps 20 to 45 minutes at a time and is more active at certain times of day. · If your doctor wants you to count your baby's kicks:  ? Empty your bladder, and lie on your side or relax in a comfortable chair. ? Write down your start time. ? Pay attention only to your baby's movements. Count any movement except hiccups. ?  After you have counted 10 movements, write down your stop time. ? Write down how many minutes it took for your baby to move 10 times. ? If an hour goes by and you have not recorded 10 movements, have something to eat or drink and then count for another hour. If you do not record 10 movements in either hour, call your doctor. Ease heartburn  · Eat small, frequent meals. · Do not eat chocolate, peppermint, or very spicy foods. Avoid drinks with caffeine, such as coffee, tea, and sodas. · Avoid bending over or lying down after meals. · Talk a short walk after you eat. · If heartburn is a problem at night, do not eat for 2 hours before bedtime. · Take antacids like Mylanta, Maalox, Rolaids, or Tums. Do not take antacids that have sodium bicarbonate. Care for varicose veins  · Varicose veins are blood vessels that stretch out with the extra blood during pregnancy. Your legs may ache or throb. Most varicose veins will go away after the birth. · Avoid standing for long periods of time. Sit with your legs crossed at the ankles, not the knees. · Sit with your feet propped up. · Avoid tight clothing or stockings. Wear support hose. · Exercise regularly. Try walking for at least 30 minutes a day. Where can you learn more? Go to http://jared-leia.info/. Enter V930 in the search box to learn more about \"Weeks 30 to 32 of Your Pregnancy: Care Instructions. \"  Current as of: September 5, 2018  Content Version: 11.9  © 9202-0136 [employer]. Care instructions adapted under license by [employer] (which disclaims liability or warranty for this information). If you have questions about a medical condition or this instruction, always ask your healthcare professional. [place] [address] any warranty or liability for your use of this information. 5

## 2022-05-13 NOTE — PROGRESS NOTE ADULT - SUBJECTIVE AND OBJECTIVE BOX
INTERVAL HPI/OVERNIGHT EVENTS:  As per night team, no overnight events. Patient seen and examined at bedside. Patient reports pain controlled, no other issues.     VITALS  Vital Signs Last 24 Hrs  T(C): 36.6 (13 May 2022 06:08), Max: 37 (12 May 2022 13:15)  T(F): 97.9 (13 May 2022 06:08), Max: 98.6 (12 May 2022 13:15)  HR: 68 (13 May 2022 06:08) (68 - 73)  BP: 169/68 (13 May 2022 06:08) (130/79 - 169/68)  BP(mean): --  RR: 19 (13 May 2022 06:08) (18 - 19)  SpO2: 95% (13 May 2022 06:08) (95% - 96%)    CAPILLARY BLOOD GLUCOSE      POCT Blood Glucose.: 104 mg/dL (13 May 2022 08:39)  POCT Blood Glucose.: 233 mg/dL (12 May 2022 21:42)  POCT Blood Glucose.: 265 mg/dL (12 May 2022 17:26)  POCT Blood Glucose.: 157 mg/dL (12 May 2022 12:27)      PHYSICAL EXAM  General: NAD, sitting comfortably in bed   HEENT: PERRL/ EOMI, no scleral icterus, MMM  Neck: Supple, no JVD  Respiratory: lungs CTA b/l, no wheezes/crackles, no accessory muscle use  Cardiovascular: Regular rhythm/rate; +S1 +S2, no murmurs  Gastrointestinal: Soft, NTND, normoactive BS, no rebound, no guarding  Extremities:  Amputation of the right 2 and 4 digit. R foot wrapped with gauze and ace bandage, not saturated, no blood. Able to move toes but with pain, sensation intact.   Neurological: A&Ox3, no gross focal deficits, follows commands      MEDICATIONS  (STANDING):  ampicillin  IVPB 2 Gram(s) IV Intermittent every 8 hours  aspirin enteric coated 81 milliGRAM(s) Oral daily  atorvastatin 40 milliGRAM(s) Oral at bedtime  buPROPion XL (24-Hour) . 150 milliGRAM(s) Oral daily  DULoxetine 20 milliGRAM(s) Oral daily  ertapenem  IVPB 500 milliGRAM(s) IV Intermittent every 24 hours  gabapentin 100 milliGRAM(s) Oral every 8 hours  heparin   Injectable 5000 Unit(s) SubCutaneous every 8 hours  hydrALAZINE 25 milliGRAM(s) Oral every 8 hours  insulin glargine Injectable (LANTUS) 10 Unit(s) SubCutaneous at bedtime  insulin lispro (ADMELOG) corrective regimen sliding scale   SubCutaneous Before meals and at bedtime  NIFEdipine XL 90 milliGRAM(s) Oral every 24 hours  polyethylene glycol 3350 17 Gram(s) Oral daily  senna 1 Tablet(s) Oral at bedtime    MEDICATIONS  (PRN):  acetaminophen     Tablet .. 650 milliGRAM(s) Oral every 6 hours PRN Mild Pain (1 - 3), Moderate Pain (4 - 6)  HYDROmorphone  Injectable 0.25 milliGRAM(s) IV Push every 6 hours PRN Severe Pain (7 - 10)  ondansetron Injectable 4 milliGRAM(s) IV Push every 6 hours PRN Nausea      No Known Allergies      LABS                        8.4    10.59 )-----------( 350      ( 13 May 2022 08:56 )             27.0     05-13    144  |  111<H>  |  34<H>  ----------------------------<  111<H>  4.7   |  21<L>  |  2.37<H>    Ca    8.8      13 May 2022 08:56  Phos  4.7     05-13  Mg     1.8     05-13                RADIOLOGY & ADDITIONAL TESTS: Reviewed

## 2022-05-13 NOTE — PROGRESS NOTE ADULT - ASSESSMENT
per Internal Medicine    72 year old female with a past medical history of multiple amputations of the second, third, and fourth digits of left foot and osteomyelitis of the right foot with right partial second and fourth toe ray amputation, chronic right foot wound and left foot plantar wound, DM, HTN, HLD, Depression who presents with to ED with concerns of osteomyelitis.       Problem/Plan - 1:  ·  Problem: Osteomyelitis.   ·  Plan: Long history of right foot ulcer with recent offer to [facility].  The physical exam is concerning of OM due to positive probe to bone in the setting of elevated ESR, CRP with high blood sugar levels. X-ray does not reveal subcutaneous emphysema. Imaging not revealing subcutaneous emphysema, and patient is now status post vancomycin and zosyn.  Podiatry following with cultures taken from wound site.    - 5/9: partial resection of 5th metatarsal with residual infected bone/tissue  - c/w Ertapenem & ampicillin for 6 weeks (6/16), pending PICC  - ID following, appreciate recs   - Tylenol for mild to moderate pain   - Dilaudid 0.25 for severe pain  -Renally dose Gabapentin to 100mg TID.    Problem/Plan - 2:  ·  Problem: ELBERT (acute kidney injury).   ·  Plan: Baseline creatinine 1.1 to 1.2. Now status post 1L of NS. Likely multifactorial worsening kidney function 2/2 comorbidities vs zosyn/vanc kidney injury   - Cr cont to downtrend  - Cont to trend.    Problem/Plan - 3:  ·  Problem: Anemia.   ·  Plan: No signs of alisia blood loss. Likely AOCD due to history of infection, however acute episode of bleeding from foot on 5/10, redressed by podiatry.   - Hb stable  - Likely i/s/o AOCD based on iron studies   - Active T&S.    Problem/Plan - 4:  ·  Problem: Diabetes mellitus.   ·  Plan: History of diabetes, last reported A1c of 11.9 in 2019, currently on Metformin at home.  Patient refused to take insulin in outpatint setting.  A1c 9.0%  -Lantus 20  -Sliding Scale   -Carb Consistent Diet.    Problem/Plan - 5:  ·  Problem: Hypertension.   ·  Plan: On Hydralzine 25mg  TID and Nifedipine 90mg at home.   -Continue Hydralazine 25mg TID  -Continue Nifedipine 90mg daily  - Monitor BP, may benefit from ACEi/ARB if continued elevated BP and if Cr. improves.    Problem/Plan - 6:  ·  Problem: Hyperlipidemia.   ·  Plan: History of HLD  - c/w atorvastatin 40mg  - c/w Aspirin 81mg.    Problem/Plan - 7:  ·  Problem: Depression, major.   ·  Plan: History of Depression not actively suicidal   - c/w Cymbalta to 40mg qd  - c/w wellbutrin 100 SR qd  - psych following, recs appreciated.    Problem/Plan - 8:  ·  Problem: Nutrition, metabolism, and development symptoms.   ·  Plan: F: None  N: Dash/TLC Carb Consistent   E: K>4 and Mg>2  DVT: Heparin  Dispo: RMF.

## 2022-05-13 NOTE — PROGRESS NOTE ADULT - PROBLEM SELECTOR PLAN 3
No signs of alisia blood loss. Likely AOCD due to history of infection, however acute episode of bleeding from foot on 5/10, redressed by podiatry.   - Hb stable  - Likely i/s/o AOCD based on iron studies   - Active T&S

## 2022-05-13 NOTE — PROCEDURE NOTE - NSPROCDETAILS_GEN_ALL_CORE
location identified, draped/prepped, sterile technique used/sterile dressing applied/sterile technique, catheter placed/ultrasound guidance
location identified, draped/prepped, sterile technique used/blood seen on insertion/dressing applied/flushes easily/secured in place/sterile technique, catheter placed

## 2022-05-13 NOTE — PROGRESS NOTE ADULT - ASSESSMENT
71 yo F pmhx severe depression, DM, HTN and chronic right foot wound; improved left foot plantar wound, p/w worsening right foot ulcer. Podiatry consulted r/o OM, gas. Of note, pt is afebrile, w/ WBC elevated to 12.2, ESR of <130, and CRP of 57.6. XR wet read positive for signs of cortical erosion and osteopenia of right 5th MT head and proximal phalanx base. With +PTB and XR changes, high suspicion for OM. MRI confirms OM of 5th MT. Wound Cx shows poly microbial growth. Of note, pt is now s/p partial resection of the Right 5th met head (5/9). Cx from bone growing numerous morganella and strep constellatus, moderate E faecalis, rare proteus mirabilis, beta hemolytic strep. Susceptibilities available. Path pending.     Plan:  - ABX per ID; agree w/ PICC for 6 weeks ABX course  - F/U pathology R 5th met head bone  - Local wound care: right foot packed with DSD and wrapped w/ ACE  - Wound vac placement today pending arrival of Vac  - pt awaiting dispo to [facility]  - Rest of care per primary team    Podiatry following. Plan d/w attending.    71 yo F pmhx severe depression, DM, HTN and chronic right foot wound; improved left foot plantar wound, p/w worsening right foot ulcer. Podiatry consulted r/o OM, gas. Of note, pt is afebrile, w/ WBC elevated to 12.2, ESR of <130, and CRP of 57.6. XR wet read positive for signs of cortical erosion and osteopenia of right 5th MT head and proximal phalanx base. With +PTB and XR changes, high suspicion for OM. MRI confirms OM of 5th MT. Wound Cx shows poly microbial growth. Of note, pt is now s/p partial resection of the Right 5th met head (5/9). Cx from bone growing numerous morganella and strep constellatus, moderate E faecalis, rare proteus mirabilis, beta hemolytic strep. Susceptibilities available. Path pending.     Plan:  - attempted application of wound Vac, was unable to get a full seal likely due to nature and location of wound lateral corner of foot, ranjit pad unable to completely enclose w/o gaps); will d/w attending alternative plan  - ABX per ID; agree w/ PICC for 6 weeks ABX course  - F/U pathology R 5th met head bone  - Local wound care: right foot packed with DSD and wrapped w/ ACE  - pt awaiting dispo to [facility]  - Rest of care per primary team    Podiatry following.

## 2022-05-13 NOTE — PROGRESS NOTE ADULT - SUBJECTIVE AND OBJECTIVE BOX
Patient is a 72y old  Female who presents with a chief complaint of Foot infection (11 May 2022 14:08)      INTERVAL HPI/ OVERNIGHT EVENTS: RASTA o/n. Pt has no new pedal complaints at this time. Awaiting wound vac application.      LABS                        8.4    10.59 )-----------( 350      ( 13 May 2022 08:56 )             27.0     05-13    144  |  111<H>  |  34<H>  ----------------------------<  111<H>  4.7   |  21<L>  |  2.37<H>    Ca    8.8      13 May 2022 08:56  Phos  4.7     05-13  Mg     1.8     05-13          ICU Vital Signs Last 24 Hrs  T(C): 36.6 (13 May 2022 06:08), Max: 37 (12 May 2022 13:15)  T(F): 97.9 (13 May 2022 06:08), Max: 98.6 (12 May 2022 13:15)  HR: 68 (13 May 2022 06:08) (68 - 73)  BP: 169/68 (13 May 2022 06:08) (130/79 - 169/68)  BP(mean): --  ABP: --  ABP(mean): --  RR: 19 (13 May 2022 06:08) (18 - 19)  SpO2: 95% (13 May 2022 06:08) (95% - 96%)      RADIOLOGY    MICROBIOLOGY    PHYSICAL EXAM  Lower Extremity Focused  Lower Extremity Focused  Vasc: 2/4 DP + PT b/l. CFT < 3 sec. Edema present to Right foot  Neuro: Protective sensation diminished  MSK: MMT 5/5 strength. Pt has digit amputation of right 2nd and 3rd digits   -Pt is s/p right 5th MT head resection w/ debridement and wash out

## 2022-05-13 NOTE — PROCEDURE NOTE - NSPOSTCAREGUIDE_GEN_A_CORE
Verbal/written post procedure instructions were given to patient/caregiver/Instructed patient/caregiver regarding signs and symptoms of infection/Keep the cast/splint/dressing clean and dry/Care for catheter as per unit/ICU protocols
Verbal/written post procedure instructions were given to patient/caregiver/Keep the cast/splint/dressing clean and dry/Care for catheter as per unit/ICU protocols

## 2022-05-13 NOTE — CONSULT NOTE ADULT - SUBJECTIVE AND OBJECTIVE BOX
Patient is a 72y old  Female who presents with a chief complaint of r/o OM (07 May 2022 10:34)        HPI:  72 year old female with a past medical history of multiple amputation of the second, third, and fourth digits of left foot and osteomyelitis of the right foot with right partial second and fourth toe ray amputation, chronic right foot wound and left foot plantar wound, DM, HTN, HLD, Depression,  who presented to the ED with a chief compliant of, "I want surgery on my right foot."  The patient has been in her usual state of health before coming to the hospital.  She states she has seen multiple physicians since her last admission to [facility].  She has been to [facility] where she had surgical intervention for toe wounds. For the past five weeks she has been following up with wound care at [facility].  During those vists the chronic right foot wound was be managed by vascular surgeons.  She has been unhappy with the care she received at [facility], [place] because she states that her wound was not getting better.  [facility] offered the patient a TMA procedure of the right foot.  She was scheduled to get the procedure this past Monday, she arrived to the hospital and then left because she had second thoughts about the amputation.  She states that she has had a good experience with [facility] in the past so she decided to come to the ED for a surgical evaluation.  She went on to further state that since getting wound care she had increasing amount of pain in her right foot.  She has not noticed any erythema at that site, but noticed slight, "sticky drainage," from the wound.  She has not been experiencing any fever or night sweats, but has chills.     ED Course:   Vitals: Afebrile, HR 76-91, -188/, RR 16, SPO2 98% RA   Labs: WBC: 12.2, Hgb: 8.9, Cr: 2.36, CRP 57.6,   EKG: NSR  Imaging: XR Foot bilateral no subcutaneous emphysema noted   Intervention: Vascular Consult - No acute surgical intervention, Vancomycin 1000mg, Zoysn 3.375grams, morphine 6mg, 5 units of regular insulin, 1L NS  (07 May 2022 00:13)      PAST MEDICAL & SURGICAL HISTORY:  Asthma  Asthma    Depressive disorder  Depression    Hyperlipidemia  HLD (hyperlipidemia)    Type 2 diabetes mellitus  DM (diabetes mellitus)    Essential hypertension  HTN (hypertension)    Local infection of skin and subcutaneous tissue  Toe infection    Type 2 diabetes mellitus with neurological manifestations  Neuropathy in diabetes    Cytomegalovirus infection not present  No significant past surgical history        MEDICATIONS  (STANDING):  aspirin enteric coated 81 milliGRAM(s) Oral daily  atorvastatin 40 milliGRAM(s) Oral at bedtime  DULoxetine 60 milliGRAM(s) Oral daily  gabapentin 100 milliGRAM(s) Oral every 8 hours  hydrALAZINE 25 milliGRAM(s) Oral every 8 hours  insulin glargine Injectable (LANTUS) 10 Unit(s) SubCutaneous at bedtime  insulin lispro (ADMELOG) corrective regimen sliding scale   SubCutaneous every 6 hours  lactated ringers. 1000 milliLiter(s) (90 mL/Hr) IV Continuous <Continuous>  NIFEdipine XL 90 milliGRAM(s) Oral every 24 hours  piperacillin/tazobactam IVPB.. 2.25 Gram(s) IV Intermittent every 6 hours    MEDICATIONS  (PRN):  acetaminophen     Tablet .. 650 milliGRAM(s) Oral every 6 hours PRN Mild Pain (1 - 3), Moderate Pain (4 - 6)  HYDROmorphone  Injectable 0.25 milliGRAM(s) IV Push daily PRN Severe Pain (7 - 10)      FAMILY HISTORY:  Family history of diabetes mellitus  Family history of diabetes mellitus (DM)      CBC Full  -  ( 09 May 2022 07:25 )  WBC Count : 9.38 K/uL  RBC Count : 2.81 M/uL  Hemoglobin : 7.4 g/dL  Hematocrit : 24.1 %  Platelet Count - Automated : 313 K/uL  Mean Cell Volume : 85.8 fl  Mean Cell Hemoglobin : 26.3 pg  Mean Cell Hemoglobin Concentration : 30.7 gm/dL  Auto Neutrophil # : 5.40 K/uL  Auto Lymphocyte # : 2.90 K/uL  Auto Monocyte # : 0.70 K/uL  Auto Eosinophil # : 0.29 K/uL  Auto Basophil # : 0.05 K/uL  Auto Neutrophil % : 57.6 %  Auto Lymphocyte % : 30.9 %  Auto Monocyte % : 7.5 %  Auto Eosinophil % : 3.1 %  Auto Basophil % : 0.5 %      05-09    139  |  106  |  43<H>  ----------------------------<  139<H>  4.0   |  19<L>  |  2.81<H>    Ca    8.5      09 May 2022 07:25  Phos  6.8     05-09  Mg     2.2     05-09    TPro  6.1  /  Alb  2.6<L>  /  TBili  <0.2  /  DBili  x   /  AST  8<L>  /  ALT  9<L>  /  AlkPhos  85  05-09            Radiology:    < from: MR Foot No Cont, Right (05.08.22 @ 12:04) >  ACC: [id number] EXAM:  MR FOOT RT                          PROCEDURE DATE:  05/08/2022          INTERPRETATION:  MR FOOT RIGHT    HISTORY: Right foot infected wound, ulcer. Concern for osteomyelitis.   Prior amputations.Concern for 5th metatarsal phalangeal joint   osteoarthritis.    TECHNIQUE:  Multiplanar MRI of the right forefoot was performed without   contrast using 6 sequences.    COMPARISON:  Bilateral foot x-rays dated 5/6/2022 and 12/29/2019    FINDINGS:    OSSEOUS STRUCTURES    Marrow:Osseous destruction is again seen involving the 5th   metatarsophalangeal joint with marrow edema and T1 marrow replacement   extending throughout the metatarsal diaphysis and proximal phalanx.   Plantar soft tissue wound is present along the 5th metatarsophalangeal   joint. Changes also involve the 5th distal phalanx with adjacent dorsal   fluid collection measuring up to 0.7 cm.  Faint marrow edema is noted within the 4th through 1st metatarsal   diaphysis. Faint edema is noted within the cuboid.    Fractures: There is healed fracture deformity of the 2nd metatarsal   neck. Healed deformity or remodeling of the 1st metatarsal base is noted.    Postoperative Changes: Prior amputations of the 2nd and 3rd metatarsal   heads are again seen.    INTERPHALANGEAL JOINTS    Articular Surfaces:  Preserved.    Effusions/Synovitis:  None.    1ST METATARSOPHALANGEAL JOINT    Articular Surfaces:  Preserved.    Effusions/Synovitis:  None.    Sesamoids:  Intact.    LESSER METATARSOPHALANGEAL JOINTS    Articular Surfaces:  There is osseous destruction of the 5th metatarsal   phalangeal joint.    Effusions/Synovitis:  Small effusion and synovitis involves the 5th   metatarsal phalangeal joint.    TARSOMETATARSAL JOINTS    Articular Surfaces:  1st metatarsophalangeal joint demonstrates   remodeling or chronic healed fracture deformity of the metatarsal base   with subchondral irregularity, cartilage loss and irregularity, small   osteophytes, and subchondral reactive change. Slight subchondral cysts   involve the 2nd and 3rd tarsometatarsal joints.    Effusions/Synovitis:  None.    INTERTARSAL JOINTS    Articular Surfaces:  There are a few tiny scattered interphalangeal   joint osteophytes.    Effusions/Synovitis:  None.    VISUALIZED ANKLE/HINDFOOT JOINTS    Articular Surfaces:  There are subchondral cysts of the superomedial   talar dome with overlying chondromalacia    Effusions/Synovitis:  A borderline small tibiotalar and posterior   subtalar joint effusions are noted posteriorly.    INTERMETATARSAL SPACES    Neuromas:  None.    Bursa:  None.    LISFRANC LIGAMENT  Intact.    TENDONS    Flexor Tendons:  Intact with alteration from the prior amputations.    Extensor Tendons:  Intact with alteration from the prior amputations.    DISTAL PLANTAR FASCIA  There is mild thickening and intermediate signal change of the central   cord reflecting mild plantar fasciitis.    SOFT TISSUES    Musculature:  Moderate generalized muscle atrophy is present with   increased T2 signal likely reflecting diabetic neuropathy.    Subcutaneous Tissues:  Moderate subcutaneous edema is present. Plantar   soft tissue wound is noted at the 1st metatarsophalangeal joint. 0.7 cm   fluid collection is seen along the dorsum of the 1st distal phalanx   concerning for small abscess.    IMPRESSION:  1. Osseous destruction of the 5th metatarsophalangeal joint again seen   with adjacent soft tissue wound reflecting a septic joint with   osteomyelitis of the 5th metatarsal and proximal phalanx.  2. Small abscess appears to be present within the dorsal soft tissues   along the 5th distal phalanx with underlying osteomyelitis of the distal   phalanx.  3. Faint marrow edema of the 4th through 1st metatarsal diaphyses and   cuboid may be reactive or stress related. Early infection cannot be   absolutely excluded.                Vital Signs Last 24 Hrs  T(C): 36.5 (09 May 2022 06:21), Max: 36.5 (09 May 2022 06:21)  T(F): 97.7 (09 May 2022 06:21), Max: 97.7 (09 May 2022 06:21)  HR: 64 (09 May 2022 06:21) (53 - 69)  BP: 148/71 (09 May 2022 06:21) (113/53 - 151/72)  BP(mean): --  RR: 18 (09 May 2022 06:21) (18 - 18)  SpO2: 94% (09 May 2022 06:21) (94% - 97%)        REVIEW OF SYSTEMS:      CONSTITUTIONAL: No fever, weight loss, or fatigue  EYES: No eye pain, visual disturbances, or discharge  ENMT:  No difficulty hearing, tinnitus, vertigo; No sinus or throat pain  NECK: No pain or stiffness  BREASTS: No pain, masses, or nipple discharge  RESPIRATORY: No cough, wheezing, chills or hemoptysis; No shortness of breath  CARDIOVASCULAR: No chest pain, palpitations, dizziness, or leg swelling  GASTROINTESTINAL: No abdominal or epigastric pain. No nausea, vomiting, or hematemesis; No diarrhea or constipation. No melena or hematochezia.  GENITOURINARY: No dysuria, frequency, hematuria, or incontinence  NEUROLOGICAL: No headaches, memory loss, loss of strength, numbness, or tremors  SKIN: No itching, burning, rashes, or lesions   LYMPH NODES: No enlarged glands  ENDOCRINE: No heat or cold intolerance; No hair loss  MUSCULOSKELETAL: No joint pain or swelling; No muscle, back, or extremity pain  PSYCHIATRIC: No depression, anxiety, mood swings, or difficulty sleeping  HEME/LYMPH: No easy bruising, or bleeding gums  ALLERGY AND IMMUNOLOGIC: No hives or eczema  VASCULAR:  per HPI        Physical Exam:  on dropletion, 73 yo  woman lying in semi Eh's position, awake, alert,  no acute complaints    Head: normocephalic, atraumatic    Eyes: PERRLA, EOMI, no nystagmus, sclera anicteric    ENT: nasal discharge, uvula midline, no oropharyngeal erythema/exudate    Neck: supple, negative JVD, negative carotid bruits, no thyromegaly    Chest: CTA bilaterally, neg wheeze/ rhonchi/ rales/ crackles/ egophany    Cardiovascular: regular rate and rhythm, neg murmurs/rubs/gallops    Abdomen: soft, non distended, non tender to palpation in all 4 quadrants, negative rebound/guarding, normal bowel sounds    Extremities:  bilateral foot/ankle dressings      Neurologic Exam:    Alert and oriented x 3     Motor Exam:    Right UE:           no focal weakness,  > 3+/5 throughout                             Left UE:             no focal weakness,  > 3+/5 throughout      Right LE:            no focal weakness,  > 3+/5 throughout    Left LE:              no focal weakness,  > 3+/5 throughout    Sensation:         intact to light touch x 4 extremities                                 DTR:                     biceps/brachioradialis: equal                                              patella/ankle: equal       Gait:  not tested              PM&R Impression:    1)  OA R foot    Recommendations/ Plan :    1) Physical / Occupational therapy focusing on therapeutic exercises, bed mobility/transfer out of bed evaluation, progressive ambulation with assistive devices prn.    2) Anticipated Disposition Plan/Recs  :  pending functional progress                  
Attending:    Patient is a 72y old  Female who presents with a chief complaint of     HPI:  71 yo F pmhx severe depression, DM, HTN and chronic right foot wound; improved left foot plantar wound, p/w worsening right foot ulcer. Patient reports that she has had the ulcer for over a year now and that it has not been improving. She was seeing a podiatrist at [facility], however she didn't want to see him anymore because he was recc a TMA and she feels her toes are good doesn't want it. Pt reports she tries to buy gauze/dressings however notes it is expensive and she is unable to perform regular dressing changes. Pt denies feeling febrile or N/V/SOB/CP at this time. Denies any other pedal complaints at this time.     Review of systems negative except per HPI and as stated below  General:	 no weakness; no fevers, no chills  Skin/Breast: no rash  Respiratory and Thorax: no SOB, no cough  Cardiovascular:	No chest pain  Gastrointestinal:	 no nausea, vomiting , diarrhea  Genitourinary:	no dysuria, no difficulty urinating, no hematuria  Musculoskeletal:	no weakness, no joint swelling/pain  Neurological:	no focal weakness/numbness  Endocrine:	no polyuria, no polydipsia    PAST MEDICAL & SURGICAL HISTORY:  Asthma  Asthma    Depressive disorder  Depression    Hyperlipidemia  HLD (hyperlipidemia)    Type 2 diabetes mellitus  DM (diabetes mellitus)    Essential hypertension  HTN (hypertension)    Local infection of skin and subcutaneous tissue  Toe infection    Type 2 diabetes mellitus with neurological manifestations  Neuropathy in diabetes    Cytomegalovirus infection not present  No significant past surgical history      Home Medications:  aluminum hydroxide-magnesium hydroxide 200 mg-200 mg/5 mL oral suspension: 30 milliliter(s) orally every 6 hours, As needed, Dyspepsia (09 Jan 2020 11:22)  aspirin 81 mg oral tablet: 1 tab(s) orally once a day (31 Dec 2019 12:35)  atorvastatin 40 mg oral tablet: 1 tab(s) orally once a day (at bedtime) (31 Dec 2019 12:35)  cefepime 2 g intravenous injection: 2 gram(s) intravenous every 12 hours (09 Jan 2020 11:22)  DULoxetine 60 mg oral delayed release capsule: 1 cap(s) orally 2 times a day (31 Dec 2019 12:35)  gabapentin 300 mg oral tablet: 1 tab(s) orally 3 times a day (31 Dec 2019 12:35)  HumaLOG KwikPen 200 units/mL (Concentrated) subcutaneous solution: 7 unit(s) subcutaneous 3 times a day (before meals) (09 Jan 2020 11:22)  hydrALAZINE 25 mg oral tablet: 1 tab(s) orally 3 times a day (09 Jan 2020 11:22)  insulin glargine: 17 unit(s) subcutaneous once a day (at bedtime) (09 Jan 2020 11:22)  melatonin 3 mg oral tablet: 1 tab(s) orally once a day (at bedtime) (09 Jan 2020 11:22)  metroNIDAZOLE 500 mg oral tablet: 1 tab(s) orally every 8 hours (09 Jan 2020 11:22)  NIFEdipine 90 mg oral tablet, extended release: 1 tab(s) orally once a day (31 Dec 2019 12:35)  pantoprazole 40 mg oral delayed release tablet: 1 tab(s) orally once a day (before a meal) (09 Jan 2020 11:22)    Allergies    No Known Allergies    Intolerances      FAMILY HISTORY:  Family history of diabetes mellitus  Family history of diabetes mellitus (DM)      Social History:       LABS                        8.9    12.24 )-----------( 379      ( 06 May 2022 21:51 )             27.6     05-06    138  |  104  |  39<H>  ----------------------------<  476<HH>  4.8   |  21<L>  |  2.36<H>    Ca    8.7      06 May 2022 21:51    TPro  6.9  /  Alb  3.0<L>  /  TBili  <0.2  /  DBili  x   /  AST  12  /  ALT  13  /  AlkPhos  114  05-06        Vital Signs Last 24 Hrs  T(C): 36.5 (06 May 2022 23:26), Max: 36.6 (06 May 2022 20:34)  T(F): 97.7 (06 May 2022 23:26), Max: 97.9 (06 May 2022 20:34)  HR: 76 (06 May 2022 23:26) (76 - 91)  BP: 188/86 (06 May 2022 23:26) (180/112 - 188/86)  BP(mean): --  RR: 16 (06 May 2022 23:26) (16 - 16)  SpO2: 97% (06 May 2022 23:26) (97% - 98%)    PHYSICAL EXAM  General: NAD, AA0x3    Lower Extremity Focused:  Vasc: 2/4 DP + PT b/l. CFT < 3 sec. Edema present to Right foot  Derm: Chambers grade 3 ulcer present to lateral aspect of right 5th MT head. +PTB. Positive for malodor, tunneling medially, w/ seropurulent drainage.   Neuro: Protective sensation diminished  MSK: MMT 5/5 strength. Pt has digit amputation of right 2nd and 3rd digits     RADIOLOGY  XR right foot wet read: Cortical erosion and osteopenia consistent w/ OM.                     
INFECTIOUS DISEASES INITIAL CONSULT NOTE    HPI:  72 year old female with a past medical history of multiple amputation of the second, third, and fourth digits of left foot and osteomyelitis of the right foot with right partial second and fourth toe ray amputation, chronic right foot wound and left foot plantar wound, DM, HTN, HLD, Depression,  who presented to the ED with a chief compliant of, "I want surgery on my right foot."  The patient has been in her usual state of health before coming to the hospital.  She states she has seen multiple physicians since her last admission to [facility].  She has been to [facility] where she had surgical intervention for toe wounds. For the past five weeks she has been following up with wound care at [facility], [place], operated by [facility].  During those vists the chronic right foot wound was be managed by vascular surgeons.  She has been unhappy with the care she received at [facility] because she states that her wound was not getting better.  [facility] offered the patient a TMA procedure of the right foot.  She was scheduled to get the procedure this past Monday, she arrived to the hospital and then left because she had second thoughts about the amputation.  She states that she has had a good experience with [facility] in the past so she decided to come to the ED for a surgical evaluation.  She went on to further state that since getting wound care she had increasing amount of pain in her right foot.  She has not noticed any erythema at that site, but noticed slight, "sticky drainage," from the wound.  She has not been experiencing any fever or night sweats, but has chills.     ED Course:   Vitals: Afebrile, HR 76-91, -188/, RR 16, SPO2 98% RA   Labs: WBC: 12.2, Hgb: 8.9, Cr: 2.36, CRP 57.6,   EKG: NSR  Imaging: XR Foot bilateral no subcutaneous emphysema noted   Intervention: Vascular Consult - No acute surgical intervention, Vancomycin 1000mg, Zoysn 3.375grams, morphine 6mg, 5 units of regular insulin, 1L NS  (07 May 2022 00:13)      ID INTERVAL HPI: Patient seen at bedside. Reports mild discomfort along surgical site of the right foot. Otherwise denies fevers, chills, cough, abdominal pain, diarrhea.    PAST MEDICAL & SURGICAL HISTORY:  Asthma  Asthma      Depressive disorder  Depression      Hyperlipidemia  HLD (hyperlipidemia)      Type 2 diabetes mellitus  DM (diabetes mellitus)      Essential hypertension  HTN (hypertension)      Local infection of skin and subcutaneous tissue  Toe infection      Type 2 diabetes mellitus with neurological manifestations  Neuropathy in diabetes      Cytomegalovirus infection not present  No significant past surgical history          Review of Systems:   Constitutional, eyes, ENT, cardiovascular, respiratory, gastrointestinal, genitourinary, integumentary, neurological, psychiatric and heme/lymph are otherwise negative other than noted above       ANTIBIOTICS:  MEDICATIONS  (STANDING):  ampicillin  IVPB 2 Gram(s) IV Intermittent every 8 hours  aspirin enteric coated 81 milliGRAM(s) Oral daily  atorvastatin 40 milliGRAM(s) Oral at bedtime  buPROPion SR (12-Hour) 100 milliGRAM(s) Oral daily  DULoxetine 40 milliGRAM(s) Oral every 24 hours  ertapenem  IVPB 500 milliGRAM(s) IV Intermittent every 24 hours  gabapentin 100 milliGRAM(s) Oral every 8 hours  heparin   Injectable 5000 Unit(s) SubCutaneous every 8 hours  hydrALAZINE 25 milliGRAM(s) Oral every 8 hours  insulin glargine Injectable (LANTUS) 10 Unit(s) SubCutaneous at bedtime  insulin lispro (ADMELOG) corrective regimen sliding scale   SubCutaneous Before meals and at bedtime  lactated ringers. 1000 milliLiter(s) (90 mL/Hr) IV Continuous <Continuous>  NIFEdipine XL 90 milliGRAM(s) Oral every 24 hours  polyethylene glycol 3350 17 Gram(s) Oral daily  senna 1 Tablet(s) Oral at bedtime    MEDICATIONS  (PRN):  acetaminophen     Tablet .. 650 milliGRAM(s) Oral every 6 hours PRN Mild Pain (1 - 3), Moderate Pain (4 - 6)  HYDROmorphone  Injectable 0.25 milliGRAM(s) IV Push every 6 hours PRN Severe Pain (7 - 10)  ondansetron Injectable 4 milliGRAM(s) IV Push every 6 hours PRN Nausea      Allergies    No Known Allergies    Intolerances        SOCIAL HISTORY:    FAMILY HISTORY:  Family history of diabetes mellitus  Family history of diabetes mellitus (DM)     no FH leading to current infection    Vital Signs Last 24 Hrs  T(C): 36.7 (11 May 2022 10:40), Max: 36.7 (11 May 2022 10:40)  T(F): 98.1 (11 May 2022 10:40), Max: 98.1 (11 May 2022 10:40)  HR: 65 (11 May 2022 13:40) (65 - 78)  BP: 133/70 (11 May 2022 13:40) (124/68 - 172/77)  BP(mean): --  RR: 18 (11 May 2022 13:40) (18 - 19)  SpO2: 99% (11 May 2022 13:40) (91% - 99%)      PHYSICAL EXAM  Constitutional: alert, NAD  Eyes: the sclera and conjunctiva were normal.   ENT: the ears and nose were normal in appearance.   Neck: the appearance of the neck was normal and the neck was supple.   Pulmonary: no respiratory distress and lungs CTA bilaterally.   Heart: heart rate was normal and rhythm regular, normal S1 and S2  Vascular: no peripheral edema  Extremities: s/p digit amputation of right 2nd and 3rd digits. R foot wrapped with gauze and ace bandage, not saturated, no blood.   Abdomen: normal bowel sounds, soft, non-tender  Neurological: no focal deficits  Psychiatric: the affect was normal      LABS:                        6.9    8.94  )-----------( 342      ( 11 May 2022 05:51 )             22.6     05-11    140  |  110<H>  |  42<H>  ----------------------------<  111<H>  4.1   |  19<L>  |  2.62<H>    Ca    8.1<L>      11 May 2022 05:51  Phos  5.8     05-11  Mg     2.0     05-11            MICROBIOLOGY:    Culture - Fungal, Tissue (collected 10 May 2022 02:06)  Source: .Tissue 5th metatarsal bone superficial or spec  Preliminary Report (10 May 2022 08:21):    Testing in progress    Culture - Fungal, Tissue (collected 10 May 2022 02:05)  Source: .Tissue 5th metatarsal bone deep or spec  Preliminary Report (10 May 2022 15:14):    Testing in progress    Culture - Tissue with Gram Stain (collected 09 May 2022 21:20)  Source: .Tissue 5th metatarsal bone deep or spec  Gram Stain (09 May 2022 23:55):    No WBC's seen.    No organisms seen  Preliminary Report (11 May 2022 14:01):    Rare Proteus mirabilis    Rare Beta Hemolytic Streptococci, Group C    Few Streptococcus constellatus    See previous culture for susceptibility  Organism: Proteus mirabilis (11 May 2022 11:34)  Organism: Proteus mirabilis (11 May 2022 11:34)      -  Ampicillin: S <=8 These ampicillin results predict results for amoxicillin      -  Ampicillin/Sulbactam: S <=4/2 Enterobacter, Klebsiella aerogenes, Citrobacter, and Serratia may develop resistance during prolonged therapy (3-4 days)      -  Cefazolin: S <=2 Enterobacter, Klebsiella aerogenes, Citrobacter, and Serratia may develop resistance during prolonged therapy (3-4 days)      -  Cefepime: S <=2      -  Ceftriaxone: S <=1 Enterobacter, Klebsiella aerogenes, Citrobacter, and Serratia may develop resistance during prolonged therapy      -  Ciprofloxacin: S <=0.25      -  Ertapenem: S <=0.5      -  Gentamicin: S <=2      -  Piperacillin/Tazobactam: S <=8      -  Tobramycin: S <=2      -  Trimethoprim/Sulfamethoxazole: S <=0.5/9.5      Method Type: DIANE    Culture - Tissue with Gram Stain (collected 09 May 2022 21:19)  Source: .Tissue 5th metatarsal bone superficial or spec  Gram Stain (09 May 2022 23:44):    Moderate White blood cells    Moderate Gram Positive Cocci  Preliminary Report (11 May 2022 13:47):    Numerous Morganella morganii    Numerous Streptococcus constellatus    Few Beta Hemolytic Streptococci, Group G Susceptibility to follow.    Rare Bacteroides ovatus group ... Beta lactamase positive  Organism: Morganella morganii  Streptococcus constellatus  Streptococcus constellatus (11 May 2022 09:27)  Organism: Morganella morganii (11 May 2022 09:27)      -  Ampicillin: R >16 These ampicillin results predict results for amoxicillin      -  Ampicillin/Sulbactam: I 16/8 Enterobacter, Klebsiella aerogenes, Citrobacter, and Serratia may develop resistance during prolonged therapy (3-4 days)      -  Cefazolin: R >16 Enterobacter, Klebsiella aerogenes, Citrobacter, and Serratia may develop resistance during prolonged therapy (3-4 days)      -  Cefepime: S 8      -  Ceftriaxone: R 32 Enterobacter, Klebsiella aerogenes, Citrobacter, and Serratia may develop resistance during prolonged therapy      -  Ciprofloxacin: R >2      -  Ertapenem: S <=0.5      -  Gentamicin: S <=2      -  Meropenem: S <=1      -  Piperacillin/Tazobactam: R >64      -  Tobramycin: S 4      -  Trimethoprim/Sulfamethoxazole: R >2/38      Method Type: DIANE  Organism: Streptococcus constellatus (11 May 2022 09:20)      -  Levofloxacin: S 0.38      -  Penicillin: S 0.064      Method Type: ETEST  Organism: Streptococcus constellatus (11 May 2022 09:18)      -  Clindamycin: S      -  Erythromycin: S      -  Vancomycin: S      Method Type: KB    Culture - Other (collected 07 May 2022 12:12)  Source: Wound Wound  Gram Stain (07 May 2022 16:40):    Few-moderate Gram positive cocci in pairs    Rare WBC's  Final Report (10 May 2022 14:39):    Rare Morganella morganii    Few Proteus mirabilis    Rare Enterococcus faecalis    Rare Streptococcus dysgalactiae (Group C/G)    Rare Streptococcus constellatus    Culture grew 3 or more types of organisms which indicate collection    contamination; consider recollection only if clinically indicated.  Organism: Enterococcus faecalis  Enterococcus species (10 May 2022 14:39)  Organism: Enterococcus species (10 May 2022 14:39)      Method Type: DIANE  Organism: Enterococcus faecalis (10 May 2022 14:39)      -  Ampicillin: S <=2 Predicts results to ampicillin/sulbactam, amoxacillin-clavulanate and  piperacillin-tazobactam.      -  Vancomycin: S 1      Method Type: DIANE    Culture - Blood (collected 07 May 2022 12:05)  Source: .Blood Blood-Peripheral  Preliminary Report (11 May 2022 13:00):    No growth at 4 days.    Culture - Blood (collected 07 May 2022 12:05)  Source: .Blood Blood-Peripheral  Preliminary Report (11 May 2022 13:00):    No growth at 4 days.        RADIOLOGY & ADDITIONAL STUDIES: Reviewed.  
VASCULAR SURGERY CONSULT NOTE    Vascular Attending: Dr. Hutchinson    HPI: Ms. George is a 72yF with hx of severe depression, DM and bilateral diabetic foot wounds presenting to the ED with cc of worsening R foot wound. Patient reports that she has had the ulcer for over a year now and that it has not been improving. Reports that she has not had great wound care. Reports the wound has been draining pus-like discharge. Denies any sensations of subjective fevers or chills at home. Denies any rest pain or claudication symptoms.    OBJECTIVE    ICU Vital Signs Last 24 Hrs  T(C): 36.5 (06 May 2022 23:26), Max: 36.6 (06 May 2022 20:34)  T(F): 97.7 (06 May 2022 23:26), Max: 97.9 (06 May 2022 20:34)  HR: 76 (06 May 2022 23:26) (76 - 91)  BP: 188/86 (06 May 2022 23:26) (180/112 - 188/86)  BP(mean): --  ABP: --  ABP(mean): --  RR: 16 (06 May 2022 23:26) (16 - 16)  SpO2: 97% (06 May 2022 23:26) (97% - 98%)    EXAM  Constitutional: Pleasant, conversational woman  Cardiac: NSR  Respiratory: Equal and bilateral chest rise.  Abdomen: Soft, NT, ND  Extremities: RLE with 2nd and 3rd well healed prior amputations. R plantar aspect of foot with [id number] in diameter shallow diabetic ulcer which probes to bone. Foul smell appreciated. No erythema. Left 1st-5th toes with well healed amputations. 2nd-4th toes on left side with shallow emacerated skin ulceration. No signs of any ischemic ulcerations or alisia necrosis.  Vascular: 2+ DP/PT pulses    LABS                          8.9    12.24 )-----------( 379      ( 06 May 2022 21:51 )             27.6     ASSESSMENT  Ms. George is a 72yF with hx of severe depression, DM and bilateral diabetic foot wounds presenting to the ED with cc of worsening R foot wound. Labs demonstrating leukocytosis of 12k and exam notable for open diabetic wound that probes to bone with palpable pedal pulses.    PLAN  1. No acute vascular surgical intervention indicated at this time.  2. Recommend antibiotics for treatment of probable osteomyelitis. Appreciate podiatry recs for wound care and debridement if indicated.    Merced Oakes MD PGY2  Vascular Surgery Resident 
Vascular Access Service Consult Note    72yFemaleHEAL ISSUES - PROBLEM Dx:  Diabetes mellitus    Hypertension    Hyperlipidemia    Depression, major    ELBERT (acute kidney injury)    Nutrition, metabolism, and development symptoms    Anemia    Osteomyelitis               Diagnosis: osteomyelitis     Indications for Vascular Access (Check all that apply)  [X  ]  Antibiotic Therapy       Antibiotic Prescribed: ampicillin and ertapenem x 6 weeks                                                                               [  ]  Total Parenteral Nutrition  [  ]  Chemotherapy  [  ]  Difficult Venous Access  [  ]  CVP monitoring  [  ]  Medications with high potential for tissue necrosis on extravasation  [  ]  Other    Screening (Check all that apply)  Previous Radiation to chest  [  ] Yes      [X  ]  No  Breast Cancer                          [  ] Left     [  ]  Right    [X  ]  No  Lymph Node Dissection         [  ] Left     [  ]  Right    [X  ]  No  Pacemaker or ICD                   [  ] Left     [  ]  Right    [ X ]  No  Upper Extremity DVT             [  ] Left     [  ]  Right    [  X]  No  Chronic Kidney Disease         [  ]  Yes     [X  ]  No  Hemodialysis                           [  ]  Yes     [X  ]  No  AV Fistula/ Graft                     [  ]  Left    [  ]  Right    [X  ]  No  Temp>101F in past 24 H       [  ]  Yes     [ X ]  No  H/O PICC/Midline                   [  ]  Yes     [ X ]  No    Lab data:                        8.8    8.66  )-----------( 360      ( 12 May 2022 07:15 )             28.0     05-12    142  |  112<H>  |  35<H>  ----------------------------<  137<H>  3.7   |  19<L>  |  2.42<H>    Ca    8.3<L>      12 May 2022 07:15  Phos  4.7     05-12  Mg     2.0     05-12                I have reviewed the chart, interviewed and examined the patient and determined that this patient:  [ X ] Is a candidate for a PICC line  [  ] Is a candidate for a Midline  [  ] Is not a candidate for vascular access device (reason)    Lumens:    [  ] Single  [ X ] Double

## 2022-05-13 NOTE — PROGRESS NOTE ADULT - SUBJECTIVE AND OBJECTIVE BOX
Physical Medicine and Rehabilitation Progress Note:    Patient is a 72y old  Female who presents with a chief complaint of Foot infection (11 May 2022 14:08)      HPI:  72 year old female with a past medical history of multiple amputation of the second, third, and fourth digits of left foot and osteomyelitis of the right foot with right partial second and fourth toe ray amputation, chronic right foot wound and left foot plantar wound, DM, HTN, HLD, Depression,  who presented to the ED with a chief compliant of, "I want surgery on my right foot."  The patient has been in her usual state of health before coming to the hospital.  She states she has seen multiple physicians since her last admission to [facility].  She has been to [facility] where she had surgical intervention for toe wounds. For the past five weeks she has been following up with wound care at [facility].  During those vists the chronic right foot wound was be managed by vascular surgeons.  She has been unhappy with the care she received at [facility] because she states that her wound was not getting better.  [facility] offered the patient a TMA procedure of the right foot.  She was scheduled to get the procedure this past Monday, she arrived to the hospital and then left because she had second thoughts about the amputation.  She states that she has had a good experience with [facility] in the past so she decided to come to the ED for a surgical evaluation.  She went on to further state that since getting wound care she had increasing amount of pain in her right foot.  She has not noticed any erythema at that site, but noticed slight, "sticky drainage," from the wound.  She has not been experiencing any fever or night sweats, but has chills.     ED Course:   Vitals: Afebrile, HR 76-91, -188/, RR 16, SPO2 98% RA   Labs: WBC: 12.2, Hgb: 8.9, Cr: 2.36, CRP 57.6,   EKG: NSR  Imaging: XR Foot bilateral no subcutaneous emphysema noted   Intervention: Vascular Consult - No acute surgical intervention, Vancomycin 1000mg, Zoysn 3.375grams, morphine 6mg, 5 units of regular insulin, 1L NS  (07 May 2022 00:13)                            8.4    10.59 )-----------( 350      ( 13 May 2022 08:56 )             27.0       05-13    144  |  111<H>  |  34<H>  ----------------------------<  111<H>  4.7   |  21<L>  |  2.37<H>    Ca    8.8      13 May 2022 08:56  Phos  4.7     05-13  Mg     1.8     05-13      Vital Signs Last 24 Hrs  T(C): 36.6 (13 May 2022 06:08), Max: 37 (12 May 2022 13:15)  T(F): 97.9 (13 May 2022 06:08), Max: 98.6 (12 May 2022 13:15)  HR: 68 (13 May 2022 06:08) (68 - 73)  BP: 169/68 (13 May 2022 06:08) (130/79 - 169/68)  BP(mean): --  RR: 19 (13 May 2022 06:08) (18 - 19)  SpO2: 95% (13 May 2022 06:08) (95% - 96%)    MEDICATIONS  (STANDING):  ampicillin  IVPB 2 Gram(s) IV Intermittent every 8 hours  aspirin enteric coated 81 milliGRAM(s) Oral daily  atorvastatin 40 milliGRAM(s) Oral at bedtime  buPROPion XL (24-Hour) . 150 milliGRAM(s) Oral daily  DULoxetine 20 milliGRAM(s) Oral daily  ertapenem  IVPB 500 milliGRAM(s) IV Intermittent every 24 hours  gabapentin 100 milliGRAM(s) Oral every 8 hours  heparin   Injectable 5000 Unit(s) SubCutaneous every 8 hours  hydrALAZINE 25 milliGRAM(s) Oral every 8 hours  insulin glargine Injectable (LANTUS) 10 Unit(s) SubCutaneous at bedtime  insulin lispro (ADMELOG) corrective regimen sliding scale   SubCutaneous Before meals and at bedtime  NIFEdipine XL 90 milliGRAM(s) Oral every 24 hours  polyethylene glycol 3350 17 Gram(s) Oral daily  senna 1 Tablet(s) Oral at bedtime    MEDICATIONS  (PRN):  acetaminophen     Tablet .. 650 milliGRAM(s) Oral every 6 hours PRN Mild Pain (1 - 3), Moderate Pain (4 - 6)  HYDROmorphone  Injectable 0.25 milliGRAM(s) IV Push every 6 hours PRN Severe Pain (7 - 10)  ondansetron Injectable 4 milliGRAM(s) IV Push every 6 hours PRN Nausea    Currently Undergoing Physical/ Occupational Therapy at bedside.    PT/OT Functional Status Assessment:   5/12/2022    Cognitive/Neuro/Behavioral  Cognitive/Neuro/Behavioral [WDL Definition: Alert; opens eyes spontaneously; arouses to voice or touch; oriented x 4; follows commands; speech spontaneous, logical; purposeful motor response; behavior appropriate to situation]: WDL    Language Assistance  Preferred Language to Address Healthcare Preferred Language to Address Healthcare: English    Therapeutic Interventions      Sit-Stand Transfer Training  Transfer Training Sit-to-Stand Transfer: contact guard;  verbal cues;  nonverbal cues (demo/gestures);  1 person assist;  weight-bearing as tolerated   rolling walker  Transfer Training Stand-to-Sit Transfer: contact guard;  verbal cues;  nonverbal cues (demo/gestures);  1 person assist;  weight-bearing as tolerated   rolling walker  Sit-to-Stand Transfer Training Transfer Safety Analysis: decreased balance;  decreased weight-shifting ability;  decreased flexibility;  decreased ROM;  decreased strength;  impaired balance;  impaired postural control;  pain;  rolling walker    Gait Training  Gait Training: verbal cues;  nonverbal cues (demo/gestures);  1 person assist;  supervision;  weight-bearing as tolerated   rolling walker;  20ft x 2  Gait Analysis: 3-point gait   decreased cornelius;  crouch;  decreased hip/knee flexion;  decreased step length;  decreased stride length;  decreased toe clearance;  decreased flexibility;  decreased strength;  impaired balance;  impaired postural control;  pain;  20ftx2;  rolling walker    Therapeutic Exercise  Therapeutic Exercise Detail: Seated Therex: LAQ,Ankle pumps, Glute sets, Hip flexion, x10             PM&R Impression: as above    Current Disposition Plan:       ANT

## 2022-05-14 LAB
ANION GAP SERPL CALC-SCNC: 10 MMOL/L — SIGNIFICANT CHANGE UP (ref 5–17)
BASOPHILS # BLD AUTO: 0.03 K/UL — SIGNIFICANT CHANGE UP (ref 0–0.2)
BASOPHILS NFR BLD AUTO: 0.3 % — SIGNIFICANT CHANGE UP (ref 0–2)
BUN SERPL-MCNC: 33 MG/DL — HIGH (ref 7–23)
CALCIUM SERPL-MCNC: 8.6 MG/DL — SIGNIFICANT CHANGE UP (ref 8.4–10.5)
CHLORIDE SERPL-SCNC: 112 MMOL/L — HIGH (ref 96–108)
CO2 SERPL-SCNC: 20 MMOL/L — LOW (ref 22–31)
CREAT SERPL-MCNC: 2.32 MG/DL — HIGH (ref 0.5–1.3)
EGFR: 22 ML/MIN/1.73M2 — LOW
EOSINOPHIL # BLD AUTO: 0.34 K/UL — SIGNIFICANT CHANGE UP (ref 0–0.5)
EOSINOPHIL NFR BLD AUTO: 3.4 % — SIGNIFICANT CHANGE UP (ref 0–6)
GLUCOSE BLDC GLUCOMTR-MCNC: 121 MG/DL — HIGH (ref 70–99)
GLUCOSE BLDC GLUCOMTR-MCNC: 121 MG/DL — HIGH (ref 70–99)
GLUCOSE BLDC GLUCOMTR-MCNC: 163 MG/DL — HIGH (ref 70–99)
GLUCOSE BLDC GLUCOMTR-MCNC: 186 MG/DL — HIGH (ref 70–99)
GLUCOSE SERPL-MCNC: 117 MG/DL — HIGH (ref 70–99)
HCT VFR BLD CALC: 25.5 % — LOW (ref 34.5–45)
HGB BLD-MCNC: 7.9 G/DL — LOW (ref 11.5–15.5)
IMM GRANULOCYTES NFR BLD AUTO: 0.5 % — SIGNIFICANT CHANGE UP (ref 0–1.5)
LYMPHOCYTES # BLD AUTO: 3.39 K/UL — HIGH (ref 1–3.3)
LYMPHOCYTES # BLD AUTO: 33.8 % — SIGNIFICANT CHANGE UP (ref 13–44)
MAGNESIUM SERPL-MCNC: 1.6 MG/DL — SIGNIFICANT CHANGE UP (ref 1.6–2.6)
MCHC RBC-ENTMCNC: 27.7 PG — SIGNIFICANT CHANGE UP (ref 27–34)
MCHC RBC-ENTMCNC: 31 GM/DL — LOW (ref 32–36)
MCV RBC AUTO: 89.5 FL — SIGNIFICANT CHANGE UP (ref 80–100)
MONOCYTES # BLD AUTO: 0.8 K/UL — SIGNIFICANT CHANGE UP (ref 0–0.9)
MONOCYTES NFR BLD AUTO: 8 % — SIGNIFICANT CHANGE UP (ref 2–14)
NEUTROPHILS # BLD AUTO: 5.41 K/UL — SIGNIFICANT CHANGE UP (ref 1.8–7.4)
NEUTROPHILS NFR BLD AUTO: 54 % — SIGNIFICANT CHANGE UP (ref 43–77)
NRBC # BLD: 0 /100 WBCS — SIGNIFICANT CHANGE UP (ref 0–0)
PHOSPHATE SERPL-MCNC: 5.3 MG/DL — HIGH (ref 2.5–4.5)
PLATELET # BLD AUTO: 336 K/UL — SIGNIFICANT CHANGE UP (ref 150–400)
POTASSIUM SERPL-MCNC: 4.4 MMOL/L — SIGNIFICANT CHANGE UP (ref 3.5–5.3)
POTASSIUM SERPL-SCNC: 4.4 MMOL/L — SIGNIFICANT CHANGE UP (ref 3.5–5.3)
RBC # BLD: 2.85 M/UL — LOW (ref 3.8–5.2)
RBC # FLD: 14.6 % — HIGH (ref 10.3–14.5)
SODIUM SERPL-SCNC: 142 MMOL/L — SIGNIFICANT CHANGE UP (ref 135–145)
WBC # BLD: 10.02 K/UL — SIGNIFICANT CHANGE UP (ref 3.8–10.5)
WBC # FLD AUTO: 10.02 K/UL — SIGNIFICANT CHANGE UP (ref 3.8–10.5)

## 2022-05-14 PROCEDURE — 99233 SBSQ HOSP IP/OBS HIGH 50: CPT | Mod: GC

## 2022-05-14 RX ORDER — HYDRALAZINE HCL 50 MG
50 TABLET ORAL EVERY 8 HOURS
Refills: 0 | Status: DISCONTINUED | OUTPATIENT
Start: 2022-05-14 | End: 2022-05-14

## 2022-05-14 RX ORDER — INSULIN LISPRO 100/ML
2 VIAL (ML) SUBCUTANEOUS
Refills: 0 | Status: DISCONTINUED | OUTPATIENT
Start: 2022-05-14 | End: 2022-05-15

## 2022-05-14 RX ORDER — MAGNESIUM SULFATE 500 MG/ML
2 VIAL (ML) INJECTION ONCE
Refills: 0 | Status: COMPLETED | OUTPATIENT
Start: 2022-05-14 | End: 2022-05-14

## 2022-05-14 RX ORDER — HYDRALAZINE HCL 50 MG
50 TABLET ORAL EVERY 8 HOURS
Refills: 0 | Status: DISCONTINUED | OUTPATIENT
Start: 2022-05-14 | End: 2022-05-15

## 2022-05-14 RX ORDER — LANOLIN ALCOHOL/MO/W.PET/CERES
3 CREAM (GRAM) TOPICAL ONCE
Refills: 0 | Status: COMPLETED | OUTPATIENT
Start: 2022-05-14 | End: 2022-05-14

## 2022-05-14 RX ADMIN — Medication 3 MILLIGRAM(S): at 23:12

## 2022-05-14 RX ADMIN — BUPROPION HYDROCHLORIDE 150 MILLIGRAM(S): 150 TABLET, EXTENDED RELEASE ORAL at 13:09

## 2022-05-14 RX ADMIN — Medication 216 GRAM(S): at 14:30

## 2022-05-14 RX ADMIN — Medication 37.5 MILLIGRAM(S): at 05:41

## 2022-05-14 RX ADMIN — HEPARIN SODIUM 5000 UNIT(S): 5000 INJECTION INTRAVENOUS; SUBCUTANEOUS at 14:30

## 2022-05-14 RX ADMIN — Medication 2 UNIT(S): at 12:34

## 2022-05-14 RX ADMIN — DULOXETINE HYDROCHLORIDE 20 MILLIGRAM(S): 30 CAPSULE, DELAYED RELEASE ORAL at 13:09

## 2022-05-14 RX ADMIN — Medication 50 MILLIGRAM(S): at 13:09

## 2022-05-14 RX ADMIN — CHLORHEXIDINE GLUCONATE 1 APPLICATION(S): 213 SOLUTION TOPICAL at 05:43

## 2022-05-14 RX ADMIN — Medication 2: at 12:34

## 2022-05-14 RX ADMIN — Medication 50 MILLIGRAM(S): at 22:22

## 2022-05-14 RX ADMIN — ERTAPENEM SODIUM 100 MILLIGRAM(S): 1 INJECTION, POWDER, LYOPHILIZED, FOR SOLUTION INTRAMUSCULAR; INTRAVENOUS at 17:06

## 2022-05-14 RX ADMIN — POLYETHYLENE GLYCOL 3350 17 GRAM(S): 17 POWDER, FOR SOLUTION ORAL at 13:10

## 2022-05-14 RX ADMIN — SENNA PLUS 1 TABLET(S): 8.6 TABLET ORAL at 22:21

## 2022-05-14 RX ADMIN — Medication 2 UNIT(S): at 17:22

## 2022-05-14 RX ADMIN — GABAPENTIN 100 MILLIGRAM(S): 400 CAPSULE ORAL at 05:41

## 2022-05-14 RX ADMIN — Medication 81 MILLIGRAM(S): at 13:09

## 2022-05-14 RX ADMIN — Medication 216 GRAM(S): at 05:41

## 2022-05-14 RX ADMIN — Medication 216 GRAM(S): at 22:25

## 2022-05-14 RX ADMIN — Medication 25 GRAM(S): at 13:07

## 2022-05-14 RX ADMIN — HEPARIN SODIUM 5000 UNIT(S): 5000 INJECTION INTRAVENOUS; SUBCUTANEOUS at 05:41

## 2022-05-14 RX ADMIN — Medication 90 MILLIGRAM(S): at 09:07

## 2022-05-14 RX ADMIN — HEPARIN SODIUM 5000 UNIT(S): 5000 INJECTION INTRAVENOUS; SUBCUTANEOUS at 22:25

## 2022-05-14 RX ADMIN — ATORVASTATIN CALCIUM 40 MILLIGRAM(S): 80 TABLET, FILM COATED ORAL at 22:21

## 2022-05-14 RX ADMIN — GABAPENTIN 100 MILLIGRAM(S): 400 CAPSULE ORAL at 13:10

## 2022-05-14 RX ADMIN — GABAPENTIN 100 MILLIGRAM(S): 400 CAPSULE ORAL at 22:21

## 2022-05-14 RX ADMIN — Medication 2: at 22:23

## 2022-05-14 RX ADMIN — INSULIN GLARGINE 10 UNIT(S): 100 INJECTION, SOLUTION SUBCUTANEOUS at 22:22

## 2022-05-14 NOTE — PROGRESS NOTE ADULT - ASSESSMENT
73 yo F pmhx severe depression, DM, HTN and chronic right foot wound; improved left foot plantar wound, p/w worsening right foot ulcer. Podiatry consulted r/o OM, gas. Of note, pt is afebrile, w/ WBC elevated to 12.2, ESR of <130, and CRP of 57.6. XR wet read positive for signs of cortical erosion and osteopenia of right 5th MT head and proximal phalanx base. With +PTB and XR changes, high suspicion for OM. MRI confirms OM of 5th MT. Wound Cx shows poly microbial growth. Of note, pt is now s/p partial resection of the Right 5th met head (5/9). Cx from bone growing numerous morganella and strep constellatus, moderate E faecalis, rare proteus mirabilis, beta hemolytic strep. Susceptibilities available. Path pending.     Plan:  - ABX per ID; agree w/ PICC for 6 weeks ABX course  - F/U pathology R 5th met head bone-Pending  - Local wound care: Wet to dry dressing and wrapped w/ ACE  - pt awaiting dispo to [facility]  - Rest of care per primary team  - Pt will need wound vac applied at [facility]    Wound vac instructions: Black granular foam cut to size of wound, Flush wound with saline, Pat dry with guaze, apply betadine to wound borders if macerated, apply black foam piece cut to size of wound to wound base only, Apply clear strips from granular package until airtight seal has been achieved, cut dime sized hole in clear seal over the foam, Stick on wound vac hose to cover hole completely, Turn machine on to 125mmg at continuous flow. Once seal is checked by machine cover with kerlix and ace bandage. Please change wound vacuum every other day.     Podiatry following.

## 2022-05-14 NOTE — PROGRESS NOTE ADULT - NS ATTEST RISK PROBLEM GEN_ALL_CORE FT
#DM foot infection  #Osteomyelitis of 5th toe s/p partial resection on 5/9 by podiatry  #Poorly controlled NIDDM2   #ELBERT on CKD stage IV 2/2 vancomycin  #HTN   #Mild recurrent MDD   #HLD
#DM foot infection  #Osteomyelitis of 5th toe s/p partial resection on 5/9 by podiatry  #Poorly controlled NIDDM2   #ELBERT on CKD stage IV 2/2 vancomycin  #HTN   #Mild recurrent MDD   #HLD

## 2022-05-14 NOTE — PROGRESS NOTE ADULT - PROBLEM SELECTOR PROBLEM 2
ELBERT (acute kidney injury)

## 2022-05-14 NOTE — PROGRESS NOTE ADULT - SUBJECTIVE AND OBJECTIVE BOX
Internal Medicine Progress Note  Kerry Aly, PGY-1  Pager: [phone]    OVERNIGHT EVENTS/INTERVAL HPI: Patient used 8U ISS in past 24hr. Patient reports pain in foot is minimal. Denies HA, CP, SOB, abd pain, n/v/d/c, urinary issues.    OBJECTIVE:  Vital Signs Last 24 Hrs  T(C): 36.7 (14 May 2022 06:13), Max: 36.7 (14 May 2022 06:13)  T(F): 98.1 (14 May 2022 06:13), Max: 98.1 (14 May 2022 06:13)  HR: 68 (14 May 2022 09:05) (68 - 73)  BP: 168/83 (14 May 2022 09:05) (149/78 - 169/70)  BP(mean): --  RR: 18 (14 May 2022 09:05) (18 - 19)  SpO2: 99% (14 May 2022 09:05) (95% - 99%)  I&O's Detail    Physical Exam:  GENERAL: Awake, alert and interactive, no acute distress  NEURO: No focal deficits  HEENT: Normocephalic, atraumatic, no conjunctivitis or scleral icterus, MMM  NECK: Supple  CARDIAC: Regular rate and rhythm, +S1/S2, no murmurs/rubs/gallops  PULM: Breathing comfortably on RA, clear to auscultation bilaterally, no wheezes/rales/rhonchi  ABDOMEN: Soft, nontender, nondistended  MSK: Amputation of the right 2 and 4 digit. R foot wrapped with gauze and ace bandage with minimal blood noted  SKIN: Warm and dry  VASC: WWP, no edema, no LE tenderness  Psych: Low mood, but hopeful    Medications:  MEDICATIONS  (STANDING):  ampicillin  IVPB 2 Gram(s) IV Intermittent every 8 hours  aspirin enteric coated 81 milliGRAM(s) Oral daily  atorvastatin 40 milliGRAM(s) Oral at bedtime  buPROPion XL (24-Hour) . 150 milliGRAM(s) Oral daily  chlorhexidine 2% Cloths 1 Application(s) Topical <User Schedule>  DULoxetine 20 milliGRAM(s) Oral daily  ertapenem  IVPB 500 milliGRAM(s) IV Intermittent every 24 hours  gabapentin 100 milliGRAM(s) Oral every 8 hours  heparin   Injectable 5000 Unit(s) SubCutaneous every 8 hours  hydrALAZINE 50 milliGRAM(s) Oral every 8 hours  insulin glargine Injectable (LANTUS) 10 Unit(s) SubCutaneous at bedtime  insulin lispro (ADMELOG) corrective regimen sliding scale   SubCutaneous Before meals and at bedtime  insulin lispro Injectable (ADMELOG) 2 Unit(s) SubCutaneous three times a day before meals  magnesium sulfate  IVPB 2 Gram(s) IV Intermittent once  NIFEdipine XL 90 milliGRAM(s) Oral every 24 hours  polyethylene glycol 3350 17 Gram(s) Oral daily  senna 1 Tablet(s) Oral at bedtime    MEDICATIONS  (PRN):  acetaminophen     Tablet .. 650 milliGRAM(s) Oral every 6 hours PRN Mild Pain (1 - 3), Moderate Pain (4 - 6)  sodium chloride 0.9% lock flush 10 milliLiter(s) IV Push every 1 hour PRN Pre/post blood products, medications, blood draw, and to maintain line patency      Labs:                        7.9    10.02 )-----------( 336      ( 14 May 2022 08:37 )             25.5     05-14    142  |  112<H>  |  33<H>  ----------------------------<  117<H>  4.4   |  20<L>  |  2.32<H>    Ca    8.6      14 May 2022 08:38  Phos  5.3     05-14  Mg     1.6     05-14            COVID-19 PCR: NotDetec (13 May 2022 07:20)  COVID-19 PCR: NotDetec (08 May 2022 15:06)  COVID-19 PCR: Negative (06 May 2022 21:51)      Radiology: Reviewed

## 2022-05-14 NOTE — PROGRESS NOTE ADULT - PROBLEM SELECTOR PLAN 3
No signs of alisia blood loss. Likely AOCD due to history of infection, however acute episode of bleeding from foot on 5/10, redressed by podiatry. s/p 1u 5/11  - Hb slightly downtrending  - Likely i/s/o AOCD based on iron studies   - Active T&S - ordered for AM

## 2022-05-14 NOTE — PROGRESS NOTE ADULT - PROBLEM SELECTOR PROBLEM 1
Diabetic ulcer of right foot
Osteomyelitis

## 2022-05-14 NOTE — PROGRESS NOTE ADULT - ATTENDING COMMENTS
Patient was seen and examined at bedside. Case discuss with resident. Pt reports that she is feeling well.     PHYSICAL EXAM:  Gen: Reclining in bed at time of exam, appears stated age  CV: RRR, +S1/S2  Pulm: CTA b/l no wheezing or crackles   Abd: soft, NTND    LABS:                        8.4    10.59 )-----------( 350      ( 13 May 2022 08:56 )             27.0     05-13    144  |  111<H>  |  34<H>  ----------------------------<  111<H>  4.7   |  21<L>  |  2.37<H>    Ca    8.8      13 May 2022 08:56  Phos  4.7     05-13  Mg     1.8     05-13    A/P: 71yo woman with a PMH of HTN, HLD, poorly controlled NIDDM2 (not open to insulin) c/b neuropathy, DM foot infections and ulcers, as well as mild recurrent MDD whofound t ohave  R toe osteomyelitis s/p partial resection 5/9 by podiatry.      #DM foot infection  #Osteomyelitis of 5th toe s/p partial resection on 5/9 by podiatry   - Per ID recommendation the pt will be on  ampicillin 2g IV q8h plus ertapenem 500mg IV q24h  through 6/16/22      #ELBERT on CKD stage IV 2/2 vancomycin  - Will continue to monitor renal fx    #Poorly controlled NIDDM2   -Pt with HbA1C 9.7  -Pt with eelvated FS   - c/w lantus and ISS  -Will start Lispro 2 units TIDAC and uptitrate if needed   - Per previous attendting the pt is  refusing idea of insulin at home but amenable while healing at [facility]     #HTN   #HLD  -Pt with elevated BP  -Will increase Hydralazine to 50mg TID  -Continue nifedepine  -Continue ASA and statin     #Mild recurrent MDD   -Will continue current medication    FEN/DVT prop/DISPO  -Continue current diet  - Continue DVT prop   -Pt is likley for [facility] in the AM Patient was seen and examined at bedside. Case discuss with resident. Pt reports that she is feeling well.     PHYSICAL EXAM:  Gen: Reclining in bed at time of exam, appears stated age  CV: RRR, +S1/S2  Pulm: CTA b/l no wheezing or crackles   Abd: soft, NTND    LABS:                        8.4    10.59 )-----------( 350      ( 13 May 2022 08:56 )             27.0     05-13    144  |  111<H>  |  34<H>  ----------------------------<  111<H>  4.7   |  21<L>  |  2.37<H>    Ca    8.8      13 May 2022 08:56  Phos  4.7     05-13  Mg     1.8     05-13    A/P: 73yo woman with a PMH of HTN, HLD, poorly controlled NIDDM2 (not open to insulin) c/b neuropathy, DM foot infections and ulcers, as well as mild recurrent MDD whofound t ohave  R toe osteomyelitis s/p partial resection 5/9 by podiatry.      #DM foot infection  #Osteomyelitis of 5th toe s/p partial resection on 5/9 by podiatry   - Per ID recommendation the pt will be on  ampicillin 2g IV q8h plus ertapenem 500mg IV q24h  through 6/16/22  -Podiatry f/u appreciated and per podiatry the pt will need the wound vac at [facility] and instructions are documented in their note.       #ELBERT on CKD stage IV 2/2 vancomycin  - Will continue to monitor renal fx    #Poorly controlled NIDDM2   -Pt with HbA1C 9.7  -Pt with eelvated FS   - c/w lantus and ISS  -Will start Lispro 2 units TIDAC and uptitrate if needed   - Per previous attendting the pt is  refusing idea of insulin at home but amenable while healing at [facility]     #HTN   #HLD  -Pt with elevated BP  -Will increase Hydralazine to 50mg TID  -Continue nifedepine  -Continue ASA and statin     #Mild recurrent MDD   -Will continue current medication    FEN/DVT prop/DISPO  -Continue current diet  - Continue DVT prop   -Pt is likley for [facility] in the AM

## 2022-05-14 NOTE — PROGRESS NOTE ADULT - PROBLEM SELECTOR PLAN 8
F: None  N: Dash/TLC Carb Consistent   E: K>4 and Mg>2  DVT: Heparin  Dispo: RMF
F: None  N: Dash/TLC Carb Consistent   E: K>4 and Mg>2  DVT: Heparin  Dispo: RMF
F: None  N: Dash/TLC Carb Consistent   E: K>4 and Mg>2  DVT: Heparin  Dispo: ANT likely tomorrow if FS and BP improved.
F: None  N: Dash/TLC Carb Consistent   E: K>4 and Mg>2  DVT: Heparin  Dispo: RMF

## 2022-05-14 NOTE — PROGRESS NOTE ADULT - ASSESSMENT
72 year old female with a past medical history of multiple amputations of the second, third, and fourth digits of left foot and osteomyelitis of the right foot with right partial second and fourth toe ray amputation, chronic right foot wound and left foot plantar wound, DM, HTN, HLD, Depression who presented to ED with concerns of osteomyelitis now s/p resection with podiatry, pending ANT.

## 2022-05-14 NOTE — PROGRESS NOTE ADULT - PROBLEM SELECTOR PLAN 5
On Hydralzine 25mg  TID and Nifedipine 90mg at home.   -Continue Hydralazine 25mg TID  -Continue Nifedipine 90mg daily  - Monitor BP, may benefit from ACEi/ARB if continued elevated BP and if Cr. improves.
Takes Hydralzine 25mg  TID and Nifedipine 90mg at home.   -increased hydralazine to 50mg TID on 5/13, increasing to 50mg TID today (5/14)  -Continue Nifedipine 90mg daily  - Monitor BP, may benefit from ACEi/ARB if continued elevated BP and if Cr. improves.
On Hydralzine 25mg  TID and Nifedipine 90mg at home.   -Continue Hydralazine 25mg TID  -Continue Nifedipine 90mg daily  - Monitor BP, may benefit from ACEi/ARB if continued elevated BP and if Cr. improves.

## 2022-05-14 NOTE — PROGRESS NOTE ADULT - SUBJECTIVE AND OBJECTIVE BOX
Patient is a 72y old  Female who presents with a chief complaint of Foot infection (11 May 2022 14:08)      INTERVAL HPI/ OVERNIGHT EVENTS: Pt seen and evaluated bedside this AM. Pt has no pedal complaints at this time and denies any N/V/F/C/SOB. Pts dressing is C/D/I.       LABS                        7.9    10.02 )-----------( 336      ( 14 May 2022 08:37 )             25.5     05-14    142  |  112<H>  |  33<H>  ----------------------------<  117<H>  4.4   |  20<L>  |  2.32<H>    Ca    8.6      14 May 2022 08:38  Phos  5.3     05-14  Mg     1.6     05-14          ICU Vital Signs Last 24 Hrs  T(C): 36.7 (14 May 2022 06:13), Max: 36.7 (14 May 2022 06:13)  T(F): 98.1 (14 May 2022 06:13), Max: 98.1 (14 May 2022 06:13)  HR: 68 (14 May 2022 09:05) (68 - 73)  BP: 168/83 (14 May 2022 09:05) (149/78 - 169/70)  BP(mean): --  ABP: --  ABP(mean): --  RR: 18 (14 May 2022 09:05) (18 - 19)  SpO2: 99% (14 May 2022 09:05) (95% - 99%)      RADIOLOGY    MICROBIOLOGY    PHYSICAL EXAM  Lower Extremity Focused  Vasc: 2/4 DP + PT b/l. CFT < 3 sec. Edema present to Right foot  Neuro: Protective sensation diminished  MSK: MMT 5/5 strength. Pt has digit amputation of right 2nd and 3rd digits   DERM: s/p right 5th MT head resection w/ debridement and wash out, Surgical wound has granular base, no purulence expressed upon palpation no surrounding erythema, no skin necrosis.

## 2022-05-14 NOTE — PROGRESS NOTE ADULT - PROBLEM SELECTOR PLAN 4
No signs of alisia blood loss. 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  - Likely i/s/o AOCD based on iron studies   - monitor   - Transfuse <7   - Active T&S
History of diabetes, last reported A1c of 11.9 in 2019, currently on Metformin at home.  Patient refused to take insulin in outpatint setting.  A1c 9.0%  -Lantus 20  -Sliding Scale   -Carb Consistent Diet
No signs of alisia blood loss. 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  - Likely i/s/o AOCD based on iron studies   - monitor   - Transfuse <7   - Active T&S
History of diabetes, last reported A1c of 11.9 in 2019, currently on Metformin at home.  Patient refused to take insulin in outpatint setting.  A1c 9.0%  -Lantus 20  -Sliding Scale   -Carb Consistent Diet
History of diabetes, last reported A1c of 11.9 in 2019, currently on Metformin at home.  Patient refused to take insulin in outpatient setting.  A1c 9.0%. Using 8-12 units per day on ISS.  -Lantus 10 at night (was decreased from 20 on day of OR and not increased as sugars were stable)  - restart premeal insulin with 2U qac  -Sliding Scale   -Carb Consistent Diet
History of diabetes, last reported A1c of 11.9 in 2019, currently on Metformin at home.  Patient refused to take insulin in outpatint setting.  A1c 9.0%  -Lantus 20  -Lispro 5  -Sliding Scale   -Carb Consistent Diet
No signs of alisia blood loss. 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  - Likely i/s/o AOCD based on iron studies   - monitor   - Transfuse <7   - Active T&S

## 2022-05-14 NOTE — PROGRESS NOTE ADULT - PROBLEM SELECTOR PLAN 1
Long history of right foot ulcer with recent offer to TMA at [facility], [place], operated by [facility].  The physical exam is concerning of OM due to positive probe to bone in the setting of elevated ESR, CRP with high blood sugar levels. X-ray does not reveal subcutaneous emphysema. Imaging not revealing subcutaneous emphysema, and patient is now status post vancomycin and zosyn.  Podiatry following with cultures taken from wound site.    - 5/9: partial resection of 5th metatarsal with residual infected bone/tissue  - c/w Ertapenem & ampicillin for 6 weeks (6/16), PICC placed 5/13  - ID following, appreciate recs   - Tylenol for mild to moderate pain   -Renally dose Gabapentin to 100mg TID

## 2022-05-15 ENCOUNTER — TRANSCRIPTION ENCOUNTER (OUTPATIENT)
Age: 72
End: 2022-05-15

## 2022-05-15 VITALS
HEART RATE: 71 BPM | DIASTOLIC BLOOD PRESSURE: 72 MMHG | SYSTOLIC BLOOD PRESSURE: 178 MMHG | RESPIRATION RATE: 18 BRPM | OXYGEN SATURATION: 94 %

## 2022-05-15 LAB
ALBUMIN SERPL ELPH-MCNC: 3 G/DL — LOW (ref 3.3–5)
ALP SERPL-CCNC: 85 U/L — SIGNIFICANT CHANGE UP (ref 40–120)
ALT FLD-CCNC: 16 U/L — SIGNIFICANT CHANGE UP (ref 10–45)
ANION GAP SERPL CALC-SCNC: 12 MMOL/L — SIGNIFICANT CHANGE UP (ref 5–17)
AST SERPL-CCNC: 17 U/L — SIGNIFICANT CHANGE UP (ref 10–40)
BASOPHILS # BLD AUTO: 0.04 K/UL — SIGNIFICANT CHANGE UP (ref 0–0.2)
BASOPHILS NFR BLD AUTO: 0.4 % — SIGNIFICANT CHANGE UP (ref 0–2)
BILIRUB SERPL-MCNC: <0.2 MG/DL — SIGNIFICANT CHANGE UP (ref 0.2–1.2)
BLD GP AB SCN SERPL QL: NEGATIVE — SIGNIFICANT CHANGE UP
BUN SERPL-MCNC: 33 MG/DL — HIGH (ref 7–23)
CALCIUM SERPL-MCNC: 8.6 MG/DL — SIGNIFICANT CHANGE UP (ref 8.4–10.5)
CHLORIDE SERPL-SCNC: 112 MMOL/L — HIGH (ref 96–108)
CO2 SERPL-SCNC: 21 MMOL/L — LOW (ref 22–31)
CREAT SERPL-MCNC: 2.51 MG/DL — HIGH (ref 0.5–1.3)
EGFR: 20 ML/MIN/1.73M2 — LOW
EOSINOPHIL # BLD AUTO: 0.34 K/UL — SIGNIFICANT CHANGE UP (ref 0–0.5)
EOSINOPHIL NFR BLD AUTO: 3.5 % — SIGNIFICANT CHANGE UP (ref 0–6)
GLUCOSE BLDC GLUCOMTR-MCNC: 119 MG/DL — HIGH (ref 70–99)
GLUCOSE BLDC GLUCOMTR-MCNC: 81 MG/DL — SIGNIFICANT CHANGE UP (ref 70–99)
GLUCOSE SERPL-MCNC: 88 MG/DL — SIGNIFICANT CHANGE UP (ref 70–99)
HCT VFR BLD CALC: 25.7 % — LOW (ref 34.5–45)
HGB BLD-MCNC: 8 G/DL — LOW (ref 11.5–15.5)
IMM GRANULOCYTES NFR BLD AUTO: 0.5 % — SIGNIFICANT CHANGE UP (ref 0–1.5)
LYMPHOCYTES # BLD AUTO: 3.05 K/UL — SIGNIFICANT CHANGE UP (ref 1–3.3)
LYMPHOCYTES # BLD AUTO: 31.8 % — SIGNIFICANT CHANGE UP (ref 13–44)
MAGNESIUM SERPL-MCNC: 1.6 MG/DL — SIGNIFICANT CHANGE UP (ref 1.6–2.6)
MCHC RBC-ENTMCNC: 27.3 PG — SIGNIFICANT CHANGE UP (ref 27–34)
MCHC RBC-ENTMCNC: 31.1 GM/DL — LOW (ref 32–36)
MCV RBC AUTO: 87.7 FL — SIGNIFICANT CHANGE UP (ref 80–100)
MONOCYTES # BLD AUTO: 0.74 K/UL — SIGNIFICANT CHANGE UP (ref 0–0.9)
MONOCYTES NFR BLD AUTO: 7.7 % — SIGNIFICANT CHANGE UP (ref 2–14)
NEUTROPHILS # BLD AUTO: 5.37 K/UL — SIGNIFICANT CHANGE UP (ref 1.8–7.4)
NEUTROPHILS NFR BLD AUTO: 56.1 % — SIGNIFICANT CHANGE UP (ref 43–77)
NRBC # BLD: 0 /100 WBCS — SIGNIFICANT CHANGE UP (ref 0–0)
PHOSPHATE SERPL-MCNC: 6.5 MG/DL — HIGH (ref 2.5–4.5)
PLATELET # BLD AUTO: 321 K/UL — SIGNIFICANT CHANGE UP (ref 150–400)
POTASSIUM SERPL-MCNC: 4.6 MMOL/L — SIGNIFICANT CHANGE UP (ref 3.5–5.3)
POTASSIUM SERPL-SCNC: 4.6 MMOL/L — SIGNIFICANT CHANGE UP (ref 3.5–5.3)
PROT SERPL-MCNC: 6.1 G/DL — SIGNIFICANT CHANGE UP (ref 6–8.3)
RBC # BLD: 2.93 M/UL — LOW (ref 3.8–5.2)
RBC # FLD: 14.8 % — HIGH (ref 10.3–14.5)
RH IG SCN BLD-IMP: POSITIVE — SIGNIFICANT CHANGE UP
SODIUM SERPL-SCNC: 145 MMOL/L — SIGNIFICANT CHANGE UP (ref 135–145)
WBC # BLD: 9.59 K/UL — SIGNIFICANT CHANGE UP (ref 3.8–10.5)
WBC # FLD AUTO: 9.59 K/UL — SIGNIFICANT CHANGE UP (ref 3.8–10.5)

## 2022-05-15 PROCEDURE — 83550 IRON BINDING TEST: CPT

## 2022-05-15 PROCEDURE — 85610 PROTHROMBIN TIME: CPT

## 2022-05-15 PROCEDURE — P9016: CPT

## 2022-05-15 PROCEDURE — 96375 TX/PRO/DX INJ NEW DRUG ADDON: CPT

## 2022-05-15 PROCEDURE — 84466 ASSAY OF TRANSFERRIN: CPT

## 2022-05-15 PROCEDURE — 83735 ASSAY OF MAGNESIUM: CPT

## 2022-05-15 PROCEDURE — 88304 TISSUE EXAM BY PATHOLOGIST: CPT

## 2022-05-15 PROCEDURE — 83935 ASSAY OF URINE OSMOLALITY: CPT

## 2022-05-15 PROCEDURE — 87040 BLOOD CULTURE FOR BACTERIA: CPT

## 2022-05-15 PROCEDURE — 84300 ASSAY OF URINE SODIUM: CPT

## 2022-05-15 PROCEDURE — 82728 ASSAY OF FERRITIN: CPT

## 2022-05-15 PROCEDURE — 84443 ASSAY THYROID STIM HORMONE: CPT

## 2022-05-15 PROCEDURE — 80048 BASIC METABOLIC PNL TOTAL CA: CPT

## 2022-05-15 PROCEDURE — 85025 COMPLETE CBC W/AUTO DIFF WBC: CPT

## 2022-05-15 PROCEDURE — 83540 ASSAY OF IRON: CPT

## 2022-05-15 PROCEDURE — 87186 SC STD MICRODIL/AGAR DIL: CPT

## 2022-05-15 PROCEDURE — 87635 SARS-COV-2 COVID-19 AMP PRB: CPT

## 2022-05-15 PROCEDURE — 80202 ASSAY OF VANCOMYCIN: CPT

## 2022-05-15 PROCEDURE — 86900 BLOOD TYPING SEROLOGIC ABO: CPT

## 2022-05-15 PROCEDURE — 87181 SC STD AGAR DILUTION PER AGT: CPT

## 2022-05-15 PROCEDURE — 73620 X-RAY EXAM OF FOOT: CPT

## 2022-05-15 PROCEDURE — 73718 MRI LOWER EXTREMITY W/O DYE: CPT

## 2022-05-15 PROCEDURE — 87075 CULTR BACTERIA EXCEPT BLOOD: CPT

## 2022-05-15 PROCEDURE — 36415 COLL VENOUS BLD VENIPUNCTURE: CPT

## 2022-05-15 PROCEDURE — 87184 SC STD DISK METHOD PER PLATE: CPT

## 2022-05-15 PROCEDURE — 36430 TRANSFUSION BLD/BLD COMPNT: CPT

## 2022-05-15 PROCEDURE — 83036 HEMOGLOBIN GLYCOSYLATED A1C: CPT

## 2022-05-15 PROCEDURE — 99233 SBSQ HOSP IP/OBS HIGH 50: CPT | Mod: GC

## 2022-05-15 PROCEDURE — 97110 THERAPEUTIC EXERCISES: CPT

## 2022-05-15 PROCEDURE — 93005 ELECTROCARDIOGRAM TRACING: CPT

## 2022-05-15 PROCEDURE — 36573 INSJ PICC RS&I 5 YR+: CPT

## 2022-05-15 PROCEDURE — 85027 COMPLETE CBC AUTOMATED: CPT

## 2022-05-15 PROCEDURE — 82570 ASSAY OF URINE CREATININE: CPT

## 2022-05-15 PROCEDURE — 86140 C-REACTIVE PROTEIN: CPT

## 2022-05-15 PROCEDURE — 97161 PT EVAL LOW COMPLEX 20 MIN: CPT

## 2022-05-15 PROCEDURE — 86923 COMPATIBILITY TEST ELECTRIC: CPT

## 2022-05-15 PROCEDURE — 87102 FUNGUS ISOLATION CULTURE: CPT

## 2022-05-15 PROCEDURE — 80053 COMPREHEN METABOLIC PANEL: CPT

## 2022-05-15 PROCEDURE — 96374 THER/PROPH/DIAG INJ IV PUSH: CPT

## 2022-05-15 PROCEDURE — 87070 CULTURE OTHR SPECIMN AEROBIC: CPT

## 2022-05-15 PROCEDURE — 99285 EMERGENCY DEPT VISIT HI MDM: CPT | Mod: 25

## 2022-05-15 PROCEDURE — 85652 RBC SED RATE AUTOMATED: CPT

## 2022-05-15 PROCEDURE — 86850 RBC ANTIBODY SCREEN: CPT

## 2022-05-15 PROCEDURE — 83605 ASSAY OF LACTIC ACID: CPT

## 2022-05-15 PROCEDURE — 84100 ASSAY OF PHOSPHORUS: CPT

## 2022-05-15 PROCEDURE — 85730 THROMBOPLASTIN TIME PARTIAL: CPT

## 2022-05-15 PROCEDURE — 82962 GLUCOSE BLOOD TEST: CPT

## 2022-05-15 PROCEDURE — 86901 BLOOD TYPING SEROLOGIC RH(D): CPT

## 2022-05-15 PROCEDURE — U0005: CPT

## 2022-05-15 PROCEDURE — 97116 GAIT TRAINING THERAPY: CPT

## 2022-05-15 PROCEDURE — U0003: CPT

## 2022-05-15 PROCEDURE — 71045 X-RAY EXAM CHEST 1 VIEW: CPT

## 2022-05-15 RX ORDER — INSULIN LISPRO 100/ML
0 VIAL (ML) SUBCUTANEOUS
Refills: 0 | DISCHARGE
Start: 2022-05-15

## 2022-05-15 RX ORDER — INSULIN GLARGINE 100 [IU]/ML
10 INJECTION, SOLUTION SUBCUTANEOUS
Refills: 0 | DISCHARGE
Start: 2022-05-15

## 2022-05-15 RX ORDER — DULOXETINE HYDROCHLORIDE 30 MG/1
1 CAPSULE, DELAYED RELEASE ORAL
Qty: 0 | Refills: 0 | DISCHARGE

## 2022-05-15 RX ORDER — HYDRALAZINE HCL 50 MG
1 TABLET ORAL
Qty: 0 | Refills: 0 | DISCHARGE
Start: 2022-05-15

## 2022-05-15 RX ORDER — MAGNESIUM SULFATE 500 MG/ML
4 VIAL (ML) INJECTION ONCE
Refills: 0 | Status: DISCONTINUED | OUTPATIENT
Start: 2022-05-15 | End: 2022-05-15

## 2022-05-15 RX ORDER — DULOXETINE HYDROCHLORIDE 30 MG/1
1 CAPSULE, DELAYED RELEASE ORAL
Qty: 0 | Refills: 0 | DISCHARGE
Start: 2022-05-15

## 2022-05-15 RX ADMIN — Medication 2 UNIT(S): at 13:18

## 2022-05-15 RX ADMIN — BUPROPION HYDROCHLORIDE 150 MILLIGRAM(S): 150 TABLET, EXTENDED RELEASE ORAL at 12:28

## 2022-05-15 RX ADMIN — Medication 81 MILLIGRAM(S): at 12:28

## 2022-05-15 RX ADMIN — DULOXETINE HYDROCHLORIDE 20 MILLIGRAM(S): 30 CAPSULE, DELAYED RELEASE ORAL at 12:28

## 2022-05-15 RX ADMIN — Medication 50 MILLIGRAM(S): at 05:39

## 2022-05-15 RX ADMIN — GABAPENTIN 100 MILLIGRAM(S): 400 CAPSULE ORAL at 13:33

## 2022-05-15 RX ADMIN — CHLORHEXIDINE GLUCONATE 1 APPLICATION(S): 213 SOLUTION TOPICAL at 05:38

## 2022-05-15 RX ADMIN — Medication 2 UNIT(S): at 08:42

## 2022-05-15 RX ADMIN — Medication 216 GRAM(S): at 05:40

## 2022-05-15 RX ADMIN — Medication 50 MILLIGRAM(S): at 13:33

## 2022-05-15 RX ADMIN — GABAPENTIN 100 MILLIGRAM(S): 400 CAPSULE ORAL at 05:39

## 2022-05-15 RX ADMIN — HEPARIN SODIUM 5000 UNIT(S): 5000 INJECTION INTRAVENOUS; SUBCUTANEOUS at 05:39

## 2022-05-15 RX ADMIN — HEPARIN SODIUM 5000 UNIT(S): 5000 INJECTION INTRAVENOUS; SUBCUTANEOUS at 13:33

## 2022-05-15 RX ADMIN — Medication 90 MILLIGRAM(S): at 09:57

## 2022-05-15 RX ADMIN — Medication 216 GRAM(S): at 13:33

## 2022-05-15 NOTE — PROGRESS NOTE ADULT - SUBJECTIVE AND OBJECTIVE BOX
Patient was seen and examined at bedside. Case discuss with resident. Pt without any complaints this morning.     Vital Signs Last 24 Hrs  T(C): 36.6 (15 May 2022 04:57), Max: 36.7 (14 May 2022 13:00)  T(F): 97.8 (15 May 2022 04:57), Max: 98 (14 May 2022 13:00)  HR: 71 (15 May 2022 09:55) (67 - 72)  BP: 178/72 (15 May 2022 09:55) (138/65 - 180/77)  BP(mean): --  RR: 18 (15 May 2022 09:55) (17 - 18)  SpO2: 94% (15 May 2022 09:55) (94% - 98%)    PHYSICAL EXAM:  Gen: Reclining in bed at time of exam;NAD   CV: RRR, +S1/S2  Pulm: CTA b/l no wheezing or crackles   Abd: soft, NTND    LABS:                          8.0    9.59  )-----------( 321      ( 15 May 2022 07:22 )             25.7       05-15    145  |  112<H>  |  33<H>  ----------------------------<  88  4.6   |  21<L>  |  2.51<H>    Ca    8.6      15 May 2022 07:22  Phos  6.5     05-15  Mg     1.6     05-15    TPro  6.1  /  Alb  3.0<L>  /  TBili  <0.2  /  DBili  x   /  AST  17  /  ALT  16  /  AlkPhos  85  05-15      A/P: 71yo woman with a PMH of HTN, HLD, poorly controlled NIDDM2 (not open to insulin) c/b neuropathy, DM foot infections and ulcers, as well as mild recurrent MDD who found t o have  R toe osteomyelitis s/p partial resection 5/9 by podiatry.      #DM foot infection  #Osteomyelitis of 5th toe s/p partial resection on 5/9 by podiatry   - Will continue  ampicillin 2g IV q8h plus ertapenem 500mg IV q24h  through 6/16/22  -Podiatry f/u appreciated and per podiatry the pt will need the wound vac at [facility] and instructions are documented in their note on 5/14.     #ELBERT on CKD stage IV 2/2 vancomycin  - Will continue to monitor renal fx    #Poorly controlled NIDDM2   -Pt with HbA1C 9.7  -Pt 's FS are improved after the pt was started Lispro 2 units TIDAC on 5/14.   - c/w lantus and ISS    #HTN   #HLD  -Pt with improved BP  -Will continue Hydralazine and  nifedepine w/ holding parameters   -Continue ASA and statin     #Mild recurrent MDD   -Will continue current medication    FEN/DVT prop/DISPO  -Continue current diet  - Continue DVT prop   -Pt is for [facility] today.

## 2022-05-15 NOTE — PROGRESS NOTE ADULT - PROVIDER SPECIALTY LIST ADULT
Hospitalist
Internal Medicine
Podiatry
Hospitalist
Podiatry
Rehab Medicine
Vascular Surgery
Infectious Disease
Podiatry
Internal Medicine

## 2022-05-15 NOTE — DISCHARGE NOTE NURSING/CASE MANAGEMENT/SOCIAL WORK - NSDCFUADDAPPT_GEN_ALL_CORE_FT
Please bring your Insurance card, Photo ID and Discharge paperwork to the following appointment:    (1) Please follow up with your Infectious Disease Provider, Dr. Lisandro Hamilton at [address] [place] [address], [address] [place] [address] on 06/01/2022 at 9:30am.    Appointment was scheduled by Ms. VARINDER Dowd, Referral Coordinator.

## 2022-05-15 NOTE — PROGRESS NOTE ADULT - SUBJECTIVE AND OBJECTIVE BOX
Patient is a 72y old  Female who presents with a chief complaint of Foot infection (11 May 2022 14:08)      INTERVAL HPI/ OVERNIGHT EVENTS: Pt seen and evaluated bedside this AM. Pt complains of slight pain to wound this AM during dressing change. Pts dressing is C/D/I and she denies N/V/F/C/SOB.       LABS                        8.0    9.59  )-----------( 321      ( 15 May 2022 07:22 )             25.7     05-15    145  |  112<H>  |  33<H>  ----------------------------<  88  4.6   |  21<L>  |  2.51<H>    Ca    8.6      15 May 2022 07:22  Phos  6.5     05-15  Mg     1.6     05-15    TPro  6.1  /  Alb  3.0<L>  /  TBili  <0.2  /  DBili  x   /  AST  17  /  ALT  16  /  AlkPhos  85  05-15        ICU Vital Signs Last 24 Hrs  T(C): 36.6 (15 May 2022 04:57), Max: 36.7 (14 May 2022 13:00)  T(F): 97.8 (15 May 2022 04:57), Max: 98 (14 May 2022 13:00)  HR: 67 (15 May 2022 04:57) (67 - 72)  BP: 138/65 (15 May 2022 04:57) (138/65 - 180/77)  BP(mean): --  ABP: --  ABP(mean): --  RR: 17 (15 May 2022 04:57) (17 - 18)  SpO2: 96% (15 May 2022 04:57) (96% - 98%)    RADIOLOGY    MICROBIOLOGY      PHYSICAL EXAM  Lower Extremity Focused  Vasc: 2/4 DP + PT b/l. CFT < 3 sec. Edema present to Right foot  Neuro: Protective sensation diminished  MSK: MMT 5/5 strength. Pt has digit amputation of right 2nd and 3rd digits   DERM: s/p right 5th MT head resection w/ debridement and wash out, Surgical wound has granular/fibrotic base, no purulence expressed upon palpation no surrounding erythema, no skin necrosis.

## 2022-05-15 NOTE — PROGRESS NOTE ADULT - ASSESSMENT
73 yo F pmhx severe depression, DM, HTN and chronic right foot wound; improved left foot plantar wound, p/w worsening right foot ulcer. Podiatry consulted r/o OM, gas. Of note, pt is afebrile, w/ WBC elevated to 12.2, ESR of <130, and CRP of 57.6. XR wet read positive for signs of cortical erosion and osteopenia of right 5th MT head and proximal phalanx base. With +PTB and XR changes, high suspicion for OM. MRI confirms OM of 5th MT. Wound Cx shows poly microbial growth. Of note, pt is now s/p partial resection of the Right 5th met head (5/9). Cx from bone growing numerous morganella and strep constellatus, moderate E faecalis, rare proteus mirabilis, beta hemolytic strep. Susceptibilities available.     Plan:  - ABX per ID; agree w/ PICC for 6 weeks ABX course  - F/U pathology R 5th met head bone-Pending  - Local wound care: Wet to dry dressing and wrapped w/ ACE  - pt awaiting dispo to [facility], [place]  - Rest of care per primary team  - Pt will need wound vac applied at [facility], [place]    Wound vac instructions: Black granular foam cut to size of wound, Flush wound with saline, Pat dry with guaze, apply betadine to wound borders if macerated, apply black foam piece cut to size of wound to wound base only, Apply clear strips from granular package until airtight seal has been achieved, cut dime sized hole in clear seal over the foam, Stick on wound vac hose to cover hole completely, Turn machine on to 125mmg at continuous flow. Once seal is checked by machine cover with kerlix and ace bandage. Please change wound vacuum every other day.     Podiatry following.    Patient should follow up with Dr. Crow Echevarria within 1 week of discharge.    Office information:          [place] Address- [address], [place] [address] Phone: [phone]         [place] Address- [address], [place] [address] Phone: [phone]

## 2022-05-15 NOTE — DISCHARGE NOTE NURSING/CASE MANAGEMENT/SOCIAL WORK - NSDCPEFALRISK_GEN_ALL_CORE
For information on Fall & Injury Prevention, visit: [email or web address] OR  [email or web address] OR  https://www.cdc.gov/steadi/patient.html

## 2022-05-15 NOTE — DISCHARGE NOTE NURSING/CASE MANAGEMENT/SOCIAL WORK - PATIENT PORTAL LINK FT
You can access the FollowMyHealth Patient Portal offered by [facility] by registering at the following website: [email or web address]. By joining [facility]’s FollowMyHealth portal, you will also be able to view your health information using other applications (apps) compatible with our system.

## 2022-05-18 LAB — SURGICAL PATHOLOGY STUDY: SIGNIFICANT CHANGE UP

## 2022-05-19 DIAGNOSIS — J45.909 UNSPECIFIED ASTHMA, UNCOMPLICATED: ICD-10-CM

## 2022-05-19 DIAGNOSIS — Z79.82 LONG TERM (CURRENT) USE OF ASPIRIN: ICD-10-CM

## 2022-05-19 DIAGNOSIS — E11.40 TYPE 2 DIABETES MELLITUS WITH DIABETIC NEUROPATHY, UNSPECIFIED: ICD-10-CM

## 2022-05-19 DIAGNOSIS — E78.5 HYPERLIPIDEMIA, UNSPECIFIED: ICD-10-CM

## 2022-05-19 DIAGNOSIS — F33.0 MAJOR DEPRESSIVE DISORDER, RECURRENT, MILD: ICD-10-CM

## 2022-05-19 DIAGNOSIS — Z89.421 ACQUIRED ABSENCE OF OTHER RIGHT TOE(S): ICD-10-CM

## 2022-05-19 DIAGNOSIS — N17.9 ACUTE KIDNEY FAILURE, UNSPECIFIED: ICD-10-CM

## 2022-05-19 DIAGNOSIS — E11.69 TYPE 2 DIABETES MELLITUS WITH OTHER SPECIFIED COMPLICATION: ICD-10-CM

## 2022-05-19 DIAGNOSIS — N18.4 CHRONIC KIDNEY DISEASE, STAGE 4 (SEVERE): ICD-10-CM

## 2022-05-19 DIAGNOSIS — E66.9 OBESITY, UNSPECIFIED: ICD-10-CM

## 2022-05-19 DIAGNOSIS — F06.31 MOOD DISORDER DUE TO KNOWN PHYSIOLOGICAL CONDITION WITH DEPRESSIVE FEATURES: ICD-10-CM

## 2022-05-19 DIAGNOSIS — E11.22 TYPE 2 DIABETES MELLITUS WITH DIABETIC CHRONIC KIDNEY DISEASE: ICD-10-CM

## 2022-05-19 DIAGNOSIS — M86.9 OSTEOMYELITIS, UNSPECIFIED: ICD-10-CM

## 2022-05-19 DIAGNOSIS — D63.8 ANEMIA IN OTHER CHRONIC DISEASES CLASSIFIED ELSEWHERE: ICD-10-CM

## 2022-05-19 DIAGNOSIS — I12.9 HYPERTENSIVE CHRONIC KIDNEY DISEASE WITH STAGE 1 THROUGH STAGE 4 CHRONIC KIDNEY DISEASE, OR UNSPECIFIED CHRONIC KIDNEY DISEASE: ICD-10-CM

## 2022-05-19 DIAGNOSIS — E11.621 TYPE 2 DIABETES MELLITUS WITH FOOT ULCER: ICD-10-CM

## 2022-05-19 DIAGNOSIS — Z89.422 ACQUIRED ABSENCE OF OTHER LEFT TOE(S): ICD-10-CM

## 2022-05-19 DIAGNOSIS — R52 PAIN, UNSPECIFIED: ICD-10-CM

## 2022-05-19 DIAGNOSIS — E11.65 TYPE 2 DIABETES MELLITUS WITH HYPERGLYCEMIA: ICD-10-CM

## 2022-06-01 ENCOUNTER — APPOINTMENT (OUTPATIENT)
Dept: INFECTIOUS DISEASE | Facility: CLINIC | Age: 72
End: 2022-06-01

## 2022-06-08 LAB
CULTURE RESULTS: SIGNIFICANT CHANGE UP
CULTURE RESULTS: SIGNIFICANT CHANGE UP
SPECIMEN SOURCE: SIGNIFICANT CHANGE UP
SPECIMEN SOURCE: SIGNIFICANT CHANGE UP

## 2022-10-05 ENCOUNTER — APPOINTMENT (OUTPATIENT)
Dept: INFECTIOUS DISEASE | Facility: CLINIC | Age: 72
End: 2022-10-05

## 2022-10-05 VITALS
HEIGHT: 62 IN | BODY MASS INDEX: 29.9 KG/M2 | SYSTOLIC BLOOD PRESSURE: 151 MMHG | DIASTOLIC BLOOD PRESSURE: 76 MMHG | TEMPERATURE: 97.3 F | WEIGHT: 162.5 LBS | OXYGEN SATURATION: 97 % | HEART RATE: 53 BPM

## 2022-10-05 DIAGNOSIS — M86.9 OSTEOMYELITIS, UNSPECIFIED: ICD-10-CM

## 2022-10-05 PROCEDURE — 99213 OFFICE O/P EST LOW 20 MIN: CPT

## 2022-10-05 NOTE — HISTORY OF PRESENT ILLNESS
[FreeTextEntry1] : 71 y/o Female with PMH poorly controlled DM  c/b neuropathy, HTN, HLD,  c/b neuropathy, DM foot infections and ulcers. She was admitted to [facility] in 05/2022 for R toe OM. She is s/p partial resection on 5/9 by podiatry. Cultures  polymicrobial with morganella morganii, strep constellate, enterococcus, group C strep, proteus mirabilis from superficial and deep wounds. She was discharged to rehab to continue Ertapenem 500 mg IV daily and ampicillin 2 g IV q 8 h. She was scheduled for f/u on 6/1 but appointment was cancelled by rehab. She was then lost to follow up. Patient is not sure why appointment was made today. Wounds on feet are healed, she keeps them wrapped for extra padding. Has not seen podiatrist.

## 2022-10-05 NOTE — PHYSICAL EXAM
[General Appearance - Alert] : alert [Sclera] : the sclera and conjunctiva were normal [Outer Ear] : the ears and nose were normal in appearance [Neck Appearance] : the appearance of the neck was normal [Heart Rate And Rhythm] : heart rate was normal and rhythm regular [Edema] : there was no peripheral edema [Abdomen Soft] : soft [No Palpable Adenopathy] : no palpable adenopathy [Musculoskeletal - Swelling] : no joint swelling [] : no rash [Motor Exam] : the motor exam was normal [No Focal Deficits] : no focal deficits [Oriented To Time, Place, And Person] : oriented to person, place, and time

## 2022-10-06 ENCOUNTER — NON-APPOINTMENT (OUTPATIENT)
Age: 72
End: 2022-10-06

## 2022-10-06 LAB
ALBUMIN SERPL ELPH-MCNC: 3.6 G/DL
ALP BLD-CCNC: 104 U/L
ALT SERPL-CCNC: 10 U/L
ANION GAP SERPL CALC-SCNC: 10 MMOL/L
AST SERPL-CCNC: 10 U/L
BASOPHILS # BLD AUTO: 0.04 K/UL
BASOPHILS NFR BLD AUTO: 0.6 %
BILIRUB SERPL-MCNC: 0.2 MG/DL
BUN SERPL-MCNC: 43 MG/DL
CALCIUM SERPL-MCNC: 9.1 MG/DL
CHLORIDE SERPL-SCNC: 110 MMOL/L
CO2 SERPL-SCNC: 23 MMOL/L
CREAT SERPL-MCNC: 2.38 MG/DL
CRP SERPL-MCNC: 7 MG/L
EGFR: 21 ML/MIN/1.73M2
EOSINOPHIL # BLD AUTO: 0.15 K/UL
EOSINOPHIL NFR BLD AUTO: 2.1 %
ERYTHROCYTE [SEDIMENTATION RATE] IN BLOOD BY WESTERGREN METHOD: 74 MM/HR
GLUCOSE SERPL-MCNC: 135 MG/DL
HCT VFR BLD CALC: 28.6 %
HGB BLD-MCNC: 8.7 G/DL
IMM GRANULOCYTES NFR BLD AUTO: 0.3 %
LYMPHOCYTES # BLD AUTO: 2.15 K/UL
LYMPHOCYTES NFR BLD AUTO: 30.5 %
MAN DIFF?: NORMAL
MCHC RBC-ENTMCNC: 26.7 PG
MCHC RBC-ENTMCNC: 30.4 GM/DL
MCV RBC AUTO: 87.7 FL
MONOCYTES # BLD AUTO: 0.7 K/UL
MONOCYTES NFR BLD AUTO: 9.9 %
NEUTROPHILS # BLD AUTO: 3.99 K/UL
NEUTROPHILS NFR BLD AUTO: 56.6 %
PLATELET # BLD AUTO: 333 K/UL
POTASSIUM SERPL-SCNC: 5 MMOL/L
PROT SERPL-MCNC: 6.4 G/DL
RBC # BLD: 3.26 M/UL
RBC # FLD: 14.1 %
SODIUM SERPL-SCNC: 143 MMOL/L
WBC # FLD AUTO: 7.05 K/UL

## 2023-06-29 NOTE — PROGRESS NOTE ADULT - PROBLEM SELECTOR PLAN 6
no anterior cervical or supraclavicular lymphadenopathy History of HLD  -Continue home atorvastatin 40mg  -Aspirin 81mg History of HLD  - c/w atorvastatin 40mg  -c/w Aspirin 81mg

## 2023-11-22 NOTE — H&P ADULT - PROBLEM/PLAN-5
Subjective:      Patient ID: Joyce Pete is a 74 y.o. female.    Chief Complaint:  PTC, postoperative hypothyroidism    History of Present Illness  This is a 74 y.o. female. with a past medical history of PTC s/p total thyroidectomy, postoperative hypothyroidism here for evaluation.      Papillary thyroid carcinoma  Postoperative hypothyroidism  She presents for evaluation and management of thyroid cancer and postoperative hypothyroidism.     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   2)   Mild chronic thyroiditis.   3)   Fragments of two small parathyroid glands.     MICROSCOPIC EXAMINATION:   The 10mm nodule in the isthmus shows patchy nuclear features of papillary thyroid neoplasm (intranuclear cytoplasmic inclusions), but the architecture is follicular, not papillary.    No capsular, lymphatic or vascular invasion is identified.   There is no mitotic activity and no necrosis.   The remainder of the thyroid shows mild chronic thyroiditis.   Two small (3 x 1mm and 2 x 1mm) parathyroid glands are noted on the left side.       She did not receive GARCIA    Post op thyroglobulin level: Noted she had positive Tg Ab pre-operatively     08/09/23 10:44   Thyroglobulin 0.4 (L)   Thyroglobulin Ab Screen 35 (H)  35 (H)       Currently taking generic levothyroxine 75 mcg daily, takes 4-5am, waits 2 hours before eating, takes 4h separate from vitamins  Fatigue  Constipation  Some insomnia  No palpitations  At times tremors    Taking calcium and vitamin D     11/17/23 09:28   eGFR >60   Calcium 9.4   Phosphorus Level 3.5   Albumin 3.7   TSH 0.863   T3, Total 76   Free T4 1.23  "  PTH 36.7         Review of Systems  As above    Social and family history reviewed  Current medications and allergies reviewed    Objective:   /78 (BP Location: Left arm, Patient Position: Sitting, BP Method: Medium (Manual))   Pulse 81   Ht 5' 4" (1.626 m)   Wt 65.8 kg (145 lb)   BMI 24.89 kg/m²   Physical Exam  Alert, oriented  Well healed anterior cervical scar    BP Readings from Last 1 Encounters:   11/22/23 119/78      Wt Readings from Last 1 Encounters:   11/22/23 1332 65.8 kg (145 lb)     Body mass index is 24.89 kg/m².    Lab Review:   No results found for: "HGBA1C"  Lab Results   Component Value Date    CHOL 196 01/21/2020    HDL 43 01/21/2020    LDLCALC 112.8 01/21/2020    TRIG 201 (H) 01/21/2020    CHOLHDL 21.9 01/21/2020     Lab Results   Component Value Date     11/17/2023    K 4.1 11/17/2023     11/17/2023    CO2 26 11/17/2023    GLU 98 11/17/2023    BUN 14 11/17/2023    CREATININE 0.8 11/17/2023    CALCIUM 9.4 11/17/2023    PROT 6.4 10/10/2023    ALBUMIN 3.7 11/17/2023    BILITOT 0.6 10/10/2023    ALKPHOS 74 10/10/2023    AST 19 10/10/2023    ALT 21 10/10/2023    ANIONGAP 10 11/17/2023    ESTGFRAFRICA >60 07/22/2022    EGFRNONAA >60 07/22/2022    TSH 0.863 11/17/2023       All pertinent labs reviewed    Assessment and Plan     Papillary thyroid carcinoma  She is 3 months s/p total thyroidectomy  Low risk CELE given intrathyroidal, encapsulated follicular variant of papillary thyroid cancer  No role for GARCIA in terms of changing survival or recurrence rates  Will evaluate thyroglobulin and Tg Ab - noted they were positive prior to surgery  If thyroglobulin antibodies still positive may need to evaluate via LCMS  Will plan for US after 6 months postsurgery    Postoperative hypothyroidism  TSH goal 0.5 - 2.0 give CELE low risk   She is not feeling well on generic levothyroxine, wishes to try brand name    Plan  - Change to Tirosint 75 mcg daily  - TFTs in 6 weeks    Vitamin D " deficiency  Recheck levels    Screening for diabetes mellitus  Check A1c      Follow-up in 6 months    Roscoe Danielson MD  Endocrinology     DISPLAY PLAN FREE TEXT

## 2023-11-25 ENCOUNTER — INPATIENT (INPATIENT)
Facility: HOSPITAL | Age: 73
LOS: 11 days | Discharge: NURSING, REHAB OR LONG-TERM CARE | DRG: 673 | End: 2023-12-07
Attending: STUDENT IN AN ORGANIZED HEALTH CARE EDUCATION/TRAINING PROGRAM | Admitting: INTERNAL MEDICINE
Payer: MEDICARE

## 2023-11-25 VITALS
RESPIRATION RATE: 20 BRPM | HEART RATE: 68 BPM | HEIGHT: 61 IN | OXYGEN SATURATION: 95 % | WEIGHT: 179.9 LBS | DIASTOLIC BLOOD PRESSURE: 59 MMHG | TEMPERATURE: 97 F | SYSTOLIC BLOOD PRESSURE: 105 MMHG

## 2023-11-25 DIAGNOSIS — Z90.49 ACQUIRED ABSENCE OF OTHER SPECIFIED PARTS OF DIGESTIVE TRACT: Chronic | ICD-10-CM

## 2023-11-25 DIAGNOSIS — F42.3 HOARDING DISORDER: ICD-10-CM

## 2023-11-25 DIAGNOSIS — F32.A DEPRESSION, UNSPECIFIED: ICD-10-CM

## 2023-11-25 DIAGNOSIS — E78.5 HYPERLIPIDEMIA, UNSPECIFIED: ICD-10-CM

## 2023-11-25 DIAGNOSIS — R79.89 OTHER SPECIFIED ABNORMAL FINDINGS OF BLOOD CHEMISTRY: ICD-10-CM

## 2023-11-25 DIAGNOSIS — R07.9 CHEST PAIN, UNSPECIFIED: ICD-10-CM

## 2023-11-25 DIAGNOSIS — E11.9 TYPE 2 DIABETES MELLITUS WITHOUT COMPLICATIONS: ICD-10-CM

## 2023-11-25 DIAGNOSIS — N18.6 END STAGE RENAL DISEASE: ICD-10-CM

## 2023-11-25 DIAGNOSIS — J45.909 UNSPECIFIED ASTHMA, UNCOMPLICATED: ICD-10-CM

## 2023-11-25 DIAGNOSIS — I10 ESSENTIAL (PRIMARY) HYPERTENSION: ICD-10-CM

## 2023-11-25 LAB
ALBUMIN SERPL ELPH-MCNC: 3.7 G/DL — SIGNIFICANT CHANGE UP (ref 3.3–5)
ALBUMIN SERPL ELPH-MCNC: 3.7 G/DL — SIGNIFICANT CHANGE UP (ref 3.3–5)
ALP SERPL-CCNC: 96 U/L — SIGNIFICANT CHANGE UP (ref 40–120)
ALP SERPL-CCNC: 96 U/L — SIGNIFICANT CHANGE UP (ref 40–120)
ALT FLD-CCNC: 19 U/L — SIGNIFICANT CHANGE UP (ref 10–45)
ALT FLD-CCNC: 19 U/L — SIGNIFICANT CHANGE UP (ref 10–45)
ANION GAP SERPL CALC-SCNC: 15 MMOL/L — SIGNIFICANT CHANGE UP (ref 5–17)
ANION GAP SERPL CALC-SCNC: 15 MMOL/L — SIGNIFICANT CHANGE UP (ref 5–17)
AST SERPL-CCNC: 16 U/L — SIGNIFICANT CHANGE UP (ref 10–40)
AST SERPL-CCNC: 16 U/L — SIGNIFICANT CHANGE UP (ref 10–40)
BASOPHILS # BLD AUTO: 0.05 K/UL — SIGNIFICANT CHANGE UP (ref 0–0.2)
BASOPHILS # BLD AUTO: 0.05 K/UL — SIGNIFICANT CHANGE UP (ref 0–0.2)
BASOPHILS NFR BLD AUTO: 0.5 % — SIGNIFICANT CHANGE UP (ref 0–2)
BASOPHILS NFR BLD AUTO: 0.5 % — SIGNIFICANT CHANGE UP (ref 0–2)
BILIRUB SERPL-MCNC: <0.2 MG/DL — SIGNIFICANT CHANGE UP (ref 0.2–1.2)
BILIRUB SERPL-MCNC: <0.2 MG/DL — SIGNIFICANT CHANGE UP (ref 0.2–1.2)
BUN SERPL-MCNC: 54 MG/DL — HIGH (ref 7–23)
BUN SERPL-MCNC: 54 MG/DL — HIGH (ref 7–23)
CALCIUM SERPL-MCNC: 8.5 MG/DL — SIGNIFICANT CHANGE UP (ref 8.4–10.5)
CALCIUM SERPL-MCNC: 8.5 MG/DL — SIGNIFICANT CHANGE UP (ref 8.4–10.5)
CHLORIDE SERPL-SCNC: 112 MMOL/L — HIGH (ref 96–108)
CHLORIDE SERPL-SCNC: 112 MMOL/L — HIGH (ref 96–108)
CK MB CFR SERPL CALC: 6.8 NG/ML — HIGH (ref 0–6.7)
CK MB CFR SERPL CALC: 6.8 NG/ML — HIGH (ref 0–6.7)
CK MB CFR SERPL CALC: 7 NG/ML — HIGH (ref 0–6.7)
CK MB CFR SERPL CALC: 7 NG/ML — HIGH (ref 0–6.7)
CK SERPL-CCNC: 178 U/L — HIGH (ref 25–170)
CK SERPL-CCNC: 178 U/L — HIGH (ref 25–170)
CK SERPL-CCNC: 190 U/L — HIGH (ref 25–170)
CK SERPL-CCNC: 190 U/L — HIGH (ref 25–170)
CO2 SERPL-SCNC: 16 MMOL/L — LOW (ref 22–31)
CO2 SERPL-SCNC: 16 MMOL/L — LOW (ref 22–31)
CREAT SERPL-MCNC: 4.38 MG/DL — HIGH (ref 0.5–1.3)
CREAT SERPL-MCNC: 4.38 MG/DL — HIGH (ref 0.5–1.3)
EGFR: 10 ML/MIN/1.73M2 — LOW
EGFR: 10 ML/MIN/1.73M2 — LOW
EOSINOPHIL # BLD AUTO: 0.19 K/UL — SIGNIFICANT CHANGE UP (ref 0–0.5)
EOSINOPHIL # BLD AUTO: 0.19 K/UL — SIGNIFICANT CHANGE UP (ref 0–0.5)
EOSINOPHIL NFR BLD AUTO: 1.8 % — SIGNIFICANT CHANGE UP (ref 0–6)
EOSINOPHIL NFR BLD AUTO: 1.8 % — SIGNIFICANT CHANGE UP (ref 0–6)
GLUCOSE BLDC GLUCOMTR-MCNC: 114 MG/DL — HIGH (ref 70–99)
GLUCOSE BLDC GLUCOMTR-MCNC: 114 MG/DL — HIGH (ref 70–99)
GLUCOSE SERPL-MCNC: 222 MG/DL — HIGH (ref 70–99)
GLUCOSE SERPL-MCNC: 222 MG/DL — HIGH (ref 70–99)
HCT VFR BLD CALC: 25.1 % — LOW (ref 34.5–45)
HCT VFR BLD CALC: 25.1 % — LOW (ref 34.5–45)
HGB BLD-MCNC: 8.2 G/DL — LOW (ref 11.5–15.5)
HGB BLD-MCNC: 8.2 G/DL — LOW (ref 11.5–15.5)
IMM GRANULOCYTES NFR BLD AUTO: 0.9 % — SIGNIFICANT CHANGE UP (ref 0–0.9)
IMM GRANULOCYTES NFR BLD AUTO: 0.9 % — SIGNIFICANT CHANGE UP (ref 0–0.9)
LIDOCAIN IGE QN: 59 U/L — SIGNIFICANT CHANGE UP (ref 7–60)
LIDOCAIN IGE QN: 59 U/L — SIGNIFICANT CHANGE UP (ref 7–60)
LYMPHOCYTES # BLD AUTO: 1.72 K/UL — SIGNIFICANT CHANGE UP (ref 1–3.3)
LYMPHOCYTES # BLD AUTO: 1.72 K/UL — SIGNIFICANT CHANGE UP (ref 1–3.3)
LYMPHOCYTES # BLD AUTO: 16 % — SIGNIFICANT CHANGE UP (ref 13–44)
LYMPHOCYTES # BLD AUTO: 16 % — SIGNIFICANT CHANGE UP (ref 13–44)
MAGNESIUM SERPL-MCNC: 1.3 MG/DL — LOW (ref 1.6–2.6)
MAGNESIUM SERPL-MCNC: 1.3 MG/DL — LOW (ref 1.6–2.6)
MCHC RBC-ENTMCNC: 29.4 PG — SIGNIFICANT CHANGE UP (ref 27–34)
MCHC RBC-ENTMCNC: 29.4 PG — SIGNIFICANT CHANGE UP (ref 27–34)
MCHC RBC-ENTMCNC: 32.7 GM/DL — SIGNIFICANT CHANGE UP (ref 32–36)
MCHC RBC-ENTMCNC: 32.7 GM/DL — SIGNIFICANT CHANGE UP (ref 32–36)
MCV RBC AUTO: 90 FL — SIGNIFICANT CHANGE UP (ref 80–100)
MCV RBC AUTO: 90 FL — SIGNIFICANT CHANGE UP (ref 80–100)
MONOCYTES # BLD AUTO: 1.22 K/UL — HIGH (ref 0–0.9)
MONOCYTES # BLD AUTO: 1.22 K/UL — HIGH (ref 0–0.9)
MONOCYTES NFR BLD AUTO: 11.3 % — SIGNIFICANT CHANGE UP (ref 2–14)
MONOCYTES NFR BLD AUTO: 11.3 % — SIGNIFICANT CHANGE UP (ref 2–14)
NEUTROPHILS # BLD AUTO: 7.49 K/UL — HIGH (ref 1.8–7.4)
NEUTROPHILS # BLD AUTO: 7.49 K/UL — HIGH (ref 1.8–7.4)
NEUTROPHILS NFR BLD AUTO: 69.5 % — SIGNIFICANT CHANGE UP (ref 43–77)
NEUTROPHILS NFR BLD AUTO: 69.5 % — SIGNIFICANT CHANGE UP (ref 43–77)
NRBC # BLD: 0 /100 WBCS — SIGNIFICANT CHANGE UP (ref 0–0)
NRBC # BLD: 0 /100 WBCS — SIGNIFICANT CHANGE UP (ref 0–0)
NT-PROBNP SERPL-SCNC: HIGH PG/ML (ref 0–300)
NT-PROBNP SERPL-SCNC: HIGH PG/ML (ref 0–300)
PLATELET # BLD AUTO: 237 K/UL — SIGNIFICANT CHANGE UP (ref 150–400)
PLATELET # BLD AUTO: 237 K/UL — SIGNIFICANT CHANGE UP (ref 150–400)
POTASSIUM SERPL-MCNC: 4.5 MMOL/L — SIGNIFICANT CHANGE UP (ref 3.5–5.3)
POTASSIUM SERPL-MCNC: 4.5 MMOL/L — SIGNIFICANT CHANGE UP (ref 3.5–5.3)
POTASSIUM SERPL-SCNC: 4.5 MMOL/L — SIGNIFICANT CHANGE UP (ref 3.5–5.3)
POTASSIUM SERPL-SCNC: 4.5 MMOL/L — SIGNIFICANT CHANGE UP (ref 3.5–5.3)
PROT SERPL-MCNC: 6.7 G/DL — SIGNIFICANT CHANGE UP (ref 6–8.3)
PROT SERPL-MCNC: 6.7 G/DL — SIGNIFICANT CHANGE UP (ref 6–8.3)
RBC # BLD: 2.79 M/UL — LOW (ref 3.8–5.2)
RBC # BLD: 2.79 M/UL — LOW (ref 3.8–5.2)
RBC # FLD: 13.1 % — SIGNIFICANT CHANGE UP (ref 10.3–14.5)
RBC # FLD: 13.1 % — SIGNIFICANT CHANGE UP (ref 10.3–14.5)
SODIUM SERPL-SCNC: 143 MMOL/L — SIGNIFICANT CHANGE UP (ref 135–145)
SODIUM SERPL-SCNC: 143 MMOL/L — SIGNIFICANT CHANGE UP (ref 135–145)
TROPONIN T, HIGH SENSITIVITY RESULT: 275 NG/L — CRITICAL HIGH (ref 0–51)
TROPONIN T, HIGH SENSITIVITY RESULT: 275 NG/L — CRITICAL HIGH (ref 0–51)
TROPONIN T, HIGH SENSITIVITY RESULT: 311 NG/L — CRITICAL HIGH (ref 0–51)
TROPONIN T, HIGH SENSITIVITY RESULT: 311 NG/L — CRITICAL HIGH (ref 0–51)
WBC # BLD: 10.77 K/UL — HIGH (ref 3.8–10.5)
WBC # BLD: 10.77 K/UL — HIGH (ref 3.8–10.5)
WBC # FLD AUTO: 10.77 K/UL — HIGH (ref 3.8–10.5)
WBC # FLD AUTO: 10.77 K/UL — HIGH (ref 3.8–10.5)

## 2023-11-25 PROCEDURE — 71045 X-RAY EXAM CHEST 1 VIEW: CPT | Mod: 26

## 2023-11-25 PROCEDURE — 99222 1ST HOSP IP/OBS MODERATE 55: CPT

## 2023-11-25 PROCEDURE — 99285 EMERGENCY DEPT VISIT HI MDM: CPT

## 2023-11-25 RX ORDER — ALBUTEROL 90 UG/1
1 AEROSOL, METERED ORAL
Refills: 0 | DISCHARGE

## 2023-11-25 RX ORDER — SITAGLIPTIN 50 MG/1
1 TABLET, FILM COATED ORAL
Refills: 0 | DISCHARGE

## 2023-11-25 RX ORDER — PANTOPRAZOLE SODIUM 20 MG/1
1 TABLET, DELAYED RELEASE ORAL
Refills: 0 | DISCHARGE

## 2023-11-25 RX ORDER — MAGNESIUM SULFATE 500 MG/ML
2 VIAL (ML) INJECTION ONCE
Refills: 0 | Status: COMPLETED | OUTPATIENT
Start: 2023-11-25 | End: 2023-11-25

## 2023-11-25 RX ORDER — IPRATROPIUM/ALBUTEROL SULFATE 18-103MCG
3 AEROSOL WITH ADAPTER (GRAM) INHALATION ONCE
Refills: 0 | Status: COMPLETED | OUTPATIENT
Start: 2023-11-25 | End: 2023-11-25

## 2023-11-25 RX ORDER — TRAZODONE HCL 50 MG
50 TABLET ORAL DAILY
Refills: 0 | Status: DISCONTINUED | OUTPATIENT
Start: 2023-11-25 | End: 2023-11-27

## 2023-11-25 RX ORDER — METFORMIN HYDROCHLORIDE 850 MG/1
1 TABLET ORAL
Qty: 0 | Refills: 0 | DISCHARGE

## 2023-11-25 RX ORDER — CLONAZEPAM 1 MG
0.5 TABLET ORAL DAILY
Refills: 0 | Status: DISCONTINUED | OUTPATIENT
Start: 2023-11-25 | End: 2023-11-27

## 2023-11-25 RX ORDER — ASPIRIN/CALCIUM CARB/MAGNESIUM 324 MG
324 TABLET ORAL ONCE
Refills: 0 | Status: COMPLETED | OUTPATIENT
Start: 2023-11-25 | End: 2023-11-25

## 2023-11-25 RX ORDER — MAGNESIUM SULFATE 500 MG/ML
4 VIAL (ML) INJECTION ONCE
Refills: 0 | Status: DISCONTINUED | OUTPATIENT
Start: 2023-11-25 | End: 2023-11-25

## 2023-11-25 RX ORDER — IPRATROPIUM/ALBUTEROL SULFATE 18-103MCG
3 AEROSOL WITH ADAPTER (GRAM) INHALATION EVERY 6 HOURS
Refills: 0 | Status: DISCONTINUED | OUTPATIENT
Start: 2023-11-25 | End: 2023-12-07

## 2023-11-25 RX ORDER — ALBUTEROL 90 UG/1
3 AEROSOL, METERED ORAL
Refills: 0 | DISCHARGE

## 2023-11-25 RX ORDER — BUDESONIDE AND FORMOTEROL FUMARATE DIHYDRATE 160; 4.5 UG/1; UG/1
2 AEROSOL RESPIRATORY (INHALATION)
Refills: 0 | DISCHARGE

## 2023-11-25 RX ORDER — ERGOCALCIFEROL 1.25 MG/1
1 CAPSULE ORAL
Refills: 0 | DISCHARGE

## 2023-11-25 RX ORDER — DEXTROSE 50 % IN WATER 50 %
15 SYRINGE (ML) INTRAVENOUS ONCE
Refills: 0 | Status: DISCONTINUED | OUTPATIENT
Start: 2023-11-25 | End: 2023-11-26

## 2023-11-25 RX ORDER — SODIUM BICARBONATE 1 MEQ/ML
650 SYRINGE (ML) INTRAVENOUS
Refills: 0 | Status: DISCONTINUED | OUTPATIENT
Start: 2023-11-25 | End: 2023-11-27

## 2023-11-25 RX ORDER — DULOXETINE HYDROCHLORIDE 30 MG/1
30 CAPSULE, DELAYED RELEASE ORAL DAILY
Refills: 0 | Status: DISCONTINUED | OUTPATIENT
Start: 2023-11-25 | End: 2023-11-27

## 2023-11-25 RX ORDER — BUDESONIDE AND FORMOTEROL FUMARATE DIHYDRATE 160; 4.5 UG/1; UG/1
2 AEROSOL RESPIRATORY (INHALATION)
Refills: 0 | Status: DISCONTINUED | OUTPATIENT
Start: 2023-11-25 | End: 2023-11-26

## 2023-11-25 RX ORDER — SODIUM CHLORIDE 9 MG/ML
1000 INJECTION, SOLUTION INTRAVENOUS
Refills: 0 | Status: DISCONTINUED | OUTPATIENT
Start: 2023-11-25 | End: 2023-11-26

## 2023-11-25 RX ORDER — GABAPENTIN 400 MG/1
1 CAPSULE ORAL
Qty: 0 | Refills: 0 | DISCHARGE

## 2023-11-25 RX ORDER — ALBUTEROL 90 UG/1
2 AEROSOL, METERED ORAL DAILY
Refills: 0 | Status: DISCONTINUED | OUTPATIENT
Start: 2023-11-25 | End: 2023-12-07

## 2023-11-25 RX ORDER — ATORVASTATIN CALCIUM 80 MG/1
20 TABLET, FILM COATED ORAL AT BEDTIME
Refills: 0 | Status: DISCONTINUED | OUTPATIENT
Start: 2023-11-25 | End: 2023-11-30

## 2023-11-25 RX ORDER — CALCIUM ACETATE 667 MG
667 TABLET ORAL
Refills: 0 | Status: DISCONTINUED | OUTPATIENT
Start: 2023-11-25 | End: 2023-12-07

## 2023-11-25 RX ORDER — PANTOPRAZOLE SODIUM 20 MG/1
40 TABLET, DELAYED RELEASE ORAL
Refills: 0 | Status: DISCONTINUED | OUTPATIENT
Start: 2023-11-25 | End: 2023-12-07

## 2023-11-25 RX ORDER — POLYETHYLENE GLYCOL 3350 17 G/17G
17 POWDER, FOR SOLUTION ORAL
Refills: 0 | DISCHARGE

## 2023-11-25 RX ORDER — DEXTROSE 50 % IN WATER 50 %
12.5 SYRINGE (ML) INTRAVENOUS ONCE
Refills: 0 | Status: DISCONTINUED | OUTPATIENT
Start: 2023-11-25 | End: 2023-11-26

## 2023-11-25 RX ORDER — INSULIN LISPRO 100/ML
VIAL (ML) SUBCUTANEOUS
Refills: 0 | Status: DISCONTINUED | OUTPATIENT
Start: 2023-11-25 | End: 2023-11-26

## 2023-11-25 RX ORDER — GLUCAGON INJECTION, SOLUTION 0.5 MG/.1ML
1 INJECTION, SOLUTION SUBCUTANEOUS ONCE
Refills: 0 | Status: DISCONTINUED | OUTPATIENT
Start: 2023-11-25 | End: 2023-11-26

## 2023-11-25 RX ORDER — NIFEDIPINE 30 MG
90 TABLET, EXTENDED RELEASE 24 HR ORAL DAILY
Refills: 0 | Status: DISCONTINUED | OUTPATIENT
Start: 2023-11-25 | End: 2023-11-26

## 2023-11-25 RX ORDER — ASPIRIN/CALCIUM CARB/MAGNESIUM 324 MG
1 TABLET ORAL
Qty: 0 | Refills: 0 | DISCHARGE

## 2023-11-25 RX ORDER — DEXTROSE 50 % IN WATER 50 %
25 SYRINGE (ML) INTRAVENOUS ONCE
Refills: 0 | Status: DISCONTINUED | OUTPATIENT
Start: 2023-11-25 | End: 2023-11-26

## 2023-11-25 RX ORDER — NITROGLYCERIN 6.5 MG
1 CAPSULE, EXTENDED RELEASE ORAL DAILY
Refills: 0 | Status: DISCONTINUED | OUTPATIENT
Start: 2023-11-25 | End: 2023-12-07

## 2023-11-25 RX ORDER — HYDRALAZINE HCL 50 MG
50 TABLET ORAL THREE TIMES A DAY
Refills: 0 | Status: DISCONTINUED | OUTPATIENT
Start: 2023-11-25 | End: 2023-11-26

## 2023-11-25 RX ORDER — NIFEDIPINE 30 MG
1 TABLET, EXTENDED RELEASE 24 HR ORAL
Qty: 0 | Refills: 0 | DISCHARGE

## 2023-11-25 RX ORDER — ATORVASTATIN CALCIUM 80 MG/1
1 TABLET, FILM COATED ORAL
Qty: 0 | Refills: 0 | DISCHARGE

## 2023-11-25 RX ORDER — CALCIUM ACETATE 667 MG
1 TABLET ORAL
Refills: 0 | DISCHARGE

## 2023-11-25 RX ADMIN — Medication 50 MILLIGRAM(S): at 23:59

## 2023-11-25 RX ADMIN — Medication 25 GRAM(S): at 19:45

## 2023-11-25 RX ADMIN — DULOXETINE HYDROCHLORIDE 30 MILLIGRAM(S): 30 CAPSULE, DELAYED RELEASE ORAL at 23:59

## 2023-11-25 RX ADMIN — ATORVASTATIN CALCIUM 20 MILLIGRAM(S): 80 TABLET, FILM COATED ORAL at 23:57

## 2023-11-25 RX ADMIN — Medication 0.5 MILLIGRAM(S): at 23:58

## 2023-11-25 RX ADMIN — Medication 90 MILLIGRAM(S): at 23:58

## 2023-11-25 RX ADMIN — Medication 324 MILLIGRAM(S): at 16:41

## 2023-11-25 RX ADMIN — Medication 3 MILLILITER(S): at 16:25

## 2023-11-25 RX ADMIN — Medication 1 PATCH: at 17:31

## 2023-11-25 RX ADMIN — Medication 3 MILLILITER(S): at 16:30

## 2023-11-25 RX ADMIN — Medication 650 MILLIGRAM(S): at 23:57

## 2023-11-25 RX ADMIN — Medication 667 MILLIGRAM(S): at 23:59

## 2023-11-25 RX ADMIN — Medication 25 GRAM(S): at 23:56

## 2023-11-25 RX ADMIN — Medication 1 PATCH: at 19:50

## 2023-11-25 NOTE — H&P ADULT - EXTREMITIES COMMENTS
bilateral toe amputations (L second, third, and fourth digits of left foot  right foot with right partial second and fourth toe ray amputation)  R toe wound dressing bilateral toe amputations (L second, third, and fourth digits of left foot  right foot with right partial second and fourth toe ray amputation)  2 cm round, ecchymotic wound on plantar aspect of R foot

## 2023-11-25 NOTE — BH CONSULTATION LIAISON ASSESSMENT NOTE - NSBHLOCFT_PSY_A_CORE
alert throughout most of evaluation though patient suddenly falls asleep towards the end and wakes up several minutes later

## 2023-11-25 NOTE — BH CONSULTATION LIAISON ASSESSMENT NOTE - NSBHREFERDETAILS_PSY_A_CORE_FT
Patient referred for SI, stating she thought about jumping off a building and "I can't take it anymore."

## 2023-11-25 NOTE — BH CONSULTATION LIAISON ASSESSMENT NOTE - DETAILS
Patient reports frequent intermittent suicidal ideation since her 20s. She reports thinking about jumping in front of a car and jumping out the window, but does not have the courage to do so. She feels that there is an "mariah" protecting her as something always stops her. She does not believe that she will act on it.

## 2023-11-25 NOTE — H&P ADULT - PROBLEM SELECTOR PLAN 7
last A1c 9.7 (5/2022)  -f/u AM A1c  -continue with Lantus 10 units at bedtime and Lispro 2 units 3x daily with meals      F: None  E: Replete if K<4 or Mag<2  N: DASH Diet  VTEppx: SCD  Dispo: pending clinical course last A1c 9.7 (5/2022)  -f/u AM A1c  -continue with Lantus 10 units at bedtime and Lispro 2 units 3x daily with meals      F: None  E: Replete if K<4 or Mag<2  N: DASH Diet, consistent carb  VTEppx: SCD  Dispo: pending clinical course last A1c 9.7 (5/2022)  -f/u AM A1c  -continue MISS  -pt states she is on insulin however no records on [facility] of the exact dose  -endocrine consult in AM      F: None  E: Replete if K<4 or Mag<2  N: DASH Diet, consistent carb  VTEppx: SCD  GIppx: PPI  Dispo: pending clinical course WBC 10.77  -Afebrile, diarrhea x 4-5 days symptoms resolved  -CXR reveals no acute infiltrates   -f/u UA  -GI PCR if diarrhea recurs WBC 10.77  -Afebrile, diarrhea x 4-5 days and vomitting x1 day, symptoms have resolved  -CXR reveals no acute infiltrates   -f/u UA  -GI PCR if diarrhea recurs

## 2023-11-25 NOTE — BH SAFETY PLAN - DISTRACTION NAME 1
MC: treated with IM abx x 1 for strep throat 2 days ago, still with fever today; she is tolerating Pedialyte pops and fluids well, denies sore throat, fever, rash, HA, N/V. Advised continuing analgesics tonight, appt for f/u tomorrow if fever persists (to recheck rapid strep).   Mom understands and agrees with plan,
Sister

## 2023-11-25 NOTE — H&P ADULT - NSICDXPASTSURGICALHX_GEN_ALL_CORE_FT
PAST SURGICAL HISTORY:  Cytomegalovirus infection not present No significant past surgical history    S/P cholecystectomy

## 2023-11-25 NOTE — BH CONSULTATION LIAISON ASSESSMENT NOTE - NSSUICPROTFACT_PSY_ALL_CORE
Identifies reasons for living/Supportive social network of family or friends/Fear of death or the actual act of killing self/Cultural, spiritual and/or moral attitudes against suicide/Ability to cope with stress/Frustration tolerance

## 2023-11-25 NOTE — BH CONSULTATION LIAISON ASSESSMENT NOTE - CURRENT MEDICATION
MEDICATIONS  (STANDING):  dextrose 5%. 1000 milliLiter(s) (50 mL/Hr) IV Continuous <Continuous>  dextrose 5%. 1000 milliLiter(s) (100 mL/Hr) IV Continuous <Continuous>  dextrose 50% Injectable 12.5 Gram(s) IV Push once  dextrose 50% Injectable 25 Gram(s) IV Push once  dextrose 50% Injectable 25 Gram(s) IV Push once  glucagon  Injectable 1 milliGRAM(s) IntraMuscular once  insulin lispro (ADMELOG) corrective regimen sliding scale   SubCutaneous three times a day before meals  magnesium sulfate  IVPB 2 Gram(s) IV Intermittent once  nitroglycerin    Patch 0.3 mG/Hr(s) 1 patch Transdermal daily    MEDICATIONS  (PRN):  dextrose Oral Gel 15 Gram(s) Oral once PRN Blood Glucose LESS THAN 70 milliGRAM(s)/deciliter

## 2023-11-25 NOTE — ED PROVIDER NOTE - CLINICAL SUMMARY MEDICAL DECISION MAKING FREE TEXT BOX
73 F w ho DM, HTN, high lipids, ESRD - cp- rad to neck- ekg T inv inf/lateral - + trop will give asa, ntg

## 2023-11-25 NOTE — ED ADULT NURSE NOTE - NSFALLRISKINTERV_ED_ALL_ED

## 2023-11-25 NOTE — H&P ADULT - RESPIRATORY
no respiratory distress/no use of accessory muscles/no dull ness or hyperresonance to percussion/rales

## 2023-11-25 NOTE — H&P ADULT - NSHPADDITIONALINFOADULT_GEN_ALL_CORE
Attending Attestation:  I was physically present for the key portions of the evaluation and management (E/M) service provided.  I agree with the above history, physical, and plan which I have reviewed with the following edits/addendum:    73F assisted resident (unable to care for self, lived alone) w DMT2 w toe amputations, advanced CKD, asthma, anxiety who p/w SOB, palpitations. Pt can't recall any ischemia eval but supposed to have a stress test as OP. EKG sinus with old repol abnormalities. CXR cardiomegaly w mild congestion. Anemic, with elevated but flat trop trend.     #Troponin elevation in setting of #ELBERT on CKD #Volume overload   #Anemia of chr dse  - [facility]  - IV diuresis, duonebs  - TTE, NST when more euvolemic  - consult nephrology     Duong Piña MD  Cardiology

## 2023-11-25 NOTE — H&P ADULT - PROBLEM SELECTOR PLAN 6
SBP 130s-140s SBP 130s-140s  -continue to monitor   -home meds SBP 130s-140s  -continue to monitor   -home meds: hydralazine 50mg three times daily, nife SBP 130s-140s  -continue to monitor   -continue with home meds: hydralazine 50mg three times daily, nifedipine 90mg daily, metoprolol succinate 50mg daily SBP 130s-140s  -continue with home meds: hydralazine 50mg three times daily, nifedipine 90mg daily, metoprolol succinate 25mg daily

## 2023-11-25 NOTE — H&P ADULT - HISTORY OF PRESENT ILLNESS
73 year old F with PMHx of DM, HTN, HLD, ESRD (not on HD) who presented complaining of chest pain earlier this morning. Pt awoke with back pain that radiated to her chest lasting a few hours, Pt states she had associated shortness of breath, lasted for a few hours and now has resolved, Pt ___ denies palpitations, orthopnea, PND, leg edema, n/v/d, fever, chills, hematochezia, hematemesis, melena, BRBPR and other symptoms.   Pt rates the pain XX/10, lasted xx, associated with xx.      Pt is now admitted to cardiac telemetry for further management and ischemic workup.   73 year old F with PMHx of DM, HTN, HLD, ESRD (not on HD) who presented complaining of chest pain earlier this morning. Pt awoke with back pain that radiated to her chest lasting a few hours, Pt states she had associated shortness of breath, lasted for a few hours and now has resolved, Pt ___ denies palpitations, orthopnea, PND, leg edema, n/v/d, fever, chills, hematochezia, hematemesis, melena, BRBPR and other symptoms.   Pt rates the pain XX/10, lasted xx, associated with xx.    In the ED, T=97.9F, /59, HR 68, RR 20, SPO2=95%. Labs notable for Trop T notable for 311. . CKMB 6.8. Cr 4.38. BNP 15388. EKG reveals     Pt is now admitted to cardiac telemetry for further management and ischemic workup.   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 Pt states she also had back pain which is uncommon for her. Patient endorses history of panic attacks in the past, however says that this episode felt different. Upon arrival to the hospital, her symptoms improved tremendously. Currently, pt endorses intermittent dizziness where she feels the room is spinning and nausea but denies chest pain, back pain, palpitations, SOB. Earlier this week she had non bloody diarrhea for 4-5 days and after thanksgiving dinner 2 nights prior, pt endorsed non bloody vomiting that resolved in 1 day. Pt denies fever, dysuria, cough/cold symptoms, orthopnea, leg swelling, PND, hematuria, BRBPR, hematochezia.     In speaking with pt, she became teary eyed stating that she sees no point in living and often feels thoughts of killing herself and has felt this way in the past.    Of note, pt does not know any of her home medications aside from her home insulin regimen.     In the ED, T=97.9F, /59, HR 68, RR 20, SPO2=95%. Labs notable for Trop T notable for 311. . CKMB 6.8. Cr 4.38. BNP 00639. EKG reveals TWI in V1-V6 and ST depressions in V4-V5. Pt was given aspirin 325mg x1 in the ED, Duonebs x1, and nitroglycerin patch.    Pt is now admitted to cardiac telemetry for further management and ischemic workup.       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 Pt states she also had back pain which is uncommon for her. Patient endorses history of panic attacks in the past, however says that this episode felt different. Upon arrival to the hospital, her symptoms improved tremendously. Currently, pt endorses intermittent dizziness where she feels the room is spinning and nausea but denies chest pain, back pain, palpitations, SOB. Earlier this week she had non bloody diarrhea for 4-5 days and after thanksgiving dinner 2 nights prior, pt endorsed non bloody vomiting that resolved in 1 day. Pt denies fever, dysuria, cough/cold symptoms, orthopnea, leg swelling, PND, hematuria, BRBPR, hematochezia.     In speaking with pt, she became teary eyed stating that she sees no point in living and often feels thoughts of killing herself and has felt this way in the past.    Of note, pt does not know any of her home medications aside from her home insulin regimen. Assisted living home was called and could not provide information regarding home medications.     In the ED, T=97.9F, /59, HR 68, RR 20, SPO2=95%. Labs notable for Trop T notable for 311. . CKMB 6.8. Cr 4.38. BNP 61808. EKG reveals TWI in V1-V6 and ST depressions in V4-V5. Pt was given aspirin 325mg x1 in the ED, Duonebs x1, and nitroglycerin patch.    Pt is now admitted to cardiac telemetry for further management and ischemic workup.       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 Pt states she also had back pain which is uncommon for her. Patient endorses history of panic attacks in the past, however says that this episode felt different. Upon arrival to the hospital, her symptoms improved tremendously. Currently, pt endorses intermittent dizziness where she feels the room is spinning and nausea but denies chest pain, back pain, palpitations, SOB. Earlier this week she had non bloody diarrhea for 4-5 days and after thanksgiving dinner 2 nights prior, pt endorsed non bloody vomiting that resolved in 1 day. Pt denies fever, dysuria, cough/cold symptoms, orthopnea, leg swelling, PND, hematuria, BRBPR, hematochezia.     In speaking with pt, she became teary eyed stating that she sees no point in living and often feels thoughts of killing herself and has felt this way in the past.    Of note, pt does not know any of her home medications aside from her home insulin regimen. Assisted living home was called and could not provide information regarding home medications.     In the ED, T=97.9F, /59, HR 68, RR 20, SPO2=95%. Labs notable for Trop T notable for 311. . CKMB 6.8. Cr 4.38. BNP 12370. EKG reveals TWI in V1-V6, II, III, AVF                                                          and ST depressions in V4-V5. Pt was given aspirin 325mg x1 in the ED, Duonebs x1, and nitroglycerin patch.    Pt is now admitted to cardiac telemetry for further management and ischemic workup.       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 Pt states she also had back pain which is uncommon for her. Patient endorses history of panic attacks in the past, however says that this episode felt different. Upon arrival to the hospital, her symptoms improved tremendously. Currently, pt endorses intermittent dizziness where she feels the room is spinning and nausea but denies chest pain, back pain, palpitations, SOB. Earlier this week she had non bloody diarrhea for 4-5 days and after thanksgiving dinner 2 nights prior, pt endorsed non bloody vomiting that resolved in 1 day. Pt endorses orthopnea for many years and leg swelling since she was diagnosed with kidney disease. Pt denies fever, dysuria, cough/cold symptoms, hematuria, BRBPR, hematochezia.     In speaking with pt, she became teary eyed stating that she sees no point in living and often feels thoughts of killing herself and has felt this way in the past.    Of note, pt does not know any of her home medications  In the ED, T=97.9F, /59, HR 68, RR 20, SPO2=95%. Labs notable for Trop T notable for 311. . CKMB 6.8. Cr 4.38. BNP 09977. EKG reveals TWI in V1-V6, II, III, AV and ST depressions in V4-V5. Pt was given aspirin 325mg x1 in the ED, Duonebs x1, and nitroglycerin patch.    Pt is now admitted to cardiac telemetry for further management and ischemic workup.       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 Pt states she also had back pain which is uncommon for her. Patient endorses history of panic attacks in the past, however says that this episode felt different. Upon arrival to the hospital, her symptoms improved tremendously. Currently, pt endorses intermittent dizziness where she feels the room is spinning and nausea but denies chest pain, back pain, palpitations, SOB. Earlier this week she had non bloody diarrhea for 4-5 days and after thanksgiving dinner 2 nights prior, pt endorsed non bloody vomiting that resolved in 1 day. Pt endorses orthopnea for many years and leg swelling since she was diagnosed with kidney disease. Pt denies fever, dysuria, cough/cold symptoms, hematuria, BRBPR, hematochezia.     In speaking with pt, she became teary eyed stating that she been depressed lately and often feels thoughts of killing herself and has felt this way in the past.    Of note, pt does not know any of her home medications. Medication list obtained from paperwork from [facility],    In the ED, T=97.9F, /59, HR 68, RR 20, SPO2=95%. Labs notable for Trop T notable for 311. . CKMB 6.8. Cr 4.38. BNP 36087. EKG reveals TWI in V1-V6, II, III, AV and ST depressions in V4-V5. Pt was given aspirin 325mg x1 in the ED, Duonebs x1, and nitroglycerin patch.    Pt is now admitted to cardiac telemetry for further management and possible ischemic workup.       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 Pt states she also had back pain which is uncommon for her. Patient endorses history of panic attacks in the past, however says that this episode felt different. Upon arrival to the hospital, her symptoms improved tremendously. Currently, pt endorses intermittent dizziness where she feels the room is spinning and nausea but denies chest pain, back pain, palpitations, SOB. Earlier this week she had non bloody diarrhea for 4-5 days and after thanksgiving dinner 2 nights prior, pt endorsed non bloody vomiting that resolved in 1 day. Pt endorses orthopnea for many years and leg swelling since she was diagnosed with kidney disease. Pt denies fever, dysuria, cough/cold symptoms, hematuria, BRBPR, hematochezia.     In speaking with pt, she became teary eyed stating that she been depressed lately and often feels thoughts of killing herself and has felt this way in the past.    Of note, pt does not know any of her home medications. Medication list obtained from paperwork from [facility],    In the ED, T=97.9F, /59, HR 68, RR 20, SPO2=95%. Labs notable for Trop T notable for 311. . CKMB 6.8. Cr 4.38. BNP 36963. EKG reveals TWI in V1-V6, II, III, AV and ST depressions in V4-V5. Pt was given aspirin 325mg x1 in the ED, Duonebs x1, and nitroglycerin patch.    Pt is now admitted to cardiac telemetry for further management of ELBERT on CKD and possible ischemic workup.        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 Pt states she also had back pain which is uncommon for her. Patient endorses history of panic attacks in the past, however says that this episode felt different. Upon arrival to the hospital, her symptoms improved tremendously. Currently, pt endorses intermittent dizziness where she feels the room is spinning and nausea but denies chest pain, back pain, palpitations, SOB. Earlier this week she had non bloody diarrhea for 4-5 days and after thanksgiving dinner 2 nights prior, pt endorsed non bloody vomiting that resolved in 1 day. Pt endorses orthopnea for many years and leg swelling since she was diagnosed with kidney disease. Pt denies fever, dysuria, cough/cold symptoms, hematuria, BRBPR, hematochezia.     In speaking with pt, she became teary eyed stating that she been depressed lately and often feels thoughts of killing herself and has felt this way in the past.    Of note, pt does not know any of her home medications. Medication list obtained from paperwork from [facility],    In the ED, T=97.9F, /59, HR 68, RR 20, SPO2=95%. Labs notable for Trop T notable for 311. . CKMB 6.8. Cr 4.38. BNP 11403. EKG reveals TWI in V1-V6, II, III, AVF and ST depressions in V4-V5. Pt was given aspirin 325mg x1 in the ED, Duonebs x1, and nitroglycerin patch.    Pt is now admitted to cardiac telemetry for further management of ELBERT on CKD and possible ischemic workup.

## 2023-11-25 NOTE — H&P ADULT - NSHPLABSRESULTS_GEN_ALL_CORE
8.2    10.77 )-----------( 237      ( 25 Nov 2023 15:12 )             25.1       11-25    143  |  112<H>  |  54<H>  ----------------------------<  222<H>  4.5   |  16<L>  |  4.38<H>    Ca    8.5      25 Nov 2023 15:12  Mg     1.3     11-25    TPro  6.7  /  Alb  3.7  /  TBili  <0.2  /  DBili  x   /  AST  16  /  ALT  19  /  AlkPhos  96  11-25          CARDIAC MARKERS ( 25 Nov 2023 20:49 )  x     / x     / 178 U/L / x     / 7.0 ng/mL  CARDIAC MARKERS ( 25 Nov 2023 15:12 )  x     / x     / 190 U/L / x     / 6.8 ng/mL        Urinalysis Basic - ( 25 Nov 2023 15:12 )    Color: x / Appearance: x / SG: x / pH: x  Gluc: 222 mg/dL / Ketone: x  / Bili: x / Urobili: x   Blood: x / Protein: x / Nitrite: x   Leuk Esterase: x / RBC: x / WBC x   Sq Epi: x / Non Sq Epi: x / Bacteria: x

## 2023-11-25 NOTE — H&P ADULT - PROBLEM SELECTOR PLAN 1
Pt endorsed episode of heart racing, shortness of breath and anxiety that has resolved.  -EKG reveals TWI in XXX  -Trop T 311->275  -CK/CKMB-   -CXR reveals   - Pt endorsed episode of heart racing, shortness of breath and anxiety that has resolved.  -EKG reveals TWI in XXX, repeat EKG unchanged  -Trop T 311->275  -CK/CKMB-   -CXR wet read reveals diffuse intersitial markings but   -f/u TTE in AM Pt endorsed episode of heart racing, shortness of breath and anxiety that has resolved.  -EKG reveals TWI in V1-V6, II, III, AV and ST depressions in V4-V5, repeat EKG unchanged  -Trop T 311->275  -CK/CKMB- 190/6.8-->178/7.0  -BNP 81054  -CXR wet read reveals diffuse interstitial markings but no pleural effusion or acute infiltrates  -f/u TTE in AM  -troponin elevated could be i/s/o ESRD Pt endorsed episode of heart racing, shortness of breath and anxiety that has resolved.  -EKG reveals TWI in V1-V6, II, III, AV and ST depressions in V4-V5, repeat EKG unchanged  -Trop T 311->275  -CK/CKMB- 190/6.8-->178/7.0  -BNP 37885  -CXR wet read reveals diffuse interstitial markings but no pleural effusion or acute infiltrates  -f/u TTE in AM Pt endorsed episode of heart racing, shortness of breath and anxiety that has resolved.  -EKG reveals TWI in V1-V6, II, III, AVF and ST depressions in V4-V5, repeat EKG unchanged  -Trop T 311->275  -CK/CKMB- 190/6.8-->178/7.0  -BNP 01145  -CXR wet read reveals diffuse interstitial markings but no pleural effusion or acute infiltrates  -f/u TTE in AM

## 2023-11-25 NOTE — H&P ADULT - PROBLEM SELECTOR PLAN 5
last A1c 9.7 (5/2022)  -f/u AM A1c  -continue with MISS      F: None  E: Replete if K<4 or Mag<2  N: DASH Diet  GIppx:   VTEppx:   Dispo:  PT: -f/u AM lipid panel -f/u AM lipid panel  -continue with home atorvastatin 20mg daily

## 2023-11-25 NOTE — H&P ADULT - PROBLEM SELECTOR PLAN 4
Hx of asthma.   -Bibasilar crackles on exam, SpO2 98%  -duonebs q6 hours Hx of asthma.   -Bibasilar crackles on exam, SpO2 98%  -continue with symbicort 2x daily  -duonebs q6 hours

## 2023-11-25 NOTE — H&P ADULT - PROBLEM SELECTOR PLAN 9
last A1c 9.7 (5/2022)  -f/u AM A1c  -continue with lantus 10U at bedtime and lispro 2U with meals (per endocrine rec on last admission)  -confirm insulin dose with [place] in AM  -consider endocrine consult in AM      F: None  E: Replete if K<4 or Mag<2  N: DASH Diet, consistent carb  VTEppx: SCD  GIppx: PPI  Dispo: pending clinical course

## 2023-11-25 NOTE — H&P ADULT - MUSCULOSKELETAL
normal/ROM intact/normal gait/strength 5/5 bilateral upper extremities/strength 5/5 bilateral lower extremities normal/ROM intact/normal gait/strength 5/5 bilateral upper extremities/strength 5/5 bilateral lower extremities/extremities exam

## 2023-11-25 NOTE — H&P ADULT - PROBLEM SELECTOR PLAN 2
Cr=4.38 , unknown baseline last Cr 2.38 (10/2022)  -pt does not follow with a nephrologist,  -f/u urine lytes, renal US in AM  -renal consult in AM  -avoid nephrotoxic agents Cr=4.38 , unknown baseline last Cr 2.38 (10/2022)  -elevated troponin could be a result of ESRD  -f/u urine lytes, renal US in AM  -renal consult in AM  -continue with home calcium acetate 3x daily, sodium bicarbonate Cr=4.38 , unknown baseline last Cr 2.38 (10/2022)  -elevated troponin could be a result of ESRD  -f/u urine lytes, renal US in AM  -renal consult in AM  -continue with home calcium acetate 3x daily, sodium bicarbonate  -holding home lasix 80mg daily due to possible ELBERT

## 2023-11-25 NOTE — BH CONSULTATION LIAISON ASSESSMENT NOTE - HPI (INCLUDE ILLNESS QUALITY, SEVERITY, DURATION, TIMING, CONTEXT, MODIFYING FACTORS, ASSOCIATED SIGNS AND SYMPTOMS)
Patient is a 73 year old female, single with no children, retired  supported by [employer] and [employer] benefits, domiciled at [facility], with PMH of DM, HTN, ESRD not on dialysis, HLD, asthma, and obesity and PPH of MDD and hoarding disorder, who is currently admitted for ACS rule out. Psychiatry was consulted for SI as patient became tearful during medical evaluation stating that she can't take it anymore and has thought about jumping out the window.     On evaluation, patient is cooperative and hyperverbal, requiring frequent redirection. She states that she has felt intermittently suicidal since her 20s, and has always thought about it, but has never attempted to end her life. She states that whenever she does think about it, something stops her - "Maybe an mariah, I don't know." She muses that maybe she's too much of a coward. She reports her current stressors to be triggering these thoughts as she is not happy about her circumstances. She states that she has hoarding disorder and moved into the assisted living facility as her apartment was too cluttered, and it was too overwhelming to live in. She states that she has been diagnosed with hoarding disorder and cannot stop herself from buying a lot of cheap, pretty things that she likes, but she has never been helped with it. She made a friend recently in the assisted living facility who had been sober from substances for 3 years and she was enjoying their friendship, but he recently became depressed due to conflict with his children, and relapsed into smoking again. She reports being very disappointed and feeling very drained as she had spent too much energy trying to help him, and it was making her feel very depressed. This was exacerbated by her recent medical deterioration, as she has been in and out of the hospital for medical reasons the last several months. She also has had a disruption in her psychiatric care, as her new psychiatrist at the assisted living facility left 2 months ago and she has not been getting the right medications. She states that she had been on Cymbalta for 30 years, originally prescribed by her longtime psychiatrist Dr. Hannon in [place], and her new psychiatrist continued it; she was doing well on it. However, in the last two months, she was prescribed something else that she does not know, and only received Cymbalta intermittently. She is not certain about the last time that she took it, but feels a lot better currently as she received some medications in the ER that helped her anxiety. She adamantly denies any SI here in the hospital as she feels safe here away from all of the stressors of the outside world. She does not like the environment of the assisted living facility, as there are a lot of "bums" and people who use drugs there. She feels out of place there and disappointed that she has not been able to make any close friends; she thought she had, but it did not work out. She feels confident that she will no longer be depressed once she is stabilized on Cymbalta again, as it has worked well for her for many years. She reports poor appetite for the last month and intermittent insomnia. Of note, patient fell asleep while speaking with writer; she awoke after several minutes and stated that she often wakes up from sleep because of difficulty breathing. She states that she has heard of the term RADHA and was recommended to get a sleep study, but has never done so. She denies snoring.     Patient denies any current anxiety. She denies any history of AH, VH, paranoia, grandiose delusions, and HI. She denies any alcohol, recreational substance, and tobacco use.

## 2023-11-25 NOTE — BH CONSULTATION LIAISON ASSESSMENT NOTE - RISK ASSESSMENT
Patient is at low acute suicide risk as she denies any SI. Her chronic elevated risk due to longstanding depression with chronic passive SI is mitigated by protective factors of hope, future orientation, help-seeking behavior, prior good response to medication, and medication compliance. She is at low risk of violence as she has not exhibited any threatening or aggressive behavior, and has no history of violence.

## 2023-11-25 NOTE — H&P ADULT - PROBLEM SELECTOR PLAN 8
Hx of iron deficiency anemia   -H/H: 8.2/25.1   -pt denies any signs of bleeding, hematuria, hematochezia  -f/u AM iron studies

## 2023-11-25 NOTE — H&P ADULT - NSICDXFAMILYHX_GEN_ALL_CORE_FT
FAMILY HISTORY:  Family history of diabetes mellitus, Family history of diabetes mellitus (DM)    Father  Still living? No  FH: Parkinson's disease, Age at diagnosis: Age Unknown    Mother  Still living? No  FH: COPD (chronic obstructive pulmonary disease), Age at diagnosis: Age Unknown  FH: heart attack, Age at diagnosis: Age Unknown

## 2023-11-25 NOTE — BH CONSULTATION LIAISON ASSESSMENT NOTE - OTHER PAST PSYCHIATRIC HISTORY (INCLUDE DETAILS REGARDING ONSET, COURSE OF ILLNESS, INPATIENT/OUTPATIENT TREATMENT)
Patient reports seeing an outpatient psychiatrist for 30 years and being prescribed Cymbalta to good effect. She reports only one hospitalization at [facility] 4-5 years ago for 2 weeks prompted by her sister, as she was unable to be reached, and her sister was concerned. She states that she had been taking a walk because she did not like being in her apartment full of clutter.

## 2023-11-25 NOTE — H&P ADULT - CARDIOVASCULAR
normal/regular rate and rhythm/S1 S2 present/no gallops/no rub/no murmur/peripheral edema/pedal edema normal/regular rate and rhythm/S1 S2 present/no gallops/no rub/no murmur/peripheral edema/pedal edema/vascular

## 2023-11-25 NOTE — BH CONSULTATION LIAISON ASSESSMENT NOTE - NSICDXBHSECONDARYDX_PSY_ALL_CORE
ESRD (end stage renal disease)   N18.6  HLD (hyperlipidemia)   E78.5  DM2 (diabetes mellitus, type 2)   E11.9  HTN (hypertension)   I10  Asthma   J45.909  Depression with suicidal ideation   F32.A  Elevated troponin   R79.89  Hoarding disorder   F42.3

## 2023-11-25 NOTE — ED PROVIDER NOTE - OBJECTIVE STATEMENT
73 F w ho DM, HTN, high lipids, ESRD - not on HD- co chest pain this am- this am- awoke with back pain radiating to the chest lasted for a few hours  assoc w sob- lasted for a few hours- now has resolved- no prior mi  no f/c no n/v  no sig exac/allev factors  mod severity

## 2023-11-25 NOTE — H&P ADULT - ASSESSMENT
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 Pt states she also had back pain which is uncommon for her. In the ED, T=97.9F, /59, HR 68, RR 20, SPO2=95%. Labs notable for Trop T notable for 311. . CKMB 6.8. Cr 4.38. BNP 05139. EKG reveals TWI in V1-V6, II, III, AV and ST depressions in V4-V5.     Pt is now admitted to cardiac telemetry for further management and possible ischemic workup.   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 Labs notable for Trop T notable for 311. . CKMB 6.8. Cr 4.38. BNP 00059. EKG reveals TWI in V1-V6, II, III, AV and ST depressions in V4-V5.     Pt is now admitted to cardiac telemetry for further management of ELBERT on CKD and possible ischemic workup.    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 Labs notable for Trop T notable for 311. . CKMB 6.8. Cr 4.38. BNP 84973. EKG reveals TWI in V1-V6, II, III, AVF and ST depressions in V4-V5.     Pt is now admitted to cardiac telemetry for further management of ELBERT on CKD and possible ischemic workup.

## 2023-11-25 NOTE — ED ADULT NURSE NOTE - OBJECTIVE STATEMENT
Pt A&Ox3 and able to speak in complete sentences. Pt breathing even and unlabored with equal chest rise and fall. Pt endorsing chest discomfort and mild sob. pt denies n, v, lightheadedness, dizziness, numbness, tingling, fevers, chills.

## 2023-11-25 NOTE — BH CONSULTATION LIAISON ASSESSMENT NOTE - SUMMARY
Patient is a 73 year old female, single with no children, retired  supported by [employer] and [employer] benefits, domiciled at [facility], with PMH of DM, HTN, ESRD not on dialysis, HLD, asthma, and obesity and PPH of MDD and hoarding disorder, who is currently admitted for ACS rule out. Psychiatry was consulted for SI as patient became tearful during medical evaluation stating that she can't take it anymore and has thought about jumping out the window. On evaluation, patient is cooperative with circumstantial speech. She reports significant recent stressors, including over-investment in a friend, medical illness, and frustration with her new residence in the context of changing providers and discontinuation of Cymbalta, which has worked well for her depression for many years. Patient reports decades-long chronic passive SI with no intent or plan, and reports passive SI in the context of stressors, but none at all while in the hospital as the stressors are not present here. Patient's current presentation is consistent with adjustment disorder with mixed anxiety and depressed mood exacerbated by underlying MDD in the context of medication non-compliance. Patient exhibits good insight and judgment, and is motivated to restart Cymbalta and seek medical treatment.     Recommendations:  - Restart Cymbalta 20 mg QD, to titrate up with outpatient provider   - No 1:1 indicated as patient is at low acute suicide risk   - SW consult for change in residence  - Sleep study to rule out RADHA   - Psychiatrically stable for discharge, follow up with outpatient provider

## 2023-11-25 NOTE — BH CONSULTATION LIAISON ASSESSMENT NOTE - NSBHCHARTREVIEWVS_PSY_A_CORE FT
Vital Signs Last 24 Hrs  T(C): 36.3 (25 Nov 2023 21:11), Max: 36.5 (25 Nov 2023 19:15)  T(F): 97.3 (25 Nov 2023 21:11), Max: 97.7 (25 Nov 2023 19:15)  HR: 61 (25 Nov 2023 21:11) (61 - 76)  BP: 144/68 (25 Nov 2023 21:11) (105/59 - 144/68)  BP(mean): 98 (25 Nov 2023 21:11) (98 - 98)  RR: 18 (25 Nov 2023 21:11) (18 - 20)  SpO2: 99% (25 Nov 2023 21:11) (95% - 99%)    Parameters below as of 25 Nov 2023 21:11  Patient On (Oxygen Delivery Method): room air

## 2023-11-25 NOTE — H&P ADULT - PROBLEM/PLAN-6
Patient here for Phase 3 Cardiac Rehab.  Documentation on Phase 3 hard copy form.   DISPLAY PLAN FREE TEXT

## 2023-11-25 NOTE — ED ADULT TRIAGE NOTE - CHIEF COMPLAINT QUOTE
"I started having chest pain this morning and pain to my neck and pain to my left foot too". patient denies NV, fevers and chills.

## 2023-11-25 NOTE — BH CONSULTATION LIAISON ASSESSMENT NOTE - DESCRIPTION
Patient reports having been  once but kicked him out after having a stillborn child. She remains single.

## 2023-11-25 NOTE — H&P ADULT - PROBLEM SELECTOR PLAN 3
Pt endorsed thoughts of suicide in the past and endorsed feelings of hopelessness  -pt denies any active plan to commit suicide   -psych consulted, pt does not need 1-1 observation  -start cymbalta 20mg daily Pt endorsed thoughts of suicide in the past and endorsed feelings of hopelessness  -pt denies any active plan to commit suicide   -psych consulted, pt does not need 1-1 observation  -continue with home klonopin 0.5mg daily, cymbalta 30mg daily and trazodone 50mg daily Pt endorsed thoughts of suicide/hopeless  -pt denies any active plan to commit suicide   -psych consulted, pt does not need 1-1 observation and deemed pt a low suicide risk  -continue with home klonopin 0.5mg daily, cymbalta 30mg daily and trazodone 50mg daily

## 2023-11-26 DIAGNOSIS — D72.829 ELEVATED WHITE BLOOD CELL COUNT, UNSPECIFIED: ICD-10-CM

## 2023-11-26 DIAGNOSIS — D50.9 IRON DEFICIENCY ANEMIA, UNSPECIFIED: ICD-10-CM

## 2023-11-26 DIAGNOSIS — N17.9 ACUTE KIDNEY FAILURE, UNSPECIFIED: ICD-10-CM

## 2023-11-26 LAB
A1C WITH ESTIMATED AVERAGE GLUCOSE RESULT: 5.7 % — HIGH (ref 4–5.6)
A1C WITH ESTIMATED AVERAGE GLUCOSE RESULT: 5.7 % — HIGH (ref 4–5.6)
ALBUMIN SERPL ELPH-MCNC: 3 G/DL — LOW (ref 3.3–5)
ALBUMIN SERPL ELPH-MCNC: 3.1 G/DL — LOW (ref 3.3–5)
ALBUMIN SERPL ELPH-MCNC: 3.1 G/DL — LOW (ref 3.3–5)
ALBUMIN SERPL ELPH-MCNC: 3.3 G/DL — SIGNIFICANT CHANGE UP (ref 3.3–5)
ALBUMIN SERPL ELPH-MCNC: 3.3 G/DL — SIGNIFICANT CHANGE UP (ref 3.3–5)
ALP SERPL-CCNC: 151 U/L — HIGH (ref 40–120)
ALP SERPL-CCNC: 151 U/L — HIGH (ref 40–120)
ALP SERPL-CCNC: 158 U/L — HIGH (ref 40–120)
ALP SERPL-CCNC: 158 U/L — HIGH (ref 40–120)
ALP SERPL-CCNC: 84 U/L — SIGNIFICANT CHANGE UP (ref 40–120)
ALP SERPL-CCNC: 84 U/L — SIGNIFICANT CHANGE UP (ref 40–120)
ALP SERPL-CCNC: 97 U/L — SIGNIFICANT CHANGE UP (ref 40–120)
ALP SERPL-CCNC: 97 U/L — SIGNIFICANT CHANGE UP (ref 40–120)
ALT FLD-CCNC: 16 U/L — SIGNIFICANT CHANGE UP (ref 10–45)
ALT FLD-CCNC: 16 U/L — SIGNIFICANT CHANGE UP (ref 10–45)
ALT FLD-CCNC: 180 U/L — HIGH (ref 10–45)
ALT FLD-CCNC: 180 U/L — HIGH (ref 10–45)
ALT FLD-CCNC: 191 U/L — HIGH (ref 10–45)
ALT FLD-CCNC: 191 U/L — HIGH (ref 10–45)
ALT FLD-CCNC: 22 U/L — SIGNIFICANT CHANGE UP (ref 10–45)
ALT FLD-CCNC: 22 U/L — SIGNIFICANT CHANGE UP (ref 10–45)
ANION GAP SERPL CALC-SCNC: 14 MMOL/L — SIGNIFICANT CHANGE UP (ref 5–17)
ANION GAP SERPL CALC-SCNC: 14 MMOL/L — SIGNIFICANT CHANGE UP (ref 5–17)
ANION GAP SERPL CALC-SCNC: 16 MMOL/L — SIGNIFICANT CHANGE UP (ref 5–17)
ANION GAP SERPL CALC-SCNC: 16 MMOL/L — SIGNIFICANT CHANGE UP (ref 5–17)
ANION GAP SERPL CALC-SCNC: 19 MMOL/L — HIGH (ref 5–17)
ANION GAP SERPL CALC-SCNC: 19 MMOL/L — HIGH (ref 5–17)
ANION GAP SERPL CALC-SCNC: 20 MMOL/L — HIGH (ref 5–17)
ANION GAP SERPL CALC-SCNC: 20 MMOL/L — HIGH (ref 5–17)
ANION GAP SERPL CALC-SCNC: 22 MMOL/L — HIGH (ref 5–17)
ANION GAP SERPL CALC-SCNC: 22 MMOL/L — HIGH (ref 5–17)
APAP SERPL-MCNC: <5 UG/ML — LOW (ref 10–30)
APAP SERPL-MCNC: <5 UG/ML — LOW (ref 10–30)
APTT BLD: 25.7 SEC — SIGNIFICANT CHANGE UP (ref 24.5–35.6)
APTT BLD: 25.7 SEC — SIGNIFICANT CHANGE UP (ref 24.5–35.6)
APTT BLD: 29.2 SEC — SIGNIFICANT CHANGE UP (ref 24.5–35.6)
APTT BLD: 29.2 SEC — SIGNIFICANT CHANGE UP (ref 24.5–35.6)
AST SERPL-CCNC: 14 U/L — SIGNIFICANT CHANGE UP (ref 10–40)
AST SERPL-CCNC: 14 U/L — SIGNIFICANT CHANGE UP (ref 10–40)
AST SERPL-CCNC: 172 U/L — HIGH (ref 10–40)
AST SERPL-CCNC: 172 U/L — HIGH (ref 10–40)
AST SERPL-CCNC: 205 U/L — HIGH (ref 10–40)
AST SERPL-CCNC: 205 U/L — HIGH (ref 10–40)
AST SERPL-CCNC: 25 U/L — SIGNIFICANT CHANGE UP (ref 10–40)
AST SERPL-CCNC: 25 U/L — SIGNIFICANT CHANGE UP (ref 10–40)
B-OH-BUTYR SERPL-SCNC: 0.4 MMOL/L — SIGNIFICANT CHANGE UP
B-OH-BUTYR SERPL-SCNC: 0.4 MMOL/L — SIGNIFICANT CHANGE UP
BASE EXCESS BLDA CALC-SCNC: -7.6 MMOL/L — LOW (ref -2–3)
BASE EXCESS BLDA CALC-SCNC: -7.6 MMOL/L — LOW (ref -2–3)
BASE EXCESS BLDA CALC-SCNC: -9.2 MMOL/L — LOW (ref -2–3)
BASE EXCESS BLDA CALC-SCNC: -9.2 MMOL/L — LOW (ref -2–3)
BASE EXCESS BLDV CALC-SCNC: -15.5 MMOL/L — LOW (ref -2–3)
BASE EXCESS BLDV CALC-SCNC: -15.5 MMOL/L — LOW (ref -2–3)
BILIRUB SERPL-MCNC: 0.2 MG/DL — SIGNIFICANT CHANGE UP (ref 0.2–1.2)
BILIRUB SERPL-MCNC: 0.2 MG/DL — SIGNIFICANT CHANGE UP (ref 0.2–1.2)
BILIRUB SERPL-MCNC: 0.4 MG/DL — SIGNIFICANT CHANGE UP (ref 0.2–1.2)
BILIRUB SERPL-MCNC: 0.4 MG/DL — SIGNIFICANT CHANGE UP (ref 0.2–1.2)
BILIRUB SERPL-MCNC: <0.2 MG/DL — SIGNIFICANT CHANGE UP (ref 0.2–1.2)
BLD GP AB SCN SERPL QL: NEGATIVE — SIGNIFICANT CHANGE UP
BUN SERPL-MCNC: 48 MG/DL — HIGH (ref 7–23)
BUN SERPL-MCNC: 48 MG/DL — HIGH (ref 7–23)
BUN SERPL-MCNC: 54 MG/DL — HIGH (ref 7–23)
BUN SERPL-MCNC: 54 MG/DL — HIGH (ref 7–23)
BUN SERPL-MCNC: 59 MG/DL — HIGH (ref 7–23)
BUN SERPL-MCNC: 59 MG/DL — HIGH (ref 7–23)
BUN SERPL-MCNC: 60 MG/DL — HIGH (ref 7–23)
CA-I SERPL-SCNC: 1.15 MMOL/L — SIGNIFICANT CHANGE UP (ref 1.15–1.33)
CA-I SERPL-SCNC: 1.15 MMOL/L — SIGNIFICANT CHANGE UP (ref 1.15–1.33)
CALCIUM SERPL-MCNC: 8.4 MG/DL — SIGNIFICANT CHANGE UP (ref 8.4–10.5)
CALCIUM SERPL-MCNC: 8.4 MG/DL — SIGNIFICANT CHANGE UP (ref 8.4–10.5)
CALCIUM SERPL-MCNC: 8.5 MG/DL — SIGNIFICANT CHANGE UP (ref 8.4–10.5)
CALCIUM SERPL-MCNC: 8.5 MG/DL — SIGNIFICANT CHANGE UP (ref 8.4–10.5)
CALCIUM SERPL-MCNC: 8.8 MG/DL — SIGNIFICANT CHANGE UP (ref 8.4–10.5)
CALCIUM SERPL-MCNC: 8.8 MG/DL — SIGNIFICANT CHANGE UP (ref 8.4–10.5)
CALCIUM SERPL-MCNC: 9.2 MG/DL — SIGNIFICANT CHANGE UP (ref 8.4–10.5)
CALCIUM SERPL-MCNC: 9.2 MG/DL — SIGNIFICANT CHANGE UP (ref 8.4–10.5)
CALCIUM SERPL-MCNC: 9.4 MG/DL — SIGNIFICANT CHANGE UP (ref 8.4–10.5)
CALCIUM SERPL-MCNC: 9.4 MG/DL — SIGNIFICANT CHANGE UP (ref 8.4–10.5)
CHLORIDE SERPL-SCNC: 108 MMOL/L — SIGNIFICANT CHANGE UP (ref 96–108)
CHLORIDE SERPL-SCNC: 108 MMOL/L — SIGNIFICANT CHANGE UP (ref 96–108)
CHLORIDE SERPL-SCNC: 109 MMOL/L — HIGH (ref 96–108)
CHLORIDE SERPL-SCNC: 109 MMOL/L — HIGH (ref 96–108)
CHLORIDE SERPL-SCNC: 110 MMOL/L — HIGH (ref 96–108)
CHLORIDE SERPL-SCNC: 114 MMOL/L — HIGH (ref 96–108)
CHLORIDE SERPL-SCNC: 114 MMOL/L — HIGH (ref 96–108)
CHOLEST SERPL-MCNC: 191 MG/DL — SIGNIFICANT CHANGE UP
CHOLEST SERPL-MCNC: 191 MG/DL — SIGNIFICANT CHANGE UP
CK MB CFR SERPL CALC: 5.5 NG/ML — SIGNIFICANT CHANGE UP (ref 0–6.7)
CK MB CFR SERPL CALC: 5.5 NG/ML — SIGNIFICANT CHANGE UP (ref 0–6.7)
CK SERPL-CCNC: 144 U/L — SIGNIFICANT CHANGE UP (ref 25–170)
CK SERPL-CCNC: 144 U/L — SIGNIFICANT CHANGE UP (ref 25–170)
CO2 BLDA-SCNC: 15 MMOL/L — LOW (ref 19–24)
CO2 BLDA-SCNC: 15 MMOL/L — LOW (ref 19–24)
CO2 BLDA-SCNC: 16 MMOL/L — LOW (ref 19–24)
CO2 BLDA-SCNC: 16 MMOL/L — LOW (ref 19–24)
CO2 BLDV-SCNC: 11.4 MMOL/L — LOW (ref 22–26)
CO2 BLDV-SCNC: 11.4 MMOL/L — LOW (ref 22–26)
CO2 SERPL-SCNC: 12 MMOL/L — LOW (ref 22–31)
CO2 SERPL-SCNC: 12 MMOL/L — LOW (ref 22–31)
CO2 SERPL-SCNC: 15 MMOL/L — LOW (ref 22–31)
CO2 SERPL-SCNC: 15 MMOL/L — LOW (ref 22–31)
CO2 SERPL-SCNC: 17 MMOL/L — LOW (ref 22–31)
CO2 SERPL-SCNC: 17 MMOL/L — LOW (ref 22–31)
CO2 SERPL-SCNC: 19 MMOL/L — LOW (ref 22–31)
CO2 SERPL-SCNC: 19 MMOL/L — LOW (ref 22–31)
CO2 SERPL-SCNC: 9 MMOL/L — CRITICAL LOW (ref 22–31)
CO2 SERPL-SCNC: 9 MMOL/L — CRITICAL LOW (ref 22–31)
CREAT SERPL-MCNC: 3.96 MG/DL — HIGH (ref 0.5–1.3)
CREAT SERPL-MCNC: 3.96 MG/DL — HIGH (ref 0.5–1.3)
CREAT SERPL-MCNC: 4.49 MG/DL — HIGH (ref 0.5–1.3)
CREAT SERPL-MCNC: 4.49 MG/DL — HIGH (ref 0.5–1.3)
CREAT SERPL-MCNC: 4.56 MG/DL — HIGH (ref 0.5–1.3)
CREAT SERPL-MCNC: 4.56 MG/DL — HIGH (ref 0.5–1.3)
CREAT SERPL-MCNC: 4.76 MG/DL — HIGH (ref 0.5–1.3)
CREAT SERPL-MCNC: 4.76 MG/DL — HIGH (ref 0.5–1.3)
CREAT SERPL-MCNC: 4.8 MG/DL — HIGH (ref 0.5–1.3)
CREAT SERPL-MCNC: 4.8 MG/DL — HIGH (ref 0.5–1.3)
EGFR: 10 ML/MIN/1.73M2 — LOW
EGFR: 11 ML/MIN/1.73M2 — LOW
EGFR: 11 ML/MIN/1.73M2 — LOW
EGFR: 9 ML/MIN/1.73M2 — LOW
ESTIMATED AVERAGE GLUCOSE: 117 MG/DL — HIGH (ref 68–114)
ESTIMATED AVERAGE GLUCOSE: 117 MG/DL — HIGH (ref 68–114)
FERRITIN SERPL-MCNC: 373 NG/ML — HIGH (ref 13–330)
FERRITIN SERPL-MCNC: 373 NG/ML — HIGH (ref 13–330)
GAS PNL BLDA: SIGNIFICANT CHANGE UP
GAS PNL BLDV: 136 MMOL/L — SIGNIFICANT CHANGE UP (ref 136–145)
GAS PNL BLDV: 136 MMOL/L — SIGNIFICANT CHANGE UP (ref 136–145)
GAS PNL BLDV: SIGNIFICANT CHANGE UP
GLUCOSE BLDC GLUCOMTR-MCNC: 113 MG/DL — HIGH (ref 70–99)
GLUCOSE BLDC GLUCOMTR-MCNC: 113 MG/DL — HIGH (ref 70–99)
GLUCOSE BLDC GLUCOMTR-MCNC: 167 MG/DL — HIGH (ref 70–99)
GLUCOSE BLDC GLUCOMTR-MCNC: 167 MG/DL — HIGH (ref 70–99)
GLUCOSE BLDC GLUCOMTR-MCNC: 261 MG/DL — HIGH (ref 70–99)
GLUCOSE BLDC GLUCOMTR-MCNC: 261 MG/DL — HIGH (ref 70–99)
GLUCOSE BLDC GLUCOMTR-MCNC: 272 MG/DL — HIGH (ref 70–99)
GLUCOSE BLDC GLUCOMTR-MCNC: 272 MG/DL — HIGH (ref 70–99)
GLUCOSE BLDC GLUCOMTR-MCNC: 294 MG/DL — HIGH (ref 70–99)
GLUCOSE BLDC GLUCOMTR-MCNC: 294 MG/DL — HIGH (ref 70–99)
GLUCOSE BLDC GLUCOMTR-MCNC: 341 MG/DL — HIGH (ref 70–99)
GLUCOSE BLDC GLUCOMTR-MCNC: 341 MG/DL — HIGH (ref 70–99)
GLUCOSE BLDC GLUCOMTR-MCNC: 398 MG/DL — HIGH (ref 70–99)
GLUCOSE BLDC GLUCOMTR-MCNC: 398 MG/DL — HIGH (ref 70–99)
GLUCOSE SERPL-MCNC: 195 MG/DL — HIGH (ref 70–99)
GLUCOSE SERPL-MCNC: 195 MG/DL — HIGH (ref 70–99)
GLUCOSE SERPL-MCNC: 291 MG/DL — HIGH (ref 70–99)
GLUCOSE SERPL-MCNC: 291 MG/DL — HIGH (ref 70–99)
GLUCOSE SERPL-MCNC: 295 MG/DL — HIGH (ref 70–99)
GLUCOSE SERPL-MCNC: 295 MG/DL — HIGH (ref 70–99)
GLUCOSE SERPL-MCNC: 359 MG/DL — HIGH (ref 70–99)
GLUCOSE SERPL-MCNC: 359 MG/DL — HIGH (ref 70–99)
GLUCOSE SERPL-MCNC: 98 MG/DL — SIGNIFICANT CHANGE UP (ref 70–99)
GLUCOSE SERPL-MCNC: 98 MG/DL — SIGNIFICANT CHANGE UP (ref 70–99)
HCO3 BLDA-SCNC: 14 MMOL/L — LOW (ref 21–28)
HCO3 BLDA-SCNC: 14 MMOL/L — LOW (ref 21–28)
HCO3 BLDA-SCNC: 15 MMOL/L — LOW (ref 21–28)
HCO3 BLDA-SCNC: 15 MMOL/L — LOW (ref 21–28)
HCO3 BLDV-SCNC: 11 MMOL/L — LOW (ref 22–29)
HCO3 BLDV-SCNC: 11 MMOL/L — LOW (ref 22–29)
HCT VFR BLD CALC: 21.8 % — LOW (ref 34.5–45)
HCT VFR BLD CALC: 23.4 % — LOW (ref 34.5–45)
HCT VFR BLD CALC: 23.4 % — LOW (ref 34.5–45)
HCT VFR BLD CALC: 23.6 % — LOW (ref 34.5–45)
HCT VFR BLD CALC: 23.6 % — LOW (ref 34.5–45)
HDLC SERPL-MCNC: 39 MG/DL — LOW
HDLC SERPL-MCNC: 39 MG/DL — LOW
HGB BLD-MCNC: 7 G/DL — CRITICAL LOW (ref 11.5–15.5)
HGB BLD-MCNC: 7 G/DL — CRITICAL LOW (ref 11.5–15.5)
HGB BLD-MCNC: 7.2 G/DL — LOW (ref 11.5–15.5)
HGB BLD-MCNC: 7.2 G/DL — LOW (ref 11.5–15.5)
HGB BLD-MCNC: 7.5 G/DL — LOW (ref 11.5–15.5)
HGB BLD-MCNC: 7.5 G/DL — LOW (ref 11.5–15.5)
HGB BLD-MCNC: 7.6 G/DL — LOW (ref 11.5–15.5)
HGB BLD-MCNC: 7.6 G/DL — LOW (ref 11.5–15.5)
INR BLD: 0.95 — SIGNIFICANT CHANGE UP (ref 0.85–1.18)
INR BLD: 0.95 — SIGNIFICANT CHANGE UP (ref 0.85–1.18)
IRON SATN MFR SERPL: 27 % — SIGNIFICANT CHANGE UP (ref 14–50)
IRON SATN MFR SERPL: 27 % — SIGNIFICANT CHANGE UP (ref 14–50)
IRON SATN MFR SERPL: 39 UG/DL — SIGNIFICANT CHANGE UP (ref 30–160)
IRON SATN MFR SERPL: 39 UG/DL — SIGNIFICANT CHANGE UP (ref 30–160)
LACTATE SERPL-SCNC: 3 MMOL/L — HIGH (ref 0.5–2)
LACTATE SERPL-SCNC: 3 MMOL/L — HIGH (ref 0.5–2)
LACTATE SERPL-SCNC: 4.3 MMOL/L — CRITICAL HIGH (ref 0.5–2)
LACTATE SERPL-SCNC: 4.3 MMOL/L — CRITICAL HIGH (ref 0.5–2)
LACTATE SERPL-SCNC: 5.9 MMOL/L — CRITICAL HIGH (ref 0.5–2)
LACTATE SERPL-SCNC: 5.9 MMOL/L — CRITICAL HIGH (ref 0.5–2)
LIPID PNL WITH DIRECT LDL SERPL: 106 MG/DL — HIGH
LIPID PNL WITH DIRECT LDL SERPL: 106 MG/DL — HIGH
MAGNESIUM SERPL-MCNC: 1.9 MG/DL — SIGNIFICANT CHANGE UP (ref 1.6–2.6)
MAGNESIUM SERPL-MCNC: 1.9 MG/DL — SIGNIFICANT CHANGE UP (ref 1.6–2.6)
MAGNESIUM SERPL-MCNC: 2 MG/DL — SIGNIFICANT CHANGE UP (ref 1.6–2.6)
MCHC RBC-ENTMCNC: 29 PG — SIGNIFICANT CHANGE UP (ref 27–34)
MCHC RBC-ENTMCNC: 29 PG — SIGNIFICANT CHANGE UP (ref 27–34)
MCHC RBC-ENTMCNC: 29.1 PG — SIGNIFICANT CHANGE UP (ref 27–34)
MCHC RBC-ENTMCNC: 29.1 PG — SIGNIFICANT CHANGE UP (ref 27–34)
MCHC RBC-ENTMCNC: 29.3 PG — SIGNIFICANT CHANGE UP (ref 27–34)
MCHC RBC-ENTMCNC: 29.3 PG — SIGNIFICANT CHANGE UP (ref 27–34)
MCHC RBC-ENTMCNC: 29.5 PG — SIGNIFICANT CHANGE UP (ref 27–34)
MCHC RBC-ENTMCNC: 29.5 PG — SIGNIFICANT CHANGE UP (ref 27–34)
MCHC RBC-ENTMCNC: 31.8 GM/DL — LOW (ref 32–36)
MCHC RBC-ENTMCNC: 31.8 GM/DL — LOW (ref 32–36)
MCHC RBC-ENTMCNC: 32.1 GM/DL — SIGNIFICANT CHANGE UP (ref 32–36)
MCHC RBC-ENTMCNC: 32.1 GM/DL — SIGNIFICANT CHANGE UP (ref 32–36)
MCHC RBC-ENTMCNC: 32.5 GM/DL — SIGNIFICANT CHANGE UP (ref 32–36)
MCHC RBC-ENTMCNC: 32.5 GM/DL — SIGNIFICANT CHANGE UP (ref 32–36)
MCHC RBC-ENTMCNC: 33 GM/DL — SIGNIFICANT CHANGE UP (ref 32–36)
MCHC RBC-ENTMCNC: 33 GM/DL — SIGNIFICANT CHANGE UP (ref 32–36)
MCV RBC AUTO: 89.3 FL — SIGNIFICANT CHANGE UP (ref 80–100)
MCV RBC AUTO: 89.3 FL — SIGNIFICANT CHANGE UP (ref 80–100)
MCV RBC AUTO: 89.7 FL — SIGNIFICANT CHANGE UP (ref 80–100)
MCV RBC AUTO: 89.7 FL — SIGNIFICANT CHANGE UP (ref 80–100)
MCV RBC AUTO: 90.5 FL — SIGNIFICANT CHANGE UP (ref 80–100)
MCV RBC AUTO: 90.5 FL — SIGNIFICANT CHANGE UP (ref 80–100)
MCV RBC AUTO: 92.2 FL — SIGNIFICANT CHANGE UP (ref 80–100)
MCV RBC AUTO: 92.2 FL — SIGNIFICANT CHANGE UP (ref 80–100)
NON HDL CHOLESTEROL: 152 MG/DL — HIGH
NON HDL CHOLESTEROL: 152 MG/DL — HIGH
NRBC # BLD: 0 /100 WBCS — SIGNIFICANT CHANGE UP (ref 0–0)
OSMOLALITY SERPL: 327 MOSM/KG — HIGH (ref 280–301)
OSMOLALITY SERPL: 327 MOSM/KG — HIGH (ref 280–301)
PCO2 BLDA: 21 MMHG — LOW (ref 32–45)
PCO2 BLDA: 21 MMHG — LOW (ref 32–45)
PCO2 BLDA: 29 MMHG — LOW (ref 32–45)
PCO2 BLDA: 29 MMHG — LOW (ref 32–45)
PCO2 BLDV: 26 MMHG — LOW (ref 39–42)
PCO2 BLDV: 26 MMHG — LOW (ref 39–42)
PH BLDA: 7.33 — LOW (ref 7.35–7.45)
PH BLDA: 7.33 — LOW (ref 7.35–7.45)
PH BLDA: 7.44 — SIGNIFICANT CHANGE UP (ref 7.35–7.45)
PH BLDA: 7.44 — SIGNIFICANT CHANGE UP (ref 7.35–7.45)
PH BLDV: 7.22 — LOW (ref 7.32–7.43)
PH BLDV: 7.22 — LOW (ref 7.32–7.43)
PHOSPHATE SERPL-MCNC: 5.2 MG/DL — HIGH (ref 2.5–4.5)
PHOSPHATE SERPL-MCNC: 5.2 MG/DL — HIGH (ref 2.5–4.5)
PHOSPHATE SERPL-MCNC: 5.6 MG/DL — HIGH (ref 2.5–4.5)
PHOSPHATE SERPL-MCNC: 5.6 MG/DL — HIGH (ref 2.5–4.5)
PHOSPHATE SERPL-MCNC: 6.5 MG/DL — HIGH (ref 2.5–4.5)
PHOSPHATE SERPL-MCNC: 6.5 MG/DL — HIGH (ref 2.5–4.5)
PLATELET # BLD AUTO: 205 K/UL — SIGNIFICANT CHANGE UP (ref 150–400)
PLATELET # BLD AUTO: 205 K/UL — SIGNIFICANT CHANGE UP (ref 150–400)
PLATELET # BLD AUTO: 211 K/UL — SIGNIFICANT CHANGE UP (ref 150–400)
PLATELET # BLD AUTO: 211 K/UL — SIGNIFICANT CHANGE UP (ref 150–400)
PLATELET # BLD AUTO: 221 K/UL — SIGNIFICANT CHANGE UP (ref 150–400)
PLATELET # BLD AUTO: 221 K/UL — SIGNIFICANT CHANGE UP (ref 150–400)
PLATELET # BLD AUTO: 268 K/UL — SIGNIFICANT CHANGE UP (ref 150–400)
PLATELET # BLD AUTO: 268 K/UL — SIGNIFICANT CHANGE UP (ref 150–400)
PO2 BLDA: 138 MMHG — HIGH (ref 83–108)
PO2 BLDA: 138 MMHG — HIGH (ref 83–108)
PO2 BLDA: 65 MMHG — LOW (ref 83–108)
PO2 BLDA: 65 MMHG — LOW (ref 83–108)
PO2 BLDV: 38 MMHG — SIGNIFICANT CHANGE UP (ref 25–45)
PO2 BLDV: 38 MMHG — SIGNIFICANT CHANGE UP (ref 25–45)
POTASSIUM BLDV-SCNC: 6.8 MMOL/L — CRITICAL HIGH (ref 3.5–5.1)
POTASSIUM BLDV-SCNC: 6.8 MMOL/L — CRITICAL HIGH (ref 3.5–5.1)
POTASSIUM SERPL-MCNC: 4.2 MMOL/L — SIGNIFICANT CHANGE UP (ref 3.5–5.3)
POTASSIUM SERPL-MCNC: 4.2 MMOL/L — SIGNIFICANT CHANGE UP (ref 3.5–5.3)
POTASSIUM SERPL-MCNC: 5 MMOL/L — SIGNIFICANT CHANGE UP (ref 3.5–5.3)
POTASSIUM SERPL-MCNC: 5 MMOL/L — SIGNIFICANT CHANGE UP (ref 3.5–5.3)
POTASSIUM SERPL-MCNC: 5.2 MMOL/L — SIGNIFICANT CHANGE UP (ref 3.5–5.3)
POTASSIUM SERPL-MCNC: 5.2 MMOL/L — SIGNIFICANT CHANGE UP (ref 3.5–5.3)
POTASSIUM SERPL-MCNC: 5.3 MMOL/L — SIGNIFICANT CHANGE UP (ref 3.5–5.3)
POTASSIUM SERPL-MCNC: 5.3 MMOL/L — SIGNIFICANT CHANGE UP (ref 3.5–5.3)
POTASSIUM SERPL-MCNC: 6 MMOL/L — HIGH (ref 3.5–5.3)
POTASSIUM SERPL-MCNC: 6 MMOL/L — HIGH (ref 3.5–5.3)
POTASSIUM SERPL-SCNC: 4.2 MMOL/L — SIGNIFICANT CHANGE UP (ref 3.5–5.3)
POTASSIUM SERPL-SCNC: 4.2 MMOL/L — SIGNIFICANT CHANGE UP (ref 3.5–5.3)
POTASSIUM SERPL-SCNC: 5 MMOL/L — SIGNIFICANT CHANGE UP (ref 3.5–5.3)
POTASSIUM SERPL-SCNC: 5 MMOL/L — SIGNIFICANT CHANGE UP (ref 3.5–5.3)
POTASSIUM SERPL-SCNC: 5.2 MMOL/L — SIGNIFICANT CHANGE UP (ref 3.5–5.3)
POTASSIUM SERPL-SCNC: 5.2 MMOL/L — SIGNIFICANT CHANGE UP (ref 3.5–5.3)
POTASSIUM SERPL-SCNC: 5.3 MMOL/L — SIGNIFICANT CHANGE UP (ref 3.5–5.3)
POTASSIUM SERPL-SCNC: 5.3 MMOL/L — SIGNIFICANT CHANGE UP (ref 3.5–5.3)
POTASSIUM SERPL-SCNC: 6 MMOL/L — HIGH (ref 3.5–5.3)
POTASSIUM SERPL-SCNC: 6 MMOL/L — HIGH (ref 3.5–5.3)
PROCALCITONIN SERPL-MCNC: 0.37 NG/ML — HIGH (ref 0.02–0.1)
PROCALCITONIN SERPL-MCNC: 0.37 NG/ML — HIGH (ref 0.02–0.1)
PROT SERPL-MCNC: 6.1 G/DL — SIGNIFICANT CHANGE UP (ref 6–8.3)
PROTHROM AB SERPL-ACNC: 10.9 SEC — SIGNIFICANT CHANGE UP (ref 9.5–13)
PROTHROM AB SERPL-ACNC: 10.9 SEC — SIGNIFICANT CHANGE UP (ref 9.5–13)
RBC # BLD: 2.41 M/UL — LOW (ref 3.8–5.2)
RBC # BLD: 2.41 M/UL — LOW (ref 3.8–5.2)
RBC # BLD: 2.44 M/UL — LOW (ref 3.8–5.2)
RBC # BLD: 2.44 M/UL — LOW (ref 3.8–5.2)
RBC # BLD: 2.56 M/UL — LOW (ref 3.8–5.2)
RBC # BLD: 2.56 M/UL — LOW (ref 3.8–5.2)
RBC # BLD: 2.61 M/UL — LOW (ref 3.8–5.2)
RBC # BLD: 2.61 M/UL — LOW (ref 3.8–5.2)
RBC # FLD: 13.1 % — SIGNIFICANT CHANGE UP (ref 10.3–14.5)
RBC # FLD: 13.1 % — SIGNIFICANT CHANGE UP (ref 10.3–14.5)
RBC # FLD: 13.2 % — SIGNIFICANT CHANGE UP (ref 10.3–14.5)
RBC # FLD: 13.2 % — SIGNIFICANT CHANGE UP (ref 10.3–14.5)
RBC # FLD: 13.4 % — SIGNIFICANT CHANGE UP (ref 10.3–14.5)
RH IG SCN BLD-IMP: POSITIVE — SIGNIFICANT CHANGE UP
SALICYLATES SERPL-MCNC: <0.3 MG/DL — LOW (ref 2.8–20)
SALICYLATES SERPL-MCNC: <0.3 MG/DL — LOW (ref 2.8–20)
SAO2 % BLDA: 91.7 % — LOW (ref 94–98)
SAO2 % BLDA: 91.7 % — LOW (ref 94–98)
SAO2 % BLDA: 97.7 % — SIGNIFICANT CHANGE UP (ref 94–98)
SAO2 % BLDA: 97.7 % — SIGNIFICANT CHANGE UP (ref 94–98)
SAO2 % BLDV: 57.9 % — LOW (ref 67–88)
SAO2 % BLDV: 57.9 % — LOW (ref 67–88)
SODIUM SERPL-SCNC: 139 MMOL/L — SIGNIFICANT CHANGE UP (ref 135–145)
SODIUM SERPL-SCNC: 139 MMOL/L — SIGNIFICANT CHANGE UP (ref 135–145)
SODIUM SERPL-SCNC: 141 MMOL/L — SIGNIFICANT CHANGE UP (ref 135–145)
SODIUM SERPL-SCNC: 141 MMOL/L — SIGNIFICANT CHANGE UP (ref 135–145)
SODIUM SERPL-SCNC: 144 MMOL/L — SIGNIFICANT CHANGE UP (ref 135–145)
SODIUM SERPL-SCNC: 144 MMOL/L — SIGNIFICANT CHANGE UP (ref 135–145)
SODIUM SERPL-SCNC: 145 MMOL/L — SIGNIFICANT CHANGE UP (ref 135–145)
TIBC SERPL-MCNC: 146 UG/DL — LOW (ref 220–430)
TIBC SERPL-MCNC: 146 UG/DL — LOW (ref 220–430)
TRIGL SERPL-MCNC: 228 MG/DL — HIGH
TRIGL SERPL-MCNC: 228 MG/DL — HIGH
TROPONIN T, HIGH SENSITIVITY RESULT: 273 NG/L — CRITICAL HIGH (ref 0–51)
TROPONIN T, HIGH SENSITIVITY RESULT: 273 NG/L — CRITICAL HIGH (ref 0–51)
UIBC SERPL-MCNC: 107 UG/DL — LOW (ref 110–370)
UIBC SERPL-MCNC: 107 UG/DL — LOW (ref 110–370)
WBC # BLD: 10.48 K/UL — SIGNIFICANT CHANGE UP (ref 3.8–10.5)
WBC # BLD: 10.48 K/UL — SIGNIFICANT CHANGE UP (ref 3.8–10.5)
WBC # BLD: 13.2 K/UL — HIGH (ref 3.8–10.5)
WBC # BLD: 13.2 K/UL — HIGH (ref 3.8–10.5)
WBC # BLD: 18.42 K/UL — HIGH (ref 3.8–10.5)
WBC # BLD: 18.42 K/UL — HIGH (ref 3.8–10.5)
WBC # BLD: 9.76 K/UL — SIGNIFICANT CHANGE UP (ref 3.8–10.5)
WBC # BLD: 9.76 K/UL — SIGNIFICANT CHANGE UP (ref 3.8–10.5)
WBC # FLD AUTO: 10.48 K/UL — SIGNIFICANT CHANGE UP (ref 3.8–10.5)
WBC # FLD AUTO: 10.48 K/UL — SIGNIFICANT CHANGE UP (ref 3.8–10.5)
WBC # FLD AUTO: 13.2 K/UL — HIGH (ref 3.8–10.5)
WBC # FLD AUTO: 13.2 K/UL — HIGH (ref 3.8–10.5)
WBC # FLD AUTO: 18.42 K/UL — HIGH (ref 3.8–10.5)
WBC # FLD AUTO: 18.42 K/UL — HIGH (ref 3.8–10.5)
WBC # FLD AUTO: 9.76 K/UL — SIGNIFICANT CHANGE UP (ref 3.8–10.5)
WBC # FLD AUTO: 9.76 K/UL — SIGNIFICANT CHANGE UP (ref 3.8–10.5)

## 2023-11-26 PROCEDURE — 93010 ELECTROCARDIOGRAM REPORT: CPT | Mod: 76

## 2023-11-26 PROCEDURE — 76937 US GUIDE VASCULAR ACCESS: CPT | Mod: 26

## 2023-11-26 PROCEDURE — 31500 INSERT EMERGENCY AIRWAY: CPT

## 2023-11-26 PROCEDURE — 71045 X-RAY EXAM CHEST 1 VIEW: CPT | Mod: 26

## 2023-11-26 PROCEDURE — 99291 CRITICAL CARE FIRST HOUR: CPT | Mod: 25

## 2023-11-26 PROCEDURE — 74018 RADEX ABDOMEN 1 VIEW: CPT | Mod: 26

## 2023-11-26 PROCEDURE — 36600 WITHDRAWAL OF ARTERIAL BLOOD: CPT | Mod: 59

## 2023-11-26 PROCEDURE — 36000 PLACE NEEDLE IN VEIN: CPT | Mod: 59

## 2023-11-26 PROCEDURE — 43752 NASAL/OROGASTRIC W/TUBE PLMT: CPT

## 2023-11-26 PROCEDURE — 99223 1ST HOSP IP/OBS HIGH 75: CPT

## 2023-11-26 RX ORDER — FENTANYL CITRATE 50 UG/ML
50 INJECTION INTRAVENOUS ONCE
Refills: 0 | Status: DISCONTINUED | OUTPATIENT
Start: 2023-11-26 | End: 2023-11-26

## 2023-11-26 RX ORDER — FENTANYL CITRATE 50 UG/ML
100 INJECTION INTRAVENOUS ONCE
Refills: 0 | Status: DISCONTINUED | OUTPATIENT
Start: 2023-11-26 | End: 2023-11-26

## 2023-11-26 RX ORDER — ATROPINE SULFATE 0.1 MG/ML
0.5 SYRINGE (ML) INJECTION ONCE
Refills: 0 | Status: COMPLETED | OUTPATIENT
Start: 2023-11-26 | End: 2023-11-26

## 2023-11-26 RX ORDER — INSULIN LISPRO 100/ML
VIAL (ML) SUBCUTANEOUS AT BEDTIME
Refills: 0 | Status: DISCONTINUED | OUTPATIENT
Start: 2023-11-26 | End: 2023-11-26

## 2023-11-26 RX ORDER — PROPOFOL 10 MG/ML
10 INJECTION, EMULSION INTRAVENOUS
Qty: 500 | Refills: 0 | Status: DISCONTINUED | OUTPATIENT
Start: 2023-11-26 | End: 2023-11-27

## 2023-11-26 RX ORDER — INSULIN GLARGINE 100 [IU]/ML
10 INJECTION, SOLUTION SUBCUTANEOUS AT BEDTIME
Refills: 0 | Status: DISCONTINUED | OUTPATIENT
Start: 2023-11-26 | End: 2023-11-26

## 2023-11-26 RX ORDER — DEXTROSE 50 % IN WATER 50 %
12.5 SYRINGE (ML) INTRAVENOUS ONCE
Refills: 0 | Status: DISCONTINUED | OUTPATIENT
Start: 2023-11-26 | End: 2023-12-07

## 2023-11-26 RX ORDER — ETOMIDATE 2 MG/ML
25 INJECTION INTRAVENOUS ONCE
Refills: 0 | Status: COMPLETED | OUTPATIENT
Start: 2023-11-26 | End: 2023-11-26

## 2023-11-26 RX ORDER — AZITHROMYCIN 500 MG/1
500 TABLET, FILM COATED ORAL EVERY 24 HOURS
Refills: 0 | Status: DISCONTINUED | OUTPATIENT
Start: 2023-11-26 | End: 2023-11-26

## 2023-11-26 RX ORDER — SODIUM CHLORIDE 9 MG/ML
1000 INJECTION, SOLUTION INTRAVENOUS
Refills: 0 | Status: DISCONTINUED | OUTPATIENT
Start: 2023-11-26 | End: 2023-12-07

## 2023-11-26 RX ORDER — ACETAMINOPHEN 500 MG
975 TABLET ORAL ONCE
Refills: 0 | Status: COMPLETED | OUTPATIENT
Start: 2023-11-26 | End: 2023-11-26

## 2023-11-26 RX ORDER — CEFTRIAXONE 500 MG/1
2000 INJECTION, POWDER, FOR SOLUTION INTRAMUSCULAR; INTRAVENOUS EVERY 24 HOURS
Refills: 0 | Status: COMPLETED | OUTPATIENT
Start: 2023-11-26 | End: 2023-11-30

## 2023-11-26 RX ORDER — HEPARIN SODIUM 5000 [USP'U]/ML
5000 INJECTION INTRAVENOUS; SUBCUTANEOUS EVERY 8 HOURS
Refills: 0 | Status: DISCONTINUED | OUTPATIENT
Start: 2023-11-26 | End: 2023-12-07

## 2023-11-26 RX ORDER — INSULIN LISPRO 100/ML
2 VIAL (ML) SUBCUTANEOUS
Refills: 0 | Status: DISCONTINUED | OUTPATIENT
Start: 2023-11-26 | End: 2023-11-26

## 2023-11-26 RX ORDER — NOREPINEPHRINE BITARTRATE/D5W 8 MG/250ML
0.05 PLASTIC BAG, INJECTION (ML) INTRAVENOUS
Qty: 8 | Refills: 0 | Status: DISCONTINUED | OUTPATIENT
Start: 2023-11-26 | End: 2023-11-27

## 2023-11-26 RX ORDER — CHLORHEXIDINE GLUCONATE 213 G/1000ML
15 SOLUTION TOPICAL EVERY 12 HOURS
Refills: 0 | Status: DISCONTINUED | OUTPATIENT
Start: 2023-11-26 | End: 2023-11-27

## 2023-11-26 RX ORDER — MIDAZOLAM HYDROCHLORIDE 1 MG/ML
4 INJECTION, SOLUTION INTRAMUSCULAR; INTRAVENOUS ONCE
Refills: 0 | Status: DISCONTINUED | OUTPATIENT
Start: 2023-11-26 | End: 2023-11-26

## 2023-11-26 RX ORDER — INSULIN HUMAN 100 [IU]/ML
5 INJECTION, SOLUTION SUBCUTANEOUS ONCE
Refills: 0 | Status: DISCONTINUED | OUTPATIENT
Start: 2023-11-26 | End: 2023-11-26

## 2023-11-26 RX ORDER — CHLORHEXIDINE GLUCONATE 213 G/1000ML
1 SOLUTION TOPICAL
Refills: 0 | Status: DISCONTINUED | OUTPATIENT
Start: 2023-11-26 | End: 2023-12-07

## 2023-11-26 RX ORDER — METOPROLOL TARTRATE 50 MG
25 TABLET ORAL DAILY
Refills: 0 | Status: DISCONTINUED | OUTPATIENT
Start: 2023-11-26 | End: 2023-11-26

## 2023-11-26 RX ORDER — CALCIUM GLUCONATE 100 MG/ML
1 VIAL (ML) INTRAVENOUS ONCE
Refills: 0 | Status: COMPLETED | OUTPATIENT
Start: 2023-11-26 | End: 2023-11-26

## 2023-11-26 RX ORDER — ALBUTEROL 90 UG/1
2.5 AEROSOL, METERED ORAL ONCE
Refills: 0 | Status: COMPLETED | OUTPATIENT
Start: 2023-11-26 | End: 2023-11-26

## 2023-11-26 RX ORDER — DEXTROSE 50 % IN WATER 50 %
25 SYRINGE (ML) INTRAVENOUS ONCE
Refills: 0 | Status: DISCONTINUED | OUTPATIENT
Start: 2023-11-26 | End: 2023-12-07

## 2023-11-26 RX ORDER — ACETAMINOPHEN 500 MG
650 TABLET ORAL EVERY 6 HOURS
Refills: 0 | Status: DISCONTINUED | OUTPATIENT
Start: 2023-11-26 | End: 2023-12-07

## 2023-11-26 RX ORDER — GLUCAGON INJECTION, SOLUTION 0.5 MG/.1ML
1 INJECTION, SOLUTION SUBCUTANEOUS ONCE
Refills: 0 | Status: DISCONTINUED | OUTPATIENT
Start: 2023-11-26 | End: 2023-12-07

## 2023-11-26 RX ORDER — SODIUM CHLORIDE 9 MG/ML
10 INJECTION INTRAMUSCULAR; INTRAVENOUS; SUBCUTANEOUS
Refills: 0 | Status: DISCONTINUED | OUTPATIENT
Start: 2023-11-26 | End: 2023-11-29

## 2023-11-26 RX ORDER — ACETAMINOPHEN 500 MG
1000 TABLET ORAL ONCE
Refills: 0 | Status: COMPLETED | OUTPATIENT
Start: 2023-11-26 | End: 2023-11-26

## 2023-11-26 RX ORDER — FUROSEMIDE 40 MG
40 TABLET ORAL ONCE
Refills: 0 | Status: COMPLETED | OUTPATIENT
Start: 2023-11-26 | End: 2023-11-26

## 2023-11-26 RX ORDER — PROPOFOL 10 MG/ML
10 INJECTION, EMULSION INTRAVENOUS ONCE
Refills: 0 | Status: COMPLETED | OUTPATIENT
Start: 2023-11-26 | End: 2023-11-26

## 2023-11-26 RX ORDER — DEXTROSE 50 % IN WATER 50 %
15 SYRINGE (ML) INTRAVENOUS ONCE
Refills: 0 | Status: DISCONTINUED | OUTPATIENT
Start: 2023-11-26 | End: 2023-12-07

## 2023-11-26 RX ORDER — BUMETANIDE 0.25 MG/ML
2 INJECTION INTRAMUSCULAR; INTRAVENOUS ONCE
Refills: 0 | Status: COMPLETED | OUTPATIENT
Start: 2023-11-26 | End: 2023-11-26

## 2023-11-26 RX ORDER — CEFTRIAXONE 500 MG/1
1000 INJECTION, POWDER, FOR SOLUTION INTRAMUSCULAR; INTRAVENOUS EVERY 24 HOURS
Refills: 0 | Status: DISCONTINUED | OUTPATIENT
Start: 2023-11-26 | End: 2023-11-26

## 2023-11-26 RX ORDER — FENTANYL CITRATE 50 UG/ML
0.5 INJECTION INTRAVENOUS
Qty: 2500 | Refills: 0 | Status: DISCONTINUED | OUTPATIENT
Start: 2023-11-26 | End: 2023-11-26

## 2023-11-26 RX ORDER — INSULIN LISPRO 100/ML
VIAL (ML) SUBCUTANEOUS
Refills: 0 | Status: DISCONTINUED | OUTPATIENT
Start: 2023-11-26 | End: 2023-11-26

## 2023-11-26 RX ORDER — INSULIN LISPRO 100/ML
VIAL (ML) SUBCUTANEOUS EVERY 6 HOURS
Refills: 0 | Status: DISCONTINUED | OUTPATIENT
Start: 2023-11-26 | End: 2023-12-07

## 2023-11-26 RX ADMIN — Medication 3 MILLILITER(S): at 11:08

## 2023-11-26 RX ADMIN — ALBUTEROL 2.5 MILLIGRAM(S): 90 AEROSOL, METERED ORAL at 15:02

## 2023-11-26 RX ADMIN — FENTANYL CITRATE 50 MICROGRAM(S): 50 INJECTION INTRAVENOUS at 18:52

## 2023-11-26 RX ADMIN — Medication 100 GRAM(S): at 15:06

## 2023-11-26 RX ADMIN — Medication 7.65 MICROGRAM(S)/KG/MIN: at 18:48

## 2023-11-26 RX ADMIN — PANTOPRAZOLE SODIUM 40 MILLIGRAM(S): 20 TABLET, DELAYED RELEASE ORAL at 07:01

## 2023-11-26 RX ADMIN — Medication 90 MILLIGRAM(S): at 07:01

## 2023-11-26 RX ADMIN — Medication 3 MILLILITER(S): at 05:53

## 2023-11-26 RX ADMIN — Medication 40 MILLIGRAM(S): at 15:01

## 2023-11-26 RX ADMIN — Medication 975 MILLIGRAM(S): at 10:48

## 2023-11-26 RX ADMIN — Medication 667 MILLIGRAM(S): at 12:26

## 2023-11-26 RX ADMIN — Medication 3 MILLILITER(S): at 22:38

## 2023-11-26 RX ADMIN — Medication 3 MILLILITER(S): at 00:05

## 2023-11-26 RX ADMIN — PROPOFOL 10 MILLIGRAM(S): 10 INJECTION, EMULSION INTRAVENOUS at 15:35

## 2023-11-26 RX ADMIN — DULOXETINE HYDROCHLORIDE 30 MILLIGRAM(S): 30 CAPSULE, DELAYED RELEASE ORAL at 12:26

## 2023-11-26 RX ADMIN — Medication 2: at 23:28

## 2023-11-26 RX ADMIN — Medication 0.5 MILLIGRAM(S): at 12:26

## 2023-11-26 RX ADMIN — Medication 6: at 19:09

## 2023-11-26 RX ADMIN — MIDAZOLAM HYDROCHLORIDE 4 MILLIGRAM(S): 1 INJECTION, SOLUTION INTRAMUSCULAR; INTRAVENOUS at 15:33

## 2023-11-26 RX ADMIN — FENTANYL CITRATE 50 MICROGRAM(S): 50 INJECTION INTRAVENOUS at 19:02

## 2023-11-26 RX ADMIN — CEFTRIAXONE 100 MILLIGRAM(S): 500 INJECTION, POWDER, FOR SOLUTION INTRAMUSCULAR; INTRAVENOUS at 18:37

## 2023-11-26 RX ADMIN — ETOMIDATE 25 MILLIGRAM(S): 2 INJECTION INTRAVENOUS at 15:32

## 2023-11-26 RX ADMIN — HEPARIN SODIUM 5000 UNIT(S): 5000 INJECTION INTRAVENOUS; SUBCUTANEOUS at 22:23

## 2023-11-26 RX ADMIN — Medication 2 UNIT(S): at 08:33

## 2023-11-26 RX ADMIN — Medication 7.65 MICROGRAM(S)/KG/MIN: at 22:24

## 2023-11-26 RX ADMIN — PROPOFOL 4.9 MICROGRAM(S)/KG/MIN: 10 INJECTION, EMULSION INTRAVENOUS at 19:13

## 2023-11-26 RX ADMIN — BUDESONIDE AND FORMOTEROL FUMARATE DIHYDRATE 2 PUFF(S): 160; 4.5 AEROSOL RESPIRATORY (INHALATION) at 10:49

## 2023-11-26 RX ADMIN — ATORVASTATIN CALCIUM 20 MILLIGRAM(S): 80 TABLET, FILM COATED ORAL at 22:23

## 2023-11-26 RX ADMIN — BUMETANIDE 2 MILLIGRAM(S): 0.25 INJECTION INTRAMUSCULAR; INTRAVENOUS at 11:04

## 2023-11-26 RX ADMIN — Medication 25 MILLIGRAM(S): at 07:01

## 2023-11-26 RX ADMIN — FENTANYL CITRATE 50 MICROGRAM(S): 50 INJECTION INTRAVENOUS at 18:47

## 2023-11-26 RX ADMIN — Medication 1 PATCH: at 06:59

## 2023-11-26 RX ADMIN — Medication 667 MILLIGRAM(S): at 08:35

## 2023-11-26 RX ADMIN — Medication 975 MILLIGRAM(S): at 11:48

## 2023-11-26 RX ADMIN — Medication 50 MILLIGRAM(S): at 12:26

## 2023-11-26 RX ADMIN — PROPOFOL 4.9 MICROGRAM(S)/KG/MIN: 10 INJECTION, EMULSION INTRAVENOUS at 19:27

## 2023-11-26 RX ADMIN — Medication 3 MILLILITER(S): at 18:39

## 2023-11-26 RX ADMIN — Medication 0.5 MILLIGRAM(S): at 15:23

## 2023-11-26 RX ADMIN — FENTANYL CITRATE 50 MICROGRAM(S): 50 INJECTION INTRAVENOUS at 18:37

## 2023-11-26 RX ADMIN — Medication 650 MILLIGRAM(S): at 07:01

## 2023-11-26 RX ADMIN — Medication 50 MILLIGRAM(S): at 08:31

## 2023-11-26 NOTE — PROVIDER CONTACT NOTE (OTHER) - ASSESSMENT
Pt denies CP or SOB at this time. VSS
No active signs of bleeding noted
Pt continues to appear agitated, unable to stay still.

## 2023-11-26 NOTE — PROGRESS NOTE ADULT - ASSESSMENT
72 yo F WITH HTN, HLD, uncontrolled DM with multiple amputations of toes iso OM; CAD with stress test planned for 11/28; CKD stage 5 presented with SOB - admitted with fluid overload iso of renal failure requiring HD; was a step up on 11/26 from cards tele - patient obtunded requiring intubation and now requiring CVVHD    NEURO  #Intubated and sedated  Intubated for airway protection.   - SAT/SBT as appropriate     PSYCH  #Depression with suicidal ideation  Pt endorsed thoughts of suicide/hopeless. Denies any active plan to commit suicide. Psych consulted, no need for 1-1 obs  - c/w home klonipin 0.5mg QD  - c/w cymbalta 30mg QD  - c/w trazodone 50 QD  - holding PO meds while intubated    PULM  #Asthma   Hx of asthma  - Holding symbicort while NPO and intubated  - C/w duonebs q6h     CARDIO  #Elevated troponin  Pt endorsed episode of heart racing, shortness of breath and anxiety that has resolved   EKG TWI in V1-V6, II, III, AVF and ST depressions in V4-V5, repeat EKG unchanged  - Trops, CKMB, CK peak, can stop trending  - F/u TTE    #HLD  - c/w atorvastatin 20 QD    #HTN  On hydral 50 TID, nifedipine 90 QD, toprol 25 QD  - holding BP meds while intubated    GI    RENAL  #ELBERT on CKD  #Urgent dialysis  Crt 4.38, uptrending. Admitted to MICU for urgent dialysis.  - F/u renal recs  - f/u urine studies   - Strict I/Os  - Renal diet when appropriate    ENDO  #DM2  A1C 9.7 2022. A1c this admission 5.7.   - c/w lantus 10u QHS, lispro 2u with meals     ID  #Leukocytosis   Uptrending   - C/w CTX 2g QD    HEME  #DIMITRIS  Hx of DIMITRIS. Iron studies with low TIBC indicative of AOCD  - Transfuse <7     MSK  APOLONIA    PROPHYLAXIS  F: None  E: Replete if K<4 or Mag<2  N: NPO while intubated  VTEppx: SCD  GIppx: PPI  Dispo: pending clinical course.

## 2023-11-26 NOTE — PROVIDER CONTACT NOTE (OTHER) - BACKGROUND
MD at bedside in last hour r/t pt agitation and anxiety, at the time denied chest pain
Pt with PMH of anemia and CKD

## 2023-11-26 NOTE — PROGRESS NOTE ADULT - SUBJECTIVE AND OBJECTIVE BOX
Patient is a 73y old  Female who presents with a chief complaint of Shortness of breath (26 Nov 2023 13:07)      INTERVAL HPI/OVERNIGHT EVENTS:   No overnight events   Afebrile, hemodynamically stable     Subjective: Patient seen and examined at bedside.    ICU Vital Signs Last 24 Hrs  T(C): 36.8 (26 Nov 2023 12:08), Max: 36.8 (26 Nov 2023 12:08)  T(F): 98.3 (26 Nov 2023 12:08), Max: 98.3 (26 Nov 2023 12:08)  HR: 69 (26 Nov 2023 16:35) (58 - 72)  BP: 90/52 (26 Nov 2023 14:28) (89/54 - 150/67)  BP(mean): 66 (26 Nov 2023 14:28) (66 - 98)  ABP: --  ABP(mean): --  RR: 27 (26 Nov 2023 16:35) (10 - 27)  SpO2: 92% (26 Nov 2023 16:35) (92% - 99%)    O2 Parameters below as of 26 Nov 2023 16:35  Patient On (Oxygen Delivery Method): ventilator    O2 Concentration (%): 50      I&O's Summary    25 Nov 2023 07:01  -  26 Nov 2023 07:00  --------------------------------------------------------  IN: 0 mL / OUT: 100 mL / NET: -100 mL    26 Nov 2023 07:01  -  26 Nov 2023 16:49  --------------------------------------------------------  IN: 120 mL / OUT: 600 mL / NET: -480 mL      Mode: AC/ CMV (Assist Control/ Continuous Mandatory Ventilation)  RR (machine): 12  TV (machine): 330  FiO2: 50  PEEP: 5  ITime: 0.1  MAP: 5  PIP: 9      PHYSICAL EXAM:  GENERAL: No acute distress   HEAD:  Atraumatic, Normocephalic  EYES: EOMI, PERRLA, conjunctiva and sclera clear  ENMT: No tonsillar erythema, exudates, or enlargement; Moist mucous membranes  NECK: Supple, No JVD, Normal thyroid  HEART: Regular rate and rhythm; No murmurs, rubs, or gallops  RESPIRATORY: CTA B/L, No W/R/R  ABDOMEN: Soft, Nontender, Nondistended; Bowel sounds present  NEUROLOGY: A&Ox3, nonfocal, moving all extremities  EXTREMITIES:  2+ Peripheral Pulses, No clubbing, cyanosis, or edema  SKIN: warm, dry, normal color, no rash or abnormal lesions    LABS:                        7.5    13.20 )-----------( 221      ( 26 Nov 2023 15:55 )             23.6     11-26    141  |  110<H>  |  59<H>  ----------------------------<  359<H>  6.0<H>   |  9<LL>  |  4.80<H>    Ca    8.4      26 Nov 2023 14:45  Phos  5.6     11-26  Mg     2.0     11-26    TPro  6.1  /  Alb  3.0<L>  /  TBili  0.2  /  DBili  x   /  AST  25  /  ALT  22  /  AlkPhos  97  11-26    PT/INR - ( 26 Nov 2023 06:59 )   PT: 10.9 sec;   INR: 0.95          PTT - ( 26 Nov 2023 06:59 )  PTT:29.2 sec  Urinalysis Basic - ( 26 Nov 2023 14:45 )    Color: x / Appearance: x / SG: x / pH: x  Gluc: 359 mg/dL / Ketone: x  / Bili: x / Urobili: x   Blood: x / Protein: x / Nitrite: x   Leuk Esterase: x / RBC: x / WBC x   Sq Epi: x / Non Sq Epi: x / Bacteria: x      CAPILLARY BLOOD GLUCOSE      POCT Blood Glucose.: 294 mg/dL (26 Nov 2023 16:23)  POCT Blood Glucose.: 341 mg/dL (26 Nov 2023 15:29)  POCT Blood Glucose.: 398 mg/dL (26 Nov 2023 14:34)  POCT Blood Glucose.: 261 mg/dL (26 Nov 2023 11:52)  POCT Blood Glucose.: 113 mg/dL (26 Nov 2023 07:38)  POCT Blood Glucose.: 114 mg/dL (25 Nov 2023 22:33)    ABG - ( 26 Nov 2023 15:21 )  pH, Arterial: 7.44  pH, Blood: x     /  pCO2: 21    /  pO2: 138   / HCO3: 14    / Base Excess: -7.6  /  SaO2: 97.7                Consultant(s) Notes Reviewed:  [x ] YES  [ ] NO    MEDICATIONS  (STANDING):  acetaminophen   IVPB .. 1000 milliGRAM(s) IV Intermittent once  albuterol    0.083%. 2.5 milliGRAM(s) Nebulizer once  albuterol    90 MICROgram(s) HFA Inhaler 2 Puff(s) Inhalation daily  albuterol/ipratropium for Nebulization 3 milliLiter(s) Nebulizer every 6 hours  atorvastatin 20 milliGRAM(s) Oral at bedtime  budesonide 160 MICROgram(s)/formoterol 4.5 MICROgram(s) Inhaler 2 Puff(s) Inhalation two times a day  calcium acetate 667 milliGRAM(s) Oral three times a day with meals  clonazePAM  Tablet 0.5 milliGRAM(s) Oral daily  dextrose 5%. 1000 milliLiter(s) (50 mL/Hr) IV Continuous <Continuous>  dextrose 5%. 1000 milliLiter(s) (100 mL/Hr) IV Continuous <Continuous>  dextrose 50% Injectable 25 Gram(s) IV Push once  dextrose 50% Injectable 25 Gram(s) IV Push once  dextrose 50% Injectable 12.5 Gram(s) IV Push once  DULoxetine 30 milliGRAM(s) Oral daily  etomidate Injectable 25 milliGRAM(s) IV Push once  fentaNYL    Injectable 100 MICROGram(s) IV Push once  glucagon  Injectable 1 milliGRAM(s) IntraMuscular once  hydrALAZINE 50 milliGRAM(s) Oral three times a day  insulin glargine Injectable (LANTUS) 10 Unit(s) SubCutaneous at bedtime  insulin lispro (ADMELOG) corrective regimen sliding scale   SubCutaneous every 6 hours  metoprolol succinate ER 25 milliGRAM(s) Oral daily  midazolam Injectable 4 milliGRAM(s) IV Push once  NIFEdipine XL 90 milliGRAM(s) Oral daily  nitroglycerin    Patch 0.3 mG/Hr(s) 1 patch Transdermal daily  norepinephrine Infusion 0.05 MICROgram(s)/kG/Min (7.65 mL/Hr) IV Continuous <Continuous>  pantoprazole    Tablet 40 milliGRAM(s) Oral before breakfast  propofol Injectable 10 milliGRAM(s) IV Push once  sodium bicarbonate 650 milliGRAM(s) Oral two times a day  traZODone 50 milliGRAM(s) Oral daily    MEDICATIONS  (PRN):  acetaminophen     Tablet .. 650 milliGRAM(s) Oral every 6 hours PRN Mild Pain (1 - 3)  dextrose Oral Gel 15 Gram(s) Oral once PRN Blood Glucose LESS THAN 70 milliGRAM(s)/deciliter      Care Discussed with Consultants/Other Providers [ x] YES  [ ] NO    RADIOLOGY & ADDITIONAL TESTS: ******TRANSFER ACCEPTED TO MICU*****  72 yo F WITH HTN, HLD, uncontrolled DM with multiple amputations of toes iso OM; CAD with stress test planned for 11/28; CKD stage 5 presented with SOB - admitted with fluid overload iso of renal failure requiring HD. A rapid was called iso unresponsiveness. FSG was 390. Stroke code called. Labs returned with metabolic acidosis with bicarb 11 and PCO2 of 26 with K 6.8. Received 3 amps of bicarb of 14 with lasix 40 IVP, 5u insulin and 1g calcium gluconate. ABG drawn with bicarb 14. Lactate elevated at 5.9 but BHB wnl. Renal consulted for urgent dialysis. Stepped up from cards tele to MICU. Intubated for airway protection. Started on CVVHD started at 7pm.      ICU Vital Signs Last 24 Hrs  T(C): 36.8 (26 Nov 2023 12:08), Max: 36.8 (26 Nov 2023 12:08)  T(F): 98.3 (26 Nov 2023 12:08), Max: 98.3 (26 Nov 2023 12:08)  HR: 69 (26 Nov 2023 16:35) (58 - 72)  BP: 90/52 (26 Nov 2023 14:28) (89/54 - 150/67)  BP(mean): 66 (26 Nov 2023 14:28) (66 - 98)  ABP: --  ABP(mean): --  RR: 27 (26 Nov 2023 16:35) (10 - 27)  SpO2: 92% (26 Nov 2023 16:35) (92% - 99%)    O2 Parameters below as of 26 Nov 2023 16:35  Patient On (Oxygen Delivery Method): ventilator    O2 Concentration (%): 50      I&O's Summary    25 Nov 2023 07:01  -  26 Nov 2023 07:00  --------------------------------------------------------  IN: 0 mL / OUT: 100 mL / NET: -100 mL    26 Nov 2023 07:01  -  26 Nov 2023 16:49  --------------------------------------------------------  IN: 120 mL / OUT: 600 mL / NET: -480 mL      Mode: AC/ CMV (Assist Control/ Continuous Mandatory Ventilation)  RR (machine): 12  TV (machine): 330  FiO2: 50  PEEP: 5  ITime: 0.1  MAP: 5  PIP: 9      PHYSICAL EXAM:  Constitutional: Intubated and sedated  Head: NCAT, EOMI  Respiratory: CTAB, no crackles anteriorly  Cardiac: Regular rate  Gastrointestinal: non-distended  Extremities: LE edema noted, R>L, R foot bandaged  Neurologic: Awake, alert, conversant    LABS:                        7.5    13.20 )-----------( 221      ( 26 Nov 2023 15:55 )             23.6     11-26    141  |  110<H>  |  59<H>  ----------------------------<  359<H>  6.0<H>   |  9<LL>  |  4.80<H>    Ca    8.4      26 Nov 2023 14:45  Phos  5.6     11-26  Mg     2.0     11-26    TPro  6.1  /  Alb  3.0<L>  /  TBili  0.2  /  DBili  x   /  AST  25  /  ALT  22  /  AlkPhos  97  11-26    PT/INR - ( 26 Nov 2023 06:59 )   PT: 10.9 sec;   INR: 0.95          PTT - ( 26 Nov 2023 06:59 )  PTT:29.2 sec  Urinalysis Basic - ( 26 Nov 2023 14:45 )    Color: x / Appearance: x / SG: x / pH: x  Gluc: 359 mg/dL / Ketone: x  / Bili: x / Urobili: x   Blood: x / Protein: x / Nitrite: x   Leuk Esterase: x / RBC: x / WBC x   Sq Epi: x / Non Sq Epi: x / Bacteria: x      CAPILLARY BLOOD GLUCOSE      POCT Blood Glucose.: 294 mg/dL (26 Nov 2023 16:23)  POCT Blood Glucose.: 341 mg/dL (26 Nov 2023 15:29)  POCT Blood Glucose.: 398 mg/dL (26 Nov 2023 14:34)  POCT Blood Glucose.: 261 mg/dL (26 Nov 2023 11:52)  POCT Blood Glucose.: 113 mg/dL (26 Nov 2023 07:38)  POCT Blood Glucose.: 114 mg/dL (25 Nov 2023 22:33)    ABG - ( 26 Nov 2023 15:21 )  pH, Arterial: 7.44  pH, Blood: x     /  pCO2: 21    /  pO2: 138   / HCO3: 14    / Base Excess: -7.6  /  SaO2: 97.7                Consultant(s) Notes Reviewed:  [x ] YES  [ ] NO    MEDICATIONS  (STANDING):  acetaminophen   IVPB .. 1000 milliGRAM(s) IV Intermittent once  albuterol    0.083%. 2.5 milliGRAM(s) Nebulizer once  albuterol    90 MICROgram(s) HFA Inhaler 2 Puff(s) Inhalation daily  albuterol/ipratropium for Nebulization 3 milliLiter(s) Nebulizer every 6 hours  atorvastatin 20 milliGRAM(s) Oral at bedtime  budesonide 160 MICROgram(s)/formoterol 4.5 MICROgram(s) Inhaler 2 Puff(s) Inhalation two times a day  calcium acetate 667 milliGRAM(s) Oral three times a day with meals  clonazePAM  Tablet 0.5 milliGRAM(s) Oral daily  dextrose 5%. 1000 milliLiter(s) (50 mL/Hr) IV Continuous <Continuous>  dextrose 5%. 1000 milliLiter(s) (100 mL/Hr) IV Continuous <Continuous>  dextrose 50% Injectable 25 Gram(s) IV Push once  dextrose 50% Injectable 25 Gram(s) IV Push once  dextrose 50% Injectable 12.5 Gram(s) IV Push once  DULoxetine 30 milliGRAM(s) Oral daily  etomidate Injectable 25 milliGRAM(s) IV Push once  fentaNYL    Injectable 100 MICROGram(s) IV Push once  glucagon  Injectable 1 milliGRAM(s) IntraMuscular once  hydrALAZINE 50 milliGRAM(s) Oral three times a day  insulin glargine Injectable (LANTUS) 10 Unit(s) SubCutaneous at bedtime  insulin lispro (ADMELOG) corrective regimen sliding scale   SubCutaneous every 6 hours  metoprolol succinate ER 25 milliGRAM(s) Oral daily  midazolam Injectable 4 milliGRAM(s) IV Push once  NIFEdipine XL 90 milliGRAM(s) Oral daily  nitroglycerin    Patch 0.3 mG/Hr(s) 1 patch Transdermal daily  norepinephrine Infusion 0.05 MICROgram(s)/kG/Min (7.65 mL/Hr) IV Continuous <Continuous>  pantoprazole    Tablet 40 milliGRAM(s) Oral before breakfast  propofol Injectable 10 milliGRAM(s) IV Push once  sodium bicarbonate 650 milliGRAM(s) Oral two times a day  traZODone 50 milliGRAM(s) Oral daily    MEDICATIONS  (PRN):  acetaminophen     Tablet .. 650 milliGRAM(s) Oral every 6 hours PRN Mild Pain (1 - 3)  dextrose Oral Gel 15 Gram(s) Oral once PRN Blood Glucose LESS THAN 70 milliGRAM(s)/deciliter      Care Discussed with Consultants/Other Providers [ x] YES  [ ] NO    RADIOLOGY & ADDITIONAL TESTS:

## 2023-11-26 NOTE — PROCEDURE NOTE - ADDITIONAL PROCEDURE DETAILS
R Radial line place via ultrasound guidance x 1 attempt and tolerated well line confirmed via pulsatile blood flow and arterial waveform.

## 2023-11-26 NOTE — PROGRESS NOTE ADULT - PROBLEM SELECTOR PLAN 6
SBP 130s-140s  -continue with home meds: hydralazine 50mg three times daily, nifedipine 90mg daily, metoprolol succinate 25mg daily

## 2023-11-26 NOTE — PROCEDURE NOTE - ADDITIONAL PROCEDURE DETAILS
Left radial artery puncture with ultrasound guidance to obtain ABG, x 1 attempt, tolerated well, hemostasis obtained and pressure dressing applied.

## 2023-11-26 NOTE — PROVIDER CONTACT NOTE (OTHER) - SITUATION
Pt noted to be in distress and agitated. Pt attempting to get up unassisted, ripping cardiac monitor and gown off. Pt stating she feels "very anxious"
Hgb 7.2
Pt c/o new onset 10/10 chest pain midsternal. Pt denies radiating pain. VSS with exception to hypotension with SBP in 80s.

## 2023-11-26 NOTE — PROGRESS NOTE ADULT - PROBLEM SELECTOR PLAN 7
WBC 10.77  -Afebrile, diarrhea x 4-5 days and vomitting x1 day, symptoms have resolved  -CXR reveals no acute infiltrates   -f/u UA  -GI PCR if diarrhea recurs

## 2023-11-26 NOTE — PROVIDER CONTACT NOTE (CHANGE IN STATUS NOTIFICATION) - BACKGROUND
Pt previously noted to be agitated c/o anxiety and 10/10 chest pain. MD Salas at bedside for assessment

## 2023-11-26 NOTE — PROGRESS NOTE ADULT - SUBJECTIVE AND OBJECTIVE BOX
O/N Events: naeo    Subjective/ROS: Patient seen and examined at bedside.     Denies Fever/Chills, HA, CP, SOB, n/v, changes in bowel/urinary habits.  12pt ROS otherwise negative.    VITALS  Vital Signs Last 24 Hrs  T(C): 36.6 (26 Nov 2023 08:00), Max: 36.6 (26 Nov 2023 05:52)  T(F): 97.9 (26 Nov 2023 08:00), Max: 97.9 (26 Nov 2023 05:52)  HR: 70 (26 Nov 2023 08:00) (61 - 76)  BP: 140/66 (26 Nov 2023 08:00) (105/59 - 150/67)  BP(mean): 95 (26 Nov 2023 08:00) (84 - 98)  RR: 18 (26 Nov 2023 08:00) (18 - 20)  SpO2: 94% (26 Nov 2023 08:00) (94% - 99%)    Parameters below as of 26 Nov 2023 08:00  Patient On (Oxygen Delivery Method): room air        CAPILLARY BLOOD GLUCOSE      POCT Blood Glucose.: 113 mg/dL (26 Nov 2023 07:38)  POCT Blood Glucose.: 114 mg/dL (25 Nov 2023 22:33)  POCT Blood Glucose.: 231 mg/dL (25 Nov 2023 15:08)      PHYSICAL EXAM  General: NAD  Head: NC/AT; MMM; PERRL; EOMI;  Neck: Supple; elevated JVD  Respiratory: rales bibasilar  Cardiovascular: Regular rhythm/rate; S1/S2+, no murmurs, rubs gallops   Gastrointestinal: Soft; NTND; bowel sounds normal and present  Extremities: WWP; 1+ edema. pulses palpated bilaterally.   Neurological: A&Ox3, CNII-XII grossly intact; no obvious focal deficits    MEDICATIONS  (STANDING):  acetaminophen     Tablet .. 975 milliGRAM(s) Oral once  albuterol    90 MICROgram(s) HFA Inhaler 2 Puff(s) Inhalation daily  albuterol/ipratropium for Nebulization 3 milliLiter(s) Nebulizer every 6 hours  atorvastatin 20 milliGRAM(s) Oral at bedtime  budesonide 160 MICROgram(s)/formoterol 4.5 MICROgram(s) Inhaler 2 Puff(s) Inhalation two times a day  buMETAnide Injectable 2 milliGRAM(s) IV Push once  calcium acetate 667 milliGRAM(s) Oral three times a day with meals  clonazePAM  Tablet 0.5 milliGRAM(s) Oral daily  dextrose 5%. 1000 milliLiter(s) (50 mL/Hr) IV Continuous <Continuous>  dextrose 5%. 1000 milliLiter(s) (100 mL/Hr) IV Continuous <Continuous>  dextrose 50% Injectable 25 Gram(s) IV Push once  dextrose 50% Injectable 12.5 Gram(s) IV Push once  dextrose 50% Injectable 25 Gram(s) IV Push once  DULoxetine 30 milliGRAM(s) Oral daily  glucagon  Injectable 1 milliGRAM(s) IntraMuscular once  hydrALAZINE 50 milliGRAM(s) Oral three times a day  insulin glargine Injectable (LANTUS) 10 Unit(s) SubCutaneous at bedtime  insulin lispro Injectable (ADMELOG) 2 Unit(s) SubCutaneous three times a day before meals  metoprolol succinate ER 25 milliGRAM(s) Oral daily  NIFEdipine XL 90 milliGRAM(s) Oral daily  nitroglycerin    Patch 0.3 mG/Hr(s) 1 patch Transdermal daily  pantoprazole    Tablet 40 milliGRAM(s) Oral before breakfast  sodium bicarbonate 650 milliGRAM(s) Oral two times a day  traZODone 50 milliGRAM(s) Oral daily    MEDICATIONS  (PRN):  acetaminophen     Tablet .. 650 milliGRAM(s) Oral every 6 hours PRN Mild Pain (1 - 3)  dextrose Oral Gel 15 Gram(s) Oral once PRN Blood Glucose LESS THAN 70 milliGRAM(s)/deciliter      No Known Allergies      LABS                        7.2    10.48 )-----------( 205      ( 26 Nov 2023 07:26 )             21.8     11-26    145  |  114<H>  |  54<H>  ----------------------------<  98  4.2   |  17<L>  |  4.49<H>    Ca    8.5      26 Nov 2023 07:01  Phos  5.6     11-26  Mg     2.0     11-26    TPro  6.1  /  Alb  3.3  /  TBili  <0.2  /  DBili  x   /  AST  14  /  ALT  16  /  AlkPhos  84  11-26    PT/INR - ( 26 Nov 2023 06:59 )   PT: 10.9 sec;   INR: 0.95          PTT - ( 26 Nov 2023 06:59 )  PTT:29.2 sec  Urinalysis Basic - ( 26 Nov 2023 07:01 )    Color: x / Appearance: x / SG: x / pH: x  Gluc: 98 mg/dL / Ketone: x  / Bili: x / Urobili: x   Blood: x / Protein: x / Nitrite: x   Leuk Esterase: x / RBC: x / WBC x   Sq Epi: x / Non Sq Epi: x / Bacteria: x      CARDIAC MARKERS ( 25 Nov 2023 20:49 )  x     / x     / 178 U/L / x     / 7.0 ng/mL  CARDIAC MARKERS ( 25 Nov 2023 15:12 )  x     / x     / 190 U/L / x     / 6.8 ng/mL          IMAGING/EKG/ETC   Hospital Course:   73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 Admitted to Cardiac telemetry after found to have elevated troponin with ELBERT on CKD as baseline Cr noted to be 2.1 in 10/2022. Cardiac enzymes downtrended and enzymes attributed to worsening renal function which was accompanied with overload and a BNP of 26K. other notable labs were NAGMA and Hg of 8.2. She was given lasix 40mg IVP o/n with the plan to continue diuresis until euvolemic and complete an ischemic evaluation while inpatient. Labs the following morning were unremarkable and patient had no acute complaints but exam was notable for 2+ pitting edema and elevated JVP. She was then given 2mg IVP of bumex. As the day progressed (11/26) the provider was called to the bedside on 2 separate occasions, the first was for agitation as the patient was removing lines and her clothing but continued to be A&Ox3, she was scheduled for her home klonopin for her baseline anxiety so it was given at approx 12pm. The 2nd occasion the patient was complaining of "chest pain." upon arrival to the bedside, the patient was evaluated and when ask to point towards the chest pain, the patient placed her right hand on her belly multiple times indicating the pain was there. Examination revealed a round belly that appeared distended but was nontender and not tense. With her history of uncontrolled diabetes and her gender an EKG and cardiac enzymes were ordered which revealed no acute changes from her baseline. She stated during the encounter that she was starting to feel better and so this provider left the bedside after conferring with nursing to reach back out if any changes.  1 hour later nursing called the provider over the phone and indicated that the patient was unresponsive and a rapid response was called (details in chart note (11/26 16:04). Patient was ultimately intubated, placed on pressors and stepped up to the MICU for acute renal failure/ HAGMA/ and metabolic encephalopathy.       O/N Events: naeo    Subjective/ROS: Patient seen and examined at bedside.     Denies Fever/Chills, HA, CP, SOB, n/v, changes in bowel/urinary habits.  12pt ROS otherwise negative.    VITALS  Vital Signs Last 24 Hrs  T(C): 36.6 (26 Nov 2023 08:00), Max: 36.6 (26 Nov 2023 05:52)  T(F): 97.9 (26 Nov 2023 08:00), Max: 97.9 (26 Nov 2023 05:52)  HR: 70 (26 Nov 2023 08:00) (61 - 76)  BP: 140/66 (26 Nov 2023 08:00) (105/59 - 150/67)  BP(mean): 95 (26 Nov 2023 08:00) (84 - 98)  RR: 18 (26 Nov 2023 08:00) (18 - 20)  SpO2: 94% (26 Nov 2023 08:00) (94% - 99%)    Parameters below as of 26 Nov 2023 08:00  Patient On (Oxygen Delivery Method): room air        CAPILLARY BLOOD GLUCOSE      POCT Blood Glucose.: 113 mg/dL (26 Nov 2023 07:38)  POCT Blood Glucose.: 114 mg/dL (25 Nov 2023 22:33)  POCT Blood Glucose.: 231 mg/dL (25 Nov 2023 15:08)      PHYSICAL EXAM  General: NAD  Head: NC/AT; MMM; PERRL; EOMI;  Neck: Supple; elevated JVD  Respiratory: rales bibasilar  Cardiovascular: Regular rhythm/rate; S1/S2+, no murmurs, rubs gallops   Gastrointestinal: Soft; NTND; bowel sounds normal and present  Extremities: WWP; 1+ edema. pulses palpated bilaterally.   Neurological: A&Ox3, CNII-XII grossly intact; no obvious focal deficits    MEDICATIONS  (STANDING):  acetaminophen     Tablet .. 975 milliGRAM(s) Oral once  albuterol    90 MICROgram(s) HFA Inhaler 2 Puff(s) Inhalation daily  albuterol/ipratropium for Nebulization 3 milliLiter(s) Nebulizer every 6 hours  atorvastatin 20 milliGRAM(s) Oral at bedtime  budesonide 160 MICROgram(s)/formoterol 4.5 MICROgram(s) Inhaler 2 Puff(s) Inhalation two times a day  buMETAnide Injectable 2 milliGRAM(s) IV Push once  calcium acetate 667 milliGRAM(s) Oral three times a day with meals  clonazePAM  Tablet 0.5 milliGRAM(s) Oral daily  dextrose 5%. 1000 milliLiter(s) (50 mL/Hr) IV Continuous <Continuous>  dextrose 5%. 1000 milliLiter(s) (100 mL/Hr) IV Continuous <Continuous>  dextrose 50% Injectable 25 Gram(s) IV Push once  dextrose 50% Injectable 12.5 Gram(s) IV Push once  dextrose 50% Injectable 25 Gram(s) IV Push once  DULoxetine 30 milliGRAM(s) Oral daily  glucagon  Injectable 1 milliGRAM(s) IntraMuscular once  hydrALAZINE 50 milliGRAM(s) Oral three times a day  insulin glargine Injectable (LANTUS) 10 Unit(s) SubCutaneous at bedtime  insulin lispro Injectable (ADMELOG) 2 Unit(s) SubCutaneous three times a day before meals  metoprolol succinate ER 25 milliGRAM(s) Oral daily  NIFEdipine XL 90 milliGRAM(s) Oral daily  nitroglycerin    Patch 0.3 mG/Hr(s) 1 patch Transdermal daily  pantoprazole    Tablet 40 milliGRAM(s) Oral before breakfast  sodium bicarbonate 650 milliGRAM(s) Oral two times a day  traZODone 50 milliGRAM(s) Oral daily    MEDICATIONS  (PRN):  acetaminophen     Tablet .. 650 milliGRAM(s) Oral every 6 hours PRN Mild Pain (1 - 3)  dextrose Oral Gel 15 Gram(s) Oral once PRN Blood Glucose LESS THAN 70 milliGRAM(s)/deciliter      No Known Allergies      LABS                        7.2    10.48 )-----------( 205      ( 26 Nov 2023 07:26 )             21.8     11-26    145  |  114<H>  |  54<H>  ----------------------------<  98  4.2   |  17<L>  |  4.49<H>    Ca    8.5      26 Nov 2023 07:01  Phos  5.6     11-26  Mg     2.0     11-26    TPro  6.1  /  Alb  3.3  /  TBili  <0.2  /  DBili  x   /  AST  14  /  ALT  16  /  AlkPhos  84  11-26    PT/INR - ( 26 Nov 2023 06:59 )   PT: 10.9 sec;   INR: 0.95          PTT - ( 26 Nov 2023 06:59 )  PTT:29.2 sec  Urinalysis Basic - ( 26 Nov 2023 07:01 )    Color: x / Appearance: x / SG: x / pH: x  Gluc: 98 mg/dL / Ketone: x  / Bili: x / Urobili: x   Blood: x / Protein: x / Nitrite: x   Leuk Esterase: x / RBC: x / WBC x   Sq Epi: x / Non Sq Epi: x / Bacteria: x      CARDIAC MARKERS ( 25 Nov 2023 20:49 )  x     / x     / 178 U/L / x     / 7.0 ng/mL  CARDIAC MARKERS ( 25 Nov 2023 15:12 )  x     / x     / 190 U/L / x     / 6.8 ng/mL          IMAGING/EKG/ETC   *******************TRANSFER NOTE CARDIAC TELEMETRY TO MEDICAL ICU***************************    Hospital Course:   73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 Admitted to Cardiac telemetry after found to have elevated troponin with ELBERT on CKD as baseline Cr noted to be 2.1 in 10/2022. Cardiac enzymes downtrended and enzymes attributed to worsening renal function which was accompanied with overload and a BNP of 26K. other notable labs were NAGMA and Hg of 8.2. She was given lasix 40mg IVP o/n with the plan to continue diuresis until euvolemic and complete an ischemic evaluation while inpatient. Labs the following morning were unremarkable and patient had no acute complaints but exam was notable for 2+ pitting edema and elevated JVP. She was then given 2mg IVP of bumex. As the day progressed (11/26) the provider was called to the bedside on 2 separate occasions, the first was for agitation as the patient was removing lines and her clothing but continued to be A&Ox3, she was scheduled for her home klonopin for her baseline anxiety so it was given at approx 12pm. The 2nd occasion the patient was complaining of "chest pain." upon arrival to the bedside, the patient was evaluated and when ask to point towards the chest pain, the patient placed her right hand on her belly multiple times indicating the pain was there. Examination revealed a round belly that appeared distended but was nontender and not tense. With her history of uncontrolled diabetes and her gender an EKG and cardiac enzymes were ordered which revealed no acute changes from her baseline. She stated during the encounter that she was starting to feel better and so this provider left the bedside after conferring with nursing to reach back out if any changes.  1 hour later nursing called the provider over the phone and indicated that the patient was unresponsive and a rapid response was called (details in chart note (11/26 16:04). Patient was ultimately intubated, placed on pressors and stepped up to the MICU for acute renal failure/ HAGMA/ and metabolic encephalopathy with plans for urgent dialysis.      O/N Events: naeo    Subjective/ROS: Patient seen and examined at bedside.     Denies Fever/Chills, HA, CP, SOB, n/v, changes in bowel/urinary habits.  12pt ROS otherwise negative.    VITALS  Vital Signs Last 24 Hrs  T(C): 36.6 (26 Nov 2023 08:00), Max: 36.6 (26 Nov 2023 05:52)  T(F): 97.9 (26 Nov 2023 08:00), Max: 97.9 (26 Nov 2023 05:52)  HR: 70 (26 Nov 2023 08:00) (61 - 76)  BP: 140/66 (26 Nov 2023 08:00) (105/59 - 150/67)  BP(mean): 95 (26 Nov 2023 08:00) (84 - 98)  RR: 18 (26 Nov 2023 08:00) (18 - 20)  SpO2: 94% (26 Nov 2023 08:00) (94% - 99%)    Parameters below as of 26 Nov 2023 08:00  Patient On (Oxygen Delivery Method): room air        CAPILLARY BLOOD GLUCOSE      POCT Blood Glucose.: 113 mg/dL (26 Nov 2023 07:38)  POCT Blood Glucose.: 114 mg/dL (25 Nov 2023 22:33)  POCT Blood Glucose.: 231 mg/dL (25 Nov 2023 15:08)      PHYSICAL EXAM  General: NAD  Head: NC/AT; MMM; PERRL; EOMI;  Neck: Supple; elevated JVD  Respiratory: rales bibasilar  Cardiovascular: Regular rhythm/rate; S1/S2+, no murmurs, rubs gallops   Gastrointestinal: Soft; NTND; bowel sounds normal and present  Extremities: WWP; 1+ edema. pulses palpated bilaterally.   Neurological: A&Ox3, CNII-XII grossly intact; no obvious focal deficits    MEDICATIONS  (STANDING):  acetaminophen     Tablet .. 975 milliGRAM(s) Oral once  albuterol    90 MICROgram(s) HFA Inhaler 2 Puff(s) Inhalation daily  albuterol/ipratropium for Nebulization 3 milliLiter(s) Nebulizer every 6 hours  atorvastatin 20 milliGRAM(s) Oral at bedtime  budesonide 160 MICROgram(s)/formoterol 4.5 MICROgram(s) Inhaler 2 Puff(s) Inhalation two times a day  buMETAnide Injectable 2 milliGRAM(s) IV Push once  calcium acetate 667 milliGRAM(s) Oral three times a day with meals  clonazePAM  Tablet 0.5 milliGRAM(s) Oral daily  dextrose 5%. 1000 milliLiter(s) (50 mL/Hr) IV Continuous <Continuous>  dextrose 5%. 1000 milliLiter(s) (100 mL/Hr) IV Continuous <Continuous>  dextrose 50% Injectable 25 Gram(s) IV Push once  dextrose 50% Injectable 12.5 Gram(s) IV Push once  dextrose 50% Injectable 25 Gram(s) IV Push once  DULoxetine 30 milliGRAM(s) Oral daily  glucagon  Injectable 1 milliGRAM(s) IntraMuscular once  hydrALAZINE 50 milliGRAM(s) Oral three times a day  insulin glargine Injectable (LANTUS) 10 Unit(s) SubCutaneous at bedtime  insulin lispro Injectable (ADMELOG) 2 Unit(s) SubCutaneous three times a day before meals  metoprolol succinate ER 25 milliGRAM(s) Oral daily  NIFEdipine XL 90 milliGRAM(s) Oral daily  nitroglycerin    Patch 0.3 mG/Hr(s) 1 patch Transdermal daily  pantoprazole    Tablet 40 milliGRAM(s) Oral before breakfast  sodium bicarbonate 650 milliGRAM(s) Oral two times a day  traZODone 50 milliGRAM(s) Oral daily    MEDICATIONS  (PRN):  acetaminophen     Tablet .. 650 milliGRAM(s) Oral every 6 hours PRN Mild Pain (1 - 3)  dextrose Oral Gel 15 Gram(s) Oral once PRN Blood Glucose LESS THAN 70 milliGRAM(s)/deciliter      No Known Allergies      LABS                        7.2    10.48 )-----------( 205      ( 26 Nov 2023 07:26 )             21.8     11-26    145  |  114<H>  |  54<H>  ----------------------------<  98  4.2   |  17<L>  |  4.49<H>    Ca    8.5      26 Nov 2023 07:01  Phos  5.6     11-26  Mg     2.0     11-26    TPro  6.1  /  Alb  3.3  /  TBili  <0.2  /  DBili  x   /  AST  14  /  ALT  16  /  AlkPhos  84  11-26    PT/INR - ( 26 Nov 2023 06:59 )   PT: 10.9 sec;   INR: 0.95          PTT - ( 26 Nov 2023 06:59 )  PTT:29.2 sec  Urinalysis Basic - ( 26 Nov 2023 07:01 )    Color: x / Appearance: x / SG: x / pH: x  Gluc: 98 mg/dL / Ketone: x  / Bili: x / Urobili: x   Blood: x / Protein: x / Nitrite: x   Leuk Esterase: x / RBC: x / WBC x   Sq Epi: x / Non Sq Epi: x / Bacteria: x      CARDIAC MARKERS ( 25 Nov 2023 20:49 )  x     / x     / 178 U/L / x     / 7.0 ng/mL  CARDIAC MARKERS ( 25 Nov 2023 15:12 )  x     / x     / 190 U/L / x     / 6.8 ng/mL          IMAGING/EKG/ETC

## 2023-11-26 NOTE — PROGRESS NOTE ADULT - PROBLEM SELECTOR PLAN 4
Hx of asthma.   -Bibasilar crackles on exam, SpO2 98%  -continue with symbicort 2x daily  -duonebs q6 hours

## 2023-11-26 NOTE — CHART NOTE - NSCHARTNOTEFT_GEN_A_CORE
***INCOMPLETE NOTE!!****    ***Rapid Response Critical Care Nurse Practitioner Note***    Patient is a 73y old  Female admitted for HPI:  73 year old F POOR HISTORIAN with PMHx of DM (on insulin), HTN, HLD, ESRD (not on HD), asthma, anxiety and depression, multiple B/L toe amputations (b/l) due to osteomyelitis, chronic R foot ulcer, iron deficiency anemia who presented after an episode earlier this morning where she noticed her heart was racing, she was short of breath and persistently anxious. Pt states she also had back pain which is uncommon for her. Patient endorses history of panic attacks in the past, however says that this episode felt different. Upon arrival to the hospital, her symptoms improved tremendously. Currently, pt endorses intermittent dizziness where she feels the room is spinning and nausea but denies chest pain, back pain, palpitations, SOB. Earlier this week she had non bloody diarrhea for 4-5 days and after thanksgiving dinner 2 nights prior, pt endorsed non bloody vomiting that resolved in 1 day. Pt endorses orthopnea for many years and leg swelling since she was diagnosed with kidney disease. Pt denies fever, dysuria, cough/cold symptoms, hematuria, BRBPR, hematochezia.   In speaking with pt, she became teary eyed stating that she been depressed lately and often feels thoughts of killing herself and has felt this way in the past. Of note, pt does not know any of her home medications. Medication list obtained from paperwork from [facility],    In the ED, T=97.9F, /59, HR 68, RR 20, SPO2=95%. Labs notable for Trop T notable for 311. . CKMB 6.8. Cr 4.38. BNP 70392. EKG reveals TWI in V1-V6, II, III, AVF and ST depressions in V4-V5. Pt was given aspirin 325mg x1 in the ED, Duonebs x1, and nitroglycerin patch. Pt is now admitted to cardiac telemetry for further management of ELBERT on CKD and possible ischemic workup.        (25 Nov 2023 19:57)      Rapid response team called @ 1434 for altered mental status and only responsive to noxious stimuli responsiveness.    Patient was seen and examined at the bedside by the rapid response team.    Allergies    No Known Allergies    Intolerances        PAST MEDICAL & SURGICAL HISTORY:  Asthma  Asthma      Depressive disorder  Depression      Hyperlipidemia  HLD (hyperlipidemia)      Type 2 diabetes mellitus  DM (diabetes mellitus)      Essential hypertension  HTN (hypertension)      Local infection of skin and subcutaneous tissue  Toe infection      Type 2 diabetes mellitus with neurological manifestations  Neuropathy in diabetes      Cytomegalovirus infection not present  No significant past surgical history      S/P cholecystectomy          REVIEW OF SYSTEMS:   See HPI (as per chart review), unable to obtain 2/2 ETT and sedation      Vital Signs Last 24 Hrs  T(C): 36.8 (26 Nov 2023 12:08), Max: 36.8 (26 Nov 2023 12:08)  T(F): 98.3 (26 Nov 2023 12:08), Max: 98.3 (26 Nov 2023 12:08)  HR: 64 (26 Nov 2023 12:08) (61 - 72)  BP: 108/65 (26 Nov 2023 12:08) (108/65 - 150/67)  BP(mean): 81 (26 Nov 2023 12:08) (78 - 98)  RR: 20 (26 Nov 2023 12:08) (18 - 20)  SpO2: 92% (26 Nov 2023 12:08) (92% - 99%)    Parameters below as of 26 Nov 2023 12:08  Patient On (Oxygen Delivery Method): room air        Vent settings     11-25 @ 07:01  -  11-26 @ 07:00  --------------------------------------------------------  IN: 0 mL / OUT: 100 mL / NET: -100 mL    11-26 @ 07:01  -  11-26 @ 16:12  --------------------------------------------------------  IN: 120 mL / OUT: 600 mL / NET: -480 mL      *insert ventilator settings    Mode: RR: TV: PEEP: FiO2    Physical Exam                INCISIONS:    DRAINS:    LINES (DATES):  sites, dates, how site looks      11-25 @ 07:01  -  11-26 @ 07:00  --------------------------------------------------------  IN: 0 mL / OUT: 100 mL / NET: -100 mL    11-26 @ 07:01  -  11-26 @ 16:12  --------------------------------------------------------  IN: 120 mL / OUT: 600 mL / NET: -480 mL                              7.5    13.20 )-----------( 221      ( 26 Nov 2023 15:55 )             23.6         11-26    141  |  110<H>  |  59<H>  ----------------------------<  359<H>  6.0<H>   |  9<LL>  |  4.80<H>    Ca    8.4      26 Nov 2023 14:45  Phos  5.6     11-26  Mg     2.0     11-26    TPro  6.1  /  Alb  3.0<L>  /  TBili  0.2  /  DBili  x   /  AST  25  /  ALT  22  /  AlkPhos  97  11-26    ABG - ( 26 Nov 2023 15:21 )  pH, Arterial: 7.44  pH, Blood: x     /  pCO2: 21    /  pO2: 138   / HCO3: 14    / Base Excess: -7.6  /  SaO2: 97.7                 LIVER FUNCTIONS - ( 26 Nov 2023 13:27 )  Alb: 3.0 g/dL / Pro: 6.1 g/dL / ALK PHOS: 97 U/L / ALT: 22 U/L / AST: 25 U/L / GGT: x             PT/INR - ( 26 Nov 2023 06:59 )   PT: 10.9 sec;   INR: 0.95          PTT - ( 26 Nov 2023 06:59 )  PTT:29.2 sec    Urinalysis Basic - ( 26 Nov 2023 14:45 )    Color: x / Appearance: x / SG: x / pH: x  Gluc: 359 mg/dL / Ketone: x  / Bili: x / Urobili: x   Blood: x / Protein: x / Nitrite: x   Leuk Esterase: x / RBC: x / WBC x   Sq Epi: x / Non Sq Epi: x / Bacteria: x         Chest Xray? ____________    EKG? _________________    MEDICATIONS  (STANDING):  acetaminophen   IVPB .. 1000 milliGRAM(s) IV Intermittent once  albuterol    0.083%. 2.5 milliGRAM(s) Nebulizer once  albuterol    90 MICROgram(s) HFA Inhaler 2 Puff(s) Inhalation daily  albuterol/ipratropium for Nebulization 3 milliLiter(s) Nebulizer every 6 hours  atorvastatin 20 milliGRAM(s) Oral at bedtime  atropine Injectable 0.5 milliGRAM(s) IntraMuscular once  budesonide 160 MICROgram(s)/formoterol 4.5 MICROgram(s) Inhaler 2 Puff(s) Inhalation two times a day  calcium acetate 667 milliGRAM(s) Oral three times a day with meals  calcium gluconate IVPB 1 Gram(s) IV Intermittent once  clonazePAM  Tablet 0.5 milliGRAM(s) Oral daily  dextrose 5%. 1000 milliLiter(s) (50 mL/Hr) IV Continuous <Continuous>  dextrose 5%. 1000 milliLiter(s) (100 mL/Hr) IV Continuous <Continuous>  dextrose 50% Injectable 25 Gram(s) IV Push once  dextrose 50% Injectable 25 Gram(s) IV Push once  dextrose 50% Injectable 12.5 Gram(s) IV Push once  DULoxetine 30 milliGRAM(s) Oral daily  etomidate Injectable 25 milliGRAM(s) IV Push once  fentaNYL    Injectable 100 MICROGram(s) IV Push once  furosemide   Injectable 40 milliGRAM(s) IV Push once  glucagon  Injectable 1 milliGRAM(s) IntraMuscular once  hydrALAZINE 50 milliGRAM(s) Oral three times a day  insulin glargine Injectable (LANTUS) 10 Unit(s) SubCutaneous at bedtime  insulin lispro (ADMELOG) corrective regimen sliding scale   SubCutaneous at bedtime  insulin lispro (ADMELOG) corrective regimen sliding scale   SubCutaneous three times a day before meals  insulin lispro Injectable (ADMELOG) 2 Unit(s) SubCutaneous three times a day before meals  insulin regular  human recombinant 5 Unit(s) IV Push once  metoprolol succinate ER 25 milliGRAM(s) Oral daily  midazolam Injectable 4 milliGRAM(s) IV Push once  NIFEdipine XL 90 milliGRAM(s) Oral daily  nitroglycerin    Patch 0.3 mG/Hr(s) 1 patch Transdermal daily  norepinephrine Infusion 0.05 MICROgram(s)/kG/Min (7.65 mL/Hr) IV Continuous <Continuous>  pantoprazole    Tablet 40 milliGRAM(s) Oral before breakfast  propofol Injectable 10 milliGRAM(s) IV Push once  sodium bicarbonate 650 milliGRAM(s) Oral two times a day  traZODone 50 milliGRAM(s) Oral daily    MEDICATIONS  (PRN):  acetaminophen     Tablet .. 650 milliGRAM(s) Oral every 6 hours PRN Mild Pain (1 - 3)  dextrose Oral Gel 15 Gram(s) Oral once PRN Blood Glucose LESS THAN 70 milliGRAM(s)/deciliter      ASSESSMENT & PLAN    Assessment- Rapid Response called for 73y year old Female with a past medical history of   **Admit to MICU s/p intubation and plan for emergent HD    Plan-        #DISPO/GOC  -Full code/DNR?  -Palliative consult  -PT/OT?  -Pending consults ***INCOMPLETE NOTE!!****    ***Rapid Response Critical Care Nurse Practitioner Note***    Patient is a 73y old  Female admitted for HPI:  73 year old F POOR HISTORIAN with PMHx of DM (on insulin), HTN, HLD, ESRD (not on HD), asthma, anxiety and depression, multiple B/L toe amputations (b/l) due to osteomyelitis, chronic R foot ulcer, iron deficiency anemia who presented after an episode earlier this morning where she noticed her heart was racing, she was short of breath and persistently anxious. Pt states she also had back pain which is uncommon for her. Patient endorses history of panic attacks in the past, however says that this episode felt different. Upon arrival to the hospital, her symptoms improved tremendously. Currently, pt endorses intermittent dizziness where she feels the room is spinning and nausea but denies chest pain, back pain, palpitations, SOB. Earlier this week she had non bloody diarrhea for 4-5 days and after thanksgiving dinner 2 nights prior, pt endorsed non bloody vomiting that resolved in 1 day. Pt endorses orthopnea for many years and leg swelling since she was diagnosed with kidney disease. Pt denies fever, dysuria, cough/cold symptoms, hematuria, BRBPR, hematochezia.   In speaking with pt, she became teary eyed stating that she been depressed lately and often feels thoughts of killing herself and has felt this way in the past. Of note, pt does not know any of her home medications. Medication list obtained from paperwork from [facility],    In the ED, T=97.9F, /59, HR 68, RR 20, SPO2=95%. Labs notable for Trop T notable for 311. . CKMB 6.8. Cr 4.38. BNP 01684. EKG reveals TWI in V1-V6, II, III, AVF and ST depressions in V4-V5. Pt was given aspirin 325mg x1 in the ED, Duonebs x1, and nitroglycerin patch. Pt is now admitted to cardiac telemetry for further management of ELBERT on CKD and possible ischemic workup.     Rapid response team called @ 1434 for altered mental status and only responsive to noxious stimuli responsiveness.    Patient was seen and examined at the bedside by the rapid response team. Initially rapid response 3was called by nursing staff d/t altered mental status and being minimally responsive to noxious stimuli. Upon arrival of RRT HR 50s(Junctional), RR 30s, BP 90s/50s(MAP 60s), SPO2 88% on 5LPM NC and glucose in 300s. Patient moving all extremities, not following commands but responsive to noxious stimuli minimally responsive to noxious stimuli and pupils ~2-3mm round and reactive and +gag. 12lead obtained showing new junctional rhythm, zoll placed, NRB placed, PIV/labs, stroke code initially called but deferred given exam nonfocal and VBG showing acute metabolic acidosis and hyperkalemia. 3 amps of bicarb given, insulin 5 units, calcium chloride 1g, albuterol 10mg, 1L of NS, lasix 40mg and atropine 0.5mg PCCM attending and fellow @ bedside d/t worsening mental status. Patient was intubated by Dr. Worley/Scott for airway protection patient w difficult airway and +vomiting during an intubation attempt. Norepi started prior to intubation and required rapid uptitration max 0.45mcg/kg/hr to maintain MAP > 70. Renal contacted plan to CVVHD, cardiology fellow POCUS w normal Biventricular function, focal b-lines and b/l lung sliding pre/post intubation. Patient transported to MICU for management of shock of unclear origin, acute renal failure requiring emergent dialysis and acute hypoxic respiratory failure.     SEE RRT documentation on paper chart for detailed list of vital signs and medications.     Allergies  No Known Allergies    PAST MEDICAL & SURGICAL HISTORY:  Asthma  Depressive disorder  Hyperlipidemia  Type 2 diabetes mellitus  HTN (hypertension)  Local infection of skin and subcutaneous tissue  Toe infection  Type 2 diabetes mellitus with neurological manifestations  Neuropathy in diabetes    Pshx:   S/P cholecystectomy      REVIEW OF SYSTEMS:   See HPI (as per chart review), unable to obtain 2/2 AMS and ETT.     Vital Signs Last 24 Hrs  T(C): 36.8 (26 Nov 2023 12:08), Max: 36.8 (26 Nov 2023 12:08)  T(F): 98.3 (26 Nov 2023 12:08), Max: 98.3 (26 Nov 2023 12:08)  HR: 64 (26 Nov 2023 12:08) (61 - 72)  BP: 108/65 (26 Nov 2023 12:08) (108/65 - 150/67)  BP(mean): 81 (26 Nov 2023 12:08) (78 - 98)  RR: 20 (26 Nov 2023 12:08) (18 - 20)  SpO2: 92% (26 Nov 2023 12:08) (92% - 99%)    Parameters below as of 26 Nov 2023 12:08  Patient On (Oxygen Delivery Method): room air    Vent settings:  vT: 330 RR: 12 Fio2:50% PEEP: 5 iTime: 0.9    11-25 @ 07:01  -  11-26 @ 07:00  --------------------------------------------------------  IN: 0 mL / OUT: 100 mL / NET: -100 mL    11-26 @ 07:01  -  11-26 @ 16:12  --------------------------------------------------------  IN: 120 mL / OUT: 600 mL / NET: -480 mL    Physical Exam  GENERAL: The patient is ill appearing, obtunded w only response to noxious stimuli  HEENT: Head is normocephalic and atraumatic. Mucous membranes are moist.   NECK: Supple.  LUNGS: +tachypnea. Clear to auscultation BL without wheezing, rales or rhonchi;   HEART: Regular rate and rhythm ,+S1/+S2, no murmurs, rubs, gallops  ABDOMEN: +distention. Soft, nontender, and no rebound, guarding rigidity, bowel sounds in all 4 quadrants  EXTREMITIES: RLE w chronic appearing wound on foot. Without any cyanosis, clubbing, rash, lesions or edema.   SKIN: No new rashes or lesions.  VASCULAR: Radial and Dorsal pedal pulses palpable BL  NEUROLOGIC: Moving all 4 extremities, A&O x 0, not following commands, PERRLA    Lines: PIV x 3, Hernández placed during RRT     11-25 @ 07:01  -  11-26 @ 07:00  --------------------------------------------------------  IN: 0 mL / OUT: 100 mL / NET: -100 mL  11-26 @ 07:01  -  11-26 @ 16:12  --------------------------------------------------------  IN: 120 mL / OUT: 600 mL / NET: -480 mL    Labs:             7.5    13.20 )-----( 221      ( 26 Nov 2023 15:55 )             23.6   11-26  141  |  110<H>  |  59<H>  ----------------------------<  359<H>  6.0<H>   |  9<LL>  |  4.80<H>    Ca    8.4      26 Nov 2023 14:45  Phos  5.6     11-26  Mg     2.0     11-26    TPro  6.1  /  Alb  3.0<L>  /  TBili  0.2  /  DBili  x   /  AST  25  /  ALT  22  /  AlkPhos  97  11-26    ABG - ( 26 Nov 2023 15:21 )  pH, Arterial: 7.44  pH, Blood: x     /  pCO2: 21    /  pO2: 138   / HCO3: 14    / Base Excess: -7.6  /  SaO2: 97.7      LIVER FUNCTIONS - ( 26 Nov 2023 13:27 )  Alb: 3.0 g/dL / Pro: 6.1 g/dL / ALK PHOS: 97 U/L / ALT: 22 U/L / AST: 25 U/L / GGT: x         PT/INR - ( 26 Nov 2023 06:59 )   PT: 10.9 sec;   INR: 0.95      PTT - ( 26 Nov 2023 06:59 )  PTT:29.2 sec    Urinalysis Basic - ( 26 Nov 2023 14:45 )  Color: x / Appearance: x / SG: x / pH: x  Gluc: 359 mg/dL / Ketone: x  / Bili: x / Urobili: x   Blood: x / Protein: x / Nitrite: x   Leuk Esterase: x / RBC: x / WBC x   Sq Epi: x / Non Sq Epi: x / Bacteria: x         Chest Xray:WET READ: s/p intubation ETT ~2cm above deborah, NGT below diaphram, no acute infiltrates    EKG: Bardycardia w junctional rhythm     MEDICATIONS  (STANDING):  acetaminophen   IVPB .. 1000 milliGRAM(s) IV Intermittent once  albuterol    0.083%. 2.5 milliGRAM(s) Nebulizer once  albuterol    90 MICROgram(s) HFA Inhaler 2 Puff(s) Inhalation daily  albuterol/ipratropium for Nebulization 3 milliLiter(s) Nebulizer every 6 hours  atorvastatin 20 milliGRAM(s) Oral at bedtime  atropine Injectable 0.5 milliGRAM(s) IntraMuscular once  budesonide 160 MICROgram(s)/formoterol 4.5 MICROgram(s) Inhaler 2 Puff(s) Inhalation two times a day  calcium acetate 667 milliGRAM(s) Oral three times a day with meals  calcium gluconate IVPB 1 Gram(s) IV Intermittent once  clonazePAM  Tablet 0.5 milliGRAM(s) Oral daily  dextrose 5%. 1000 milliLiter(s) (50 mL/Hr) IV Continuous <Continuous>  dextrose 5%. 1000 milliLiter(s) (100 mL/Hr) IV Continuous <Continuous>  dextrose 50% Injectable 25 Gram(s) IV Push once  dextrose 50% Injectable 25 Gram(s) IV Push once  dextrose 50% Injectable 12.5 Gram(s) IV Push once  DULoxetine 30 milliGRAM(s) Oral daily  etomidate Injectable 25 milliGRAM(s) IV Push once  fentaNYL    Injectable 100 MICROGram(s) IV Push once  furosemide   Injectable 40 milliGRAM(s) IV Push once  glucagon  Injectable 1 milliGRAM(s) IntraMuscular once  hydrALAZINE 50 milliGRAM(s) Oral three times a day  insulin glargine Injectable (LANTUS) 10 Unit(s) SubCutaneous at bedtime  insulin lispro (ADMELOG) corrective regimen sliding scale   SubCutaneous at bedtime  insulin lispro (ADMELOG) corrective regimen sliding scale   SubCutaneous three times a day before meals  insulin lispro Injectable (ADMELOG) 2 Unit(s) SubCutaneous three times a day before meals  insulin regular  human recombinant 5 Unit(s) IV Push once  metoprolol succinate ER 25 milliGRAM(s) Oral daily  midazolam Injectable 4 milliGRAM(s) IV Push once  NIFEdipine XL 90 milliGRAM(s) Oral daily  nitroglycerin    Patch 0.3 mG/Hr(s) 1 patch Transdermal daily  norepinephrine Infusion 0.05 MICROgram(s)/kG/Min (7.65 mL/Hr) IV Continuous <Continuous>  pantoprazole    Tablet 40 milliGRAM(s) Oral before breakfast  propofol Injectable 10 milliGRAM(s) IV Push once  sodium bicarbonate 650 milliGRAM(s) Oral two times a day  traZODone 50 milliGRAM(s) Oral daily    MEDICATIONS  (PRN):  acetaminophen     Tablet .. 650 milliGRAM(s) Oral every 6 hours PRN Mild Pain (1 - 3)  dextrose Oral Gel 15 Gram(s) Oral once PRN Blood Glucose LESS THAN 70 milliGRAM(s)/deciliter    ASSESSMENT & PLAN  Rapid Response called for 73y year old Female with a past medical history of DM (on insulin), HTN, HLD, ESRD (not on HD), asthma, anxiety and depression, multiple B/L toe amputations (b/l) due to osteomyelitis, chronic R foot ulcer, iron deficiency anemia admitted to cardiac telemetry to r/o ACS now s/p rapid response and upgraded to MICU d/t altered mental status likely 2/2 worsening CKD, hyperkalemia requiring emergent dialysis, intubated for airway protection and shock likely 2/2 sedation s/p intubation requiring vasopressors.     Plan-  #ELBERT on CKD requiring emergent dialysis  #Hyperkalemia   #R/o ACS  #Aspiratoin PNA  #Acute hypoxic respiratory failure   #aspiration pna vs chemical pneumonitis   -Admit to MICU for complex medical managment   -s/p 1 liter of NS  -s/p insulin 5units, calcium chloride, albuteral, bicarb and lasix for acute management of hyperkalemia  -f/u cbc/cmp/abg/bhb  -RASS -1 to 0   -Consider NCHCT once stable   -Maintain MAP > 65 mmHg wean NE as tolerated   -Hold home BP meds while on vasopressors  -Trend lactate   -SAT/SBT daily   -STAT EKG  -STAT ECHO   -s/p intubation for airway protection   -Wean FiO2 goal SPO2 > 90%   -CTX 2g for aPNA coverage   -CBC/BMP/ABG q4-6 hours for first 24hours of CVVHD  -STAT Bcx   -STAT renal consult   -CVVHD for clearance  -Hernández for strict I/o  -Consider lasix challenge   -f/u A1c     F: none CVVHD for clearance  E: Goal K <5 on CVVHD   D: NPO   GI PPx : Not indicated  DVT PPx: consider SQH and SCDs  Code status: Full Code (Patient's brother called by primary team during rapid response but did not answer)  Dispo: MICU    Case discussed with critical care attending Dr. Chavez. I have personally and independently provided 61 minutes of critical care services.  This excludes any time spent on separate procedures or teaching.     Chance Castaneda ACNP ***INCOMPLETE NOTE!!****    ***Rapid Response Critical Care Nurse Practitioner Note***    Patient is a 73y old  Female admitted for HPI:  73 year old F POOR HISTORIAN with PMHx of DM (on insulin), HTN, HLD, ESRD (not on HD), asthma, anxiety and depression, multiple B/L toe amputations (b/l) due to osteomyelitis, chronic R foot ulcer, iron deficiency anemia who presented after an episode earlier this morning where she noticed her heart was racing, she was short of breath and persistently anxious. Pt states she also had back pain which is uncommon for her. Patient endorses history of panic attacks in the past, however says that this episode felt different. Upon arrival to the hospital, her symptoms improved tremendously. Currently, pt endorses intermittent dizziness where she feels the room is spinning and nausea but denies chest pain, back pain, palpitations, SOB. Earlier this week she had non bloody diarrhea for 4-5 days and after thanksgiving dinner 2 nights prior, pt endorsed non bloody vomiting that resolved in 1 day. Pt endorses orthopnea for many years and leg swelling since she was diagnosed with kidney disease. Pt denies fever, dysuria, cough/cold symptoms, hematuria, BRBPR, hematochezia.   In speaking with pt, she became teary eyed stating that she been depressed lately and often feels thoughts of killing herself and has felt this way in the past. Of note, pt does not know any of her home medications. Medication list obtained from paperwork from [facility],    In the ED, T=97.9F, /59, HR 68, RR 20, SPO2=95%. Labs notable for Trop T notable for 311. . CKMB 6.8. Cr 4.38. BNP 32705. EKG reveals TWI in V1-V6, II, III, AVF and ST depressions in V4-V5. Pt was given aspirin 325mg x1 in the ED, Duonebs x1, and nitroglycerin patch. Pt is now admitted to cardiac telemetry for further management of ELBERT on CKD and possible ischemic workup.     Rapid response team called @ 1434 for altered mental status and only responsive to noxious stimuli responsiveness.    Patient was seen and examined at the bedside by the rapid response team. Initially rapid response 3was called by nursing staff d/t altered mental status and being minimally responsive to noxious stimuli. Upon arrival of RRT HR 50s(Junctional), RR 30s, BP 90s/50s(MAP 60s), SPO2 88% on 5LPM NC and glucose in 300s. Patient moving all extremities, not following commands but responsive to noxious stimuli minimally responsive to noxious stimuli and pupils ~2-3mm round and reactive and +gag. 12lead obtained showing new junctional rhythm, zoll placed, NRB placed, PIV/labs, stroke code initially called but deferred given exam nonfocal and VBG showing acute metabolic acidosis and hyperkalemia. 3 amps of bicarb given, insulin 5 units, calcium chloride 1g, albuterol 10mg, 1L of NS, lasix 40mg and atropine 0.5mg PCCM attending and fellow @ bedside d/t worsening mental status. Patient was intubated by Dr. Worley/Scott for airway protection patient w difficult airway and +vomiting during an intubation attempt. Norepi started prior to intubation and required rapid uptitration max 0.45mcg/kg/hr to maintain MAP > 70. Renal contacted plan to CVVHD, cardiology fellow POCUS w normal Biventricular function, focal b-lines and b/l lung sliding pre/post intubation. Patient transported to MICU for management of shock of unclear origin, acute renal failure requiring emergent dialysis and acute hypoxic respiratory failure.     SEE RRT documentation on paper chart for detailed list of vital signs and medications.     Allergies  No Known Allergies    PAST MEDICAL & SURGICAL HISTORY:  Asthma  Depressive disorder  Hyperlipidemia  Type 2 diabetes mellitus  HTN (hypertension)  Local infection of skin and subcutaneous tissue  Toe infection  Type 2 diabetes mellitus with neurological manifestations  Neuropathy in diabetes    Pshx:   S/P cholecystectomy      REVIEW OF SYSTEMS:   See HPI (as per chart review), unable to obtain 2/2 AMS and ETT.     Vital Signs Last 24 Hrs  T(C): 36.8 (26 Nov 2023 12:08), Max: 36.8 (26 Nov 2023 12:08)  T(F): 98.3 (26 Nov 2023 12:08), Max: 98.3 (26 Nov 2023 12:08)  HR: 64 (26 Nov 2023 12:08) (61 - 72)  BP: 108/65 (26 Nov 2023 12:08) (108/65 - 150/67)  BP(mean): 81 (26 Nov 2023 12:08) (78 - 98)  RR: 20 (26 Nov 2023 12:08) (18 - 20)  SpO2: 92% (26 Nov 2023 12:08) (92% - 99%)    Parameters below as of 26 Nov 2023 12:08  Patient On (Oxygen Delivery Method): room air    Vent settings:  vT: 330 RR: 12 Fio2:50% PEEP: 5 iTime: 0.9    11-25 @ 07:01  -  11-26 @ 07:00  --------------------------------------------------------  IN: 0 mL / OUT: 100 mL / NET: -100 mL    11-26 @ 07:01  -  11-26 @ 16:12  --------------------------------------------------------  IN: 120 mL / OUT: 600 mL / NET: -480 mL    Physical Exam  GENERAL: The patient is ill appearing, obtunded w only response to noxious stimuli  HEENT: Head is normocephalic and atraumatic. Mucous membranes are moist.   NECK: Supple.  LUNGS: +tachypnea. Clear to auscultation BL without wheezing, rales or rhonchi;   HEART: Regular rate and rhythm ,+S1/+S2, no murmurs, rubs, gallops  ABDOMEN: +distention. Soft, nontender, and no rebound, guarding rigidity, bowel sounds in all 4 quadrants  EXTREMITIES: RLE w chronic appearing wound on foot. Without any cyanosis, clubbing, rash, lesions or edema.   SKIN: No new rashes or lesions.  VASCULAR: Radial and Dorsal pedal pulses palpable BL  NEUROLOGIC: Moving all 4 extremities, A&O x 0, not following commands, PERRLA    Lines: PIV x 3, Hernández placed during RRT     11-25 @ 07:01  -  11-26 @ 07:00  --------------------------------------------------------  IN: 0 mL / OUT: 100 mL / NET: -100 mL  11-26 @ 07:01  -  11-26 @ 16:12  --------------------------------------------------------  IN: 120 mL / OUT: 600 mL / NET: -480 mL    Labs:             7.5    13.20 )-----( 221      ( 26 Nov 2023 15:55 )             23.6   11-26  141  |  110<H>  |  59<H>  ----------------------------<  359<H>  6.0<H>   |  9<LL>  |  4.80<H>    Ca    8.4      26 Nov 2023 14:45  Phos  5.6     11-26  Mg     2.0     11-26    TPro  6.1  /  Alb  3.0<L>  /  TBili  0.2  /  DBili  x   /  AST  25  /  ALT  22  /  AlkPhos  97  11-26    ABG - ( 26 Nov 2023 15:21 )  pH, Arterial: 7.44  pH, Blood: x     /  pCO2: 21    /  pO2: 138   / HCO3: 14    / Base Excess: -7.6  /  SaO2: 97.7      LIVER FUNCTIONS - ( 26 Nov 2023 13:27 )  Alb: 3.0 g/dL / Pro: 6.1 g/dL / ALK PHOS: 97 U/L / ALT: 22 U/L / AST: 25 U/L / GGT: x         PT/INR - ( 26 Nov 2023 06:59 )   PT: 10.9 sec;   INR: 0.95      PTT - ( 26 Nov 2023 06:59 )  PTT:29.2 sec    Urinalysis Basic - ( 26 Nov 2023 14:45 )  Color: x / Appearance: x / SG: x / pH: x  Gluc: 359 mg/dL / Ketone: x  / Bili: x / Urobili: x   Blood: x / Protein: x / Nitrite: x   Leuk Esterase: x / RBC: x / WBC x   Sq Epi: x / Non Sq Epi: x / Bacteria: x         Chest Xray:WET READ: s/p intubation ETT ~2cm above deborah, NGT below diaphram, no acute infiltrates    EKG: Bardycardia w junctional rhythm     MEDICATIONS  (STANDING):  acetaminophen   IVPB .. 1000 milliGRAM(s) IV Intermittent once  albuterol    0.083%. 2.5 milliGRAM(s) Nebulizer once  albuterol    90 MICROgram(s) HFA Inhaler 2 Puff(s) Inhalation daily  albuterol/ipratropium for Nebulization 3 milliLiter(s) Nebulizer every 6 hours  atorvastatin 20 milliGRAM(s) Oral at bedtime  atropine Injectable 0.5 milliGRAM(s) IntraMuscular once  budesonide 160 MICROgram(s)/formoterol 4.5 MICROgram(s) Inhaler 2 Puff(s) Inhalation two times a day  calcium acetate 667 milliGRAM(s) Oral three times a day with meals  calcium gluconate IVPB 1 Gram(s) IV Intermittent once  clonazePAM  Tablet 0.5 milliGRAM(s) Oral daily  dextrose 5%. 1000 milliLiter(s) (50 mL/Hr) IV Continuous <Continuous>  dextrose 5%. 1000 milliLiter(s) (100 mL/Hr) IV Continuous <Continuous>  dextrose 50% Injectable 25 Gram(s) IV Push once  dextrose 50% Injectable 25 Gram(s) IV Push once  dextrose 50% Injectable 12.5 Gram(s) IV Push once  DULoxetine 30 milliGRAM(s) Oral daily  etomidate Injectable 25 milliGRAM(s) IV Push once  fentaNYL    Injectable 100 MICROGram(s) IV Push once  furosemide   Injectable 40 milliGRAM(s) IV Push once  glucagon  Injectable 1 milliGRAM(s) IntraMuscular once  hydrALAZINE 50 milliGRAM(s) Oral three times a day  insulin glargine Injectable (LANTUS) 10 Unit(s) SubCutaneous at bedtime  insulin lispro (ADMELOG) corrective regimen sliding scale   SubCutaneous at bedtime  insulin lispro (ADMELOG) corrective regimen sliding scale   SubCutaneous three times a day before meals  insulin lispro Injectable (ADMELOG) 2 Unit(s) SubCutaneous three times a day before meals  insulin regular  human recombinant 5 Unit(s) IV Push once  metoprolol succinate ER 25 milliGRAM(s) Oral daily  midazolam Injectable 4 milliGRAM(s) IV Push once  NIFEdipine XL 90 milliGRAM(s) Oral daily  nitroglycerin    Patch 0.3 mG/Hr(s) 1 patch Transdermal daily  norepinephrine Infusion 0.05 MICROgram(s)/kG/Min (7.65 mL/Hr) IV Continuous <Continuous>  pantoprazole    Tablet 40 milliGRAM(s) Oral before breakfast  propofol Injectable 10 milliGRAM(s) IV Push once  sodium bicarbonate 650 milliGRAM(s) Oral two times a day  traZODone 50 milliGRAM(s) Oral daily    MEDICATIONS  (PRN):  acetaminophen     Tablet .. 650 milliGRAM(s) Oral every 6 hours PRN Mild Pain (1 - 3)  dextrose Oral Gel 15 Gram(s) Oral once PRN Blood Glucose LESS THAN 70 milliGRAM(s)/deciliter    ASSESSMENT & PLAN  Rapid Response called for 73y year old Female with a past medical history of DM (on insulin), HTN, HLD, ESRD (not on HD), asthma, anxiety and depression, multiple B/L toe amputations (b/l) due to osteomyelitis, chronic R foot ulcer, iron deficiency anemia admitted to cardiac telemetry to r/o ACS now s/p rapid response and upgraded to MICU d/t altered mental status likely 2/2 worsening CKD, hyperkalemia requiring emergent dialysis, intubated for airway protection and shock likely 2/2 sedation s/p intubation requiring vasopressors.     Plan-  #ELBERT on CKD requiring emergent dialysis  #HAGMA 2/2 renal failure and lactic acidosis  #Hyperkalemia   #R/o ACS  #Aspiratoin PNA  #Acute hypoxic respiratory failure   #aspiration pna vs chemical pneumonitis   -Admit to MICU for complex medical managment   -s/p 1 liter of NS  -s/p insulin 5units, calcium chloride, albuteral, bicarb and lasix for acute management of hyperkalemia  -f/u cbc/cmp/abg/bhb  -RASS -1 to 0   -Consider NCHCT once stable   -Maintain MAP > 65 mmHg wean NE as tolerated   -Hold home BP meds while on vasopressors  -Trend lactate   -SAT/SBT daily   -STAT EKG  -STAT ECHO   -s/p intubation for airway protection   -Wean FiO2 goal SPO2 > 90%   -CTX 2g for aPNA coverage   -CBC/BMP/ABG q4-6 hours for first 24hours of CVVHD  -STAT Bcx   -STAT renal consult   -CVVHD for clearance  -Hernández for strict I/o  -Consider lasix challenge   -f/u A1c     F: none CVVHD for clearance  E: Goal K <5 on CVVHD   D: NPO   GI PPx : Not indicated  DVT PPx: consider SQH and SCDs  Code status: Full Code (Patient's brother called by primary team during rapid response but did not answer)  Dispo: MICU    Case discussed with critical care attending Dr. Chavez. I have personally and independently provided 61 minutes of critical care services.  This excludes any time spent on separate procedures or teaching.     Chance Castaneda ACNP ***INCOMPLETE NOTE!!****    ***Rapid Response Critical Care Nurse Practitioner Note***    Patient is a 73y old  Female admitted for HPI:  73 year old F POOR HISTORIAN with PMHx of DM (on insulin), HTN, HLD, ESRD (not on HD), asthma, anxiety and depression, multiple B/L toe amputations (b/l) due to osteomyelitis, chronic R foot ulcer, iron deficiency anemia who presented after an episode earlier this morning where she noticed her heart was racing, she was short of breath and persistently anxious. Pt states she also had back pain which is uncommon for her. Patient endorses history of panic attacks in the past, however says that this episode felt different. Upon arrival to the hospital, her symptoms improved tremendously. Currently, pt endorses intermittent dizziness where she feels the room is spinning and nausea but denies chest pain, back pain, palpitations, SOB. Earlier this week she had non bloody diarrhea for 4-5 days and after thanksgiving dinner 2 nights prior, pt endorsed non bloody vomiting that resolved in 1 day. Pt endorses orthopnea for many years and leg swelling since she was diagnosed with kidney disease. Pt denies fever, dysuria, cough/cold symptoms, hematuria, BRBPR, hematochezia.   In speaking with pt, she became teary eyed stating that she been depressed lately and often feels thoughts of killing herself and has felt this way in the past. Of note, pt does not know any of her home medications. Medication list obtained from paperwork from [facility],    In the ED, T=97.9F, /59, HR 68, RR 20, SPO2=95%. Labs notable for Trop T notable for 311. . CKMB 6.8. Cr 4.38. BNP 59920. EKG reveals TWI in V1-V6, II, III, AVF and ST depressions in V4-V5. Pt was given aspirin 325mg x1 in the ED, Duonebs x1, and nitroglycerin patch. Pt is now admitted to cardiac telemetry for further management of ELBERT on CKD and possible ischemic workup.     Rapid response team called @ 1434 for altered mental status and only responsive to noxious stimuli responsiveness.    Patient was seen and examined at the bedside by the rapid response team. Initially rapid response 3was called by nursing staff d/t altered mental status and being minimally responsive to noxious stimuli. Upon arrival of RRT HR 50s(Junctional), RR 30s, BP 90s/50s(MAP 60s), SPO2 88% on 5LPM NC and glucose in 300s. Patient moving all extremities, not following commands but minimally responsive to noxious stimuli, pupils ~2-3mm round and reactive and + weak gag. 12lead obtained showing new junctional rhythm, zoll placed, NRB placed, PIV/labs, stroke code initially called but deferred given exam nonfocal and VBG showing acute metabolic acidosis and hyperkalemia. 3 amps of bicarb given, insulin 5 units, calcium chloride 1g, albuterol 10mg, 1L of NS, Lasix 40mg and atropine 0.5mg PCCM attending and fellow @ bedside d/t worsening mental status and need for intubation for airway protection. Patient was intubated by Alexis Worley/Scott patient w difficult airway and +vomiting during an intubation attempt. Patient suctioned prior to further attempt and ventilated via BVM between attempt. Norepi started prior to intubation and required rapid uptitration max 0.45mcg/kg/hr to maintain MAP > 70. Versed, propofol, etomidate used to facilitate intubation. Renal contacted plan to CVVHD, cardiology fellow POCUS w normal biventricular function, focal b-lines and b/l lung sliding pre/post intubation. Patient transported to MICU for management of shock of unclear origin, acute renal failure requiring emergent dialysis and acute hypoxic respiratory failure.     SEE RRT documentation on paper chart for detailed list of vital signs and medications.     Allergies  No Known Allergies    PAST MEDICAL & SURGICAL HISTORY:  Asthma  Depressive disorder  Hyperlipidemia  Type 2 diabetes mellitus  HTN (hypertension)  Local infection of skin and subcutaneous tissue  Toe infection  Type 2 diabetes mellitus with neurological manifestations  Neuropathy in diabetes    Pshx:   S/P cholecystectomy      REVIEW OF SYSTEMS:   See HPI (as per chart review), unable to obtain 2/2 AMS and ETT.     Vital Signs Last 24 Hrs  T(C): 36.8 (26 Nov 2023 12:08), Max: 36.8 (26 Nov 2023 12:08)  T(F): 98.3 (26 Nov 2023 12:08), Max: 98.3 (26 Nov 2023 12:08)  HR: 64 (26 Nov 2023 12:08) (61 - 72)  BP: 108/65 (26 Nov 2023 12:08) (108/65 - 150/67)  BP(mean): 81 (26 Nov 2023 12:08) (78 - 98)  RR: 20 (26 Nov 2023 12:08) (18 - 20)  SpO2: 92% (26 Nov 2023 12:08) (92% - 99%)    Parameters below as of 26 Nov 2023 12:08  Patient On (Oxygen Delivery Method): room air    Vent settings:  vT: 330 RR: 12 Fio2:50% PEEP: 5 iTime: 0.9    11-25 @ 07:01  -  11-26 @ 07:00  --------------------------------------------------------  IN: 0 mL / OUT: 100 mL / NET: -100 mL    11-26 @ 07:01  -  11-26 @ 16:12  --------------------------------------------------------  IN: 120 mL / OUT: 600 mL / NET: -480 mL    Physical Exam  GENERAL: The patient is ill appearing, obtunded w only response to noxious stimuli  HEENT: Head is normocephalic and atraumatic. Mucous membranes are moist.   NECK: Supple.  LUNGS: +tachypnea. Clear to auscultation BL without wheezing, rales or rhonchi;   HEART: Regular rate and rhythm ,+S1/+S2, no murmurs, rubs, gallops  ABDOMEN: +distention. Soft, nontender, and no rebound, guarding rigidity, bowel sounds in all 4 quadrants  EXTREMITIES: RLE w chronic appearing wound on foot.   SKIN: No new rashes or lesions.  VASCULAR: Radial and Dorsal pedal pulses palpable BL  NEUROLOGIC: Moving all 4 extremities, A&O x 0, not following commands, PERRLA    Lines: PIV x 3, Hernández placed during RRT     11-25 @ 07:01  -  11-26 @ 07:00  --------------------------------------------------------  IN: 0 mL / OUT: 100 mL / NET: -100 mL  11-26 @ 07:01  -  11-26 @ 16:12  --------------------------------------------------------  IN: 120 mL / OUT: 600 mL / NET: -480 mL    Labs:             7.5    13.20 )-----( 221      ( 26 Nov 2023 15:55 )             23.6   11-26  141  |  110<H>  |  59<H>  ----------------------------<  359<H>  6.0<H>   |  9<LL>  |  4.80<H>    Ca    8.4      26 Nov 2023 14:45  Phos  5.6     11-26  Mg     2.0     11-26    TPro  6.1  /  Alb  3.0<L>  /  TBili  0.2  /  DBili  x   /  AST  25  /  ALT  22  /  AlkPhos  97  11-26    ABG - ( 26 Nov 2023 15:21 )  pH, Arterial: 7.44  pH, Blood: x     /  pCO2: 21    /  pO2: 138   / HCO3: 14    / Base Excess: -7.6  /  SaO2: 97.7      LIVER FUNCTIONS - ( 26 Nov 2023 13:27 )  Alb: 3.0 g/dL / Pro: 6.1 g/dL / ALK PHOS: 97 U/L / ALT: 22 U/L / AST: 25 U/L / GGT: x         PT/INR - ( 26 Nov 2023 06:59 )   PT: 10.9 sec;   INR: 0.95      PTT - ( 26 Nov 2023 06:59 )  PTT:29.2 sec    Urinalysis Basic - ( 26 Nov 2023 14:45 )  Color: x / Appearance: x / SG: x / pH: x  Gluc: 359 mg/dL / Ketone: x  / Bili: x / Urobili: x   Blood: x / Protein: x / Nitrite: x   Leuk Esterase: x / RBC: x / WBC x   Sq Epi: x / Non Sq Epi: x / Bacteria: x         Chest Xray:WET READ: s/p intubation ETT ~2cm above deborah, NGT below diaphragm, no acute infiltrates    EKG: Bardycardia w junctional rhythm     MEDICATIONS  (STANDING):  acetaminophen   IVPB .. 1000 milliGRAM(s) IV Intermittent once  albuterol    0.083%. 2.5 milliGRAM(s) Nebulizer once  albuterol    90 MICROgram(s) HFA Inhaler 2 Puff(s) Inhalation daily  albuterol/ipratropium for Nebulization 3 milliLiter(s) Nebulizer every 6 hours  atorvastatin 20 milliGRAM(s) Oral at bedtime  atropine Injectable 0.5 milliGRAM(s) IntraMuscular once  budesonide 160 MICROgram(s)/formoterol 4.5 MICROgram(s) Inhaler 2 Puff(s) Inhalation two times a day  calcium acetate 667 milliGRAM(s) Oral three times a day with meals  calcium gluconate IVPB 1 Gram(s) IV Intermittent once  clonazePAM  Tablet 0.5 milliGRAM(s) Oral daily  dextrose 5%. 1000 milliLiter(s) (50 mL/Hr) IV Continuous <Continuous>  dextrose 5%. 1000 milliLiter(s) (100 mL/Hr) IV Continuous <Continuous>  dextrose 50% Injectable 25 Gram(s) IV Push once  dextrose 50% Injectable 25 Gram(s) IV Push once  dextrose 50% Injectable 12.5 Gram(s) IV Push once  DULoxetine 30 milliGRAM(s) Oral daily  etomidate Injectable 25 milliGRAM(s) IV Push once  fentaNYL    Injectable 100 MICROGram(s) IV Push once  furosemide   Injectable 40 milliGRAM(s) IV Push once  glucagon  Injectable 1 milliGRAM(s) IntraMuscular once  hydrALAZINE 50 milliGRAM(s) Oral three times a day  insulin glargine Injectable (LANTUS) 10 Unit(s) SubCutaneous at bedtime  insulin lispro (ADMELOG) corrective regimen sliding scale   SubCutaneous at bedtime  insulin lispro (ADMELOG) corrective regimen sliding scale   SubCutaneous three times a day before meals  insulin lispro Injectable (ADMELOG) 2 Unit(s) SubCutaneous three times a day before meals  insulin regular  human recombinant 5 Unit(s) IV Push once  metoprolol succinate ER 25 milliGRAM(s) Oral daily  midazolam Injectable 4 milliGRAM(s) IV Push once  NIFEdipine XL 90 milliGRAM(s) Oral daily  nitroglycerin    Patch 0.3 mG/Hr(s) 1 patch Transdermal daily  norepinephrine Infusion 0.05 MICROgram(s)/kG/Min (7.65 mL/Hr) IV Continuous <Continuous>  pantoprazole    Tablet 40 milliGRAM(s) Oral before breakfast  propofol Injectable 10 milliGRAM(s) IV Push once  sodium bicarbonate 650 milliGRAM(s) Oral two times a day  traZODone 50 milliGRAM(s) Oral daily    MEDICATIONS  (PRN):  acetaminophen     Tablet .. 650 milliGRAM(s) Oral every 6 hours PRN Mild Pain (1 - 3)  dextrose Oral Gel 15 Gram(s) Oral once PRN Blood Glucose LESS THAN 70 milliGRAM(s)/deciliter    ASSESSMENT & PLAN  Rapid Response called for 73y year old Female with a past medical history of DM (on insulin), HTN, HLD, ESRD (not on HD), asthma, anxiety and depression, multiple B/L toe amputations (b/l) due to osteomyelitis, chronic R foot ulcer, iron deficiency anemia admitted to cardiac telemetry to r/o ACS now s/p rapid response and upgraded to MICU d/t altered mental status likely 2/2 worsening CKD, hyperkalemia requiring emergent dialysis, intubated for airway protection and shock likely 2/2 sedation s/p intubation requiring vasopressors.     Plan-  #ELBERT on CKD requiring emergent dialysis  #HAGMA 2/2 renal failure and lactic acidosis  #Hyperkalemia   #R/o ACS  #Aspiratoin PNA  #Acute hypoxic respiratory failure   #aspiration pna vs chemical pneumonitis   -Admit to MICU for complex medical managment   -s/p 1 liter of NS  -s/p insulin 5units, calcium chloride, albuteral, bicarb and lasix for acute management of hyperkalemia  -f/u cbc/cmp/abg/bhb  -RASS -1 to 0   -Consider NCHCT once stable   -Maintain MAP > 65 mmHg wean NE as tolerated   -Hold home BP meds while on vasopressors  -Trend lactate   -SAT/SBT daily   -STAT EKG  -STAT ECHO   -s/p intubation for airway protection   -Wean FiO2 goal SPO2 > 90%   -CTX 2g for aPNA coverage   -CBC/BMP/ABG q4-6 hours for first 24hours of CVVHD  -STAT Bcx   -STAT renal consult   -CVVHD for clearance  -Insulin sliding scare q6h while npo or on TF goal glucose <180 if unable to obtain consider insulin gtt.  -Hernández for strict I/o  -Consider lasix challenge   -f/u A1c     F: none CVVHD for clearance goal net even  E: Goal K <5 on CVVHD   D: NPO   GI PPx : Not indicated  DVT PPx: consider SQH and SCDs  Code status: Full Code (Patient's brother called by primary team during rapid response but did not answer)  Dispo: MICU    Case discussed with critical care attending Dr. Chavez. I have personally and independently provided 61 minutes of critical care services.  This excludes any time spent on separate procedures or teaching.     Chance [facility]aneda [facility] ***INCOMPLETE NOTE!!****    ***Rapid Response Critical Care Nurse Practitioner Note***    Patient is a 73y old  Female admitted for HPI:  73 year old F POOR HISTORIAN with PMHx of DM (on insulin), HTN, HLD, ESRD (not on HD), asthma, anxiety and depression, multiple B/L toe amputations (b/l) due to osteomyelitis, chronic R foot ulcer, iron deficiency anemia who presented after an episode earlier this morning where she noticed her heart was racing, she was short of breath and persistently anxious. Pt states she also had back pain which is uncommon for her. Patient endorses history of panic attacks in the past, however says that this episode felt different. Upon arrival to the hospital, her symptoms improved tremendously. Currently, pt endorses intermittent dizziness where she feels the room is spinning and nausea but denies chest pain, back pain, palpitations, SOB. Earlier this week she had non bloody diarrhea for 4-5 days and after thanksgiving dinner 2 nights prior, pt endorsed non bloody vomiting that resolved in 1 day. Pt endorses orthopnea for many years and leg swelling since she was diagnosed with kidney disease. Pt denies fever, dysuria, cough/cold symptoms, hematuria, BRBPR, hematochezia.   In speaking with pt, she became teary eyed stating that she been depressed lately and often feels thoughts of killing herself and has felt this way in the past. Of note, pt does not know any of her home medications. Medication list obtained from paperwork from [facility],    In the ED, T=97.9F, /59, HR 68, RR 20, SPO2=95%. Labs notable for Trop T notable for 311. . CKMB 6.8. Cr 4.38. BNP 02448. EKG reveals TWI in V1-V6, II, III, AVF and ST depressions in V4-V5. Pt was given aspirin 325mg x1 in the ED, Duonebs x1, and nitroglycerin patch. Pt is now admitted to cardiac telemetry for further management of ELBERT on CKD and possible ischemic workup.     Rapid response team called @ 1434 for altered mental status and only responsive to noxious stimuli responsiveness.    Patient was seen and examined at the bedside by the rapid response team. Initially rapid response 3was called by nursing staff d/t altered mental status and being minimally responsive to noxious stimuli. Upon arrival of RRT HR 50s(Junctional), RR 30s, BP 90s/50s(MAP 60s), SPO2 88% on 5LPM NC and glucose in 300s. Patient moving all extremities, not following commands but minimally responsive to noxious stimuli, pupils ~2-3mm round and reactive and + weak gag. 12lead obtained showing new junctional rhythm, zoll placed, NRB placed, PIV/labs, stroke code initially called but deferred given exam nonfocal and VBG showing acute metabolic acidosis and hyperkalemia. 3 amps of bicarb given, insulin 5 units, calcium chloride 1g, albuterol 10mg, 1L of NS, Lasix 40mg and atropine 0.5mg PCCM attending and fellow @ bedside d/t worsening mental status and need for intubation for airway protection. Patient was intubated by Alexis Worley/Scott patient w difficult airway and +vomiting during an intubation attempt. Patient suctioned prior to further attempt and ventilated via BVM between attempt. Norepi started prior to intubation and required rapid uptitration max 0.45mcg/kg/hr to maintain MAP > 70. Versed, propofol, etomidate used to facilitate intubation. Renal contacted plan to CVVHD, cardiology fellow POCUS w normal biventricular function, focal b-lines and b/l lung sliding pre/post intubation. Patient transported to MICU for management of shock of unclear origin, acute renal failure requiring emergent dialysis and acute hypoxic respiratory failure.     SEE RRT documentation on paper chart for detailed list of vital signs and medications.     Allergies  No Known Allergies    PAST MEDICAL & SURGICAL HISTORY:  Asthma  Depressive disorder  Hyperlipidemia  Type 2 diabetes mellitus  HTN (hypertension)  Local infection of skin and subcutaneous tissue  Toe infection  Type 2 diabetes mellitus with neurological manifestations  Neuropathy in diabetes    Pshx:   S/P cholecystectomy      REVIEW OF SYSTEMS:   See HPI (as per chart review), unable to obtain 2/2 AMS and ETT.     Vital Signs Last 24 Hrs  T(C): 36.8 (26 Nov 2023 12:08), Max: 36.8 (26 Nov 2023 12:08)  T(F): 98.3 (26 Nov 2023 12:08), Max: 98.3 (26 Nov 2023 12:08)  HR: 64 (26 Nov 2023 12:08) (61 - 72)  BP: 108/65 (26 Nov 2023 12:08) (108/65 - 150/67)  BP(mean): 81 (26 Nov 2023 12:08) (78 - 98)  RR: 20 (26 Nov 2023 12:08) (18 - 20)  SpO2: 92% (26 Nov 2023 12:08) (92% - 99%)    Parameters below as of 26 Nov 2023 12:08  Patient On (Oxygen Delivery Method): room air    Vent settings:  vT: 330 RR: 12 Fio2:50% PEEP: 5 iTime: 0.9    11-25 @ 07:01  -  11-26 @ 07:00  --------------------------------------------------------  IN: 0 mL / OUT: 100 mL / NET: -100 mL    11-26 @ 07:01  -  11-26 @ 16:12  --------------------------------------------------------  IN: 120 mL / OUT: 600 mL / NET: -480 mL    Physical Exam  GENERAL: The patient is ill appearing, obtunded w only response to noxious stimuli  HEENT: Head is normocephalic and atraumatic. Mucous membranes are moist.   NECK: Supple.  LUNGS: +tachypnea. Clear to auscultation BL without wheezing, rales or rhonchi;   HEART: Regular rate and rhythm ,+S1/+S2, no murmurs, rubs, gallops  ABDOMEN: +distention. Soft, nontender, and no rebound, guarding rigidity, bowel sounds in all 4 quadrants  EXTREMITIES: RLE w chronic appearing wound on foot.   SKIN: No new rashes or lesions.  VASCULAR: Radial and Dorsal pedal pulses palpable BL  NEUROLOGIC: Moving all 4 extremities, A&O x 0, not following commands, PERRLA    Lines: PIV x 3, Hernández placed during RRT     11-25 @ 07:01  -  11-26 @ 07:00  --------------------------------------------------------  IN: 0 mL / OUT: 100 mL / NET: -100 mL  11-26 @ 07:01  -  11-26 @ 16:12  --------------------------------------------------------  IN: 120 mL / OUT: 600 mL / NET: -480 mL    Labs:             7.5    13.20 )-----( 221      ( 26 Nov 2023 15:55 )             23.6   11-26  141  |  110<H>  |  59<H>  ----------------------------<  359<H>  6.0<H>   |  9<LL>  |  4.80<H>    Ca    8.4      26 Nov 2023 14:45  Phos  5.6     11-26  Mg     2.0     11-26    TPro  6.1  /  Alb  3.0<L>  /  TBili  0.2  /  DBili  x   /  AST  25  /  ALT  22  /  AlkPhos  97  11-26    ABG - ( 26 Nov 2023 15:21 )  pH, Arterial: 7.44  pH, Blood: x     /  pCO2: 21    /  pO2: 138   / HCO3: 14    / Base Excess: -7.6  /  SaO2: 97.7      LIVER FUNCTIONS - ( 26 Nov 2023 13:27 )  Alb: 3.0 g/dL / Pro: 6.1 g/dL / ALK PHOS: 97 U/L / ALT: 22 U/L / AST: 25 U/L / GGT: x         PT/INR - ( 26 Nov 2023 06:59 )   PT: 10.9 sec;   INR: 0.95      PTT - ( 26 Nov 2023 06:59 )  PTT:29.2 sec    Urinalysis Basic - ( 26 Nov 2023 14:45 )  Color: x / Appearance: x / SG: x / pH: x  Gluc: 359 mg/dL / Ketone: x  / Bili: x / Urobili: x   Blood: x / Protein: x / Nitrite: x   Leuk Esterase: x / RBC: x / WBC x   Sq Epi: x / Non Sq Epi: x / Bacteria: x         Chest Xray:WET READ: s/p intubation ETT ~2cm above deborah, NGT below diaphragm, no acute infiltrates    EKG: Bardycardia w junctional rhythm     MEDICATIONS  (STANDING):  acetaminophen   IVPB .. 1000 milliGRAM(s) IV Intermittent once  albuterol    0.083%. 2.5 milliGRAM(s) Nebulizer once  albuterol    90 MICROgram(s) HFA Inhaler 2 Puff(s) Inhalation daily  albuterol/ipratropium for Nebulization 3 milliLiter(s) Nebulizer every 6 hours  atorvastatin 20 milliGRAM(s) Oral at bedtime  atropine Injectable 0.5 milliGRAM(s) IntraMuscular once  budesonide 160 MICROgram(s)/formoterol 4.5 MICROgram(s) Inhaler 2 Puff(s) Inhalation two times a day  calcium acetate 667 milliGRAM(s) Oral three times a day with meals  calcium gluconate IVPB 1 Gram(s) IV Intermittent once  clonazePAM  Tablet 0.5 milliGRAM(s) Oral daily  dextrose 5%. 1000 milliLiter(s) (50 mL/Hr) IV Continuous <Continuous>  dextrose 5%. 1000 milliLiter(s) (100 mL/Hr) IV Continuous <Continuous>  dextrose 50% Injectable 25 Gram(s) IV Push once  dextrose 50% Injectable 25 Gram(s) IV Push once  dextrose 50% Injectable 12.5 Gram(s) IV Push once  DULoxetine 30 milliGRAM(s) Oral daily  etomidate Injectable 25 milliGRAM(s) IV Push once  fentaNYL    Injectable 100 MICROGram(s) IV Push once  furosemide   Injectable 40 milliGRAM(s) IV Push once  glucagon  Injectable 1 milliGRAM(s) IntraMuscular once  hydrALAZINE 50 milliGRAM(s) Oral three times a day  insulin glargine Injectable (LANTUS) 10 Unit(s) SubCutaneous at bedtime  insulin lispro (ADMELOG) corrective regimen sliding scale   SubCutaneous at bedtime  insulin lispro (ADMELOG) corrective regimen sliding scale   SubCutaneous three times a day before meals  insulin lispro Injectable (ADMELOG) 2 Unit(s) SubCutaneous three times a day before meals  insulin regular  human recombinant 5 Unit(s) IV Push once  metoprolol succinate ER 25 milliGRAM(s) Oral daily  midazolam Injectable 4 milliGRAM(s) IV Push once  NIFEdipine XL 90 milliGRAM(s) Oral daily  nitroglycerin    Patch 0.3 mG/Hr(s) 1 patch Transdermal daily  norepinephrine Infusion 0.05 MICROgram(s)/kG/Min (7.65 mL/Hr) IV Continuous <Continuous>  pantoprazole    Tablet 40 milliGRAM(s) Oral before breakfast  propofol Injectable 10 milliGRAM(s) IV Push once  sodium bicarbonate 650 milliGRAM(s) Oral two times a day  traZODone 50 milliGRAM(s) Oral daily    MEDICATIONS  (PRN):  acetaminophen     Tablet .. 650 milliGRAM(s) Oral every 6 hours PRN Mild Pain (1 - 3)  dextrose Oral Gel 15 Gram(s) Oral once PRN Blood Glucose LESS THAN 70 milliGRAM(s)/deciliter    ASSESSMENT & PLAN  Rapid Response called for 73y year old Female with a past medical history of DM (on insulin), HTN, HLD, CKD5 (not on HD), asthma, anxiety and depression, multiple B/L toe amputations (b/l) due to osteomyelitis, chronic R foot ulcer, iron deficiency anemia admitted to cardiac telemetry to r/o ACS now s/p rapid response and upgraded to MICU d/t altered mental status likely 2/2 toxic metabolic encephalopathy i/s/o metabolic acidosis d/t worsening renal failure and hyperkalemia requiring emergent dialysis, intubated for airway protection and shock likely 2/2 sedation s/p intubation requiring vasopressors.     Plan-  #ELBERT on CKD requiring emergent dialysis  #Toxic metabolic encephalopathy  #HAGMA 2/2 renal failure and lactic acidosis  #Hyperkalemia   #R/o ACS  #Aspiration PNA  #Acute hypoxic respiratory failure   #aspiration pna vs chemical pneumonitis   -Admit to MICU for complex medical management   -s/p 1 liter of NS  -s/p insulin 5units, calcium chloride, albuterol, bicarb and Lasix for acute management of hyperkalemia  -f/u cbc/cmp/abg/bhb  -RASS -1 to 0   -Consider NCHCT once stable   -Maintain MAP > 65 mmHg wean NE as tolerated   -Hold home BP meds while on vasopressors  -Trend lactate   -SAT/SBT daily   -STAT EKG  -STAT ECHO   -s/p intubation for airway protection   -Wean FiO2 goal SPO2 > 90%   -CTX 2g for aPNA coverage   -CBC/BMP/ABG q4-6 hours for first 24hours of CVVHD  -STAT Bcx   -STAT renal consult   -CVVHD for clearance  -Insulin sliding scare q6h while npo or on TF goal glucose <180 if unable to obtain consider insulin gtt.  -Hernández for strict I/o  -Consider Lasix challenge   -f/u A1c     F: none CVVHD for clearance goal net even  E: Goal K <5 on CVVHD   D: NPO   GI PPx : Not indicated  DVT PPx: consider SQH and SCDs  Code status: Full Code (Patient's brother called by primary team during rapid response but did not answer)  Dispo: MICU    Case discussed with critical care attending Dr. Chavez. I have personally and independently provided 61 minutes of critical care services.  This excludes any time spent on separate procedures or teaching.     Chance Castaneda ACNP ***Rapid Response Critical Care Nurse Practitioner Note***    Patient is a 73y old  Female admitted for HPI:  73 year old F POOR HISTORIAN with PMHx of DM (on insulin), HTN, HLD, ESRD (not on HD), asthma, anxiety and depression, multiple B/L toe amputations (b/l) due to osteomyelitis, chronic R foot ulcer, iron deficiency anemia who presented after an episode earlier this morning where she noticed her heart was racing, she was short of breath and persistently anxious. Pt states she also had back pain which is uncommon for her. Patient endorses history of panic attacks in the past, however says that this episode felt different. Upon arrival to the hospital, her symptoms improved tremendously. Currently, pt endorses intermittent dizziness where she feels the room is spinning and nausea but denies chest pain, back pain, palpitations, SOB. Earlier this week she had non bloody diarrhea for 4-5 days and after thanksgiving dinner 2 nights prior, pt endorsed non bloody vomiting that resolved in 1 day. Pt endorses orthopnea for many years and leg swelling since she was diagnosed with kidney disease. Pt denies fever, dysuria, cough/cold symptoms, hematuria, BRBPR, hematochezia.   In speaking with pt, she became teary eyed stating that she been depressed lately and often feels thoughts of killing herself and has felt this way in the past. Of note, pt does not know any of her home medications. Medication list obtained from paperwork from [facility],    In the ED, T=97.9F, /59, HR 68, RR 20, SPO2=95%. Labs notable for Trop T notable for 311. . CKMB 6.8. Cr 4.38. BNP 42461. EKG reveals TWI in V1-V6, II, III, AVF and ST depressions in V4-V5. Pt was given aspirin 325mg x1 in the ED, Duonebs x1, and nitroglycerin patch. Pt is now admitted to cardiac telemetry for further management of ELBERT on CKD and possible ischemic workup.     Rapid response team called @ 1434 for altered mental status and only responsive to noxious stimuli responsiveness.    Patient was seen and examined at the bedside by the rapid response team. Initially rapid response 3was called by nursing staff d/t altered mental status and being minimally responsive to noxious stimuli. Upon arrival of RRT HR 50s(Junctional), RR 30s, BP 90s/50s(MAP 60s), SPO2 88% on 5LPM NC and glucose in 300s. Patient moving all extremities, not following commands but minimally responsive to noxious stimuli, pupils ~2-3mm round and reactive and + weak gag. 12lead obtained showing new junctional rhythm, zoll placed, NRB placed, PIV/labs, stroke code initially called but deferred given exam nonfocal and VBG showing acute metabolic acidosis and hyperkalemia. 3 amps of bicarb given, insulin 5 units, calcium chloride 1g, albuterol 10mg, 1L of NS, Lasix 40mg and atropine 0.5mg PCCM attending and fellow @ bedside d/t worsening mental status and need for intubation for airway protection. Patient was intubated by Alexis Worley/Scott patient w difficult airway and +vomiting during an intubation attempt. Patient suctioned prior to further attempt and ventilated via BVM between attempt. Norepi started prior to intubation and required rapid uptitration max 0.45mcg/kg/hr to maintain MAP > 70. Versed, propofol, etomidate used to facilitate intubation. Renal contacted plan to CVVHD, cardiology fellow POCUS w normal biventricular function, focal b-lines and b/l lung sliding pre/post intubation. Patient transported to MICU for management of shock of unclear origin, acute renal failure requiring emergent dialysis and acute hypoxic respiratory failure.     SEE RRT documentation on paper chart for detailed list of vital signs and medications.     Allergies  No Known Allergies    PAST MEDICAL & SURGICAL HISTORY:  Asthma  Depressive disorder  Hyperlipidemia  Type 2 diabetes mellitus  HTN (hypertension)  Local infection of skin and subcutaneous tissue  Toe infection  Type 2 diabetes mellitus with neurological manifestations  Neuropathy in diabetes    Pshx:   S/P cholecystectomy      REVIEW OF SYSTEMS:   See HPI (as per chart review), unable to obtain 2/2 AMS and ETT.     Vital Signs Last 24 Hrs  T(C): 36.8 (26 Nov 2023 12:08), Max: 36.8 (26 Nov 2023 12:08)  T(F): 98.3 (26 Nov 2023 12:08), Max: 98.3 (26 Nov 2023 12:08)  HR: 64 (26 Nov 2023 12:08) (61 - 72)  BP: 108/65 (26 Nov 2023 12:08) (108/65 - 150/67)  BP(mean): 81 (26 Nov 2023 12:08) (78 - 98)  RR: 20 (26 Nov 2023 12:08) (18 - 20)  SpO2: 92% (26 Nov 2023 12:08) (92% - 99%)    Parameters below as of 26 Nov 2023 12:08  Patient On (Oxygen Delivery Method): room air    Vent settings:  vT: 330 RR: 12 Fio2:50% PEEP: 5 iTime: 0.9    11-25 @ 07:01  -  11-26 @ 07:00  --------------------------------------------------------  IN: 0 mL / OUT: 100 mL / NET: -100 mL    11-26 @ 07:01  -  11-26 @ 16:12  --------------------------------------------------------  IN: 120 mL / OUT: 600 mL / NET: -480 mL    Physical Exam  GENERAL: The patient is ill appearing, obtunded w only response to noxious stimuli  HEENT: Head is normocephalic and atraumatic. Mucous membranes are moist.   NECK: Supple.  LUNGS: +tachypnea. Clear to auscultation BL without wheezing, rales or rhonchi;   HEART: Regular rate and rhythm ,+S1/+S2, no murmurs, rubs, gallops  ABDOMEN: +distention. Soft, nontender, and no rebound, guarding rigidity, bowel sounds in all 4 quadrants  EXTREMITIES: RLE w chronic appearing wound on foot.   SKIN: No new rashes or lesions.  VASCULAR: Radial and Dorsal pedal pulses palpable BL  NEUROLOGIC: Moving all 4 extremities, A&O x 0, not following commands, PERRLA    Lines: PIV x 3, Hernández placed during RRT     11-25 @ 07:01  -  11-26 @ 07:00  --------------------------------------------------------  IN: 0 mL / OUT: 100 mL / NET: -100 mL  11-26 @ 07:01  -  11-26 @ 16:12  --------------------------------------------------------  IN: 120 mL / OUT: 600 mL / NET: -480 mL    Labs:             7.5    13.20 )-----( 221      ( 26 Nov 2023 15:55 )             23.6   11-26  141  |  110<H>  |  59<H>  ----------------------------<  359<H>  6.0<H>   |  9<LL>  |  4.80<H>    Ca    8.4      26 Nov 2023 14:45  Phos  5.6     11-26  Mg     2.0     11-26    TPro  6.1  /  Alb  3.0<L>  /  TBili  0.2  /  DBili  x   /  AST  25  /  ALT  22  /  AlkPhos  97  11-26    ABG - ( 26 Nov 2023 15:21 )  pH, Arterial: 7.44  pH, Blood: x     /  pCO2: 21    /  pO2: 138   / HCO3: 14    / Base Excess: -7.6  /  SaO2: 97.7      LIVER FUNCTIONS - ( 26 Nov 2023 13:27 )  Alb: 3.0 g/dL / Pro: 6.1 g/dL / ALK PHOS: 97 U/L / ALT: 22 U/L / AST: 25 U/L / GGT: x         PT/INR - ( 26 Nov 2023 06:59 )   PT: 10.9 sec;   INR: 0.95      PTT - ( 26 Nov 2023 06:59 )  PTT:29.2 sec    Urinalysis Basic - ( 26 Nov 2023 14:45 )  Color: x / Appearance: x / SG: x / pH: x  Gluc: 359 mg/dL / Ketone: x  / Bili: x / Urobili: x   Blood: x / Protein: x / Nitrite: x   Leuk Esterase: x / RBC: x / WBC x   Sq Epi: x / Non Sq Epi: x / Bacteria: x         Chest Xray:WET READ: s/p intubation ETT ~2cm above deborah, NGT below diaphragm, no acute infiltrates    EKG: Bardycardia w junctional rhythm     MEDICATIONS  (STANDING):  acetaminophen   IVPB .. 1000 milliGRAM(s) IV Intermittent once  albuterol    0.083%. 2.5 milliGRAM(s) Nebulizer once  albuterol    90 MICROgram(s) HFA Inhaler 2 Puff(s) Inhalation daily  albuterol/ipratropium for Nebulization 3 milliLiter(s) Nebulizer every 6 hours  atorvastatin 20 milliGRAM(s) Oral at bedtime  atropine Injectable 0.5 milliGRAM(s) IntraMuscular once  budesonide 160 MICROgram(s)/formoterol 4.5 MICROgram(s) Inhaler 2 Puff(s) Inhalation two times a day  calcium acetate 667 milliGRAM(s) Oral three times a day with meals  calcium gluconate IVPB 1 Gram(s) IV Intermittent once  clonazePAM  Tablet 0.5 milliGRAM(s) Oral daily  dextrose 5%. 1000 milliLiter(s) (50 mL/Hr) IV Continuous <Continuous>  dextrose 5%. 1000 milliLiter(s) (100 mL/Hr) IV Continuous <Continuous>  dextrose 50% Injectable 25 Gram(s) IV Push once  dextrose 50% Injectable 25 Gram(s) IV Push once  dextrose 50% Injectable 12.5 Gram(s) IV Push once  DULoxetine 30 milliGRAM(s) Oral daily  etomidate Injectable 25 milliGRAM(s) IV Push once  fentaNYL    Injectable 100 MICROGram(s) IV Push once  furosemide   Injectable 40 milliGRAM(s) IV Push once  glucagon  Injectable 1 milliGRAM(s) IntraMuscular once  hydrALAZINE 50 milliGRAM(s) Oral three times a day  insulin glargine Injectable (LANTUS) 10 Unit(s) SubCutaneous at bedtime  insulin lispro (ADMELOG) corrective regimen sliding scale   SubCutaneous at bedtime  insulin lispro (ADMELOG) corrective regimen sliding scale   SubCutaneous three times a day before meals  insulin lispro Injectable (ADMELOG) 2 Unit(s) SubCutaneous three times a day before meals  insulin regular  human recombinant 5 Unit(s) IV Push once  metoprolol succinate ER 25 milliGRAM(s) Oral daily  midazolam Injectable 4 milliGRAM(s) IV Push once  NIFEdipine XL 90 milliGRAM(s) Oral daily  nitroglycerin    Patch 0.3 mG/Hr(s) 1 patch Transdermal daily  norepinephrine Infusion 0.05 MICROgram(s)/kG/Min (7.65 mL/Hr) IV Continuous <Continuous>  pantoprazole    Tablet 40 milliGRAM(s) Oral before breakfast  propofol Injectable 10 milliGRAM(s) IV Push once  sodium bicarbonate 650 milliGRAM(s) Oral two times a day  traZODone 50 milliGRAM(s) Oral daily    MEDICATIONS  (PRN):  acetaminophen     Tablet .. 650 milliGRAM(s) Oral every 6 hours PRN Mild Pain (1 - 3)  dextrose Oral Gel 15 Gram(s) Oral once PRN Blood Glucose LESS THAN 70 milliGRAM(s)/deciliter    ASSESSMENT & PLAN  Rapid Response called for 73y year old Female with a past medical history of DM (on insulin), HTN, HLD, CKD5 (not on HD), asthma, anxiety and depression, multiple B/L toe amputations (b/l) due to osteomyelitis, chronic R foot ulcer, iron deficiency anemia admitted to cardiac telemetry to r/o ACS now s/p rapid response and upgraded to MICU d/t altered mental status likely 2/2 toxic metabolic encephalopathy i/s/o metabolic acidosis d/t worsening renal failure and hyperkalemia requiring emergent dialysis, intubated for airway protection and shock likely 2/2 sedation s/p intubation requiring vasopressors.     Plan-  #ELBERT on CKD requiring emergent dialysis  #Toxic metabolic encephalopathy  #HAGMA 2/2 renal failure and lactic acidosis  #Hyperkalemia   #R/o ACS  #Aspiration PNA  #Acute hypoxic respiratory failure   #aspiration pna vs chemical pneumonitis   -Admit to MICU for complex medical management   -s/p 1 liter of NS  -s/p insulin 5units, calcium chloride, albuterol, bicarb and Lasix for acute management of hyperkalemia  -f/u cbc/cmp/abg/bhb  -RASS -1 to 0   -Consider NCHCT once stable   -Maintain MAP > 65 mmHg wean NE as tolerated   -Hold home BP meds while on vasopressors  -Trend lactate   -SAT/SBT daily   -STAT EKG  -STAT ECHO   -s/p intubation for airway protection   -Wean FiO2 goal SPO2 > 90%   -CTX 2g for aPNA coverage   -CBC/BMP/ABG q4-6 hours for first 24hours of CVVHD  -STAT Bcx   -STAT renal consult   -CVVHD for clearance  -Insulin sliding scare q6h while npo or on TF goal glucose <180 if unable to obtain consider insulin gtt.  -Hernández for strict I/o  -Consider Lasix challenge   -f/u A1c     F: none CVVHD for clearance goal net even  E: Goal K <5 on CVVHD   D: NPO   GI PPx : Not indicated  DVT PPx: consider SQH and SCDs  Code status: Full Code (Patient's brother called by primary team during rapid response but did not answer)  Dispo: MICU    Case discussed with critical care attending Dr. Chavez. I have personally and independently provided 61 minutes of critical care services.  This excludes any time spent on separate procedures or teaching.     Chance Castaneda [facility]

## 2023-11-26 NOTE — PROCEDURE NOTE - ADDITIONAL PROCEDURE DETAILS
R IJ HDC placed under US guidance.   Guide wire visualized in RIJ under ultrasound in long and short axis views prior to dilation.  Lung sliding present pre and post procedure.   Agitated saline test demonstrates R sided heart filling on POCUS.   Post-procedure CXR performed for placement confirmation.    Labs drawn w HDC insertion

## 2023-11-26 NOTE — PROGRESS NOTE ADULT - PROBLEM SELECTOR PLAN 1
Pt endorsed episode of heart racing, shortness of breath and anxiety that has resolved.  -EKG reveals TWI in V1-V6, II, III, AVF and ST depressions in V4-V5, repeat EKG unchanged  -Trop T 311->275  -CK/CKMB- 190/6.8-->178/7.0  -BNP 03774  -CXR wet read reveals diffuse interstitial markings but no pleural effusion or acute infiltrates  -f/u TTE  -will require nuclear stress test when patient more euvolemic

## 2023-11-26 NOTE — PROGRESS NOTE ADULT - NSPROGADDITIONALINFOA_GEN_ALL_CORE
Attending Attestation:  I was physically present for the key portions of the evaluation and management (E/M) service provided.  I agree with the above history, physical, and plan which I have reviewed.  Duong Piña MD (Cardiology)  Initial encounter 11/26. See HnP attestation

## 2023-11-26 NOTE — CHART NOTE - NSCHARTNOTEFT_GEN_A_CORE
Rapid Response Rapid Response called approx 2:30    Patient had been admitted to hospital for r/o ACS after presenting with palpitations and shortness of breath. In the outpatient setting she was scheduled to undergo ischemic eval on the 28th before admission. Since the AM I was called to the bedside on 2 separate occasions, the first was for agitation as the patient was removing lines and her clothing but continued to be A&Ox3, she was scheduled for her home klonopin for her baseline anxiety so it was given at approx 12pm. The 2nd occasion the patient was complaining of "chest pain." upon arrival to the bedside, the patient was evaluated and when ask to point towards the chest pain, the patient placed her right hand on her belly multiple times indicating the pain was there. Examination revealed a round belly that appeared distended but was nontender and not tense. With her history of uncontrolled diabetes and her gender an EKG and cardiac enzymes were ordered which revealed no acute changes from her baseline. She stated during the encounter that she was starting to feel better and so this provider left the bedside after conferring with nursing to reach back out if any changes.    One hour later I was called by nursing and over the phone the staff member stated that the patient was now unresponsive and she asked to call a rapid to which I agreed and I went to the patient's bedside. I was told by members of the rapid response team that the patient was not responding to noxious stimuli. Fingerstick glucose was 389. Physical exam was unchanged from the AM. We did a neurologic assessment and found her to have PERRL and she withdrew to pain bilaterally. Stroke code was called, however. While awaiting for stroke team. Repeat Ekg completed and was unremarkable, bedside TTE was completed and revealed no regional wall motion abnormalities nor effusions, labs were drawn and CMP was added on to the cardiac enzymes that were obtained at the 1pm encounter. when stroke team arrived they assessed the patient and recommended to postpone the stroke code and obtain CT head if necessary after metabolic w/u completed.   Labs began to come back and VBG revealed metabolic acidosis with Bicarb of 11 and PCO2 of 26 with potassium of 6.8, 3 amps of bicarb was given along with 40IVP of lasix, 5units insulin, and 1g of calcium gluc.  ABG obtained and bicarb of 14 was noted and PCO2 of 21  Lactate was found to be elevated at 5.9 and BHB was WNL. Vital signs throughout rapid at this point: BPs in 90s/50s, O2 sat > 95, tachypneic, bradycardic to low normal HR. Patient was given atropine for the bradycardia and 1liter of normal saline for the hypotension                  Brother was called during rapid response but was unable to be reached. After labs returned patient was rapidly inducted for endotracheal intubation and was oxygenated via bag/valve 2/2 poor airway protection. Renal came to bedside and advised to prepare patient for urgent dialysis. Patient was accepted and transferred to the medical ICU for further management.

## 2023-11-26 NOTE — PROVIDER CONTACT NOTE (CHANGE IN STATUS NOTIFICATION) - ACTION/TREATMENT ORDERED:
Rapid response team at bedside. Stat labs, ABG and VBG sent. Ekg done. Hernández placed. Patient intubated. Patient upgraded to MICU, report given to RN on [address].

## 2023-11-26 NOTE — CONSULT NOTE ADULT - SUBJECTIVE AND OBJECTIVE BOX
NEPHROLOGY SERVICE INITIAL CONSULT NOTE    Lissett George is a 74 yo F w/ PMH of CKD (Cr 2.3 last year consistent with stage 4), HTN, HLD, DM, osteomyelitis, DIMITRIS who presented with palpitations and SOB from assisted living facility. Noted to have elevated troponin and admitted initially with concern for NSTEMI. Found to have creatinine 4.4 with unclear recent baseline. Also noted to have some LE edema but is comfortable on room air and CXR without significant apparent vascular congestion. Patient reports that she was told she would need dialysis due to kidney disease from her diabetes and underwent vein mapping but so far has been refusing any additional dialysis planning. Does not recall any recent creatinine values. Currently makes urine and takes diuretic at home. Otherwise unclear medication regimen. Nephrology consulted for further evaluation and management of ELBERT vs progression of CKD.    REVIEW OF SYSTEMS:   Otherwise negative except as specified in HPI    PAST MEDICAL AND SURGICAL HISTORY:   PAST MEDICAL & SURGICAL HISTORY:  Asthma  Asthma      Depressive disorder  Depression      Hyperlipidemia  HLD (hyperlipidemia)      Type 2 diabetes mellitus  DM (diabetes mellitus)      Essential hypertension  HTN (hypertension)      Local infection of skin and subcutaneous tissue  Toe infection      Type 2 diabetes mellitus with neurological manifestations  Neuropathy in diabetes      Cytomegalovirus infection not present  No significant past surgical history      S/P cholecystectomy          FAMILY HISTORY:  FAMILY HISTORY:  Family history of diabetes mellitus  Family history of diabetes mellitus (DM)    FH: heart attack (Mother)    FH: Parkinson's disease (Father)    FH: COPD (chronic obstructive pulmonary disease) (Mother)        Otherwise Noncontributory to current admission    SOCIAL HISTORY:  Tobacco use: Denies  EtOH use: Denies  Illicit drug use: Denies    HOME MEDICATIONS:      ACTIVE MEDICATIONS:  MEDICATIONS  (STANDING):  albuterol    90 MICROgram(s) HFA Inhaler 2 Puff(s) Inhalation daily  albuterol/ipratropium for Nebulization 3 milliLiter(s) Nebulizer every 6 hours  atorvastatin 20 milliGRAM(s) Oral at bedtime  budesonide 160 MICROgram(s)/formoterol 4.5 MICROgram(s) Inhaler 2 Puff(s) Inhalation two times a day  calcium acetate 667 milliGRAM(s) Oral three times a day with meals  clonazePAM  Tablet 0.5 milliGRAM(s) Oral daily  dextrose 5%. 1000 milliLiter(s) (50 mL/Hr) IV Continuous <Continuous>  dextrose 5%. 1000 milliLiter(s) (100 mL/Hr) IV Continuous <Continuous>  dextrose 50% Injectable 25 Gram(s) IV Push once  dextrose 50% Injectable 12.5 Gram(s) IV Push once  dextrose 50% Injectable 25 Gram(s) IV Push once  DULoxetine 30 milliGRAM(s) Oral daily  glucagon  Injectable 1 milliGRAM(s) IntraMuscular once  hydrALAZINE 50 milliGRAM(s) Oral three times a day  insulin glargine Injectable (LANTUS) 10 Unit(s) SubCutaneous at bedtime  insulin lispro Injectable (ADMELOG) 2 Unit(s) SubCutaneous three times a day before meals  metoprolol succinate ER 25 milliGRAM(s) Oral daily  NIFEdipine XL 90 milliGRAM(s) Oral daily  nitroglycerin    Patch 0.3 mG/Hr(s) 1 patch Transdermal daily  pantoprazole    Tablet 40 milliGRAM(s) Oral before breakfast  sodium bicarbonate 650 milliGRAM(s) Oral two times a day  traZODone 50 milliGRAM(s) Oral daily    MEDICATIONS  (PRN):  acetaminophen     Tablet .. 650 milliGRAM(s) Oral every 6 hours PRN Mild Pain (1 - 3)  dextrose Oral Gel 15 Gram(s) Oral once PRN Blood Glucose LESS THAN 70 milliGRAM(s)/deciliter      ALLERGIES:  Allergies    No Known Allergies    Intolerances        VITAL SIGNS:  Vital Signs Last 24 Hrs  T(C): 36.8 (26 Nov 2023 12:08), Max: 36.8 (26 Nov 2023 12:08)  T(F): 98.3 (26 Nov 2023 12:08), Max: 98.3 (26 Nov 2023 12:08)  HR: 64 (26 Nov 2023 12:08) (61 - 76)  BP: 108/65 (26 Nov 2023 12:08) (105/59 - 150/67)  BP(mean): 81 (26 Nov 2023 12:08) (78 - 98)  RR: 20 (26 Nov 2023 12:08) (18 - 20)  SpO2: 92% (26 Nov 2023 12:08) (92% - 99%)    Parameters below as of 26 Nov 2023 12:08  Patient On (Oxygen Delivery Method): room air        11-25-23 @ 07:01  -  11-26-23 @ 07:00  --------------------------------------------------------  IN:  Total IN: 0 mL    OUT:    Voided (mL): 100 mL  Total OUT: 100 mL    Total NET: -100 mL      11-26-23 @ 07:01  -  11-26-23 @ 13:07  --------------------------------------------------------  IN:    Oral Fluid: 300 mL  Total IN: 300 mL    OUT:    Voided (mL): 600 mL  Total OUT: 600 mL    Total NET: -300 mL          PHYSICAL EXAM:  Constitutional: NAD, lying in bed  Head: NCAT, EOMI  Respiratory: CTAB, no crackles anteriorly  Cardiac: Regular rate  Gastrointestinal: non-distended  Extremities: LE edema noted, R>L, R foot bandaged  Neurologic: Awake, alert, conversant    LABS:                        7.2    10.48 )-----------( 205      ( 26 Nov 2023 07:26 )             21.8     11-26    145  |  114<H>  |  54<H>  ----------------------------<  98  4.2   |  17<L>  |  4.49<H>    Ca    8.5      26 Nov 2023 07:01  Phos  5.6     11-26  Mg     2.0     11-26    TPro  6.1  /  Alb  3.3  /  TBili  <0.2  /  DBili  x   /  AST  14  /  ALT  16  /  AlkPhos  84  11-26    PT/INR - ( 26 Nov 2023 06:59 )   PT: 10.9 sec;   INR: 0.95          PTT - ( 26 Nov 2023 06:59 )  PTT:29.2 sec  Urinalysis Basic - ( 26 Nov 2023 07:01 )    Color: x / Appearance: x / SG: x / pH: x  Gluc: 98 mg/dL / Ketone: x  / Bili: x / Urobili: x   Blood: x / Protein: x / Nitrite: x   Leuk Esterase: x / RBC: x / WBC x   Sq Epi: x / Non Sq Epi: x / Bacteria: x      CARDIAC MARKERS ( 25 Nov 2023 20:49 )  x     / x     / 178 U/L / x     / 7.0 ng/mL  CARDIAC MARKERS ( 25 Nov 2023 15:12 )  x     / x     / 190 U/L / x     / 6.8 ng/mL      CAPILLARY BLOOD GLUCOSE      POCT Blood Glucose.: 261 mg/dL (26 Nov 2023 11:52)  POCT Blood Glucose.: 113 mg/dL (26 Nov 2023 07:38)  POCT Blood Glucose.: 114 mg/dL (25 Nov 2023 22:33)  POCT Blood Glucose.: 231 mg/dL (25 Nov 2023 15:08)          RADIOLOGY & ADDITIONAL TESTS: Reviewed. NEPHROLOGY SERVICE INITIAL CONSULT NOTE    Lissett George is a 72 yo F w/ PMH of CKD (Cr 2.3 last year consistent with stage 4), HTN, HLD, DM, osteomyelitis, DIMITRIS who presented with palpitations and SOB from assisted living facility. Noted to have elevated troponin and admitted initially with concern for NSTEMI. Found to have creatinine 4.4 with unclear recent baseline. Also noted to have some LE edema but is comfortable on room air and CXR without significant apparent vascular congestion. Patient reports that she was told she would need dialysis due to kidney disease from her diabetes and underwent vein mapping but so far has been refusing any additional dialysis planning. Does not recall any recent creatinine values. Currently makes urine and takes diuretic at home. Otherwise unclear medication regimen. Nephrology consulted for further evaluation and management of ELBERT vs progression of CKD.    REVIEW OF SYSTEMS:   Otherwise negative except as specified in HPI    PAST MEDICAL AND SURGICAL HISTORY:   PAST MEDICAL & SURGICAL HISTORY:  Asthma  Asthma      Depressive disorder  Depression      Hyperlipidemia  HLD (hyperlipidemia)      Type 2 diabetes mellitus  DM (diabetes mellitus)      Essential hypertension  HTN (hypertension)      Local infection of skin and subcutaneous tissue  Toe infection      Type 2 diabetes mellitus with neurological manifestations  Neuropathy in diabetes      Cytomegalovirus infection not present  No significant past surgical history      S/P cholecystectomy          FAMILY HISTORY:  FAMILY HISTORY:  Family history of diabetes mellitus  Family history of diabetes mellitus (DM)    FH: heart attack (Mother)    FH: Parkinson's disease (Father)    FH: COPD (chronic obstructive pulmonary disease) (Mother)        Otherwise Noncontributory to current admission    SOCIAL HISTORY:  Tobacco use: Denies  EtOH use: Denies  Illicit drug use: Denies    HOME MEDICATIONS:      ACTIVE MEDICATIONS:  MEDICATIONS  (STANDING):  albuterol    90 MICROgram(s) HFA Inhaler 2 Puff(s) Inhalation daily  albuterol/ipratropium for Nebulization 3 milliLiter(s) Nebulizer every 6 hours  atorvastatin 20 milliGRAM(s) Oral at bedtime  budesonide 160 MICROgram(s)/formoterol 4.5 MICROgram(s) Inhaler 2 Puff(s) Inhalation two times a day  calcium acetate 667 milliGRAM(s) Oral three times a day with meals  clonazePAM  Tablet 0.5 milliGRAM(s) Oral daily  dextrose 5%. 1000 milliLiter(s) (50 mL/Hr) IV Continuous <Continuous>  dextrose 5%. 1000 milliLiter(s) (100 mL/Hr) IV Continuous <Continuous>  dextrose 50% Injectable 25 Gram(s) IV Push once  dextrose 50% Injectable 12.5 Gram(s) IV Push once  dextrose 50% Injectable 25 Gram(s) IV Push once  DULoxetine 30 milliGRAM(s) Oral daily  glucagon  Injectable 1 milliGRAM(s) IntraMuscular once  hydrALAZINE 50 milliGRAM(s) Oral three times a day  insulin glargine Injectable (LANTUS) 10 Unit(s) SubCutaneous at bedtime  insulin lispro Injectable (ADMELOG) 2 Unit(s) SubCutaneous three times a day before meals  metoprolol succinate ER 25 milliGRAM(s) Oral daily  NIFEdipine XL 90 milliGRAM(s) Oral daily  nitroglycerin    Patch 0.3 mG/Hr(s) 1 patch Transdermal daily  pantoprazole    Tablet 40 milliGRAM(s) Oral before breakfast  sodium bicarbonate 650 milliGRAM(s) Oral two times a day  traZODone 50 milliGRAM(s) Oral daily    MEDICATIONS  (PRN):  acetaminophen     Tablet .. 650 milliGRAM(s) Oral every 6 hours PRN Mild Pain (1 - 3)  dextrose Oral Gel 15 Gram(s) Oral once PRN Blood Glucose LESS THAN 70 milliGRAM(s)/deciliter      ALLERGIES:  Allergies    No Known Allergies    Intolerances        VITAL SIGNS:  Vital Signs Last 24 Hrs  T(C): 36.8 (26 Nov 2023 12:08), Max: 36.8 (26 Nov 2023 12:08)  T(F): 98.3 (26 Nov 2023 12:08), Max: 98.3 (26 Nov 2023 12:08)  HR: 64 (26 Nov 2023 12:08) (61 - 76)  BP: 108/65 (26 Nov 2023 12:08) (105/59 - 150/67)  BP(mean): 81 (26 Nov 2023 12:08) (78 - 98)  RR: 20 (26 Nov 2023 12:08) (18 - 20)  SpO2: 92% (26 Nov 2023 12:08) (92% - 99%)    Parameters below as of 26 Nov 2023 12:08  Patient On (Oxygen Delivery Method): room air        11-25-23 @ 07:01  -  11-26-23 @ 07:00  --------------------------------------------------------  IN:  Total IN: 0 mL    OUT:    Voided (mL): 100 mL  Total OUT: 100 mL    Total NET: -100 mL      11-26-23 @ 07:01  -  11-26-23 @ 13:07  --------------------------------------------------------  IN:    Oral Fluid: 300 mL  Total IN: 300 mL    OUT:    Voided (mL): 600 mL  Total OUT: 600 mL    Total NET: -300 mL          PHYSICAL EXAM:  Constitutional: NAD, lying in bed  Head: NCAT, EOMI  Respiratory: CTAB, no crackles anteriorly  Cardiac: Regular rate  Gastrointestinal: non-distended  Extremities: LE edema noted, R>L, R foot bandaged  Neurologic: Awake, alert, conversant    LABS:                        7.2    10.48 )-----------( 205      ( 26 Nov 2023 07:26 )             21.8     11-26    145  |  114<H>  |  54<H>  ----------------------------<  98  4.2   |  17<L>  |  4.49<H>    Ca    8.5      26 Nov 2023 07:01  Phos  5.6     11-26  Mg     2.0     11-26    TPro  6.1  /  Alb  3.3  /  TBili  <0.2  /  DBili  x   /  AST  14  /  ALT  16  /  AlkPhos  84  11-26    PT/INR - ( 26 Nov 2023 06:59 )   PT: 10.9 sec;   INR: 0.95          PTT - ( 26 Nov 2023 06:59 )  PTT:29.2 sec  Urinalysis Basic - ( 26 Nov 2023 07:01 )    Color: x / Appearance: x / SG: x / pH: x  Gluc: 98 mg/dL / Ketone: x  / Bili: x / Urobili: x   Blood: x / Protein: x / Nitrite: x   Leuk Esterase: x / RBC: x / WBC x   Sq Epi: x / Non Sq Epi: x / Bacteria: x      CARDIAC MARKERS ( 25 Nov 2023 20:49 )  x     / x     / 178 U/L / x     / 7.0 ng/mL  CARDIAC MARKERS ( 25 Nov 2023 15:12 )  x     / x     / 190 U/L / x     / 6.8 ng/mL      CAPILLARY BLOOD GLUCOSE      POCT Blood Glucose.: 261 mg/dL (26 Nov 2023 11:52)  POCT Blood Glucose.: 113 mg/dL (26 Nov 2023 07:38)  POCT Blood Glucose.: 114 mg/dL (25 Nov 2023 22:33)  POCT Blood Glucose.: 231 mg/dL (25 Nov 2023 15:08)          RADIOLOGY & ADDITIONAL TESTS: Reviewed.

## 2023-11-26 NOTE — CONSULT NOTE ADULT - ASSESSMENT
74 yo F w/ PMH of CKD (Cr 2.3 last year consistent with stage 4), HTN, HLD, DM, osteomyelitis, DIMITRIS who presented with palpitations and SOB from assisted living facility. Found to have creatinine 4.4 with unclear recent baseline and LE edema. Patient reports that she was told she would need dialysis due to kidney disease from her diabetes and underwent vein mapping but so far has been refusing any additional dialysis planning. Nephrology consulted for further evaluation and management of ELBERT vs progression of CKD.    ELBERT vs CKD - unclear recent baseline, prior Cr noted from last year 2.3  - Cr 4.4, now 4.5  - BUN 50s  - UOP non-oliguric  - No urgent indication for HD now, will continue to assess clinical trajectory daily. If patient fails to improve or worsens, may require initiation of HD. No uremic symptoms reported at present.  - Trend BMP  - Please check urine studies including UA, urine protein/creatinine, urine lytes  - Obtain renal/bladder US  - Consider TTE  - LE edema appears asymmetric, consider LE doppler  - Can trial Lasix for volume management  - On sodium bicarb supplementation  - Strict I&O, daily weights  - Renal diet   72 yo F w/ PMH of CKD (Cr 2.3 last year consistent with stage 4), HTN, HLD, DM, osteomyelitis, DIMITRIS who presented with palpitations and SOB from assisted living facility. Found to have creatinine 4.4 with unclear recent baseline and LE edema. Patient reports that she was told she would need dialysis due to kidney disease from her diabetes and underwent vein mapping but so far has been refusing any additional dialysis planning. Nephrology consulted for further evaluation and management of ELBERT vs progression of CKD.    ELBERT vs CKD - unclear recent baseline, prior Cr noted from last year 2.3  - Cr 4.4, now 4.5  - BUN 50s  - UOP non-oliguric  - No urgent indication for HD now, will continue to assess clinical trajectory daily. If patient fails to improve or worsens, may require initiation of HD. No uremic symptoms reported at present.  - Trend BMP  - Please check urine studies including UA, urine protein/creatinine, urine lytes  - Obtain renal/bladder US  - Consider TTE  - LE edema appears asymmetric, consider LE doppler  - Can trial Lasix for volume management  - On sodium bicarb supplementation  - Strict I&O, daily weights  - Renal diet

## 2023-11-26 NOTE — PROGRESS NOTE ADULT - PROBLEM SELECTOR PLAN 8
Hx of iron deficiency anemia   -H/H: 8.2/25.1, total iron wnl, ferritin elevated, TIBC low  -pt denies any signs of bleeding, hematuria, hematochezia

## 2023-11-26 NOTE — PROVIDER CONTACT NOTE (OTHER) - ACTION/TREATMENT ORDERED:
MD Salas called to bedside. STAT EKG done and cardiac enzyme labs sent. Pt is poor historian and unable to articulate location and details of pain. No further orders at this time. Care continued
Per MD Salas, no need to transfuse as this is most likely close to patient's baseline r/t CKD diagnosis. Care continued
MD Salas called to bedside to assess, no new orders at this time. Afternoon klonopin provided. care continued

## 2023-11-26 NOTE — PROGRESS NOTE ADULT - ASSESSMENT
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 Labs notable for Trop T notable for 311. . CKMB 6.8. Cr 4.38. BNP 45333. EKG reveals TWI in V1-V6, II, III, AVF and ST depressions in V4-V5.     Pt is now admitted to cardiac telemetry for further management of ELBERT on CKD and possible ischemic workup.    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 Labs notable for Trop T notable for 311. . CKMB 6.8. Cr 4.38. BNP 68681. EKG reveals TWI in V1-V6, II, III, AVF and ST depressions in V4-V5.     Pt is now admitted to cardiac telemetry for further management of ELBERT on CKD and possible ischemic workup.

## 2023-11-26 NOTE — PROGRESS NOTE ADULT - PROBLEM SELECTOR PLAN 2
Cr=4.38 , unknown baseline last Cr 2.38 (10/2022)  -elevated troponin could be a result of ESRD  -f/u urine lytes, renal US   -renal consulted f/u recs  -continue with home calcium acetate 3x daily, sodium bicarbonate  -patient fluid overloaded with possible etiologies being cardiac vs renal, will give 2mg of Bumex and assess fluid output    -> called Kody multiple times, no response, left voice messages

## 2023-11-27 LAB
ALBUMIN SERPL ELPH-MCNC: 3 G/DL — LOW (ref 3.3–5)
ALBUMIN SERPL ELPH-MCNC: 3 G/DL — LOW (ref 3.3–5)
ALBUMIN SERPL ELPH-MCNC: 3.1 G/DL — LOW (ref 3.3–5)
ALP SERPL-CCNC: 132 U/L — HIGH (ref 40–120)
ALP SERPL-CCNC: 132 U/L — HIGH (ref 40–120)
ALP SERPL-CCNC: 136 U/L — HIGH (ref 40–120)
ALP SERPL-CCNC: 136 U/L — HIGH (ref 40–120)
ALP SERPL-CCNC: 144 U/L — HIGH (ref 40–120)
ALP SERPL-CCNC: 144 U/L — HIGH (ref 40–120)
ALT FLD-CCNC: 127 U/L — HIGH (ref 10–45)
ALT FLD-CCNC: 127 U/L — HIGH (ref 10–45)
ALT FLD-CCNC: 154 U/L — HIGH (ref 10–45)
ALT FLD-CCNC: 154 U/L — HIGH (ref 10–45)
ALT FLD-CCNC: 166 U/L — HIGH (ref 10–45)
ALT FLD-CCNC: 166 U/L — HIGH (ref 10–45)
AMORPH SED URNS QL MICRO: PRESENT
AMORPH SED URNS QL MICRO: PRESENT
ANION GAP SERPL CALC-SCNC: 10 MMOL/L — SIGNIFICANT CHANGE UP (ref 5–17)
ANION GAP SERPL CALC-SCNC: 10 MMOL/L — SIGNIFICANT CHANGE UP (ref 5–17)
ANION GAP SERPL CALC-SCNC: 14 MMOL/L — SIGNIFICANT CHANGE UP (ref 5–17)
ANION GAP SERPL CALC-SCNC: 14 MMOL/L — SIGNIFICANT CHANGE UP (ref 5–17)
ANION GAP SERPL CALC-SCNC: 15 MMOL/L — SIGNIFICANT CHANGE UP (ref 5–17)
ANION GAP SERPL CALC-SCNC: 15 MMOL/L — SIGNIFICANT CHANGE UP (ref 5–17)
ANION GAP SERPL CALC-SCNC: 9 MMOL/L — SIGNIFICANT CHANGE UP (ref 5–17)
ANION GAP SERPL CALC-SCNC: 9 MMOL/L — SIGNIFICANT CHANGE UP (ref 5–17)
ANISOCYTOSIS BLD QL: SIGNIFICANT CHANGE UP
ANISOCYTOSIS BLD QL: SIGNIFICANT CHANGE UP
APPEARANCE UR: ABNORMAL
APPEARANCE UR: ABNORMAL
AST SERPL-CCNC: 116 U/L — HIGH (ref 10–40)
AST SERPL-CCNC: 116 U/L — HIGH (ref 10–40)
AST SERPL-CCNC: 142 U/L — HIGH (ref 10–40)
AST SERPL-CCNC: 142 U/L — HIGH (ref 10–40)
AST SERPL-CCNC: 67 U/L — HIGH (ref 10–40)
AST SERPL-CCNC: 67 U/L — HIGH (ref 10–40)
BACTERIA # UR AUTO: ABNORMAL /HPF
BACTERIA # UR AUTO: ABNORMAL /HPF
BASE EXCESS BLDA CALC-SCNC: -1.1 MMOL/L — SIGNIFICANT CHANGE UP (ref -2–3)
BASE EXCESS BLDA CALC-SCNC: -1.1 MMOL/L — SIGNIFICANT CHANGE UP (ref -2–3)
BASE EXCESS BLDA CALC-SCNC: -1.6 MMOL/L — SIGNIFICANT CHANGE UP (ref -2–3)
BASE EXCESS BLDA CALC-SCNC: -1.6 MMOL/L — SIGNIFICANT CHANGE UP (ref -2–3)
BASE EXCESS BLDA CALC-SCNC: 0 MMOL/L — SIGNIFICANT CHANGE UP (ref -2–3)
BASE EXCESS BLDA CALC-SCNC: 0 MMOL/L — SIGNIFICANT CHANGE UP (ref -2–3)
BASOPHILS # BLD AUTO: 0.03 K/UL — SIGNIFICANT CHANGE UP (ref 0–0.2)
BASOPHILS # BLD AUTO: 0.03 K/UL — SIGNIFICANT CHANGE UP (ref 0–0.2)
BASOPHILS # BLD AUTO: 0.1 K/UL — SIGNIFICANT CHANGE UP (ref 0–0.2)
BASOPHILS # BLD AUTO: 0.1 K/UL — SIGNIFICANT CHANGE UP (ref 0–0.2)
BASOPHILS NFR BLD AUTO: 0.3 % — SIGNIFICANT CHANGE UP (ref 0–2)
BASOPHILS NFR BLD AUTO: 0.3 % — SIGNIFICANT CHANGE UP (ref 0–2)
BASOPHILS NFR BLD AUTO: 0.9 % — SIGNIFICANT CHANGE UP (ref 0–2)
BASOPHILS NFR BLD AUTO: 0.9 % — SIGNIFICANT CHANGE UP (ref 0–2)
BILIRUB SERPL-MCNC: 0.2 MG/DL — SIGNIFICANT CHANGE UP (ref 0.2–1.2)
BILIRUB SERPL-MCNC: <0.2 MG/DL — SIGNIFICANT CHANGE UP (ref 0.2–1.2)
BILIRUB SERPL-MCNC: <0.2 MG/DL — SIGNIFICANT CHANGE UP (ref 0.2–1.2)
BILIRUB UR-MCNC: NEGATIVE — SIGNIFICANT CHANGE UP
BILIRUB UR-MCNC: NEGATIVE — SIGNIFICANT CHANGE UP
BUN SERPL-MCNC: 24 MG/DL — HIGH (ref 7–23)
BUN SERPL-MCNC: 24 MG/DL — HIGH (ref 7–23)
BUN SERPL-MCNC: 27 MG/DL — HIGH (ref 7–23)
BUN SERPL-MCNC: 27 MG/DL — HIGH (ref 7–23)
BUN SERPL-MCNC: 39 MG/DL — HIGH (ref 7–23)
BUN SERPL-MCNC: 39 MG/DL — HIGH (ref 7–23)
BUN SERPL-MCNC: 42 MG/DL — HIGH (ref 7–23)
BUN SERPL-MCNC: 42 MG/DL — HIGH (ref 7–23)
CALCIUM SERPL-MCNC: 8.9 MG/DL — SIGNIFICANT CHANGE UP (ref 8.4–10.5)
CALCIUM SERPL-MCNC: 8.9 MG/DL — SIGNIFICANT CHANGE UP (ref 8.4–10.5)
CALCIUM SERPL-MCNC: 9 MG/DL — SIGNIFICANT CHANGE UP (ref 8.4–10.5)
CALCIUM SERPL-MCNC: 9 MG/DL — SIGNIFICANT CHANGE UP (ref 8.4–10.5)
CALCIUM SERPL-MCNC: 9.1 MG/DL — SIGNIFICANT CHANGE UP (ref 8.4–10.5)
CHLORIDE SERPL-SCNC: 106 MMOL/L — SIGNIFICANT CHANGE UP (ref 96–108)
CHLORIDE SERPL-SCNC: 108 MMOL/L — SIGNIFICANT CHANGE UP (ref 96–108)
CHLORIDE SERPL-SCNC: 108 MMOL/L — SIGNIFICANT CHANGE UP (ref 96–108)
CHLORIDE SERPL-SCNC: 109 MMOL/L — HIGH (ref 96–108)
CHLORIDE SERPL-SCNC: 109 MMOL/L — HIGH (ref 96–108)
CO2 BLDA-SCNC: 23 MMOL/L — SIGNIFICANT CHANGE UP (ref 19–24)
CO2 BLDA-SCNC: 26 MMOL/L — HIGH (ref 19–24)
CO2 BLDA-SCNC: 26 MMOL/L — HIGH (ref 19–24)
CO2 SERPL-SCNC: 21 MMOL/L — LOW (ref 22–31)
CO2 SERPL-SCNC: 25 MMOL/L — SIGNIFICANT CHANGE UP (ref 22–31)
COARSE GRAN CASTS #/AREA URNS AUTO: PRESENT
COARSE GRAN CASTS #/AREA URNS AUTO: PRESENT
COLOR SPEC: SIGNIFICANT CHANGE UP
COLOR SPEC: SIGNIFICANT CHANGE UP
CREAT ?TM UR-MCNC: 168 MG/DL — SIGNIFICANT CHANGE UP
CREAT ?TM UR-MCNC: 168 MG/DL — SIGNIFICANT CHANGE UP
CREAT SERPL-MCNC: 1.82 MG/DL — HIGH (ref 0.5–1.3)
CREAT SERPL-MCNC: 1.82 MG/DL — HIGH (ref 0.5–1.3)
CREAT SERPL-MCNC: 2.09 MG/DL — HIGH (ref 0.5–1.3)
CREAT SERPL-MCNC: 2.09 MG/DL — HIGH (ref 0.5–1.3)
CREAT SERPL-MCNC: 3.19 MG/DL — HIGH (ref 0.5–1.3)
CREAT SERPL-MCNC: 3.19 MG/DL — HIGH (ref 0.5–1.3)
CREAT SERPL-MCNC: 3.25 MG/DL — HIGH (ref 0.5–1.3)
CREAT SERPL-MCNC: 3.25 MG/DL — HIGH (ref 0.5–1.3)
DIFF PNL FLD: NEGATIVE — SIGNIFICANT CHANGE UP
DIFF PNL FLD: NEGATIVE — SIGNIFICANT CHANGE UP
EGFR: 14 ML/MIN/1.73M2 — LOW
EGFR: 14 ML/MIN/1.73M2 — LOW
EGFR: 15 ML/MIN/1.73M2 — LOW
EGFR: 15 ML/MIN/1.73M2 — LOW
EGFR: 24 ML/MIN/1.73M2 — LOW
EGFR: 24 ML/MIN/1.73M2 — LOW
EGFR: 29 ML/MIN/1.73M2 — LOW
EGFR: 29 ML/MIN/1.73M2 — LOW
EOSINOPHIL # BLD AUTO: 0.1 K/UL — SIGNIFICANT CHANGE UP (ref 0–0.5)
EOSINOPHIL # BLD AUTO: 0.1 K/UL — SIGNIFICANT CHANGE UP (ref 0–0.5)
EOSINOPHIL # BLD AUTO: 0.2 K/UL — SIGNIFICANT CHANGE UP (ref 0–0.5)
EOSINOPHIL # BLD AUTO: 0.2 K/UL — SIGNIFICANT CHANGE UP (ref 0–0.5)
EOSINOPHIL NFR BLD AUTO: 0.8 % — SIGNIFICANT CHANGE UP (ref 0–6)
EOSINOPHIL NFR BLD AUTO: 0.8 % — SIGNIFICANT CHANGE UP (ref 0–6)
EOSINOPHIL NFR BLD AUTO: 1.8 % — SIGNIFICANT CHANGE UP (ref 0–6)
EOSINOPHIL NFR BLD AUTO: 1.8 % — SIGNIFICANT CHANGE UP (ref 0–6)
FINE GRAN CASTS #/AREA URNS AUTO: PRESENT
FINE GRAN CASTS #/AREA URNS AUTO: PRESENT
GAS PNL BLDA: SIGNIFICANT CHANGE UP
GIANT PLATELETS BLD QL SMEAR: PRESENT — SIGNIFICANT CHANGE UP
GIANT PLATELETS BLD QL SMEAR: PRESENT — SIGNIFICANT CHANGE UP
GLUCOSE BLDC GLUCOMTR-MCNC: 103 MG/DL — HIGH (ref 70–99)
GLUCOSE BLDC GLUCOMTR-MCNC: 103 MG/DL — HIGH (ref 70–99)
GLUCOSE BLDC GLUCOMTR-MCNC: 123 MG/DL — HIGH (ref 70–99)
GLUCOSE BLDC GLUCOMTR-MCNC: 123 MG/DL — HIGH (ref 70–99)
GLUCOSE BLDC GLUCOMTR-MCNC: 138 MG/DL — HIGH (ref 70–99)
GLUCOSE BLDC GLUCOMTR-MCNC: 138 MG/DL — HIGH (ref 70–99)
GLUCOSE BLDC GLUCOMTR-MCNC: 150 MG/DL — HIGH (ref 70–99)
GLUCOSE BLDC GLUCOMTR-MCNC: 150 MG/DL — HIGH (ref 70–99)
GLUCOSE SERPL-MCNC: 117 MG/DL — HIGH (ref 70–99)
GLUCOSE SERPL-MCNC: 117 MG/DL — HIGH (ref 70–99)
GLUCOSE SERPL-MCNC: 125 MG/DL — HIGH (ref 70–99)
GLUCOSE SERPL-MCNC: 125 MG/DL — HIGH (ref 70–99)
GLUCOSE SERPL-MCNC: 133 MG/DL — HIGH (ref 70–99)
GLUCOSE SERPL-MCNC: 133 MG/DL — HIGH (ref 70–99)
GLUCOSE SERPL-MCNC: 142 MG/DL — HIGH (ref 70–99)
GLUCOSE SERPL-MCNC: 142 MG/DL — HIGH (ref 70–99)
GLUCOSE UR QL: 250 MG/DL
GLUCOSE UR QL: 250 MG/DL
HCO3 BLDA-SCNC: 22 MMOL/L — SIGNIFICANT CHANGE UP (ref 21–28)
HCO3 BLDA-SCNC: 25 MMOL/L — SIGNIFICANT CHANGE UP (ref 21–28)
HCO3 BLDA-SCNC: 25 MMOL/L — SIGNIFICANT CHANGE UP (ref 21–28)
HCT VFR BLD CALC: 20.5 % — CRITICAL LOW (ref 34.5–45)
HCT VFR BLD CALC: 20.5 % — CRITICAL LOW (ref 34.5–45)
HCT VFR BLD CALC: 21.2 % — LOW (ref 34.5–45)
HCT VFR BLD CALC: 21.2 % — LOW (ref 34.5–45)
HGB BLD-MCNC: 6.7 G/DL — CRITICAL LOW (ref 11.5–15.5)
HGB BLD-MCNC: 6.7 G/DL — CRITICAL LOW (ref 11.5–15.5)
HGB BLD-MCNC: 7 G/DL — CRITICAL LOW (ref 11.5–15.5)
HGB BLD-MCNC: 7 G/DL — CRITICAL LOW (ref 11.5–15.5)
HYALINE CASTS # UR AUTO: PRESENT
HYALINE CASTS # UR AUTO: PRESENT
HYPOCHROMIA BLD QL: SLIGHT — SIGNIFICANT CHANGE UP
HYPOCHROMIA BLD QL: SLIGHT — SIGNIFICANT CHANGE UP
IMM GRANULOCYTES NFR BLD AUTO: 0.7 % — SIGNIFICANT CHANGE UP (ref 0–0.9)
IMM GRANULOCYTES NFR BLD AUTO: 0.7 % — SIGNIFICANT CHANGE UP (ref 0–0.9)
KETONES UR-MCNC: ABNORMAL MG/DL
KETONES UR-MCNC: ABNORMAL MG/DL
LACTATE SERPL-SCNC: 1.7 MMOL/L — SIGNIFICANT CHANGE UP (ref 0.5–2)
LACTATE SERPL-SCNC: 1.7 MMOL/L — SIGNIFICANT CHANGE UP (ref 0.5–2)
LACTATE SERPL-SCNC: 1.8 MMOL/L — SIGNIFICANT CHANGE UP (ref 0.5–2)
LACTATE SERPL-SCNC: 1.8 MMOL/L — SIGNIFICANT CHANGE UP (ref 0.5–2)
LEUKOCYTE ESTERASE UR-ACNC: NEGATIVE — SIGNIFICANT CHANGE UP
LEUKOCYTE ESTERASE UR-ACNC: NEGATIVE — SIGNIFICANT CHANGE UP
LYMPHOCYTES # BLD AUTO: 1.99 K/UL — SIGNIFICANT CHANGE UP (ref 1–3.3)
LYMPHOCYTES # BLD AUTO: 1.99 K/UL — SIGNIFICANT CHANGE UP (ref 1–3.3)
LYMPHOCYTES # BLD AUTO: 16.7 % — SIGNIFICANT CHANGE UP (ref 13–44)
LYMPHOCYTES # BLD AUTO: 16.7 % — SIGNIFICANT CHANGE UP (ref 13–44)
LYMPHOCYTES # BLD AUTO: 19.5 % — SIGNIFICANT CHANGE UP (ref 13–44)
LYMPHOCYTES # BLD AUTO: 19.5 % — SIGNIFICANT CHANGE UP (ref 13–44)
LYMPHOCYTES # BLD AUTO: 2.2 K/UL — SIGNIFICANT CHANGE UP (ref 1–3.3)
LYMPHOCYTES # BLD AUTO: 2.2 K/UL — SIGNIFICANT CHANGE UP (ref 1–3.3)
MAGNESIUM SERPL-MCNC: 1.7 MG/DL — SIGNIFICANT CHANGE UP (ref 1.6–2.6)
MAGNESIUM SERPL-MCNC: 1.7 MG/DL — SIGNIFICANT CHANGE UP (ref 1.6–2.6)
MAGNESIUM SERPL-MCNC: 1.8 MG/DL — SIGNIFICANT CHANGE UP (ref 1.6–2.6)
MAGNESIUM SERPL-MCNC: 1.8 MG/DL — SIGNIFICANT CHANGE UP (ref 1.6–2.6)
MAGNESIUM SERPL-MCNC: 1.9 MG/DL — SIGNIFICANT CHANGE UP (ref 1.6–2.6)
MAGNESIUM SERPL-MCNC: 1.9 MG/DL — SIGNIFICANT CHANGE UP (ref 1.6–2.6)
MANUAL SMEAR VERIFICATION: SIGNIFICANT CHANGE UP
MANUAL SMEAR VERIFICATION: SIGNIFICANT CHANGE UP
MCHC RBC-ENTMCNC: 28.9 PG — SIGNIFICANT CHANGE UP (ref 27–34)
MCHC RBC-ENTMCNC: 28.9 PG — SIGNIFICANT CHANGE UP (ref 27–34)
MCHC RBC-ENTMCNC: 29 PG — SIGNIFICANT CHANGE UP (ref 27–34)
MCHC RBC-ENTMCNC: 29 PG — SIGNIFICANT CHANGE UP (ref 27–34)
MCHC RBC-ENTMCNC: 32.7 GM/DL — SIGNIFICANT CHANGE UP (ref 32–36)
MCHC RBC-ENTMCNC: 32.7 GM/DL — SIGNIFICANT CHANGE UP (ref 32–36)
MCHC RBC-ENTMCNC: 33 GM/DL — SIGNIFICANT CHANGE UP (ref 32–36)
MCHC RBC-ENTMCNC: 33 GM/DL — SIGNIFICANT CHANGE UP (ref 32–36)
MCV RBC AUTO: 88 FL — SIGNIFICANT CHANGE UP (ref 80–100)
MCV RBC AUTO: 88 FL — SIGNIFICANT CHANGE UP (ref 80–100)
MCV RBC AUTO: 88.4 FL — SIGNIFICANT CHANGE UP (ref 80–100)
MCV RBC AUTO: 88.4 FL — SIGNIFICANT CHANGE UP (ref 80–100)
MICROCYTES BLD QL: SIGNIFICANT CHANGE UP
MICROCYTES BLD QL: SIGNIFICANT CHANGE UP
MONOCYTES # BLD AUTO: 0.29 K/UL — SIGNIFICANT CHANGE UP (ref 0–0.9)
MONOCYTES # BLD AUTO: 0.29 K/UL — SIGNIFICANT CHANGE UP (ref 0–0.9)
MONOCYTES # BLD AUTO: 1.3 K/UL — HIGH (ref 0–0.9)
MONOCYTES # BLD AUTO: 1.3 K/UL — HIGH (ref 0–0.9)
MONOCYTES NFR BLD AUTO: 10.9 % — SIGNIFICANT CHANGE UP (ref 2–14)
MONOCYTES NFR BLD AUTO: 10.9 % — SIGNIFICANT CHANGE UP (ref 2–14)
MONOCYTES NFR BLD AUTO: 2.6 % — SIGNIFICANT CHANGE UP (ref 2–14)
MONOCYTES NFR BLD AUTO: 2.6 % — SIGNIFICANT CHANGE UP (ref 2–14)
NEUTROPHILS # BLD AUTO: 8.45 K/UL — HIGH (ref 1.8–7.4)
NEUTROPHILS # BLD AUTO: 8.45 K/UL — HIGH (ref 1.8–7.4)
NEUTROPHILS # BLD AUTO: 8.5 K/UL — HIGH (ref 1.8–7.4)
NEUTROPHILS # BLD AUTO: 8.5 K/UL — HIGH (ref 1.8–7.4)
NEUTROPHILS NFR BLD AUTO: 70.6 % — SIGNIFICANT CHANGE UP (ref 43–77)
NEUTROPHILS NFR BLD AUTO: 70.6 % — SIGNIFICANT CHANGE UP (ref 43–77)
NEUTROPHILS NFR BLD AUTO: 75.2 % — SIGNIFICANT CHANGE UP (ref 43–77)
NEUTROPHILS NFR BLD AUTO: 75.2 % — SIGNIFICANT CHANGE UP (ref 43–77)
NITRITE UR-MCNC: NEGATIVE — SIGNIFICANT CHANGE UP
NITRITE UR-MCNC: NEGATIVE — SIGNIFICANT CHANGE UP
NRBC # BLD: 0 /100 WBCS — SIGNIFICANT CHANGE UP (ref 0–0)
NRBC # BLD: 0 /100 WBCS — SIGNIFICANT CHANGE UP (ref 0–0)
OSMOLALITY UR: 358 MOSM/KG — SIGNIFICANT CHANGE UP (ref 300–900)
OSMOLALITY UR: 358 MOSM/KG — SIGNIFICANT CHANGE UP (ref 300–900)
OVALOCYTES BLD QL SMEAR: SLIGHT — SIGNIFICANT CHANGE UP
OVALOCYTES BLD QL SMEAR: SLIGHT — SIGNIFICANT CHANGE UP
PCO2 BLDA: 32 MMHG — SIGNIFICANT CHANGE UP (ref 32–45)
PCO2 BLDA: 32 MMHG — SIGNIFICANT CHANGE UP (ref 32–45)
PCO2 BLDA: 33 MMHG — SIGNIFICANT CHANGE UP (ref 32–45)
PCO2 BLDA: 33 MMHG — SIGNIFICANT CHANGE UP (ref 32–45)
PCO2 BLDA: 40 MMHG — SIGNIFICANT CHANGE UP (ref 32–45)
PCO2 BLDA: 40 MMHG — SIGNIFICANT CHANGE UP (ref 32–45)
PH BLDA: 7.4 — SIGNIFICANT CHANGE UP (ref 7.35–7.45)
PH BLDA: 7.4 — SIGNIFICANT CHANGE UP (ref 7.35–7.45)
PH BLDA: 7.44 — SIGNIFICANT CHANGE UP (ref 7.35–7.45)
PH UR: 5 — SIGNIFICANT CHANGE UP (ref 5–8)
PH UR: 5 — SIGNIFICANT CHANGE UP (ref 5–8)
PHOSPHATE SERPL-MCNC: 3.2 MG/DL — SIGNIFICANT CHANGE UP (ref 2.5–4.5)
PHOSPHATE SERPL-MCNC: 3.2 MG/DL — SIGNIFICANT CHANGE UP (ref 2.5–4.5)
PHOSPHATE SERPL-MCNC: 3.9 MG/DL — SIGNIFICANT CHANGE UP (ref 2.5–4.5)
PHOSPHATE SERPL-MCNC: 3.9 MG/DL — SIGNIFICANT CHANGE UP (ref 2.5–4.5)
PHOSPHATE SERPL-MCNC: 4.4 MG/DL — SIGNIFICANT CHANGE UP (ref 2.5–4.5)
PHOSPHATE SERPL-MCNC: 4.4 MG/DL — SIGNIFICANT CHANGE UP (ref 2.5–4.5)
PHOSPHATE SERPL-MCNC: 4.7 MG/DL — HIGH (ref 2.5–4.5)
PHOSPHATE SERPL-MCNC: 4.7 MG/DL — HIGH (ref 2.5–4.5)
PLAT MORPH BLD: ABNORMAL
PLAT MORPH BLD: ABNORMAL
PLATELET # BLD AUTO: 180 K/UL — SIGNIFICANT CHANGE UP (ref 150–400)
PLATELET # BLD AUTO: 180 K/UL — SIGNIFICANT CHANGE UP (ref 150–400)
PLATELET # BLD AUTO: 184 K/UL — SIGNIFICANT CHANGE UP (ref 150–400)
PLATELET # BLD AUTO: 184 K/UL — SIGNIFICANT CHANGE UP (ref 150–400)
PO2 BLDA: 112 MMHG — HIGH (ref 83–108)
PO2 BLDA: 112 MMHG — HIGH (ref 83–108)
PO2 BLDA: 83 MMHG — SIGNIFICANT CHANGE UP (ref 83–108)
PO2 BLDA: 83 MMHG — SIGNIFICANT CHANGE UP (ref 83–108)
PO2 BLDA: 86 MMHG — SIGNIFICANT CHANGE UP (ref 83–108)
PO2 BLDA: 86 MMHG — SIGNIFICANT CHANGE UP (ref 83–108)
POIKILOCYTOSIS BLD QL AUTO: SLIGHT — SIGNIFICANT CHANGE UP
POIKILOCYTOSIS BLD QL AUTO: SLIGHT — SIGNIFICANT CHANGE UP
POLYCHROMASIA BLD QL SMEAR: SLIGHT — SIGNIFICANT CHANGE UP
POLYCHROMASIA BLD QL SMEAR: SLIGHT — SIGNIFICANT CHANGE UP
POTASSIUM SERPL-MCNC: 4.4 MMOL/L — SIGNIFICANT CHANGE UP (ref 3.5–5.3)
POTASSIUM SERPL-MCNC: 4.4 MMOL/L — SIGNIFICANT CHANGE UP (ref 3.5–5.3)
POTASSIUM SERPL-MCNC: 4.5 MMOL/L — SIGNIFICANT CHANGE UP (ref 3.5–5.3)
POTASSIUM SERPL-MCNC: 4.8 MMOL/L — SIGNIFICANT CHANGE UP (ref 3.5–5.3)
POTASSIUM SERPL-MCNC: 4.8 MMOL/L — SIGNIFICANT CHANGE UP (ref 3.5–5.3)
POTASSIUM SERPL-SCNC: 4.4 MMOL/L — SIGNIFICANT CHANGE UP (ref 3.5–5.3)
POTASSIUM SERPL-SCNC: 4.4 MMOL/L — SIGNIFICANT CHANGE UP (ref 3.5–5.3)
POTASSIUM SERPL-SCNC: 4.5 MMOL/L — SIGNIFICANT CHANGE UP (ref 3.5–5.3)
POTASSIUM SERPL-SCNC: 4.8 MMOL/L — SIGNIFICANT CHANGE UP (ref 3.5–5.3)
POTASSIUM SERPL-SCNC: 4.8 MMOL/L — SIGNIFICANT CHANGE UP (ref 3.5–5.3)
POTASSIUM UR-SCNC: 77 MMOL/L — SIGNIFICANT CHANGE UP
POTASSIUM UR-SCNC: 77 MMOL/L — SIGNIFICANT CHANGE UP
PROT SERPL-MCNC: 5.8 G/DL — LOW (ref 6–8.3)
PROT SERPL-MCNC: 5.9 G/DL — LOW (ref 6–8.3)
PROT SERPL-MCNC: 5.9 G/DL — LOW (ref 6–8.3)
PROT UR-MCNC: >=1000 MG/DL
PROT UR-MCNC: >=1000 MG/DL
RBC # BLD: 2.32 M/UL — LOW (ref 3.8–5.2)
RBC # BLD: 2.32 M/UL — LOW (ref 3.8–5.2)
RBC # BLD: 2.41 M/UL — LOW (ref 3.8–5.2)
RBC # BLD: 2.41 M/UL — LOW (ref 3.8–5.2)
RBC # FLD: 13.2 % — SIGNIFICANT CHANGE UP (ref 10.3–14.5)
RBC # FLD: 13.2 % — SIGNIFICANT CHANGE UP (ref 10.3–14.5)
RBC # FLD: 13.3 % — SIGNIFICANT CHANGE UP (ref 10.3–14.5)
RBC # FLD: 13.3 % — SIGNIFICANT CHANGE UP (ref 10.3–14.5)
RBC BLD AUTO: ABNORMAL
RBC BLD AUTO: ABNORMAL
RBC CASTS # UR COMP ASSIST: 0 /HPF — SIGNIFICANT CHANGE UP (ref 0–4)
RBC CASTS # UR COMP ASSIST: 0 /HPF — SIGNIFICANT CHANGE UP (ref 0–4)
SAO2 % BLDA: 95.6 % — SIGNIFICANT CHANGE UP (ref 94–98)
SAO2 % BLDA: 96.2 % — SIGNIFICANT CHANGE UP (ref 94–98)
SAO2 % BLDA: 96.2 % — SIGNIFICANT CHANGE UP (ref 94–98)
SODIUM SERPL-SCNC: 140 MMOL/L — SIGNIFICANT CHANGE UP (ref 135–145)
SODIUM SERPL-SCNC: 140 MMOL/L — SIGNIFICANT CHANGE UP (ref 135–145)
SODIUM SERPL-SCNC: 141 MMOL/L — SIGNIFICANT CHANGE UP (ref 135–145)
SODIUM SERPL-SCNC: 141 MMOL/L — SIGNIFICANT CHANGE UP (ref 135–145)
SODIUM SERPL-SCNC: 143 MMOL/L — SIGNIFICANT CHANGE UP (ref 135–145)
SODIUM SERPL-SCNC: 143 MMOL/L — SIGNIFICANT CHANGE UP (ref 135–145)
SODIUM SERPL-SCNC: 145 MMOL/L — SIGNIFICANT CHANGE UP (ref 135–145)
SODIUM SERPL-SCNC: 145 MMOL/L — SIGNIFICANT CHANGE UP (ref 135–145)
SODIUM UR-SCNC: <20 MMOL/L — SIGNIFICANT CHANGE UP
SODIUM UR-SCNC: <20 MMOL/L — SIGNIFICANT CHANGE UP
SP GR SPEC: >1.03 — HIGH (ref 1–1.03)
SP GR SPEC: >1.03 — HIGH (ref 1–1.03)
SQUAMOUS # UR AUTO: 5 /HPF — SIGNIFICANT CHANGE UP (ref 0–5)
SQUAMOUS # UR AUTO: 5 /HPF — SIGNIFICANT CHANGE UP (ref 0–5)
UROBILINOGEN FLD QL: 0.2 MG/DL — SIGNIFICANT CHANGE UP (ref 0.2–1)
UROBILINOGEN FLD QL: 0.2 MG/DL — SIGNIFICANT CHANGE UP (ref 0.2–1)
UUN UR-MCNC: 236 MG/DL — SIGNIFICANT CHANGE UP
UUN UR-MCNC: 236 MG/DL — SIGNIFICANT CHANGE UP
WAXY CASTS # URNS: PRESENT
WAXY CASTS # URNS: PRESENT
WBC # BLD: 11.3 K/UL — HIGH (ref 3.8–10.5)
WBC # BLD: 11.3 K/UL — HIGH (ref 3.8–10.5)
WBC # BLD: 11.95 K/UL — HIGH (ref 3.8–10.5)
WBC # BLD: 11.95 K/UL — HIGH (ref 3.8–10.5)
WBC # FLD AUTO: 11.3 K/UL — HIGH (ref 3.8–10.5)
WBC # FLD AUTO: 11.3 K/UL — HIGH (ref 3.8–10.5)
WBC # FLD AUTO: 11.95 K/UL — HIGH (ref 3.8–10.5)
WBC # FLD AUTO: 11.95 K/UL — HIGH (ref 3.8–10.5)
WBC UR QL: 4 /HPF — SIGNIFICANT CHANGE UP (ref 0–5)
WBC UR QL: 4 /HPF — SIGNIFICANT CHANGE UP (ref 0–5)

## 2023-11-27 PROCEDURE — 93306 TTE W/DOPPLER COMPLETE: CPT | Mod: 26

## 2023-11-27 PROCEDURE — 36556 INSERT NON-TUNNEL CV CATH: CPT

## 2023-11-27 PROCEDURE — 76937 US GUIDE VASCULAR ACCESS: CPT | Mod: 26

## 2023-11-27 PROCEDURE — 71045 X-RAY EXAM CHEST 1 VIEW: CPT | Mod: 26

## 2023-11-27 PROCEDURE — 76775 US EXAM ABDO BACK WALL LIM: CPT | Mod: 26

## 2023-11-27 PROCEDURE — 99233 SBSQ HOSP IP/OBS HIGH 50: CPT

## 2023-11-27 PROCEDURE — 93970 EXTREMITY STUDY: CPT | Mod: 26

## 2023-11-27 RX ORDER — CLONAZEPAM 1 MG
0.25 TABLET ORAL AT BEDTIME
Refills: 0 | Status: DISCONTINUED | OUTPATIENT
Start: 2023-11-28 | End: 2023-12-04

## 2023-11-27 RX ORDER — ROCURONIUM BROMIDE 10 MG/ML
80 VIAL (ML) INTRAVENOUS ONCE
Refills: 0 | Status: DISCONTINUED | OUTPATIENT
Start: 2023-11-27 | End: 2023-11-27

## 2023-11-27 RX ORDER — DULOXETINE HYDROCHLORIDE 30 MG/1
30 CAPSULE, DELAYED RELEASE ORAL EVERY 24 HOURS
Refills: 0 | Status: DISCONTINUED | OUTPATIENT
Start: 2023-11-28 | End: 2023-12-04

## 2023-11-27 RX ORDER — HYDRALAZINE HCL 50 MG
50 TABLET ORAL EVERY 8 HOURS
Refills: 0 | Status: DISCONTINUED | OUTPATIENT
Start: 2023-11-27 | End: 2023-11-27

## 2023-11-27 RX ORDER — PROPOFOL 10 MG/ML
10 INJECTION, EMULSION INTRAVENOUS
Qty: 1000 | Refills: 0 | Status: DISCONTINUED | OUTPATIENT
Start: 2023-11-27 | End: 2023-11-27

## 2023-11-27 RX ORDER — HYDRALAZINE HCL 50 MG
10 TABLET ORAL EVERY 8 HOURS
Refills: 0 | Status: DISCONTINUED | OUTPATIENT
Start: 2023-11-27 | End: 2023-11-28

## 2023-11-27 RX ORDER — CLONAZEPAM 1 MG
0.5 TABLET ORAL DAILY
Refills: 0 | Status: DISCONTINUED | OUTPATIENT
Start: 2023-11-27 | End: 2023-11-27

## 2023-11-27 RX ORDER — NOREPINEPHRINE BITARTRATE/D5W 8 MG/250ML
0.09 PLASTIC BAG, INJECTION (ML) INTRAVENOUS
Qty: 16 | Refills: 0 | Status: DISCONTINUED | OUTPATIENT
Start: 2023-11-27 | End: 2023-11-27

## 2023-11-27 RX ORDER — TRAZODONE HCL 50 MG
50 TABLET ORAL EVERY 24 HOURS
Refills: 0 | Status: DISCONTINUED | OUTPATIENT
Start: 2023-11-28 | End: 2023-12-07

## 2023-11-27 RX ADMIN — Medication 1 PATCH: at 23:16

## 2023-11-27 RX ADMIN — Medication 667 MILLIGRAM(S): at 07:46

## 2023-11-27 RX ADMIN — Medication 0.5 MILLIGRAM(S): at 11:06

## 2023-11-27 RX ADMIN — HEPARIN SODIUM 5000 UNIT(S): 5000 INJECTION INTRAVENOUS; SUBCUTANEOUS at 06:11

## 2023-11-27 RX ADMIN — CHLORHEXIDINE GLUCONATE 15 MILLILITER(S): 213 SOLUTION TOPICAL at 06:10

## 2023-11-27 RX ADMIN — Medication 1 PATCH: at 18:25

## 2023-11-27 RX ADMIN — DULOXETINE HYDROCHLORIDE 30 MILLIGRAM(S): 30 CAPSULE, DELAYED RELEASE ORAL at 11:06

## 2023-11-27 RX ADMIN — Medication 1 APPLICATION(S): at 17:19

## 2023-11-27 RX ADMIN — PROPOFOL 4.9 MICROGRAM(S)/KG/MIN: 10 INJECTION, EMULSION INTRAVENOUS at 11:06

## 2023-11-27 RX ADMIN — Medication 1 DROP(S): at 06:10

## 2023-11-27 RX ADMIN — Medication 667 MILLIGRAM(S): at 11:13

## 2023-11-27 RX ADMIN — Medication 3 MILLILITER(S): at 09:11

## 2023-11-27 RX ADMIN — Medication 3 MILLILITER(S): at 16:42

## 2023-11-27 RX ADMIN — CHLORHEXIDINE GLUCONATE 1 APPLICATION(S): 213 SOLUTION TOPICAL at 06:11

## 2023-11-27 RX ADMIN — Medication 10 MILLIGRAM(S): at 19:28

## 2023-11-27 RX ADMIN — HEPARIN SODIUM 5000 UNIT(S): 5000 INJECTION INTRAVENOUS; SUBCUTANEOUS at 21:51

## 2023-11-27 RX ADMIN — PANTOPRAZOLE SODIUM 40 MILLIGRAM(S): 20 TABLET, DELAYED RELEASE ORAL at 06:11

## 2023-11-27 RX ADMIN — CEFTRIAXONE 100 MILLIGRAM(S): 500 INJECTION, POWDER, FOR SOLUTION INTRAMUSCULAR; INTRAVENOUS at 17:06

## 2023-11-27 RX ADMIN — Medication 1 PATCH: at 11:06

## 2023-11-27 RX ADMIN — Medication 3 MILLILITER(S): at 21:19

## 2023-11-27 RX ADMIN — Medication 650 MILLIGRAM(S): at 06:46

## 2023-11-27 RX ADMIN — Medication 1 DROP(S): at 17:19

## 2023-11-27 RX ADMIN — HEPARIN SODIUM 5000 UNIT(S): 5000 INJECTION INTRAVENOUS; SUBCUTANEOUS at 14:13

## 2023-11-27 RX ADMIN — Medication 50 MILLIGRAM(S): at 11:06

## 2023-11-27 RX ADMIN — Medication 3 MILLILITER(S): at 04:57

## 2023-11-27 RX ADMIN — Medication 1 APPLICATION(S): at 06:10

## 2023-11-27 NOTE — PROCEDURE NOTE - ADDITIONAL PROCEDURE DETAILS
L IJ CVC placed under US guidance.   Guide wire visualized in L IJ under ultrasound in long and short axis views prior to dilation.  Lung sliding present pre and post procedure.   Agitated saline test demonstrates R sided heart filling on POCUS.   Post-procedure CXR performed for placement confirmation. L IJ CVC placed under US guidance.   Guide wire visualized in L IJ under ultrasound in long and short axis views prior to dilation.  Lung sliding present pre and post procedure.   Agitated saline test demonstrates R sided heart filling on POCUS.   Post-procedure CXR performed for placement confirmation.    0200AM: CXR reported as CVC in RA @ length of 20cm. Dressing removed and via sterile fashion CVC pulled back 3cm, 2mL of 1% lidocaine administered, CVC sutured in place and sterile dressing applied. Insertion depth now 17cm

## 2023-11-27 NOTE — PROGRESS NOTE ADULT - SUBJECTIVE AND OBJECTIVE BOX
Patient is a 73y Female seen and evaluated at bedside No acute distress, extubated  Weekend notes reviewed    On CVVHD, no complaints tolerating well,  continue CVVHD with net neg FB   Plan to transition to iHD in AM   Outpatient records obtained sCr of 4 on 10/6 and 4.5 on 11/2, likley all progression of CKD       Meds:    acetaminophen     Tablet .. 650 every 6 hours PRN  albuterol    90 MICROgram(s) HFA Inhaler 2 daily  albuterol/ipratropium for Nebulization 3 every 6 hours  artificial  tears Solution 1 every 12 hours  atorvastatin 20 at bedtime  calcium acetate 667 three times a day with meals  cefTRIAXone   IVPB 2000 every 24 hours  chlorhexidine 2% Cloths 1 <User Schedule>  CRRT Treatment  <Continuous>  dextrose 5%. 1000 <Continuous>  dextrose 5%. 1000 <Continuous>  dextrose 50% Injectable 25 once  dextrose 50% Injectable 12.5 once  dextrose 50% Injectable 25 once  dextrose Oral Gel 15 once PRN  glucagon  Injectable 1 once  heparin   Injectable 5000 every 8 hours  hydrALAZINE 50 every 8 hours  insulin lispro (ADMELOG) corrective regimen sliding scale  every 6 hours  nitroglycerin    Patch 0.3 mG/Hr(s) 1 daily  pantoprazole    Tablet 40 before breakfast  petrolatum Ophthalmic Ointment 1 every 12 hours  PureFlow Dialysate RFP-401 (K 4 / Ca 3) 5000 <Continuous>  sodium bicarbonate 650 two times a day  sodium chloride 0.9% lock flush 10 every 1 hour PRN      T(C): , Max: 36.7 (11-27-23 @ 06:00)  T(F): , Max: 98.1 (11-27-23 @ 06:00)  HR: 66 (11-27-23 @ 16:00)  BP: 119/56 (11-26-23 @ 17:00)  BP(mean): 81 (11-26-23 @ 17:00)  RR: 22 (11-27-23 @ 16:00)  SpO2: 98% (11-27-23 @ 16:00)  Wt(kg): --    11-26 @ 07:01  -  11-27 @ 07:00  --------------------------------------------------------  IN: 738.3 mL / OUT: 805 mL / NET: -66.7 mL    11-27 @ 07:01  -  11-27 @ 16:36  --------------------------------------------------------  IN: 78.8 mL / OUT: 364 mL / NET: -285.2 mL          Review of Systems:  ROS negative except as per HPI      PHYSICAL EXAM:  GENERAL: Mild distress on NRB, tolerating CVVHD   NECK: supple, No JVD  CHEST/LUNG: Clear to auscultation bilaterally  HEART: normal S1S2, RRR  ABDOMEN: Soft, Nontender, +BS, No flank tenderness bilateral  EXTREMITIES: No clubbing, cyanosis, b/l LE edema   NEUROLOGY: AAO x3, no focal neurological deficit  ACCESS:  [facility] HD cath accessed       LABS:                        6.7    11.30 )-----------( 180      ( 27 Nov 2023 15:22 )             20.5     11-27    140  |  106  |  27<H>  ----------------------------<  142<H>  4.5   |  25  |  2.09<H>    Ca    9.0      27 Nov 2023 15:22  Phos  3.9     11-27  Mg     1.7     11-27    TPro  5.8<L>  /  Alb  3.0<L>  /  TBili  0.2  /  DBili  x   /  AST  67<H>  /  ALT  127<H>  /  AlkPhos  132<H>  11-27      PT/INR - ( 26 Nov 2023 06:59 )   PT: 10.9 sec;   INR: 0.95          PTT - ( 26 Nov 2023 17:39 )  PTT:25.7 sec  Urinalysis Basic - ( 27 Nov 2023 15:22 )    Color: x / Appearance: x / SG: x / pH: x  Gluc: 142 mg/dL / Ketone: x  / Bili: x / Urobili: x   Blood: x / Protein: x / Nitrite: x   Leuk Esterase: x / RBC: x / WBC x   Sq Epi: x / Non Sq Epi: x / Bacteria: x      Sodium, Random Urine: <20 mmol/L (11-26 @ 20:26)  Creatinine, Random Urine: 168 mg/dL (11-26 @ 20:26)  Osmolality, Random Urine: 358 mosm/kg (11-26 @ 20:26)  Potassium, Random Urine: 77 mmol/L (11-26 @ 20:26)        RADIOLOGY & ADDITIONAL STUDIES:

## 2023-11-27 NOTE — DIETITIAN INITIAL EVALUATION ADULT - NSFNSGIIOFT_GEN_A_CORE
11-26-23 @ 07:01  -  11-27-23 @ 07:00  --------------------------------------------------------  OUT:    Gastrostomy Tube (mL): 100 mL  Total OUT: 100 mL    Total NET: -100 mL

## 2023-11-27 NOTE — PROGRESS NOTE ADULT - ATTENDING COMMENTS
worsening CKD devleoped metabolic acidosis and AMS requiring intubation.  acidosis improved with CRRT, following commands  extubated  started on ctx to cover for aspiration pre/loni-intubation    Decision making high complexity

## 2023-11-27 NOTE — DIETITIAN INITIAL EVALUATION ADULT - ADD RECOMMEND
-Initiate nutrition within 24-48 hrs    *Recommend DASH diet with low concentrated phosphorus with appropriate textures/consistencies   -Encourage good PO intake as appropriate   -Align nutrition with goals of care at all times   -Monitor chemistry, GI function, and skin integrity

## 2023-11-27 NOTE — PROCEDURE NOTE - NSPOSTPRCRAD_GEN_A_CORE
20cm/central line located in the superior vena cava/depth of insertion/no pneumothorax 20cm @ 2am withdrawn 3cm now @ 17cm/central line located in the superior vena cava/depth of insertion/no pneumothorax

## 2023-11-27 NOTE — PROGRESS NOTE ADULT - SUBJECTIVE AND OBJECTIVE BOX
Patient is a 73y old  Female who presents with a chief complaint of Shortness of breath (27 Nov 2023 12:13)      INTERVAL HPI/OVERNIGHT EVENTS: As per overnight team per nephro keep UF no removal of fluid . Patient seen and examined at bedside. Extubated this morning, now awake and alert.     ICU Vital Signs Last 24 Hrs  T(C): 36 (27 Nov 2023 09:15), Max: 36.7 (27 Nov 2023 06:00)  T(F): 96.8 (27 Nov 2023 09:15), Max: 98.1 (27 Nov 2023 06:00)  HR: 64 (27 Nov 2023 14:00) (47 - 70)  BP: 119/56 (26 Nov 2023 17:00) (110/55 - 119/56)  BP(mean): 81 (26 Nov 2023 17:00) (77 - 81)  ABP: 166/61 (27 Nov 2023 14:00) (104/45 - 167/66)  ABP(mean): 94 (27 Nov 2023 14:00) (60 - 97)  RR: 28 (27 Nov 2023 14:00) (12 - 271)  SpO2: 96% (27 Nov 2023 14:00) (92% - 100%)    O2 Parameters below as of 27 Nov 2023 14:00  Patient On (Oxygen Delivery Method): mask, aerosol    O2 Concentration (%): 40      I&O's Summary    26 Nov 2023 07:01  -  27 Nov 2023 07:00  --------------------------------------------------------  IN: 738.3 mL / OUT: 805 mL / NET: -66.7 mL    27 Nov 2023 07:01  -  27 Nov 2023 14:36  --------------------------------------------------------  IN: 78.8 mL / OUT: 230 mL / NET: -151.2 mL      Mode: AC/ CMV (Assist Control/ Continuous Mandatory Ventilation)  RR (machine): 16  TV (machine): 400  FiO2: 50  PEEP: 10  ITime: 0.9  MAP: 14  PIP: 22      LABS:                        7.0    11.95 )-----------( 184      ( 27 Nov 2023 05:30 )             21.2     11-27    145  |  109<H>  |  39<H>  ----------------------------<  117<H>  4.4   |  21<L>  |  3.19<H>    Ca    9.1      27 Nov 2023 05:30  Phos  4.7     11-27  Mg     1.8     11-27    TPro  5.8<L>  /  Alb  3.1<L>  /  TBili  0.2  /  DBili  x   /  AST  116<H>  /  ALT  154<H>  /  AlkPhos  136<H>  11-27    PT/INR - ( 26 Nov 2023 06:59 )   PT: 10.9 sec;   INR: 0.95          PTT - ( 26 Nov 2023 17:39 )  PTT:25.7 sec  Urinalysis Basic - ( 27 Nov 2023 05:30 )    Color: x / Appearance: x / SG: x / pH: x  Gluc: 117 mg/dL / Ketone: x  / Bili: x / Urobili: x   Blood: x / Protein: x / Nitrite: x   Leuk Esterase: x / RBC: x / WBC x   Sq Epi: x / Non Sq Epi: x / Bacteria: x      CAPILLARY BLOOD GLUCOSE      POCT Blood Glucose.: 150 mg/dL (27 Nov 2023 11:09)  POCT Blood Glucose.: 103 mg/dL (27 Nov 2023 05:23)  POCT Blood Glucose.: 167 mg/dL (26 Nov 2023 23:13)  POCT Blood Glucose.: 272 mg/dL (26 Nov 2023 19:02)  POCT Blood Glucose.: 294 mg/dL (26 Nov 2023 16:23)  POCT Blood Glucose.: 341 mg/dL (26 Nov 2023 15:29)    ABG - ( 27 Nov 2023 13:39 )  pH, Arterial: 7.40  pH, Blood: x     /  pCO2: 40    /  pO2: 83    / HCO3: 25    / Base Excess: 0.0   /  SaO2: 95.6                RADIOLOGY & ADDITIONAL TESTS:    Consultant(s) Notes Reviewed:  [x ] YES  [ ] NO    MEDICATIONS  (STANDING):  albuterol    90 MICROgram(s) HFA Inhaler 2 Puff(s) Inhalation daily  albuterol/ipratropium for Nebulization 3 milliLiter(s) Nebulizer every 6 hours  artificial  tears Solution 1 Drop(s) Both EYES every 12 hours  atorvastatin 20 milliGRAM(s) Oral at bedtime  calcium acetate 667 milliGRAM(s) Oral three times a day with meals  cefTRIAXone   IVPB 2000 milliGRAM(s) IV Intermittent every 24 hours  chlorhexidine 2% Cloths 1 Application(s) Topical <User Schedule>  clonazePAM  Tablet 0.5 milliGRAM(s) Oral daily  CRRT Treatment    <Continuous>  dextrose 5%. 1000 milliLiter(s) (50 mL/Hr) IV Continuous <Continuous>  dextrose 5%. 1000 milliLiter(s) (100 mL/Hr) IV Continuous <Continuous>  dextrose 50% Injectable 25 Gram(s) IV Push once  dextrose 50% Injectable 25 Gram(s) IV Push once  dextrose 50% Injectable 12.5 Gram(s) IV Push once  DULoxetine 30 milliGRAM(s) Oral daily  glucagon  Injectable 1 milliGRAM(s) IntraMuscular once  heparin   Injectable 5000 Unit(s) SubCutaneous every 8 hours  insulin lispro (ADMELOG) corrective regimen sliding scale   SubCutaneous every 6 hours  nitroglycerin    Patch 0.3 mG/Hr(s) 1 patch Transdermal daily  norepinephrine Infusion 0.09 MICROgram(s)/kG/Min (6.89 mL/Hr) IV Continuous <Continuous>  pantoprazole    Tablet 40 milliGRAM(s) Oral before breakfast  petrolatum Ophthalmic Ointment 1 Application(s) Both EYES every 12 hours  propofol Infusion 10 MICROgram(s)/kG/Min (4.9 mL/Hr) IV Continuous <Continuous>  PureFlow Dialysate RFP-401 (K 4 / Ca 3) 5000 milliLiter(s) (2000 mL/Hr) CRRT <Continuous>  sodium bicarbonate 650 milliGRAM(s) Oral two times a day  traZODone 50 milliGRAM(s) Oral daily    MEDICATIONS  (PRN):  acetaminophen     Tablet .. 650 milliGRAM(s) Oral every 6 hours PRN Mild Pain (1 - 3)  dextrose Oral Gel 15 Gram(s) Oral once PRN Blood Glucose LESS THAN 70 milliGRAM(s)/deciliter  sodium chloride 0.9% lock flush 10 milliLiter(s) IV Push every 1 hour PRN Pre/post blood products, medications, blood draw, and to maintain line patency      PHYSICAL EXAM  General: resting comfortably, NAD  HEENT: MMM  Neck: supple, no cervical or post-auricular lymphadenopathy  Cardiovascular: +S1/S2, no murmurs/rubs/gallops, RRR  Respiratory: CTA bilaterally,  no wheezes/rales/rhonchi, no increased work of breathing or accessory muscle use  Gastrointestinal: soft, NT/ND; active bowel sounds  Extremities: WWP; b/l LE edema  Vascular: 2+ radial, DP/PT pulses B/L  Skin: no rashes  Neurological: Awake and alert, spontaneous movement observed, no focal deficits Patient is a 73y old  Female who presents with a chief complaint of Shortness of breath (27 Nov 2023 12:13)  HOSPITAL COURSE  72 yo F WITH HTN, HLD, uncontrolled DM with multiple amputations of toes iso OM; CAD with stress test planned for 11/28; CKD stage 5 presented with SOB - admitted with fluid overload iso of renal failure requiring HD. On 11/27, rapid was called due to unresponsiveness, found to have metabolic acidosis with BUN 60, HCO3 9, PCO2 26, K 6.8, Lactate 5.9. Suspected to have metabolic encephalopathy 2/2 uremia vs acidosis. Received 3 amps HCO3, lasix 40 IVP, 5u Insulin and 1g Calcium Gluconate. Stepped up from cards tele to MICU. Intubated for airway protection, started on CVVHD, Levophed, Propofol.     In the MICU, uremia and acidosis improved. Levophed and Propofol weaned off, extubated on 11/27.     INTERVAL HPI/OVERNIGHT EVENTS: As per overnight team per nephro keep UF no removal of fluid . Patient seen and examined at bedside. Extubated this morning, now awake and alert.     ICU Vital Signs Last 24 Hrs  T(C): 36 (27 Nov 2023 09:15), Max: 36.7 (27 Nov 2023 06:00)  T(F): 96.8 (27 Nov 2023 09:15), Max: 98.1 (27 Nov 2023 06:00)  HR: 64 (27 Nov 2023 14:00) (47 - 70)  BP: 119/56 (26 Nov 2023 17:00) (110/55 - 119/56)  BP(mean): 81 (26 Nov 2023 17:00) (77 - 81)  ABP: 166/61 (27 Nov 2023 14:00) (104/45 - 167/66)  ABP(mean): 94 (27 Nov 2023 14:00) (60 - 97)  RR: 28 (27 Nov 2023 14:00) (12 - 271)  SpO2: 96% (27 Nov 2023 14:00) (92% - 100%)    O2 Parameters below as of 27 Nov 2023 14:00  Patient On (Oxygen Delivery Method): mask, aerosol    O2 Concentration (%): 40      I&O's Summary    26 Nov 2023 07:01  -  27 Nov 2023 07:00  --------------------------------------------------------  IN: 738.3 mL / OUT: 805 mL / NET: -66.7 mL    27 Nov 2023 07:01  -  27 Nov 2023 14:36  --------------------------------------------------------  IN: 78.8 mL / OUT: 230 mL / NET: -151.2 mL      Mode: AC/ CMV (Assist Control/ Continuous Mandatory Ventilation)  RR (machine): 16  TV (machine): 400  FiO2: 50  PEEP: 10  ITime: 0.9  MAP: 14  PIP: 22      LABS:                        7.0    11.95 )-----------( 184      ( 27 Nov 2023 05:30 )             21.2     11-27    145  |  109<H>  |  39<H>  ----------------------------<  117<H>  4.4   |  21<L>  |  3.19<H>    Ca    9.1      27 Nov 2023 05:30  Phos  4.7     11-27  Mg     1.8     11-27    TPro  5.8<L>  /  Alb  3.1<L>  /  TBili  0.2  /  DBili  x   /  AST  116<H>  /  ALT  154<H>  /  AlkPhos  136<H>  11-27    PT/INR - ( 26 Nov 2023 06:59 )   PT: 10.9 sec;   INR: 0.95          PTT - ( 26 Nov 2023 17:39 )  PTT:25.7 sec  Urinalysis Basic - ( 27 Nov 2023 05:30 )    Color: x / Appearance: x / SG: x / pH: x  Gluc: 117 mg/dL / Ketone: x  / Bili: x / Urobili: x   Blood: x / Protein: x / Nitrite: x   Leuk Esterase: x / RBC: x / WBC x   Sq Epi: x / Non Sq Epi: x / Bacteria: x      CAPILLARY BLOOD GLUCOSE      POCT Blood Glucose.: 150 mg/dL (27 Nov 2023 11:09)  POCT Blood Glucose.: 103 mg/dL (27 Nov 2023 05:23)  POCT Blood Glucose.: 167 mg/dL (26 Nov 2023 23:13)  POCT Blood Glucose.: 272 mg/dL (26 Nov 2023 19:02)  POCT Blood Glucose.: 294 mg/dL (26 Nov 2023 16:23)  POCT Blood Glucose.: 341 mg/dL (26 Nov 2023 15:29)    ABG - ( 27 Nov 2023 13:39 )  pH, Arterial: 7.40  pH, Blood: x     /  pCO2: 40    /  pO2: 83    / HCO3: 25    / Base Excess: 0.0   /  SaO2: 95.6                RADIOLOGY & ADDITIONAL TESTS:    Consultant(s) Notes Reviewed:  [x ] YES  [ ] NO    MEDICATIONS  (STANDING):  albuterol    90 MICROgram(s) HFA Inhaler 2 Puff(s) Inhalation daily  albuterol/ipratropium for Nebulization 3 milliLiter(s) Nebulizer every 6 hours  artificial  tears Solution 1 Drop(s) Both EYES every 12 hours  atorvastatin 20 milliGRAM(s) Oral at bedtime  calcium acetate 667 milliGRAM(s) Oral three times a day with meals  cefTRIAXone   IVPB 2000 milliGRAM(s) IV Intermittent every 24 hours  chlorhexidine 2% Cloths 1 Application(s) Topical <User Schedule>  clonazePAM  Tablet 0.5 milliGRAM(s) Oral daily  CRRT Treatment    <Continuous>  dextrose 5%. 1000 milliLiter(s) (50 mL/Hr) IV Continuous <Continuous>  dextrose 5%. 1000 milliLiter(s) (100 mL/Hr) IV Continuous <Continuous>  dextrose 50% Injectable 25 Gram(s) IV Push once  dextrose 50% Injectable 25 Gram(s) IV Push once  dextrose 50% Injectable 12.5 Gram(s) IV Push once  DULoxetine 30 milliGRAM(s) Oral daily  glucagon  Injectable 1 milliGRAM(s) IntraMuscular once  heparin   Injectable 5000 Unit(s) SubCutaneous every 8 hours  insulin lispro (ADMELOG) corrective regimen sliding scale   SubCutaneous every 6 hours  nitroglycerin    Patch 0.3 mG/Hr(s) 1 patch Transdermal daily  norepinephrine Infusion 0.09 MICROgram(s)/kG/Min (6.89 mL/Hr) IV Continuous <Continuous>  pantoprazole    Tablet 40 milliGRAM(s) Oral before breakfast  petrolatum Ophthalmic Ointment 1 Application(s) Both EYES every 12 hours  propofol Infusion 10 MICROgram(s)/kG/Min (4.9 mL/Hr) IV Continuous <Continuous>  PureFlow Dialysate RFP-401 (K 4 / Ca 3) 5000 milliLiter(s) (2000 mL/Hr) CRRT <Continuous>  sodium bicarbonate 650 milliGRAM(s) Oral two times a day  traZODone 50 milliGRAM(s) Oral daily    MEDICATIONS  (PRN):  acetaminophen     Tablet .. 650 milliGRAM(s) Oral every 6 hours PRN Mild Pain (1 - 3)  dextrose Oral Gel 15 Gram(s) Oral once PRN Blood Glucose LESS THAN 70 milliGRAM(s)/deciliter  sodium chloride 0.9% lock flush 10 milliLiter(s) IV Push every 1 hour PRN Pre/post blood products, medications, blood draw, and to maintain line patency      PHYSICAL EXAM  General: resting comfortably, NAD  HEENT: MMM  Neck: supple, no cervical or post-auricular lymphadenopathy  Cardiovascular: +S1/S2, no murmurs/rubs/gallops, RRR  Respiratory: CTA bilaterally,  no wheezes/rales/rhonchi, no increased work of breathing or accessory muscle use  Gastrointestinal: soft, NT/ND; active bowel sounds  Extremities: WWP; b/l LE edema  Vascular: 2+ radial, DP/PT pulses B/L  Skin: no rashes  Neurological: Awake and alert, spontaneous movement observed, no focal deficits Patient is a 73y old  Female who presents with a chief complaint of Shortness of breath (27 Nov 2023 12:13)  HOSPITAL COURSE  73F with PMH HTN, HLD, DM with OM s/p multiple amputations of toes; CAD with stress test planned for 11/28; CKD5 presenting with dyspnea and lightheadedness, found to have ischemic EKG changes and elevated cardiac enzymes, started on Nitroglycerin patch. Cardiac enzymes peaked and downtrending- suspected to be due to CKD. Patient was admitted for volume overload due to renal failure requiring HD. On 11/27, rapid was called due to unresponsiveness, found to have metabolic acidosis with BUN 60, HCO3 9, PCO2 26, K 6.8, Lactate 5.9. Suspected to have metabolic encephalopathy 2/2 uremia vs acidosis. Received 3 amps HCO3, lasix 40 IVP, 5u Insulin and 1g Calcium Gluconate. Stepped up from cards tele to MICU. Intubated for airway protection, started on CVVHD, Levophed, Propofol.     In the MICU, uremia and acidosis improved. Levophed and Propofol weaned off, extubated on 11/27.     INTERVAL HPI/OVERNIGHT EVENTS: As per overnight team per nephro keep UF no removal of fluid . Patient seen and examined at bedside. Extubated this morning, now awake and alert.     ICU Vital Signs Last 24 Hrs  T(C): 36 (27 Nov 2023 09:15), Max: 36.7 (27 Nov 2023 06:00)  T(F): 96.8 (27 Nov 2023 09:15), Max: 98.1 (27 Nov 2023 06:00)  HR: 64 (27 Nov 2023 14:00) (47 - 70)  BP: 119/56 (26 Nov 2023 17:00) (110/55 - 119/56)  BP(mean): 81 (26 Nov 2023 17:00) (77 - 81)  ABP: 166/61 (27 Nov 2023 14:00) (104/45 - 167/66)  ABP(mean): 94 (27 Nov 2023 14:00) (60 - 97)  RR: 28 (27 Nov 2023 14:00) (12 - 271)  SpO2: 96% (27 Nov 2023 14:00) (92% - 100%)    O2 Parameters below as of 27 Nov 2023 14:00  Patient On (Oxygen Delivery Method): mask, aerosol    O2 Concentration (%): 40      I&O's Summary    26 Nov 2023 07:01  -  27 Nov 2023 07:00  --------------------------------------------------------  IN: 738.3 mL / OUT: 805 mL / NET: -66.7 mL    27 Nov 2023 07:01  -  27 Nov 2023 14:36  --------------------------------------------------------  IN: 78.8 mL / OUT: 230 mL / NET: -151.2 mL      Mode: AC/ CMV (Assist Control/ Continuous Mandatory Ventilation)  RR (machine): 16  TV (machine): 400  FiO2: 50  PEEP: 10  ITime: 0.9  MAP: 14  PIP: 22      LABS:                        7.0    11.95 )-----------( 184      ( 27 Nov 2023 05:30 )             21.2     11-27    145  |  109<H>  |  39<H>  ----------------------------<  117<H>  4.4   |  21<L>  |  3.19<H>    Ca    9.1      27 Nov 2023 05:30  Phos  4.7     11-27  Mg     1.8     11-27    TPro  5.8<L>  /  Alb  3.1<L>  /  TBili  0.2  /  DBili  x   /  AST  116<H>  /  ALT  154<H>  /  AlkPhos  136<H>  11-27    PT/INR - ( 26 Nov 2023 06:59 )   PT: 10.9 sec;   INR: 0.95          PTT - ( 26 Nov 2023 17:39 )  PTT:25.7 sec  Urinalysis Basic - ( 27 Nov 2023 05:30 )    Color: x / Appearance: x / SG: x / pH: x  Gluc: 117 mg/dL / Ketone: x  / Bili: x / Urobili: x   Blood: x / Protein: x / Nitrite: x   Leuk Esterase: x / RBC: x / WBC x   Sq Epi: x / Non Sq Epi: x / Bacteria: x      CAPILLARY BLOOD GLUCOSE      POCT Blood Glucose.: 150 mg/dL (27 Nov 2023 11:09)  POCT Blood Glucose.: 103 mg/dL (27 Nov 2023 05:23)  POCT Blood Glucose.: 167 mg/dL (26 Nov 2023 23:13)  POCT Blood Glucose.: 272 mg/dL (26 Nov 2023 19:02)  POCT Blood Glucose.: 294 mg/dL (26 Nov 2023 16:23)  POCT Blood Glucose.: 341 mg/dL (26 Nov 2023 15:29)    ABG - ( 27 Nov 2023 13:39 )  pH, Arterial: 7.40  pH, Blood: x     /  pCO2: 40    /  pO2: 83    / HCO3: 25    / Base Excess: 0.0   /  SaO2: 95.6                RADIOLOGY & ADDITIONAL TESTS:    Consultant(s) Notes Reviewed:  [x ] YES  [ ] NO    MEDICATIONS  (STANDING):  albuterol    90 MICROgram(s) HFA Inhaler 2 Puff(s) Inhalation daily  albuterol/ipratropium for Nebulization 3 milliLiter(s) Nebulizer every 6 hours  artificial  tears Solution 1 Drop(s) Both EYES every 12 hours  atorvastatin 20 milliGRAM(s) Oral at bedtime  calcium acetate 667 milliGRAM(s) Oral three times a day with meals  cefTRIAXone   IVPB 2000 milliGRAM(s) IV Intermittent every 24 hours  chlorhexidine 2% Cloths 1 Application(s) Topical <User Schedule>  clonazePAM  Tablet 0.5 milliGRAM(s) Oral daily  CRRT Treatment    <Continuous>  dextrose 5%. 1000 milliLiter(s) (50 mL/Hr) IV Continuous <Continuous>  dextrose 5%. 1000 milliLiter(s) (100 mL/Hr) IV Continuous <Continuous>  dextrose 50% Injectable 25 Gram(s) IV Push once  dextrose 50% Injectable 25 Gram(s) IV Push once  dextrose 50% Injectable 12.5 Gram(s) IV Push once  DULoxetine 30 milliGRAM(s) Oral daily  glucagon  Injectable 1 milliGRAM(s) IntraMuscular once  heparin   Injectable 5000 Unit(s) SubCutaneous every 8 hours  insulin lispro (ADMELOG) corrective regimen sliding scale   SubCutaneous every 6 hours  nitroglycerin    Patch 0.3 mG/Hr(s) 1 patch Transdermal daily  norepinephrine Infusion 0.09 MICROgram(s)/kG/Min (6.89 mL/Hr) IV Continuous <Continuous>  pantoprazole    Tablet 40 milliGRAM(s) Oral before breakfast  petrolatum Ophthalmic Ointment 1 Application(s) Both EYES every 12 hours  propofol Infusion 10 MICROgram(s)/kG/Min (4.9 mL/Hr) IV Continuous <Continuous>  PureFlow Dialysate RFP-401 (K 4 / Ca 3) 5000 milliLiter(s) (2000 mL/Hr) CRRT <Continuous>  sodium bicarbonate 650 milliGRAM(s) Oral two times a day  traZODone 50 milliGRAM(s) Oral daily    MEDICATIONS  (PRN):  acetaminophen     Tablet .. 650 milliGRAM(s) Oral every 6 hours PRN Mild Pain (1 - 3)  dextrose Oral Gel 15 Gram(s) Oral once PRN Blood Glucose LESS THAN 70 milliGRAM(s)/deciliter  sodium chloride 0.9% lock flush 10 milliLiter(s) IV Push every 1 hour PRN Pre/post blood products, medications, blood draw, and to maintain line patency      PHYSICAL EXAM  General: resting comfortably, NAD  HEENT: MMM  Neck: supple, no cervical or post-auricular lymphadenopathy  Cardiovascular: +S1/S2, no murmurs/rubs/gallops, RRR  Respiratory: CTA bilaterally,  no wheezes/rales/rhonchi, no increased work of breathing or accessory muscle use  Gastrointestinal: soft, NT/ND; active bowel sounds  Extremities: WWP; b/l LE edema  Vascular: 2+ radial, DP/PT pulses B/L  Skin: no rashes  Neurological: Awake and alert, spontaneous movement observed, no focal deficits Patient is a 73y old  Female who presents with a chief complaint of Shortness of breath (27 Nov 2023 12:13)  HOSPITAL COURSE  73F with PMH HTN, HLD, DM with OM s/p multiple amputations of toes; CAD with stress test planned for 11/28; CKD5 presenting with dyspnea and lightheadedness, found to have ischemic EKG changes and elevated cardiac enzymes, started on Nitroglycerin patch. Cardiac enzymes peaked and downtrending- suspected to be due to CKD. Patient was admitted for volume overload due to renal failure requiring HD. On 11/27, rapid was called due to unresponsiveness, found to have metabolic acidosis with BUN 60, HCO3 9, PCO2 26, K 6.8, Lactate 5.9. Suspected to have metabolic encephalopathy 2/2 uremia vs acidosis. Received 3 amps HCO3, lasix 40 IVP, 5u Insulin and 1g Calcium Gluconate. Stepped up from cards tele to MICU. Intubated for airway protection, started on CVVHD, Levophed, Propofol.     In the MICU, uremia and acidosis improved. Levophed and Propofol weaned off, extubated on 11/27.     INTERVAL HPI/OVERNIGHT EVENTS: As per overnight team per nephro keep UF no removal of fluid . Patient seen and examined at bedside. Extubated and off sedation this morning, now awake but still somewhat somnolent      ICU Vital Signs Last 24 Hrs  T(C): 36 (27 Nov 2023 09:15), Max: 36.7 (27 Nov 2023 06:00)  T(F): 96.8 (27 Nov 2023 09:15), Max: 98.1 (27 Nov 2023 06:00)  HR: 64 (27 Nov 2023 14:00) (47 - 70)  BP: 119/56 (26 Nov 2023 17:00) (110/55 - 119/56)  BP(mean): 81 (26 Nov 2023 17:00) (77 - 81)  ABP: 166/61 (27 Nov 2023 14:00) (104/45 - 167/66)  ABP(mean): 94 (27 Nov 2023 14:00) (60 - 97)  RR: 28 (27 Nov 2023 14:00) (12 - 271)  SpO2: 96% (27 Nov 2023 14:00) (92% - 100%)    O2 Parameters below as of 27 Nov 2023 14:00  Patient On (Oxygen Delivery Method): mask, aerosol    O2 Concentration (%): 40      I&O's Summary    26 Nov 2023 07:01  -  27 Nov 2023 07:00  --------------------------------------------------------  IN: 738.3 mL / OUT: 805 mL / NET: -66.7 mL    27 Nov 2023 07:01  -  27 Nov 2023 14:36  --------------------------------------------------------  IN: 78.8 mL / OUT: 230 mL / NET: -151.2 mL      Mode: AC/ CMV (Assist Control/ Continuous Mandatory Ventilation)  RR (machine): 16  TV (machine): 400  FiO2: 50  PEEP: 10  ITime: 0.9  MAP: 14  PIP: 22      LABS:                        7.0    11.95 )-----------( 184      ( 27 Nov 2023 05:30 )             21.2     11-27    145  |  109<H>  |  39<H>  ----------------------------<  117<H>  4.4   |  21<L>  |  3.19<H>    Ca    9.1      27 Nov 2023 05:30  Phos  4.7     11-27  Mg     1.8     11-27    TPro  5.8<L>  /  Alb  3.1<L>  /  TBili  0.2  /  DBili  x   /  AST  116<H>  /  ALT  154<H>  /  AlkPhos  136<H>  11-27    PT/INR - ( 26 Nov 2023 06:59 )   PT: 10.9 sec;   INR: 0.95          PTT - ( 26 Nov 2023 17:39 )  PTT:25.7 sec  Urinalysis Basic - ( 27 Nov 2023 05:30 )    Color: x / Appearance: x / SG: x / pH: x  Gluc: 117 mg/dL / Ketone: x  / Bili: x / Urobili: x   Blood: x / Protein: x / Nitrite: x   Leuk Esterase: x / RBC: x / WBC x   Sq Epi: x / Non Sq Epi: x / Bacteria: x      CAPILLARY BLOOD GLUCOSE      POCT Blood Glucose.: 150 mg/dL (27 Nov 2023 11:09)  POCT Blood Glucose.: 103 mg/dL (27 Nov 2023 05:23)  POCT Blood Glucose.: 167 mg/dL (26 Nov 2023 23:13)  POCT Blood Glucose.: 272 mg/dL (26 Nov 2023 19:02)  POCT Blood Glucose.: 294 mg/dL (26 Nov 2023 16:23)  POCT Blood Glucose.: 341 mg/dL (26 Nov 2023 15:29)    ABG - ( 27 Nov 2023 13:39 )  pH, Arterial: 7.40  pH, Blood: x     /  pCO2: 40    /  pO2: 83    / HCO3: 25    / Base Excess: 0.0   /  SaO2: 95.6                RADIOLOGY & ADDITIONAL TESTS:    Consultant(s) Notes Reviewed:  [x ] YES  [ ] NO    MEDICATIONS  (STANDING):  albuterol    90 MICROgram(s) HFA Inhaler 2 Puff(s) Inhalation daily  albuterol/ipratropium for Nebulization 3 milliLiter(s) Nebulizer every 6 hours  artificial  tears Solution 1 Drop(s) Both EYES every 12 hours  atorvastatin 20 milliGRAM(s) Oral at bedtime  calcium acetate 667 milliGRAM(s) Oral three times a day with meals  cefTRIAXone   IVPB 2000 milliGRAM(s) IV Intermittent every 24 hours  chlorhexidine 2% Cloths 1 Application(s) Topical <User Schedule>  clonazePAM  Tablet 0.5 milliGRAM(s) Oral daily  CRRT Treatment    <Continuous>  dextrose 5%. 1000 milliLiter(s) (50 mL/Hr) IV Continuous <Continuous>  dextrose 5%. 1000 milliLiter(s) (100 mL/Hr) IV Continuous <Continuous>  dextrose 50% Injectable 25 Gram(s) IV Push once  dextrose 50% Injectable 25 Gram(s) IV Push once  dextrose 50% Injectable 12.5 Gram(s) IV Push once  DULoxetine 30 milliGRAM(s) Oral daily  glucagon  Injectable 1 milliGRAM(s) IntraMuscular once  heparin   Injectable 5000 Unit(s) SubCutaneous every 8 hours  insulin lispro (ADMELOG) corrective regimen sliding scale   SubCutaneous every 6 hours  nitroglycerin    Patch 0.3 mG/Hr(s) 1 patch Transdermal daily  norepinephrine Infusion 0.09 MICROgram(s)/kG/Min (6.89 mL/Hr) IV Continuous <Continuous>  pantoprazole    Tablet 40 milliGRAM(s) Oral before breakfast  petrolatum Ophthalmic Ointment 1 Application(s) Both EYES every 12 hours  propofol Infusion 10 MICROgram(s)/kG/Min (4.9 mL/Hr) IV Continuous <Continuous>  PureFlow Dialysate RFP-401 (K 4 / Ca 3) 5000 milliLiter(s) (2000 mL/Hr) CRRT <Continuous>  sodium bicarbonate 650 milliGRAM(s) Oral two times a day  traZODone 50 milliGRAM(s) Oral daily    MEDICATIONS  (PRN):  acetaminophen     Tablet .. 650 milliGRAM(s) Oral every 6 hours PRN Mild Pain (1 - 3)  dextrose Oral Gel 15 Gram(s) Oral once PRN Blood Glucose LESS THAN 70 milliGRAM(s)/deciliter  sodium chloride 0.9% lock flush 10 milliLiter(s) IV Push every 1 hour PRN Pre/post blood products, medications, blood draw, and to maintain line patency      PHYSICAL EXAM  General: resting comfortably, NAD  HEENT: MMM  Neck: supple, no cervical or post-auricular lymphadenopathy  Cardiovascular: +S1/S2, no murmurs/rubs/gallops, RRR  Respiratory: CTA bilaterally,  no wheezes/rales/rhonchi, no increased work of breathing or accessory muscle use  Gastrointestinal: soft, NT/ND; active bowel sounds  Extremities: WWP; b/l LE edema  Vascular: 2+ radial, DP/PT pulses B/L  Skin: no rashes  Neurological: Awake, somnolent, spontaneous movement observed, no focal deficits

## 2023-11-27 NOTE — PROGRESS NOTE ADULT - ASSESSMENT
74 yo F WITH HTN, HLD, uncontrolled DM with multiple amputations of toes iso OM; CAD with stress test planned for 11/28; CKD stage 5 presented with SOB - admitted with fluid overload iso of renal failure requiring HD; was a step up on 11/26 from cards tele - patient obtunded requiring intubation and now requiring CVVHD    NEURO: APOLONIA    PSYCH  #Depression with suicidal ideation  Pt endorsed thoughts of suicide/hopeless. Denies any active plan to commit suicide. Psych consulted, no need for 1-1 obs  - c/w home klonipin 0.5mg QD  - c/w cymbalta 30mg QD  - c/w trazodone 50 QD  - holding PO meds while intubated    PULM  #Asthma   Hx of asthma  - Holding symbicort while NPO and intubated  - C/w duonebs q6h     #Pneumonia  On 11/27, new LLL opacity seen. Likely aspiration pneumonia  - CTX 2g QD    CARDIO  #Elevated troponin  Pt endorsed episode of heart racing, shortness of breath and anxiety that has resolved   EKG TWI in V1-V6, II, III, AVF and ST depressions in V4-V5, repeat EKG unchanged  - Trops, CKMB, CK peak, can stop trending  - F/u TTE    #HLD  - c/w atorvastatin 20 QD    #HTN  On hydral 50 TID, nifedipine 90 QD, toprol 25 QD  - holding BP meds while intubated    GI    RENAL  #ELBERT on CKD  #Urgent dialysis  Crt 4.38, uptrending. Admitted to MICU for urgent dialysis.  - F/u renal recs  - f/u urine studies   - Strict I/Os  - Renal diet when appropriate    ENDO  #DM2  A1C 9.7 2022. A1c this admission 5.7.   - c/w lantus 10u QHS, lispro 2u with meals     ID  #Leukocytosis   Uptrending   - C/w CTX 2g QD    HEME  #DIMITRIS  Hx of DIMITRIS. Iron studies with low TIBC indicative of AOCD  - Transfuse <7     MSK  APOLONIA    PROPHYLAXIS  F: None  E: Replete if K<4 or Mag<2  N: NPO while intubated  VTEppx: SCD  GIppx: PPI  Dispo: pending clinical course. 73F with PMH HTN, HLD, DM with OM s/p multiple amputations of toes; CAD with stress test planned for 11/28; CKD5 presenting with dyspnea, admitted for volume overload due to renal failure requiring HD; stepped up to MICU for AMS requiring intubation, now receiving CVVHD. Extubated on 11/27.     NEURO: APOLONIA    PSYCH  #Depression with suicidal ideation  Home meds: Klonipin 0.5mg QD, Duloxetine 30mg QD, Trazodone 50mg QD. Pt endorsed thoughts of suicide/hopeless. Denies any active plan to commit suicide. Psych consulted, no need for 1-1 obs  - Decrease to Klonipin 0.2mg QD due to concern for oversedation  - c/w Duloxetine 30mg QD  - Holding Trazodone 50 QD    PULM  #Asthma   Hx of asthma. Home meds: Symbicort, Albuterol  - c/w Symbicort  - C/w duonebs Q6h     #Pneumonia  On 11/27, new LLL opacity seen. WBC downtrending but still elevated. Likely aspiration pneumonia as pt was vomiting.   - CTX 2g QD    CARDIO  #Elevated troponin  Pt endorsed episode of heart racing, shortness of breath and anxiety that has resolved   EKG TWI in V1-V6, II, III, AVF and ST depressions in V4-V5, repeat EKG unchanged. Cardiac enzymes peaked and downtrended.  - continue Nitroglycerin 0.3mg/hr patch QD  - F/u TTE    #HLD  Home med: Atorvastatin 20mg QD  - c/w atorvastatin 20mg QD    #HTN  Home meds: Hydral 50 TID, Nifedipine 90 QD, Toprol 25 QD  - holding BP meds, restart as appropriate    GI  Prophylactic Protonix    RENAL  #ELBERT on CKD  #Urgent dialysis  Baseline Cr ~2.4. On admission, Cr 4.38, now downtrending. FENa 0.3% prerenal. Renal US: bilateral echogenic kidneys consistent with CKD, no hydonephrosis. Admitted to MICU for CVVHD.  - Plan for intermittent HD starting 11/28.   - continue NaHCO3 650mg BID  - continue Phoslo 667mg PO TID  - Strict I/Os  - Renal diet when appropriate    ENDO  #DM2  A1C 9.7 2022. A1c this admission 5.7. Has not consistently required sliding scale during this admission  - c/w mISS. Monitor requirement, convert to basal bolus as indicated    ID  Pneumonia as above    HEME  #Anemia  Hx of DIMITRIS. Iron studies with low TIBC indicative of AOCD  - EPO with dialysis  - Transfuse <7     MSK  #History of osteomyelitis  Patient has history of toe osteomyelitis 2/2 uncontrolled DM, s/p bilateral toe amputations. Remaining toes appear clean, with no apparent wounds.  - Continue to monitor    PROPHYLAXIS  F: None  E: Replete if K<4 or Mag<2  N: advance to renal as tolerated  VTEppx: heparin 5000U subQ Q8hr  GIppx: Protonix 40mg PO QD  Dispo: MICU 73F with PMH HTN, HLD, DM with OM s/p multiple amputations of toes; CAD with stress test planned for 11/28; CKD5 presenting with dyspnea, admitted for volume overload due to renal failure requiring HD; stepped up to MICU for AMS requiring intubation, now receiving CVVHD. Extubated on 11/27.     NEURO: APOLONIA    PSYCH  #Depression with suicidal ideation  Home meds: Klonipin 0.5mg QD, Duloxetine 30mg QD, Trazodone 50mg QD. Pt endorsed thoughts of suicide/hopeless. Denies any active plan to commit suicide. Psych consulted, no need for 1-1 obs  - Decrease to Klonipin 0.25mg QHS due to concern for oversedation  - c/w Duloxetine 30mg QD  - c/w Trazodone 50 QD    PULM  #Asthma   Hx of asthma. Home meds: Symbicort, Albuterol  - c/w Symbicort  - C/w duonebs Q6h     #Pneumonia  On 11/27, new LLL opacity seen. WBC downtrending but still elevated. Likely aspiration pneumonia as pt was vomiting.   - CTX 2g QD    CARDIO  #Elevated troponin  Pt endorsed episode of heart racing, shortness of breath and anxiety that has resolved   EKG TWI in V1-V6, II, III, AVF and ST depressions in V4-V5, repeat EKG unchanged. Cardiac enzymes peaked and downtrended.  - continue Nitroglycerin 0.3mg/hr patch QD  - F/u TTE    #HLD  Home med: Atorvastatin 20mg QD  - c/w atorvastatin 20mg QD    #HTN  Home meds: Hydral 50 TID, Nifedipine 90 QD, Toprol 25 QD  - holding BP meds, restart as appropriate    GI  Prophylactic Protonix    RENAL  #ELBERT on CKD  #Urgent dialysis  Baseline Cr ~2.4. On admission, Cr 4.38, now downtrending. FENa 0.3% prerenal. Renal US: bilateral echogenic kidneys consistent with CKD, no hydonephrosis. Admitted to MICU for CVVHD.  - Plan for intermittent HD starting 11/28.   - continue NaHCO3 650mg BID  - continue Phoslo 667mg PO TID  - Strict I/Os  - Renal diet when appropriate    ENDO  #DM2  A1C 9.7 2022. A1c this admission 5.7. Has not consistently required sliding scale during this admission  - c/w mISS. Monitor requirement, convert to basal bolus as indicated    ID  Pneumonia as above    HEME  #Anemia  Hx of DIMITRIS. Iron studies with low TIBC indicative of AOCD  - EPO with dialysis  - Transfuse <7     MSK  #History of osteomyelitis  Patient has history of toe osteomyelitis 2/2 uncontrolled DM, s/p bilateral toe amputations. Remaining toes appear clean, with no apparent wounds.  - Continue to monitor    PROPHYLAXIS  F: None  E: Replete if K<4 or Mag<2  N: advance to renal as tolerated  VTEppx: heparin 5000U subQ Q8hr  GIppx: Protonix 40mg PO QD  Dispo: MICU 73F with PMH HTN, HLD, DM with OM s/p multiple amputations of toes; CAD with stress test planned for 11/28; CKD5 presenting with dyspnea, admitted for volume overload due to renal failure requiring HD; stepped up to MICU for AMS requiring intubation, now receiving CVVHD. Extubated on 11/27.     NEURO: APOLONIA    PSYCH  #Depression with suicidal ideation  Home meds: Klonipin 0.5mg QD, Duloxetine 30mg QD, Trazodone 50mg QD. Pt endorsed thoughts of suicide/hopeless. Denies any active plan to commit suicide. Psych consulted, no need for 1-1 obs  - Decrease to Klonipin 0.25mg QHS due to concern for oversedation  - c/w Duloxetine 30mg QD  - c/w Trazodone 50 QD    PULM  #Asthma   Hx of asthma. Home meds: Symbicort, Albuterol  - c/w Symbicort  - C/w duonebs Q6h     #Pneumonia  On 11/27, new LLL opacity seen. WBC downtrending but still elevated. Likely aspiration pneumonia as pt was vomiting.   - CTX 2g QD    CARDIO  #Elevated troponin  Pt endorsed episode of heart racing, shortness of breath and anxiety that has resolved   EKG TWI in V1-V6, II, III, AVF and ST depressions in V4-V5, repeat EKG unchanged. Cardiac enzymes peaked and downtrended.  - continue Nitroglycerin 0.3mg/hr patch QD  - F/u TTE    #HLD  Home med: Atorvastatin 20mg QD  - c/w atorvastatin 20mg QD    #HTN  Home meds: Hydral 50 TID, Nifedipine 90 QD, Toprol 25 QD, Lasix 80mg QD. Home meds held due to hypotension requiring Levophed. Now no longer hypotensive, off Levophed  - holding BP meds, restart as appropriate    GI  Prophylactic Protonix    RENAL  #ELBERT on CKD  Home meds: NaHCO3 650mg BID, Phoslo 667mg PO TID, not on HD prior to admission. Baseline Cr ~2.4. On admission, Cr 4.38, now downtrending. FENa 0.3% prerenal. Renal US: bilateral echogenic kidneys consistent with CKD, no hydonephrosis. Admitted to MICU for CVVHD.  - Plan for intermittent HD starting 11/28.   - continue NaHCO3 650mg BID  - continue Phoslo 667mg PO TID  - Strict I/Os  - Renal diet when appropriate    ENDO  #DM2  A1C 9.7 2022. A1c this admission 5.7. Home meds: Januvia 25mg QD, Lispro 2U premeal, Lantus 10U. Has not consistently required sliding scale during this admission  - c/w .   - restart home Januvia, Lispro 2U premeal, and Lantus 10U on discharge    ID  Pneumonia as above    HEME  #Anemia  Hx of DIMITRIS. Iron studies with low TIBC indicative of AOCD  - EPO with dialysis  - Transfuse <7     MSK  #History of osteomyelitis  Patient has history of toe osteomyelitis 2/2 uncontrolled DM, s/p bilateral toe amputations. Remaining toes appear clean, with no apparent wounds.  - Continue to monitor    PROPHYLAXIS  F: None  E: Replete if K<4 or Mag<2  N: advance to renal as tolerated  VTEppx: heparin 5000U subQ Q8hr  GIppx: Protonix 40mg PO QD  Dispo: MICU 73F with PMH HTN, HLD, DM with OM s/p multiple amputations of toes; CAD with stress test planned for 11/28; CKD5 presenting with dyspnea, admitted for volume overload due to renal failure requiring HD; stepped up to MICU for AMS requiring intubation, now receiving CVVHD. Extubated on 11/27.     NEURO: APOLONIA    PSYCH  #Depression with suicidal ideation  Home meds: Klonipin 0.5mg QD, Duloxetine 30mg QD, Trazodone 50mg QD. Pt endorsed thoughts of suicide/hopeless. Denies any active plan to commit suicide. Psych consulted, no need for 1-1 obs  - Decrease to Klonipin 0.25mg QHS due to concern for oversedation  - c/w Duloxetine 30mg QD  - c/w Trazodone 50 QD    PULM  #Asthma   Hx of asthma. Home meds: Symbicort, Albuterol  - c/w Symbicort  - C/w duonebs Q6h     #Pneumonia  On 11/27, new LLL opacity seen. WBC downtrending but still elevated. Likely aspiration pneumonia as pt was vomiting.   - CTX 2g QD    CARDIO  #Elevated troponin  Pt endorsed episode of heart racing, shortness of breath and anxiety that has resolved   EKG TWI in V1-V6, II, III, AVF and ST depressions in V4-V5, repeat EKG unchanged. Cardiac enzymes peaked and downtrended- may be elevated due to CKD.   - continue Nitroglycerin 0.3mg/hr patch QD  - F/u TTE    #HLD  Home med: Atorvastatin 20mg QD  - c/w atorvastatin 20mg QD    #HTN  Home meds: Hydral 50 TID, Nifedipine 90 QD, Toprol 25 QD, Lasix 80mg QD. Home meds held due to hypotension requiring Levophed. Now no longer hypotensive, off Levophed  - holding BP meds, restart as appropriate    GI  Prophylactic Protonix    RENAL  #ELBERT on CKD  Home meds: NaHCO3 650mg BID, Phoslo 667mg PO TID, not on HD prior to admission. Baseline Cr ~2.4. On admission, Cr 4.38, now downtrending. FENa 0.3% prerenal. Renal US: bilateral echogenic kidneys consistent with CKD, no hydonephrosis. Admitted to MICU for CVVHD.  - Plan for intermittent HD starting 11/28.   - continue NaHCO3 650mg BID  - continue Phoslo 667mg PO TID  - Strict I/Os  - Renal diet when appropriate    ENDO  #DM2  A1C 9.7 2022. A1c this admission 5.7. Home meds: Januvia 25mg QD, Lispro 2U premeal, Lantus 10U. Has not consistently required sliding scale during this admission  - c/w mISS.   - restart home Januvia, Lispro 2U premeal, and Lantus 10U on discharge    ID  Pneumonia as above    HEME  #Anemia  Hx of DIMITRIS. Iron studies with low TIBC indicative of AOCD  - EPO with dialysis  - Transfuse <7     MSK  #History of osteomyelitis  Patient has history of toe osteomyelitis 2/2 uncontrolled DM, s/p bilateral toe amputations. Remaining toes appear clean, with no apparent wounds.  - Continue to monitor    PROPHYLAXIS  F: None  E: Replete if K<4 or Mag<2  N: advance to renal as tolerated  VTEppx: heparin 5000U subQ Q8hr  GIppx: Protonix 40mg PO QD  Dispo: MICU 73F with PMH HTN, HLD, DM with OM s/p multiple amputations of toes; CAD with stress test planned for 11/28; CKD5 presenting with dyspnea, admitted for volume overload due to renal failure requiring HD; stepped up to MICU for AMS requiring intubation, now receiving CVVHD. Extubated on 11/27.     NEURO: APOLONIA    PSYCH  #Depression with suicidal ideation  Home meds: Klonipin 0.5mg QD, Duloxetine 30mg QD, Trazodone 50mg QD. Pt endorsed thoughts of suicide/hopeless. Denies any active plan to commit suicide. Psych consulted, no need for 1-1 obs  - Decrease to Klonipin 0.25mg QHS due to concern for oversedation  - c/w Duloxetine 30mg QD  - c/w Trazodone 50 QD    PULM  #Asthma   Hx of asthma. Home meds: Symbicort, Albuterol  - c/w Symbicort  - C/w duonebs Q6h     #Pneumonia  On 11/27, new LLL opacity seen. WBC downtrending but still elevated. Likely aspiration pneumonia as pt was vomiting.   - CTX 2g QD    CARDIO  #Elevated troponin  Pt endorsed episode of heart racing, shortness of breath and anxiety that has resolved   EKG TWI in V1-V6, II, III, AVF and ST depressions in V4-V5, repeat EKG unchanged. Cardiac enzymes peaked and downtrended- may be elevated due to CKD.   - continue Nitroglycerin 0.3mg/hr patch QD  - F/u TTE    #HLD  Home med: Atorvastatin 20mg QD  - c/w atorvastatin 20mg QD    #HTN  Home meds: Hydral 50 TID, Nifedipine 90 QD, Toprol 25 QD, Lasix 80mg QD. Home meds held due to hypotension requiring Levophed. Now no longer hypotensive, off Levophed  - holding BP meds, restart as appropriate    GI  Prophylactic Protonix    RENAL  #ELBERT on CKD  Home meds: NaHCO3 650mg BID, Phoslo 667mg PO TID, not on HD prior to admission. Baseline Cr ~2.4. On admission, Cr 4.38, now downtrending. FENa 0.3% prerenal. Renal US: bilateral echogenic kidneys consistent with CKD, no hydonephrosis. Admitted to MICU for CVVHD.  - Plan for intermittent HD starting 11/28.   - discontinued NaHCO3 650mg BID since pt on RRT. Restart on discharge  - continue Phoslo 667mg PO TID  - Strict I/Os  - Renal diet when appropriate    ENDO  #DM2  A1C 9.7 2022. A1c this admission 5.7. Home meds: Januvia 25mg QD, Lispro 2U premeal, Lantus 10U. Has not consistently required sliding scale during this admission  - c/w mISS.   - restart home Januvia, Lispro 2U premeal, and Lantus 10U on discharge    ID  Pneumonia as above    HEME  #Anemia  Hx of DIMITRIS. Iron studies with low TIBC indicative of AOCD. S/p 1u pRBC on 11/27  - EPO with dialysis  - F/u Iron panel  - Transfuse <7     MSK  #History of osteomyelitis  Patient has history of toe osteomyelitis 2/2 uncontrolled DM, s/p bilateral toe amputations. Remaining toes appear clean, with no apparent wounds.  - Continue to monitor    PROPHYLAXIS  F: None  E: Replete if K<4 or Mag<2  N: advance to renal as tolerated  VTEppx: heparin 5000U subQ Q8hr  GIppx: Protonix 40mg PO QD  Dispo: MICU

## 2023-11-27 NOTE — PROGRESS NOTE ADULT - ATTENDING COMMENTS
Interim chart reviewed at length and case d/w renal fellow.  As above:  73YF hx of CKD V initially admitted for SOB and ischemic w/u h/c c/b AMS, acidosis, hyperkalmeia, upgraded to MICU for further management, initiated on CVVHD for metabolic clearance.  Pt seen.  Labs reviewed and interim notes appreciated.  Agree with details of Dr. Martinez's note above.   Renal service following closely with you.

## 2023-11-27 NOTE — DIETITIAN INITIAL EVALUATION ADULT - PROBLEM SELECTOR PLAN 2
Cr=4.38 , unknown baseline last Cr 2.38 (10/2022)  -elevated troponin could be a result of ESRD  -f/u urine lytes, renal US in AM  -renal consult in AM  -continue with home calcium acetate 3x daily, sodium bicarbonate  -holding home lasix 80mg daily due to possible ELBERT

## 2023-11-27 NOTE — DIETITIAN INITIAL EVALUATION ADULT - OTHER CALCULATIONS
Fluids per team. Estimated nutritional needs determined using [facility] Standards of Nutrition Care for HD using ideal body weight 47.7 kg (171%IBW).

## 2023-11-27 NOTE — DIETITIAN INITIAL EVALUATION ADULT - PERTINENT LABORATORY DATA
11-27    145  |  109<H>  |  39<H>  ----------------------------<  117<H>  4.4   |  21<L>  |  3.19<H>    Ca    9.1      27 Nov 2023 05:30  Phos  4.7     11-27  Mg     1.8     11-27    TPro  5.8<L>  /  Alb  3.1<L>  /  TBili  0.2  /  DBili  x   /  AST  116<H>  /  ALT  154<H>  /  AlkPhos  136<H>  11-27  POCT Blood Glucose.: 150 mg/dL (11-27-23 @ 11:09)  A1C with Estimated Average Glucose Result: 5.7 % (11-26-23 @ 07:01)

## 2023-11-27 NOTE — DIETITIAN INITIAL EVALUATION ADULT - OTHER INFO
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 Labs notable for Trop T notable for 311. . CKMB 6.8. Cr 4.38. BNP 85273. EKG reveals TWI in V1-V6, II, III, AVF and ST depressions in V4-V5.     Pt care discussed in interdisciplinary care team rounds. Rx and labs reviewed. Pt presents with no overt signs of nutritional wasting; limited nutrition focused physical examination in setting of pt in care. Pt now status post extubation this morning; monitor for ability to pass dysphagia screen and initiate PO diet -see recs below. Pt has had HD catheter placed and was started on CVVHD; nephrology following, monitor for HD needs. No other reports GI distress or further nutritional concerns at this time. RDN will continue to reassess, intervene, and monitor as appropriate.     Pain: No non-verbal indicators   GI: Abdomen firm, low pitch bowel sounds, last bowel movement PTA   Skin: R food ulcer, L toe wounds, +1 generalized edema, +2 BLE

## 2023-11-27 NOTE — DIETITIAN INITIAL EVALUATION ADULT - PERTINENT MEDS FT
MEDICATIONS  (STANDING):  albuterol    90 MICROgram(s) HFA Inhaler 2 Puff(s) Inhalation daily  albuterol/ipratropium for Nebulization 3 milliLiter(s) Nebulizer every 6 hours  artificial  tears Solution 1 Drop(s) Both EYES every 12 hours  atorvastatin 20 milliGRAM(s) Oral at bedtime  calcium acetate 667 milliGRAM(s) Oral three times a day with meals  cefTRIAXone   IVPB 2000 milliGRAM(s) IV Intermittent every 24 hours  chlorhexidine 2% Cloths 1 Application(s) Topical <User Schedule>  clonazePAM  Tablet 0.5 milliGRAM(s) Oral daily  CRRT Treatment    <Continuous>  dextrose 5%. 1000 milliLiter(s) (50 mL/Hr) IV Continuous <Continuous>  dextrose 5%. 1000 milliLiter(s) (100 mL/Hr) IV Continuous <Continuous>  dextrose 50% Injectable 12.5 Gram(s) IV Push once  dextrose 50% Injectable 25 Gram(s) IV Push once  dextrose 50% Injectable 25 Gram(s) IV Push once  DULoxetine 30 milliGRAM(s) Oral daily  glucagon  Injectable 1 milliGRAM(s) IntraMuscular once  heparin   Injectable 5000 Unit(s) SubCutaneous every 8 hours  insulin lispro (ADMELOG) corrective regimen sliding scale   SubCutaneous every 6 hours  nitroglycerin    Patch 0.3 mG/Hr(s) 1 patch Transdermal daily  norepinephrine Infusion 0.09 MICROgram(s)/kG/Min (6.89 mL/Hr) IV Continuous <Continuous>  pantoprazole    Tablet 40 milliGRAM(s) Oral before breakfast  petrolatum Ophthalmic Ointment 1 Application(s) Both EYES every 12 hours  propofol Infusion 10 MICROgram(s)/kG/Min (4.9 mL/Hr) IV Continuous <Continuous>  PureFlow Dialysate RFP-401 (K 4 / Ca 3) 5000 milliLiter(s) (2000 mL/Hr) CRRT <Continuous>  sodium bicarbonate 650 milliGRAM(s) Oral two times a day  traZODone 50 milliGRAM(s) Oral daily    MEDICATIONS  (PRN):  acetaminophen     Tablet .. 650 milliGRAM(s) Oral every 6 hours PRN Mild Pain (1 - 3)  dextrose Oral Gel 15 Gram(s) Oral once PRN Blood Glucose LESS THAN 70 milliGRAM(s)/deciliter  sodium chloride 0.9% lock flush 10 milliLiter(s) IV Push every 1 hour PRN Pre/post blood products, medications, blood draw, and to maintain line patency

## 2023-11-27 NOTE — PROGRESS NOTE ADULT - ASSESSMENT
73YF  w/hc of  of CKD V initially admitted for SOB and ischemic w/u h/c c/b AMS, acidosis, hyperkalmeia, upgraded to MICU for further management, initiated on CVVHD for metabolic clearance, tolerating well, extubated, off pressors         ESRD:   -labs obtained from outpatient, likely CKD progression to ESRD   -Renal US with no hydro, stones, echogenic, size wnl   -UA with proteinuria, no quantification, Urine Na <20 indicative of pre-renal   -On CVVHD 11/26     Plan:  Continue CVVHD with net neg FB, plan to transition to iHD   Q6hrs BMP and Phos while on CVVHD   Please send iron panel, aidee Moe 2/2 ESRD   EPO with HD   Can discontinue Sodium bicarb as patient on RRT

## 2023-11-28 LAB
ALBUMIN SERPL ELPH-MCNC: 3 G/DL — LOW (ref 3.3–5)
ALBUMIN SERPL ELPH-MCNC: 3 G/DL — LOW (ref 3.3–5)
ALP SERPL-CCNC: 124 U/L — HIGH (ref 40–120)
ALP SERPL-CCNC: 124 U/L — HIGH (ref 40–120)
ALT FLD-CCNC: 108 U/L — HIGH (ref 10–45)
ALT FLD-CCNC: 108 U/L — HIGH (ref 10–45)
ANION GAP SERPL CALC-SCNC: 10 MMOL/L — SIGNIFICANT CHANGE UP (ref 5–17)
ANION GAP SERPL CALC-SCNC: 10 MMOL/L — SIGNIFICANT CHANGE UP (ref 5–17)
AST SERPL-CCNC: 51 U/L — HIGH (ref 10–40)
AST SERPL-CCNC: 51 U/L — HIGH (ref 10–40)
BASOPHILS # BLD AUTO: 0.03 K/UL — SIGNIFICANT CHANGE UP (ref 0–0.2)
BASOPHILS NFR BLD AUTO: 0.3 % — SIGNIFICANT CHANGE UP (ref 0–2)
BILIRUB SERPL-MCNC: 0.2 MG/DL — SIGNIFICANT CHANGE UP (ref 0.2–1.2)
BILIRUB SERPL-MCNC: 0.2 MG/DL — SIGNIFICANT CHANGE UP (ref 0.2–1.2)
BUN SERPL-MCNC: 21 MG/DL — SIGNIFICANT CHANGE UP (ref 7–23)
BUN SERPL-MCNC: 21 MG/DL — SIGNIFICANT CHANGE UP (ref 7–23)
CALCIUM SERPL-MCNC: 8.9 MG/DL — SIGNIFICANT CHANGE UP (ref 8.4–10.5)
CALCIUM SERPL-MCNC: 8.9 MG/DL — SIGNIFICANT CHANGE UP (ref 8.4–10.5)
CHLORIDE SERPL-SCNC: 105 MMOL/L — SIGNIFICANT CHANGE UP (ref 96–108)
CHLORIDE SERPL-SCNC: 105 MMOL/L — SIGNIFICANT CHANGE UP (ref 96–108)
CO2 SERPL-SCNC: 24 MMOL/L — SIGNIFICANT CHANGE UP (ref 22–31)
CO2 SERPL-SCNC: 24 MMOL/L — SIGNIFICANT CHANGE UP (ref 22–31)
CREAT SERPL-MCNC: 1.82 MG/DL — HIGH (ref 0.5–1.3)
CREAT SERPL-MCNC: 1.82 MG/DL — HIGH (ref 0.5–1.3)
EGFR: 29 ML/MIN/1.73M2 — LOW
EGFR: 29 ML/MIN/1.73M2 — LOW
EOSINOPHIL # BLD AUTO: 0.21 K/UL — SIGNIFICANT CHANGE UP (ref 0–0.5)
EOSINOPHIL NFR BLD AUTO: 2 % — SIGNIFICANT CHANGE UP (ref 0–6)
FERRITIN SERPL-MCNC: 1459 NG/ML — HIGH (ref 13–330)
FERRITIN SERPL-MCNC: 1459 NG/ML — HIGH (ref 13–330)
GLUCOSE BLDC GLUCOMTR-MCNC: 106 MG/DL — HIGH (ref 70–99)
GLUCOSE BLDC GLUCOMTR-MCNC: 114 MG/DL — HIGH (ref 70–99)
GLUCOSE BLDC GLUCOMTR-MCNC: 114 MG/DL — HIGH (ref 70–99)
GLUCOSE BLDC GLUCOMTR-MCNC: 120 MG/DL — HIGH (ref 70–99)
GLUCOSE BLDC GLUCOMTR-MCNC: 120 MG/DL — HIGH (ref 70–99)
GLUCOSE SERPL-MCNC: 121 MG/DL — HIGH (ref 70–99)
GLUCOSE SERPL-MCNC: 121 MG/DL — HIGH (ref 70–99)
HBV SURFACE AB SER-ACNC: SIGNIFICANT CHANGE UP
HBV SURFACE AB SER-ACNC: SIGNIFICANT CHANGE UP
HBV SURFACE AG SER-ACNC: SIGNIFICANT CHANGE UP
HBV SURFACE AG SER-ACNC: SIGNIFICANT CHANGE UP
HCT VFR BLD CALC: 23.1 % — LOW (ref 34.5–45)
HCT VFR BLD CALC: 23.1 % — LOW (ref 34.5–45)
HCT VFR BLD CALC: 23.4 % — LOW (ref 34.5–45)
HCT VFR BLD CALC: 23.4 % — LOW (ref 34.5–45)
HCV AB S/CO SERPL IA: 0.04 S/CO — SIGNIFICANT CHANGE UP
HCV AB S/CO SERPL IA: 0.04 S/CO — SIGNIFICANT CHANGE UP
HCV AB SERPL-IMP: SIGNIFICANT CHANGE UP
HCV AB SERPL-IMP: SIGNIFICANT CHANGE UP
HGB BLD-MCNC: 7.7 G/DL — LOW (ref 11.5–15.5)
IMM GRANULOCYTES NFR BLD AUTO: 0.9 % — SIGNIFICANT CHANGE UP (ref 0–0.9)
IRON SATN MFR SERPL: 38 % — SIGNIFICANT CHANGE UP (ref 14–50)
IRON SATN MFR SERPL: 38 % — SIGNIFICANT CHANGE UP (ref 14–50)
IRON SATN MFR SERPL: 56 UG/DL — SIGNIFICANT CHANGE UP (ref 30–160)
IRON SATN MFR SERPL: 56 UG/DL — SIGNIFICANT CHANGE UP (ref 30–160)
LYMPHOCYTES # BLD AUTO: 1.25 K/UL — SIGNIFICANT CHANGE UP (ref 1–3.3)
LYMPHOCYTES # BLD AUTO: 1.25 K/UL — SIGNIFICANT CHANGE UP (ref 1–3.3)
LYMPHOCYTES # BLD AUTO: 1.43 K/UL — SIGNIFICANT CHANGE UP (ref 1–3.3)
LYMPHOCYTES # BLD AUTO: 1.43 K/UL — SIGNIFICANT CHANGE UP (ref 1–3.3)
LYMPHOCYTES # BLD AUTO: 11.6 % — LOW (ref 13–44)
LYMPHOCYTES # BLD AUTO: 11.6 % — LOW (ref 13–44)
LYMPHOCYTES # BLD AUTO: 13.5 % — SIGNIFICANT CHANGE UP (ref 13–44)
LYMPHOCYTES # BLD AUTO: 13.5 % — SIGNIFICANT CHANGE UP (ref 13–44)
MAGNESIUM SERPL-MCNC: 1.7 MG/DL — SIGNIFICANT CHANGE UP (ref 1.6–2.6)
MAGNESIUM SERPL-MCNC: 1.7 MG/DL — SIGNIFICANT CHANGE UP (ref 1.6–2.6)
MCHC RBC-ENTMCNC: 28.8 PG — SIGNIFICANT CHANGE UP (ref 27–34)
MCHC RBC-ENTMCNC: 28.8 PG — SIGNIFICANT CHANGE UP (ref 27–34)
MCHC RBC-ENTMCNC: 29.3 PG — SIGNIFICANT CHANGE UP (ref 27–34)
MCHC RBC-ENTMCNC: 29.3 PG — SIGNIFICANT CHANGE UP (ref 27–34)
MCHC RBC-ENTMCNC: 32.9 GM/DL — SIGNIFICANT CHANGE UP (ref 32–36)
MCHC RBC-ENTMCNC: 32.9 GM/DL — SIGNIFICANT CHANGE UP (ref 32–36)
MCHC RBC-ENTMCNC: 33.3 GM/DL — SIGNIFICANT CHANGE UP (ref 32–36)
MCHC RBC-ENTMCNC: 33.3 GM/DL — SIGNIFICANT CHANGE UP (ref 32–36)
MCV RBC AUTO: 87.6 FL — SIGNIFICANT CHANGE UP (ref 80–100)
MCV RBC AUTO: 87.6 FL — SIGNIFICANT CHANGE UP (ref 80–100)
MCV RBC AUTO: 87.8 FL — SIGNIFICANT CHANGE UP (ref 80–100)
MCV RBC AUTO: 87.8 FL — SIGNIFICANT CHANGE UP (ref 80–100)
MONOCYTES # BLD AUTO: 0.98 K/UL — HIGH (ref 0–0.9)
MONOCYTES # BLD AUTO: 0.98 K/UL — HIGH (ref 0–0.9)
MONOCYTES # BLD AUTO: 1.12 K/UL — HIGH (ref 0–0.9)
MONOCYTES # BLD AUTO: 1.12 K/UL — HIGH (ref 0–0.9)
MONOCYTES NFR BLD AUTO: 10.6 % — SIGNIFICANT CHANGE UP (ref 2–14)
MONOCYTES NFR BLD AUTO: 10.6 % — SIGNIFICANT CHANGE UP (ref 2–14)
MONOCYTES NFR BLD AUTO: 9.1 % — SIGNIFICANT CHANGE UP (ref 2–14)
MONOCYTES NFR BLD AUTO: 9.1 % — SIGNIFICANT CHANGE UP (ref 2–14)
NEUTROPHILS # BLD AUTO: 7.69 K/UL — HIGH (ref 1.8–7.4)
NEUTROPHILS # BLD AUTO: 7.69 K/UL — HIGH (ref 1.8–7.4)
NEUTROPHILS # BLD AUTO: 8.18 K/UL — HIGH (ref 1.8–7.4)
NEUTROPHILS # BLD AUTO: 8.18 K/UL — HIGH (ref 1.8–7.4)
NEUTROPHILS NFR BLD AUTO: 72.7 % — SIGNIFICANT CHANGE UP (ref 43–77)
NEUTROPHILS NFR BLD AUTO: 72.7 % — SIGNIFICANT CHANGE UP (ref 43–77)
NEUTROPHILS NFR BLD AUTO: 76.1 % — SIGNIFICANT CHANGE UP (ref 43–77)
NEUTROPHILS NFR BLD AUTO: 76.1 % — SIGNIFICANT CHANGE UP (ref 43–77)
NRBC # BLD: 0 /100 WBCS — SIGNIFICANT CHANGE UP (ref 0–0)
PHOSPHATE SERPL-MCNC: 3.3 MG/DL — SIGNIFICANT CHANGE UP (ref 2.5–4.5)
PHOSPHATE SERPL-MCNC: 3.3 MG/DL — SIGNIFICANT CHANGE UP (ref 2.5–4.5)
PLATELET # BLD AUTO: 159 K/UL — SIGNIFICANT CHANGE UP (ref 150–400)
PLATELET # BLD AUTO: 159 K/UL — SIGNIFICANT CHANGE UP (ref 150–400)
PLATELET # BLD AUTO: 163 K/UL — SIGNIFICANT CHANGE UP (ref 150–400)
PLATELET # BLD AUTO: 163 K/UL — SIGNIFICANT CHANGE UP (ref 150–400)
POTASSIUM SERPL-MCNC: 4.5 MMOL/L — SIGNIFICANT CHANGE UP (ref 3.5–5.3)
POTASSIUM SERPL-MCNC: 4.5 MMOL/L — SIGNIFICANT CHANGE UP (ref 3.5–5.3)
POTASSIUM SERPL-SCNC: 4.5 MMOL/L — SIGNIFICANT CHANGE UP (ref 3.5–5.3)
POTASSIUM SERPL-SCNC: 4.5 MMOL/L — SIGNIFICANT CHANGE UP (ref 3.5–5.3)
PROT ?TM UR-MCNC: 623 MG/DL — HIGH (ref 0–12)
PROT ?TM UR-MCNC: 623 MG/DL — HIGH (ref 0–12)
PROT SERPL-MCNC: 5.8 G/DL — LOW (ref 6–8.3)
PROT SERPL-MCNC: 5.8 G/DL — LOW (ref 6–8.3)
PROT/CREAT UR-RTO: 3.5 RATIO — HIGH (ref 0–0.2)
PROT/CREAT UR-RTO: 3.5 RATIO — HIGH (ref 0–0.2)
RBC # BLD: 2.63 M/UL — LOW (ref 3.8–5.2)
RBC # BLD: 2.63 M/UL — LOW (ref 3.8–5.2)
RBC # BLD: 2.67 M/UL — LOW (ref 3.8–5.2)
RBC # BLD: 2.67 M/UL — LOW (ref 3.8–5.2)
RBC # FLD: 13.6 % — SIGNIFICANT CHANGE UP (ref 10.3–14.5)
RBC # FLD: 13.6 % — SIGNIFICANT CHANGE UP (ref 10.3–14.5)
RBC # FLD: 13.7 % — SIGNIFICANT CHANGE UP (ref 10.3–14.5)
RBC # FLD: 13.7 % — SIGNIFICANT CHANGE UP (ref 10.3–14.5)
SODIUM SERPL-SCNC: 139 MMOL/L — SIGNIFICANT CHANGE UP (ref 135–145)
SODIUM SERPL-SCNC: 139 MMOL/L — SIGNIFICANT CHANGE UP (ref 135–145)
TIBC SERPL-MCNC: 147 UG/DL — LOW (ref 220–430)
TIBC SERPL-MCNC: 147 UG/DL — LOW (ref 220–430)
UIBC SERPL-MCNC: 91 UG/DL — LOW (ref 110–370)
UIBC SERPL-MCNC: 91 UG/DL — LOW (ref 110–370)
WBC # BLD: 10.57 K/UL — HIGH (ref 3.8–10.5)
WBC # BLD: 10.57 K/UL — HIGH (ref 3.8–10.5)
WBC # BLD: 10.75 K/UL — HIGH (ref 3.8–10.5)
WBC # BLD: 10.75 K/UL — HIGH (ref 3.8–10.5)
WBC # FLD AUTO: 10.57 K/UL — HIGH (ref 3.8–10.5)
WBC # FLD AUTO: 10.57 K/UL — HIGH (ref 3.8–10.5)
WBC # FLD AUTO: 10.75 K/UL — HIGH (ref 3.8–10.5)
WBC # FLD AUTO: 10.75 K/UL — HIGH (ref 3.8–10.5)

## 2023-11-28 PROCEDURE — 99233 SBSQ HOSP IP/OBS HIGH 50: CPT | Mod: GC

## 2023-11-28 PROCEDURE — 70450 CT HEAD/BRAIN W/O DYE: CPT | Mod: 26

## 2023-11-28 RX ORDER — FUROSEMIDE 40 MG
80 TABLET ORAL ONCE
Refills: 0 | Status: COMPLETED | OUTPATIENT
Start: 2023-11-28 | End: 2023-11-28

## 2023-11-28 RX ORDER — ACETAMINOPHEN 500 MG
1000 TABLET ORAL ONCE
Refills: 0 | Status: COMPLETED | OUTPATIENT
Start: 2023-11-28 | End: 2023-11-28

## 2023-11-28 RX ORDER — HYDRALAZINE HCL 50 MG
10 TABLET ORAL ONCE
Refills: 0 | Status: COMPLETED | OUTPATIENT
Start: 2023-11-28 | End: 2023-11-28

## 2023-11-28 RX ORDER — HYDRALAZINE HCL 50 MG
25 TABLET ORAL EVERY 8 HOURS
Refills: 0 | Status: DISCONTINUED | OUTPATIENT
Start: 2023-11-28 | End: 2023-11-29

## 2023-11-28 RX ADMIN — Medication 3 MILLILITER(S): at 22:10

## 2023-11-28 RX ADMIN — Medication 3 MILLILITER(S): at 15:02

## 2023-11-28 RX ADMIN — HEPARIN SODIUM 5000 UNIT(S): 5000 INJECTION INTRAVENOUS; SUBCUTANEOUS at 15:08

## 2023-11-28 RX ADMIN — Medication 1 DROP(S): at 19:04

## 2023-11-28 RX ADMIN — Medication 400 MILLIGRAM(S): at 21:33

## 2023-11-28 RX ADMIN — Medication 25 MILLIGRAM(S): at 21:18

## 2023-11-28 RX ADMIN — Medication 1 APPLICATION(S): at 19:04

## 2023-11-28 RX ADMIN — Medication 1 DROP(S): at 05:07

## 2023-11-28 RX ADMIN — Medication 3 MILLILITER(S): at 04:58

## 2023-11-28 RX ADMIN — Medication 25 MILLIGRAM(S): at 13:07

## 2023-11-28 RX ADMIN — Medication 1 PATCH: at 19:18

## 2023-11-28 RX ADMIN — Medication 1 APPLICATION(S): at 05:08

## 2023-11-28 RX ADMIN — Medication 10 MILLIGRAM(S): at 05:15

## 2023-11-28 RX ADMIN — Medication 80 MILLIGRAM(S): at 12:22

## 2023-11-28 RX ADMIN — Medication 80 MILLIGRAM(S): at 19:04

## 2023-11-28 RX ADMIN — Medication 1000 MILLIGRAM(S): at 13:27

## 2023-11-28 RX ADMIN — HEPARIN SODIUM 5000 UNIT(S): 5000 INJECTION INTRAVENOUS; SUBCUTANEOUS at 21:17

## 2023-11-28 RX ADMIN — CHLORHEXIDINE GLUCONATE 1 APPLICATION(S): 213 SOLUTION TOPICAL at 05:06

## 2023-11-28 RX ADMIN — Medication 1000 MILLIGRAM(S): at 22:56

## 2023-11-28 RX ADMIN — Medication 10 MILLIGRAM(S): at 02:33

## 2023-11-28 RX ADMIN — Medication 400 MILLIGRAM(S): at 05:40

## 2023-11-28 RX ADMIN — CEFTRIAXONE 100 MILLIGRAM(S): 500 INJECTION, POWDER, FOR SOLUTION INTRAMUSCULAR; INTRAVENOUS at 19:03

## 2023-11-28 RX ADMIN — Medication 400 MILLIGRAM(S): at 13:07

## 2023-11-28 RX ADMIN — Medication 1000 MILLIGRAM(S): at 07:26

## 2023-11-28 RX ADMIN — Medication 1 PATCH: at 12:23

## 2023-11-28 RX ADMIN — HEPARIN SODIUM 5000 UNIT(S): 5000 INJECTION INTRAVENOUS; SUBCUTANEOUS at 05:07

## 2023-11-28 RX ADMIN — Medication 3 MILLILITER(S): at 09:14

## 2023-11-28 NOTE — PROGRESS NOTE ADULT - SUBJECTIVE AND OBJECTIVE BOX
Patient is a 73y Female seen and evaluated at bedside in MICU on HD tolerating well   BP elevated possibly related to pain/anxiety, will continue with HD and aim for 2.8L UF goal         Meds:    acetaminophen     Tablet .. 650 every 6 hours PRN  albuterol    90 MICROgram(s) HFA Inhaler 2 daily  albuterol/ipratropium for Nebulization 3 every 6 hours  artificial  tears Solution 1 every 12 hours  atorvastatin 20 at bedtime  calcium acetate 667 three times a day with meals  cefTRIAXone   IVPB 2000 every 24 hours  chlorhexidine 2% Cloths 1 <User Schedule>  clonazePAM  Tablet 0.25 at bedtime  CRRT Treatment  <Continuous>  dextrose 5%. 1000 <Continuous>  dextrose 5%. 1000 <Continuous>  dextrose 50% Injectable 25 once  dextrose 50% Injectable 25 once  dextrose 50% Injectable 12.5 once  dextrose Oral Gel 15 once PRN  DULoxetine 30 every 24 hours  glucagon  Injectable 1 once  heparin   Injectable 5000 every 8 hours  hydrALAZINE Injectable 25 every 8 hours  insulin lispro (ADMELOG) corrective regimen sliding scale  every 6 hours  nitroglycerin    Patch 0.3 mG/Hr(s) 1 daily  pantoprazole    Tablet 40 before breakfast  petrolatum Ophthalmic Ointment 1 every 12 hours  Phoxillum Filtration BK 4 / 2.5 5000 <Continuous>  sodium chloride 0.9% lock flush 10 every 1 hour PRN  traZODone 50 every 24 hours      T(C): , Max: 38 (11-28-23 @ 06:04)  T(F): , Max: 100.4 (11-28-23 @ 06:04)  HR: 91 (11-28-23 @ 13:00)  BP: 174/75 (11-28-23 @ 11:00)  BP(mean): 108 (11-28-23 @ 11:00)  RR: 17 (11-28-23 @ 13:00)  SpO2: 100% (11-28-23 @ 13:00)  Wt(kg): --    11-27 @ 07:01  -  11-28 @ 07:00  --------------------------------------------------------  IN: 528.8 mL / OUT: 2396 mL / NET: -1867.2 mL    11-28 @ 07:01  -  11-28 @ 14:05  --------------------------------------------------------  IN: 100 mL / OUT: 635 mL / NET: -535 mL          Review of Systems:  ROS negative except as per HPI      PHYSICAL EXAM:  GENERAL: NAD, laying in bed tolerating HD   NECK: supple, No JVD  CHEST/LUNG: Clear to auscultation bilaterally  HEART: normal S1S2, RRR  ABDOMEN: Soft, Nontender  EXTREMITIES: No clubbing, cyanosis, or edema   NEUROLOGY: AAO x3, no focal neurological deficit  [facility]       LABS:                        7.7    10.57 )-----------( 159      ( 28 Nov 2023 03:01 )             23.1     11-28    139  |  105  |  21  ----------------------------<  121<H>  4.5   |  24  |  1.82<H>    Ca    8.9      28 Nov 2023 03:01  Phos  3.3     11-28  Mg     1.7     11-28    TPro  5.8<L>  /  Alb  3.0<L>  /  TBili  0.2  /  DBili  x   /  AST  51<H>  /  ALT  108<H>  /  AlkPhos  124<H>  11-28    Hepatitis B Surface Antibody: Nonreact (11-28 @ 09:35)  Hepatitis C Virus S/CO Ratio: 0.04 S/CO (11-28 @ 09:35)    PTT - ( 26 Nov 2023 17:39 )  PTT:25.7 sec  Urinalysis Basic - ( 28 Nov 2023 03:01 )    Color: x / Appearance: x / SG: x / pH: x  Gluc: 121 mg/dL / Ketone: x  / Bili: x / Urobili: x   Blood: x / Protein: x / Nitrite: x   Leuk Esterase: x / RBC: x / WBC x   Sq Epi: x / Non Sq Epi: x / Bacteria: x      Sodium, Random Urine: <20 mmol/L (11-26 @ 20:26)  Creatinine, Random Urine: 168 mg/dL (11-26 @ 20:26)  Protein/Creatinine Ratio Calculation: 3.5 Ratio (11-26 @ 20:26)  Osmolality, Random Urine: 358 mosm/kg (11-26 @ 20:26)  Potassium, Random Urine: 77 mmol/L (11-26 @ 20:26)        RADIOLOGY & ADDITIONAL STUDIES:

## 2023-11-28 NOTE — CHART NOTE - NSCHARTNOTEFT_GEN_A_CORE
Informed by nursing team that patient refusing CTH. Upon discussion patient is AAOx1-2 (which does not represent baseline), is able to communicate in English and continues to refuse CTH. Risks and benefits of pursuing CTH imaging discussed. However, patient cannot verbalize risks of refusing CTH. Therefore, patient is deemed not to have capacity regarding refusal of CTH imaging.

## 2023-11-28 NOTE — PROGRESS NOTE ADULT - SUBJECTIVE AND OBJECTIVE BOX
Patient is a 73y old  Female who presents with a chief complaint of Shortness of breath (28 Nov 2023 14:04)  HOSPITAL COURSE  73F with PMH HTN, HLD, DM with OM s/p multiple amputations of toes; CAD with stress test planned for 11/28; CKD5 presenting with dyspnea and lightheadedness, found to have ischemic EKG changes and elevated cardiac enzymes, started on Nitroglycerin patch. Cardiac enzymes peaked and downtrending- suspected to be due to CKD. Patient was admitted for volume overload due to renal failure requiring HD. On 11/27, rapid was called due to unresponsiveness, found to have metabolic acidosis with BUN 60, HCO3 9, PCO2 26, K 6.8, Lactate 5.9. Suspected to have metabolic encephalopathy 2/2 uremia vs acidosis. Received 3 amps HCO3, lasix 40 IVP, 5u Insulin and 1g Calcium Gluconate. Stepped up from cards tele to MICU. Intubated for airway protection, started on CVVHD, Levophed, Propofol.     In the MICU, uremia and acidosis improved. Levophed and Propofol weaned off, extubated on 11/27. Became hypertensive to SBP 200s, received intermittent HD and started Hydralazine 25mg IV TID. Continues to be confused. Active issues include further titration of BP regimen (continues to be hypertensive) and monitoring for further improvement of mental status (follow-up CT head read).    INTERVAL HPI/OVERNIGHT EVENTS: As per overnight team BMP lateral, post transfusion Hb 7.7. -200s on CVVHD, gave additional Hydral 10mg IVP x1. CVVHD machine clotted, unable to rinse back, spoke to nephrology fellow > ok to transition to intermittent HD , febrile 100.4, gave tylenol . Patient seen and examined at bedside. Appears more awake but continues to appear confused.     ICU Vital Signs Last 24 Hrs  T(C): 37.6 (28 Nov 2023 17:00), Max: 38 (28 Nov 2023 06:04)  T(F): 99.7 (28 Nov 2023 17:00), Max: 100.4 (28 Nov 2023 06:04)  HR: 92 (28 Nov 2023 17:01) (71 - 98)  BP: 164/72 (28 Nov 2023 17:01) (157/96 - 174/75)  BP(mean): 103 (28 Nov 2023 17:01) (103 - 120)  ABP: 185/59 (28 Nov 2023 17:01) (158/60 - 200/74)  ABP(mean): 102 (28 Nov 2023 17:01) (69 - 137)  RR: 18 (28 Nov 2023 17:01) (13 - 36)  SpO2: 98% (28 Nov 2023 17:01) (93% - 100%)    O2 Parameters below as of 28 Nov 2023 17:01  Patient On (Oxygen Delivery Method): nasal cannula  O2 Flow (L/min): 6        I&O's Summary    27 Nov 2023 07:01  -  28 Nov 2023 07:00  --------------------------------------------------------  IN: 528.8 mL / OUT: 2396 mL / NET: -1867.2 mL    28 Nov 2023 07:01  -  28 Nov 2023 17:34  --------------------------------------------------------  IN: 100 mL / OUT: 990 mL / NET: -890 mL          LABS:                        7.7    10.57 )-----------( 159      ( 28 Nov 2023 03:01 )             23.1     11-28    139  |  105  |  21  ----------------------------<  121<H>  4.5   |  24  |  1.82<H>    Ca    8.9      28 Nov 2023 03:01  Phos  3.3     11-28  Mg     1.7     11-28    TPro  5.8<L>  /  Alb  3.0<L>  /  TBili  0.2  /  DBili  x   /  AST  51<H>  /  ALT  108<H>  /  AlkPhos  124<H>  11-28    PTT - ( 26 Nov 2023 17:39 )  PTT:25.7 sec  Urinalysis Basic - ( 28 Nov 2023 03:01 )    Color: x / Appearance: x / SG: x / pH: x  Gluc: 121 mg/dL / Ketone: x  / Bili: x / Urobili: x   Blood: x / Protein: x / Nitrite: x   Leuk Esterase: x / RBC: x / WBC x   Sq Epi: x / Non Sq Epi: x / Bacteria: x      CAPILLARY BLOOD GLUCOSE      POCT Blood Glucose.: 106 mg/dL (28 Nov 2023 11:07)  POCT Blood Glucose.: 120 mg/dL (28 Nov 2023 05:33)  POCT Blood Glucose.: 123 mg/dL (27 Nov 2023 23:27)    ABG - ( 27 Nov 2023 15:33 )  pH, Arterial: 7.44  pH, Blood: x     /  pCO2: 34    /  pO2: 133   / HCO3: 23    / Base Excess: -0.5  /  SaO2: 97.2                RADIOLOGY & ADDITIONAL TESTS:    Consultant(s) Notes Reviewed:  [x ] YES  [ ] NO    MEDICATIONS  (STANDING):  albuterol    90 MICROgram(s) HFA Inhaler 2 Puff(s) Inhalation daily  albuterol/ipratropium for Nebulization 3 milliLiter(s) Nebulizer every 6 hours  artificial  tears Solution 1 Drop(s) Both EYES every 12 hours  atorvastatin 20 milliGRAM(s) Oral at bedtime  calcium acetate 667 milliGRAM(s) Oral three times a day with meals  cefTRIAXone   IVPB 2000 milliGRAM(s) IV Intermittent every 24 hours  chlorhexidine 2% Cloths 1 Application(s) Topical <User Schedule>  clonazePAM  Tablet 0.25 milliGRAM(s) Oral at bedtime  CRRT Treatment    <Continuous>  dextrose 5%. 1000 milliLiter(s) (50 mL/Hr) IV Continuous <Continuous>  dextrose 5%. 1000 milliLiter(s) (100 mL/Hr) IV Continuous <Continuous>  dextrose 50% Injectable 25 Gram(s) IV Push once  dextrose 50% Injectable 25 Gram(s) IV Push once  dextrose 50% Injectable 12.5 Gram(s) IV Push once  DULoxetine 30 milliGRAM(s) Oral every 24 hours  glucagon  Injectable 1 milliGRAM(s) IntraMuscular once  heparin   Injectable 5000 Unit(s) SubCutaneous every 8 hours  hydrALAZINE Injectable 25 milliGRAM(s) IV Push every 8 hours  insulin lispro (ADMELOG) corrective regimen sliding scale   SubCutaneous every 6 hours  nitroglycerin    Patch 0.3 mG/Hr(s) 1 patch Transdermal daily  pantoprazole    Tablet 40 milliGRAM(s) Oral before breakfast  petrolatum Ophthalmic Ointment 1 Application(s) Both EYES every 12 hours  Phoxillum Filtration BK 4 / 2.5 5000 milliLiter(s) (2000 mL/Hr) CRRT <Continuous>  traZODone 50 milliGRAM(s) Oral every 24 hours    MEDICATIONS  (PRN):  acetaminophen     Tablet .. 650 milliGRAM(s) Oral every 6 hours PRN Mild Pain (1 - 3)  dextrose Oral Gel 15 Gram(s) Oral once PRN Blood Glucose LESS THAN 70 milliGRAM(s)/deciliter  sodium chloride 0.9% lock flush 10 milliLiter(s) IV Push every 1 hour PRN Pre/post blood products, medications, blood draw, and to maintain line patency      PHYSICAL EXAM  General: resting comfortably, NAD  HEENT: MMM  Neck: supple, no cervical or post-auricular lymphadenopathy  Cardiovascular: +S1/S2, no murmurs/rubs/gallops, RRR  Respiratory: CTA bilaterally,  no wheezes/rales/rhonchi, no increased work of breathing or accessory muscle use  Gastrointestinal: soft, NT/ND; active bowel sounds  Extremities: WWP; b/l LE edema  Vascular: 2+ radial, DP/PT pulses B/L  Skin: no rashes  Neurological: AOx1, confused, spontaneous movements observed, no focal deficits

## 2023-11-28 NOTE — PROGRESS NOTE ADULT - ASSESSMENT
73YF now ESRD initially admitted for SOB and ischemic w/u h/c c/b AMS, acidosis, hyperkalmeia, upgraded to MICU for further management, initiated on CVVHD for HD instability, transitioned to iHD today, tolerating well, continue to optimize volume status     ESRD:   HD today for metabolic clearance and volume management   Renal diet   Fluid restriction to <1.2L   BMP per ICU protocol   Reassess daily need for RRT plan for additional UF treatment in AM   If BP still elevated post HD and more volume component can transition to AQ   Can continue Nifedipine 90mg PO daily, if still elevated can start on Losartan 50mg PO   Can continue with lasix 80mg   Will need placement of TDC with IR by end of week, will continue to optimize     Anemia:  [place] 2/2 ESRD   Given 1U PRBC   EPO with HD, no indication for IV iron at this time            Hemodialysis Treatment.:     Schedule: Once, Modality: Hemodialysis, Access: Internal Jugular Central Venous Catheter    Dialyzer: Optiflux [id number], Time: 180 Min    Blood Flow: 400 mL/Min , Dialysate Flow: 500 mL/Min, Dialysate Temp: 36.5, Tubinmm (Adult)    Target Fluid Removal: 2.8 Liters    Dialysate Electrolytes (mEq/L): Potassium 2, Calcium 2.5, Sodium 138, Bicarbonate 35

## 2023-11-28 NOTE — PROGRESS NOTE ADULT - ATTENDING COMMENTS
AMS and hyperkalemia requiring urgent HD, intubated and now extubated but remains intermittently lethargic and at times confused. Plan for repeat HD today and obtain CT head to r/o CVA as etiology for AMS. Bedside dysphagia, once can take oral medication needs optimization of anti hypertensives. Rest as above.    Reviewed medications, lab results. Decision making of high complexity.

## 2023-11-28 NOTE — PROCEDURE NOTE - NSTIMEOUT_GEN_A_CORE
Heide Biswas has been scheduled for the following IR Procedure: IR Procedure List: port removal.    The procedure has been planned with Local Sedation Only.    The procedure has been scheduled for 11/29 at 1000.      The patient was told to arrive at 945 and told where to check in.    The patient was instructed to continue eating and drinking without restrictions pre-procedure.    ANTICOAGULANTS  The patient is taking the following anticoagulant(s): none  The patient was instructed to hold for none.    NSAIDS   The patient is currently taking the following non-steroidal anti-inflammatory drugs (NSAIDS):  None.     The patient may take all other regular medications the morning of the procedure with sips of water.      POST PROCEDURE   Discussed with patient specific details of post procedure care including: can drive self    
Patient's first and last name, , procedure, and correct site confirmed prior to the start of procedure.

## 2023-11-28 NOTE — PROGRESS NOTE ADULT - ASSESSMENT
73F with PMH HTN, HLD, DM with OM s/p multiple amputations of toes; CAD with stress test planned for 11/28; CKD5 presenting with dyspnea, admitted for volume overload due to renal failure requiring HD; stepped up to MICU for AMS requiring intubation, now s/p CVVHD. Extubated on 11/27, transitioned to intermittent HD on 11/28.    NEURO:  #Metabolic encephalopathy  Patient became altered on 11/26, prompting rapid response. Suspected to be metabolic encephalopathy 2/2 uremia vs acidosis. Also oversedation from Klonopin was considered possible etiology. Patient is more alert following CVVHD with improvement of uremia and acidosis, however continues to be confused.   - f/u CT head non-con  - Klonopin at reduced dose as below  - HD as below    PSYCH  #Depression with suicidal ideation  Home meds: Klonipin 0.5mg QD, Duloxetine 30mg QD, Trazodone 50mg QD. Pt endorsed thoughts of suicide/hopeless. Denies any active plan to commit suicide. Psych consulted, no need for 1-1 obs  - Decrease to Klonipin 0.25mg QHS due to concern for oversedation  - c/w Duloxetine 30mg QD  - c/w Trazodone 50 QD    PULM  #Asthma   Hx of asthma. Home meds: Symbicort, Albuterol  - c/w Symbicort  - C/w duonebs Q6h     #Pneumonia  On 11/27, new LLL opacity seen. WBC downtrending but still elevated. Likely aspiration pneumonia as pt was vomiting.   - CTX 2g QD    CARDIO  #Elevated troponin  Pt endorsed episode of heart racing, shortness of breath and anxiety that has resolved   EKG TWI in V1-V6, II, III, AVF and ST depressions in V4-V5, repeat EKG unchanged. Cardiac enzymes peaked and downtrended- may be elevated due to CKD. Cardiology will reassess on 11/29  - continue Nitroglycerin 0.3mg/hr patch QD  - F/u TTE  - f/u cardiology recommendations    #HLD  Home med: Atorvastatin 20mg QD  - c/w atorvastatin 20mg QD    #HTN  Home meds: Hydral 50 TID, Nifedipine 90 QD, Toprol 25 QD, Lasix 80mg QD. Home meds held due to hypotension requiring Levophed. Now no longer hypotensive, off Levophed, with rising BPs to SBP 200s. Restarting home meds.  - continue hydralazine 25mg TID  - may restart other home meds pending repeat BP following HD    GI  Prophylactic Protonix    RENAL  #ELBERT on CKD  Home meds: NaHCO3 650mg BID, Phoslo 667mg PO TID, not on HD prior to admission. Baseline Cr ~2.4. On admission, Cr 4.38, now downtrending. FENa 0.3% prerenal. Renal US: bilateral echogenic kidneys consistent with CKD, no hydonephrosis. Admitted to MICU for CVVHD.  - Plan for intermittent HD starting 11/28.   - discontinued NaHCO3 650mg BID since pt on RRT. Restart on discharge  - continue Phoslo 667mg PO TID  - Strict I/Os  - Renal diet when appropriate    ENDO  #DM2  A1C 9.7 2022. A1c this admission 5.7. Home meds: Januvia 25mg QD, Lispro 2U premeal, Lantus 10U. Has not consistently required sliding scale during this admission  - c/w mISS.   - restart home Januvia, Lispro 2U premeal, and Lantus 10U on discharge    ID  Pneumonia as above    HEME  #Anemia  Hx of DIMITRIS. Iron studies with low TIBC indicative of AOCD. S/p 1u pRBC on 11/27  - EPO with dialysis  - Transfuse <7     MSK  #History of osteomyelitis  Patient has history of toe osteomyelitis 2/2 uncontrolled DM, s/p bilateral toe amputations. Remaining toes appear clean, with no apparent wounds.  - Continue to monitor    PROPHYLAXIS  F: None  E: Replete if K<4 or Mag<2  N: advance to renal as tolerated  VTEppx: heparin 5000U subQ Q8hr  GIppx: Protonix 40mg PO QD  Dispo: MICU

## 2023-11-28 NOTE — PROGRESS NOTE ADULT - ATTENDING COMMENTS
Interim chart reviewed and events noted. Case d/w Dr. Martinez this am.  As above, 73YF now ESRD initially admitted for SOB and ischemic w/u h/c c/b AMS, acidosis, hyperkalmeia, upgraded to MICU for further management, initiated on CVVHD for HD instability, transitioned to iHD today, tolerating well, continue to optimize volume status.  Agree with HD  plan.  Epo to assist anemia. Monitor post-HD BP and adjust antihypertensive regimen as needed.   Agree with details of note above.

## 2023-11-29 DIAGNOSIS — D64.9 ANEMIA, UNSPECIFIED: ICD-10-CM

## 2023-11-29 DIAGNOSIS — I16.0 HYPERTENSIVE URGENCY: ICD-10-CM

## 2023-11-29 DIAGNOSIS — Z29.9 ENCOUNTER FOR PROPHYLACTIC MEASURES, UNSPECIFIED: ICD-10-CM

## 2023-11-29 DIAGNOSIS — J69.0 PNEUMONITIS DUE TO INHALATION OF FOOD AND VOMIT: ICD-10-CM

## 2023-11-29 DIAGNOSIS — G93.41 METABOLIC ENCEPHALOPATHY: ICD-10-CM

## 2023-11-29 LAB
ALBUMIN SERPL ELPH-MCNC: 2.7 G/DL — LOW (ref 3.3–5)
ALBUMIN SERPL ELPH-MCNC: 2.7 G/DL — LOW (ref 3.3–5)
ALP SERPL-CCNC: 112 U/L — SIGNIFICANT CHANGE UP (ref 40–120)
ALP SERPL-CCNC: 112 U/L — SIGNIFICANT CHANGE UP (ref 40–120)
ALT FLD-CCNC: 55 U/L — HIGH (ref 10–45)
ALT FLD-CCNC: 55 U/L — HIGH (ref 10–45)
ANION GAP SERPL CALC-SCNC: 14 MMOL/L — SIGNIFICANT CHANGE UP (ref 5–17)
ANION GAP SERPL CALC-SCNC: 14 MMOL/L — SIGNIFICANT CHANGE UP (ref 5–17)
AST SERPL-CCNC: 23 U/L — SIGNIFICANT CHANGE UP (ref 10–40)
AST SERPL-CCNC: 23 U/L — SIGNIFICANT CHANGE UP (ref 10–40)
BASOPHILS # BLD AUTO: 0.02 K/UL — SIGNIFICANT CHANGE UP (ref 0–0.2)
BASOPHILS # BLD AUTO: 0.02 K/UL — SIGNIFICANT CHANGE UP (ref 0–0.2)
BASOPHILS NFR BLD AUTO: 0.3 % — SIGNIFICANT CHANGE UP (ref 0–2)
BASOPHILS NFR BLD AUTO: 0.3 % — SIGNIFICANT CHANGE UP (ref 0–2)
BILIRUB SERPL-MCNC: <0.2 MG/DL — SIGNIFICANT CHANGE UP (ref 0.2–1.2)
BILIRUB SERPL-MCNC: <0.2 MG/DL — SIGNIFICANT CHANGE UP (ref 0.2–1.2)
BUN SERPL-MCNC: 23 MG/DL — SIGNIFICANT CHANGE UP (ref 7–23)
BUN SERPL-MCNC: 23 MG/DL — SIGNIFICANT CHANGE UP (ref 7–23)
CALCIUM SERPL-MCNC: 8.6 MG/DL — SIGNIFICANT CHANGE UP (ref 8.4–10.5)
CALCIUM SERPL-MCNC: 8.6 MG/DL — SIGNIFICANT CHANGE UP (ref 8.4–10.5)
CHLORIDE SERPL-SCNC: 103 MMOL/L — SIGNIFICANT CHANGE UP (ref 96–108)
CHLORIDE SERPL-SCNC: 103 MMOL/L — SIGNIFICANT CHANGE UP (ref 96–108)
CO2 SERPL-SCNC: 27 MMOL/L — SIGNIFICANT CHANGE UP (ref 22–31)
CO2 SERPL-SCNC: 27 MMOL/L — SIGNIFICANT CHANGE UP (ref 22–31)
CREAT SERPL-MCNC: 2.2 MG/DL — HIGH (ref 0.5–1.3)
CREAT SERPL-MCNC: 2.2 MG/DL — HIGH (ref 0.5–1.3)
EGFR: 23 ML/MIN/1.73M2 — LOW
EGFR: 23 ML/MIN/1.73M2 — LOW
EOSINOPHIL # BLD AUTO: 0.18 K/UL — SIGNIFICANT CHANGE UP (ref 0–0.5)
EOSINOPHIL # BLD AUTO: 0.18 K/UL — SIGNIFICANT CHANGE UP (ref 0–0.5)
EOSINOPHIL NFR BLD AUTO: 2.3 % — SIGNIFICANT CHANGE UP (ref 0–6)
EOSINOPHIL NFR BLD AUTO: 2.3 % — SIGNIFICANT CHANGE UP (ref 0–6)
GLUCOSE BLDC GLUCOMTR-MCNC: 102 MG/DL — HIGH (ref 70–99)
GLUCOSE BLDC GLUCOMTR-MCNC: 102 MG/DL — HIGH (ref 70–99)
GLUCOSE BLDC GLUCOMTR-MCNC: 103 MG/DL — HIGH (ref 70–99)
GLUCOSE BLDC GLUCOMTR-MCNC: 103 MG/DL — HIGH (ref 70–99)
GLUCOSE BLDC GLUCOMTR-MCNC: 134 MG/DL — HIGH (ref 70–99)
GLUCOSE BLDC GLUCOMTR-MCNC: 134 MG/DL — HIGH (ref 70–99)
GLUCOSE SERPL-MCNC: 102 MG/DL — HIGH (ref 70–99)
GLUCOSE SERPL-MCNC: 102 MG/DL — HIGH (ref 70–99)
HBV SURFACE AB SER-ACNC: <3 MIU/ML — LOW
HBV SURFACE AB SER-ACNC: <3 MIU/ML — LOW
HCT VFR BLD CALC: 24.1 % — LOW (ref 34.5–45)
HCT VFR BLD CALC: 24.1 % — LOW (ref 34.5–45)
HGB BLD-MCNC: 7.6 G/DL — LOW (ref 11.5–15.5)
HGB BLD-MCNC: 7.6 G/DL — LOW (ref 11.5–15.5)
IMM GRANULOCYTES NFR BLD AUTO: 0.5 % — SIGNIFICANT CHANGE UP (ref 0–0.9)
IMM GRANULOCYTES NFR BLD AUTO: 0.5 % — SIGNIFICANT CHANGE UP (ref 0–0.9)
LYMPHOCYTES # BLD AUTO: 1.46 K/UL — SIGNIFICANT CHANGE UP (ref 1–3.3)
LYMPHOCYTES # BLD AUTO: 1.46 K/UL — SIGNIFICANT CHANGE UP (ref 1–3.3)
LYMPHOCYTES # BLD AUTO: 18.4 % — SIGNIFICANT CHANGE UP (ref 13–44)
LYMPHOCYTES # BLD AUTO: 18.4 % — SIGNIFICANT CHANGE UP (ref 13–44)
MAGNESIUM SERPL-MCNC: 2 MG/DL — SIGNIFICANT CHANGE UP (ref 1.6–2.6)
MAGNESIUM SERPL-MCNC: 2 MG/DL — SIGNIFICANT CHANGE UP (ref 1.6–2.6)
MCHC RBC-ENTMCNC: 28.6 PG — SIGNIFICANT CHANGE UP (ref 27–34)
MCHC RBC-ENTMCNC: 28.6 PG — SIGNIFICANT CHANGE UP (ref 27–34)
MCHC RBC-ENTMCNC: 31.5 GM/DL — LOW (ref 32–36)
MCHC RBC-ENTMCNC: 31.5 GM/DL — LOW (ref 32–36)
MCV RBC AUTO: 90.6 FL — SIGNIFICANT CHANGE UP (ref 80–100)
MCV RBC AUTO: 90.6 FL — SIGNIFICANT CHANGE UP (ref 80–100)
MONOCYTES # BLD AUTO: 0.95 K/UL — HIGH (ref 0–0.9)
MONOCYTES # BLD AUTO: 0.95 K/UL — HIGH (ref 0–0.9)
MONOCYTES NFR BLD AUTO: 12 % — SIGNIFICANT CHANGE UP (ref 2–14)
MONOCYTES NFR BLD AUTO: 12 % — SIGNIFICANT CHANGE UP (ref 2–14)
NEUTROPHILS # BLD AUTO: 5.27 K/UL — SIGNIFICANT CHANGE UP (ref 1.8–7.4)
NEUTROPHILS # BLD AUTO: 5.27 K/UL — SIGNIFICANT CHANGE UP (ref 1.8–7.4)
NEUTROPHILS NFR BLD AUTO: 66.5 % — SIGNIFICANT CHANGE UP (ref 43–77)
NEUTROPHILS NFR BLD AUTO: 66.5 % — SIGNIFICANT CHANGE UP (ref 43–77)
NRBC # BLD: 0 /100 WBCS — SIGNIFICANT CHANGE UP (ref 0–0)
NRBC # BLD: 0 /100 WBCS — SIGNIFICANT CHANGE UP (ref 0–0)
PHOSPHATE SERPL-MCNC: 5 MG/DL — HIGH (ref 2.5–4.5)
PHOSPHATE SERPL-MCNC: 5 MG/DL — HIGH (ref 2.5–4.5)
PLATELET # BLD AUTO: 184 K/UL — SIGNIFICANT CHANGE UP (ref 150–400)
PLATELET # BLD AUTO: 184 K/UL — SIGNIFICANT CHANGE UP (ref 150–400)
POTASSIUM SERPL-MCNC: 3.5 MMOL/L — SIGNIFICANT CHANGE UP (ref 3.5–5.3)
POTASSIUM SERPL-MCNC: 3.5 MMOL/L — SIGNIFICANT CHANGE UP (ref 3.5–5.3)
POTASSIUM SERPL-SCNC: 3.5 MMOL/L — SIGNIFICANT CHANGE UP (ref 3.5–5.3)
POTASSIUM SERPL-SCNC: 3.5 MMOL/L — SIGNIFICANT CHANGE UP (ref 3.5–5.3)
PROT SERPL-MCNC: 5.6 G/DL — LOW (ref 6–8.3)
PROT SERPL-MCNC: 5.6 G/DL — LOW (ref 6–8.3)
RBC # BLD: 2.66 M/UL — LOW (ref 3.8–5.2)
RBC # BLD: 2.66 M/UL — LOW (ref 3.8–5.2)
RBC # FLD: 13.6 % — SIGNIFICANT CHANGE UP (ref 10.3–14.5)
RBC # FLD: 13.6 % — SIGNIFICANT CHANGE UP (ref 10.3–14.5)
SODIUM SERPL-SCNC: 144 MMOL/L — SIGNIFICANT CHANGE UP (ref 135–145)
SODIUM SERPL-SCNC: 144 MMOL/L — SIGNIFICANT CHANGE UP (ref 135–145)
WBC # BLD: 7.92 K/UL — SIGNIFICANT CHANGE UP (ref 3.8–10.5)
WBC # BLD: 7.92 K/UL — SIGNIFICANT CHANGE UP (ref 3.8–10.5)
WBC # FLD AUTO: 7.92 K/UL — SIGNIFICANT CHANGE UP (ref 3.8–10.5)
WBC # FLD AUTO: 7.92 K/UL — SIGNIFICANT CHANGE UP (ref 3.8–10.5)

## 2023-11-29 PROCEDURE — 73620 X-RAY EXAM OF FOOT: CPT | Mod: 26,RT

## 2023-11-29 PROCEDURE — 93010 ELECTROCARDIOGRAM REPORT: CPT

## 2023-11-29 PROCEDURE — 36569 INSJ PICC 5 YR+ W/O IMAGING: CPT

## 2023-11-29 PROCEDURE — 99223 1ST HOSP IP/OBS HIGH 75: CPT | Mod: GC,AI

## 2023-11-29 PROCEDURE — 99232 SBSQ HOSP IP/OBS MODERATE 35: CPT | Mod: GC

## 2023-11-29 PROCEDURE — 76937 US GUIDE VASCULAR ACCESS: CPT | Mod: 26,59

## 2023-11-29 PROCEDURE — 99221 1ST HOSP IP/OBS SF/LOW 40: CPT

## 2023-11-29 RX ORDER — LABETALOL HCL 100 MG
10 TABLET ORAL EVERY 6 HOURS
Refills: 0 | Status: DISCONTINUED | OUTPATIENT
Start: 2023-11-29 | End: 2023-11-29

## 2023-11-29 RX ORDER — LABETALOL HCL 100 MG
10 TABLET ORAL ONCE
Refills: 0 | Status: COMPLETED | OUTPATIENT
Start: 2023-11-29 | End: 2023-11-29

## 2023-11-29 RX ORDER — HYDRALAZINE HCL 50 MG
50 TABLET ORAL THREE TIMES A DAY
Refills: 0 | Status: DISCONTINUED | OUTPATIENT
Start: 2023-11-29 | End: 2023-11-30

## 2023-11-29 RX ORDER — CARVEDILOL PHOSPHATE 80 MG/1
6.25 CAPSULE, EXTENDED RELEASE ORAL ONCE
Refills: 0 | Status: COMPLETED | OUTPATIENT
Start: 2023-11-29 | End: 2023-11-29

## 2023-11-29 RX ORDER — SENNA PLUS 8.6 MG/1
2 TABLET ORAL AT BEDTIME
Refills: 0 | Status: DISCONTINUED | OUTPATIENT
Start: 2023-11-29 | End: 2023-12-04

## 2023-11-29 RX ORDER — CARVEDILOL PHOSPHATE 80 MG/1
6.25 CAPSULE, EXTENDED RELEASE ORAL EVERY 12 HOURS
Refills: 0 | Status: DISCONTINUED | OUTPATIENT
Start: 2023-11-30 | End: 2023-11-30

## 2023-11-29 RX ORDER — HYDRALAZINE HCL 50 MG
30 TABLET ORAL EVERY 8 HOURS
Refills: 0 | Status: DISCONTINUED | OUTPATIENT
Start: 2023-11-29 | End: 2023-11-29

## 2023-11-29 RX ORDER — CARVEDILOL PHOSPHATE 80 MG/1
6.25 CAPSULE, EXTENDED RELEASE ORAL EVERY 12 HOURS
Refills: 0 | Status: DISCONTINUED | OUTPATIENT
Start: 2023-11-29 | End: 2023-11-29

## 2023-11-29 RX ORDER — POLYETHYLENE GLYCOL 3350 17 G/17G
17 POWDER, FOR SOLUTION ORAL
Refills: 0 | Status: DISCONTINUED | OUTPATIENT
Start: 2023-11-29 | End: 2023-12-04

## 2023-11-29 RX ORDER — NIFEDIPINE 30 MG
90 TABLET, EXTENDED RELEASE 24 HR ORAL EVERY 24 HOURS
Refills: 0 | Status: DISCONTINUED | OUTPATIENT
Start: 2023-11-30 | End: 2023-12-07

## 2023-11-29 RX ADMIN — CHLORHEXIDINE GLUCONATE 1 APPLICATION(S): 213 SOLUTION TOPICAL at 05:43

## 2023-11-29 RX ADMIN — ATORVASTATIN CALCIUM 20 MILLIGRAM(S): 80 TABLET, FILM COATED ORAL at 21:43

## 2023-11-29 RX ADMIN — Medication 10 MILLIGRAM(S): at 04:00

## 2023-11-29 RX ADMIN — Medication 3 MILLILITER(S): at 18:54

## 2023-11-29 RX ADMIN — Medication 1 PATCH: at 00:25

## 2023-11-29 RX ADMIN — DULOXETINE HYDROCHLORIDE 30 MILLIGRAM(S): 30 CAPSULE, DELAYED RELEASE ORAL at 12:59

## 2023-11-29 RX ADMIN — CEFTRIAXONE 100 MILLIGRAM(S): 500 INJECTION, POWDER, FOR SOLUTION INTRAMUSCULAR; INTRAVENOUS at 22:15

## 2023-11-29 RX ADMIN — Medication 0.25 MILLIGRAM(S): at 04:33

## 2023-11-29 RX ADMIN — HEPARIN SODIUM 5000 UNIT(S): 5000 INJECTION INTRAVENOUS; SUBCUTANEOUS at 05:40

## 2023-11-29 RX ADMIN — HEPARIN SODIUM 5000 UNIT(S): 5000 INJECTION INTRAVENOUS; SUBCUTANEOUS at 14:24

## 2023-11-29 RX ADMIN — CARVEDILOL PHOSPHATE 6.25 MILLIGRAM(S): 80 CAPSULE, EXTENDED RELEASE ORAL at 14:57

## 2023-11-29 RX ADMIN — Medication 667 MILLIGRAM(S): at 18:53

## 2023-11-29 RX ADMIN — Medication 3 MILLILITER(S): at 10:14

## 2023-11-29 RX ADMIN — Medication 1 PATCH: at 12:57

## 2023-11-29 RX ADMIN — POLYETHYLENE GLYCOL 3350 17 GRAM(S): 17 POWDER, FOR SOLUTION ORAL at 18:54

## 2023-11-29 RX ADMIN — Medication 1 PATCH: at 19:47

## 2023-11-29 RX ADMIN — Medication 50 MILLIGRAM(S): at 21:20

## 2023-11-29 RX ADMIN — Medication 3 MILLILITER(S): at 06:10

## 2023-11-29 RX ADMIN — HEPARIN SODIUM 5000 UNIT(S): 5000 INJECTION INTRAVENOUS; SUBCUTANEOUS at 21:43

## 2023-11-29 RX ADMIN — Medication 1 DROP(S): at 05:41

## 2023-11-29 RX ADMIN — Medication 50 MILLIGRAM(S): at 12:58

## 2023-11-29 RX ADMIN — Medication 1 APPLICATION(S): at 05:41

## 2023-11-29 RX ADMIN — Medication 10 MILLIGRAM(S): at 02:24

## 2023-11-29 RX ADMIN — Medication 10 MILLIGRAM(S): at 04:47

## 2023-11-29 RX ADMIN — Medication 30 MILLIGRAM(S): at 05:40

## 2023-11-29 RX ADMIN — Medication 0.25 MILLIGRAM(S): at 21:43

## 2023-11-29 RX ADMIN — Medication 667 MILLIGRAM(S): at 12:58

## 2023-11-29 NOTE — PROCEDURE NOTE - PROCEDURE DATE TIME, MLM
26-Nov-2023 17:20
26-Nov-2023 14:40
26-Nov-2023 15:15
29-Nov-2023 13:15
26-Nov-2023 15:40
27-Nov-2023 00:00
26-Nov-2023 16:20

## 2023-11-29 NOTE — PROGRESS NOTE ADULT - ASSESSMENT
73F with PMH HTN, HLD, DM with OM s/p multiple amputations of toes; CAD with stress test planned for 11/28; CKD5 presenting with dyspnea, admitted for ischemic w/u, ELBERT on CKD5, stepped up to MICU for HAGMA metabolic encephalopathy 2/2 uremia vs. acidosis requiring intubation; now extubated, improving s/p CVVHD but course c/b hypertensive urgency. Mental status improving, on intermittent HD. BP improving w/ uptitrating BP meds. Medically stable to step down to RF       NEURO  #Metabolic encephalopathy  RESOLVED. HAGMA metabolic encephalopathy 2/2 uremia vs. acidosis, requiring intubation in MICU. mental status improved s/p CVVHD with uremia and acidosis resolved. Now AOx3 with appropriate affect. CTH non cont 11/28 shows microvascular disease with lacunar infarcts. No acute pathology. Passed bedside dysphagia   - Klonopin at reduced dose 0.25 qhs.   - holding other   - c/w HD       PSYCH  #Depression with suicidal ideation   Psych consulted, no need for 1-1.  Denies any active plan to commit suicide. Home meds: Klonipin 0.5mg QD, Duloxetine 30mg QD, Trazodone 50mg QD.  - Klonopin at reduced dose 0.25 qhs.   - c/w Duloxetine 30mg QD  - c/w Trazodone 50 QD      PULM  Pt on RA, saturating  RA 95%.   #Asthma  Not in acute exacerbation. Home med Duoneb Symbicort   - c/w duoneb q6h PRN     #Aspiration Pneumonia  new LLL opacity seen on Xray 11/27. Febrile on 11/28. . Aspiration vs. atelectasis. on CTX 2g. pt continues to be afebrile with downtrending WBC.   - c/w CTX for total 5 days (11/26-12/1)      CARDIO  #elevated Troponin   POA w/ palpitation, SOB. EKG shows TWI in V1-V6, II, III, AVF and ST depressions in V4-V5. Peaked and downtrended, can be confounded by CKD   - c/w nitroglycerin patch 0.3  - f/u TTE   - f/u card recs     #Hypertensive urgency   Home meds: Hydral 50 TID, Nifedipine 90 QD, Toprol 25 QD, Lasix 80mg QD.  - transition toprol to coreg 6.25 bid  - c/w home hydral, nifedipine as above  - holding lasix in MICU      RENAL  #ELBERT on CKD  POA with Cr 4.38. Cr 2.4 baseline with no previos HD hx. s/p CVVHD 2/2 HAGMA metabolic encephalopathy (uremia vs. acidosis). FENa 0.3% prerenal. Renal US: consistent with CKD.   s/p intermittent HD last 11/28    Home meds: NaHCO3 650mg BID, Phoslo 667mg PO TID    - discontinued NaHCO3 650mg BID since pt on RRT. Restart on discharge  - continue Phoslo 667mg PO TID  - Strict I/Os  - Renal diet when appropriate      ENDO  #DM2  POA w/  A1c this admission 5.7.   Home meds: Januvia 25mg QD, Lispro 2U premeal, Lantus 10U.   - c/w mISS inpt.   - restart home Januvia, Lispro 2U premeal, and Lantus 10U on discharge      ID  Aspiration Pneumonia as above      HEME  #Anemia  Hx of DIMITRIS. Iron studies with low TIBC indicative of AOCD. S/p 1u pRBC on 11/27. Likely 2/2 DIMITRIS and CKD   - EPO with dialysis  - Transfuse <7       MSK  #History of osteomyelitis  Patient has history of toe osteomyelitis 2/2 uncontrolled DM, s/p bilateral toe amputations. 1 lesions on R plantar surface with eschar. wound care consulted  - consider podiatry consult       PROPHYLAXIS  F: None  E: Replete PRN   N: advance to renal as tolerated  VTEppx: heparin 5000U subQ Q8hr  GIppx: Protonix 40mg PO QD  Dispo: MICU -> [facility]  73F with PMH HTN, HLD, DM with OM s/p multiple amputations of toes; CAD with stress test planned for 11/28; CKD5 presenting with dyspnea, admitted for ischemic w/u, ELBERT on CKD5, stepped up to MICU for HAGMA metabolic encephalopathy 2/2 uremia vs. acidosis requiring intubation; now extubated, improving s/p CVVHD but course c/b hypertensive urgency. Mental status improving, on intermittent HD. BP improving w/ uptitrating BP meds. Medically stable to step down to RF       NEURO  #Metabolic encephalopathy  RESOLVED. HAGMA metabolic encephalopathy 2/2 uremia vs. acidosis, requiring intubation in MICU. mental status improved s/p CVVHD with uremia and acidosis resolved. Now AOx3 with appropriate affect. CTH non cont 11/28 shows microvascular disease with lacunar infarcts. No acute pathology. Passed bedside dysphagia   - Klonopin at reduced dose 0.25 qhs.   - holding other   - c/w HD       PSYCH  #Depression with suicidal ideation   Psych consulted, no need for 1-1.  Denies any active plan to commit suicide. Home meds: Klonipin 0.5mg QD, Duloxetine 30mg QD, Trazodone 50mg QD.  - Klonopin at reduced dose 0.25 qhs.   - c/w Duloxetine 30mg QD  - c/w Trazodone 50 QD      PULM  Pt on RA, saturating  RA 95%.   #Asthma  Not in acute exacerbation. Home med Duoneb Symbicort   - c/w duoneb q6h PRN     #Aspiration Pneumonia  new LLL opacity seen on Xray 11/27. Febrile on 11/28. . Aspiration vs. atelectasis. on CTX 2g. pt continues to be afebrile with downtrending WBC.   - c/w CTX for total 5 days (11/26-12/1)      CARDIO  #elevated Troponin   POA w/ palpitation, SOB. EKG shows TWI in V1-V6, II, III, AVF and ST depressions in V4-V5. Peaked and downtrended, can be confounded by CKD   - c/w nitroglycerin patch 0.3  - f/u TTE   - f/u card recs     #Hypertensive urgency   Home meds: Hydral 50 TID, Nifedipine 90 QD, Toprol 25 QD, Lasix 80mg QD.  - transition toprol to coreg 6.25 bid  - c/w home hydral, nifedipine as above  - holding lasix in MICU    #HLD  Home med: Atorvastatin 20mg QD  - c/w atorvastatin 20mg QD      RENAL  #ELBERT on CKD  POA with Cr 4.38. Cr 2.4 baseline with no previos HD hx. s/p CVVHD 2/2 HAGMA metabolic encephalopathy (uremia vs. acidosis). FENa 0.3% prerenal. Renal US: consistent with CKD.   s/p intermittent HD last 11/28    Home meds: NaHCO3 650mg BID, Phoslo 667mg PO TID    - discontinued NaHCO3 650mg BID since pt on RRT. Restart on discharge  - continue Phoslo 667mg PO TID  - Strict I/Os  - Renal diet when appropriate      ENDO  #DM2  POA w/  A1c this admission 5.7.   Home meds: Januvia 25mg QD, Lispro 2U premeal, Lantus 10U.   - c/w mISS inpt.   - restart home Januvia, Lispro 2U premeal, and Lantus 10U on discharge      ID  Aspiration Pneumonia as above      HEME  #Anemia  Hx of DIMITRIS. Iron studies with low TIBC indicative of AOCD. S/p 1u pRBC on 11/27. Likely 2/2 DIMITRIS and CKD   - EPO with dialysis  - Transfuse <7       MSK  #History of osteomyelitis  Patient has history of toe osteomyelitis 2/2 uncontrolled DM, s/p bilateral toe amputations. 1 lesions on R plantar surface with eschar. wound care consulted  - consider podiatry consult       PROPHYLAXIS  F: None  E: Replete PRN   N: advance to renal as tolerated  VTEppx: heparin 5000U subQ Q8hr  GIppx: Protonix 40mg PO QD  Dispo: MICU -> [facility]

## 2023-11-29 NOTE — PROGRESS NOTE ADULT - ASSESSMENT
73YF now ESRD initially admitted for SOB and ischemic w/u h/c c/b metabolic encephalopathy , acidosis, hyperkalmeia, upgraded to MICU for further management, initiated on CVVHD for HD instability, transitioned to iHD, BP still not controlled, continue isolated UF as tolerated     ESRD:   Seen and evaluated on HD, tolerating well  Renal diet   Fluid restriction to <1.2L   BMP per ICU protocol   Reassess daily need for RRT   Continue with hydralazine, Nifedapine   if still elevated can start on Losartan 50mg PO   Can continue with lasix 80mg off HD days   Will need placement of TDC with IR by end of week, will continue to optimize     Anemia:  [place] 2/2 ESRD   Given 1U PRBC   EPO with HD, no indication for IV iron at this time            Hemodialysis Treatment.:     Schedule: Once, Modality: Hemodialysis, Access: Internal Jugular Central Venous Catheter    Dialyzer: Optiflux [id number], Time: 180 Min    Blood Flow: 400 mL/Min ,  Target Fluid Removal: 1 Liters

## 2023-11-29 NOTE — PROGRESS NOTE ADULT - SUBJECTIVE AND OBJECTIVE BOX
Seen and evaluated on HD again for BP evaluation   At time of tx /70 patient received PO meds decreased UF goal to 1L as BP dropped to 117/68 and decreased rx time to 2hrs   Continue HD with new rx       Physical Exam   GENERAL: NAD laying in bed tolerating HD   NECK: supple, No JVD  CHEST/LUNG: Clear to auscultation bilaterally  HEART: normal S1S2, RRR  ABDOMEN: Soft, Nontender, +BS,    EXTREMITIES: No clubbing, cyanosis, or edema   NEUROLOGY: AAO x3, no focal neurological deficit  ACCESS: [facility] HD cath accessed

## 2023-11-29 NOTE — PROGRESS NOTE ADULT - PROBLEM SELECTOR PLAN 8
POA with Cr 4.38. Cr 2.4 baseline with no previos HD hx. s/p CVVHD 2/2 HAGMA metabolic encephalopathy (uremia vs. acidosis). FENa 0.3% prerenal. Renal US: consistent with CKD.   s/p intermittent HD last 11/28    Home meds: NaHCO3 650mg BID, Phoslo 667mg PO TID    - discontinued NaHCO3 650mg BID since pt on RRT. Restart on discharge  - continue Phoslo 667mg PO TID  - Strict I/Os  - Renal diet when appropriate

## 2023-11-29 NOTE — CONSULT NOTE ADULT - ASSESSMENT
73F [facility] resident (unable to care for self, lived alone) w DMT2 w toe amputations, advanced CKD5, asthma, anxiety who p/w SOB, palpitations. Pt can't recall any ischemia eval but supposed to have a stress test as OP. EKG sinus with old repol abnormalities. CXR cardiomegaly w mild congestion. Anemic, with elevated but flat trop trend was initially admitted to cardiac tele (11/25) RRR called for AMS (11/26) likely  metabolic encephalopathy 2/2 uremia vs acidosis intubated for aiway protection required levophed for CVVHD. Extubated 11/27 course complicated by hypertensive urgency. Cardiology consulted for evaluation of initial troponemia    Review of Studies:    Telemetry 11/29/23: Sinus rhythm 70-85bpm    ECG 11/29/29 Sinus rhythm with infrequent APCS 86 bpm, left atrial enlargement  ECG 11/26/23 19:12: Junctional rhythm 53bpm  ECG 11/25/23: Accelerated junctional 77bpm. TWI V3-V6 vs ectopic atrial rhythm     TTE 11/27/23: LVIDD 5.7cm LVEF 55-60%, Normal RV size and function. Dilated LA. No significant valvular disease. Dilated IVC. No pericardial effusion.      #r/o Acute coronary syndrome  No cardiopulmonary complaints on bedside examination. Reports she has not had any chest pain since admission, however notes marked improvement in respiratory status s/p CVVHD and extubation 11/27. Initial hsTrop peaked 311> 275 >273 with initial ECG notable for junctional rhythm. Suspect likely Type II MI (Myocardial injury) in the setting of CKD and volume overload on admission, now improved with improved oxygen requirements and non-schemic ECG and normal echocardiogram.  - No need to trend troponins further  - Please repeat ECG while in sinus rhythm (11/29)  - Low suspicion for obstructive CAD however she has risk factors including DM and PVD s/p toe amputations, and can follow up as outpatient for stress test vs CCTA if clinically indicated  - No further cardiac intervention.    #Hypertension  Course complicated by hypertensive urgency, SBP elevated to 180s on bedside assessment.   - Recommend addition of carvedilol 6.25mg BID to aid with blood pressure control  - Continue hydralazine 50 TID  - Continue Nifedipine 90mg QD     Case discussed with cardiology consult attending, please re-consult with any questions. 73F [facility] resident (unable to care for self, lived alone) w DMT2 w toe amputations, advanced CKD5, asthma, anxiety who p/w SOB, palpitations. Pt can't recall any ischemia eval but supposed to have a stress test as OP. EKG sinus with old repol abnormalities. CXR cardiomegaly w mild congestion. Anemic, with elevated but flat trop trend was initially admitted to cardiac tele (11/25) RRR called for AMS (11/26) likely  metabolic encephalopathy 2/2 uremia vs acidosis intubated for aiway protection required levophed for CVVHD. Extubated 11/27 course complicated by hypertensive urgency. Cardiology consulted for evaluation of initial troponemia    Review of Studies:    Telemetry 11/29/23: Sinus rhythm 70-85bpm    ECG 11/29/29 Sinus rhythm with infrequent APCS 86 bpm, left atrial enlargement  ECG 11/26/23 19:12: Junctional rhythm 53bpm  ECG 11/25/23: Accelerated junctional 77bpm. TWI V3-V6 vs ectopic atrial rhythm     TTE 11/27/23: LVIDD 5.7cm LVEF 55-60%, Normal RV size and function. Dilated LA. No significant valvular disease. Dilated IVC. No pericardial effusion.      #r/o Acute coronary syndrome  No cardiopulmonary complaints on bedside examination. Reports she has not had any chest pain since admission, however notes marked improvement in respiratory status s/p CVVHD and extubation 11/27. Initial hsTrop peaked 311> 275 >273 with initial ECG notable for junctional rhythm. Suspect likely Type II MI (Myocardial injury) in the setting of CKD and volume overload on admission, now improved with improved oxygen requirements and non-schemic ECG and normal echocardiogram.  - No need to trend troponins further  - Please repeat ECG while in sinus rhythm (11/29)  - Low suspicion for obstructive CAD however she has risk factors including DM and PVD s/p toe amputations, and can follow up as outpatient for stress test vs CCTA if clinically indicated  - No further cardiac intervention.    #Hypertension  Course complicated by hypertensive urgency, SBP elevated to 180s on bedside assessment.   - Recommend addition of carvedilol 6.25mg BID to aid with blood pressure control  - Continue hydralazine 50 TID  - Continue Nifedipine 90mg QD

## 2023-11-29 NOTE — PHYSICAL THERAPY INITIAL EVALUATION ADULT - ADDITIONAL COMMENTS
As per chart pt is a resident at [facility], reports prior to admission being Indep with ADLs and living alone. Pt reports amb with rollator, no stairs to enter and no stairs inside the facility.

## 2023-11-29 NOTE — PROGRESS NOTE ADULT - PROBLEM SELECTOR PLAN 4
new LLL opacity seen on Xray 11/27. Febrile on 11/28. . Aspiration vs. atelectasis. on CTX 2g. pt continues to be afebrile with downtrending WBC.   - c/w CTX for total 5 days (11/26-12/1)

## 2023-11-29 NOTE — PHYSICAL THERAPY INITIAL EVALUATION ADULT - IMPAIRMENTS FOUND, PT EVAL
aerobic capacity/endurance/ergonomics and body mechanics/fine motor/gait, locomotion, and balance/gross motor/muscle strength/poor safety awareness/posture

## 2023-11-29 NOTE — PROCEDURE NOTE - NSINFORMCONSENT_GEN_A_CORE
This was an emergent procedure.
This was an emergent procedure.
2 provider consent/This was an emergent procedure.
This was an emergent procedure.
This was an emergent procedure.
Benefits, risks, and possible complications of procedure explained to patient/caregiver who verbalized understanding and gave verbal consent.
This was an emergent procedure.

## 2023-11-29 NOTE — PROGRESS NOTE ADULT - PROBLEM SELECTOR PLAN 2
Psych consulted, no need for 1-1.  Denies any active plan to commit suicide. Home meds: Klonipin 0.5mg QD, Duloxetine 30mg QD, Trazodone 50mg QD.  - Klonopin at reduced dose 0.25 qhs.   - c/w Duloxetine 30mg QD  - c/w Trazodone 50 QD

## 2023-11-29 NOTE — CONSULT NOTE ADULT - ATTENDING COMMENTS
Patient si a 72 yo F [facility] resident, with Pmhx of DMT2 w toe amputations, advanced CKD5, who p/w SOB, palpitations originally admitted to cardiac Tele for Troponemia in setting of Volume Overload in the setting of ELBERT on CKD, with course complicated by Metabolic encephalopathy 2/2 uremia requiring urgent CVVHD, Extubated 11/27 course complicated by hypertensive urgency. Cardiology following for Troponemia evaluation      Review of Studies:  - Telemetry 11/29/23: Sinus rhythm 70-85bpm  - ECG 11/29/29 Sinus rhythm with infrequent APCS 86 bpm, left atrial enlargement  - ECG 11/26/23 19:12: Junctional rhythm 53bpm  - ECG 11/25/23: Accelerated junctional 77bpm. TWI V3-V6 vs ectopic atrial rhythm   - TTE 11/27/23: LVIDD 5.7cm LVEF 55-60%, Normal RV size and function. Dilated LA. No significant valvular disease. Dilated IVC. No pericardial effusion.      # Elevated Troponin  - Patient unable to recall prior CV workup. No prior ischemic workup noted on our medical records.  - Patient originally presented with Chest discomfort, neck discomfort and toe pain which resolved on presentation. She later reported SOB and palpitation.  HSTrop T noted to be midlly elevated in setting of ELBERT on CKD  - EKGS reviewed with patient with brief episode of Junctional rythmn in setting of metabolic derangements requiring urgent CVVHD, now in NSR  - Echo this hospitalization reviewed with normal BiV function without significant valvular disease  - At this time do not suspect Troponemia to be from ischemia but rather from Myocardial injury in patient with renal failure.   - At this time will not pursue ischemic evaluation this hospitalization.  - Patient should have close outpatient follow up upon DC. Would consider Luzma NST as an outpatient with recurrent anginal symptoms if aligns with GOC    #Hypertension  - SBP's have ranged from 170's-200's  - At this time would recommend initiation of carvedilol 6.25mg BID with goal to uptitrate to 12.5 mg po BID by ends day if needed  - Continue Hydralazine 50 TID   - Continue Nifedipine 90mg QD   - Please ensure all anti hypertensives are staggered and have strict holding parameters to avoid iatrogenic hypotension      Anticipate patient will need outpatient Cardiology follow up upon DC. Please ensure outpatient follow up appointment with Dr Youssef within 2 weeks of DC

## 2023-11-29 NOTE — PROCEDURE NOTE - NSPOSTCAREGUIDE_GEN_A_CORE
Keep the cast/splint/dressing clean and dry
Verbal/written post procedure instructions were given to patient/caregiver/Keep the cast/splint/dressing clean and dry/Care for catheter as per unit/ICU protocols
Care for catheter as per unit/ICU protocols
Verbal/written post procedure instructions were given to patient/caregiver/Care for catheter as per unit/ICU protocols
Instructed patient/caregiver regarding signs and symptoms of infection/Keep the cast/splint/dressing clean and dry/Care for catheter as per unit/ICU protocols
Verbal/written post procedure instructions were given to patient/caregiver/Instructed patient/caregiver to follow-up with primary care physician

## 2023-11-29 NOTE — PROGRESS NOTE ADULT - PROBLEM SELECTOR PLAN 1
RESOLVED. [facility] metabolic encephalopathy 2/2 uremia vs. acidosis, requiring intubation in MICU. mental status improved s/p CVVHD with uremia and acidosis resolved. Now AOx3 with appropriate affect. CTH non cont 11/28 shows microvascular disease with lacunar infarcts. No acute pathology. Passed bedside dysphagia   - Klonopin at reduced dose 0.25 qhs.   - holding other   - c/w HD

## 2023-11-29 NOTE — PROGRESS NOTE ADULT - CONVERSATION DETAILS
Discussed diagnosis and likelihood that this condition will progress with time with patient at bedside. Patient displayed adequate understanding of her medical condition. Patient is a/ox3 but has a sad affect. Patient able to demonstrate understanding and what consequences may occur if his she does not continue with hemodialysis. At this time, patient is amenable to further testing and treatments, such as blood pressure control.    When asked about chest compressions, patient stated that she would not want chest compressions. When asked about intubation, patient stated that she would not want to be intubated again.    MOLST form was discussed and patient would like time for ongoing consideration.

## 2023-11-29 NOTE — PROGRESS NOTE ADULT - PROBLEM SELECTOR PLAN 6
POA w/ palpitation, SOB. EKG shows TWI in V1-V6, II, III, AVF and ST depressions in V4-V5. Peaked and downtrended, can be confounded by CKD   - c/w nitroglycerin patch 0.3  - f/u TTE   - f/u card recs

## 2023-11-29 NOTE — CONSULT NOTE ADULT - SUBJECTIVE AND OBJECTIVE BOX
Attending: Dr. Artis     Patient is a 73y old  Female who presents with a chief complaint of Shortness of breath (29 Nov 2023 12:37)      HPI:  73 year old F POOR HISTORIAN with PMHx of DM (on insulin), HTN, HLD, ESRD (not on HD), asthma, anxiety and depression, multiple B/L toe amputations (b/l) due to osteomyelitis, chronic R foot ulcer, iron deficiency anemia who presented after an episode earlier this morning where she noticed her heart was racing, she was short of breath and persistently anxious. Pt states she also had back pain which is uncommon for her. Patient endorses history of panic attacks in the past, however says that this episode felt different. Upon arrival to the hospital, her symptoms improved tremendously. Currently, pt endorses intermittent dizziness where she feels the room is spinning and nausea but denies chest pain, back pain, palpitations, SOB. Earlier this week she had non bloody diarrhea for 4-5 days and after thanksgiving dinner 2 nights prior, pt endorsed non bloody vomiting that resolved in 1 day. Pt endorses orthopnea for many years and leg swelling since she was diagnosed with kidney disease. Pt denies fever, dysuria, cough/cold symptoms, hematuria, BRBPR, hematochezia.   Podiatry addendum: Pt with history of R foot wound. Podiatry consulted to evaluate. Pt states she underwent amputations for osteomyelitis on both of her feeet in 2019 at [place] that had healed. She developed this wound to the bottom of her foot about 2 years ago. States she has noticed it has gotten deeper over time. States she has never noticed any purulence, malodor, or signs of infection around the site. States she keeps wound covered and it is changed regularly at her nursing home. States medihoney is usually applied to the affected area. States she has not recently seen a podiatrist but she sees a doctor at the nursing home who checks her feet.       Review of systems negative except per HPI and as stated below  General:	 no weakness; no fevers, no chills  Skin/Breast: no rash  Respiratory and Thorax: no SOB, no cough  Cardiovascular:	No chest pain  Gastrointestinal:	 no nausea, vomiting , diarrhea  Genitourinary:	no dysuria, no difficulty urinating, no hematuria  Musculoskeletal:	no weakness, no joint swelling/pain  Neurological:	no focal weakness/numbness  Endocrine:	no polyuria, no polydipsia    PAST MEDICAL & SURGICAL HISTORY:  Asthma  Asthma      Depressive disorder  Depression      Hyperlipidemia  HLD (hyperlipidemia)      Type 2 diabetes mellitus  DM (diabetes mellitus)      Essential hypertension  HTN (hypertension)      Local infection of skin and subcutaneous tissue  Toe infection      Type 2 diabetes mellitus with neurological manifestations  Neuropathy in diabetes      Cytomegalovirus infection not present  No significant past surgical history      S/P cholecystectomy        Home Medications:  acetaminophen 325 mg oral tablet: 2 tab(s) orally once a day (25 Nov 2023 23:04)  Airet 2.5 mg/3 mL (0.083%) inhalation solution: 3 milliliter(s) by nebulizer 3 times a day as needed for  shortness of breath and/or wheezing (25 Nov 2023 23:04)  Albuterol (Eqv-Proventil HFA) 90 mcg/inh inhalation aerosol: 1 puff(s) inhaled once a day (25 Nov 2023 23:04)  atorvastatin 20 mg oral tablet: 1 tab(s) orally once a day (25 Nov 2023 23:04)  budesonide-formoterol 160 mcg-4.5 mcg/inh inhalation aerosol: 2 puff(s) inhaled 2 times a day (25 Nov 2023 23:04)  calcium acetate 667 mg oral tablet: 1 tab(s) orally 3 times a day (25 Nov 2023 23:04)  clonazePAM 0.5 mg oral tablet: 1 tab(s) orally once a day (25 Nov 2023 23:04)  Cymbalta 30 mg oral delayed release capsule: 1 cap(s) orally once a day (25 Nov 2023 23:04)  ergocalciferol 400 intl units (10 mcg) oral tablet: 1 tab(s) orally once a day (25 Nov 2023 23:05)  Flomax 0.4 mg oral capsule: 1 cap(s) orally once a day (25 Nov 2023 23:05)  hydrALAZINE 50 mg oral tablet: 1 tab(s) orally 3 times a day (25 Nov 2023 23:04)  Januvia 25 mg oral tablet: 1 tab(s) orally once a day (25 Nov 2023 23:04)  Lasix 80 mg oral tablet: 1 tab(s) orally once a day (25 Nov 2023 23:04)  loperamide 2 mg oral tablet: 1 tab(s) orally once a day (25 Nov 2023 23:04)  metoprolol succinate 25 mg oral capsule, extended release: 1 cap(s) orally once a day (25 Nov 2023 23:04)  Nephro-Jamie oral tablet: 1 tab(s) orally once a day (25 Nov 2023 23:04)  NIFEdipine (Eqv-Adalat CC) 90 mg oral tablet, extended release: 1 tab(s) orally once a day (25 Nov 2023 23:04)  pantoprazole 40 mg oral delayed release tablet: 1 tab(s) orally once a day (25 Nov 2023 23:05)  polyethylene glycol 3350 oral powder for reconstitution: 17 gram(s) orally once a day (25 Nov 2023 23:04)  sodium bicarbonate 325 mg oral tablet: 2 tab(s) orally 2 times a day (25 Nov 2023 23:05)  traZODone 50 mg oral tablet: 1 tab(s) orally once a day (25 Nov 2023 23:05)    Allergies    No Known Allergies    Intolerances      FAMILY HISTORY:  Family history of diabetes mellitus  Family history of diabetes mellitus (DM)    FH: heart attack (Mother)    FH: Parkinson's disease (Father)    FH: COPD (chronic obstructive pulmonary disease) (Mother)      Social History:       LABS                        7.6    7.92  )-----------( 184      ( 29 Nov 2023 05:30 )             24.1     11-29    144  |  103  |  23  ----------------------------<  102<H>  3.5   |  27  |  2.20<H>    Ca    8.6      29 Nov 2023 05:30  Phos  5.0     11-29  Mg     2.0     11-29    TPro  5.6<L>  /  Alb  2.7<L>  /  TBili  <0.2  /  DBili  x   /  AST  23  /  ALT  55<H>  /  AlkPhos  112  11-29        Vital Signs Last 24 Hrs  T(C): 36.9 (29 Nov 2023 14:07), Max: 37.6 (28 Nov 2023 17:00)  T(F): 98.5 (29 Nov 2023 14:07), Max: 99.7 (28 Nov 2023 17:00)  HR: 82 (29 Nov 2023 13:30) (66 - 99)  BP: 189/88 (29 Nov 2023 13:30) (162/72 - 215/88)  BP(mean): 126 (29 Nov 2023 13:30) (101 - 128)  RR: 20 (29 Nov 2023 13:30) (12 - 26)  SpO2: 96% (29 Nov 2023 13:30) (91% - 100%)    Parameters below as of 29 Nov 2023 13:30  Patient On (Oxygen Delivery Method): room air        PHYSICAL EXAM  General: NAD, AA0x3    Lower Extremity Focused:  Vasc: DP/PT 2/4, no edema or erythema, temp gradient warm to warm  Derm: sub met 2 3 cm x 2 cm lesion with central eschar, no exposed dermis, does not probe deep, - PTB, no drainage, hyperkeratotic border  Neuro: protective sensation in tact  MSK: s/p R partial 2nd + 3rd ray amputation    RADIOLOGY  X-rays: pending                      Attending: Dr. Artis     Patient is a 73y old  Female who presents with a chief complaint of Shortness of breath (29 Nov 2023 12:37)      HPI:  73 year old F POOR HISTORIAN with PMHx of DM (on insulin), HTN, HLD, ESRD (not on HD), asthma, anxiety and depression, multiple B/L toe amputations (b/l) due to osteomyelitis, chronic R foot ulcer, iron deficiency anemia who presented after an episode earlier this morning where she noticed her heart was racing, she was short of breath and persistently anxious. Pt states she also had back pain which is uncommon for her. Patient endorses history of panic attacks in the past, however says that this episode felt different. Upon arrival to the hospital, her symptoms improved tremendously. Currently, pt endorses intermittent dizziness where she feels the room is spinning and nausea but denies chest pain, back pain, palpitations, SOB. Earlier this week she had non bloody diarrhea for 4-5 days and after thanksgiving dinner 2 nights prior, pt endorsed non bloody vomiting that resolved in 1 day. Pt endorses orthopnea for many years and leg swelling since she was diagnosed with kidney disease. Pt denies fever, dysuria, cough/cold symptoms, hematuria, BRBPR, hematochezia.   Podiatry addendum: Pt with history of R foot wound. Podiatry consulted to evaluate. Pt states she underwent amputations for osteomyelitis on both of her feeet in 2019 at [facility] that had healed. She developed this wound to the bottom of her foot about 2 years ago. States she has noticed it has gotten deeper over time. States she has never noticed any purulence, malodor, or signs of infection around the site. States she keeps wound covered and it is changed regularly at her nursing home. States medihoney is usually applied to the affected area. States she has not recently seen a podiatrist but she sees a doctor at the nursing home who checks her feet.       Review of systems negative except per HPI and as stated below  General:	 no weakness; no fevers, no chills  Skin/Breast: no rash  Respiratory and Thorax: no SOB, no cough  Cardiovascular:	No chest pain  Gastrointestinal:	 no nausea, vomiting , diarrhea  Genitourinary:	no dysuria, no difficulty urinating, no hematuria  Musculoskeletal:	no weakness, no joint swelling/pain  Neurological:	no focal weakness/numbness  Endocrine:	no polyuria, no polydipsia    PAST MEDICAL & SURGICAL HISTORY:  Asthma  Asthma      Depressive disorder  Depression      Hyperlipidemia  HLD (hyperlipidemia)      Type 2 diabetes mellitus  DM (diabetes mellitus)      Essential hypertension  HTN (hypertension)      Local infection of skin and subcutaneous tissue  Toe infection      Type 2 diabetes mellitus with neurological manifestations  Neuropathy in diabetes      Cytomegalovirus infection not present  No significant past surgical history      S/P cholecystectomy        Home Medications:  acetaminophen 325 mg oral tablet: 2 tab(s) orally once a day (25 Nov 2023 23:04)  Airet 2.5 mg/3 mL (0.083%) inhalation solution: 3 milliliter(s) by nebulizer 3 times a day as needed for  shortness of breath and/or wheezing (25 Nov 2023 23:04)  Albuterol (Eqv-Proventil HFA) 90 mcg/inh inhalation aerosol: 1 puff(s) inhaled once a day (25 Nov 2023 23:04)  atorvastatin 20 mg oral tablet: 1 tab(s) orally once a day (25 Nov 2023 23:04)  budesonide-formoterol 160 mcg-4.5 mcg/inh inhalation aerosol: 2 puff(s) inhaled 2 times a day (25 Nov 2023 23:04)  calcium acetate 667 mg oral tablet: 1 tab(s) orally 3 times a day (25 Nov 2023 23:04)  clonazePAM 0.5 mg oral tablet: 1 tab(s) orally once a day (25 Nov 2023 23:04)  Cymbalta 30 mg oral delayed release capsule: 1 cap(s) orally once a day (25 Nov 2023 23:04)  ergocalciferol 400 intl units (10 mcg) oral tablet: 1 tab(s) orally once a day (25 Nov 2023 23:05)  Flomax 0.4 mg oral capsule: 1 cap(s) orally once a day (25 Nov 2023 23:05)  hydrALAZINE 50 mg oral tablet: 1 tab(s) orally 3 times a day (25 Nov 2023 23:04)  Januvia 25 mg oral tablet: 1 tab(s) orally once a day (25 Nov 2023 23:04)  Lasix 80 mg oral tablet: 1 tab(s) orally once a day (25 Nov 2023 23:04)  loperamide 2 mg oral tablet: 1 tab(s) orally once a day (25 Nov 2023 23:04)  metoprolol succinate 25 mg oral capsule, extended release: 1 cap(s) orally once a day (25 Nov 2023 23:04)  Nephro-Jamie oral tablet: 1 tab(s) orally once a day (25 Nov 2023 23:04)  NIFEdipine (Eqv-Adalat CC) 90 mg oral tablet, extended release: 1 tab(s) orally once a day (25 Nov 2023 23:04)  pantoprazole 40 mg oral delayed release tablet: 1 tab(s) orally once a day (25 Nov 2023 23:05)  polyethylene glycol 3350 oral powder for reconstitution: 17 gram(s) orally once a day (25 Nov 2023 23:04)  sodium bicarbonate 325 mg oral tablet: 2 tab(s) orally 2 times a day (25 Nov 2023 23:05)  traZODone 50 mg oral tablet: 1 tab(s) orally once a day (25 Nov 2023 23:05)    Allergies    No Known Allergies    Intolerances      FAMILY HISTORY:  Family history of diabetes mellitus  Family history of diabetes mellitus (DM)    FH: heart attack (Mother)    FH: Parkinson's disease (Father)    FH: COPD (chronic obstructive pulmonary disease) (Mother)      Social History:       LABS                        7.6    7.92  )-----------( 184      ( 29 Nov 2023 05:30 )             24.1     11-29    144  |  103  |  23  ----------------------------<  102<H>  3.5   |  27  |  2.20<H>    Ca    8.6      29 Nov 2023 05:30  Phos  5.0     11-29  Mg     2.0     11-29    TPro  5.6<L>  /  Alb  2.7<L>  /  TBili  <0.2  /  DBili  x   /  AST  23  /  ALT  55<H>  /  AlkPhos  112  11-29        Vital Signs Last 24 Hrs  T(C): 36.9 (29 Nov 2023 14:07), Max: 37.6 (28 Nov 2023 17:00)  T(F): 98.5 (29 Nov 2023 14:07), Max: 99.7 (28 Nov 2023 17:00)  HR: 82 (29 Nov 2023 13:30) (66 - 99)  BP: 189/88 (29 Nov 2023 13:30) (162/72 - 215/88)  BP(mean): 126 (29 Nov 2023 13:30) (101 - 128)  RR: 20 (29 Nov 2023 13:30) (12 - 26)  SpO2: 96% (29 Nov 2023 13:30) (91% - 100%)    Parameters below as of 29 Nov 2023 13:30  Patient On (Oxygen Delivery Method): room air        PHYSICAL EXAM  General: NAD, AA0x3    Lower Extremity Focused:  Vasc: DP/PT 2/4, no edema or erythema, temp gradient warm to warm  Derm: sub met 2 3 cm x 2 cm lesion with central eschar, no exposed dermis, does not probe deep, - PTB, no drainage, hyperkeratotic border  Neuro: protective sensation in tact  MSK: s/p R partial 2nd + 3rd ray amputation    RADIOLOGY  X-rays: pending                      Attending: Dr. Artis     Patient is a 73y old  Female who presents with a chief complaint of Shortness of breath (29 Nov 2023 12:37)      HPI:  73 year old F POOR HISTORIAN with PMHx of DM (on insulin), HTN, HLD, ESRD (not on HD), asthma, anxiety and depression, multiple B/L toe amputations (b/l) due to osteomyelitis, chronic R foot ulcer, iron deficiency anemia who presented after an episode earlier this morning where she noticed her heart was racing, she was short of breath and persistently anxious. Pt states she also had back pain which is uncommon for her. Patient endorses history of panic attacks in the past, however says that this episode felt different. Upon arrival to the hospital, her symptoms improved tremendously. Currently, pt endorses intermittent dizziness where she feels the room is spinning and nausea but denies chest pain, back pain, palpitations, SOB. Earlier this week she had non bloody diarrhea for 4-5 days and after thanksgiving dinner 2 nights prior, pt endorsed non bloody vomiting that resolved in 1 day. Pt endorses orthopnea for many years and leg swelling since she was diagnosed with kidney disease. Pt denies fever, dysuria, cough/cold symptoms, hematuria, BRBPR, hematochezia.   Podiatry addendum: Pt with history of R foot wound. Podiatry consulted to evaluate. Pt states she underwent amputations for osteomyelitis on both of her feeet in 2019 at [place] that had healed. She developed this wound to the bottom of her foot about 2 years ago. States she has noticed it has gotten deeper over time. States she has never noticed any purulence, malodor, or signs of infection around the site. States she keeps wound covered and it is changed regularly at her nursing home. States medihoney is usually applied to the affected area. States she has not recently seen a podiatrist but she sees a doctor at the nursing home who checks her feet.       Review of systems negative except per HPI and as stated below  General:	 no weakness; no fevers, no chills  Skin/Breast: no rash  Respiratory and Thorax: no SOB, no cough  Cardiovascular:	No chest pain  Gastrointestinal:	 no nausea, vomiting , diarrhea  Genitourinary:	no dysuria, no difficulty urinating, no hematuria  Musculoskeletal:	no weakness, no joint swelling/pain  Neurological:	no focal weakness/numbness  Endocrine:	no polyuria, no polydipsia    PAST MEDICAL & SURGICAL HISTORY:  Asthma  Asthma      Depressive disorder  Depression      Hyperlipidemia  HLD (hyperlipidemia)      Type 2 diabetes mellitus  DM (diabetes mellitus)      Essential hypertension  HTN (hypertension)      Local infection of skin and subcutaneous tissue  Toe infection      Type 2 diabetes mellitus with neurological manifestations  Neuropathy in diabetes      Cytomegalovirus infection not present  No significant past surgical history      S/P cholecystectomy        Home Medications:  acetaminophen 325 mg oral tablet: 2 tab(s) orally once a day (25 Nov 2023 23:04)  Airet 2.5 mg/3 mL (0.083%) inhalation solution: 3 milliliter(s) by nebulizer 3 times a day as needed for  shortness of breath and/or wheezing (25 Nov 2023 23:04)  Albuterol (Eqv-Proventil HFA) 90 mcg/inh inhalation aerosol: 1 puff(s) inhaled once a day (25 Nov 2023 23:04)  atorvastatin 20 mg oral tablet: 1 tab(s) orally once a day (25 Nov 2023 23:04)  budesonide-formoterol 160 mcg-4.5 mcg/inh inhalation aerosol: 2 puff(s) inhaled 2 times a day (25 Nov 2023 23:04)  calcium acetate 667 mg oral tablet: 1 tab(s) orally 3 times a day (25 Nov 2023 23:04)  clonazePAM 0.5 mg oral tablet: 1 tab(s) orally once a day (25 Nov 2023 23:04)  Cymbalta 30 mg oral delayed release capsule: 1 cap(s) orally once a day (25 Nov 2023 23:04)  ergocalciferol 400 intl units (10 mcg) oral tablet: 1 tab(s) orally once a day (25 Nov 2023 23:05)  Flomax 0.4 mg oral capsule: 1 cap(s) orally once a day (25 Nov 2023 23:05)  hydrALAZINE 50 mg oral tablet: 1 tab(s) orally 3 times a day (25 Nov 2023 23:04)  Januvia 25 mg oral tablet: 1 tab(s) orally once a day (25 Nov 2023 23:04)  Lasix 80 mg oral tablet: 1 tab(s) orally once a day (25 Nov 2023 23:04)  loperamide 2 mg oral tablet: 1 tab(s) orally once a day (25 Nov 2023 23:04)  metoprolol succinate 25 mg oral capsule, extended release: 1 cap(s) orally once a day (25 Nov 2023 23:04)  Nephro-Jamie oral tablet: 1 tab(s) orally once a day (25 Nov 2023 23:04)  NIFEdipine (Eqv-Adalat CC) 90 mg oral tablet, extended release: 1 tab(s) orally once a day (25 Nov 2023 23:04)  pantoprazole 40 mg oral delayed release tablet: 1 tab(s) orally once a day (25 Nov 2023 23:05)  polyethylene glycol 3350 oral powder for reconstitution: 17 gram(s) orally once a day (25 Nov 2023 23:04)  sodium bicarbonate 325 mg oral tablet: 2 tab(s) orally 2 times a day (25 Nov 2023 23:05)  traZODone 50 mg oral tablet: 1 tab(s) orally once a day (25 Nov 2023 23:05)    Allergies    No Known Allergies    Intolerances      FAMILY HISTORY:  Family history of diabetes mellitus  Family history of diabetes mellitus (DM)    FH: heart attack (Mother)    FH: Parkinson's disease (Father)    FH: COPD (chronic obstructive pulmonary disease) (Mother)      Social History:       LABS                        7.6    7.92  )-----------( 184      ( 29 Nov 2023 05:30 )             24.1     11-29    144  |  103  |  23  ----------------------------<  102<H>  3.5   |  27  |  2.20<H>    Ca    8.6      29 Nov 2023 05:30  Phos  5.0     11-29  Mg     2.0     11-29    TPro  5.6<L>  /  Alb  2.7<L>  /  TBili  <0.2  /  DBili  x   /  AST  23  /  ALT  55<H>  /  AlkPhos  112  11-29        Vital Signs Last 24 Hrs  T(C): 36.9 (29 Nov 2023 14:07), Max: 37.6 (28 Nov 2023 17:00)  T(F): 98.5 (29 Nov 2023 14:07), Max: 99.7 (28 Nov 2023 17:00)  HR: 82 (29 Nov 2023 13:30) (66 - 99)  BP: 189/88 (29 Nov 2023 13:30) (162/72 - 215/88)  BP(mean): 126 (29 Nov 2023 13:30) (101 - 128)  RR: 20 (29 Nov 2023 13:30) (12 - 26)  SpO2: 96% (29 Nov 2023 13:30) (91% - 100%)    Parameters below as of 29 Nov 2023 13:30  Patient On (Oxygen Delivery Method): room air        PHYSICAL EXAM  General: NAD, AA0x3    Lower Extremity Focused:  Vasc: DP/PT 2/4, no edema or erythema, temp gradient warm to warm  Derm: sub met 2 3 cm x 2 cm lesion with central eschar, no exposed dermis, does not probe deep, - PTB, no drainage, hyperkeratotic border  Neuro: protective sensation in tact  MSK: s/p R partial 2nd + 3rd ray amputation    RADIOLOGY  X-rays: pending

## 2023-11-29 NOTE — PROGRESS NOTE ADULT - SUBJECTIVE AND OBJECTIVE BOX
***** Stepdown from MICU to [facility] *****  73F with PMH HTN, HLD, DM with OM s/p multiple amputations of toes; CAD with stress test planned for 11/28; CKD5 p/w dyspnea and lightheadedness, found to have TWIs and elevated cardiac enzymes, admitted for ischemic workup and ELBERT on CKD. Cardiopulmonary sx resolved s/p nitroglycerin; cardiac enzymes peaked and downtrending (suspected 2/2 CKD). On 11/26 rapid was called due to unresponsiveness, found to have metabolic encephalopathy 2/2 HAGMA (uremia vs lactate acidosis). PT was stepped-up from [facility] to MICU and intubated for airway protection, started on CVVHD, Levophed, Propofol. Pt was extubated 11/27 in the MICU with improved uremia and acidosis s/p CVVHD. Levophed and propofol weaned off. MICU stay c/b hypertensive urgency w/ SBP > 200s. Initiated intermittent HD 11/28 and started hydralzaine TID. Mental status continue to improve through stay and currently uptitrating BP regiment with -180s . Pt is medically stable to be transferred to [facility] 11/29.   ***** Stepdown from MICU to [facility] *****    Subjective/ROS: Patient seen and examined at bedside. Patient states that she is unsure if she wants to continue with HD because she heard that it can be painful. States that she is unhappy with her assisted living situation and is considering going home if she is able to have her "hoarded" apartment cleaned up. Patient has sad affect and blames herself for her chronic conditions and declining health.    Denies Fever/Chills, HA, CP, SOB, n/v, changes in urinary habits.  12pt ROS otherwise negative.    VITALS  Vital Signs Last 24 Hrs  T(C): 36.9 (29 Nov 2023 14:07), Max: 37.6 (28 Nov 2023 17:00)  T(F): 98.5 (29 Nov 2023 14:07), Max: 99.7 (28 Nov 2023 17:00)  HR: 79 (29 Nov 2023 14:58) (66 - 99)  BP: 180/82 (29 Nov 2023 14:58) (162/72 - 215/88)  BP(mean): 118 (29 Nov 2023 14:58) (101 - 128)  RR: 19 (29 Nov 2023 14:58) (12 - 26)  SpO2: 93% (29 Nov 2023 14:58) (91% - 100%)    Parameters below as of 29 Nov 2023 14:58  Patient On (Oxygen Delivery Method): room air        CAPILLARY BLOOD GLUCOSE      POCT Blood Glucose.: 103 mg/dL (29 Nov 2023 11:10)  POCT Blood Glucose.: 102 mg/dL (29 Nov 2023 05:36)  POCT Blood Glucose.: 114 mg/dL (28 Nov 2023 23:18)  POCT Blood Glucose.: 106 mg/dL (28 Nov 2023 19:13)      PHYSICAL EXAM  General: NAD, sad affect  Head: NC/AT; MMM; EOMI; HD cath in place on right side of neck  Neck: Supple; no JVD  Respiratory: CTAB; no wheezes/rales/rhonchi  Cardiovascular: Regular rhythm/rate; S1/S2+, + murmur at L upper sternal border, rubs gallops   Gastrointestinal: Soft; NTND; bowel sounds normal and present  Extremities: WWP; no edema/cyanosis  Neurological: A&Ox3, CNII-XII grossly intact; no obvious focal deficits    Antimicrobials:  MEDICATIONS  (STANDING):  cefTRIAXone   IVPB 2000 milliGRAM(s) IV Intermittent every 24 hours      Standing Medications:  MEDICATIONS  (STANDING):  albuterol    90 MICROgram(s) HFA Inhaler 2 Puff(s) Inhalation daily  albuterol/ipratropium for Nebulization 3 milliLiter(s) Nebulizer every 6 hours  atorvastatin 20 milliGRAM(s) Oral at bedtime  calcium acetate 667 milliGRAM(s) Oral three times a day with meals  carvedilol 6.25 milliGRAM(s) Oral every 12 hours  cefTRIAXone   IVPB 2000 milliGRAM(s) IV Intermittent every 24 hours  chlorhexidine 2% Cloths 1 Application(s) Topical <User Schedule>  clonazePAM  Tablet 0.25 milliGRAM(s) Oral at bedtime  dextrose 5%. 1000 milliLiter(s) (50 mL/Hr) IV Continuous <Continuous>  dextrose 5%. 1000 milliLiter(s) (100 mL/Hr) IV Continuous <Continuous>  dextrose 50% Injectable 25 Gram(s) IV Push once  dextrose 50% Injectable 25 Gram(s) IV Push once  dextrose 50% Injectable 12.5 Gram(s) IV Push once  DULoxetine 30 milliGRAM(s) Oral every 24 hours  glucagon  Injectable 1 milliGRAM(s) IntraMuscular once  heparin   Injectable 5000 Unit(s) SubCutaneous every 8 hours  hydrALAZINE 50 milliGRAM(s) Oral three times a day  insulin lispro (ADMELOG) corrective regimen sliding scale   SubCutaneous every 6 hours  nitroglycerin    Patch 0.3 mG/Hr(s) 1 patch Transdermal daily  pantoprazole    Tablet 40 milliGRAM(s) Oral before breakfast  traZODone 50 milliGRAM(s) Oral every 24 hours      PRN Medications:  MEDICATIONS  (PRN):  acetaminophen     Tablet .. 650 milliGRAM(s) Oral every 6 hours PRN Mild Pain (1 - 3)  dextrose Oral Gel 15 Gram(s) Oral once PRN Blood Glucose LESS THAN 70 milliGRAM(s)/deciliter      No Known Allergies      LABS                        7.6    7.92  )-----------( 184      ( 29 Nov 2023 05:30 )             24.1     11-29    144  |  103  |  23  ----------------------------<  102<H>  3.5   |  27  |  2.20<H>    Ca    8.6      29 Nov 2023 05:30  Phos  5.0     11-29  Mg     2.0     11-29    TPro  5.6<L>  /  Alb  2.7<L>  /  TBili  <0.2  /  DBili  x   /  AST  23  /  ALT  55<H>  /  AlkPhos  112  11-29      Urinalysis Basic - ( 29 Nov 2023 05:30 )    Color: x / Appearance: x / SG: x / pH: x  Gluc: 102 mg/dL / Ketone: x  / Bili: x / Urobili: x   Blood: x / Protein: x / Nitrite: x   Leuk Esterase: x / RBC: x / WBC x   Sq Epi: x / Non Sq Epi: x / Bacteria: x              IMAGING/EKG/ETC   ***** Stepdown from MICU to [facility] *****  73F with PMH HTN, HLD, DM with OM s/p multiple amputations of toes; CAD with stress test planned for 11/28; CKD5 p/w dyspnea and lightheadedness, found to have TWIs and elevated cardiac enzymes, admitted for ischemic workup and ELBERT on CKD. Cardiopulmonary sx resolved s/p nitroglycerin; cardiac enzymes peaked and downtrending (suspected 2/2 CKD). On 11/26 rapid was called due to unresponsiveness, found to have metabolic encephalopathy 2/2 HAGMA (uremia vs lactate acidosis). PT was stepped-up from [facility] to MICU and intubated for airway protection, started on CVVHD, Levophed, Propofol. Pt was extubated 11/27 in the MICU with improved uremia and acidosis s/p CVVHD. Levophed and propofol weaned off. MICU stay c/b hypertensive urgency w/ SBP > 200s. Initiated intermittent HD 11/28 and started hydralzaine TID. Mental status continue to improve through stay and currently uptitrating BP regiment with -180s . Pt is medically stable to be transferred to [facility] 11/29.   ***** Stepdown from MICU to [facility] *****    Subjective/ROS: Patient seen and examined at bedside. Patient states that she is unsure if she wants to continue with HD because she heard that it can be painful. States that she is unhappy with her assisted living situation and is considering going home if she is able to have her "hoarded" apartment cleaned up. Patient has sad affect and blames herself for her chronic conditions and declining health. Patient states that she has significant constipation requiring the use of fingers to evacuate stool.    Denies Fever/Chills, HA, CP, SOB, n/v, changes in urinary habits.  12pt ROS otherwise negative.    VITALS  Vital Signs Last 24 Hrs  T(C): 36.9 (29 Nov 2023 14:07), Max: 37.6 (28 Nov 2023 17:00)  T(F): 98.5 (29 Nov 2023 14:07), Max: 99.7 (28 Nov 2023 17:00)  HR: 79 (29 Nov 2023 14:58) (66 - 99)  BP: 180/82 (29 Nov 2023 14:58) (162/72 - 215/88)  BP(mean): 118 (29 Nov 2023 14:58) (101 - 128)  RR: 19 (29 Nov 2023 14:58) (12 - 26)  SpO2: 93% (29 Nov 2023 14:58) (91% - 100%)    Parameters below as of 29 Nov 2023 14:58  Patient On (Oxygen Delivery Method): room air        CAPILLARY BLOOD GLUCOSE      POCT Blood Glucose.: 103 mg/dL (29 Nov 2023 11:10)  POCT Blood Glucose.: 102 mg/dL (29 Nov 2023 05:36)  POCT Blood Glucose.: 114 mg/dL (28 Nov 2023 23:18)  POCT Blood Glucose.: 106 mg/dL (28 Nov 2023 19:13)      PHYSICAL EXAM  General: NAD, sad affect  Head: NC/AT; MMM; EOMI; HD cath in place on right side of neck  Neck: Supple; no JVD  Respiratory: CTAB; no wheezes/rales/rhonchi  Cardiovascular: Regular rhythm/rate; S1/S2+, + murmur at L upper sternal border, rubs gallops   Gastrointestinal: Soft; NTND; bowel sounds normal and present  Extremities: WWP; no edema/cyanosis  Neurological: A&Ox3, CNII-XII grossly intact; no obvious focal deficits    Antimicrobials:  MEDICATIONS  (STANDING):  cefTRIAXone   IVPB 2000 milliGRAM(s) IV Intermittent every 24 hours      Standing Medications:  MEDICATIONS  (STANDING):  albuterol    90 MICROgram(s) HFA Inhaler 2 Puff(s) Inhalation daily  albuterol/ipratropium for Nebulization 3 milliLiter(s) Nebulizer every 6 hours  atorvastatin 20 milliGRAM(s) Oral at bedtime  calcium acetate 667 milliGRAM(s) Oral three times a day with meals  carvedilol 6.25 milliGRAM(s) Oral every 12 hours  cefTRIAXone   IVPB 2000 milliGRAM(s) IV Intermittent every 24 hours  chlorhexidine 2% Cloths 1 Application(s) Topical <User Schedule>  clonazePAM  Tablet 0.25 milliGRAM(s) Oral at bedtime  dextrose 5%. 1000 milliLiter(s) (50 mL/Hr) IV Continuous <Continuous>  dextrose 5%. 1000 milliLiter(s) (100 mL/Hr) IV Continuous <Continuous>  dextrose 50% Injectable 25 Gram(s) IV Push once  dextrose 50% Injectable 25 Gram(s) IV Push once  dextrose 50% Injectable 12.5 Gram(s) IV Push once  DULoxetine 30 milliGRAM(s) Oral every 24 hours  glucagon  Injectable 1 milliGRAM(s) IntraMuscular once  heparin   Injectable 5000 Unit(s) SubCutaneous every 8 hours  hydrALAZINE 50 milliGRAM(s) Oral three times a day  insulin lispro (ADMELOG) corrective regimen sliding scale   SubCutaneous every 6 hours  nitroglycerin    Patch 0.3 mG/Hr(s) 1 patch Transdermal daily  pantoprazole    Tablet 40 milliGRAM(s) Oral before breakfast  traZODone 50 milliGRAM(s) Oral every 24 hours      PRN Medications:  MEDICATIONS  (PRN):  acetaminophen     Tablet .. 650 milliGRAM(s) Oral every 6 hours PRN Mild Pain (1 - 3)  dextrose Oral Gel 15 Gram(s) Oral once PRN Blood Glucose LESS THAN 70 milliGRAM(s)/deciliter      No Known Allergies      LABS                        7.6    7.92  )-----------( 184      ( 29 Nov 2023 05:30 )             24.1     11-29    144  |  103  |  23  ----------------------------<  102<H>  3.5   |  27  |  2.20<H>    Ca    8.6      29 Nov 2023 05:30  Phos  5.0     11-29  Mg     2.0     11-29    TPro  5.6<L>  /  Alb  2.7<L>  /  TBili  <0.2  /  DBili  x   /  AST  23  /  ALT  55<H>  /  AlkPhos  112  11-29      Urinalysis Basic - ( 29 Nov 2023 05:30 )    Color: x / Appearance: x / SG: x / pH: x  Gluc: 102 mg/dL / Ketone: x  / Bili: x / Urobili: x   Blood: x / Protein: x / Nitrite: x   Leuk Esterase: x / RBC: x / WBC x   Sq Epi: x / Non Sq Epi: x / Bacteria: x              IMAGING/EKG/ETC

## 2023-11-29 NOTE — CONSULT NOTE ADULT - SUBJECTIVE AND OBJECTIVE BOX
Patient is a 73y old  Female who presents with a chief complaint of Shortness of breath (28 Nov 2023 14:04)      HPI:  73 year old F POOR HISTORIAN with PMHx of DM (on insulin), HTN, HLD, ESRD (not on HD), asthma, anxiety and depression, multiple B/L toe amputations (b/l) due to osteomyelitis, chronic R foot ulcer, iron deficiency anemia who presented after an episode earlier this morning where she noticed her heart was racing, she was short of breath and persistently anxious. Pt states she also had back pain which is uncommon for her. Patient endorses history of panic attacks in the past, however says that this episode felt different. Upon arrival to the hospital, her symptoms improved tremendously. Currently, pt endorses intermittent dizziness where she feels the room is spinning and nausea but denies chest pain, back pain, palpitations, SOB. Earlier this week she had non bloody diarrhea for 4-5 days and after thanksgiving dinner 2 nights prior, pt endorsed non bloody vomiting that resolved in 1 day. Pt endorses orthopnea for many years and leg swelling since she was diagnosed with kidney disease. Pt denies fever, dysuria, cough/cold symptoms, hematuria, BRBPR, hematochezia.     In speaking with pt, she became teary eyed stating that she been depressed lately and often feels thoughts of killing herself and has felt this way in the past.    Of note, pt does not know any of her home medications. Medication list obtained from paperwork from [facility],    In the ED, T=97.9F, /59, HR 68, RR 20, SPO2=95%. Labs notable for Trop T notable for 311. . CKMB 6.8. Cr 4.38. BNP 84664. EKG reveals TWI in V1-V6, II, III, AVF and ST depressions in V4-V5. Pt was given aspirin 325mg x1 in the ED, Duonebs x1, and nitroglycerin patch.    Pt is now admitted to cardiac telemetry for further management of ELBERT on CKD and possible ischemic workup.        (25 Nov 2023 19:57)      PAST MEDICAL & SURGICAL HISTORY:  Asthma  Asthma      Depressive disorder  Depression      Hyperlipidemia  HLD (hyperlipidemia)      Type 2 diabetes mellitus  DM (diabetes mellitus)      Essential hypertension  HTN (hypertension)      Local infection of skin and subcutaneous tissue  Toe infection      Type 2 diabetes mellitus with neurological manifestations  Neuropathy in diabetes      Cytomegalovirus infection not present  No significant past surgical history      S/P cholecystectomy          HPI:                PREVIOUS DIAGNOSTIC TESTING:      ECHO  FINDINGS:    STRESS  FINDINGS:    CATHETERIZATION  FINDINGS:    MEDICATIONS  (STANDING):  albuterol    90 MICROgram(s) HFA Inhaler 2 Puff(s) Inhalation daily  albuterol/ipratropium for Nebulization 3 milliLiter(s) Nebulizer every 6 hours  artificial  tears Solution 1 Drop(s) Both EYES every 12 hours  atorvastatin 20 milliGRAM(s) Oral at bedtime  calcium acetate 667 milliGRAM(s) Oral three times a day with meals  cefTRIAXone   IVPB 2000 milliGRAM(s) IV Intermittent every 24 hours  chlorhexidine 2% Cloths 1 Application(s) Topical <User Schedule>  clonazePAM  Tablet 0.25 milliGRAM(s) Oral at bedtime  dextrose 5%. 1000 milliLiter(s) (50 mL/Hr) IV Continuous <Continuous>  dextrose 5%. 1000 milliLiter(s) (100 mL/Hr) IV Continuous <Continuous>  dextrose 50% Injectable 25 Gram(s) IV Push once  dextrose 50% Injectable 12.5 Gram(s) IV Push once  dextrose 50% Injectable 25 Gram(s) IV Push once  DULoxetine 30 milliGRAM(s) Oral every 24 hours  glucagon  Injectable 1 milliGRAM(s) IntraMuscular once  heparin   Injectable 5000 Unit(s) SubCutaneous every 8 hours  hydrALAZINE Injectable 30 milliGRAM(s) IV Push every 8 hours  insulin lispro (ADMELOG) corrective regimen sliding scale   SubCutaneous every 6 hours  nitroglycerin    Patch 0.3 mG/Hr(s) 1 patch Transdermal daily  pantoprazole    Tablet 40 milliGRAM(s) Oral before breakfast  petrolatum Ophthalmic Ointment 1 Application(s) Both EYES every 12 hours  traZODone 50 milliGRAM(s) Oral every 24 hours    MEDICATIONS  (PRN):  acetaminophen     Tablet .. 650 milliGRAM(s) Oral every 6 hours PRN Mild Pain (1 - 3)  dextrose Oral Gel 15 Gram(s) Oral once PRN Blood Glucose LESS THAN 70 milliGRAM(s)/deciliter  sodium chloride 0.9% lock flush 10 milliLiter(s) IV Push every 1 hour PRN Pre/post blood products, medications, blood draw, and to maintain line patency      FAMILY HISTORY:  Family history of diabetes mellitus  Family history of diabetes mellitus (DM)    FH: heart attack (Mother)    FH: Parkinson's disease (Father)    FH: COPD (chronic obstructive pulmonary disease) (Mother)        SOCIAL HISTORY:    CIGARETTES:    ALCOHOL:    REVIEW OF SYSTEMS      General:	    Skin/Breast:  	  Ophthalmologic:  	  ENMT:	    Respiratory and Thorax:  	  Cardiovascular:	    Gastrointestinal:	    Genitourinary:	    Musculoskeletal:	    Neurological:	    Psychiatric:	    Hematology/Lymphatics:	    Endocrine:	    Allergic/Immunologic:	    Vital Signs Last 24 Hrs  T(C): 36.5 (29 Nov 2023 09:00), Max: 37.6 (28 Nov 2023 17:00)  T(F): 97.7 (29 Nov 2023 09:00), Max: 99.7 (28 Nov 2023 17:00)  HR: 82 (29 Nov 2023 10:15) (66 - 99)  BP: 180/81 (29 Nov 2023 10:15) (162/72 - 215/88)  BP(mean): 117 (29 Nov 2023 10:15) (101 - 126)  RR: 16 (29 Nov 2023 10:15) (12 - 26)  SpO2: 92% (29 Nov 2023 10:15) (91% - 100%)    Parameters below as of 29 Nov 2023 10:15  Patient On (Oxygen Delivery Method): room air        PHYSICAL EXAM:      Constitutional:    Eyes:    ENMT:    Neck:    Breasts:    Back:    Respiratory:    Cardiovascular:    Gastrointestinal:    Genitourinary:    Rectal:    Extremities:    Vascular:    Neurological:    Skin:    Lymph Nodes:    Musculoskeletal:    Psychiatric:            INTERPRETATION OF TELEMETRY:    ECG:    I&O's Detail    28 Nov 2023 07:01  -  29 Nov 2023 07:00  --------------------------------------------------------  IN:    IV PiggyBack: 250 mL  Total IN: 250 mL    OUT:    Other (mL): 2200 mL    Ureteral Catheter (mL): 2330 mL  Total OUT: 4530 mL    Total NET: -4280 mL      29 Nov 2023 07:01  -  29 Nov 2023 11:46  --------------------------------------------------------  IN:  Total IN: 0 mL    OUT:    Ureteral Catheter (mL): 180 mL  Total OUT: 180 mL    Total NET: -180 mL          LABS:                        7.6    7.92  )-----------( 184      ( 29 Nov 2023 05:30 )             24.1     11-29    144  |  103  |  23  ----------------------------<  102<H>  3.5   |  27  |  2.20<H>    Ca    8.6      29 Nov 2023 05:30  Phos  5.0     11-29  Mg     2.0     11-29    TPro  5.6<L>  /  Alb  2.7<L>  /  TBili  <0.2  /  DBili  x   /  AST  23  /  ALT  55<H>  /  AlkPhos  112  11-29          Urinalysis Basic - ( 29 Nov 2023 05:30 )    Color: x / Appearance: x / SG: x / pH: x  Gluc: 102 mg/dL / Ketone: x  / Bili: x / Urobili: x   Blood: x / Protein: x / Nitrite: x   Leuk Esterase: x / RBC: x / WBC x   Sq Epi: x / Non Sq Epi: x / Bacteria: x      I&O's Summary    28 Nov 2023 07:01  -  29 Nov 2023 07:00  --------------------------------------------------------  IN: 250 mL / OUT: 4530 mL / NET: -4280 mL    29 Nov 2023 07:01  -  29 Nov 2023 11:46  --------------------------------------------------------  IN: 0 mL / OUT: 180 mL / NET: -180 mL      BNP  RADIOLOGY & ADDITIONAL STUDIES: 73F long term resident (unable to care for self, lived alone) w DMII w toe amputations, advanced CKD5, asthma, anxiety who presented with SOB, palpitations. Pt can't recall any ischemia eval but supposed to have a stress test as OP. EKG sinus with old repol abnormalities. CXR cardiomegaly w mild congestion. Anemic, with elevated but flat trop trend was initially admitted to cardiac tele (11/25) RRR called for AMS (11/26) likely  metabolic encephalopathy 2/2 uremia vs acidosis intubated for aiway protection required levophed for CVVHD. Extubated 11/27 course complicated by hypertensive urgency. Cardiology consulted for comanagement.    All: None  Meds:  Lipitor 20mg, Hydralazine 50 TID, Januvia 25, Flomax 0.4, Lasix 80mg, Toprol 25, Nifedipine 90mg, Trazodone 50mg  PSH: cholecystectomy  FH: Mom MI, Dad Alzheimers  SH: Non smoker, no illicit drug use, no EtoH use      MEDICATIONS  (STANDING):  albuterol    90 MICROgram(s) HFA Inhaler 2 Puff(s) Inhalation daily  albuterol/ipratropium for Nebulization 3 milliLiter(s) Nebulizer every 6 hours  artificial  tears Solution 1 Drop(s) Both EYES every 12 hours  atorvastatin 20 milliGRAM(s) Oral at bedtime  calcium acetate 667 milliGRAM(s) Oral three times a day with meals  cefTRIAXone   IVPB 2000 milliGRAM(s) IV Intermittent every 24 hours  chlorhexidine 2% Cloths 1 Application(s) Topical <User Schedule>  clonazePAM  Tablet 0.25 milliGRAM(s) Oral at bedtime  dextrose 5%. 1000 milliLiter(s) (50 mL/Hr) IV Continuous <Continuous>  dextrose 5%. 1000 milliLiter(s) (100 mL/Hr) IV Continuous <Continuous>  dextrose 50% Injectable 25 Gram(s) IV Push once  dextrose 50% Injectable 12.5 Gram(s) IV Push once  dextrose 50% Injectable 25 Gram(s) IV Push once  DULoxetine 30 milliGRAM(s) Oral every 24 hours  glucagon  Injectable 1 milliGRAM(s) IntraMuscular once  heparin   Injectable 5000 Unit(s) SubCutaneous every 8 hours  hydrALAZINE Injectable 30 milliGRAM(s) IV Push every 8 hours  insulin lispro (ADMELOG) corrective regimen sliding scale   SubCutaneous every 6 hours  nitroglycerin    Patch 0.3 mG/Hr(s) 1 patch Transdermal daily  pantoprazole    Tablet 40 milliGRAM(s) Oral before breakfast  petrolatum Ophthalmic Ointment 1 Application(s) Both EYES every 12 hours  traZODone 50 milliGRAM(s) Oral every 24 hours    MEDICATIONS  (PRN):  acetaminophen     Tablet .. 650 milliGRAM(s) Oral every 6 hours PRN Mild Pain (1 - 3)  dextrose Oral Gel 15 Gram(s) Oral once PRN Blood Glucose LESS THAN 70 milliGRAM(s)/deciliter  sodium chloride 0.9% lock flush 10 milliLiter(s) IV Push every 1 hour PRN Pre/post blood products, medications, blood draw, and to maintain line patency      Vital Signs Last 24 Hrs  T(C): 36.5 (29 Nov 2023 09:00), Max: 37.6 (28 Nov 2023 17:00)  T(F): 97.7 (29 Nov 2023 09:00), Max: 99.7 (28 Nov 2023 17:00)  HR: 82 (29 Nov 2023 10:15) (66 - 99)  BP: 180/81 (29 Nov 2023 10:15) (162/72 - 215/88)  BP(mean): 117 (29 Nov 2023 10:15) (101 - 126)  RR: 16 (29 Nov 2023 10:15) (12 - 26)  SpO2: 92% (29 Nov 2023 10:15) (91% - 100%)    Parameters below as of 29 Nov 2023 10:15  Patient On (Oxygen Delivery Method): room air    PHYSICAL EXAM:    General: NAD, calm, speaks in full sentences   HEENT: + L TLC, + R HD cath, MMM,   Respiratory: CTAB; no wheezes/rales/rhonchi, normal WOB  Cardiovascular: RRR, + S1/S2; 2/6 crescendo murmur at L>R upper sternal border.   Gastrointestinal: Soft; NTND; bowel sounds normal and present  : no suprapubic tenderness, + richardson draining nonbloody, clear liquid.   Vascular: extremities WWP; no edema/cyanosis, 2+ distal pulses b/l   MSK: s/p multiple toe amputation, 1 lesions on R plantar surface with eschar  Neurological: A&Ox3, affect appropriate, no focal deficits  Integument: No gross skin abnormalities or rashes     INTERPRETATION OF TELEMETRY: Sinus rhythm      I&O's Detail    28 Nov 2023 07:01  -  29 Nov 2023 07:00  --------------------------------------------------------  IN:    IV PiggyBack: 250 mL  Total IN: 250 mL    OUT:    Other (mL): 2200 mL    Ureteral Catheter (mL): 2330 mL  Total OUT: 4530 mL    Total NET: -4280 mL      29 Nov 2023 07:01  -  29 Nov 2023 11:46  --------------------------------------------------------  IN:  Total IN: 0 mL    OUT:    Ureteral Catheter (mL): 180 mL  Total OUT: 180 mL    Total NET: -180 mL          LABS:                        7.6    7.92  )-----------( 184      ( 29 Nov 2023 05:30 )             24.1     11-29    144  |  103  |  23  ----------------------------<  102<H>  3.5   |  27  |  2.20<H>    Ca    8.6      29 Nov 2023 05:30  Phos  5.0     11-29  Mg     2.0     11-29    TPro  5.6<L>  /  Alb  2.7<L>  /  TBili  <0.2  /  DBili  x   /  AST  23  /  ALT  55<H>  /  AlkPhos  112  11-29          Urinalysis Basic - ( 29 Nov 2023 05:30 )    Color: x / Appearance: x / SG: x / pH: x  Gluc: 102 mg/dL / Ketone: x  / Bili: x / Urobili: x   Blood: x / Protein: x / Nitrite: x   Leuk Esterase: x / RBC: x / WBC x   Sq Epi: x / Non Sq Epi: x / Bacteria: x      I&O's Summary    28 Nov 2023 07:01  -  29 Nov 2023 07:00  --------------------------------------------------------  IN: 250 mL / OUT: 4530 mL / NET: -4280 mL    29 Nov 2023 07:01  -  29 Nov 2023 11:46  --------------------------------------------------------  IN: 0 mL / OUT: 180 mL / NET: -180 mL      BNP  RADIOLOGY & ADDITIONAL STUDIES:

## 2023-11-29 NOTE — PROGRESS NOTE ADULT - ATTENDING COMMENTS
HTN, DM, multiple toe amputations CAD, CKD 5 now with need for HD   physical as above  continue HD  passed dysphagia screen today  start diet  restart hydralazine, nifedipine, toprol  restart lower dose of klonopin

## 2023-11-29 NOTE — PHYSICAL THERAPY INITIAL EVALUATION ADULT - GENERAL OBSERVATIONS, REHAB EVAL
PT IE Completed, pt cleared for PT by SPENCER Miller, pt received in supine with +tele, +pulse ox, +R IJ HD cath, L triple lumen IJ cath, +2 heplock, +richardson

## 2023-11-29 NOTE — PROGRESS NOTE ADULT - ATTENDING COMMENTS
73F with PMH HTN, HLD, DM with OM s/p multiple amputations of toes; CAD with stress test planned for 11/28; CKD5 presenting with dyspnea, admitted for ischemic w/u, ELBERT on CKD5, stepped up to MICU for HAGMA metabolic encephalopathy 2/2 uremia vs. acidosis requiring intubation; now extubated, improving s/p CVVHD but course c/b hypertensive urgency. Mental status improving, on intermittent HD. BP improving w/ uptitrating BP meds. Medically stable to step down to RMF.    Plan:  Continue carvedilol, hydralazine and nifedipine for HTN; uptitrate as needed   IR consulted for THD catheter  Troponemia due to type 2 MI from renal failure. Close outpatient f/u with cards    rest as per resident note 73F with PMH HTN, HLD, DM with OM s/p multiple amputations of toes; CAD with stress test planned for 11/28; CKD5 presenting with dyspnea, admitted for ischemic w/u, ELBERT on CKD5, stepped up to MICU for HAGMA metabolic encephalopathy 2/2 uremia vs. acidosis requiring intubation; now extubated, improving s/p CVVHD but course c/b hypertensive urgency. Mental status improving, on intermittent HD. BP improving w/ uptitrating BP meds. Medically stable to step down to RMF.    All labs and images reviewed     Plan:  Continue carvedilol, hydralazine and nifedipine for HTN; uptitrate as needed   IR consulted for THD catheter  Troponemia due to type 2 MI from renal failure. Close outpatient f/u with cards    rest as per resident note

## 2023-11-29 NOTE — PROGRESS NOTE ADULT - ATTENDING COMMENTS
73YF now ESRD initially admitted for SOB and ischemic w/u h/c c/b metabolic encephalopathy , acidosis, hyperkalmeia, upgraded to MICU for further management, initiated on CVVHD for HD instability, transitioned to iHD, BP still not controlled, continue isolated UF as tolerated.  Chart reviewed and case d/w Dr. Martinez.  Agree with HD plan and details of note above.   Adjust antihypertensives as needed.    Await TDC.

## 2023-11-29 NOTE — CONSULT NOTE ADULT - ASSESSMENT
72 y/o F PMHx of DM (on insulin), HTN, HLD, ESRD (not on HD), asthma, anxiety and depression, multiple B/L toe amputations (b/l) due to osteomyelitis, chronic R foot ulcer, iron deficiency anemia, and ESRD admitted for volume overload due to renal failure. Podiatry consulted for R foot lesion. At time of consult, WBC 7.9, ESR/CRP pending, VSS. Clinically, wound closed with no clinical signs of infection.     Plan:   - Excisional debridement of hyperkeratotic skin down to and including level of epidermis with #10 blade scalpel  - F/u x-rays  - Local wound care with betadine DSD  - Pt can heel WBAT to RLE, WBAT to LLE  - Rest of care per primary team    Plan pending d/w Attending.    74 y/o F PMHx of DM (on insulin), HTN, HLD, ESRD (not on HD), asthma, anxiety and depression, multiple B/L toe amputations (b/l) due to osteomyelitis, chronic R foot ulcer, iron deficiency anemia, and ESRD admitted for volume overload due to renal failure. Podiatry consulted for R foot lesion. At time of consult, WBC 7.9, ESR/CRP pending, VSS. Clinically, wound closed with no clinical signs of infection.     Plan:   - Excisional debridement of hyperkeratotic skin down to and including level of epidermis with #10 blade scalpel  - F/u x-rays  - Local wound care with betadine DSD  - Pt can heel WBAT to RLE, WBAT to LLE  - Rest of care per primary team    Plan pending d/w Attending.    74 y/o F PMHx of DM (on insulin), HTN, HLD, ESRD (not on HD), asthma, anxiety and depression, multiple B/L toe amputations (b/l) due to osteomyelitis, chronic R foot ulcer, iron deficiency anemia, and ESRD admitted for volume overload due to renal failure. Podiatry consulted for R foot lesion. At time of consult, WBC 7.9, ESR/CRP pending, VSS. Clinically, wound closed with no clinical signs of infection.     Plan:   - Excisional debridement of hyperkeratotic skin down to and including level of epidermis with #10 blade scalpel  - F/u x-rays, ESR/CRP  - Local wound care with betadine DSD  - Pt can heel WBAT to RLE, WBAT to LLE  - Rest of care per primary team    Plan pending d/w Attending.    72 y/o F PMHx of DM (on insulin), HTN, HLD, ESRD (not on HD), asthma, anxiety and depression, multiple B/L toe amputations (b/l) due to osteomyelitis, chronic R foot ulcer, iron deficiency anemia, and ESRD admitted for volume overload due to renal failure. Podiatry consulted for R foot lesion. At time of consult, WBC 7.9, ESR/CRP pending, VSS. Clinically, wound closed with no clinical signs of infection.     Plan:   - Excisional debridement of hyperkeratotic skin down to and including level of epidermis with #10 blade scalpel  - F/u x-rays, ESR/CRP  - Local wound care with betadine DSD  - Pt can heel WBAT to RLE, WBAT to LLE  - Rest of care per primary team    Plan pending d/w Attending.

## 2023-11-29 NOTE — PROGRESS NOTE ADULT - ASSESSMENT
73F with PMH HTN, HLD, DM with OM s/p multiple amputations of toes; CAD with stress test planned for 11/28; CKD5 presenting with dyspnea, admitted for ischemic w/u, ELBERT on CKD5, stepped up to MICU for HAGMA metabolic encephalopathy 2/2 uremia vs. acidosis requiring intubation; now extubated, improving s/p CVVHD but course c/b hypertensive urgency. Mental status improving, on intermittent HD. BP improving w/ uptitrating BP meds. Medically stable to step down to RMF.

## 2023-11-29 NOTE — PROCEDURE NOTE - NSICDXPROCEDURE_GEN_ALL_CORE_FT
Message    Date / Time: ARYAN SCALES RN, BSN 1/11 11000.      General Information   Who called: TERRANCE.    Alternative Phone Number: [phone].    Last Visit: 1/4.    Next Visit: 3/8.   Reason for Call PT REQUESTING A REFILL ON HER ATIVAN 2MG #30/30 BE SENT TO [facility]. THANKS ADDISON      Plan   1. LORazepam 2 MG Oral Tablet; TAKE 1 TABLET BEDTIME    Signatures   Electronically signed by : ODILIA LOPEZ M.D.; Jan 11 2018  1:40PM CST (Author)    
PROCEDURES:  Arterial puncture 26-Nov-2023 22:24:30  Chance Castaneda  
PROCEDURES:  US guided vascular access 29-Nov-2023 13:27:04  Michael Staley  
PROCEDURES:  Insertion, nasogastric tube 26-Nov-2023 21:24:17  Chance Castaneda  
PROCEDURES:  Insertion, arterial line, radial, left 26-Nov-2023 17:42:41  Chance Castaneda  
PROCEDURES:  Imaging guided insertion of central venous cannula 27-Nov-2023 02:14:38  Chance Castaneda

## 2023-11-29 NOTE — PROGRESS NOTE ADULT - SUBJECTIVE AND OBJECTIVE BOX
***INCOMPLETE NOTE*** Hospital Course  73F with PMH HTN, HLD, DM with OM s/p multiple amputations of toes; CAD with stress test planned for 11/28; CKD5 p/w dyspnea and lightheadedness, found to have TWIs and elevated cardiac enzymes, admitted for ischemic workup and ELBERT on CKD. Cardiopulmonary sx resolved s/p nitroglycerin; cardiac enzymes peaked and downtrending (suspected 2/2 CKD). On 11/26 rapid was called due to unresponsiveness, found to have metabolic encephalopathy 2/2 HAGMA (uremia vs lactate acidosis). PT was stepped-up from [facility] to MICU and intubated for airway protection, started on CVVHD, Levophed, Propofol. Pt was extubated 11/27 in the MICU with improved uremia and acidosis s/p CVVHD. Levophed and propofol weaned off. MICU stay c/b hypertensive urgency w/ SBP > 200s. Initiated intermittent HD 11/28 and started hydralzaine TID. Mental status continue to improve through stay and currently uptitrating BP regiment with -180s . Pt is medically stable to be transferred to [email or web address] 11/29.       OVERNIGHT EVENT/ INTERVAL HPI: pt with good UOP. hydral increased to 25 q8. labetalol 10 IVPx3 for HTN, ativan 0.25 IV and HTN (possible benzo withdrawal) with   slight improvement in BP.     SUBJECTIVE  Pt was seen and examined at bedside. Passed dysphagia testing bedside. Appears alert with no active concerns.     Vital Signs Last 24 Hrs  T(C): 36.5 (29 Nov 2023 09:00), Max: 37.6 (28 Nov 2023 17:00)  T(F): 97.7 (29 Nov 2023 09:00), Max: 99.7 (28 Nov 2023 17:00)  HR: 76 (29 Nov 2023 11:00) (66 - 99)  BP: 183/79 (29 Nov 2023 11:00) (162/72 - 215/88)  BP(mean): 114 (29 Nov 2023 11:00) (101 - 126)  RR: 21 (29 Nov 2023 11:00) (12 - 26)  SpO2: 95% (29 Nov 2023 11:00) (91% - 100%)    Parameters below as of 29 Nov 2023 11:00  Patient On (Oxygen Delivery Method): room air      PHYSICAL EXAM   General: NAD, calm, speaks in full sentences   HEENT: + L TLC, + R HD cath, MMM,   Respiratory: CTAB; no wheezes/rales/rhonchi, normal WOB  Cardiovascular: RRR, + S1/S2; 2/6 crescendo murmur at L>R upper sternal border.   Gastrointestinal: Soft; NTND; bowel sounds normal and present  : no suprapubic tenderness, + richardson draining nonbloody, clear liquid.   Vascular: extremities WWP; no edema/cyanosis, 2+ distal pulses b/l   MSK: s/p multiple toe amputation, 1 lesions on R plantar surface with eschar  Neurological: A&Ox3, affect appropriate, no focal deficits  Integument: No gross skin abnormalities or rashes       .  LABS:                         7.6    7.92  )-----------( 184      ( 29 Nov 2023 05:30 )             24.1     11-29    144  |  103  |  23  ----------------------------<  102<H>  3.5   |  27  |  2.20<H>    Ca    8.6      29 Nov 2023 05:30  Phos  5.0     11-29  Mg     2.0     11-29    TPro  5.6<L>  /  Alb  2.7<L>  /  TBili  <0.2  /  DBili  x   /  AST  23  /  ALT  55<H>  /  AlkPhos  112  11-29      Urinalysis Basic - ( 29 Nov 2023 05:30 )    Color: x / Appearance: x / SG: x / pH: x  Gluc: 102 mg/dL / Ketone: x  / Bili: x / Urobili: x   Blood: x / Protein: x / Nitrite: x   Leuk Esterase: x / RBC: x / WBC x   Sq Epi: x / Non Sq Epi: x / Bacteria: x                RADIOLOGY, EKG & ADDITIONAL TESTS: Reviewed.

## 2023-11-29 NOTE — PROCEDURE NOTE - NSINDICATIONS_GEN_A_CORE
drainage
venous access
arterial puncture to obtain ABG's/blood sampling/cannulation purposes/critical patient/monitoring purposes
critical illness/dialysis/CRRT
emergency venous access/fluid administration
arterial puncture to obtain ABG's/blood sampling
critical illness/emergency venous access/hemodynamic monitoring/hypertonic/irritant infusion/venous access/volume resuscitation

## 2023-11-29 NOTE — PROCEDURE NOTE - NSANESTHESIA_GEN_A_CORE
no anesthesia administered
1% lidocaine
no anesthesia administered
no anesthesia administered
1% lidocaine
1% lidocaine

## 2023-11-29 NOTE — PROGRESS NOTE ADULT - SUBJECTIVE AND OBJECTIVE BOX
Seen and evaluated at HD unit on HD, tolerating well   BP elevated at tx time, continue isolated UF 2L goal but may be limited as patient received BP meds prior     Tolerated HD 11/28 with 2.2L UF and diuresed additional 2.3L       Meds:    acetaminophen     Tablet .. 650 every 6 hours PRN  albuterol    90 MICROgram(s) HFA Inhaler 2 daily  albuterol/ipratropium for Nebulization 3 every 6 hours  atorvastatin 20 at bedtime  calcium acetate 667 three times a day with meals  carvedilol 6.25 every 12 hours  cefTRIAXone   IVPB 2000 every 24 hours  chlorhexidine 2% Cloths 1 <User Schedule>  clonazePAM  Tablet 0.25 at bedtime  dextrose 5%. 1000 <Continuous>  dextrose 5%. 1000 <Continuous>  dextrose 50% Injectable 25 once  dextrose 50% Injectable 12.5 once  dextrose 50% Injectable 25 once  dextrose Oral Gel 15 once PRN  DULoxetine 30 every 24 hours  glucagon  Injectable 1 once  heparin   Injectable 5000 every 8 hours  hydrALAZINE 50 three times a day  insulin lispro (ADMELOG) corrective regimen sliding scale  every 6 hours  nitroglycerin    Patch 0.3 mG/Hr(s) 1 daily  pantoprazole    Tablet 40 before breakfast  polyethylene glycol 3350 17 two times a day  senna 2 at bedtime  traZODone 50 every 24 hours      T(C): , Max: 37.6 (11-28-23 @ 17:00)  T(F): , Max: 99.7 (11-28-23 @ 17:00)  HR: 78 (11-29-23 @ 16:10)  BP: 159/67 (11-29-23 @ 16:10)  BP(mean): 101 (11-29-23 @ 15:42)  RR: 16 (11-29-23 @ 16:10)  SpO2: 97% (11-29-23 @ 16:10)  Wt(kg): --    11-28 @ 07:01  -  11-29 @ 07:00  --------------------------------------------------------  IN: 250 mL / OUT: 4530 mL / NET: -4280 mL    11-29 @ 07:01  -  11-29 @ 16:43  --------------------------------------------------------  IN: 0 mL / OUT: 180 mL / NET: -180 mL          Review of Systems:  ROS negative except as per HPI      PHYSICAL EXAM:  GENERAL: NAD laying in bed tolerating HD   NECK: supple, No JVD  CHEST/LUNG: Clear to auscultation bilaterally  HEART: normal S1S2, RRR  ABDOMEN: Soft, Nontender, +BS,    EXTREMITIES: No clubbing, cyanosis, or edema   NEUROLOGY: AAO x3, no focal neurological deficit  ACCESS: [facility] HD cath accessed       LABS:                        7.6    7.92  )-----------( 184      ( 29 Nov 2023 05:30 )             24.1     11-29    144  |  103  |  23  ----------------------------<  102<H>  3.5   |  27  |  2.20<H>    Ca    8.6      29 Nov 2023 05:30  Phos  5.0     11-29  Mg     2.0     11-29    TPro  5.6<L>  /  Alb  2.7<L>  /  TBili  <0.2  /  DBili  x   /  AST  23  /  ALT  55<H>  /  AlkPhos  112  11-29        Urinalysis Basic - ( 29 Nov 2023 05:30 )    Color: x / Appearance: x / SG: x / pH: x  Gluc: 102 mg/dL / Ketone: x  / Bili: x / Urobili: x   Blood: x / Protein: x / Nitrite: x   Leuk Esterase: x / RBC: x / WBC x   Sq Epi: x / Non Sq Epi: x / Bacteria: x            RADIOLOGY & ADDITIONAL STUDIES:

## 2023-11-29 NOTE — PHYSICAL THERAPY INITIAL EVALUATION ADULT - PERTINENT HX OF CURRENT PROBLEM, REHAB EVAL
73F with PMH HTN, HLD, DM with OM s/p multiple amputations of toes; CAD with stress test planned for 11/28; CKD5 presenting with dyspnea, admitted for ischemic w/u, ELBERT on CKD5, stepped up to MICU for HAGMA metabolic encephalopathy 2/2 uremia vs. acidosis requiring intubation; now extubated, improving s/p CVVHD but course c/b hypertensive urgency. Mental status improving, on intermittent HD. BP improving w/ uptitrating BP meds. Medically stable to step down to RF

## 2023-11-29 NOTE — PROCEDURE NOTE - NSPROCNAME_GEN_A_CORE
Central Line Insertion
Gastric Intubation/Gastric Lavage
Peripheral Line Insertion
Arterial Puncture/Cannulation
Central Line Insertion
Peripheral Line Insertion
Arterial Puncture/Cannulation

## 2023-11-29 NOTE — PROCEDURE NOTE - NSPROCDETAILS_GEN_ALL_CORE
location identified, draped/prepped, sterile technique used/blood seen on insertion/dressing applied/flushes easily/secured in place/sterile technique, catheter placed
guidewire recovered/lumen(s) aspirated and flushed/sterile dressing applied/sterile technique, catheter placed/ultrasound guidance with use of sterile gel and probe cove
location identified, draped/prepped, sterile technique used, needle inserted/introduced/positive blood return obtained via catheter/connected to a pressurized flush line/sutured in place/hemostasis with direct pressure, dressing applied/Seldinger technique/all materials/supplies accounted for at end of procedure
location identified, draped/prepped, sterile technique used/blood seen on insertion/dressing applied/flushes easily
confirmed via CXR/audible air bolus/placement confirmed by auscultation/orogastric
location identified, draped/prepped, sterile technique used, needle inserted/introduced/hemostasis with direct pressure, dressing applied/all materials/supplies accounted for at end of procedure
guidewire recovered/lumen(s) aspirated and flushed/sterile dressing applied/sterile technique, catheter placed/ultrasound guidance with use of sterile gel and probe cove

## 2023-11-30 ENCOUNTER — TRANSCRIPTION ENCOUNTER (OUTPATIENT)
Age: 73
End: 2023-11-30

## 2023-11-30 DIAGNOSIS — N18.6 END STAGE RENAL DISEASE: ICD-10-CM

## 2023-11-30 DIAGNOSIS — J18.9 PNEUMONIA, UNSPECIFIED ORGANISM: ICD-10-CM

## 2023-11-30 DIAGNOSIS — Z71.89 OTHER SPECIFIED COUNSELING: ICD-10-CM

## 2023-11-30 DIAGNOSIS — Z51.5 ENCOUNTER FOR PALLIATIVE CARE: ICD-10-CM

## 2023-11-30 DIAGNOSIS — R53.81 OTHER MALAISE: ICD-10-CM

## 2023-11-30 DIAGNOSIS — I21.A1 MYOCARDIAL INFARCTION TYPE 2: ICD-10-CM

## 2023-11-30 LAB
ALBUMIN SERPL ELPH-MCNC: 2.9 G/DL — LOW (ref 3.3–5)
ALBUMIN SERPL ELPH-MCNC: 2.9 G/DL — LOW (ref 3.3–5)
ALP SERPL-CCNC: 104 U/L — SIGNIFICANT CHANGE UP (ref 40–120)
ALP SERPL-CCNC: 104 U/L — SIGNIFICANT CHANGE UP (ref 40–120)
ALT FLD-CCNC: 49 U/L — HIGH (ref 10–45)
ALT FLD-CCNC: 49 U/L — HIGH (ref 10–45)
ANION GAP SERPL CALC-SCNC: 14 MMOL/L — SIGNIFICANT CHANGE UP (ref 5–17)
ANION GAP SERPL CALC-SCNC: 14 MMOL/L — SIGNIFICANT CHANGE UP (ref 5–17)
AST SERPL-CCNC: 19 U/L — SIGNIFICANT CHANGE UP (ref 10–40)
AST SERPL-CCNC: 19 U/L — SIGNIFICANT CHANGE UP (ref 10–40)
BASOPHILS # BLD AUTO: 0.02 K/UL — SIGNIFICANT CHANGE UP (ref 0–0.2)
BASOPHILS # BLD AUTO: 0.02 K/UL — SIGNIFICANT CHANGE UP (ref 0–0.2)
BASOPHILS NFR BLD AUTO: 0.2 % — SIGNIFICANT CHANGE UP (ref 0–2)
BASOPHILS NFR BLD AUTO: 0.2 % — SIGNIFICANT CHANGE UP (ref 0–2)
BILIRUB SERPL-MCNC: <0.2 MG/DL — SIGNIFICANT CHANGE UP (ref 0.2–1.2)
BILIRUB SERPL-MCNC: <0.2 MG/DL — SIGNIFICANT CHANGE UP (ref 0.2–1.2)
BUN SERPL-MCNC: 46 MG/DL — HIGH (ref 7–23)
BUN SERPL-MCNC: 46 MG/DL — HIGH (ref 7–23)
CALCIUM SERPL-MCNC: 8.2 MG/DL — LOW (ref 8.4–10.5)
CALCIUM SERPL-MCNC: 8.2 MG/DL — LOW (ref 8.4–10.5)
CHLORIDE SERPL-SCNC: 104 MMOL/L — SIGNIFICANT CHANGE UP (ref 96–108)
CHLORIDE SERPL-SCNC: 104 MMOL/L — SIGNIFICANT CHANGE UP (ref 96–108)
CO2 SERPL-SCNC: 25 MMOL/L — SIGNIFICANT CHANGE UP (ref 22–31)
CO2 SERPL-SCNC: 25 MMOL/L — SIGNIFICANT CHANGE UP (ref 22–31)
CREAT SERPL-MCNC: 2.86 MG/DL — HIGH (ref 0.5–1.3)
CREAT SERPL-MCNC: 2.86 MG/DL — HIGH (ref 0.5–1.3)
CRP SERPL-MCNC: 48.9 MG/L — HIGH (ref 0–4)
CRP SERPL-MCNC: 48.9 MG/L — HIGH (ref 0–4)
EGFR: 17 ML/MIN/1.73M2 — LOW
EGFR: 17 ML/MIN/1.73M2 — LOW
EOSINOPHIL # BLD AUTO: 0.24 K/UL — SIGNIFICANT CHANGE UP (ref 0–0.5)
EOSINOPHIL # BLD AUTO: 0.24 K/UL — SIGNIFICANT CHANGE UP (ref 0–0.5)
EOSINOPHIL NFR BLD AUTO: 3 % — SIGNIFICANT CHANGE UP (ref 0–6)
EOSINOPHIL NFR BLD AUTO: 3 % — SIGNIFICANT CHANGE UP (ref 0–6)
ERYTHROCYTE [SEDIMENTATION RATE] IN BLOOD: 60 MM/HR — HIGH
ERYTHROCYTE [SEDIMENTATION RATE] IN BLOOD: 60 MM/HR — HIGH
GLUCOSE BLDC GLUCOMTR-MCNC: 119 MG/DL — HIGH (ref 70–99)
GLUCOSE BLDC GLUCOMTR-MCNC: 119 MG/DL — HIGH (ref 70–99)
GLUCOSE BLDC GLUCOMTR-MCNC: 131 MG/DL — HIGH (ref 70–99)
GLUCOSE BLDC GLUCOMTR-MCNC: 131 MG/DL — HIGH (ref 70–99)
GLUCOSE BLDC GLUCOMTR-MCNC: 165 MG/DL — HIGH (ref 70–99)
GLUCOSE BLDC GLUCOMTR-MCNC: 165 MG/DL — HIGH (ref 70–99)
GLUCOSE BLDC GLUCOMTR-MCNC: 181 MG/DL — HIGH (ref 70–99)
GLUCOSE BLDC GLUCOMTR-MCNC: 181 MG/DL — HIGH (ref 70–99)
GLUCOSE BLDC GLUCOMTR-MCNC: 194 MG/DL — HIGH (ref 70–99)
GLUCOSE BLDC GLUCOMTR-MCNC: 194 MG/DL — HIGH (ref 70–99)
GLUCOSE SERPL-MCNC: 112 MG/DL — HIGH (ref 70–99)
GLUCOSE SERPL-MCNC: 112 MG/DL — HIGH (ref 70–99)
HAV IGM SER-ACNC: SIGNIFICANT CHANGE UP
HAV IGM SER-ACNC: SIGNIFICANT CHANGE UP
HBV CORE AB SER-ACNC: SIGNIFICANT CHANGE UP
HBV CORE AB SER-ACNC: SIGNIFICANT CHANGE UP
HBV CORE IGM SER-ACNC: SIGNIFICANT CHANGE UP
HBV CORE IGM SER-ACNC: SIGNIFICANT CHANGE UP
HBV SURFACE AB SER-ACNC: SIGNIFICANT CHANGE UP
HBV SURFACE AB SER-ACNC: SIGNIFICANT CHANGE UP
HBV SURFACE AG SER-ACNC: SIGNIFICANT CHANGE UP
HBV SURFACE AG SER-ACNC: SIGNIFICANT CHANGE UP
HCT VFR BLD CALC: 25.9 % — LOW (ref 34.5–45)
HCT VFR BLD CALC: 25.9 % — LOW (ref 34.5–45)
HCV AB S/CO SERPL IA: 0.04 S/CO — SIGNIFICANT CHANGE UP
HCV AB S/CO SERPL IA: 0.04 S/CO — SIGNIFICANT CHANGE UP
HCV AB SERPL-IMP: SIGNIFICANT CHANGE UP
HCV AB SERPL-IMP: SIGNIFICANT CHANGE UP
HGB BLD-MCNC: 8.2 G/DL — LOW (ref 11.5–15.5)
HGB BLD-MCNC: 8.2 G/DL — LOW (ref 11.5–15.5)
IMM GRANULOCYTES NFR BLD AUTO: 0.9 % — SIGNIFICANT CHANGE UP (ref 0–0.9)
IMM GRANULOCYTES NFR BLD AUTO: 0.9 % — SIGNIFICANT CHANGE UP (ref 0–0.9)
LYMPHOCYTES # BLD AUTO: 2.27 K/UL — SIGNIFICANT CHANGE UP (ref 1–3.3)
LYMPHOCYTES # BLD AUTO: 2.27 K/UL — SIGNIFICANT CHANGE UP (ref 1–3.3)
LYMPHOCYTES # BLD AUTO: 28.1 % — SIGNIFICANT CHANGE UP (ref 13–44)
LYMPHOCYTES # BLD AUTO: 28.1 % — SIGNIFICANT CHANGE UP (ref 13–44)
MAGNESIUM SERPL-MCNC: 2 MG/DL — SIGNIFICANT CHANGE UP (ref 1.6–2.6)
MAGNESIUM SERPL-MCNC: 2 MG/DL — SIGNIFICANT CHANGE UP (ref 1.6–2.6)
MCHC RBC-ENTMCNC: 29.3 PG — SIGNIFICANT CHANGE UP (ref 27–34)
MCHC RBC-ENTMCNC: 29.3 PG — SIGNIFICANT CHANGE UP (ref 27–34)
MCHC RBC-ENTMCNC: 31.7 GM/DL — LOW (ref 32–36)
MCHC RBC-ENTMCNC: 31.7 GM/DL — LOW (ref 32–36)
MCV RBC AUTO: 92.5 FL — SIGNIFICANT CHANGE UP (ref 80–100)
MCV RBC AUTO: 92.5 FL — SIGNIFICANT CHANGE UP (ref 80–100)
MONOCYTES # BLD AUTO: 0.95 K/UL — HIGH (ref 0–0.9)
MONOCYTES # BLD AUTO: 0.95 K/UL — HIGH (ref 0–0.9)
MONOCYTES NFR BLD AUTO: 11.8 % — SIGNIFICANT CHANGE UP (ref 2–14)
MONOCYTES NFR BLD AUTO: 11.8 % — SIGNIFICANT CHANGE UP (ref 2–14)
NEUTROPHILS # BLD AUTO: 4.53 K/UL — SIGNIFICANT CHANGE UP (ref 1.8–7.4)
NEUTROPHILS # BLD AUTO: 4.53 K/UL — SIGNIFICANT CHANGE UP (ref 1.8–7.4)
NEUTROPHILS NFR BLD AUTO: 56 % — SIGNIFICANT CHANGE UP (ref 43–77)
NEUTROPHILS NFR BLD AUTO: 56 % — SIGNIFICANT CHANGE UP (ref 43–77)
NRBC # BLD: 0 /100 WBCS — SIGNIFICANT CHANGE UP (ref 0–0)
NRBC # BLD: 0 /100 WBCS — SIGNIFICANT CHANGE UP (ref 0–0)
PHOSPHATE SERPL-MCNC: 4.8 MG/DL — HIGH (ref 2.5–4.5)
PHOSPHATE SERPL-MCNC: 4.8 MG/DL — HIGH (ref 2.5–4.5)
PLATELET # BLD AUTO: 209 K/UL — SIGNIFICANT CHANGE UP (ref 150–400)
PLATELET # BLD AUTO: 209 K/UL — SIGNIFICANT CHANGE UP (ref 150–400)
POTASSIUM SERPL-MCNC: 3.4 MMOL/L — LOW (ref 3.5–5.3)
POTASSIUM SERPL-MCNC: 3.4 MMOL/L — LOW (ref 3.5–5.3)
POTASSIUM SERPL-SCNC: 3.4 MMOL/L — LOW (ref 3.5–5.3)
POTASSIUM SERPL-SCNC: 3.4 MMOL/L — LOW (ref 3.5–5.3)
PROT SERPL-MCNC: 5.8 G/DL — LOW (ref 6–8.3)
PROT SERPL-MCNC: 5.8 G/DL — LOW (ref 6–8.3)
RBC # BLD: 2.8 M/UL — LOW (ref 3.8–5.2)
RBC # BLD: 2.8 M/UL — LOW (ref 3.8–5.2)
RBC # FLD: 13.2 % — SIGNIFICANT CHANGE UP (ref 10.3–14.5)
RBC # FLD: 13.2 % — SIGNIFICANT CHANGE UP (ref 10.3–14.5)
SODIUM SERPL-SCNC: 143 MMOL/L — SIGNIFICANT CHANGE UP (ref 135–145)
SODIUM SERPL-SCNC: 143 MMOL/L — SIGNIFICANT CHANGE UP (ref 135–145)
WBC # BLD: 8.08 K/UL — SIGNIFICANT CHANGE UP (ref 3.8–10.5)
WBC # BLD: 8.08 K/UL — SIGNIFICANT CHANGE UP (ref 3.8–10.5)
WBC # FLD AUTO: 8.08 K/UL — SIGNIFICANT CHANGE UP (ref 3.8–10.5)
WBC # FLD AUTO: 8.08 K/UL — SIGNIFICANT CHANGE UP (ref 3.8–10.5)

## 2023-11-30 PROCEDURE — 76937 US GUIDE VASCULAR ACCESS: CPT | Mod: 26

## 2023-11-30 PROCEDURE — 99232 SBSQ HOSP IP/OBS MODERATE 35: CPT | Mod: GC

## 2023-11-30 PROCEDURE — 77001 FLUOROGUIDE FOR VEIN DEVICE: CPT | Mod: 26

## 2023-11-30 PROCEDURE — 36558 INSERT TUNNELED CV CATH: CPT | Mod: RT

## 2023-11-30 PROCEDURE — 99223 1ST HOSP IP/OBS HIGH 75: CPT

## 2023-11-30 PROCEDURE — 99497 ADVNCD CARE PLAN 30 MIN: CPT | Mod: 25

## 2023-11-30 RX ORDER — HYDRALAZINE HCL 50 MG
10 TABLET ORAL ONCE
Refills: 0 | Status: COMPLETED | OUTPATIENT
Start: 2023-11-30 | End: 2023-11-30

## 2023-11-30 RX ORDER — HYDRALAZINE HCL 50 MG
50 TABLET ORAL THREE TIMES A DAY
Refills: 0 | Status: DISCONTINUED | OUTPATIENT
Start: 2023-11-30 | End: 2023-12-07

## 2023-11-30 RX ORDER — HYDRALAZINE HCL 50 MG
75 TABLET ORAL THREE TIMES A DAY
Refills: 0 | Status: DISCONTINUED | OUTPATIENT
Start: 2023-11-30 | End: 2023-11-30

## 2023-11-30 RX ORDER — TUBERCULIN PURIFIED PROTEIN DERIVATIVE 5 [IU]/.1ML
5 INJECTION, SOLUTION INTRADERMAL ONCE
Refills: 0 | Status: DISCONTINUED | OUTPATIENT
Start: 2023-11-30 | End: 2023-12-04

## 2023-11-30 RX ORDER — ATORVASTATIN CALCIUM 80 MG/1
40 TABLET, FILM COATED ORAL AT BEDTIME
Refills: 0 | Status: DISCONTINUED | OUTPATIENT
Start: 2023-11-30 | End: 2023-12-07

## 2023-11-30 RX ORDER — CARVEDILOL PHOSPHATE 80 MG/1
12.5 CAPSULE, EXTENDED RELEASE ORAL EVERY 12 HOURS
Refills: 0 | Status: DISCONTINUED | OUTPATIENT
Start: 2023-11-30 | End: 2023-12-07

## 2023-11-30 RX ADMIN — CEFTRIAXONE 100 MILLIGRAM(S): 500 INJECTION, POWDER, FOR SOLUTION INTRAMUSCULAR; INTRAVENOUS at 17:07

## 2023-11-30 RX ADMIN — Medication 2: at 18:17

## 2023-11-30 RX ADMIN — Medication 650 MILLIGRAM(S): at 21:54

## 2023-11-30 RX ADMIN — Medication 50 MILLIGRAM(S): at 21:55

## 2023-11-30 RX ADMIN — HEPARIN SODIUM 5000 UNIT(S): 5000 INJECTION INTRAVENOUS; SUBCUTANEOUS at 07:10

## 2023-11-30 RX ADMIN — Medication 50 MILLIGRAM(S): at 17:53

## 2023-11-30 RX ADMIN — Medication 10 MILLIGRAM(S): at 07:11

## 2023-11-30 RX ADMIN — Medication 650 MILLIGRAM(S): at 22:54

## 2023-11-30 RX ADMIN — Medication 3 MILLILITER(S): at 17:52

## 2023-11-30 RX ADMIN — Medication 667 MILLIGRAM(S): at 07:11

## 2023-11-30 RX ADMIN — POLYETHYLENE GLYCOL 3350 17 GRAM(S): 17 POWDER, FOR SOLUTION ORAL at 17:52

## 2023-11-30 RX ADMIN — HEPARIN SODIUM 5000 UNIT(S): 5000 INJECTION INTRAVENOUS; SUBCUTANEOUS at 21:56

## 2023-11-30 RX ADMIN — HEPARIN SODIUM 5000 UNIT(S): 5000 INJECTION INTRAVENOUS; SUBCUTANEOUS at 16:24

## 2023-11-30 RX ADMIN — CARVEDILOL PHOSPHATE 6.25 MILLIGRAM(S): 80 CAPSULE, EXTENDED RELEASE ORAL at 03:22

## 2023-11-30 RX ADMIN — Medication 1 PATCH: at 00:50

## 2023-11-30 RX ADMIN — Medication 667 MILLIGRAM(S): at 17:54

## 2023-11-30 RX ADMIN — Medication 1 PATCH: at 16:25

## 2023-11-30 RX ADMIN — Medication 1 PATCH: at 17:37

## 2023-11-30 RX ADMIN — Medication 0.25 MILLIGRAM(S): at 21:56

## 2023-11-30 RX ADMIN — DULOXETINE HYDROCHLORIDE 30 MILLIGRAM(S): 30 CAPSULE, DELAYED RELEASE ORAL at 16:25

## 2023-11-30 RX ADMIN — PANTOPRAZOLE SODIUM 40 MILLIGRAM(S): 20 TABLET, DELAYED RELEASE ORAL at 07:11

## 2023-11-30 RX ADMIN — ATORVASTATIN CALCIUM 40 MILLIGRAM(S): 80 TABLET, FILM COATED ORAL at 21:56

## 2023-11-30 RX ADMIN — Medication 3 MILLILITER(S): at 21:57

## 2023-11-30 RX ADMIN — CARVEDILOL PHOSPHATE 12.5 MILLIGRAM(S): 80 CAPSULE, EXTENDED RELEASE ORAL at 17:53

## 2023-11-30 RX ADMIN — Medication 90 MILLIGRAM(S): at 05:16

## 2023-11-30 RX ADMIN — Medication 50 MILLIGRAM(S): at 05:16

## 2023-11-30 NOTE — PROGRESS NOTE ADULT - SUBJECTIVE AND OBJECTIVE BOX
Seen and evaluated in HD unit on HD, tolerating isolated UF will continue to aim for 2L UF   BP elevated expect treatment with HD       Meds:    acetaminophen     Tablet .. 650 every 6 hours PRN  albuterol    90 MICROgram(s) HFA Inhaler 2 daily  albuterol/ipratropium for Nebulization 3 every 6 hours  atorvastatin 40 at bedtime  calcium acetate 667 three times a day with meals  carvedilol 6.25 every 12 hours  cefTRIAXone   IVPB 2000 every 24 hours  chlorhexidine 2% Cloths 1 <User Schedule>  clonazePAM  Tablet 0.25 at bedtime  dextrose 5%. 1000 <Continuous>  dextrose 5%. 1000 <Continuous>  dextrose 50% Injectable 25 once  dextrose 50% Injectable 25 once  dextrose 50% Injectable 12.5 once  dextrose Oral Gel 15 once PRN  DULoxetine 30 every 24 hours  glucagon  Injectable 1 once  heparin   Injectable 5000 every 8 hours  insulin lispro (ADMELOG) corrective regimen sliding scale  every 6 hours  NIFEdipine XL 90 every 24 hours  nitroglycerin    Patch 0.3 mG/Hr(s) 1 daily  pantoprazole    Tablet 40 before breakfast  polyethylene glycol 3350 17 two times a day  PPD  5 Tuberculin Unit(s) Injectable 5 once  senna 2 at bedtime  traZODone 50 every 24 hours      T(C): , Max: 36.9 (11-29-23 @ 14:07)  T(F): , Max: 98.5 (11-29-23 @ 14:07)  HR: 81 (11-30-23 @ 11:40)  BP: 153/71 (11-30-23 @ 11:40)  BP(mean): 101 (11-29-23 @ 15:42)  RR: 18 (11-30-23 @ 11:40)  SpO2: 92% (11-30-23 @ 11:40)  Wt(kg): --    11-29 @ 07:01  -  11-30 @ 07:00  --------------------------------------------------------  IN: 600 mL / OUT: 1780 mL / NET: -1180 mL          Review of Systems:  ROS negative except as per HPI      PHYSICAL EXAM:  GENERAL: NAD laying in bed tolerating HD   NECK: supple, No JVD  CHEST/LUNG: Clear to auscultation bilaterally  HEART: normal S1S2, RRR  ABDOMEN: Soft, Nontender, +BS,    EXTREMITIES: No clubbing, cyanosis, or edema   NEUROLOGY: AAO x3, no focal neurological deficit  ACCESS: [facility]  accessed       LABS:                        8.2    8.08  )-----------( 209      ( 30 Nov 2023 05:30 )             25.9     11-30    143  |  104  |  46<H>  ----------------------------<  112<H>  3.4<L>   |  25  |  2.86<H>    Ca    8.2<L>      30 Nov 2023 05:30  Phos  4.8     11-30  Mg     2.0     11-30    TPro  5.8<L>  /  Alb  2.9<L>  /  TBili  <0.2  /  DBili  x   /  AST  19  /  ALT  49<H>  /  AlkPhos  104  11-30        Urinalysis Basic - ( 30 Nov 2023 05:30 )    Color: x / Appearance: x / SG: x / pH: x  Gluc: 112 mg/dL / Ketone: x  / Bili: x / Urobili: x   Blood: x / Protein: x / Nitrite: x   Leuk Esterase: x / RBC: x / WBC x   Sq Epi: x / Non Sq Epi: x / Bacteria: x            RADIOLOGY & ADDITIONAL STUDIES:

## 2023-11-30 NOTE — DISCHARGE NOTE PROVIDER - HOSPITAL COURSE
#Discharge: do not delete    Patient is __ yo M/F with past medical history of _____ presented with _____, found to have _____ (one liner)    Hospital course (by problem):     Patient was discharged to: (home/ANT/acute rehab/hospice, etc, and with what services – home health PT/RN? Home O2?)    New medications:   Changes to old medications:  Medications that were stopped:    Items to follow up as outpatient:    Physical exam at the time of discharge:       #Discharge: do not delete    73F with PMH HTN, HLD, DM with OM s/p multiple amputations of toes; CAD with stress test planned for 11/28; CKD5 presenting with dyspnea, admitted for ischemic w/u, ELBERT on CKD5, stepped up to MICU for HAGMA metabolic encephalopathy 2/2 uremia vs. acidosis requiring intubation; now extubated, improving s/p CVVHD but course c/b hypertensive urgency. Mental status improving, on intermittent HD. BP improving w/ uptitrating BP meds. Medically stable to step down to [facility].      # Metabolic encephalopathy.   RESOLVED. HAGMA metabolic encephalopathy 2/2 uremia vs. acidosis, requiring intubation in MICU. mental status improved s/p CVVHD with uremia and acidosis resolved. Now AOx3 with appropriate affect. CTH non cont 11/28 shows microvascular disease with lacunar infarcts. No acute pathology. Passed bedside dysphagia   - Klonopin at reduced dose 0.25 qhs.   - holding other   - c/w HD.    # Depression with suicidal ideation.   Psych consulted, no need for 1-1.  Denies any active plan to commit suicide. Home meds: Klonipin 0.5mg QD, Duloxetine 30mg QD, Trazodone 50mg QD.  - Klonopin at reduced dose 0.25 qhs.   - c/w Duloxetine 30mg QD  - c/w Trazodone 50 QD.    # Asthma.   Not in acute exacerbation. Home med Duoneb Symbicort   - c/w duoneb q6h PRN.    # Pneumonia, aspiration.   New LLL opacity seen on Xray 11/27. Febrile on 11/28. . Aspiration vs. atelectasis. on CTX 2g. pt continues to be afebrile with downtrending WBC.   s/p CTX for total 5 days (11/26-12/1).    # Type 2 myocardial infarction.   POA w/ palpitation, SOB. EKG shows TWI in V1-V6, II, III, AVF and ST depressions in V4-V5. Peaked and downtrended 311->273, can be confounded by CKD/ESRD   Echo: no valvular dysfuction, EF 55-60%.   - c/w nitroglycerin patch 0.3  - f/u card recs.    # Hypertensive urgency.   POA w/ palpitation, SOB. EKG shows TWI in V1-V6, II, III, AVF and ST depressions in V4-V5. Peaked and downtrended, can be confounded by CKD/ESRD  Echo: no valvular dysfunction, EF 55-60%.   - c/w nitroglycerin patch 0.3  - f/u card recs  - coreg increased to 12.5 q12h.    # HLD (hyperlipidemia).   Home med: Atorvastatin 20mg QD  - increased atorvastatin to 40mg QD.    # ESRD (end stage renal disease).   POA with Cr 4.38. Cr 2.4 baseline with no previos HD hx. s/p CVVHD 2/2 HAGMA metabolic encephalopathy (uremia vs. acidosis). FENa 0.3% prerenal. Renal US: consistent with CKD.   s/p intermittent HD last 11/28    Home meds: NaHCO3 650mg BID, Phoslo 667mg PO TID    - discontinued NaHCO3 650mg BID since pt on RRT. Restart on discharge  - continue Phoslo 667mg PO TID  - Strict I/Os  - Renal diet.    # DM2 (diabetes mellitus, type 2).   POA w/  A1c this admission 5.7.   Home meds: Januvia 25mg QD, Lispro 2U premeal, Lantus 10U.   - c/w mISS inpt.   - restart home Januvia, Lispro 2U premeal, and Lantus 10U on discharge.    # Anemia.   Hx of DIMITRIS. Iron studies with low TIBC indicative of AOCD. S/p 1u pRBC on 11/27. Likely 2/2 DIMITRIS and CKD   - EPO with dialysis  - Transfuse <7.      Patient was discharged to: (home/ANT/acute rehab/hospice, etc, and with what services – home health PT/RN? Home O2?)    New medications:   Changes to old medications:  Medications that were stopped:    Items to follow up as outpatient:    Physical exam at the time of discharge:       #Discharge: do not delete    73F with PMH HTN, HLD, DM with OM s/p multiple amputations of toes; CAD with stress test planned for 11/28; CKD5 presenting with dyspnea, admitted for ischemic w/u, ELBERT on CKD5, stepped up to MICU for HAGMA metabolic encephalopathy 2/2 uremia vs. acidosis requiring intubation; now extubated, improving s/p CVVHD but course c/b hypertensive urgency. Mental status improving, on intermittent HD. BP improving w/ uptitrating BP meds. Medically stable to step down to [facility].      # Metabolic encephalopathy.   RESOLVED. HAGMA metabolic encephalopathy 2/2 uremia vs. acidosis, requiring intubation in MICU. mental status improved s/p CVVHD with uremia and acidosis resolved. Now AOx3 with appropriate affect. CTH non cont 11/28 shows microvascular disease with lacunar infarcts. No acute pathology. Passed bedside dysphagia   - Klonopin at reduced dose 0.25 qhs.   - c/w HD.    # Depression with suicidal ideation.   Psych consulted, no need for 1-1.  Denies any active plan to commit suicide. Home meds: Klonipin 0.5mg QD, Duloxetine 30mg QD, Trazodone 50mg QD.  - Klonopin at reduced dose 0.25 qhs.   - c/w Duloxetine 40mg QD  - c/w Trazodone 50 QD.  - c/w hydroxyzine 25 mg up to 2 times a day PRN for anxiety    # Asthma.   Not in acute exacerbation. Home med Duoneb Symbicort   - c/w duoneb q6h PRN.    # Pneumonia, aspiration.   New LLL opacity seen on Xray 11/27. Febrile on 11/28. . Aspiration vs. atelectasis. on CTX 2g. pt continues to be afebrile with downtrending WBC.   s/p CTX for total 5 days (11/26-12/1).    # Type 2 myocardial infarction.   POA w/ palpitation, SOB. EKG shows TWI in V1-V6, II, III, AVF and ST depressions in V4-V5. Peaked and downtrended 311->273, can be confounded by CKD/ESRD   Echo: no valvular dysfuction, EF 55-60%.   - c/w nitroglycerin patch 0.3  - f/u card recs.    # Hypertensive urgency.   POA w/ palpitation, SOB. EKG shows TWI in V1-V6, II, III, AVF and ST depressions in V4-V5. Peaked and downtrended, can be confounded by CKD/ESRD  Echo: no valvular dysfunction, EF 55-60%.   - c/w nitroglycerin patch 0.3  - c/w Hydralazine 50 TID   - c/w Nifedipine 90mg QD   - c/w coreg increased to 12.5 mg q12h    # HLD (hyperlipidemia).   Home med: Atorvastatin 20mg QD  - increased atorvastatin to 40mg QD.    # ESRD (end stage renal disease).   POA with Cr 4.38. Cr 2.4 baseline with no previos HD hx. s/p CVVHD 2/2 HAGMA metabolic encephalopathy (uremia vs. acidosis). FENa 0.3% prerenal. Renal US: consistent with CKD.   s/p intermittent HD last 11/28    Home meds: NaHCO3 650mg BID, Phoslo 667mg PO TID  - discontinued NaHCO3 650mg BID since pt on RRT. Restart on discharge  - continue Phoslo 667mg PO TID  - Renal diet.    # DM2 (diabetes mellitus, type 2).   POA w/  A1c this admission 5.7.   Home meds: Januvia 25mg QD, Lispro 2U premeal, Lantus 10U.   - c/w mISS inpt.   - restart home Januvia, Lispro 2U premeal, and Lantus 10U on discharge.    # Anemia.   Hx of DIMITRIS. Iron studies with low TIBC indicative of AOCD. S/p 1u pRBC on 11/27. Likely 2/2 DIMITRIS and CKD   - EPO with dialysis  - Transfuse <7.      Patient was discharged to: [facility] [place] with HD    New medications:   Changes to old medications: increased atorvastatin to 40 mg qd  Medications that were stopped:    Items to follow up as outpatient:     Physical exam at the time of discharge:       #Discharge: do not delete    73F with PMH HTN, HLD, DM with OM s/p multiple amputations of toes; CAD with stress test planned for 11/28; CKD5 presenting with dyspnea, admitted for ischemic w/u, ELBERT on CKD5, stepped up to MICU for HAGMA metabolic encephalopathy 2/2 uremia vs. acidosis requiring intubation; now extubated, improving s/p CVVHD but course c/b hypertensive urgency. Mental status improving, on intermittent HD. BP improving w/ uptitrating BP meds. Medically stable to step down to [facility].      # Metabolic encephalopathy.   RESOLVED. HAGMA metabolic encephalopathy 2/2 uremia vs. acidosis, requiring intubation in MICU. mental status improved s/p CVVHD with uremia and acidosis resolved. Now AOx3 with appropriate affect. CTH non cont 11/28 shows microvascular disease with lacunar infarcts. No acute pathology. Passed bedside dysphagia   - Klonopin at reduced dose 0.25 qhs.   - c/w HD.    # Depression with suicidal ideation.   Psych consulted, no need for 1-1.  Denies any active plan to commit suicide. Home meds: Klonipin 0.5mg QD, Duloxetine 30mg QD, Trazodone 50mg QD.  - Klonopin at reduced dose 0.25 qhs.   - c/w Duloxetine 40mg QD  - c/w Trazodone 50 QD.  - c/w hydroxyzine 25 mg up to 2 times a day PRN for anxiety    # Asthma.   Not in acute exacerbation. Home med Duoneb Symbicort   - c/w duoneb q6h PRN.    # Pneumonia, aspiration.   New LLL opacity seen on Xray 11/27. Febrile on 11/28. . Aspiration vs. atelectasis. on CTX 2g. pt continues to be afebrile with downtrending WBC.   s/p CTX for total 5 days (11/26-12/1).    # Type 2 myocardial infarction.   POA w/ palpitation, SOB. EKG shows TWI in V1-V6, II, III, AVF and ST depressions in V4-V5. Peaked and downtrended 311->273, can be confounded by CKD/ESRD   Echo: no valvular dysfuction, EF 55-60%.   - c/w nitroglycerin patch 0.3  - f/u card recs.    # Hypertensive urgency.   POA w/ palpitation, SOB. EKG shows TWI in V1-V6, II, III, AVF and ST depressions in V4-V5. Peaked and downtrended, can be confounded by CKD/ESRD  Echo: no valvular dysfunction, EF 55-60%.   - c/w nitroglycerin patch 0.3  - c/w Hydralazine 50 TID   - c/w Nifedipine 90mg QD   - c/w coreg increased to 12.5 mg q12h    # HLD (hyperlipidemia).   Home med: Atorvastatin 20mg QD  - increased atorvastatin to 40mg QD.    # ESRD (end stage renal disease).   POA with Cr 4.38. Cr 2.4 baseline with no previos HD hx. s/p CVVHD 2/2 HAGMA metabolic encephalopathy (uremia vs. acidosis). FENa 0.3% prerenal. Renal US: consistent with CKD.   s/p intermittent HD last 11/28    Home meds: NaHCO3 650mg BID, Phoslo 667mg PO TID  - discontinued NaHCO3 650mg BID since pt on RRT. Restart on discharge  - continue Phoslo 667mg PO TID  - Renal diet.    # DM2 (diabetes mellitus, type 2).   POA w/  A1c this admission 5.7.   Home meds: Januvia 25mg QD, Lispro 2U premeal, Lantus 10U.   - c/w mISS inpt.   - restart home Januvia, Lispro 2U premeal, and Lantus 10U on discharge.    # Anemia.   Hx of DIMITRIS. Iron studies with low TIBC indicative of AOCD. S/p 1u pRBC on 11/27. Likely 2/2 DIMITRIS and CKD   - EPO with dialysis  - Transfuse <7.      Patient was discharged to: [facility] with HD    New medications:   Changes to old medications: increased atorvastatin to 40 mg qd  Medications that were stopped:    Items to follow up as outpatient:     Physical exam at the time of discharge:       #Discharge: do not delete    73F with PMH HTN, HLD, DM with OM s/p multiple amputations of toes; CAD with stress test planned for 11/28; CKD5 presenting with dyspnea, admitted for ischemic w/u, ELBERT on CKD5, stepped up to MICU for HAGMA metabolic encephalopathy 2/2 uremia vs. acidosis requiring intubation; now extubated, improving s/p CVVHD but course c/b hypertensive urgency. Mental status improving, on intermittent HD. BP improving w/ uptitrating BP meds. Medically stable to step down to [facility].      # Metabolic encephalopathy.   RESOLVED. HAGMA metabolic encephalopathy 2/2 uremia vs. acidosis, requiring intubation in MICU. mental status improved s/p CVVHD with uremia and acidosis resolved. Now AOx3 with appropriate affect. CTH non cont 11/28 shows microvascular disease with lacunar infarcts. No acute pathology. Passed bedside dysphagia   - Klonopin at reduced dose 0.25 qhs.   - c/w HD.    # Depression with suicidal ideation.   Psych consulted, no need for 1-1.  Denies any active plan to commit suicide. Home meds: Klonipin 0.5mg QD, Duloxetine 30mg QD, Trazodone 50mg QD.  - Klonopin at reduced dose 0.25 qhs.   - c/w Duloxetine 40mg QD  - c/w Trazodone 50 QD.  - c/w hydroxyzine 25 mg up to 2 times a day PRN for anxiety    # Asthma.   Not in acute exacerbation. Home med Duoneb Symbicort   - c/w duoneb q6h PRN.    # Pneumonia, aspiration.   New LLL opacity seen on Xray 11/27. Febrile on 11/28. . Aspiration vs. atelectasis. on CTX 2g. pt continues to be afebrile with downtrending WBC.   s/p CTX for total 5 days (11/26-12/1).    # Type 2 myocardial infarction.   POA w/ palpitation, SOB. EKG shows TWI in V1-V6, II, III, AVF and ST depressions in V4-V5. Peaked and downtrended 311->273, can be confounded by CKD/ESRD   Echo: no valvular dysfuction, EF 55-60%.   - c/w nitroglycerin patch 0.3  - f/u card recs.    # Hypertensive urgency.   POA w/ palpitation, SOB. EKG shows TWI in V1-V6, II, III, AVF and ST depressions in V4-V5. Peaked and downtrended, can be confounded by CKD/ESRD  Echo: no valvular dysfunction, EF 55-60%.   - c/w nitroglycerin patch 0.3  - c/w Hydralazine 50 TID   - c/w Nifedipine 90mg QD   - c/w coreg increased to 12.5 mg q12h    # HLD (hyperlipidemia).   Home med: Atorvastatin 20mg QD  - increased atorvastatin to 40mg QD.    # ESRD (end stage renal disease).   POA with Cr 4.38. Cr 2.4 baseline with no previos HD hx. s/p CVVHD 2/2 HAGMA metabolic encephalopathy (uremia vs. acidosis). FENa 0.3% prerenal. Renal US: consistent with CKD.   s/p intermittent HD last 11/28    Home meds: NaHCO3 650mg BID, Phoslo 667mg PO TID  - discontinued NaHCO3 650mg BID since pt on RRT. Restart on discharge  - continue Phoslo 667mg PO TID  - Renal diet.    # DM2 (diabetes mellitus, type 2).   POA w/  A1c this admission 5.7.   Home meds: Januvia 25mg QD, Lispro 2U premeal, Lantus 10U.   - c/w mISS inpt.   - restart home Januvia, Lispro 2U premeal, and Lantus 10U on discharge.    # Anemia.   Hx of DIMITRIS. Iron studies with low TIBC indicative of AOCD. S/p 1u pRBC on 11/27. Likely 2/2 DIMITRIS and CKD   - EPO with dialysis  - Transfuse <7.      Patient was discharged to: [facility] with HD    New medications:   Changes to old medications: increased atorvastatin to 40 mg qd  Medications that were stopped:    Items to follow up as outpatient:     Physical exam at the time of discharge:       #Discharge: do not delete    73F with PMH HTN, HLD, DM with OM s/p multiple amputations of toes; CAD with stress test planned for 11/28; CKD5 presenting with dyspnea, admitted for ischemic w/u, ELBERT on CKD5, stepped up to MICU for HAGMA metabolic encephalopathy 2/2 uremia vs. acidosis requiring intubation; now extubated, improving s/p CVVHD but course c/b hypertensive urgency. Mental status improving, on HD. BP improving w/ uptitrating BP meds. Medically stable for transfer to [facility] with HD.      # Metabolic encephalopathy.   RESOLVED.   HAGMA metabolic encephalopathy 2/2 uremia vs. acidosis, requiring intubation in MICU. mental status improved s/p CVVHD with uremia and acidosis resolved. Now AOx3 with appropriate affect. CTH non cont 11/28 shows microvascular disease with lacunar infarcts. No acute pathology. Passed bedside dysphagia.  - Klonopin at reduced dose 0.25 qhs.   - c/w HD.    # Depression with suicidal ideation.   Psych consulted, no need for 1-1.  Denies any active plan to commit suicide. Home meds: Klonipin 0.5mg QD, Duloxetine 30mg QD, Trazodone 50mg QD.  - Klonopin at reduced dose 0.25 qhs.   - c/w Duloxetine 40mg QD  - c/w Trazodone 50 QD.  - c/w hydroxyzine 25 mg up to 2 times a day PRN for anxiety    # Asthma.   Not in acute exacerbation. Home med Duoneb Symbicort   - c/w duoneb q6h PRN.    # Pneumonia, aspiration.   New LLL opacity seen on Xray 11/27. Febrile on 11/28. . Aspiration vs. atelectasis. on CTX 2g. pt continues to be afebrile with downtrending WBC.   s/p CTX for total 5 days (11/26-12/1).    # Type 2 myocardial infarction.   POA w/ palpitation, SOB. EKG shows TWI in V1-V6, II, III, AVF and ST depressions in V4-V5. Peaked and downtrended 311->273, can be confounded by CKD/ESRD   Echo: no valvular dysfuction, EF 55-60%.   - c/w nitroglycerin patch 0.3  - f/u card recs.    # Hypertensive urgency.   POA w/ palpitation, SOB. EKG shows TWI in V1-V6, II, III, AVF and ST depressions in V4-V5. Peaked and downtrended, can be confounded by CKD/ESRD  Echo: no valvular dysfunction, EF 55-60%.   - c/w nitroglycerin patch 0.3  - c/w Hydralazine 50 TID   - c/w Nifedipine 90mg QD   - c/w coreg increased to 12.5 mg q12h    # HLD (hyperlipidemia).   Home med: Atorvastatin 20mg QD  - increased atorvastatin to 40mg QD.    # ESRD (end stage renal disease).   POA with Cr 4.38. Cr 2.4 baseline with no previos HD hx. s/p CVVHD 2/2 HAGMA metabolic encephalopathy (uremia vs. acidosis). FENa 0.3% prerenal. Renal US: consistent with CKD.   s/p intermittent HD last 11/28    Home meds: NaHCO3 650mg BID, Phoslo 667mg PO TID  - discontinued NaHCO3 650mg BID since pt on RRT. Restart on discharge  - continue Phoslo 667mg PO TID  - Renal diet.    # DM2 (diabetes mellitus, type 2).   POA w/  A1c this admission 5.7.   Home meds: Januvia 25mg QD, Lispro 2U premeal, Lantus 10U.   - c/w mISS inpt.   - restart home Januvia, Lispro 2U premeal, and Lantus 10U on discharge.    # Anemia.   Hx of DIMITRIS. Iron studies with low TIBC indicative of AOCD. S/p 1u pRBC on 11/27. Likely 2/2 DIMITRIS and CKD   - EPO with dialysis  - Transfuse <7.      Patient was discharged to: [facility] with HD    New medications: hydroxyzine 25 mg up to 2 times a day PRN for anxiety  Changes to old medications: increased atorvastatin to 40 mg qd  Medications that were stopped:    Items to follow up as outpatient:     Physical exam at the time of discharge:       #Discharge: do not delete    73F with PMH HTN, HLD, DM with OM s/p multiple amputations of toes; CAD with stress test planned for 11/28; CKD5 presenting with dyspnea, admitted for ischemic w/u, ELBERT on CKD5, stepped up to MICU for HAGMA metabolic encephalopathy 2/2 uremia vs. acidosis requiring intubation; now extubated, improving s/p CVVHD but course c/b hypertensive urgency. Mental status improving, on HD. BP improving w/ uptitrating BP meds. Medically stable for transfer to [facility] with HD.      # Metabolic encephalopathy.   RESOLVED.   HAGMA metabolic encephalopathy 2/2 uremia vs. acidosis, requiring intubation in MICU. mental status improved s/p CVVHD with uremia and acidosis resolved. Now AOx3 with appropriate affect. CTH non cont 11/28 shows microvascular disease with lacunar infarcts. No acute pathology. Passed bedside dysphagia.  - Klonopin at reduced dose 0.25 qhs.   - c/w HD.    # Depression with suicidal ideation.   Psych consulted, no need for 1-1.  Denies any active plan to commit suicide. Home meds: Klonipin 0.5mg QD, Duloxetine 30mg QD, Trazodone 50mg QD.  - Klonopin at reduced dose 0.25 qhs.   - c/w Duloxetine 40mg QD  - c/w Trazodone 50 QD.  - c/w hydroxyzine 25 mg up to 2 times a day PRN for anxiety    # Asthma.   Not in acute exacerbation. Home med Duoneb Symbicort   - c/w duoneb q6h PRN.    # Pneumonia, aspiration.   New LLL opacity seen on Xray 11/27. Febrile on 11/28. . Aspiration vs. atelectasis. on CTX 2g. pt continues to be afebrile with downtrending WBC.   s/p CTX for total 5 days (11/26-12/1).    # Type 2 myocardial infarction.   POA w/ palpitation, SOB. EKG shows TWI in V1-V6, II, III, AVF and ST depressions in V4-V5. Peaked and downtrended 311->273, can be confounded by CKD/ESRD   Echo: no valvular dysfuction, EF 55-60%.   - c/w nitroglycerin patch 0.3  - f/u card recs.    # Hypertensive urgency.   POA w/ palpitation, SOB. EKG shows TWI in V1-V6, II, III, AVF and ST depressions in V4-V5. Peaked and downtrended, can be confounded by CKD/ESRD  Echo: no valvular dysfunction, EF 55-60%.   - c/w nitroglycerin patch 0.3  - c/w Hydralazine 50 TID   - c/w Nifedipine 90mg QD   - c/w coreg increased to 12.5 mg q12h    # HLD (hyperlipidemia).   Home med: Atorvastatin 20mg QD  - increased atorvastatin to 40mg QD.    # ESRD (end stage renal disease).   POA with Cr 4.38. Cr 2.4 baseline with no previos HD hx. s/p CVVHD 2/2 HAGMA metabolic encephalopathy (uremia vs. acidosis). FENa 0.3% prerenal. Renal US: consistent with CKD.   s/p intermittent HD last 11/28    Home meds: NaHCO3 650mg BID, Phoslo 667mg PO TID  - discontinued NaHCO3 650mg BID since pt on RRT. Restart on discharge  - continue Phoslo 667mg PO TID  - Renal diet.    # DM2 (diabetes mellitus, type 2).   POA w/  A1c this admission 5.7.   Home meds: Januvia 25mg QD, Lispro 2U premeal, Lantus 10U.   - c/w mISS inpt.   - restart home Januvia, Lispro 2U premeal, and Lantus 10U on discharge.    # Anemia.   Hx of DIMITRIS. Iron studies with low TIBC indicative of AOCD. S/p 1u pRBC on 11/27. Likely 2/2 DIMITRIS and CKD   - EPO with dialysis  - Transfuse <7.      Patient was discharged to: [facility] with HD    New medications: hydroxyzine 25 mg up to 2 times a day PRN for anxiety  Changes to old medications: increased atorvastatin to 40 mg qd  Medications that were stopped:    Items to follow up as outpatient:     Physical exam at the time of discharge:       #Discharge: do not delete    73F with PMH HTN, HLD, DM with OM s/p multiple amputations of toes; CAD with stress test planned for 11/28; CKD5 presenting with dyspnea, admitted for ischemic w/u, ELBERT on CKD5, stepped up to MICU for HAGMA metabolic encephalopathy 2/2 uremia vs. acidosis requiring intubation; now extubated, improving s/p CVVHD but course c/b hypertensive urgency. Mental status improving, on HD. BP improving w/ uptitrating BP meds. Medically stable for transfer to [facility] with HD.      # Metabolic encephalopathy.   RESOLVED.   HAGMA metabolic encephalopathy 2/2 uremia vs. acidosis, requiring intubation in MICU. mental status improved s/p CVVHD with uremia and acidosis resolved. Now AOx3 with appropriate affect. CTH non cont 11/28 shows microvascular disease with lacunar infarcts. No acute pathology. Passed bedside dysphagia.  - Klonopin at reduced dose 0.25 qhs.   - c/w HD.    # Depression with suicidal ideation.   Psych consulted, no need for 1-1.  Denies any active plan to commit suicide. Home meds: Klonipin 0.5mg QD, Duloxetine 30mg QD, Trazodone 50mg QD.  - Klonopin at reduced dose 0.25 qhs.   - c/w Duloxetine 40mg QD  - c/w Trazodone 50 QD.  - c/w hydroxyzine 25 mg up to 2 times a day PRN for anxiety    # Asthma.   Not in acute exacerbation. Home med Duoneb Symbicort   - c/w duoneb q6h PRN.    # Pneumonia, aspiration.   New LLL opacity seen on Xray 11/27. Febrile on 11/28. . Aspiration vs. atelectasis. on CTX 2g. pt continues to be afebrile with downtrending WBC.   s/p CTX for total 5 days (11/26-12/1).    # Type 2 myocardial infarction.   POA w/ palpitation, SOB. EKG shows TWI in V1-V6, II, III, AVF and ST depressions in V4-V5. Peaked and downtrended 311->273, can be confounded by CKD/ESRD   Echo: no valvular dysfuction, EF 55-60%.   - c/w nitroglycerin patch 0.3  - f/u card recs.    # Hypertensive urgency.   POA w/ palpitation, SOB. EKG shows TWI in V1-V6, II, III, AVF and ST depressions in V4-V5. Peaked and downtrended, can be confounded by CKD/ESRD  Echo: no valvular dysfunction, EF 55-60%.   - c/w nitroglycerin patch 0.3  - c/w Hydralazine 50 TID   - c/w Nifedipine 90mg QD   - c/w coreg increased to 12.5 mg q12h    # HLD (hyperlipidemia).   Home med: Atorvastatin 20mg QD  - increased atorvastatin to 40mg QD.    # ESRD (end stage renal disease).   POA with Cr 4.38. Cr 2.4 baseline with no previos HD hx. s/p CVVHD 2/2 HAGMA metabolic encephalopathy (uremia vs. acidosis). FENa 0.3% prerenal. Renal US: consistent with CKD.   s/p intermittent HD last 11/28    Home meds: NaHCO3 650mg BID, Phoslo 667mg PO TID  - discontinued NaHCO3 650mg BID since pt on RRT. Restart on discharge  - continue Phoslo 667mg PO TID  - Renal diet.    # DM2 (diabetes mellitus, type 2).   POA w/  A1c this admission 5.7.   Home meds: Januvia 25mg QD, Lispro 2U premeal, Lantus 10U.   - c/w mISS inpt.   - restart home Januvia, Lispro 2U premeal, and Lantus 10U on discharge.    # Anemia.   Hx of DIMITRIS. Iron studies with low TIBC indicative of AOCD. S/p 1u pRBC on 11/27. Likely 2/2 DIMITRIS and CKD   - EPO with dialysis  - Transfuse <7.      Patient was discharged to: [facility] with HD    New medications: hydroxyzine 25 mg up to 2 times a day PRN for anxiety  Changes to old medications: increased atorvastatin to 40 mg qd  Medications that were stopped:    Items to follow up as outpatient:     Physical exam at the time of discharge:       #Discharge: do not delete    73F with PMH HTN, HLD, DM with OM s/p multiple amputations of toes; CAD with stress test planned for 11/28; CKD5 presenting with dyspnea, admitted for ischemic w/u, ELBERT on CKD5, stepped up to MICU for HAGMA metabolic encephalopathy 2/2 uremia vs. acidosis requiring intubation; now extubated, improving s/p CVVHD but course c/b hypertensive urgency. Mental status improving, on HD. BP improving w/ uptitrating BP meds. Medically stable for transfer to [facility] with HD.      # Metabolic encephalopathy.   RESOLVED.   HAGMA metabolic encephalopathy 2/2 uremia vs. acidosis, requiring intubation in MICU. mental status improved s/p CVVHD with uremia and acidosis resolved. Now AOx3 with appropriate affect. CTH non cont 11/28 shows microvascular disease with lacunar infarcts. No acute pathology. Passed bedside dysphagia.  - Klonopin at reduced dose 0.25 qhs.   - c/w HD.    # Depression with suicidal ideation.   Psych consulted, no need for 1-1.  Denies any active plan to commit suicide. Home meds: Klonipin 0.5mg QD, Duloxetine 30mg QD, Trazodone 50mg QD.  - Klonopin at reduced dose 0.25 qhs.   - c/w Duloxetine 40mg QD  - c/w Trazodone 50 QD.  - c/w hydroxyzine 25 mg up to 2 times a day PRN for anxiety    # Asthma.   Not in acute exacerbation. Home med Duoneb, Symbicort   - c/w duoneb q6h PRN.  - c/w symbicort 2 puffs twice a day    # Pneumonia, aspiration.   RESOLVED  New LLL opacity seen on Xray 11/27. Febrile on 11/28. Aspiration vs. atelectasis. on CTX 2g. pt continues to be afebrile with downtrending WBC.   s/p CTX for total 5 days (11/26-12/1).    # Type 2 myocardial infarction.   POA w/ palpitation, SOB. EKG shows TWI in V1-V6, II, III, AVF and ST depressions in V4-V5. Peaked and downtrended 311->273, can be confounded by CKD/ESRD   Echo: no valvular dysfuction, EF 55-60%.   - f/u outpatient cardiology     # Hypertensive urgency.   POA w/ palpitation, SOB. EKG shows TWI in V1-V6, II, III, AVF and ST depressions in V4-V5. Peaked and downtrended, can be confounded by CKD/ESRD  Echo: no valvular dysfunction, EF 55-60%.   - c/w Hydralazine 50 TID   - c/w Nifedipine 90mg QD   - c/w coreg increased to 12.5 mg q12h    # HLD (hyperlipidemia).   Home med: Atorvastatin 20mg QD  - increased atorvastatin to 40mg QD.    # ESRD (end stage renal disease).   POA with Cr 4.38. Cr 2.4 baseline with no previos HD hx. s/p CVVHD 2/2 HAGMA metabolic encephalopathy (uremia vs. acidosis). FENa 0.3% prerenal. Renal US: consistent with CKD.   s/p intermittent HD last 11/28    Home meds: NaHCO3 650mg BID, Phoslo 667mg PO TID  - discontinued NaHCO3 650mg BID since pt on RRT. Restart on discharge  - continue Phoslo 667mg PO TID  - Renal diet.    # DM2 (diabetes mellitus, type 2).   POA w/  A1c this admission 5.7.   Home meds: Januvia 25mg QD, Lispro 2U premeal, Lantus 10U.   - c/w mISS inpt.   - restart home Januvia, Lispro 2U premeal, and Lantus 10U on discharge.    # Anemia.   Hx of DIMITRIS. Iron studies with low TIBC indicative of AOCD. S/p 1u pRBC on 11/27. Likely 2/2 DIMITRIS and CKD   - EPO with dialysis  - Transfuse <7.      Patient was discharged to: [facility] with HD    New medications: hydroxyzine 25 mg up to 2 times a day PRN for anxiety  Changes to old medications: increased atorvastatin to 40 mg qd, Klonopin at reduced dose 0.25  Medications that were stopped: metoprolol succinate    Items to follow up as outpatient: podiatry (Van), cardiology (Ayana), nephrology (Yonathan Kessler)    Physical exam at the time of discharge:       #Discharge: do not delete    73F with PMH HTN, HLD, DM with OM s/p multiple amputations of toes; CAD with stress test planned for 11/28; CKD5 presenting with dyspnea, admitted for ischemic w/u, ELBERT on CKD5, stepped up to MICU for HAGMA metabolic encephalopathy 2/2 uremia vs. acidosis requiring intubation; now extubated, improving s/p CVVHD but course c/b hypertensive urgency. Mental status improving, on HD. BP improving w/ uptitrating BP meds. Medically stable for transfer to [facility] with HD.      # Metabolic encephalopathy.   RESOLVED.   HAGMA metabolic encephalopathy 2/2 uremia vs. acidosis, requiring intubation in MICU. mental status improved s/p CVVHD with uremia and acidosis resolved. Now AOx3 with appropriate affect. CTH non cont 11/28 shows microvascular disease with lacunar infarcts. No acute pathology. Passed bedside dysphagia.  - Klonopin at reduced dose 0.25 qhs.   - c/w HD.    # Depression with suicidal ideation.   Psych consulted, no need for 1-1.  Denies any active plan to commit suicide. Home meds: Klonipin 0.5mg QD, Duloxetine 30mg QD, Trazodone 50mg QD.  - Klonopin at reduced dose 0.25 qhs.   - c/w Duloxetine 40mg QD  - c/w Trazodone 50 QD.  - c/w hydroxyzine 25 mg up to 2 times a day PRN for anxiety    # Asthma.   Not in acute exacerbation. Home med Duoneb, Symbicort   - c/w duoneb q6h PRN.  - c/w symbicort 2 puffs twice a day    # Pneumonia, aspiration.   RESOLVED  New LLL opacity seen on Xray 11/27. Febrile on 11/28. Aspiration vs. atelectasis. on CTX 2g. pt continues to be afebrile with downtrending WBC.   s/p CTX for total 5 days (11/26-12/1).    # Type 2 myocardial infarction.   POA w/ palpitation, SOB. EKG shows TWI in V1-V6, II, III, AVF and ST depressions in V4-V5. Peaked and downtrended 311->273, can be confounded by CKD/ESRD   Echo: no valvular dysfuction, EF 55-60%.   - f/u outpatient cardiology     # Hypertensive urgency.   POA w/ palpitation, SOB. EKG shows TWI in V1-V6, II, III, AVF and ST depressions in V4-V5. Peaked and downtrended, can be confounded by CKD/ESRD  Echo: no valvular dysfunction, EF 55-60%.   - c/w Hydralazine 50 TID   - c/w Nifedipine 90mg QD   - c/w coreg increased to 12.5 mg q12h    # HLD (hyperlipidemia).   Home med: Atorvastatin 20mg QD  - increased atorvastatin to 40mg QD.    # ESRD (end stage renal disease).   POA with Cr 4.38. Cr 2.4 baseline with no previos HD hx. s/p CVVHD 2/2 HAGMA metabolic encephalopathy (uremia vs. acidosis). FENa 0.3% prerenal. Renal US: consistent with CKD.   s/p intermittent HD last 11/28    Home meds: NaHCO3 650mg BID, Phoslo 667mg PO TID  - discontinued NaHCO3 650mg BID since pt on RRT. Restart on discharge  - continue Phoslo 667mg PO TID  - Renal diet.    # DM2 (diabetes mellitus, type 2).   POA w/  A1c this admission 5.7.   Home meds: Januvia 25mg QD, Lispro 2U premeal, Lantus 10U.   - c/w mISS inpt.   - restart home Januvia, Lispro 2U premeal, and Lantus 10U on discharge.    # Anemia.   Hx of DIMITRIS. Iron studies with low TIBC indicative of AOCD. S/p 1u pRBC on 11/27. Likely 2/2 DIMITRIS and CKD   - EPO with dialysis  - Transfuse <7.      Patient was discharged to: [facility] with HD    New medications: hydroxyzine 25 mg up to 2 times a day PRN for anxiety  Changes to old medications: increased atorvastatin to 40 mg qd, Klonopin at reduced dose 0.25  Medications that were stopped: metoprolol succinate    Items to follow up as outpatient: podiatry (Van), cardiology (Ayana), nephrology (Yonathan Kessler)    Physical exam at the time of discharge:       #Discharge: do not delete    73F with PMH HTN, HLD, DM with OM s/p multiple amputations of toes; CAD with stress test planned for 11/28; CKD5 presenting with dyspnea, admitted for ischemic w/u, ELBERT on CKD5, stepped up to MICU for HAGMA metabolic encephalopathy 2/2 uremia vs. acidosis requiring intubation; now extubated, improving s/p CVVHD but course c/b hypertensive urgency. Mental status improving, on HD. BP improving w/ uptitrating BP meds. Medically stable for transfer to [facility] with HD.      # Metabolic encephalopathy.   RESOLVED.   HAGMA metabolic encephalopathy 2/2 uremia vs. acidosis, requiring intubation in MICU. mental status improved s/p CVVHD with uremia and acidosis resolved. Now AOx3 with appropriate affect. CTH non cont 11/28 shows microvascular disease with lacunar infarcts. No acute pathology. Passed bedside dysphagia.  - Klonopin at reduced dose 0.25 qhs.   - c/w HD.    # Depression with suicidal ideation.   Psych consulted, no need for 1-1.  Denies any active plan to commit suicide. Home meds: Klonipin 0.5mg QD, Duloxetine 30mg QD, Trazodone 50mg QD.  - Klonopin at reduced dose 0.25 qhs.   - c/w Duloxetine 40mg QD  - c/w Trazodone 50 QD.  - c/w hydroxyzine 25 mg up to 2 times a day PRN for anxiety    # Asthma.   Not in acute exacerbation. Home med Duoneb, Symbicort   - c/w duoneb q6h PRN.  - c/w symbicort 2 puffs twice a day    # Pneumonia, aspiration.   RESOLVED  New LLL opacity seen on Xray 11/27. Febrile on 11/28. Aspiration vs. atelectasis. on CTX 2g. pt continues to be afebrile with downtrending WBC.   s/p CTX for total 5 days (11/26-12/1).    # Type 2 myocardial infarction.   POA w/ palpitation, SOB. EKG shows TWI in V1-V6, II, III, AVF and ST depressions in V4-V5. Peaked and downtrended 311->273, can be confounded by CKD/ESRD   Echo: no valvular dysfuction, EF 55-60%.   - f/u outpatient cardiology     # Hypertensive urgency.   POA w/ palpitation, SOB. EKG shows TWI in V1-V6, II, III, AVF and ST depressions in V4-V5. Peaked and downtrended, can be confounded by CKD/ESRD  Echo: no valvular dysfunction, EF 55-60%.   - c/w Hydralazine 50 TID   - c/w Nifedipine 90mg QD   - c/w coreg increased to 12.5 mg q12h    # HLD (hyperlipidemia).   Home med: Atorvastatin 20mg QD  - increased atorvastatin to 40mg QD.    # ESRD (end stage renal disease).   POA with Cr 4.38. Cr 2.4 baseline with no previos HD hx. s/p CVVHD 2/2 HAGMA metabolic encephalopathy (uremia vs. acidosis). FENa 0.3% prerenal. Renal US: consistent with CKD.   s/p intermittent HD last 11/28    Home meds: NaHCO3 650mg BID, Phoslo 667mg PO TID  - discontinued NaHCO3 650mg BID since pt on RRT. Restart on discharge  - continue Phoslo 667mg PO TID  - Renal diet.    # DM2 (diabetes mellitus, type 2).   POA w/  A1c this admission 5.7.   Home meds: Januvia 25mg QD, Lispro 2U premeal, Lantus 10U.   - c/w mISS inpt.   - restart home Januvia, Lispro 2U premeal, and Lantus 10U on discharge.    # Anemia.   Hx of DIMITRIS. Iron studies with low TIBC indicative of AOCD. S/p 1u pRBC on 11/27. Likely 2/2 DIMITRIS and CKD   - EPO with dialysis  - Transfuse <7.      Patient was discharged to: [facility] with HD    New medications: hydroxyzine 25 mg up to 2 times a day PRN for anxiety  Changes to old medications: increased atorvastatin to 40 mg qd, Klonopin at reduced dose 0.25  Medications that were stopped: metoprolol succinate    Items to follow up as outpatient: podiatry (Van), cardiology (Ayana), nephrology (Yonathan Kessler)    Physical exam at the time of discharge:       #Discharge: do not delete    73F with PMH HTN, HLD, DM with OM s/p multiple amputations of toes; CAD with stress test planned for 11/28; CKD5 presenting with dyspnea, admitted for ischemic w/u, ELBERT on CKD5, stepped up to MICU for HAGMA metabolic encephalopathy 2/2 uremia vs. acidosis requiring intubation; now extubated, improving s/p CVVHD but course c/b hypertensive urgency. Mental status improving, on HD. BP improving w/ uptitrating BP meds. Medically stable for transfer to [facility] with HD.      # Metabolic encephalopathy.   RESOLVED.   HAGMA metabolic encephalopathy 2/2 uremia vs. acidosis, requiring intubation in MICU. mental status improved s/p CVVHD with uremia and acidosis resolved. Now AOx3 with appropriate affect. CTH non cont 11/28 shows microvascular disease with lacunar infarcts. No acute pathology. Passed bedside dysphagia.  - Klonopin at reduced dose 0.25 qhs.   - c/w HD.    # Depression with suicidal ideation.   Psych consulted, no need for 1-1.  Denies any active plan to commit suicide. Home meds: Klonipin 0.5mg QD, Duloxetine 30mg QD, Trazodone 50mg QD.  - Klonopin at reduced dose 0.25 qhs.   - c/w Duloxetine 40mg QD  - c/w Trazodone 50 QD.  - c/w hydroxyzine 25 mg up to 2 times a day PRN for anxiety  - Premedication for HD days: Ativan 0.5 mg and hydroxyzine 50 mg    # Asthma.   Not in acute exacerbation. Home med Duoneb, Symbicort   - c/w duoneb q6h PRN.  - c/w symbicort 2 puffs twice a day    # Pneumonia, aspiration.   RESOLVED  New LLL opacity seen on Xray 11/27. Febrile on 11/28. Aspiration vs. atelectasis. on CTX 2g. pt continues to be afebrile with downtrending WBC.   s/p CTX for total 5 days (11/26-12/1).    # Type 2 myocardial infarction.   POA w/ palpitation, SOB. EKG shows TWI in V1-V6, II, III, AVF and ST depressions in V4-V5. Peaked and downtrended 311->273, can be confounded by CKD/ESRD   Echo: no valvular dysfuction, EF 55-60%.   - f/u outpatient cardiology     # Hypertensive urgency.   POA w/ palpitation, SOB. EKG shows TWI in V1-V6, II, III, AVF and ST depressions in V4-V5. Peaked and downtrended, can be confounded by CKD/ESRD  Echo: no valvular dysfunction, EF 55-60%.   - c/w Hydralazine 50 TID   - c/w Nifedipine 90mg QD   - c/w coreg increased to 12.5 mg q12h    # HLD (hyperlipidemia).   Home med: Atorvastatin 20mg QD  - increased atorvastatin to 40mg QD.    # ESRD (end stage renal disease).   POA with Cr 4.38. Cr 2.4 baseline with no previos HD hx. s/p CVVHD 2/2 HAGMA metabolic encephalopathy (uremia vs. acidosis). FENa 0.3% prerenal. Renal US: consistent with CKD.   s/p intermittent HD last 11/28    Home meds: NaHCO3 650mg BID, Phoslo 667mg PO TID  - discontinued NaHCO3 650mg BID since pt on RRT. Restart on discharge  - continue Phoslo 667mg PO TID  - Renal diet.    # DM2 (diabetes mellitus, type 2).   POA w/  A1c this admission 5.7.   Home meds: Januvia 25mg QD, Lispro 2U premeal, Lantus 10U.   - c/w mISS inpt.   - restart home Januvia, Lispro 2U premeal, and Lantus 10U on discharge.    # Anemia.   Hx of DIMITRIS. Iron studies with low TIBC indicative of AOCD. S/p 1u pRBC on 11/27. Likely 2/2 DIMITRIS and CKD   - EPO with dialysis  - Transfuse <7.      Patient was discharged to: [facility] with HD    New medications: hydroxyzine 25 mg up to 2 times a day PRN for anxiety  Changes to old medications: increased atorvastatin to 40 mg qd, Klonopin at reduced dose 0.25  Medications that were stopped: metoprolol succinate    Items to follow up as outpatient: podiatry (Van), cardiology (Ayana), nephrology (Yonathan Kessler)    Physical exam at the time of discharge:       #Discharge: do not delete    73F with PMH HTN, HLD, DM with OM s/p multiple amputations of toes; CAD with stress test planned for 11/28; CKD5 presenting with dyspnea, admitted for ischemic w/u, ELBERT on CKD5, stepped up to MICU for HAGMA metabolic encephalopathy 2/2 uremia vs. acidosis requiring intubation; now extubated, improving s/p CVVHD but course c/b hypertensive urgency. Mental status improving, on HD. BP improving w/ uptitrating BP meds. Medically stable for transfer to [facility] with HD.      # Metabolic encephalopathy.   RESOLVED.   HAGMA metabolic encephalopathy 2/2 uremia vs. acidosis, requiring intubation in MICU. mental status improved s/p CVVHD with uremia and acidosis resolved. Now AOx3 with appropriate affect. CTH non cont 11/28 shows microvascular disease with lacunar infarcts. No acute pathology. Passed bedside dysphagia.  - Klonopin at reduced dose 0.25 qhs.   - c/w HD.    # Depression with suicidal ideation.   Psych consulted, no need for 1-1.  Denies any active plan to commit suicide. Home meds: Klonipin 0.5mg QD, Duloxetine 30mg QD, Trazodone 50mg QD.  - Klonopin at reduced dose 0.25 qhs.   - c/w Duloxetine 40mg QD  - c/w Trazodone 50 QD.  - c/w hydroxyzine 25 mg up to 2 times a day PRN for anxiety  - Premedication for HD days: Ativan 0.5 mg and hydroxyzine 50 mg    # Asthma.   Not in acute exacerbation. Home med Duoneb, Symbicort   - c/w duoneb q6h PRN.  - c/w symbicort 2 puffs twice a day    # Pneumonia, aspiration.   RESOLVED  New LLL opacity seen on Xray 11/27. Febrile on 11/28. Aspiration vs. atelectasis. on CTX 2g. pt continues to be afebrile with downtrending WBC.   s/p CTX for total 5 days (11/26-12/1).    # Type 2 myocardial infarction.   POA w/ palpitation, SOB. EKG shows TWI in V1-V6, II, III, AVF and ST depressions in V4-V5. Peaked and downtrended 311->273, can be confounded by CKD/ESRD   Echo: no valvular dysfuction, EF 55-60%.   - f/u outpatient cardiology     # Hypertensive urgency.   POA w/ palpitation, SOB. EKG shows TWI in V1-V6, II, III, AVF and ST depressions in V4-V5. Peaked and downtrended, can be confounded by CKD/ESRD  Echo: no valvular dysfunction, EF 55-60%.   - c/w Hydralazine 50 TID   - c/w Nifedipine 90mg QD   - c/w coreg increased to 12.5 mg q12h    # HLD (hyperlipidemia).   Home med: Atorvastatin 20mg QD  - increased atorvastatin to 40mg QD.    # ESRD (end stage renal disease).   POA with Cr 4.38. Cr 2.4 baseline with no previos HD hx. s/p CVVHD 2/2 HAGMA metabolic encephalopathy (uremia vs. acidosis). FENa 0.3% prerenal. Renal US: consistent with CKD.   s/p intermittent HD last 11/28    Home meds: NaHCO3 650mg BID, Phoslo 667mg PO TID  - discontinued NaHCO3 650mg BID since pt on RRT. Restart on discharge  - continue Phoslo 667mg PO TID  - Renal diet.    # DM2 (diabetes mellitus, type 2).   POA w/  A1c this admission 5.7.   Home meds: Januvia 25mg QD, Lispro 2U premeal, Lantus 10U.   - c/w mISS inpt.   - restart home Januvia, Lispro 2U premeal, and Lantus 10U on discharge.    # Anemia.   Hx of DIMITRIS. Iron studies with low TIBC indicative of AOCD. S/p 1u pRBC on 11/27. Likely 2/2 DIMITRIS and CKD   - EPO with dialysis  - Transfuse <7.      Patient was discharged to: [facility] with HD    New medications: hydroxyzine 25 mg up to 2 times a day PRN for anxiety  Changes to old medications: increased atorvastatin to 40 mg qd, Klonopin at reduced dose 0.25  Medications that were stopped: metoprolol succinate    Items to follow up as outpatient: podiatry (Van), cardiology (Ayana), nephrology (Yonathan Kessler)    Physical exam at the time of discharge:       #Discharge: do not delete    73F with PMH HTN, HLD, DM with OM s/p multiple amputations of toes; CAD with stress test planned for 11/28; CKD5 presenting with dyspnea, admitted for ischemic w/u, ELBERT on CKD5, stepped up to MICU for HAGMA metabolic encephalopathy 2/2 uremia vs. acidosis requiring intubation; now extubated, improving s/p CVVHD but course c/b hypertensive urgency. Mental status improving, on HD. BP improving w/ uptitrating BP meds. Medically stable for transfer to [facility] with HD.      # Metabolic encephalopathy.   RESOLVED.   HAGMA metabolic encephalopathy 2/2 uremia vs. acidosis, requiring intubation in MICU. mental status improved s/p CVVHD with uremia and acidosis resolved. Now AOx3 with appropriate affect. CTH non cont 11/28 shows microvascular disease with lacunar infarcts. No acute pathology. Passed bedside dysphagia.  - Klonopin at reduced dose 0.25 qhs.   - c/w HD.    # Depression with suicidal ideation.   Psych consulted, no need for 1-1.  Denies any active plan to commit suicide. Home meds: Klonipin 0.5mg QD, Duloxetine 30mg QD, Trazodone 50mg QD.  - Klonopin at reduced dose 0.25 qhs.   - c/w Duloxetine 40mg QD  - c/w Trazodone 50 QD.  - c/w hydroxyzine 25 mg up to 2 times a day PRN for anxiety  - Premedication for HD days: Ativan 0.5 mg and hydroxyzine 50 mg    # Asthma.   Not in acute exacerbation. Home med Duoneb, Symbicort   - c/w duoneb q6h PRN.  - c/w symbicort 2 puffs twice a day    # Pneumonia, aspiration.   RESOLVED  New LLL opacity seen on Xray 11/27. Febrile on 11/28. Aspiration vs. atelectasis. on CTX 2g. pt continues to be afebrile with downtrending WBC.   s/p CTX for total 5 days (11/26-12/1).    # Type 2 myocardial infarction.   POA w/ palpitation, SOB. EKG shows TWI in V1-V6, II, III, AVF and ST depressions in V4-V5. Peaked and downtrended 311->273, can be confounded by CKD/ESRD   Echo: no valvular dysfuction, EF 55-60%.   - f/u outpatient cardiology     # Hypertensive urgency.   POA w/ palpitation, SOB. EKG shows TWI in V1-V6, II, III, AVF and ST depressions in V4-V5. Peaked and downtrended, can be confounded by CKD/ESRD  Echo: no valvular dysfunction, EF 55-60%.   - c/w Hydralazine 50 TID   - c/w Nifedipine 90mg QD   - c/w coreg increased to 12.5 mg q12h    # HLD (hyperlipidemia).   Home med: Atorvastatin 20mg QD  - increased atorvastatin to 40mg QD.    # ESRD (end stage renal disease).   POA with Cr 4.38. Cr 2.4 baseline with no previos HD hx. s/p CVVHD 2/2 HAGMA metabolic encephalopathy (uremia vs. acidosis). FENa 0.3% prerenal. Renal US: consistent with CKD.   s/p intermittent HD last 11/28    Home meds: NaHCO3 650mg BID, Phoslo 667mg PO TID  - discontinued NaHCO3 650mg BID since pt on RRT. Restart on discharge  - continue Phoslo 667mg PO TID  - Renal diet.    # DM2 (diabetes mellitus, type 2).   POA w/  A1c this admission 5.7.   Home meds: Januvia 25mg QD, Lispro 2U premeal, Lantus 10U.   - c/w mISS inpt.   - restart home Januvia, Lispro 2U premeal, and Lantus 10U on discharge.    # Anemia.   Hx of DIMITRIS. Iron studies with low TIBC indicative of AOCD. S/p 1u pRBC on 11/27. Likely 2/2 DIMITRIS and CKD   - EPO with dialysis  - Transfuse <7.      Patient was discharged to: [facility] with HD    New medications: hydroxyzine 25 mg up to 2 times a day PRN for anxiety  Changes to old medications: increased atorvastatin to 40 mg qd, Klonopin at reduced dose 0.25  Medications that were stopped: metoprolol succinate    Items to follow up as outpatient: podiatry (Van), cardiology (Ayana), nephrology (Yonathan Kessler)    Physical exam at the time of discharge:       #Discharge: do not delete    73F with PMH HTN, HLD, DM with OM s/p multiple amputations of toes; CAD with stress test planned for 11/28; CKD5 presenting with dyspnea, admitted for ischemic w/u, ELBERT on CKD5, stepped up to MICU for HAGMA metabolic encephalopathy 2/2 uremia vs. acidosis requiring intubation; now extubated, improving s/p CVVHD but course c/b hypertensive urgency. Mental status improving, on HD. BP improving w/ uptitrating BP meds. Medically stable for transfer to [facility] with HD.      # Metabolic encephalopathy.   RESOLVED.   HAGMA metabolic encephalopathy 2/2 uremia vs. acidosis, requiring intubation in MICU. mental status improved s/p CVVHD with uremia and acidosis resolved. Now AOx3 with appropriate affect. CTH non cont 11/28 shows microvascular disease with lacunar infarcts. No acute pathology. Passed bedside dysphagia.  - Klonopin at reduced dose 0.25 qhs.   - c/w HD.    # Depression with suicidal ideation.   Psych consulted, no need for 1-1.  Denies any active plan to commit suicide. Home meds: Klonipin 0.5mg QD, Duloxetine 30mg QD, Trazodone 50mg QD.  - Klonopin at reduced dose 0.25 qhs.   - c/w Duloxetine 40mg QD  - c/w Trazodone 50 QD.  - c/w hydroxyzine 25 mg up to 2 times a day PRN for anxiety  - Premedication for HD days: Ativan 0.5 mg and hydroxyzine 50 mg    # Asthma.   Not in acute exacerbation. Home med Duoneb, Symbicort   - c/w duoneb q6h PRN.  - c/w symbicort 2 puffs twice a day    # Pneumonia, aspiration.   RESOLVED  New LLL opacity seen on Xray 11/27. Febrile on 11/28. Aspiration vs. atelectasis. on CTX 2g. pt continues to be afebrile with downtrending WBC.   s/p CTX for total 5 days (11/26-12/1).    # Type 2 myocardial infarction.   POA w/ palpitation, SOB. EKG shows TWI in V1-V6, II, III, AVF and ST depressions in V4-V5. Peaked and downtrended 311->273, can be confounded by CKD/ESRD   Echo: no valvular dysfuction, EF 55-60%.   - f/u outpatient cardiology     # Hypertensive urgency.   POA w/ palpitation, SOB. EKG shows TWI in V1-V6, II, III, AVF and ST depressions in V4-V5. Peaked and downtrended, can be confounded by CKD/ESRD  Echo: no valvular dysfunction, EF 55-60%.   - c/w Hydralazine 50 TID   - c/w Nifedipine 90mg QD   - c/w coreg increased to 12.5 mg q12h    # HLD (hyperlipidemia).   Home med: Atorvastatin 20mg QD  - increased atorvastatin to 40mg QD.    # ESRD (end stage renal disease).   POA with Cr 4.38. Cr 2.4 baseline with no previos HD hx. s/p CVVHD 2/2 HAGMA metabolic encephalopathy (uremia vs. acidosis). FENa 0.3% prerenal. Renal US: consistent with CKD.   s/p intermittent HD last 11/28    Home meds: NaHCO3 650mg BID, Phoslo 667mg PO TID  - discontinued NaHCO3 650mg BID since pt on RRT.  - continue Phoslo 667mg PO TID  - continue EPO with HD  - Hold blood pressure meds on HD days.  - Renal diet.    # DM2 (diabetes mellitus, type 2).   POA w/  A1c this admission 5.7.   Home meds: Januvia 25mg QD, Lispro 2U premeal, Lantus 10U.   - c/w mISS inpt.   - restart home Januvia, Lispro 2U premeal, and Lantus 10U on discharge.    # Anemia.   Hx of DIMITRIS. Iron studies with low TIBC indicative of AOCD. S/p 1u pRBC on 11/27. Likely 2/2 DIMITRIS and CKD   - EPO with dialysis  - Transfuse <7.      Patient was discharged to: [facility] with HD    New medications: hydroxyzine 25 mg up to 2 times a day PRN for anxiety  Changes to old medications: increased atorvastatin to 40 mg qd, Klonopin at reduced dose 0.25  Medications that were stopped: metoprolol succinate    Items to follow up as outpatient: podiatry (Van), cardiology (Ayana), nephrology (Yonathan Kessler)    Physical exam at the time of discharge:       #Discharge: do not delete    73F with PMH HTN, HLD, DM with OM s/p multiple amputations of toes; CAD with stress test planned for 11/28; CKD5 presenting with dyspnea, admitted for ischemic w/u, ELBERT on CKD5, stepped up to MICU for HAGMA metabolic encephalopathy 2/2 uremia vs. acidosis requiring intubation; now extubated, improving s/p CVVHD but course c/b hypertensive urgency. Mental status improving, on HD. BP improving w/ uptitrating BP meds. Medically stable for transfer to [facility] with HD.      # Metabolic encephalopathy.   RESOLVED.   HAGMA metabolic encephalopathy 2/2 uremia vs. acidosis, requiring intubation in MICU. mental status improved s/p CVVHD with uremia and acidosis resolved. Now AOx3 with appropriate affect. CTH non cont 11/28 shows microvascular disease with lacunar infarcts. No acute pathology. Passed bedside dysphagia.  - Klonopin at reduced dose 0.25 qhs.   - c/w HD.    # Depression with suicidal ideation.   Psych consulted, no need for 1-1.  Denies any active plan to commit suicide. Home meds: Klonipin 0.5mg QD, Duloxetine 30mg QD, Trazodone 50mg QD.  - Klonopin at reduced dose 0.25 qhs.   - c/w Duloxetine 40mg QD  - c/w Trazodone 50 QD.  - c/w hydroxyzine 25 mg up to 2 times a day PRN for anxiety  - Premedication for HD days: Ativan 0.5 mg and hydroxyzine 50 mg    # Asthma.   Not in acute exacerbation. Home med Duoneb, Symbicort   - c/w duoneb q6h PRN.  - c/w symbicort 2 puffs twice a day    # Pneumonia, aspiration.   RESOLVED  New LLL opacity seen on Xray 11/27. Febrile on 11/28. Aspiration vs. atelectasis. on CTX 2g. pt continues to be afebrile with downtrending WBC.   s/p CTX for total 5 days (11/26-12/1).    # Type 2 myocardial infarction.   POA w/ palpitation, SOB. EKG shows TWI in V1-V6, II, III, AVF and ST depressions in V4-V5. Peaked and downtrended 311->273, can be confounded by CKD/ESRD   Echo: no valvular dysfuction, EF 55-60%.   - f/u outpatient cardiology     # Hypertensive urgency.   POA w/ palpitation, SOB. EKG shows TWI in V1-V6, II, III, AVF and ST depressions in V4-V5. Peaked and downtrended, can be confounded by CKD/ESRD  Echo: no valvular dysfunction, EF 55-60%.   - c/w Hydralazine 50 TID   - c/w Nifedipine 90mg QD   - c/w coreg increased to 12.5 mg q12h    # HLD (hyperlipidemia).   Home med: Atorvastatin 20mg QD  - increased atorvastatin to 40mg QD.    # ESRD (end stage renal disease).   POA with Cr 4.38. Cr 2.4 baseline with no previos HD hx. s/p CVVHD 2/2 HAGMA metabolic encephalopathy (uremia vs. acidosis). FENa 0.3% prerenal. Renal US: consistent with CKD.   s/p intermittent HD last 11/28    Home meds: NaHCO3 650mg BID, Phoslo 667mg PO TID  - discontinued NaHCO3 650mg BID since pt on RRT.  - continue Phoslo 667mg PO TID  - continue EPO with HD  - Hold blood pressure meds on HD days.  - Renal diet.    # DM2 (diabetes mellitus, type 2).   POA w/  A1c this admission 5.7.   Home meds: Januvia 25mg QD, Lispro 2U premeal, Lantus 10U.   - c/w mISS inpt.   - restart home Januvia, Lispro 2U premeal, and Lantus 10U on discharge.    # Anemia.   Hx of DIMITRIS. Iron studies with low TIBC indicative of AOCD. S/p 1u pRBC on 11/27. Likely 2/2 DIMITRIS and CKD   - EPO with dialysis  - Transfuse <7.      Patient was discharged to: [facility] with HD    New medications: hydroxyzine 25 mg up to 2 times a day PRN for anxiety  Changes to old medications: increased atorvastatin to 40 mg qd, Klonopin at reduced dose 0.25  Medications that were stopped: metoprolol succinate    Items to follow up as outpatient: podiatry (Van), cardiology (Ayana), nephrology (Yonathan Kessler)    Physical exam at the time of discharge:       #Discharge: do not delete    73F with PMH HTN, HLD, DM with OM s/p multiple amputations of toes; CAD with stress test planned for 11/28; CKD5 presenting with dyspnea, admitted for ischemic w/u, ELBERT on CKD5, stepped up to MICU for HAGMA metabolic encephalopathy 2/2 uremia vs. acidosis requiring intubation; now extubated, improving s/p CVVHD but course c/b hypertensive urgency. Mental status improving, on HD. BP improving w/ uptitrating BP meds. Medically stable for transfer to [facility] with HD.      # Metabolic encephalopathy.   RESOLVED.   HAGMA metabolic encephalopathy 2/2 uremia vs. acidosis, requiring intubation in MICU. mental status improved s/p CVVHD with uremia and acidosis resolved. Now AOx3 with appropriate affect. CTH non cont 11/28 shows microvascular disease with lacunar infarcts. No acute pathology. Passed bedside dysphagia.  - Klonopin at reduced dose 0.25 qhs.   - c/w HD.    # Depression with suicidal ideation.   Psych consulted, no need for 1-1.  Denies any active plan to commit suicide. Home meds: Klonipin 0.5mg QD, Duloxetine 30mg QD, Trazodone 50mg QD.  - Klonopin at reduced dose 0.25 qhs.   - c/w Duloxetine 40mg QD  - c/w Trazodone 50 QD.  - c/w hydroxyzine 25 mg up to 2 times a day PRN for anxiety  - Premedication for HD days: Ativan 0.5 mg and hydroxyzine 50 mg    # Asthma.   Not in acute exacerbation. Home med Duoneb, Symbicort   - c/w duoneb q6h PRN.  - c/w symbicort 2 puffs twice a day    # Pneumonia, aspiration.   RESOLVED  New LLL opacity seen on Xray 11/27. Febrile on 11/28. Aspiration vs. atelectasis. on CTX 2g. pt continues to be afebrile with downtrending WBC.   s/p CTX for total 5 days (11/26-12/1).    # Type 2 myocardial infarction.   POA w/ palpitation, SOB. EKG shows TWI in V1-V6, II, III, AVF and ST depressions in V4-V5. Peaked and downtrended 311->273, can be confounded by CKD/ESRD   Echo: no valvular dysfuction, EF 55-60%.   - f/u outpatient cardiology     # Hypertensive urgency.   POA w/ palpitation, SOB. EKG shows TWI in V1-V6, II, III, AVF and ST depressions in V4-V5. Peaked and downtrended, can be confounded by CKD/ESRD  Echo: no valvular dysfunction, EF 55-60%.   - c/w Hydralazine 50 TID   - c/w Nifedipine 90mg QD   - c/w coreg increased to 12.5 mg q12h    # HLD (hyperlipidemia).   Home med: Atorvastatin 20mg QD  - increased atorvastatin to 40mg QD.    # ESRD (end stage renal disease).   POA with Cr 4.38. Cr 2.4 baseline with no previos HD hx. s/p CVVHD 2/2 HAGMA metabolic encephalopathy (uremia vs. acidosis). FENa 0.3% prerenal. Renal US: consistent with CKD.   s/p intermittent HD last 11/28    Home meds: NaHCO3 650mg BID, Phoslo 667mg PO TID  - discontinued NaHCO3 650mg BID since pt on RRT.  - continue Phoslo 667mg PO TID  - continue EPO with HD  - Hold blood pressure meds on HD days.  - Renal diet.    # DM2 (diabetes mellitus, type 2).   POA w/  A1c this admission 5.7.   Home meds: Januvia 25mg QD, Lispro 2U premeal, Lantus 10U.   - c/w mISS inpt.   - restart home Januvia, Lispro 2U premeal, and Lantus 10U on discharge.    # Anemia.   Hx of DIMITRIS. Iron studies with low TIBC indicative of AOCD. S/p 1u pRBC on 11/27. Likely 2/2 DIMITRIS and CKD   - EPO with dialysis  - Transfuse <7.      Patient was discharged to: [facility] with HD    New medications: hydroxyzine 25 mg up to 2 times a day PRN for anxiety  Changes to old medications: increased atorvastatin to 40 mg qd, Klonopin at reduced dose 0.25  Medications that were stopped: metoprolol succinate    Items to follow up as outpatient: podiatry (Van), cardiology (Ayana), nephrology (Yonathan Kessler)    Physical exam at the time of discharge:       #Discharge: do not delete    73F with PMH HTN, HLD, DM with OM s/p multiple amputations of toes; CAD with stress test planned for 11/28; CKD5 presenting with dyspnea, admitted for ischemic w/u, ELBERT on CKD5, stepped up to MICU for HAGMA metabolic encephalopathy 2/2 uremia vs. acidosis requiring intubation; now extubated, improving s/p CVVHD but course c/b hypertensive urgency. Mental status improving, on HD. BP improving w/ uptitrating BP meds. Medically stable for transfer to [facility] with HD.      # Metabolic encephalopathy.   RESOLVED.   HAGMA metabolic encephalopathy 2/2 uremia vs. acidosis, requiring intubation in MICU. mental status improved s/p CVVHD with uremia and acidosis resolved. Now AOx3 with appropriate affect. CTH non cont 11/28 shows microvascular disease with lacunar infarcts. No acute pathology. Passed bedside dysphagia.  - Klonopin at reduced dose 0.25 qhs.   - c/w HD.    # Depression with suicidal ideation.   Psych consulted, no need for 1-1.  Denies any active plan to commit suicide. Home meds: Klonipin 0.5mg QD, Duloxetine 30mg QD, Trazodone 50mg QD.  - Klonopin at reduced dose 0.25 qhs.   - c/w Duloxetine 40mg QD  - c/w Trazodone 50 QD.  - c/w hydroxyzine 25 mg up to 2 times a day PRN for anxiety  - Premedication for HD days: Ativan 0.5 mg and hydroxyzine 50 mg    # Asthma.   Not in acute exacerbation. Home med Duoneb, Symbicort, arinet, albuterol  - c/w duoneb q6h PRN.  - c/w symbicort 2 puffs twice a day, arinet, albuterol    # Pneumonia, aspiration.   RESOLVED  New LLL opacity seen on Xray 11/27. Febrile on 11/28. Aspiration vs. atelectasis. on CTX 2g. pt continues to be afebrile with downtrending WBC.   s/p CTX for total 5 days (11/26-12/1).    # Type 2 myocardial infarction.   POA w/ palpitation, SOB. EKG shows TWI in V1-V6, II, III, AVF and ST depressions in V4-V5. Peaked and downtrended 311->273, can be confounded by CKD/ESRD   Echo: no valvular dysfuction, EF 55-60%.   - f/u outpatient cardiology     # Hypertensive urgency.   POA w/ palpitation, SOB. EKG shows TWI in V1-V6, II, III, AVF and ST depressions in V4-V5. Peaked and downtrended, can be confounded by CKD/ESRD  Echo: no valvular dysfunction, EF 55-60%.   - c/w Hydralazine 50 TID   - c/w Nifedipine 90mg QD   - c/w coreg increased to 12.5 mg q12h    # HLD (hyperlipidemia).   Home med: Atorvastatin 20mg QD  - increased atorvastatin to 40mg QD.    # ESRD (end stage renal disease).   POA with Cr 4.38. Cr 2.4 baseline with no previos HD hx. s/p CVVHD 2/2 HAGMA metabolic encephalopathy (uremia vs. acidosis). FENa 0.3% prerenal. Renal US: consistent with CKD.   s/p intermittent HD last 11/28    Home meds: NaHCO3 650mg BID, Phoslo 667mg PO TID  - discontinued NaHCO3 650mg BID since pt on RRT.  - continue Phoslo 667mg PO TID  - continue EPO with HD  - Hold blood pressure meds on HD days.  - Renal diet.    # DM2 (diabetes mellitus, type 2).   POA w/  A1c this admission 5.7.   Home meds: Januvia 25mg QD, Lispro 2U premeal, Lantus 10U.   - c/w mISS inpt.   - restart home Januvia, Lispro 2U premeal, and Lantus 10U on discharge.    # Anemia.   Hx of DIMITRIS. Iron studies with low TIBC indicative of AOCD. S/p 1u pRBC on 11/27. Likely 2/2 DIMITRIS and CKD   - EPO with dialysis  - Transfuse <7.      Patient was discharged to: [facility] with HD    New medications: hydroxyzine 25 mg up to 2 times a day PRN for anxiety  Changes to old medications: increased atorvastatin to 40 mg qd, Klonopin at reduced dose 0.25  Medications that were stopped: metoprolol succinate    Items to follow up as outpatient: podiatry (Van), cardiology (Ayana), nephrology (Yonathan Kessler)    Physical exam at the time of discharge:  GENERAL: NAD, lying in bed comfortably  HEAD:  Atraumatic, normocephalic  EYES: EOMI, conjunctiva and sclera clear  NECK: Supple, trachea midline, no JVD  HEART: Regular rate and rhythm, no murmurs, rubs, or gallops  LUNGS: Unlabored respirations.  Clear to auscultation bilaterally, no crackles, wheezing, or rhonchi  ABDOMEN: Soft, nontender, nondistended  EXTREMITIES: 2+ peripheral pulses bilaterally. No clubbing, cyanosis, or edema  NERVOUS SYSTEM:  A&Ox3, moving all extremities, no focal deficits   SKIN: No rashes or lesions  HD cath in place right side clavicle     #Discharge: do not delete    73F with PMH HTN, HLD, DM with OM s/p multiple amputations of toes; CAD with stress test planned for 11/28; CKD5 presenting with dyspnea, admitted for ischemic w/u, ELBERT on CKD5, stepped up to MICU for HAGMA metabolic encephalopathy 2/2 uremia vs. acidosis requiring intubation; now extubated, improving s/p CVVHD but course c/b hypertensive urgency. Mental status improving, on HD. BP improving w/ uptitrating BP meds. Medically stable for transfer to [facility] with HD.      # Metabolic encephalopathy.   RESOLVED.   HAGMA metabolic encephalopathy 2/2 uremia vs. acidosis, requiring intubation in MICU. mental status improved s/p CVVHD with uremia and acidosis resolved. Now AOx3 with appropriate affect. CTH non cont 11/28 shows microvascular disease with lacunar infarcts. No acute pathology. Passed bedside dysphagia.  - Klonopin at reduced dose 0.25 qhs.   - c/w HD.    # Depression with suicidal ideation.   Psych consulted, no need for 1-1.  Denies any active plan to commit suicide. Home meds: Klonipin 0.5mg QD, Duloxetine 30mg QD, Trazodone 50mg QD.  - Klonopin at reduced dose 0.25 qhs.   - c/w Duloxetine 40mg QD  - c/w Trazodone 50 QD.  - c/w hydroxyzine 25 mg up to 2 times a day PRN for anxiety  - Premedication for HD days: Ativan 0.5 mg and hydroxyzine 50 mg    # Asthma.   Not in acute exacerbation. Home med Duoneb, Symbicort, arinet, albuterol  - c/w duoneb q6h PRN.  - c/w symbicort 2 puffs twice a day, arinet, albuterol    # Pneumonia, aspiration.   RESOLVED  New LLL opacity seen on Xray 11/27. Febrile on 11/28. Aspiration vs. atelectasis. on CTX 2g. pt continues to be afebrile with downtrending WBC.   s/p CTX for total 5 days (11/26-12/1).    # Type 2 myocardial infarction.   POA w/ palpitation, SOB. EKG shows TWI in V1-V6, II, III, AVF and ST depressions in V4-V5. Peaked and downtrended 311->273, can be confounded by CKD/ESRD   Echo: no valvular dysfuction, EF 55-60%.   - f/u outpatient cardiology     # Hypertensive urgency.   POA w/ palpitation, SOB. EKG shows TWI in V1-V6, II, III, AVF and ST depressions in V4-V5. Peaked and downtrended, can be confounded by CKD/ESRD  Echo: no valvular dysfunction, EF 55-60%.   - c/w Hydralazine 50 TID   - c/w Nifedipine 90mg QD   - c/w coreg increased to 12.5 mg q12h    # HLD (hyperlipidemia).   Home med: Atorvastatin 20mg QD  - increased atorvastatin to 40mg QD.    # ESRD (end stage renal disease).   POA with Cr 4.38. Cr 2.4 baseline with no previos HD hx. s/p CVVHD 2/2 HAGMA metabolic encephalopathy (uremia vs. acidosis). FENa 0.3% prerenal. Renal US: consistent with CKD.   s/p intermittent HD last 11/28    Home meds: NaHCO3 650mg BID, Phoslo 667mg PO TID  - discontinued NaHCO3 650mg BID since pt on RRT.  - continue Phoslo 667mg PO TID  - continue EPO with HD  - Hold blood pressure meds on HD days.  - Renal diet.    # DM2 (diabetes mellitus, type 2).   POA w/  A1c this admission 5.7.   Home meds: Januvia 25mg QD, Lispro 2U premeal, Lantus 10U.   - c/w mISS inpt.   - restart home Januvia, Lispro 2U premeal, and Lantus 10U on discharge.    # Anemia.   Hx of DIMITRIS. Iron studies with low TIBC indicative of AOCD. S/p 1u pRBC on 11/27. Likely 2/2 DIMITRIS and CKD   - EPO with dialysis  - Transfuse <7.      Patient was discharged to: [facility] with HD    New medications: hydroxyzine 25 mg up to 2 times a day PRN for anxiety  Changes to old medications: increased atorvastatin to 40 mg qd, Klonopin at reduced dose 0.25  Medications that were stopped: metoprolol succinate    Items to follow up as outpatient: podiatry (Van), cardiology (Ayana), nephrology (Yonathan Kessler)    Physical exam at the time of discharge:  GENERAL: NAD, lying in bed comfortably  HEAD:  Atraumatic, normocephalic  EYES: EOMI, conjunctiva and sclera clear  NECK: Supple, trachea midline, no JVD  HEART: Regular rate and rhythm, no murmurs, rubs, or gallops  LUNGS: Unlabored respirations.  Clear to auscultation bilaterally, no crackles, wheezing, or rhonchi  ABDOMEN: Soft, nontender, nondistended  EXTREMITIES: 2+ peripheral pulses bilaterally. No clubbing, cyanosis, or edema  NERVOUS SYSTEM:  A&Ox3, moving all extremities, no focal deficits   SKIN: No rashes or lesions  HD cath in place right side clavicle     #Discharge: do not delete    73F with PMH HTN, HLD, DM with OM s/p multiple amputations of toes; CAD with stress test planned for 11/28; CKD5 presenting with dyspnea, admitted for ischemic w/u, ELBERT on CKD5, stepped up to MICU for HAGMA metabolic encephalopathy 2/2 uremia vs. acidosis requiring intubation; now extubated, improving s/p CVVHD but course c/b hypertensive urgency. Mental status improving, on HD. BP improving w/ uptitrating BP meds. Medically stable for transfer to [facility] with HD.      # Metabolic encephalopathy.   RESOLVED.   HAGMA metabolic encephalopathy 2/2 uremia vs. acidosis, requiring intubation in MICU. mental status improved s/p CVVHD with uremia and acidosis resolved. Now AOx3 with appropriate affect. CTH non cont 11/28 shows microvascular disease with lacunar infarcts. No acute pathology. Passed bedside dysphagia.  - Klonopin at reduced dose 0.25 qhs.   - c/w HD.    # Depression with suicidal ideation.   Psych consulted, no need for 1-1.  Denies any active plan to commit suicide. Home meds: Klonipin 0.5mg QD, Duloxetine 30mg QD, Trazodone 50mg QD.  - Klonopin at reduced dose 0.25 qhs.   - c/w Duloxetine 40mg QD  - c/w Trazodone 50 QD.  - c/w hydroxyzine 25 mg up to 2 times a day PRN for anxiety  - Premedication for HD days: Ativan 0.5 mg and hydroxyzine 50 mg    # Asthma.   Not in acute exacerbation. Home med Duoneb, Symbicort, arinet, albuterol  - c/w duoneb q6h PRN.  - c/w symbicort 2 puffs twice a day, arinet, albuterol    # Pneumonia, aspiration.   RESOLVED  New LLL opacity seen on Xray 11/27. Febrile on 11/28. Aspiration vs. atelectasis. on CTX 2g. pt continues to be afebrile with downtrending WBC.   s/p CTX for total 5 days (11/26-12/1).    # Type 2 myocardial infarction.   POA w/ palpitation, SOB. EKG shows TWI in V1-V6, II, III, AVF and ST depressions in V4-V5. Peaked and downtrended 311->273, can be confounded by CKD/ESRD   Echo: no valvular dysfuction, EF 55-60%.   - f/u outpatient cardiology     # Hypertensive urgency.   POA w/ palpitation, SOB. EKG shows TWI in V1-V6, II, III, AVF and ST depressions in V4-V5. Peaked and downtrended, can be confounded by CKD/ESRD  Echo: no valvular dysfunction, EF 55-60%.   - c/w Hydralazine 50 TID   - c/w Nifedipine 90mg QD   - c/w coreg increased to 12.5 mg q12h    # HLD (hyperlipidemia).   Home med: Atorvastatin 20mg QD  - increased atorvastatin to 40mg QD.    # ESRD (end stage renal disease).   POA with Cr 4.38. Cr 2.4 baseline with no previos HD hx. s/p CVVHD 2/2 HAGMA metabolic encephalopathy (uremia vs. acidosis). FENa 0.3% prerenal. Renal US: consistent with CKD.   s/p intermittent HD last 11/28    Home meds: NaHCO3 650mg BID, Phoslo 667mg PO TID  - discontinued NaHCO3 650mg BID since pt on RRT.  - continue Phoslo 667mg PO TID  - continue EPO with HD  - Hold blood pressure meds on HD days.  - Renal diet.    # DM2 (diabetes mellitus, type 2).   POA w/  A1c this admission 5.7.   Home meds: Januvia 25mg QD, Lispro 2U premeal, Lantus 10U.   - c/w mISS inpt.   - restart home Januvia, Lispro 2U premeal, and Lantus 10U on discharge.    # Anemia.   Hx of DIMITRIS. Iron studies with low TIBC indicative of AOCD. S/p 1u pRBC on 11/27. Likely 2/2 DIMITRIS and CKD   - EPO with dialysis  - Transfuse <7.      Patient was discharged to: [facility] with HD    New medications: hydroxyzine 25 mg up to 2 times a day PRN for anxiety  Changes to old medications: increased atorvastatin to 40 mg qd, Klonopin at reduced dose 0.25  Medications that were stopped: metoprolol succinate    Items to follow up as outpatient: podiatry (Van), cardiology (Ayana), nephrology (Yonathan Kessler)    Physical exam at the time of discharge:  GENERAL: NAD, lying in bed comfortably  HEAD:  Atraumatic, normocephalic  EYES: EOMI, conjunctiva and sclera clear  NECK: Supple, trachea midline, no JVD  HEART: Regular rate and rhythm, no murmurs, rubs, or gallops  LUNGS: Unlabored respirations.  Clear to auscultation bilaterally, no crackles, wheezing, or rhonchi  ABDOMEN: Soft, nontender, nondistended  EXTREMITIES: 2+ peripheral pulses bilaterally. No clubbing, cyanosis, or edema  NERVOUS SYSTEM:  A&Ox3, moving all extremities, no focal deficits   SKIN: No rashes or lesions  HD cath in place right side clavicle

## 2023-11-30 NOTE — CONSULT NOTE ADULT - ASSESSMENT
73 year old woman with debility, Pneumonia, ESRD on dialysis, Advance care planning and encounter for her palliative care.

## 2023-11-30 NOTE — PROGRESS NOTE ADULT - ATTENDING COMMENTS
Interim chart reviewed. Case d/w Dr. Martinez on renal rounds.  As above: 73YF now ESRD initially admitted for SOB and ischemic w/u h/c c/b metabolic encephalopathy , acidosis, hyperkalmeia, upgraded to MICU for further management, now clinically stable, still with uncontrolled BP with meds, Hd today for volume management.  Pt s/p TDC placement.  Pt seen at bedside on HD.  Agree with details of above A/P.  Renal service will continue to follow closely with you.

## 2023-11-30 NOTE — DISCHARGE NOTE PROVIDER - NSDCMRMEDTOKEN_GEN_ALL_CORE_FT
acetaminophen 325 mg oral tablet: 2 tab(s) orally once a day  Airet 2.5 mg/3 mL (0.083%) inhalation solution: 3 milliliter(s) by nebulizer 3 times a day as needed for  shortness of breath and/or wheezing  Albuterol (Eqv-Proventil HFA) 90 mcg/inh inhalation aerosol: 1 puff(s) inhaled once a day  atorvastatin 20 mg oral tablet: 1 tab(s) orally once a day  budesonide-formoterol 160 mcg-4.5 mcg/inh inhalation aerosol: 2 puff(s) inhaled 2 times a day  calcium acetate 667 mg oral tablet: 1 tab(s) orally 3 times a day  clonazePAM 0.5 mg oral tablet: 1 tab(s) orally once a day  Cymbalta 30 mg oral delayed release capsule: 1 cap(s) orally once a day  ergocalciferol 400 intl units (10 mcg) oral tablet: 1 tab(s) orally once a day  Flomax 0.4 mg oral capsule: 1 cap(s) orally once a day  hydrALAZINE 50 mg oral tablet: 1 tab(s) orally 3 times a day  Januvia 25 mg oral tablet: 1 tab(s) orally once a day  Lasix 80 mg oral tablet: 1 tab(s) orally once a day  loperamide 2 mg oral tablet: 1 tab(s) orally once a day  metoprolol succinate 25 mg oral capsule, extended release: 1 cap(s) orally once a day  Nephro-Jamie oral tablet: 1 tab(s) orally once a day  NIFEdipine (Eqv-Adalat CC) 90 mg oral tablet, extended release: 1 tab(s) orally once a day  pantoprazole 40 mg oral delayed release tablet: 1 tab(s) orally once a day  polyethylene glycol 3350 oral powder for reconstitution: 17 gram(s) orally once a day  sodium bicarbonate 325 mg oral tablet: 2 tab(s) orally 2 times a day  traZODone 50 mg oral tablet: 1 tab(s) orally once a day   acetaminophen 325 mg oral tablet: 2 tab(s) orally every 6 hours As needed Mild Pain (1 - 3)  Airet 2.5 mg/3 mL (0.083%) inhalation solution: 3 milliliter(s) by nebulizer 3 times a day as needed for  shortness of breath and/or wheezing  Albuterol (Eqv-Proventil HFA) 90 mcg/inh inhalation aerosol: 1 puff(s) inhaled once a day  atorvastatin 40 mg oral tablet: 1 tab(s) orally once a day (at bedtime)  budesonide-formoterol 160 mcg-4.5 mcg/inh inhalation aerosol: 2 puff(s) inhaled 2 times a day  calcium acetate 667 mg oral tablet: 1 tab(s) orally 3 times a day  carvedilol 12.5 mg oral tablet: 1 tab(s) orally every 12 hours  chlorhexidine 2% topical pad: 1 Apply topically to affected area  clonazePAM 0.5 mg oral tablet: 1 tab(s) orally once a day  DULoxetine 40 mg oral delayed release capsule: 1 cap(s) orally once a day  ergocalciferol 400 intl units (10 mcg) oral tablet: 1 tab(s) orally once a day  hydrALAZINE 50 mg oral tablet: 1 tab(s) orally 3 times a day  hydrOXYzine hydrochloride 25 mg oral tablet: 1 tab(s) orally 2 times a day As needed Anxiety  ipratropium-albuterol 0.5 mg-2.5 mg/3 mL inhalation solution: 3 milliliter(s) inhaled every 6 hours  Januvia 25 mg oral tablet: 1 tab(s) orally once a day  lidocaine 4% topical film: Apply topically to affected area once a day  NIFEdipine 90 mg oral tablet, extended release: 1 tab(s) orally every 24 hours  pantoprazole 40 mg oral delayed release tablet: 1 tab(s) orally once a day  polyethylene glycol 3350 oral powder for reconstitution: 17 gram(s) orally once a day  sodium bicarbonate 325 mg oral tablet: 2 tab(s) orally 2 times a day  traZODone 50 mg oral tablet: 1 tab(s) orally every 24 hours   acetaminophen 325 mg oral tablet: 2 tab(s) orally every 6 hours As needed Mild Pain (1 - 3)  Airet 2.5 mg/3 mL (0.083%) inhalation solution: 3 milliliter(s) by nebulizer 3 times a day as needed for  shortness of breath and/or wheezing  Albuterol (Eqv-Proventil HFA) 90 mcg/inh inhalation aerosol: 1 puff(s) inhaled once a day  Ativan 0.5 mg oral tablet: 1 tab(s) orally As needed Anxiety  atorvastatin 40 mg oral tablet: 1 tab(s) orally once a day (at bedtime)  budesonide-formoterol 160 mcg-4.5 mcg/inh inhalation aerosol: 2 puff(s) inhaled 2 times a day  calcium acetate 667 mg oral tablet: 1 tab(s) orally 3 times a day  carvedilol 12.5 mg oral tablet: 1 tab(s) orally every 12 hours  chlorhexidine 2% topical pad: 1 Apply topically to affected area  clonazePAM: 0.25 milligram(s) once a day (at bedtime)  DULoxetine 40 mg oral delayed release capsule: 1 cap(s) orally once a day  ergocalciferol 400 intl units (10 mcg) oral tablet: 1 tab(s) orally once a day  hydrALAZINE 50 mg oral tablet: 1 tab(s) orally 3 times a day  hydrOXYzine hydrochloride 25 mg oral tablet: 1 tab(s) orally 2 times a day As needed Anxiety  ipratropium-albuterol 0.5 mg-2.5 mg/3 mL inhalation solution: 3 milliliter(s) inhaled every 6 hours  Januvia 25 mg oral tablet: 1 tab(s) orally once a day  lidocaine 4% topical film: Apply topically to affected area once a day  NIFEdipine 90 mg oral tablet, extended release: 1 tab(s) orally every 24 hours  pantoprazole 40 mg oral delayed release tablet: 1 tab(s) orally once a day  polyethylene glycol 3350 oral powder for reconstitution: 17 gram(s) orally once a day  sodium bicarbonate 325 mg oral tablet: 2 tab(s) orally 2 times a day  traZODone 50 mg oral tablet: 1 tab(s) orally every 24 hours   acetaminophen 325 mg oral tablet: 2 tab(s) orally every 6 hours As needed Mild Pain (1 - 3)  Airet 2.5 mg/3 mL (0.083%) inhalation solution: 3 milliliter(s) by nebulizer 3 times a day as needed for  shortness of breath and/or wheezing  Albuterol (Eqv-Proventil HFA) 90 mcg/inh inhalation aerosol: 1 puff(s) inhaled once a day  Ativan 0.5 mg oral tablet: 1 tab(s) orally As needed Anxiety  atorvastatin 40 mg oral tablet: 1 tab(s) orally once a day (at bedtime)  budesonide-formoterol 160 mcg-4.5 mcg/inh inhalation aerosol: 2 puff(s) inhaled 2 times a day  calcium acetate 667 mg oral tablet: 1 tab(s) orally 3 times a day  carvedilol 12.5 mg oral tablet: 1 tab(s) orally every 12 hours  chlorhexidine 2% topical pad: 1 Apply topically to affected area  clonazePAM: 0.25 milligram(s) once a day (at bedtime)  DULoxetine 40 mg oral delayed release capsule: 1 cap(s) orally once a day  ergocalciferol 400 intl units (10 mcg) oral tablet: 1 tab(s) orally once a day  hydrALAZINE 50 mg oral tablet: 1 tab(s) orally 3 times a day  hydrOXYzine hydrochloride 25 mg oral tablet: 1 tab(s) orally 2 times a day As needed Anxiety  insulin glargine 100 units/mL subcutaneous solution: 10 unit(s) subcutaneous once a day (at bedtime)  ipratropium-albuterol 0.5 mg-2.5 mg/3 mL inhalation solution: 3 milliliter(s) inhaled every 6 hours  Januvia 25 mg oral tablet: 1 tab(s) orally once a day  lidocaine 4% topical film: Apply topically to affected area once a day  NIFEdipine 90 mg oral tablet, extended release: 1 tab(s) orally every 24 hours  pantoprazole 40 mg oral delayed release tablet: 1 tab(s) orally once a day  polyethylene glycol 3350 oral powder for reconstitution: 17 gram(s) orally once a day  sodium bicarbonate 325 mg oral tablet: 2 tab(s) orally 2 times a day  traZODone 50 mg oral tablet: 1 tab(s) orally every 24 hours   acetaminophen 325 mg oral tablet: 2 tab(s) orally every 6 hours As needed Mild Pain (1 - 3)  Airet 2.5 mg/3 mL (0.083%) inhalation solution: 3 milliliter(s) by nebulizer 3 times a day as needed for  shortness of breath and/or wheezing  Albuterol (Eqv-Proventil HFA) 90 mcg/inh inhalation aerosol: 1 puff(s) inhaled once a day  Ativan 0.5 mg oral tablet: 1 tab(s) orally 3 times a week as needed for Anxiety For before dialysis sessions  atorvastatin 40 mg oral tablet: 1 tab(s) orally once a day (at bedtime)  budesonide-formoterol 160 mcg-4.5 mcg/inh inhalation aerosol: 2 puff(s) inhaled 2 times a day  calcium acetate 667 mg oral tablet: 1 tab(s) orally 3 times a day  carvedilol 12.5 mg oral tablet: 1 tab(s) orally every 12 hours  clonazePAM: 0.25 milligram(s) once a day (at bedtime)  DULoxetine 40 mg oral delayed release capsule: 1 cap(s) orally once a day  epoetin jeannie: 8,000 international unit(s) 3 times a week Dosing and frequency per nephrology at dialysis sessions.  ergocalciferol 400 intl units (10 mcg) oral tablet: 1 tab(s) orally once a day  hydrALAZINE 50 mg oral tablet: 1 tab(s) orally 3 times a day  hydrOXYzine hydrochloride 25 mg oral tablet: 1 tab(s) orally 2 times a day As needed Anxiety  insulin glargine 100 units/mL subcutaneous solution: 10 unit(s) subcutaneous once a day (at bedtime)  insulin lispro 100 units/mL injectable solution: 2 unit(s) injectable 3 times a day (before meals)  ipratropium-albuterol 0.5 mg-2.5 mg/3 mL inhalation solution: 3 milliliter(s) inhaled every 6 hours  Januvia 25 mg oral tablet: 1 tab(s) orally once a day  lidocaine 4% topical film: Apply topically to affected area once a day  NIFEdipine 90 mg oral tablet, extended release: 1 tab(s) orally every 24 hours  pantoprazole 40 mg oral delayed release tablet: 1 tab(s) orally once a day  polyethylene glycol 3350 oral powder for reconstitution: 17 gram(s) orally once a day  traZODone 50 mg oral tablet: 1 tab(s) orally every 24 hours   acetaminophen 325 mg oral tablet: 2 tab(s) orally every 6 hours As needed Mild Pain (1 - 3)  Airet 2.5 mg/3 mL (0.083%) inhalation solution: 3 milliliter(s) by nebulizer 3 times a day as needed for  shortness of breath and/or wheezing  Albuterol (Eqv-Proventil HFA) 90 mcg/inh inhalation aerosol: 1 puff(s) inhaled once a day  Ativan 0.5 mg oral tablet: 1 tab(s) orally 3 times a week as needed for Anxiety For before dialysis sessions  atorvastatin 40 mg oral tablet: 1 tab(s) orally once a day (at bedtime)  budesonide-formoterol 160 mcg-4.5 mcg/inh inhalation aerosol: 2 puff(s) inhaled 2 times a day  calcium acetate 667 mg oral tablet: 1 tab(s) orally 3 times a day  carvedilol 12.5 mg oral tablet: 1 tab(s) orally every 12 hours  clonazePAM: 0.25 milligram(s) once a day (at bedtime)  DULoxetine 40 mg oral delayed release capsule: 1 cap(s) orally once a day  epoetin jeannie: 8,000 international unit(s) 3 times a week Dosing and frequency per nephrology at dialysis sessions.  ergocalciferol 400 intl units (10 mcg) oral tablet: 1 tab(s) orally once a day  hydrALAZINE 50 mg oral tablet: 1 tab(s) orally 3 times a day  hydrOXYzine hydrochloride 25 mg oral tablet: 1 tab(s) orally 2 times a day As needed Anxiety  insulin glargine 100 units/mL subcutaneous solution: 10 unit(s) subcutaneous once a day (at bedtime)  insulin lispro 100 units/mL injectable solution: 2 unit(s) injectable 3 times a day (before meals)  ipratropium-albuterol 0.5 mg-2.5 mg/3 mL inhalation solution: 3 milliliter(s) inhaled every 6 hours as needed for  shortness of breath and/or wheezing  Januvia 25 mg oral tablet: 1 tab(s) orally once a day  lidocaine 4% topical film: Apply topically to affected area once a day  NIFEdipine 90 mg oral tablet, extended release: 1 tab(s) orally every 24 hours  pantoprazole 40 mg oral delayed release tablet: 1 tab(s) orally once a day  polyethylene glycol 3350 oral powder for reconstitution: 17 gram(s) orally once a day  traZODone 50 mg oral tablet: 1 tab(s) orally every 24 hours

## 2023-11-30 NOTE — PROGRESS NOTE ADULT - PROBLEM SELECTOR PLAN 6
POA w/ palpitation, SOB. EKG shows TWI in V1-V6, II, III, AVF and ST depressions in V4-V5. Peaked and downtrended, can be confounded by CKD   - c/w nitroglycerin patch 0.3  - f/u TTE   - f/u card recs POA w/ palpitation, SOB. EKG shows TWI in V1-V6, II, III, AVF and ST depressions in V4-V5. Peaked and downtrended, can be confounded by CKD   Echo: no valvular dysfuction, EF 55-60%.   - c/w nitroglycerin patch 0.3  - f/u card recs POA w/ palpitation, SOB. EKG shows TWI in V1-V6, II, III, AVF and ST depressions in V4-V5. Peaked and downtrended, can be confounded by CKD/ESRD  Echo: no valvular dysfunction, EF 55-60%.   - c/w nitroglycerin patch 0.3  - f/u card recs

## 2023-11-30 NOTE — PROGRESS NOTE ADULT - ATTENDING COMMENTS
73F with PMH HTN, HLD, DM with OM s/p multiple amputations of toes; CAD with stress test planned for 11/28; CKD5 presenting with dyspnea, admitted for ischemic w/u, ELBERT on CKD5, stepped up to MICU for HAGMA metabolic encephalopathy 2/2 uremia vs. acidosis requiring intubation; now extubated, improving s/p CVVHD but course c/b hypertensive urgency. Mental status improving, on intermittent HD. BP improving w/ uptitrating BP meds. Medically stable to step down to RMF.    #Metabolic encephalopathy POA now resolved   #ESRD on HD  #ACD  #Major depression   #Aspiration PNA     Plan:   -Tunneled HD catheter placed by IR. Will c/w HD.  -Uptitrate BP meds as necessary. Will go up on Carvedilol to 12.5 BID if continues to be hypertensive after HD. Hold BP meds before HD    Dispo: ANT

## 2023-11-30 NOTE — CONSULT NOTE ADULT - CONSULT REQUESTED DATE/TIME
29-Nov-2023 11:46
29-Nov-2023 14:29
30-Nov-2023 08:28
30-Nov-2023 10:09
30-Nov-2023 12:42
26-Nov-2023 13:07

## 2023-11-30 NOTE — DISCHARGE NOTE PROVIDER - NPI NUMBER (FOR SYSADMIN USE ONLY) :
[[id number]],[[id number]] [[id number]],[[id number]],[[id number]] [[id number]],[[id number]],[[id number]] [[id number]],[[id number]],[[id number]] [[id number]],[[id number]],[[id number]] [[id number]],[[id number]],[[id number]] [[id number]],[[id number]],[UNKNOWN] [[id number]],[[id number]],[UNKNOWN] [[id number]],[[id number]],[UNKNOWN] [[id number]],[[id number]],[UNKNOWN],[UNKNOWN] [[id number]],[[id number]],[UNKNOWN],[UNKNOWN] [[id number]],[[id number]],[UNKNOWN],[UNKNOWN] [[id number]],[[id number]],[UNKNOWN] [[id number]],[[id number]],[UNKNOWN] [[id number]],[[id number]],[UNKNOWN]

## 2023-11-30 NOTE — OCCUPATIONAL THERAPY INITIAL EVALUATION ADULT - ORIENTATION, REHAB EVAL
Consent: Oniel Patterson, [facility], [facility] waiver authority and the [employer] Becca Schmitz DO person/place/time

## 2023-11-30 NOTE — CONSULT NOTE ADULT - PROBLEM SELECTOR RECOMMENDATION 3
Patient with new dialysis. Tolerating it at this time. Appreciate nephrology involvement. Continue dialysis as patient tolerates.

## 2023-11-30 NOTE — DISCHARGE NOTE PROVIDER - NSDCFUADDAPPT_GEN_ALL_CORE_FT
Please bring your Insurance card, Photo ID and Discharge paperwork to the following appointments:    (1) Please follow up your Nephrology Provider, Dr. Cassie Kessler at [address] [place] [address] on 12/21/2023 at 11:20am.    Appointment was scheduled by Ms. VARINDER Dowd, Referral Coordinator.   Please bring your Insurance card, Photo ID and Discharge paperwork to the following appointments:    (1) Please follow up your Nephrology Provider, Dr. Cassie Kessler at [address] [place] [address] on 12/21/2023 at 11:20am.    Appointment was scheduled by Ms. VARINDER Dowd, Referral Coordinator.   Please bring your Insurance card, Photo ID and Discharge paperwork to the following appointments:    (1) Please follow up your Nephrology Provider, Dr. Cassie Kessler at [address] [place] [address] on 12/21/2023 at 11:20am.    Appointment was scheduled by Ms. VARINDER Dowd, Referral Coordinator.   Please bring your Insurance card, Photo ID and Discharge paperwork to the following appointments:    (1) Please follow up with your Cardiology Provider, Dr. Duong Piña at [address] [place], [place] [address] on 12/14/2023 at 10:00am.    Appointment was scheduled by Ms. VARINDER Dowd, Referral Coordinator.      (2) Please follow up your Nephrology Provider, Dr. Cassie Kessler at [address] [place] [address] on 12/21/2023 at 11:20am.    Appointment was scheduled by Ms. VARINDER Dowd, Referral Coordinator.   Please bring your Insurance card, Photo ID and Discharge paperwork to the following appointments:    (1) Please follow up with your Cardiology Provider, Dr. Duong Piña at [address] [place], [place] [address] on 12/14/2023 at 10:00am.    Appointment was scheduled by Ms. VARINDER Dowd, Referral Coordinator.      (2) Please follow up your Nephrology Provider, Dr. Cassie Kessler at [address] [place] [address] on 12/21/2023 at 11:20am.    Appointment was scheduled by Ms. VARINDER Dowd, Referral Coordinator.   Please bring your Insurance card, Photo ID and Discharge paperwork to the following appointments:    (1) Please follow up with your Cardiology Provider, Dr. Duong Piña at [address] [place], [place] [address] on 12/14/2023 at 10:00am.    Appointment was scheduled by Ms. VARINDER Dowd, Referral Coordinator.      (2) Please follow up your Nephrology Provider, Dr. Cassie Kessler at [address] [place] [address] on 12/21/2023 at 11:20am.    Appointment was scheduled by Ms. VARINDER Dowd, Referral Coordinator.   You have an appointment to establish primary care at:  [facility] at [address]  [address] [place] [address]  Phone: [phone]  Fax: [phone]    Please bring your Insurance card, Photo ID and Discharge paperwork to the following appointments:    (1) Please follow up with your Cardiology Provider, Dr. Duong Piña at [address] [place] [address] on 12/14/2023 at 10:00am.    Appointment was scheduled by Ms. VARINDER Dowd, Referral Coordinator.      (2) Please follow up your Nephrology Provider, Dr. Cassie Kessler at [address] [place] [address] on 12/21/2023 at 11:20am.    Appointment was scheduled by Ms. VARINDER Dowd, Referral Coordinator.   You have an appointment to establish primary care at:  [facility] at [address]  [address] [place] [address]  Phone: [phone]  Fax: [phone]    Please bring your Insurance card, Photo ID and Discharge paperwork to the following appointments:    (1) Please follow up with your Cardiology Provider, Dr. Duong Piña at [address] [phone] on 12/14/2023 at 10:00am.    Appointment was scheduled by Ms. VARINDER Dowd, Referral Coordinator.      (2) Please follow up your Nephrology Provider, Dr. Cassie Kessler at [address] [place] [address] on 12/21/2023 at 11:20am.    Appointment was scheduled by Ms. VARINDER Dowd, Referral Coordinator.   You have an appointment to establish primary care at:  [facility] at [address]  [address] [place] [address]  Phone: [phone]  Fax: [phone]    Please bring your Insurance card, Photo ID and Discharge paperwork to the following appointments:    (1) Please follow up with your Cardiology Provider, Dr. Duong Piña at [address] [address] on 12/14/2023 at 10:00am.    Appointment was scheduled by Ms. VARINDER Dowd, Referral Coordinator.      (2) Please follow up your Nephrology Provider, Dr. Cassie Kessler at [address] [place] [address] on 12/21/2023 at 11:20am.    Appointment was scheduled by Ms. VARINDER Dowd, Referral Coordinator.

## 2023-11-30 NOTE — CONSULT NOTE ADULT - SUBJECTIVE AND OBJECTIVE BOX
ZACK GUILLEN          MRN-[id number]            (mm/dd/yyyy)    HPI:  73 year old F POOR HISTORIAN with PMHx of DM (on insulin), HTN, HLD, ESRD (not on HD), asthma, anxiety and depression, multiple B/L toe amputations (b/l) due to osteomyelitis, chronic R foot ulcer, iron deficiency anemia who presented after an episode earlier this morning where she noticed her heart was racing, she was short of breath and persistently anxious. Pt states she also had back pain which is uncommon for her. Patient endorses history of panic attacks in the past, however says that this episode felt different. Upon arrival to the hospital, her symptoms improved tremendously. Currently, pt endorses intermittent dizziness where she feels the room is spinning and nausea but denies chest pain, back pain, palpitations, SOB. Earlier this week she had non bloody diarrhea for 4-5 days and after thanksgiving dinner 2 nights prior, pt endorsed non bloody vomiting that resolved in 1 day. Pt endorses orthopnea for many years and leg swelling since she was diagnosed with kidney disease. Pt denies fever, dysuria, cough/cold symptoms, hematuria, BRBPR, hematochezia.     In speaking with pt, she became teary eyed stating that she been depressed lately and often feels thoughts of killing herself and has felt this way in the past.    Of note, pt does not know any of her home medications. Medication list obtained from paperwork from [facility],    In the ED, T=97.9F, /59, HR 68, RR 20, SPO2=95%. Labs notable for Trop T notable for 311. . CKMB 6.8. Cr 4.38. BNP 87752. EKG reveals TWI in V1-V6, II, III, AVF and ST depressions in V4-V5. Pt was given aspirin 325mg x1 in the ED, Duonebs x1, and nitroglycerin patch.    Pt is now admitted to cardiac telemetry for further management of ELBERT on CKD and possible ischemic workup.        (2023 19:57)      PAST MEDICAL & SURGICAL HISTORY:  Asthma  Asthma      Depressive disorder  Depression      Hyperlipidemia  HLD (hyperlipidemia)      Type 2 diabetes mellitus  DM (diabetes mellitus)      Essential hypertension  HTN (hypertension)      Local infection of skin and subcutaneous tissue  Toe infection      Type 2 diabetes mellitus with neurological manifestations  Neuropathy in diabetes      Cytomegalovirus infection not present  No significant past surgical history      S/P cholecystectomy          FAMILY HISTORY:  Family history of diabetes mellitus  Family history of diabetes mellitus (DM)    FH: heart attack (Mother)    FH: Parkinson's disease (Father)    FH: COPD (chronic obstructive pulmonary disease) (Mother)         SOCIAL HISTORY:     ROS:    Unable to attain due to:                      Dyspnea (Vincent 0-10):                        N/V (Y/N):                              Secretions (Y/N) :                Agitation(Y/N):   Pain (Y/N):       -Provocation/Palliation:  -Quality/Quantity:  -Radiating:  -Severity:  -Timing/Frequency:  -Impact on ADLs:    General:  Denied  HEENT:    Denied  Neck:  Denied  CVS:  Denied  Resp:  Denied  GI:  Denied  :  Denied  Musc:  Denied  Neuro:  Denied  Psych:  Denied  Skin:  Denied  Lymph:  Denied    ALLERGIES:  Allergies    No Known Allergies    Intolerances        Opiate Naive (Y/N):   -iStop reviewed (Y/N): (Ref#:           )    MEDICATIONS:      MEDICATIONS  (STANDING):  albuterol    90 MICROgram(s) HFA Inhaler 2 Puff(s) Inhalation daily  albuterol/ipratropium for Nebulization 3 milliLiter(s) Nebulizer every 6 hours  atorvastatin 40 milliGRAM(s) Oral at bedtime  calcium acetate 667 milliGRAM(s) Oral three times a day with meals  carvedilol 6.25 milliGRAM(s) Oral every 12 hours  cefTRIAXone   IVPB 2000 milliGRAM(s) IV Intermittent every 24 hours  chlorhexidine 2% Cloths 1 Application(s) Topical <User Schedule>  clonazePAM  Tablet 0.25 milliGRAM(s) Oral at bedtime  dextrose 5%. 1000 milliLiter(s) (50 mL/Hr) IV Continuous <Continuous>  dextrose 5%. 1000 milliLiter(s) (100 mL/Hr) IV Continuous <Continuous>  dextrose 50% Injectable 25 Gram(s) IV Push once  dextrose 50% Injectable 12.5 Gram(s) IV Push once  dextrose 50% Injectable 25 Gram(s) IV Push once  DULoxetine 30 milliGRAM(s) Oral every 24 hours  glucagon  Injectable 1 milliGRAM(s) IntraMuscular once  heparin   Injectable 5000 Unit(s) SubCutaneous every 8 hours  insulin lispro (ADMELOG) corrective regimen sliding scale   SubCutaneous every 6 hours  NIFEdipine XL 90 milliGRAM(s) Oral every 24 hours  nitroglycerin    Patch 0.3 mG/Hr(s) 1 patch Transdermal daily  pantoprazole    Tablet 40 milliGRAM(s) Oral before breakfast  polyethylene glycol 3350 17 Gram(s) Oral two times a day  PPD  5 Tuberculin Unit(s) Injectable 5 Unit(s) IntraDermal once  senna 2 Tablet(s) Oral at bedtime  traZODone 50 milliGRAM(s) Oral every 24 hours    MEDICATIONS  (PRN):  acetaminophen     Tablet .. 650 milliGRAM(s) Oral every 6 hours PRN Mild Pain (1 - 3)  dextrose Oral Gel 15 Gram(s) Oral once PRN Blood Glucose LESS THAN 70 milliGRAM(s)/deciliter      LABS:    CBC:                        8.2    8.08  )-----------( 209      ( 2023 05:30 )             25.9     CMP:        143  |  104  |  46<H>  ----------------------------<  112<H>  3.4<L>   |  25  |  2.86<H>    Ca    8.2<L>      2023 05:30  Phos  4.8       Mg     2.0         TPro  5.8<L>  /  Alb  2.9<L>  /  TBili  <0.2  /  DBili  x   /  AST  19  /  ALT  49<H>  /  AlkPhos  104        Urinalysis Basic - ( 2023 05:30 )    Color: x / Appearance: x / SG: x / pH: x  Gluc: 112 mg/dL / Ketone: x  / Bili: x / Urobili: x   Blood: x / Protein: x / Nitrite: x   Leuk Esterase: x / RBC: x / WBC x   Sq Epi: x / Non Sq Epi: x / Bacteria: x        IMAGING:  Reviewed    PHYSICAL EXAM:  T(C): 36.8 (23 @ 05:11), Max: 36.9 (23 @ 14:07)  HR: 76 (23 @ 07:40) (63 - 97)  BP: 161/63 (23 @ 07:40) (150/62 - 201/70)  RR: 17 (11-30-23 @ 05:11) (16 - 23)  SpO2: 94% (23 @ 05:11) (92% - 99%)  Wt(kg): --  Daily     Daily Weight in k.5 (2023 18:10)  CAPILLARY BLOOD GLUCOSE      POCT Blood Glucose.: 131 mg/dL (2023 08:55)    I&O's Summary    2023 07:01  -  2023 07:00  --------------------------------------------------------  IN: 600 mL / OUT: 1780 mL / NET: -1180 mL        GENERAL:  [ ]Alert  [ ]Oriented x   [ ]Lethargic due to sedation  [ ]Cachexia [ ]Verbal  [ ]Non-Verbal  Behavioral:   [ ] Anxiety  [ ] Delirium [ ] Agitation [ ] Other  HEENT:  [ ]Normal   [ ]Dry mouth   [ ]ET Tube  [ ]Oral lesions  PULMONARY:   [ ]Clear  [ ]Tachypnea  [ ]Audible excessive secretions   [ ]Rhonchi     [ ]Right [ ]Left [ ]Bilateral  [ ]Crackles        [ ]Right [ ]Left [ ]Bilateral  [ ]Wheezing     [ ]Right [ ]Left [ ]Bilateral  CARDIOVASCULAR:    [ ]Regular [ ]Irregular [ ]Tachy  [ ]Ridge [ ]Murmur [ ]Other  GASTROINTESTINAL:  [ ]Soft  [ ]Distended   [ ]+BS  [ ]Non tender [ ]Tender  [ ]PEG [ ]OGT/NGT  [] flexiseal with output  Last BM:   GENITOURINARY:  [ ]Normal [ ] Incontinent   [ ]Oliguria/Anuria   [ ]Hernández  MUSCULOSKELETAL:   [ ]Normal   [ ]Weakness  [ ]Bed/Wheelchair bound [ ]Edema  NEUROLOGIC:   [ ]No focal deficits  [ ] Cognitive impairment  [ ] Dysphagia [ ]Dysarthria [ ] Paresis [  ]Other   SKIN:   [ ]Normal   [ ]Pressure ulcer(s)  [ ]Rash     Preadmit Karnofsky:  %           Current Karnofsky:     %  Cachexia (Y/N):   BMI:    ADVANCED DIRECTIVES:     Full Code     No documented HCP form found on [facility]     No Living will / POA / Advance directives found on [facility] / [facility].     No documented GOC notes on [facility]    DECISION MAKER: The patient is able to participate in symptomatic assessment but may not be able to make complex medical decisions  LEGAL SURROGATE: No documented HCP in paper chart / [facility] / [facility].  Alternate surrogate:     GOALS OF CARE DISCUSSION:       Palliative care info/counseling provided	           Family meeting       Advanced Directives addressed please see Advance Care Planning Note	           See previous Palliative Medicine Note       Documentation of GOC: 	          REFERRALS:	        Palliative Med        Unit SW/Case Mgmt              Speech/Swallow       Patient/Family Support       Massage Therapy       Music Therapy       Pet Therapy       Hospice       Nutrition       Dietician       PT/OT

## 2023-11-30 NOTE — OCCUPATIONAL THERAPY INITIAL EVALUATION ADULT - PERTINENT HX OF CURRENT PROBLEM, REHAB EVAL
72 y/o F PMHx of DM (on insulin), HTN, HLD, ESRD (not on HD), asthma, anxiety and depression, multiple B/L toe amputations (b/l) due to osteomyelitis, chronic R foot ulcer, iron deficiency anemia, and ESRD admitted for volume overload due to renal failure.

## 2023-11-30 NOTE — OCCUPATIONAL THERAPY INITIAL EVALUATION ADULT - GENERAL OBSERVATIONS, REHAB EVAL
SPENCER Alicea cleared pt for OOB with OT. Pt received semisupine in bed +IV +primafit +HD central line +NAD; pt left as found +bed alarm +NAD +all lines intact RN edith.

## 2023-11-30 NOTE — CONSULT NOTE ADULT - PROBLEM SELECTOR RECOMMENDATION 5
Patient made herself a DNR/DNI. Please see GOC note above.  Emotional support was provided.   As discussed during the palliative IDT meeting and as identified during the patients PSSA screening the patient would benefit from: Massage therapy and Chaplaincy.   - Catholic/Spiritual practice: Sabianism  - Coping: [ x] well [ ] with difficulty [ ] poor coping   - Support system: [ x] strong [ ] adequate [ ] inadequate  - All questions answered, emotional support provided  - dw primary team   - Please contact Palliative Medicine 24/7 at [phone] for any acute symptoms or further questions  - Will continue to follow with you

## 2023-11-30 NOTE — PROGRESS NOTE ADULT - ASSESSMENT
73YF now ESRD initially admitted for SOB and ischemic w/u h/c c/b metabolic encephalopathy , acidosis, hyperkalmeia, upgraded to MICU for further management, now clinically stable, still with uncontrolled BP with meds, Hd today for volume management       ESRD:   EDW: TBD, patient refusing standing weight   Seen and evaluated on HD, tolerating well  Renal diet   Fluid restriction to <1.2L   Daily BMP   Reassess volume status and lytes for HD in AM   Outpatient HD set up in progress     Access:  R TDC functional     HTN:  BP elevated above goal   UF with HD as tolerated   Hold BP meds on HD days to achieve optimal UF, can resume post HD if BP >140/80    Anemia:  Conchita 2/2 ESRD   EPO with HD, no indication for IV iron at this time      MBD:  Calcium: 8.2  Phos: 4.8  If phos persistently >5.5 can start on phos binders

## 2023-11-30 NOTE — PROGRESS NOTE ADULT - PROBLEM SELECTOR PLAN 1
RESOLVED. [facility] metabolic encephalopathy 2/2 uremia vs. acidosis, requiring intubation in MICU. mental status improved s/p CVVHD with uremia and acidosis resolved. Now AOx3 with appropriate affect. CTH non cont 11/28 shows microvascular disease with lacunar infarcts. No acute pathology. Passed bedside dysphagia   - Klonopin at reduced dose 0.25 qhs.   - holding other   - c/w HD

## 2023-11-30 NOTE — PROGRESS NOTE ADULT - PROBLEM SELECTOR PLAN 7
Home med: Atorvastatin 20mg QD  - c/w atorvastatin 20mg QD Home med: Atorvastatin 20mg QD  - increased atorvastatin to 40mg QD

## 2023-11-30 NOTE — DISCHARGE NOTE PROVIDER - PROVIDER TOKENS
PROVIDER:[TOKEN:[[id number]],FOLLOWUP:[2 weeks]],PROVIDER:[TOKEN:[[id number]],FOLLOWUP:[2 weeks]],PROVIDER:[TOKEN:[[id number]],FOLLOWUP:[2 weeks]] PROVIDER:[TOKEN:[[id number]],FOLLOWUP:[2 weeks]],PROVIDER:[TOKEN:[[id number]],FOLLOWUP:[2 weeks]],PROVIDER:[TOKEN:[[id number]],FOLLOWUP:[2 weeks]] PROVIDER:[TOKEN:[[id number]],FOLLOWUP:[2 weeks]],PROVIDER:[TOKEN:[[id number]],FOLLOWUP:[2 weeks]],PROVIDER:[TOKEN:[[id number]],FOLLOWUP:[2 weeks]] PROVIDER:[TOKEN:[[id number]],FOLLOWUP:[2 weeks]],PROVIDER:[TOKEN:[[id number]],FOLLOWUP:[2 weeks]],PROVIDER:[TOKEN:[[id number]],SCHEDULEDAPPT:[12/21/2023],SCHEDULEDAPPTTIME:[11:20 AM]] PROVIDER:[TOKEN:[[id number]],FOLLOWUP:[2 weeks]],PROVIDER:[TOKEN:[[id number]],FOLLOWUP:[2 weeks]],PROVIDER:[TOKEN:[[id number]],SCHEDULEDAPPT:[12/21/2023],SCHEDULEDAPPTTIME:[11:20 AM]] PROVIDER:[TOKEN:[[id number]],FOLLOWUP:[2 weeks]],PROVIDER:[TOKEN:[[id number]],FOLLOWUP:[2 weeks]],PROVIDER:[TOKEN:[[id number]],SCHEDULEDAPPT:[12/21/2023],SCHEDULEDAPPTTIME:[11:20 AM]] PROVIDER:[TOKEN:[[id number]],FOLLOWUP:[2 weeks]],PROVIDER:[TOKEN:[[id number]],SCHEDULEDAPPT:[12/21/2023],SCHEDULEDAPPTTIME:[11:20 AM]],FREE:[LAST:[Ayana],FIRST:[Duong],PHONE:[phone]],FAX:[(   )    -],ADDRESS:[[place] [address]],SCHEDULEDAPPT:[12/14/2023],SCHEDULEDAPPTTIME:[10:00 AM]] PROVIDER:[TOKEN:[[id number]],FOLLOWUP:[2 weeks]],PROVIDER:[TOKEN:[[id number]],SCHEDULEDAPPT:[12/21/2023],SCHEDULEDAPPTTIME:[11:20 AM]],FREE:[LAST:[Ayana],FIRST:[Duong],PHONE:[phone]],FAX:[(   )    -],ADDRESS:[[place] [address]],SCHEDULEDAPPT:[12/14/2023],SCHEDULEDAPPTTIME:[10:00 AM]] PROVIDER:[TOKEN:[[id number]],FOLLOWUP:[2 weeks]],PROVIDER:[TOKEN:[[id number]:[id number]],SCHEDULEDAPPT:[12/21/2023],SCHEDULEDAPPTTIME:[11:20 AM]],FREE:[LAST:[Ayana],FIRST:[Duong],PHONE:[phone]],FAX:[(   )    -],ADDRESS:[[place] [address]],SCHEDULEDAPPT:[12/14/2023],SCHEDULEDAPPTTIME:[10:00 AM]] PROVIDER:[TOKEN:[[id number]],SCHEDULEDAPPT:[12/18/2023],SCHEDULEDAPPTTIME:[01:30 PM]],PROVIDER:[TOKEN:[[id number]],SCHEDULEDAPPT:[12/21/2023],SCHEDULEDAPPTTIME:[11:20 AM]],FREE:[LAST:[Ayana],FIRST:[Duong],PHONE:[phone]],FAX:[(   )    -],ADDRESS:[Cardiology  [address] [place] [address]],SCHEDULEDAPPT:[12/14/2023],SCHEDULEDAPPTTIME:[10:00 AM]] PROVIDER:[TOKEN:[[id number]],SCHEDULEDAPPT:[12/18/2023],SCHEDULEDAPPTTIME:[01:30 PM]],PROVIDER:[TOKEN:[[id number]],SCHEDULEDAPPT:[12/21/2023],SCHEDULEDAPPTTIME:[11:20 AM]],FREE:[LAST:[Ayana],FIRST:[Duong],PHONE:[phone]],FAX:[(   )    -],ADDRESS:[Cardiology  [address] [place] [address]],SCHEDULEDAPPT:[12/14/2023],SCHEDULEDAPPTTIME:[10:00 AM]] PROVIDER:[TOKEN:[[id number]:[id number]],SCHEDULEDAPPT:[12/18/2023],SCHEDULEDAPPTTIME:[01:30 PM]],PROVIDER:[TOKEN:[[id number]],SCHEDULEDAPPT:[12/21/2023],SCHEDULEDAPPTTIME:[11:20 AM]],FREE:[LAST:[Ayana],FIRST:[Duong],PHONE:[phone]],FAX:[(   )    -],ADDRESS:[Cardiology  [address] [place] [address]],SCHEDULEDAPPT:[12/14/2023],SCHEDULEDAPPTTIME:[10:00 AM]] PROVIDER:[TOKEN:[[id number]],SCHEDULEDAPPT:[12/18/2023],SCHEDULEDAPPTTIME:[01:30 PM]],PROVIDER:[TOKEN:[[id number]],SCHEDULEDAPPT:[12/21/2023],SCHEDULEDAPPTTIME:[11:20 AM]],FREE:[LAST:[Ayana],FIRST:[Duong],PHONE:[phone]],FAX:[(   )    -],ADDRESS:[[place] [address]],SCHEDULEDAPPT:[12/14/2023],SCHEDULEDAPPTTIME:[10:00 AM]],FREE:[LAST:[Parker],FIRST:[Alberto],PHONE:[phone]],FAX:[(   )    -],ADDRESS:[[address] [place], [place] [address]  [place] [address]  [phone]],ESTABLISHEDPATIENT:[T]] PROVIDER:[TOKEN:[[id number]],SCHEDULEDAPPT:[12/18/2023],SCHEDULEDAPPTTIME:[01:30 PM]],PROVIDER:[TOKEN:[[id number]],SCHEDULEDAPPT:[12/21/2023],SCHEDULEDAPPTTIME:[11:20 AM]],FREE:[LAST:[Ayana],FIRST:[Duong],PHONE:[phone]],FAX:[(   )    -],ADDRESS:[[place] [address]],SCHEDULEDAPPT:[12/14/2023],SCHEDULEDAPPTTIME:[10:00 AM]],FREE:[LAST:[Parker],FIRST:[Alberto],PHONE:[phone]],FAX:[(   )    -],ADDRESS:[[address] [place], [place] [address]  [place] [address]  [phone]],ESTABLISHEDPATIENT:[T]] PROVIDER:[TOKEN:[[id number]],SCHEDULEDAPPT:[12/18/2023],SCHEDULEDAPPTTIME:[01:30 PM]],PROVIDER:[TOKEN:[[id number]],SCHEDULEDAPPT:[12/21/2023],SCHEDULEDAPPTTIME:[11:20 AM]],FREE:[LAST:[Ayana],FIRST:[Duong],PHONE:[phone]],FAX:[(   )    -],ADDRESS:[[place] [address]],SCHEDULEDAPPT:[12/14/2023],SCHEDULEDAPPTTIME:[10:00 AM]],FREE:[LAST:[Parker],FIRST:[Alberto],PHONE:[phone]],FAX:[(   )    -],ADDRESS:[[address] [place], [place] [address]  [place] [address]  [phone]],ESTABLISHEDPATIENT:[T]] PROVIDER:[TOKEN:[[id number]:[id number]],SCHEDULEDAPPT:[12/18/2023],SCHEDULEDAPPTTIME:[01:30 PM]],PROVIDER:[TOKEN:[[id number]],SCHEDULEDAPPT:[12/21/2023],SCHEDULEDAPPTTIME:[11:20 AM]],FREE:[LAST:[Ayana],FIRST:[Duong],PHONE:[phone]],FAX:[(   )    -],ADDRESS:[Cardiology  [address] [place] [address]],SCHEDULEDAPPT:[12/14/2023],SCHEDULEDAPPTTIME:[10:00 AM]] PROVIDER:[TOKEN:[[id number]:[id number]],SCHEDULEDAPPT:[12/18/2023],SCHEDULEDAPPTTIME:[01:30 PM]],PROVIDER:[TOKEN:[[id number]],SCHEDULEDAPPT:[12/21/2023],SCHEDULEDAPPTTIME:[11:20 AM]],FREE:[LAST:[Ayana],FIRST:[Duong],PHONE:[phone]],FAX:[(   )    -],ADDRESS:[Cardiology  [address] [place] [address]],SCHEDULEDAPPT:[12/14/2023],SCHEDULEDAPPTTIME:[10:00 AM]] PROVIDER:[TOKEN:[[id number]:[id number]],SCHEDULEDAPPT:[12/18/2023],SCHEDULEDAPPTTIME:[01:30 PM]],PROVIDER:[TOKEN:[[id number]],SCHEDULEDAPPT:[12/21/2023],SCHEDULEDAPPTTIME:[11:20 AM]],FREE:[LAST:[Ayana],FIRST:[Duong],PHONE:[phone]],FAX:[(   )    -],ADDRESS:[Cardiology  [address] [place] [address]],SCHEDULEDAPPT:[12/14/2023],SCHEDULEDAPPTTIME:[10:00 AM]]

## 2023-11-30 NOTE — PROGRESS NOTE ADULT - ASSESSMENT
72 y/o F PMHx of DM (on insulin), HTN, HLD, ESRD (not on HD), asthma, anxiety and depression, multiple B/L toe amputations (b/l) due to osteomyelitis, chronic R foot ulcer, iron deficiency anemia, and ESRD admitted for volume overload due to renal failure. Podiatry consulted for R foot lesion. At time of consult, WBC 7.9, ESR 60, CRP48.9, VSS. Clinically, wound superficial with no signs of active infection. X-rays with no new cortical erosions or periostitis to suggest osteomyelitis.    Plan:   - Excisional debridement of hyperkeratotic skin down to and including level of epidermis with #15 blade scalpel  - X-rays reviewed  - Wound cleansed with normal saline. Dressed with DSD  - Nursing orders placed for continued wound care  - Pt can heel WBAT to RLE, WBAT to LLE  - Rest of care per primary team    Plan d/w Attending. Podiatry will sign off.     Dressing instructions: Please change dressing on pt's right foot daily beginning 12/1. Cleanse wound with normal saline. Pat dry. Cover with dry gauze, wrap with kerlix, and secure with tape.    Patient should follow up with Dr. Edson Artis within 1 month of discharge.    Office information:          [place] Address- [address] [address] Phone: [phone]         [place] Address- [address], [place] [address] Phone: [phone]   72 y/o F PMHx of DM (on insulin), HTN, HLD, ESRD (not on HD), asthma, anxiety and depression, multiple B/L toe amputations (b/l) due to osteomyelitis, chronic R foot ulcer, iron deficiency anemia, and ESRD admitted for volume overload due to renal failure. Podiatry consulted for R foot lesion. At time of consult, WBC 7.9, ESR 60, CRP48.9, VSS. Clinically, wound superficial with no signs of active infection. X-rays with no new cortical erosions or periostitis to suggest osteomyelitis.    Plan:   - Excisional debridement of hyperkeratotic skin down to and including level of epidermis with #15 blade scalpel  - X-rays reviewed  - Wound cleansed with normal saline. Dressed with DSD  - Nursing orders placed for continued wound care  - Pt can heel WBAT to RLE, WBAT to LLE  - Rest of care per primary team    Plan d/w Attending. Podiatry will sign off.     Dressing instructions: Please change dressing on pt's right foot daily beginning 12/1. Cleanse wound with normal saline. Pat dry. Cover with dry gauze, wrap with kerlix, and secure with tape.    Patient should follow up with Dr. Edson Artis within 1 month of discharge.    Office information:          [place] Address- [address] [address] Phone: [phone]         [place] Address- [address], [place] [address] Phone: [phone]   74 y/o F PMHx of DM (on insulin), HTN, HLD, ESRD (not on HD), asthma, anxiety and depression, multiple B/L toe amputations (b/l) due to osteomyelitis, chronic R foot ulcer, iron deficiency anemia, and ESRD admitted for volume overload due to renal failure. Podiatry consulted for R foot lesion. At time of consult, WBC 7.9, ESR 60, CRP48.9, VSS. Clinically, wound superficial with no signs of active infection. X-rays with no new cortical erosions or periostitis to suggest osteomyelitis.    Plan:   - Excisional debridement of hyperkeratotic skin down to and including level of epidermis with #15 blade scalpel  - X-rays reviewed  - Wound cleansed with normal saline. Dressed with DSD  - Nursing orders placed for continued wound care  - Pt can heel WBAT to RLE, WBAT to LLE  - Rest of care per primary team    Plan d/w Attending. Podiatry will sign off.     Dressing instructions: Please change dressing on pt's right foot daily beginning 12/1. Cleanse wound with normal saline. Pat dry. Cover with dry gauze, wrap with kerlix, and secure with tape.    Patient should follow up with Dr. Edson Artis within 1 month of discharge.    Office information:          [place] Address- [address], [place] [address] Phone: [phone]         [place] Address- [address] [address] Phone: [phone]   74 y/o F PMHx of DM (on insulin), HTN, HLD, ESRD (not on HD), asthma, anxiety and depression, multiple B/L toe amputations (b/l) due to osteomyelitis, chronic R foot ulcer, iron deficiency anemia, and ESRD admitted for volume overload due to renal failure. Podiatry consulted for R foot lesion. At time of consult, WBC 7.9, ESR 60, CRP48.9, VSS. Clinically, wound superficial with no signs of active infection. X-rays with no new cortical erosions or periostitis to suggest osteomyelitis.    Plan:   - Excisional debridement of hyperkeratotic skin down to and including level of epidermis with #15 blade scalpel  - X-rays reviewed  - Wound cleansed with normal saline. Dressed with DSD  - Nursing orders placed for continued wound care  - Pt can heel WBAT to RLE, WBAT to LLE  - Rest of care per primary team    Plan d/w Attending. Podiatry will sign off. Please re-consult as needed.    Dressing instructions: Please change dressing on pt's right foot daily beginning 12/1. Cleanse wound with normal saline. Pat dry. Cover with dry gauze, wrap with kerlix, and secure with tape.    Patient should follow up with Dr. Edson Artis within 1 month of discharge.    Office information:          [place] Address- [address] [address] Phone: [phone]         [place] Address- [address], [place] [address] Phone: [phone]   74 y/o F PMHx of DM (on insulin), HTN, HLD, ESRD (not on HD), asthma, anxiety and depression, multiple B/L toe amputations (b/l) due to osteomyelitis, chronic R foot ulcer, iron deficiency anemia, and ESRD admitted for volume overload due to renal failure. Podiatry consulted for R foot lesion. At time of consult, WBC 7.9, ESR 60, CRP48.9, VSS. Clinically, wound superficial with no signs of active infection. X-rays with no new cortical erosions or periostitis to suggest osteomyelitis.    Plan:   - Excisional debridement of hyperkeratotic skin down to and including level of epidermis with #15 blade scalpel  - X-rays reviewed  - Wound cleansed with normal saline. Dressed with DSD  - Nursing orders placed for continued wound care  - Pt can heel WBAT to RLE, WBAT to LLE  - Rest of care per primary team    Plan d/w Attending. Podiatry will sign off. Please re-consult as needed.    Dressing instructions: Please change dressing on pt's right foot daily beginning 12/1. Cleanse wound with normal saline. Pat dry. Cover with dry gauze, wrap with kerlix, and secure with tape.    Patient should follow up with Dr. Edson Artis within 1 month of discharge.    Office information:          [place] Address- [address], [place] [address] Phone: [phone]         [place] Address- [address], [place] [address] Phone: [phone]   72 y/o F PMHx of DM (on insulin), HTN, HLD, ESRD (not on HD), asthma, anxiety and depression, multiple B/L toe amputations (b/l) due to osteomyelitis, chronic R foot ulcer, iron deficiency anemia, and ESRD admitted for volume overload due to renal failure. Podiatry consulted for R foot lesion. At time of consult, WBC 7.9, ESR 60, CRP48.9, VSS. Clinically, wound superficial with no signs of active infection. X-rays with no new cortical erosions or periostitis to suggest osteomyelitis.    Plan:   - Excisional debridement of hyperkeratotic skin down to and including level of epidermis with #15 blade scalpel  - X-rays reviewed  - Wound cleansed with normal saline. Dressed with DSD  - Nursing orders placed for continued wound care  - Pt can heel WBAT to RLE, WBAT to LLE  - Rest of care per primary team    Plan d/w Attending. Podiatry will sign off. Please re-consult as needed.    Dressing instructions: Please change dressing on pt's right foot daily beginning 12/1. Cleanse wound with normal saline. Pat dry. Cover with dry gauze, wrap with kerlix, and secure with tape.    Patient should follow up with Dr. Edson Artis within 1 month of discharge.    Office information:          [place] Address- [address] [address] Phone: [phone]         [place] Address- [address], [place] [address] Phone: [phone]

## 2023-11-30 NOTE — CONSULT NOTE ADULT - SUBJECTIVE AND OBJECTIVE BOX
73F with PMH HTN, HLD, DM with OM s/p multiple amputations of toes; CAD with stress test planned for 11/28; CKD5 presenting with dyspnea, admitted for ischemic w/u, ELBERT on CKD5, stepped up to MICU for HAGMA metabolic encephalopathy 2/2 uremia vs. acidosis requiring intubation; now extubated, improving s/p CVVHD but course c/b hypertensive urgency. Mental status improving, on intermittent HD via right temporary IJ cath. BP improving w/ uptitrating BP meds. Medically stable to step down to RMF. IR consulted for tunneled HD catheter placement.     Clinical History: CHEST PAIN    ADVANCED ILLNESS    Family history of diabetes mellitus    FH: heart attack (Mother)    FH: Parkinson's disease (Father)    FH: COPD (chronic obstructive pulmonary disease) (Mother)    Handoff    MEWS Score    Asthma    Depressive disorder    Hyperlipidemia    Type 2 diabetes mellitus    Essential hypertension    Local infection of skin and subcutaneous tissue    Type 2 diabetes mellitus with neurological manifestations    Adjustment disorder with mixed anxiety and depressed mood    Chest pain    Chest pain    ESRD (end stage renal disease)    HLD (hyperlipidemia)    DM2 (diabetes mellitus, type 2)    HTN (hypertension)    Asthma    Depression with suicidal ideation    Elevated troponin    Hoarding disorder    Iron deficiency anemia    Elevated white blood cell count    Leukocytosis    Acute kidney injury superimposed on CKD    Metabolic encephalopathy    Pneumonia, aspiration    Hypertensive urgency    Anemia    Prophylactic measure    Insertion, arterial line, radial, left    Insertion, nasogastric tube    Arterial puncture    Imaging guided insertion of central venous cannula    US guided vascular access    Cytomegalovirus infection not present    S/P cholecystectomy    CHEST PAIN    90+    [email or web address]        Meds:acetaminophen     Tablet .. 650 milliGRAM(s) Oral every 6 hours PRN  albuterol    90 MICROgram(s) HFA Inhaler 2 Puff(s) Inhalation daily  albuterol/ipratropium for Nebulization 3 milliLiter(s) Nebulizer every 6 hours  atorvastatin 20 milliGRAM(s) Oral at bedtime  calcium acetate 667 milliGRAM(s) Oral three times a day with meals  carvedilol 6.25 milliGRAM(s) Oral every 12 hours  cefTRIAXone   IVPB 2000 milliGRAM(s) IV Intermittent every 24 hours  chlorhexidine 2% Cloths 1 Application(s) Topical <User Schedule>  clonazePAM  Tablet 0.25 milliGRAM(s) Oral at bedtime  dextrose 5%. 1000 milliLiter(s) IV Continuous <Continuous>  dextrose 5%. 1000 milliLiter(s) IV Continuous <Continuous>  dextrose 50% Injectable 25 Gram(s) IV Push once  dextrose 50% Injectable 25 Gram(s) IV Push once  dextrose 50% Injectable 12.5 Gram(s) IV Push once  dextrose Oral Gel 15 Gram(s) Oral once PRN  DULoxetine 30 milliGRAM(s) Oral every 24 hours  glucagon  Injectable 1 milliGRAM(s) IntraMuscular once  heparin   Injectable 5000 Unit(s) SubCutaneous every 8 hours  hydrALAZINE 75 milliGRAM(s) Oral three times a day  insulin lispro (ADMELOG) corrective regimen sliding scale   SubCutaneous every 6 hours  NIFEdipine XL 90 milliGRAM(s) Oral every 24 hours  nitroglycerin    Patch 0.3 mG/Hr(s) 1 patch Transdermal daily  pantoprazole    Tablet 40 milliGRAM(s) Oral before breakfast  polyethylene glycol 3350 17 Gram(s) Oral two times a day  PPD  5 Tuberculin Unit(s) Injectable 5 Unit(s) IntraDermal once  senna 2 Tablet(s) Oral at bedtime  traZODone 50 milliGRAM(s) Oral every 24 hours      Allergies:No Known Allergies        Labs:                           8.2    8.08  )-----------( 209      ( 30 Nov 2023 05:30 )             25.9       11-30    143  |  104  |  46<H>  ----------------------------<  112<H>  3.4<L>   |  25  |  2.86<H>    Ca    8.2<L>      30 Nov 2023 05:30  Phos  4.8     11-30  Mg     2.0     11-30    TPro  5.8<L>  /  Alb  2.9<L>  /  TBili  <0.2  /  DBili  x   /  AST  19  /  ALT  49<H>  /  AlkPhos  104  11-30

## 2023-11-30 NOTE — PROGRESS NOTE ADULT - PROBLEM SELECTOR PLAN 8
POA with Cr 4.38. Cr 2.4 baseline with no previos HD hx. s/p CVVHD 2/2 HAGMA metabolic encephalopathy (uremia vs. acidosis). FENa 0.3% prerenal. Renal US: consistent with CKD.   s/p intermittent HD last 11/28    Home meds: NaHCO3 650mg BID, Phoslo 667mg PO TID    - discontinued NaHCO3 650mg BID since pt on RRT. Restart on discharge  - continue Phoslo 667mg PO TID  - Strict I/Os  - Renal diet when appropriate POA with Cr 4.38. Cr 2.4 baseline with no previos HD hx. s/p CVVHD 2/2 HAGMA metabolic encephalopathy (uremia vs. acidosis). FENa 0.3% prerenal. Renal US: consistent with CKD.   s/p intermittent HD last 11/28    Home meds: NaHCO3 650mg BID, Phoslo 667mg PO TID    - discontinued NaHCO3 650mg BID since pt on RRT. Restart on discharge  - continue Phoslo 667mg PO TID  - Strict I/Os  - Renal diet

## 2023-11-30 NOTE — OCCUPATIONAL THERAPY INITIAL EVALUATION ADULT - ADDITIONAL COMMENTS
Pt poor historian. Pt states she lives alone in apartment however chart reporting living in assisted. Pt R handed and does not wear glasses. Pt's rollator at bedside; pt states she ambulates with rollator AAT. Unable to report home DME.

## 2023-11-30 NOTE — DISCHARGE NOTE PROVIDER - NSDCFUSCHEDAPPT_GEN_ALL_CORE_FT
Cassie Matthews  [facility]  NEPHRO [address]  Scheduled Appointment: 12/21/2023     Cassie Matthews  [facility]  NEPHRO [address]  Scheduled Appointment: 12/21/2023     Cassie Matthews  [facility]  NEPHRO [address]  Scheduled Appointment: 12/21/2023     Duong Piña  [facility]  HEARTVASC [address]  Scheduled Appointment: 12/14/2023    Cassie Matthews  [facility]  NEPHRO [address]  Scheduled Appointment: 12/21/2023     Duong Piña  [facility]  HEARTVASC [address]  Scheduled Appointment: 12/14/2023    Cassie Matthews  [facility]  NEPHRO [address]  Scheduled Appointment: 12/21/2023     Duong Piña  [facility]  HEARTVASC [address]  Scheduled Appointment: 12/14/2023    Cassie Matthews  [facility]  NEPHRO [address]  Scheduled Appointment: 12/21/2023     Duong Piña  [facility]  HEARTVASC [address]  Scheduled Appointment: 12/14/2023    [facility]  INTMED [address]  Scheduled Appointment: 12/18/2023    Cassie Matthews  [facility]  NEPHRO [address]  Scheduled Appointment: 12/21/2023     Duong Piña  [facility]  HEARTVASC [address]  Scheduled Appointment: 12/14/2023    [facility]  INTMED [address]  Scheduled Appointment: 12/18/2023    Cassie Matthews  [facility]  NEPHRO [address]  Scheduled Appointment: 12/21/2023     Duong Piña  [facility]  HEARTVASC [address]  Scheduled Appointment: 12/14/2023    [facility]  INTMED [address]  Scheduled Appointment: 12/18/2023    Cassie Matthews  [facility]  NEPHRO [address]  Scheduled Appointment: 12/21/2023

## 2023-11-30 NOTE — PROGRESS NOTE ADULT - PROBLEM SELECTOR PLAN 5
POA w/ palpitation, SOB. EKG shows TWI in V1-V6, II, III, AVF and ST depressions in V4-V5. Peaked and downtrended, can be confounded by CKD   - c/w nitroglycerin patch 0.3  - f/u TTE   - f/u card recs POA w/ palpitation, SOB. EKG shows TWI in V1-V6, II, III, AVF and ST depressions in V4-V5. Peaked and downtrended 311->273, can be confounded by CKD   Echo: no valvular dysfuction, EF 55-60%.   - c/w nitroglycerin patch 0.3  - f/u card recs POA w/ palpitation, SOB. EKG shows TWI in V1-V6, II, III, AVF and ST depressions in V4-V5. Peaked and downtrended 311->273, can be confounded by CKD/ESRD   Echo: no valvular dysfuction, EF 55-60%.   - c/w nitroglycerin patch 0.3  - f/u card recs

## 2023-11-30 NOTE — CONSULT NOTE ADULT - SUBJECTIVE AND OBJECTIVE BOX
ZACK GUILLEN          MRN-[id number]            (1950)    HPI:  73 year old F POOR HISTORIAN with PMHx of DM (on insulin), HTN, HLD, ESRD (not on HD), asthma, anxiety and depression, multiple B/L toe amputations (b/l) due to osteomyelitis, chronic R foot ulcer, iron deficiency anemia who presented after an episode earlier this morning where she noticed her heart was racing, she was short of breath and persistently anxious. Pt states she also had back pain which is uncommon for her. Patient endorses history of panic attacks in the past, however says that this episode felt different. Upon arrival to the hospital, her symptoms improved tremendously. Currently, pt endorses intermittent dizziness where she feels the room is spinning and nausea but denies chest pain, back pain, palpitations, SOB. Earlier this week she had non bloody diarrhea for 4-5 days and after thanksgiving dinner 2 nights prior, pt endorsed non bloody vomiting that resolved in 1 day. Pt endorses orthopnea for many years and leg swelling since she was diagnosed with kidney disease. Pt denies fever, dysuria, cough/cold symptoms, hematuria, BRBPR, hematochezia.     In speaking with pt, she became teary eyed stating that she been depressed lately and often feels thoughts of killing herself and has felt this way in the past.    Of note, pt does not know any of her home medications. Medication list obtained from paperwork from [facility],    In the ED, T=97.9F, /59, HR 68, RR 20, SPO2=95%. Labs notable for Trop T notable for 311. . CKMB 6.8. Cr 4.38. BNP 61591. EKG reveals TWI in V1-V6, II, III, AVF and ST depressions in V4-V5. Pt was given aspirin 325mg x1 in the ED, Duonebs x1, and nitroglycerin patch.    Pt is now admitted to cardiac telemetry for further management of ELBERT on CKD and possible ischemic workup.      (2023 19:57)    PAST MEDICAL & SURGICAL HISTORY:  Asthma  Asthma      Depressive disorder  Depression      Hyperlipidemia  HLD (hyperlipidemia)    Type 2 diabetes mellitus  DM (diabetes mellitus)    Essential hypertension  HTN (hypertension)    Local infection of skin and subcutaneous tissue  Toe infection      Type 2 diabetes mellitus with neurological manifestations  Neuropathy in diabetes    Cytomegalovirus infection not present  No significant past surgical history    S/P cholecystectomy    FAMILY HISTORY:  Family history of diabetes mellitus  Family history of diabetes mellitus (DM)    FH: heart attack (Mother)    FH: Parkinson's disease (Father)    FH: COPD (chronic obstructive pulmonary disease) (Mother)      SOCIAL HISTORY: Lives at an [facility], Denies Smoking currently, quit 30 years ago. Deneis etoh and  drugs.     ROS:    Unable to attain due to:   n/a                     Dyspnea (Vincent 0-10):           0             N/V (Y/N):            N                  Secretions (Y/N) :     N           Agitation(Y/N): N  Pain (Y/N):     N  -Provocation/Palliation: n/a   -Quality/Quantity:  n/a  -Radiating:   n/a  -Severity:  n/a  -Timing/Frequency:  n/a  -Impact on ADLs:  n/a    General:  +Weakness  HEENT:    Denied  Neck:  Denied  CVS:  Denied  Resp:  Denied  GI:  Denied  :  Denied  Musc:  Denied  Neuro:  Denied  Psych:  Denied  Skin:  Denied  Lymph:  Denied    ALLERGIES:  Allergies    No Known Allergies    Intolerances        Opiate Naive (Y/N): Y  -iStop reviewed (Y/N): Y (Ref#:    [id number]    )    MEDICATIONS:      MEDICATIONS  (STANDING):  albuterol    90 MICROgram(s) HFA Inhaler 2 Puff(s) Inhalation daily  albuterol/ipratropium for Nebulization 3 milliLiter(s) Nebulizer every 6 hours  atorvastatin 40 milliGRAM(s) Oral at bedtime  calcium acetate 667 milliGRAM(s) Oral three times a day with meals  carvedilol 6.25 milliGRAM(s) Oral every 12 hours  cefTRIAXone   IVPB 2000 milliGRAM(s) IV Intermittent every 24 hours  chlorhexidine 2% Cloths 1 Application(s) Topical <User Schedule>  clonazePAM  Tablet 0.25 milliGRAM(s) Oral at bedtime  dextrose 5%. 1000 milliLiter(s) (50 mL/Hr) IV Continuous <Continuous>  dextrose 5%. 1000 milliLiter(s) (100 mL/Hr) IV Continuous <Continuous>  dextrose 50% Injectable 25 Gram(s) IV Push once  dextrose 50% Injectable 12.5 Gram(s) IV Push once  dextrose 50% Injectable 25 Gram(s) IV Push once  DULoxetine 30 milliGRAM(s) Oral every 24 hours  glucagon  Injectable 1 milliGRAM(s) IntraMuscular once  heparin   Injectable 5000 Unit(s) SubCutaneous every 8 hours  insulin lispro (ADMELOG) corrective regimen sliding scale   SubCutaneous every 6 hours  NIFEdipine XL 90 milliGRAM(s) Oral every 24 hours  nitroglycerin    Patch 0.3 mG/Hr(s) 1 patch Transdermal daily  pantoprazole    Tablet 40 milliGRAM(s) Oral before breakfast  polyethylene glycol 3350 17 Gram(s) Oral two times a day  PPD  5 Tuberculin Unit(s) Injectable 5 Unit(s) IntraDermal once  senna 2 Tablet(s) Oral at bedtime  traZODone 50 milliGRAM(s) Oral every 24 hours    MEDICATIONS  (PRN):  acetaminophen     Tablet .. 650 milliGRAM(s) Oral every 6 hours PRN Mild Pain (1 - 3)  dextrose Oral Gel 15 Gram(s) Oral once PRN Blood Glucose LESS THAN 70 milliGRAM(s)/deciliter    LABS:    CBC:                        8.2    8.08  )-----------( 209      ( 2023 05:30 )             25.9     CMP:        143  |  104  |  46<H>  ----------------------------<  112<H>  3.4<L>   |  25  |  2.86<H>    Ca    8.2<L>      2023 05:30  Phos  4.8       Mg     2.0         TPro  5.8<L>  /  Alb  2.9<L>  /  TBili  <0.2  /  DBili  x   /  AST  19  /  ALT  49<H>  /  AlkPhos  104        Urinalysis Basic - ( 2023 05:30 )    Color: x / Appearance: x / SG: x / pH: x  Gluc: 112 mg/dL / Ketone: x  / Bili: x / Urobili: x   Blood: x / Protein: x / Nitrite: x   Leuk Esterase: x / RBC: x / WBC x   Sq Epi: x / Non Sq Epi: x / Bacteria: x    IMAGING:    < from: CT Head No Cont (23 @ 17:47) >    ACC: [id number] EXAM:  CT BRAIN   ORDERED BY: KATIE PAGAN     PROCEDURE DATE:  2023        Impression: Moderate to severe microvascular disease. Chronic lacunar   infarcts. No acute intracranial injury..    < end of copied text >    < from: Xray Foot AP + Lateral, Right (23 @ 15:30) >    ACC: [id number] EXAM:  XR FOOT 2 VIEWS RT   ORDERED BY: DEAN MELO     PROCEDURE DATE:  2023        Overlying sock limits evaluation.    Suggestion of a soft tissue ulcer along the plantar aspect of the   forefoot/midfoot.    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 Stable abnormal morphology of theamputation margins of the   second and third metatarsals. Stable abnormal morphology of the distal   aspect of the first distal phalanx. Status post amputation of the   proximal fifth metatarsal base and distal fifth metatarsal with   heterotopic bone formation seen along the amputation margins. No definite   new cortical erosion or periostitis to suggest osteomyelitis. However,   MRI would be more sensitive test to evaluate for osteomyelitis.    No definite acute displaced fracture or dislocation. Osteoarthritic   changes throughout the foot.    Atherosclerotic calcifications are noted.    < end of copied text >    PHYSICAL EXAM:  T(C): 36.5 (23 @ 11:40), Max: 36.9 (23 @ 14:07)  HR: 81 (23 @ 11:40) (63 - 88)  BP: 153/71 (23 @ 11:40) (150/62 - 201/70)  RR: 18 (23 @ 11:40) (16 - 20)  SpO2: 92% (23 @ 11:40) (92% - 97%)  Wt(kg): 81.6kG  Daily     Daily Weight in k.5 (2023 18:10)  CAPILLARY BLOOD GLUCOSE    POCT Blood Glucose.: 131 mg/dL (2023 08:55)    I&O's Summary    2023 07:01  -  2023 07:00  --------------------------------------------------------  IN: 600 mL / OUT: 1780 mL / NET: -1180 mL    GENERAL:  [x ]Alert  [x ]Oriented x  4  [ ]Lethargic due to sedation  [ ]Cachexia [x ]Verbal  [ ]Non-Verbal  Behavioral:   [ ] Anxiety  [ ] Delirium [ ] Agitation [x ] Other - calm   HEENT:  [ x]Normal   [ ]Dry mouth   [ ]ET Tube  [ ]Oral lesions  PULMONARY:   [x ]Clear  [ ]Tachypnea  [ ]Audible excessive secretions   [ ]Rhonchi     [ ]Right [ ]Left [ ]Bilateral  [ ]Crackles        [ ]Right [ ]Left [ ]Bilateral  [ ]Wheezing     [ ]Right [ ]Left [ ]Bilateral  CARDIOVASCULAR:    [ x]Regular [ ]Irregular [ ]Tachy  [ ]Ridge [ ]Murmur [ ]Other  GASTROINTESTINAL:  [x ]Soft  [ ]Distended   [ ]+BS  [x ]Non tender [ ]Tender  [ ]PEG [ ]OGT/NGT  [] flexiseal with output  Last BM:   GENITOURINARY:  [ ]Normal [ ] Incontinent   [ x] Oliguria/Anuria   [ ]Hernández  MUSCULOSKELETAL:   [ ]Normal   [x ]Weakness  [ ]Bed/Wheelchair bound [ ]Edema  NEUROLOGIC:   [ x]No focal deficits  [ ] Cognitive impairment  [ ] Dysphagia [ ]Dysarthria [ ] Paresis [  ]Other   SKIN:   [ x]Normal   [ ]Pressure ulcer(s)  [ ]Rash     Preadmit Karnofsky: 70 %           Current Karnofsky:  60  %  Cachexia (Y/N): N  BMI: 81.6kG    ADVANCED DIRECTIVES:     DNR/DNI     No documented HCP form found on [facility]     No Living will / POA / Advance directives found on [place] / [facility].     No documented GOC notes on [facility]    DECISION MAKER: The patient is able to participate in symptomatic assessment and make complex medical decisions  LEGAL SURROGATE: No documented HCP in paper chart / [place] / [facility].  Alternate surrogate: Marlee Smith [phone]    GOALS OF CARE DISCUSSION:       Palliative care info/counseling provided	           Family meeting       Advanced Directives addressed please see Advance Care Planning Note	           See previous Palliative Medicine Note       Documentation of GOC: 	DNR/DNI          REFERRALS:	        Unit SW/Case Mgmt       PT/OT ZACK GUILLEN          MRN-[id number]            (1950)    HPI:  73 year old F POOR HISTORIAN with PMHx of DM (on insulin), HTN, HLD, ESRD (not on HD), asthma, anxiety and depression, multiple B/L toe amputations (b/l) due to osteomyelitis, chronic R foot ulcer, iron deficiency anemia who presented after an episode earlier this morning where she noticed her heart was racing, she was short of breath and persistently anxious. Pt states she also had back pain which is uncommon for her. Patient endorses history of panic attacks in the past, however says that this episode felt different. Upon arrival to the hospital, her symptoms improved tremendously. Currently, pt endorses intermittent dizziness where she feels the room is spinning and nausea but denies chest pain, back pain, palpitations, SOB. Earlier this week she had non bloody diarrhea for 4-5 days and after thanksgiving dinner 2 nights prior, pt endorsed non bloody vomiting that resolved in 1 day. Pt endorses orthopnea for many years and leg swelling since she was diagnosed with kidney disease. Pt denies fever, dysuria, cough/cold symptoms, hematuria, BRBPR, hematochezia.     In speaking with pt, she became teary eyed stating that she been depressed lately and often feels thoughts of killing herself and has felt this way in the past.    Of note, pt does not know any of her home medications. Medication list obtained from paperwork from [facility],    In the ED, T=97.9F, /59, HR 68, RR 20, SPO2=95%. Labs notable for Trop T notable for 311. . CKMB 6.8. Cr 4.38. BNP 36150. EKG reveals TWI in V1-V6, II, III, AVF and ST depressions in V4-V5. Pt was given aspirin 325mg x1 in the ED, Duonebs x1, and nitroglycerin patch.    Pt is now admitted to cardiac telemetry for further management of ELBERT on CKD and possible ischemic workup.      (2023 19:57)    PAST MEDICAL & SURGICAL HISTORY:  Asthma  Asthma      Depressive disorder  Depression      Hyperlipidemia  HLD (hyperlipidemia)    Type 2 diabetes mellitus  DM (diabetes mellitus)    Essential hypertension  HTN (hypertension)    Local infection of skin and subcutaneous tissue  Toe infection      Type 2 diabetes mellitus with neurological manifestations  Neuropathy in diabetes    Cytomegalovirus infection not present  No significant past surgical history    S/P cholecystectomy    FAMILY HISTORY:  Family history of diabetes mellitus  Family history of diabetes mellitus (DM)    FH: heart attack (Mother)    FH: Parkinson's disease (Father)    FH: COPD (chronic obstructive pulmonary disease) (Mother)      SOCIAL HISTORY: Lives at an [facility], Denies Smoking currently, quit 30 years ago. Deneis etoh and  drugs.     ROS:    Unable to attain due to:   n/a                     Dyspnea (Vincent 0-10):           0             N/V (Y/N):            N                  Secretions (Y/N) :     N           Agitation(Y/N): N  Pain (Y/N):     N  -Provocation/Palliation: n/a   -Quality/Quantity:  n/a  -Radiating:   n/a  -Severity:  n/a  -Timing/Frequency:  n/a  -Impact on ADLs:  n/a    General:  +Weakness  HEENT:    Denied  Neck:  Denied  CVS:  Denied  Resp:  Denied  GI:  Denied  :  Denied  Musc:  Denied  Neuro:  Denied  Psych:  Denied  Skin:  Denied  Lymph:  Denied    ALLERGIES:  Allergies    No Known Allergies    Intolerances    Opiate Naive (Y/N): Y  -iStop reviewed (Y/N): Y (Ref#:    [id number]    )    MEDICATIONS:      MEDICATIONS  (STANDING):  albuterol    90 MICROgram(s) HFA Inhaler 2 Puff(s) Inhalation daily  albuterol/ipratropium for Nebulization 3 milliLiter(s) Nebulizer every 6 hours  atorvastatin 40 milliGRAM(s) Oral at bedtime  calcium acetate 667 milliGRAM(s) Oral three times a day with meals  carvedilol 6.25 milliGRAM(s) Oral every 12 hours  cefTRIAXone   IVPB 2000 milliGRAM(s) IV Intermittent every 24 hours  chlorhexidine 2% Cloths 1 Application(s) Topical <User Schedule>  clonazePAM  Tablet 0.25 milliGRAM(s) Oral at bedtime  dextrose 5%. 1000 milliLiter(s) (50 mL/Hr) IV Continuous <Continuous>  dextrose 5%. 1000 milliLiter(s) (100 mL/Hr) IV Continuous <Continuous>  dextrose 50% Injectable 25 Gram(s) IV Push once  dextrose 50% Injectable 12.5 Gram(s) IV Push once  dextrose 50% Injectable 25 Gram(s) IV Push once  DULoxetine 30 milliGRAM(s) Oral every 24 hours  glucagon  Injectable 1 milliGRAM(s) IntraMuscular once  heparin   Injectable 5000 Unit(s) SubCutaneous every 8 hours  insulin lispro (ADMELOG) corrective regimen sliding scale   SubCutaneous every 6 hours  NIFEdipine XL 90 milliGRAM(s) Oral every 24 hours  nitroglycerin    Patch 0.3 mG/Hr(s) 1 patch Transdermal daily  pantoprazole    Tablet 40 milliGRAM(s) Oral before breakfast  polyethylene glycol 3350 17 Gram(s) Oral two times a day  PPD  5 Tuberculin Unit(s) Injectable 5 Unit(s) IntraDermal once  senna 2 Tablet(s) Oral at bedtime  traZODone 50 milliGRAM(s) Oral every 24 hours    MEDICATIONS  (PRN):  acetaminophen     Tablet .. 650 milliGRAM(s) Oral every 6 hours PRN Mild Pain (1 - 3)  dextrose Oral Gel 15 Gram(s) Oral once PRN Blood Glucose LESS THAN 70 milliGRAM(s)/deciliter    LABS:    CBC:                        8.2    8.08  )-----------( 209      ( 2023 05:30 )             25.9     CMP:        143  |  104  |  46<H>  ----------------------------<  112<H>  3.4<L>   |  25  |  2.86<H>    Ca    8.2<L>      2023 05:30  Phos  4.8       Mg     2.0         TPro  5.8<L>  /  Alb  2.9<L>  /  TBili  <0.2  /  DBili  x   /  AST  19  /  ALT  49<H>  /  AlkPhos  104      Urinalysis Basic - ( 2023 05:30 )    Color: x / Appearance: x / SG: x / pH: x  Gluc: 112 mg/dL / Ketone: x  / Bili: x / Urobili: x   Blood: x / Protein: x / Nitrite: x   Leuk Esterase: x / RBC: x / WBC x   Sq Epi: x / Non Sq Epi: x / Bacteria: x    IMAGING:    < from: CT Head No Cont (23 @ 17:47) >    ACC: [id number] EXAM:  CT BRAIN   ORDERED BY: KATIE PAGAN     PROCEDURE DATE:  2023        Impression: Moderate to severe microvascular disease. Chronic lacunar   infarcts. No acute intracranial injury..    < end of copied text >    < from: Xray Foot AP + Lateral, Right (23 @ 15:30) >    ACC: [id number] EXAM:  XR FOOT 2 VIEWS RT   ORDERED BY: DEAN MELO     PROCEDURE DATE:  2023      Overlying sock limits evaluation.    Suggestion of a soft tissue ulcer along the plantar aspect of the   forefoot/midfoot.    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 Stable abnormal morphology of theamputation margins of the   second and third metatarsals. Stable abnormal morphology of the distal   aspect of the first distal phalanx. Status post amputation of the   proximal fifth metatarsal base and distal fifth metatarsal with   heterotopic bone formation seen along the amputation margins. No definite   new cortical erosion or periostitis to suggest osteomyelitis. However,   MRI would be more sensitive test to evaluate for osteomyelitis.    No definite acute displaced fracture or dislocation. Osteoarthritic   changes throughout the foot.    Atherosclerotic calcifications are noted.    < end of copied text >    PHYSICAL EXAM:  T(C): 36.5 (23 @ 11:40), Max: 36.9 (23 @ 14:07)  HR: 81 (23 @ 11:40) (63 - 88)  BP: 153/71 (23 @ 11:40) (150/62 - 201/70)  RR: 18 (23 @ 11:40) (16 - 20)  SpO2: 92% (23 @ 11:40) (92% - 97%)  Wt(kg): 81.6kG  Daily     Daily Weight in k.5 (2023 18:10)  CAPILLARY BLOOD GLUCOSE    POCT Blood Glucose.: 131 mg/dL (2023 08:55)    I&O's Summary    2023 07:01  -  2023 07:00  --------------------------------------------------------  IN: 600 mL / OUT: 1780 mL / NET: -1180 mL    GENERAL:  [x ]Alert  [x ]Oriented x  4  [ ]Lethargic due to sedation  [ ]Cachexia [x ]Verbal  [ ]Non-Verbal  Behavioral:   [ ] Anxiety  [ ] Delirium [ ] Agitation [x ] Other - calm   HEENT:  [ x]Normal   [ ]Dry mouth   [ ]ET Tube  [ ]Oral lesions  PULMONARY:   [x ]Clear  [ ]Tachypnea  [ ]Audible excessive secretions   [ ]Rhonchi     [ ]Right [ ]Left [ ]Bilateral  [ ]Crackles        [ ]Right [ ]Left [ ]Bilateral  [ ]Wheezing     [ ]Right [ ]Left [ ]Bilateral  CARDIOVASCULAR:    [ x]Regular [ ]Irregular [ ]Tachy  [ ]Ridge [ ]Murmur [ ]Other  GASTROINTESTINAL:  [x ]Soft  [ ]Distended   [ ]+BS  [x ]Non tender [ ]Tender  [ ]PEG [ ]OGT/NGT  [] flexiseal with output  Last BM:   GENITOURINARY:  [ ]Normal [ ] Incontinent   [ x] Oliguria/Anuria   [ ]Hernández  MUSCULOSKELETAL:   [ ]Normal   [x ]Weakness  [ ]Bed/Wheelchair bound [ ]Edema  NEUROLOGIC:   [ x]No focal deficits  [ ] Cognitive impairment  [ ] Dysphagia [ ]Dysarthria [ ] Paresis [  ]Other   SKIN:   [ x]Normal   [ ]Pressure ulcer(s)  [ ]Rash     Preadmit Karnofsky: 70 %           Current Karnofsky:  60  %  Cachexia (Y/N): N  BMI: 81.6kG    ADVANCED DIRECTIVES:     DNR/DNI     No documented HCP form found on [facility]     No Living will / POA / Advance directives found on [place] / [facility].     No documented GOC notes on [place]    DECISION MAKER: The patient is able to participate in symptomatic assessment and make complex medical decisions  LEGAL SURROGATE: No documented HCP in paper chart / [place] / [facility].  Alternate surrogate: Marlee Smith [phone]    GOALS OF CARE DISCUSSION:       Palliative care info/counseling provided	           Family meeting       Advanced Directives addressed please see Advance Care Planning Note	           See previous Palliative Medicine Note       Documentation of GOC: 	DNR/DNI          REFERRALS:	        Unit SW/Case Mgmt       PT/OT ZACK GUILLEN          [id number]            (1950)    HPI:  73 year old F POOR HISTORIAN with PMHx of DM (on insulin), HTN, HLD, ESRD (not on HD), asthma, anxiety and depression, multiple B/L toe amputations (b/l) due to osteomyelitis, chronic R foot ulcer, iron deficiency anemia who presented after an episode earlier this morning where she noticed her heart was racing, she was short of breath and persistently anxious. Pt states she also had back pain which is uncommon for her. Patient endorses history of panic attacks in the past, however says that this episode felt different. Upon arrival to the hospital, her symptoms improved tremendously. Currently, pt endorses intermittent dizziness where she feels the room is spinning and nausea but denies chest pain, back pain, palpitations, SOB. Earlier this week she had non bloody diarrhea for 4-5 days and after thanksgiving dinner 2 nights prior, pt endorsed non bloody vomiting that resolved in 1 day. Pt endorses orthopnea for many years and leg swelling since she was diagnosed with kidney disease. Pt denies fever, dysuria, cough/cold symptoms, hematuria, BRBPR, hematochezia.     In speaking with pt, she became teary eyed stating that she been depressed lately and often feels thoughts of killing herself and has felt this way in the past.    Of note, pt does not know any of her home medications. Medication list obtained from paperwork from [facility],    In the ED, T=97.9F, /59, HR 68, RR 20, SPO2=95%. Labs notable for Trop T notable for 311. . CKMB 6.8. Cr 4.38. BNP 96089. EKG reveals TWI in V1-V6, II, III, AVF and ST depressions in V4-V5. Pt was given aspirin 325mg x1 in the ED, Duonebs x1, and nitroglycerin patch.    Pt is now admitted to cardiac telemetry for further management of ELBERT on CKD and possible ischemic workup.      (2023 19:57)    PAST MEDICAL & SURGICAL HISTORY:  Asthma  Asthma    Depressive disorder  Depression    Hyperlipidemia  HLD (hyperlipidemia)    Type 2 diabetes mellitus  DM (diabetes mellitus)    Essential hypertension  HTN (hypertension)    Local infection of skin and subcutaneous tissue  Toe infection    Type 2 diabetes mellitus with neurological manifestations  Neuropathy in diabetes    Cytomegalovirus infection not present  No significant past surgical history    S/P cholecystectomy    FAMILY HISTORY:  Family history of diabetes mellitus  Family history of diabetes mellitus (DM)    FH: heart attack (Mother)    FH: Parkinson's disease (Father)    FH: COPD (chronic obstructive pulmonary disease) (Mother)      SOCIAL HISTORY: Lives at an [facility], Denies Smoking currently, quit 30 years ago. Deneis etoh and  drugs.     ROS:    Unable to attain due to:   n/a                     Dyspnea (Vincent 0-10):           0             N/V (Y/N):            N                  Secretions (Y/N) :     N           Agitation(Y/N): N  Pain (Y/N):     N  -Provocation/Palliation: n/a   -Quality/Quantity:  n/a  -Radiating:   n/a  -Severity:  n/a  -Timing/Frequency:  n/a  -Impact on ADLs:  n/a    General:  +Weakness  HEENT:    Denied  Neck:  Denied  CVS:  Denied  Resp:  Denied  GI:  Denied  :  Denied  Musc:  Denied  Neuro:  Denied  Psych:  Denied  Skin:  Denied  Lymph:  Denied    ALLERGIES:  Allergies    No Known Allergies    Intolerances    Opiate Naive (Y/N): Y  -iStop reviewed (Y/N): Y (Ref#:    [id number]    )    MEDICATIONS:      MEDICATIONS  (STANDING):  albuterol    90 MICROgram(s) HFA Inhaler 2 Puff(s) Inhalation daily  albuterol/ipratropium for Nebulization 3 milliLiter(s) Nebulizer every 6 hours  atorvastatin 40 milliGRAM(s) Oral at bedtime  calcium acetate 667 milliGRAM(s) Oral three times a day with meals  carvedilol 6.25 milliGRAM(s) Oral every 12 hours  cefTRIAXone   IVPB 2000 milliGRAM(s) IV Intermittent every 24 hours  chlorhexidine 2% Cloths 1 Application(s) Topical <User Schedule>  clonazePAM  Tablet 0.25 milliGRAM(s) Oral at bedtime  dextrose 5%. 1000 milliLiter(s) (50 mL/Hr) IV Continuous <Continuous>  dextrose 5%. 1000 milliLiter(s) (100 mL/Hr) IV Continuous <Continuous>  dextrose 50% Injectable 25 Gram(s) IV Push once  dextrose 50% Injectable 12.5 Gram(s) IV Push once  dextrose 50% Injectable 25 Gram(s) IV Push once  DULoxetine 30 milliGRAM(s) Oral every 24 hours  glucagon  Injectable 1 milliGRAM(s) IntraMuscular once  heparin   Injectable 5000 Unit(s) SubCutaneous every 8 hours  insulin lispro (ADMELOG) corrective regimen sliding scale   SubCutaneous every 6 hours  NIFEdipine XL 90 milliGRAM(s) Oral every 24 hours  nitroglycerin    Patch 0.3 mG/Hr(s) 1 patch Transdermal daily  pantoprazole    Tablet 40 milliGRAM(s) Oral before breakfast  polyethylene glycol 3350 17 Gram(s) Oral two times a day  PPD  5 Tuberculin Unit(s) Injectable 5 Unit(s) IntraDermal once  senna 2 Tablet(s) Oral at bedtime  traZODone 50 milliGRAM(s) Oral every 24 hours    MEDICATIONS  (PRN):  acetaminophen     Tablet .. 650 milliGRAM(s) Oral every 6 hours PRN Mild Pain (1 - 3)  dextrose Oral Gel 15 Gram(s) Oral once PRN Blood Glucose LESS THAN 70 milliGRAM(s)/deciliter    LABS:    CBC:                        8.2    8.08  )-----------( 209      ( 2023 05:30 )             25.9     CMP:        143  |  104  |  46<H>  ----------------------------<  112<H>  3.4<L>   |  25  |  2.86<H>    Ca    8.2<L>      2023 05:30  Phos  4.8       Mg     2.0         TPro  5.8<L>  /  Alb  2.9<L>  /  TBili  <0.2  /  DBili  x   /  AST  19  /  ALT  49<H>  /  AlkPhos  104      Urinalysis Basic - ( 2023 05:30 )    Color: x / Appearance: x / SG: x / pH: x  Gluc: 112 mg/dL / Ketone: x  / Bili: x / Urobili: x   Blood: x / Protein: x / Nitrite: x   Leuk Esterase: x / RBC: x / WBC x   Sq Epi: x / Non Sq Epi: x / Bacteria: x    IMAGING:    < from: CT Head No Cont (23 @ 17:47) >    ACC: [id number] EXAM:  CT BRAIN   ORDERED BY: KATIE PAGAN     PROCEDURE DATE:  2023        Impression: Moderate to severe microvascular disease. Chronic lacunar   infarcts. No acute intracranial injury..    < end of copied text >    < from: Xray Foot AP + Lateral, Right (23 @ 15:30) >    ACC: [id number] EXAM:  XR FOOT 2 VIEWS RT   ORDERED BY: DEAN MELO     PROCEDURE DATE:  2023      Overlying sock limits evaluation.    Suggestion of a soft tissue ulcer along the plantar aspect of the   forefoot/midfoot.    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 Stable abnormal morphology of theamputation margins of the   second and third metatarsals. Stable abnormal morphology of the distal   aspect of the first distal phalanx. Status post amputation of the   proximal fifth metatarsal base and distal fifth metatarsal with   heterotopic bone formation seen along the amputation margins. No definite   new cortical erosion or periostitis to suggest osteomyelitis. However,   MRI would be more sensitive test to evaluate for osteomyelitis.    No definite acute displaced fracture or dislocation. Osteoarthritic   changes throughout the foot.    Atherosclerotic calcifications are noted.    < end of copied text >    PHYSICAL EXAM:  T(C): 36.5 (23 @ 11:40), Max: 36.9 (23 @ 14:07)  HR: 81 (23 @ 11:40) (63 - 88)  BP: 153/71 (23 @ 11:40) (150/62 - 201/70)  RR: 18 (23 @ 11:40) (16 - 20)  SpO2: 92% (23 @ 11:40) (92% - 97%)  Wt(kg): 81.6kG  Daily     Daily Weight in k.5 (2023 18:10)  CAPILLARY BLOOD GLUCOSE    POCT Blood Glucose.: 131 mg/dL (2023 08:55)    I&O's Summary    2023 07:01  -  2023 07:00  --------------------------------------------------------  IN: 600 mL / OUT: 1780 mL / NET: -1180 mL    GENERAL:  [x ]Alert  [x ]Oriented x  4  [ ]Lethargic due to sedation  [ ]Cachexia [x ]Verbal  [ ]Non-Verbal  Behavioral:   [ ] Anxiety  [ ] Delirium [ ] Agitation [x ] Other - calm   HEENT:  [ x]Normal   [ ]Dry mouth   [ ]ET Tube  [ ]Oral lesions  PULMONARY:   [x ]Clear  [ ]Tachypnea  [ ]Audible excessive secretions   [ ]Rhonchi     [ ]Right [ ]Left [ ]Bilateral  [ ]Crackles        [ ]Right [ ]Left [ ]Bilateral  [ ]Wheezing     [ ]Right [ ]Left [ ]Bilateral  CARDIOVASCULAR:    [ x]Regular [ ]Irregular [ ]Tachy  [ ]Ridge [ ]Murmur [ ]Other  GASTROINTESTINAL:  [x ]Soft  [ ]Distended   [ ]+BS  [x ]Non tender [ ]Tender  [ ]PEG [ ]OGT/NGT  [] flexiseal with output  Last BM:   GENITOURINARY:  [ ]Normal [ ] Incontinent   [ x] Oliguria/Anuria   [ ]Hernández  MUSCULOSKELETAL:   [ ]Normal   [x ]Weakness  [ ]Bed/Wheelchair bound [ ]Edema  NEUROLOGIC:   [ x]No focal deficits  [ ] Cognitive impairment  [ ] Dysphagia [ ]Dysarthria [ ] Paresis [  ]Other   SKIN:   [ x]Normal   [ ]Pressure ulcer(s)  [ ]Rash     Preadmit Karnofsky: 70 %           Current Karnofsky:  60  %  Cachexia (Y/N): N  BMI: 81.6kG    ADVANCED DIRECTIVES:     DNR/DNI     No documented HCP form found on [facility]     No Living will / POA / Advance directives found on [place] / [facility].     No documented GOC notes on [facility] [place]    DECISION MAKER: The patient is able to participate in symptomatic assessment and make complex medical decisions  LEGAL SURROGATE: No documented HCP in paper chart / [place] / [facility].  Alternate surrogate: Marlee Smith [phone]    GOALS OF CARE DISCUSSION:       Palliative care info/counseling provided	           Family meeting       Advanced Directives addressed please see Advance Care Planning Note	           See previous Palliative Medicine Note       Documentation of GOC: 	DNR/DNI          REFERRALS:	        Unit SW/Case Mgmt       PT/OT

## 2023-11-30 NOTE — CONSULT NOTE ADULT - ASSESSMENT
Assessment: 73F with PMH HTN, HLD, DM with OM s/p multiple amputations of toes; CAD with stress test planned for 11/28; CKD5 presenting with dyspnea, admitted for ischemic w/u, ELBERT on CKD5, stepped up to MICU for HAGMA metabolic encephalopathy 2/2 uremia vs. acidosis requiring intubation; now extubated, improving s/p CVVHD but course c/b hypertensive urgency. Mental status improving, on intermittent HD via right temporary IJ cath. BP improving w/ uptitrating BP meds. Medically stable to step down to [facility]. IR consulted for tunneled HD catheter placement. Case reviewed with Dr. Berry, plan for procedure with sedation.     Recommendations: Maintain NPO x 8 hours prior to procedure.     Communicated with: Dr. Salas primary team

## 2023-11-30 NOTE — DISCHARGE NOTE PROVIDER - ATTENDING DISCHARGE PHYSICAL EXAMINATION:
GENERAL: NAD  HEAD: AT/NC  EYES: EOMI, conjunctiva and sclera clear  HEART: RRR  LUNGS: CTABL   ABDOMEN: Soft, nontender, nondistended  EXTREMITIES: 2+ peripheral pulses bilaterally. No clubbing, cyanosis, or edema  NERVOUS SYSTEM:  A&Ox3, moving all extremities, no focal deficits   SKIN: No rashes or lesions  HD cath in place right side clavicle

## 2023-11-30 NOTE — PROGRESS NOTE ADULT - SUBJECTIVE AND OBJECTIVE BOX
O/N Events: NAEON    Subjective/ROS: Patient seen and examined at bedside. Patient has improved outlook on dialysis. States that she has not experienced any discomfort or pain from HD. Patient states that her constipation has improved since starting senna and miralax, did not need her fingers to remove stool..    Denies Fever/Chills, HA, CP, SOB, n/v.  12pt ROS otherwise negative.    VITALS  Vital Signs Last 24 Hrs  T(C): 36.5 (30 Nov 2023 11:40), Max: 36.8 (30 Nov 2023 05:11)  T(F): 97.7 (30 Nov 2023 11:40), Max: 98.2 (30 Nov 2023 05:11)  HR: 81 (30 Nov 2023 11:40) (63 - 88)  BP: 153/71 (30 Nov 2023 11:40) (150/62 - 201/70)  BP(mean): 101 (29 Nov 2023 15:42) (101 - 118)  RR: 18 (30 Nov 2023 11:40) (16 - 19)  SpO2: 92% (30 Nov 2023 11:40) (92% - 97%)    Parameters below as of 30 Nov 2023 11:40  Patient On (Oxygen Delivery Method): room air        CAPILLARY BLOOD GLUCOSE      POCT Blood Glucose.: 131 mg/dL (30 Nov 2023 08:55)  POCT Blood Glucose.: 119 mg/dL (30 Nov 2023 01:05)  POCT Blood Glucose.: 134 mg/dL (29 Nov 2023 18:41)      PHYSICAL EXAM  General: NAD, lying in bed comfortably  Head: NC/AT; MMM; EOMI;  Neck: Supple; no JVD, catheter present in right side of neck  Respiratory: CTAB; no wheezes/rales/rhonchi  Cardiovascular: Regular rhythm/rate; S1/S2+, no murmurs  Gastrointestinal: Soft; NTND  Extremities: WWP; no edema, multiple toe amputations  Neurological: A&Ox3, CNII-XII grossly intact; no obvious focal deficits    Antimicrobials:  MEDICATIONS  (STANDING):  cefTRIAXone   IVPB 2000 milliGRAM(s) IV Intermittent every 24 hours      Standing Medications:  MEDICATIONS  (STANDING):  albuterol    90 MICROgram(s) HFA Inhaler 2 Puff(s) Inhalation daily  albuterol/ipratropium for Nebulization 3 milliLiter(s) Nebulizer every 6 hours  atorvastatin 40 milliGRAM(s) Oral at bedtime  calcium acetate 667 milliGRAM(s) Oral three times a day with meals  carvedilol 6.25 milliGRAM(s) Oral every 12 hours  cefTRIAXone   IVPB 2000 milliGRAM(s) IV Intermittent every 24 hours  chlorhexidine 2% Cloths 1 Application(s) Topical <User Schedule>  clonazePAM  Tablet 0.25 milliGRAM(s) Oral at bedtime  dextrose 5%. 1000 milliLiter(s) (50 mL/Hr) IV Continuous <Continuous>  dextrose 5%. 1000 milliLiter(s) (100 mL/Hr) IV Continuous <Continuous>  dextrose 50% Injectable 25 Gram(s) IV Push once  dextrose 50% Injectable 12.5 Gram(s) IV Push once  dextrose 50% Injectable 25 Gram(s) IV Push once  DULoxetine 30 milliGRAM(s) Oral every 24 hours  glucagon  Injectable 1 milliGRAM(s) IntraMuscular once  heparin   Injectable 5000 Unit(s) SubCutaneous every 8 hours  insulin lispro (ADMELOG) corrective regimen sliding scale   SubCutaneous every 6 hours  NIFEdipine XL 90 milliGRAM(s) Oral every 24 hours  nitroglycerin    Patch 0.3 mG/Hr(s) 1 patch Transdermal daily  pantoprazole    Tablet 40 milliGRAM(s) Oral before breakfast  polyethylene glycol 3350 17 Gram(s) Oral two times a day  PPD  5 Tuberculin Unit(s) Injectable 5 Unit(s) IntraDermal once  senna 2 Tablet(s) Oral at bedtime  traZODone 50 milliGRAM(s) Oral every 24 hours      PRN Medications:  MEDICATIONS  (PRN):  acetaminophen     Tablet .. 650 milliGRAM(s) Oral every 6 hours PRN Mild Pain (1 - 3)  dextrose Oral Gel 15 Gram(s) Oral once PRN Blood Glucose LESS THAN 70 milliGRAM(s)/deciliter      No Known Allergies      LABS                        8.2    8.08  )-----------( 209      ( 30 Nov 2023 05:30 )             25.9     11-30    143  |  104  |  46<H>  ----------------------------<  112<H>  3.4<L>   |  25  |  2.86<H>    Ca    8.2<L>      30 Nov 2023 05:30  Phos  4.8     11-30  Mg     2.0     11-30    TPro  5.8<L>  /  Alb  2.9<L>  /  TBili  <0.2  /  DBili  x   /  AST  19  /  ALT  49<H>  /  AlkPhos  104  11-30      Urinalysis Basic - ( 30 Nov 2023 05:30 )    Color: x / Appearance: x / SG: x / pH: x  Gluc: 112 mg/dL / Ketone: x  / Bili: x / Urobili: x   Blood: x / Protein: x / Nitrite: x   Leuk Esterase: x / RBC: x / WBC x   Sq Epi: x / Non Sq Epi: x / Bacteria: x              IMAGING/EKG/ETC

## 2023-11-30 NOTE — CONSULT NOTE ADULT - CONVERSATION DETAILS
In addition to the EM visit, an advance care planning meeting was performed  Start time:1120am  End time: 1140am  Total time: 20 mins  A face to face meeting to discuss advance care planning was held today regarding: ZACK GEORGE  Primary decision maker: Zack George  Alternate/surrogate: Marlee Smith  Discussed advance directives including, but not limited to, healthcare proxy and code status.  Decision regarding code status: DNR/DNI  Documentation completed today: CAMILLE    Discussion had with patient at bedside while she was getting dialysis. She was explaining how she blames herself on allowing her to get to a point where she needed dialysis. It was explained that she cannot blame herself and now she is getting dialysis. Reviewed Advance care planning with her and she stated that she would like to allow herself to have a natural death and not be kept alive by artificial means. In addition to the EM visit, an advance care planning meeting was performed  Start time:1120am  End time: 1140am  Total time: 20 mins  A face to face meeting to discuss advance care planning was held today regarding: ZACK GEORGE  Primary decision maker: Zack George  Alternate/surrogate: Marlee Smith  Discussed advance directives including, but not limited to, healthcare proxy and code status.  Decision regarding code status: DNR/DNI  Documentation completed today: CAMILLE    Discussion had with patient at bedside while she was getting dialysis. She was explaining how she blames herself on allowing her to get to a point where she needed dialysis. It was explained that she cannot blame herself and now she is getting dialysis. Reviewed Advance care planning with her and she stated that she would like to allow herself to have a natural death and not be kept alive by artificial means. CAMILLE will be reviewed with her once she is done with dialis In addition to the EM visit, an advance care planning meeting was performed  Start time:1120am  End time: 1140am  Total time: 20 mins  A face to face meeting to discuss advance care planning was held today regarding: ZACK GEORGE  Primary decision maker: Zack George  Alternate/surrogate: Marlee Smith  Discussed advance directives including, but not limited to, healthcare proxy and code status.  Decision regarding code status: DNR/DNI  Documentation completed today: CAMILLE    Discussion had with patient at bedside while she was getting dialysis. She was explaining how she blames herself on allowing her to get to a point where she needed dialysis. It was explained that she cannot blame herself and now she is getting dialysis. Reviewed Advance care planning with her and she stated that she would like to allow herself to have a natural death and not be kept alive by artificial means. CAMILLE will be reviewed with her once she is done with dialysis.

## 2023-11-30 NOTE — DISCHARGE NOTE PROVIDER - NSDCCPCAREPLAN_GEN_ALL_CORE_FT
PRINCIPAL DISCHARGE DIAGNOSIS  Diagnosis: Metabolic encephalopathy  Assessment and Plan of Treatment: Metabolic encephalopathy is a problem in the brain. It is caused by a chemical imbalance in the blood. The imbalance is caused by an illness or organs that are not working as well as they should. It is not caused by a head injury. When the imbalance affects the brain, it can lead to personality changes. It can also make it harder to think clearly and remember things.  The problems may only last a short time if you get treatment right away. But this depends on the cause. If the imbalance has been building up because you've been sick for a long time, the mental changes may be more severe. They may also last longer.  When things are working right, your body has many ways to keep the chemicals in your blood in balance. For example, your liver and kidneys remove waste from your blood. The kidneys also help keep fluids and sodium in balance. And your pancreas makes insulin. It is a hormone that helps control the amount of sugar in your blood.  But the chemicals in your blood can get out of balance and damage parts of your body because of a medical problem. This may be kidney or liver failure. Or it could be diabetes that isn't controlled well. When the imbalance affects the brain, normal thinking and behaviour can change.  Symptoms may include:  Confusion.  Problems with thinking and remembering.  Being grouchy and depressed.  Feeling drowsy.  Not being able to sleep.  Passing out (fainting) now and then.  Please continue to follow with your nephrologist and going to dialysis.  Please return to the Emergency Department if you begin to feel any of the sympoms listed above.      SECONDARY DISCHARGE DIAGNOSES  Diagnosis: ESRD (end stage renal disease)  Assessment and Plan of Treatment: ESRD is a longstanding disease of the kidneys leading to renal failure. The kidneys filter waste and excess fluid from the blood. As kidneys fail, waste builds up. Symptoms develop slowly and aren't specific to the disease. Some people have no symptoms at all and are diagnosed by a lab test. Medications help manage symptoms. In later stages, filtering the blood with a machine (dialysis) or a transplant may be needed. Please continue to get dialysis on your scheduled days and follow up with a nephrologist and your PCP in 101-4 days.      Diagnosis: Pneumonia, aspiration  Assessment and Plan of Treatment: You were diagnosed with pneumonia, or an infection of the lungs during your admission to [facility]. This was noted based on your symptom profile and findings on chest X-ray. You were treated with antibiotics throughout your hospital course. Please continue to take ___. Please follow-up with your primary care physician when you leave the hospital. Should you experience symptoms such as but not limited to: worsening shortness of breath, wheezing, severe cough, coughing bloody sputum, unrelenting/high fever (>103 F or lasting >3 days), and chest pain, please return to the emergency department for interval evaluation.       PRINCIPAL DISCHARGE DIAGNOSIS  Diagnosis: Metabolic encephalopathy  Assessment and Plan of Treatment: Metabolic encephalopathy is a problem in the brain. It is caused by a chemical imbalance in the blood. The imbalance is caused by an illness or organs that are not working as well as they should. It is not caused by a head injury. When the imbalance affects the brain, it can lead to personality changes. It can also make it harder to think clearly and remember things.  The problems may only last a short time if you get treatment right away. But this depends on the cause. If the imbalance has been building up because you've been sick for a long time, the mental changes may be more severe. They may also last longer.  When things are working right, your body has many ways to keep the chemicals in your blood in balance. For example, your liver and kidneys remove waste from your blood. The kidneys also help keep fluids and sodium in balance. And your pancreas makes insulin. It is a hormone that helps control the amount of sugar in your blood.  But the chemicals in your blood can get out of balance and damage parts of your body because of a medical problem. This may be kidney or liver failure. Or it could be diabetes that isn't controlled well. When the imbalance affects the brain, normal thinking and behaviour can change.  Symptoms may include:  Confusion.  Problems with thinking and remembering.  Being grouchy and depressed.  Feeling drowsy.  Not being able to sleep.  Passing out (fainting) now and then.  Please continue to follow with your nephrologist and going to dialysis.  Please return to the Emergency Department if you begin to feel any of the sympoms listed above.      SECONDARY DISCHARGE DIAGNOSES  Diagnosis: ESRD (end stage renal disease)  Assessment and Plan of Treatment: ESRD is a longstanding disease of the kidneys leading to renal failure. The kidneys filter waste and excess fluid from the blood. As kidneys fail, waste builds up. Symptoms develop slowly and aren't specific to the disease. Some people have no symptoms at all and are diagnosed by a lab test. Medications help manage symptoms. In later stages, filtering the blood with a machine (dialysis) or a transplant may be needed. Please continue to get dialysis on your scheduled days and follow up with a nephrologist and your PCP in 101-4 days.      Diagnosis: Pneumonia, aspiration  Assessment and Plan of Treatment: You were diagnosed with pneumonia, or an infection of the lungs during your admission to [facility]. This was noted based on your symptom profile and findings on chest X-ray. You were treated with antibiotics throughout your hospital course. Please continue to take ___. Please follow-up with your primary care physician when you leave the hospital. Should you experience symptoms such as but not limited to: worsening shortness of breath, wheezing, severe cough, coughing bloody sputum, unrelenting/high fever (>103 F or lasting >3 days), and chest pain, please return to the emergency department for interval evaluation.       PRINCIPAL DISCHARGE DIAGNOSIS  Diagnosis: Metabolic encephalopathy  Assessment and Plan of Treatment: Metabolic encephalopathy is a problem in the brain. It is caused by a chemical imbalance in the blood. The imbalance is caused by an illness or organs that are not working as well as they should. It is not caused by a head injury. When the imbalance affects the brain, it can lead to personality changes. It can also make it harder to think clearly and remember things.  The problems may only last a short time if you get treatment right away. But this depends on the cause. If the imbalance has been building up because you've been sick for a long time, the mental changes may be more severe. They may also last longer.  When things are working right, your body has many ways to keep the chemicals in your blood in balance. For example, your liver and kidneys remove waste from your blood. The kidneys also help keep fluids and sodium in balance. And your pancreas makes insulin. It is a hormone that helps control the amount of sugar in your blood.  But the chemicals in your blood can get out of balance and damage parts of your body because of a medical problem. This may be kidney or liver failure. Or it could be diabetes that isn't controlled well. When the imbalance affects the brain, normal thinking and behaviour can change.  Symptoms may include:  Confusion.  Problems with thinking and remembering.  Being grouchy and depressed.  Feeling drowsy.  Not being able to sleep.  Passing out (fainting) now and then.  Please continue to follow with your nephrologist and going to dialysis.  Please return to the Emergency Department if you begin to feel any of the sympoms listed above.      SECONDARY DISCHARGE DIAGNOSES  Diagnosis: ESRD (end stage renal disease)  Assessment and Plan of Treatment: ESRD is a longstanding disease of the kidneys leading to renal failure. The kidneys filter waste and excess fluid from the blood. As kidneys fail, waste builds up. Symptoms develop slowly and aren't specific to the disease. Some people have no symptoms at all and are diagnosed by a lab test. Medications help manage symptoms. In later stages, filtering the blood with a machine (dialysis) or a transplant may be needed. Please continue to get dialysis on your scheduled days and follow up with a nephrologist and your PCP in 101-4 days.      Diagnosis: Pneumonia, aspiration  Assessment and Plan of Treatment: You were diagnosed with pneumonia, or an infection of the lungs during your admission to [facility]. This was noted based on your symptom profile and findings on chest X-ray. You were treated with antibiotics throughout your hospital course. Please continue to take ___. Please follow-up with your primary care physician when you leave the hospital. Should you experience symptoms such as but not limited to: worsening shortness of breath, wheezing, severe cough, coughing bloody sputum, unrelenting/high fever (>103 F or lasting >3 days), and chest pain, please return to the emergency department for interval evaluation.       PRINCIPAL DISCHARGE DIAGNOSIS  Diagnosis: Metabolic encephalopathy  Assessment and Plan of Treatment: Metabolic encephalopathy is a problem in the brain. It is caused by a chemical imbalance in the blood. The imbalance is caused by an illness or organs that are not working as well as they should. It is not caused by a head injury. When the imbalance affects the brain, it can lead to personality changes. It can also make it harder to think clearly and remember things.  The problems may only last a short time if you get treatment right away. But this depends on the cause. If the imbalance has been building up because you've been sick for a long time, the mental changes may be more severe. They may also last longer.  When things are working right, your body has many ways to keep the chemicals in your blood in balance. For example, your liver and kidneys remove waste from your blood. The kidneys also help keep fluids and sodium in balance. And your pancreas makes insulin. It is a hormone that helps control the amount of sugar in your blood.  But the chemicals in your blood can get out of balance and damage parts of your body because of a medical problem. This may be kidney or liver failure. Or it could be diabetes that isn't controlled well. When the imbalance affects the brain, normal thinking and behaviour can change.  Symptoms may include:  Confusion.  Problems with thinking and remembering.  Being grouchy and depressed.  Feeling drowsy.  Not being able to sleep.  Passing out (fainting) now and then.  Please continue to follow with your nephrologist and going to dialysis.  Please return to the Emergency Department if you begin to feel any of the sympoms listed above.      SECONDARY DISCHARGE DIAGNOSES  Diagnosis: Pneumonia, aspiration  Assessment and Plan of Treatment: You were diagnosed with pneumonia, or an infection of the lungs during your admission to [facility]. This was noted based on your symptom profile and findings on chest X-ray. You were treated with antibiotics throughout your hospital course. Please follow-up with your primary care physician when you leave the hospital. Should you experience symptoms such as but not limited to: worsening shortness of breath, wheezing, severe cough, coughing bloody sputum, unrelenting/high fever (>103 F or lasting >3 days), and chest pain, please return to the emergency department for interval evaluation.      Diagnosis: ESRD (end stage renal disease)  Assessment and Plan of Treatment: ESRD is a longstanding disease of the kidneys leading to renal failure. The kidneys filter waste and excess fluid from the blood. As kidneys fail, waste builds up. Symptoms develop slowly and aren't specific to the disease. Some people have no symptoms at all and are diagnosed by a lab test. Medications help manage symptoms. In later stages, filtering the blood with a machine (dialysis) or a transplant may be needed. Please continue to get dialysis on your scheduled days and follow up with a nephrologist and your PCP in 1-4 days.       PRINCIPAL DISCHARGE DIAGNOSIS  Diagnosis: Metabolic encephalopathy  Assessment and Plan of Treatment: Metabolic encephalopathy is a problem in the brain. It is caused by a chemical imbalance in the blood. The imbalance is caused by an illness or organs that are not working as well as they should. It is not caused by a head injury. When the imbalance affects the brain, it can lead to personality changes. It can also make it harder to think clearly and remember things.  The problems may only last a short time if you get treatment right away. But this depends on the cause. If the imbalance has been building up because you've been sick for a long time, the mental changes may be more severe. They may also last longer.  When things are working right, your body has many ways to keep the chemicals in your blood in balance. For example, your liver and kidneys remove waste from your blood. The kidneys also help keep fluids and sodium in balance. And your pancreas makes insulin. It is a hormone that helps control the amount of sugar in your blood.  But the chemicals in your blood can get out of balance and damage parts of your body because of a medical problem. This may be kidney or liver failure. Or it could be diabetes that isn't controlled well. When the imbalance affects the brain, normal thinking and behaviour can change.  Symptoms may include:  Confusion.  Problems with thinking and remembering.  Being grouchy and depressed.  Feeling drowsy.  Not being able to sleep.  Passing out (fainting) now and then.  Please continue to follow with your nephrologist and going to dialysis.  Please return to the Emergency Department if you begin to feel any of the sympoms listed above.      SECONDARY DISCHARGE DIAGNOSES  Diagnosis: Pneumonia, aspiration  Assessment and Plan of Treatment: You were diagnosed with pneumonia, or an infection of the lungs during your admission to [facility]. This was noted based on your symptom profile and findings on chest X-ray. You were treated with antibiotics throughout your hospital course. Please follow-up with your primary care physician when you leave the hospital. Should you experience symptoms such as but not limited to: worsening shortness of breath, wheezing, severe cough, coughing bloody sputum, unrelenting/high fever (>103 F or lasting >3 days), and chest pain, please return to the emergency department for interval evaluation.      Diagnosis: ESRD (end stage renal disease)  Assessment and Plan of Treatment: ESRD is a longstanding disease of the kidneys leading to renal failure. The kidneys filter waste and excess fluid from the blood. As kidneys fail, waste builds up. Symptoms develop slowly and aren't specific to the disease. Some people have no symptoms at all and are diagnosed by a lab test. Medications help manage symptoms. In later stages, filtering the blood with a machine (dialysis) or a transplant may be needed. Please continue to get dialysis on your scheduled days and follow up with a nephrologist and your PCP in 1-4 days.       PRINCIPAL DISCHARGE DIAGNOSIS  Diagnosis: Metabolic encephalopathy  Assessment and Plan of Treatment: Metabolic encephalopathy is a problem in the brain. It is caused by a chemical imbalance in the blood. The imbalance is caused by an illness or organs that are not working as well as they should. It is not caused by a head injury. When the imbalance affects the brain, it can lead to personality changes. It can also make it harder to think clearly and remember things.  The problems may only last a short time if you get treatment right away. But this depends on the cause. If the imbalance has been building up because you've been sick for a long time, the mental changes may be more severe. They may also last longer.  When things are working right, your body has many ways to keep the chemicals in your blood in balance. For example, your liver and kidneys remove waste from your blood. The kidneys also help keep fluids and sodium in balance. And your pancreas makes insulin. It is a hormone that helps control the amount of sugar in your blood.  But the chemicals in your blood can get out of balance and damage parts of your body because of a medical problem. This may be kidney or liver failure. Or it could be diabetes that isn't controlled well. When the imbalance affects the brain, normal thinking and behaviour can change.  Symptoms may include:  Confusion.  Problems with thinking and remembering.  Being grouchy and depressed.  Feeling drowsy.  Not being able to sleep.  Passing out (fainting) now and then.  Please continue to follow with your nephrologist and going to dialysis.  Please return to the Emergency Department if you begin to feel any of the sympoms listed above.      SECONDARY DISCHARGE DIAGNOSES  Diagnosis: Pneumonia, aspiration  Assessment and Plan of Treatment: You were diagnosed with pneumonia, or an infection of the lungs during your admission to [facility]. This was noted based on your symptom profile and findings on chest X-ray. You were treated with antibiotics throughout your hospital course. Please follow-up with your primary care physician when you leave the hospital. Should you experience symptoms such as but not limited to: worsening shortness of breath, wheezing, severe cough, coughing bloody sputum, unrelenting/high fever (>103 F or lasting >3 days), and chest pain, please return to the emergency department for interval evaluation.      Diagnosis: ESRD (end stage renal disease)  Assessment and Plan of Treatment: ESRD is a longstanding disease of the kidneys leading to renal failure. The kidneys filter waste and excess fluid from the blood. As kidneys fail, waste builds up. Symptoms develop slowly and aren't specific to the disease. Some people have no symptoms at all and are diagnosed by a lab test. Medications help manage symptoms. In later stages, filtering the blood with a machine (dialysis) or a transplant may be needed. Please continue to get dialysis on your scheduled days and follow up with a nephrologist and your PCP in 1-4 days.       PRINCIPAL DISCHARGE DIAGNOSIS  Diagnosis: Metabolic encephalopathy  Assessment and Plan of Treatment: Metabolic encephalopathy is a problem in the brain. It is caused by a chemical imbalance in the blood. The imbalance is caused by an illness or organs that are not working as well as they should. It is not caused by a head injury. When the imbalance affects the brain, it can lead to personality changes. It can also make it harder to think clearly and remember things.  The problems may only last a short time if you get treatment right away. But this depends on the cause. If the imbalance has been building up because you've been sick for a long time, the mental changes may be more severe. They may also last longer.  When things are working right, your body has many ways to keep the chemicals in your blood in balance. For example, your liver and kidneys remove waste from your blood. The kidneys also help keep fluids and sodium in balance. And your pancreas makes insulin. It is a hormone that helps control the amount of sugar in your blood.  But the chemicals in your blood can get out of balance and damage parts of your body because of a medical problem. This may be kidney or liver failure. Or it could be diabetes that isn't controlled well. When the imbalance affects the brain, normal thinking and behaviour can change.  Symptoms may include:  Confusion.  Problems with thinking and remembering.  Being grouchy and depressed.  Feeling drowsy.  Not being able to sleep.  Passing out (fainting) now and then.  Please continue to follow with your nephrologist and going to dialysis.  Please return to the Emergency Department if you begin to feel any of the sympoms listed above.      SECONDARY DISCHARGE DIAGNOSES  Diagnosis: Pneumonia, aspiration  Assessment and Plan of Treatment: You were diagnosed with pneumonia, or an infection of the lungs during your admission to [facility]. This was noted based on your symptom profile and findings on chest X-ray. You were treated with antibiotics throughout your hospital course. Please follow-up with your primary care physician when you leave the hospital. Should you experience symptoms such as but not limited to: worsening shortness of breath, wheezing, severe cough, coughing bloody sputum, unrelenting/high fever (>103 F or lasting >3 days), and chest pain, please return to the emergency department for interval evaluation.      Diagnosis: ESRD (end stage renal disease)  Assessment and Plan of Treatment: ESRD is a longstanding disease of the kidneys leading to renal failure. The kidneys filter waste and excess fluid from the blood. As kidneys fail, waste builds up. Symptoms develop slowly and aren't specific to the disease. Some people have no symptoms at all and are diagnosed by a lab test. Medications help manage symptoms. In later stages, filtering the blood with a machine (dialysis) or a transplant may be needed. Please continue to get dialysis on your scheduled days and follow up with a nephrologist and your PCP in 1-4 days.      Diagnosis: Hypertensive urgency  Assessment and Plan of Treatment: You have a known history of high blood pressure prior to your admission. To manage this you are on medication called coreg, hydralazine, nifedipine, and nitroglycerin. High blood pressure can cause damage to your heart and kidneys and increases your risk of heart attack and stroke. To avoid this, It is important that you continue to take this medication when you are discharged so that you can continue to control your blood pressure. Additionally be sure to follow up with your primary care physician on a regular basis to make sure your blood pressure continues to be well controlled. If you experience symptoms such as but not limited to: sudden onset blurry vision, nausea, vomiting, chest pain, shortness of breath, or palpitations, please go to the nearest emergency room.  Please continue to take: hydralazine 50 TID, nifedipine 90mg QD, coreg increased to 12.5 mg q12h       PRINCIPAL DISCHARGE DIAGNOSIS  Diagnosis: Metabolic encephalopathy  Assessment and Plan of Treatment: Metabolic encephalopathy is a problem in the brain. It is caused by a chemical imbalance in the blood. The imbalance is caused by an illness or organs that are not working as well as they should. It is not caused by a head injury. When the imbalance affects the brain, it can lead to personality changes. It can also make it harder to think clearly and remember things.  The problems may only last a short time if you get treatment right away. But this depends on the cause. If the imbalance has been building up because you've been sick for a long time, the mental changes may be more severe. They may also last longer.  When things are working right, your body has many ways to keep the chemicals in your blood in balance. For example, your liver and kidneys remove waste from your blood. The kidneys also help keep fluids and sodium in balance. And your pancreas makes insulin. It is a hormone that helps control the amount of sugar in your blood.  But the chemicals in your blood can get out of balance and damage parts of your body because of a medical problem. This may be kidney or liver failure. Or it could be diabetes that isn't controlled well. When the imbalance affects the brain, normal thinking and behaviour can change.  Symptoms may include:  Confusion.  Problems with thinking and remembering.  Being grouchy and depressed.  Feeling drowsy.  Not being able to sleep.  Passing out (fainting) now and then.  Please continue to follow with your nephrologist and going to dialysis.  Please return to the Emergency Department if you begin to feel any of the sympoms listed above.      SECONDARY DISCHARGE DIAGNOSES  Diagnosis: Pneumonia, aspiration  Assessment and Plan of Treatment: You were diagnosed with pneumonia, or an infection of the lungs during your admission to [facility]. This was noted based on your symptom profile and findings on chest X-ray. You were treated with antibiotics throughout your hospital course. Please follow-up with your primary care physician when you leave the hospital. Should you experience symptoms such as but not limited to: worsening shortness of breath, wheezing, severe cough, coughing bloody sputum, unrelenting/high fever (>103 F or lasting >3 days), and chest pain, please return to the emergency department for interval evaluation.      Diagnosis: ESRD (end stage renal disease)  Assessment and Plan of Treatment: ESRD is a longstanding disease of the kidneys leading to renal failure. The kidneys filter waste and excess fluid from the blood. As kidneys fail, waste builds up. Symptoms develop slowly and aren't specific to the disease. Some people have no symptoms at all and are diagnosed by a lab test. Medications help manage symptoms. In later stages, filtering the blood with a machine (dialysis) or a transplant may be needed. Please continue to get dialysis on your scheduled days and follow up with a nephrologist and your PCP in 1-4 days.      Diagnosis: Hypertensive urgency  Assessment and Plan of Treatment: You have a known history of high blood pressure prior to your admission. To manage this you are on medication called coreg, hydralazine, nifedipine, and nitroglycerin. High blood pressure can cause damage to your heart and kidneys and increases your risk of heart attack and stroke. To avoid this, It is important that you continue to take this medication when you are discharged so that you can continue to control your blood pressure. Additionally be sure to follow up with your primary care physician on a regular basis to make sure your blood pressure continues to be well controlled. If you experience symptoms such as but not limited to: sudden onset blurry vision, nausea, vomiting, chest pain, shortness of breath, or palpitations, please go to the nearest emergency room.  Please continue to take: hydralazine 50 TID, nifedipine 90mg QD, coreg increased to 12.5 mg q12h       PRINCIPAL DISCHARGE DIAGNOSIS  Diagnosis: Metabolic encephalopathy  Assessment and Plan of Treatment: Metabolic encephalopathy is a problem in the brain. It is caused by a chemical imbalance in the blood. The imbalance is caused by an illness or organs that are not working as well as they should. It is not caused by a head injury. When the imbalance affects the brain, it can lead to personality changes. It can also make it harder to think clearly and remember things.  The problems may only last a short time if you get treatment right away. But this depends on the cause. If the imbalance has been building up because you've been sick for a long time, the mental changes may be more severe. They may also last longer.  When things are working right, your body has many ways to keep the chemicals in your blood in balance. For example, your liver and kidneys remove waste from your blood. The kidneys also help keep fluids and sodium in balance. And your pancreas makes insulin. It is a hormone that helps control the amount of sugar in your blood.  But the chemicals in your blood can get out of balance and damage parts of your body because of a medical problem. This may be kidney or liver failure. Or it could be diabetes that isn't controlled well. When the imbalance affects the brain, normal thinking and behaviour can change.  Symptoms may include:  Confusion.  Problems with thinking and remembering.  Being grouchy and depressed.  Feeling drowsy.  Not being able to sleep.  Passing out (fainting) now and then.  Please continue to follow with your nephrologist and going to dialysis.  Please return to the Emergency Department if you begin to feel any of the sympoms listed above.      SECONDARY DISCHARGE DIAGNOSES  Diagnosis: Pneumonia, aspiration  Assessment and Plan of Treatment: You were diagnosed with pneumonia, or an infection of the lungs during your admission to [facility]. This was noted based on your symptom profile and findings on chest X-ray. You were treated with antibiotics throughout your hospital course. Please follow-up with your primary care physician when you leave the hospital. Should you experience symptoms such as but not limited to: worsening shortness of breath, wheezing, severe cough, coughing bloody sputum, unrelenting/high fever (>103 F or lasting >3 days), and chest pain, please return to the emergency department for interval evaluation.      Diagnosis: ESRD (end stage renal disease)  Assessment and Plan of Treatment: ESRD is a longstanding disease of the kidneys leading to renal failure. The kidneys filter waste and excess fluid from the blood. As kidneys fail, waste builds up. Symptoms develop slowly and aren't specific to the disease. Some people have no symptoms at all and are diagnosed by a lab test. Medications help manage symptoms. In later stages, filtering the blood with a machine (dialysis) or a transplant may be needed. Please continue to get dialysis on your scheduled days and follow up with a nephrologist and your PCP in 1-4 days.      Diagnosis: Hypertensive urgency  Assessment and Plan of Treatment: You have a known history of high blood pressure prior to your admission. To manage this you are on medication called coreg, hydralazine, nifedipine, and nitroglycerin. High blood pressure can cause damage to your heart and kidneys and increases your risk of heart attack and stroke. To avoid this, It is important that you continue to take this medication when you are discharged so that you can continue to control your blood pressure. Additionally be sure to follow up with your primary care physician on a regular basis to make sure your blood pressure continues to be well controlled. If you experience symptoms such as but not limited to: sudden onset blurry vision, nausea, vomiting, chest pain, shortness of breath, or palpitations, please go to the nearest emergency room.  Please continue to take: hydralazine 50 TID, nifedipine 90mg QD, coreg increased to 12.5 mg q12h       PRINCIPAL DISCHARGE DIAGNOSIS  Diagnosis: Metabolic encephalopathy  Assessment and Plan of Treatment: Metabolic encephalopathy is a problem in the brain. It is caused by a chemical imbalance in the blood. The imbalance is caused by an illness or organs that are not working as well as they should. It is not caused by a head injury. When the imbalance affects the brain, it can lead to personality changes. It can also make it harder to think clearly and remember things.  The problems may only last a short time if you get treatment right away. But this depends on the cause. If the imbalance has been building up because you've been sick for a long time, the mental changes may be more severe. They may also last longer.  When things are working right, your body has many ways to keep the chemicals in your blood in balance. For example, your liver and kidneys remove waste from your blood. The kidneys also help keep fluids and sodium in balance. And your pancreas makes insulin. It is a hormone that helps control the amount of sugar in your blood.  But the chemicals in your blood can get out of balance and damage parts of your body because of a medical problem. This may be kidney or liver failure. Or it could be diabetes that isn't controlled well. When the imbalance affects the brain, normal thinking and behaviour can change.  Symptoms may include:  Confusion.  Problems with thinking and remembering.  Being grouchy and depressed.  Feeling drowsy.  Not being able to sleep.  Passing out (fainting) now and then.  Please continue to follow with your nephrologist and going to dialysis.  Please return to the Emergency Department if you begin to feel any of the sympoms listed above.      SECONDARY DISCHARGE DIAGNOSES  Diagnosis: Pneumonia, aspiration  Assessment and Plan of Treatment: You were diagnosed with pneumonia, or an infection of the lungs during your admission to [facility]. This was noted based on your symptom profile and findings on chest X-ray. You were treated with antibiotics throughout your hospital course. Please follow-up with your primary care physician when you leave the hospital. Should you experience symptoms such as but not limited to: worsening shortness of breath, wheezing, severe cough, coughing bloody sputum, unrelenting/high fever (>103 F or lasting >3 days), and chest pain, please return to the emergency department for interval evaluation.      Diagnosis: ESRD (end stage renal disease)  Assessment and Plan of Treatment: ESRD is a longstanding disease of the kidneys leading to renal failure. The kidneys filter waste and excess fluid from the blood. As kidneys fail, waste builds up. Symptoms develop slowly and aren't specific to the disease. Some people have no symptoms at all and are diagnosed by a lab test. Medications help manage symptoms. In later stages, filtering the blood with a machine (dialysis) or a transplant may be needed. Please continue to get dialysis on your scheduled days and follow up with a nephrologist and your PCP in 1-4 days.  Please continue to follow with your nephrologist and going to dialysis.  On dialysis days please do not take your blood pressure medications in the morning.  On dialysis days you should get a dose of epo.  Please continue to take phoslo 667mg three times a day with meals.  If you miss a dialysis day please come to the Emergency department for emergent dialysis.      Diagnosis: Hypertensive urgency  Assessment and Plan of Treatment: You have a known history of high blood pressure prior to your admission. To manage this you are on medication called coreg, hydralazine, nifedipine, and nitroglycerin. High blood pressure can cause damage to your heart and kidneys and increases your risk of heart attack and stroke. To avoid this, It is important that you continue to take this medication when you are discharged so that you can continue to control your blood pressure. Additionally be sure to follow up with your primary care physician on a regular basis to make sure your blood pressure continues to be well controlled. If you experience symptoms such as but not limited to: sudden onset blurry vision, nausea, vomiting, chest pain, shortness of breath, or palpitations, please go to the nearest emergency room.  Please continue to take: hydralazine 50 TID, nifedipine 90mg QD, coreg increased to 12.5 mg q12h       PRINCIPAL DISCHARGE DIAGNOSIS  Diagnosis: Metabolic encephalopathy  Assessment and Plan of Treatment: Metabolic encephalopathy is a problem in the brain. It is caused by a chemical imbalance in the blood. The imbalance is caused by an illness or organs that are not working as well as they should. It is not caused by a head injury. When the imbalance affects the brain, it can lead to personality changes. It can also make it harder to think clearly and remember things.  The problems may only last a short time if you get treatment right away. But this depends on the cause. If the imbalance has been building up because you've been sick for a long time, the mental changes may be more severe. They may also last longer.  When things are working right, your body has many ways to keep the chemicals in your blood in balance. For example, your liver and kidneys remove waste from your blood. The kidneys also help keep fluids and sodium in balance. And your pancreas makes insulin. It is a hormone that helps control the amount of sugar in your blood.  But the chemicals in your blood can get out of balance and damage parts of your body because of a medical problem. This may be kidney or liver failure. Or it could be diabetes that isn't controlled well. When the imbalance affects the brain, normal thinking and behaviour can change.  Symptoms may include:  Confusion.  Problems with thinking and remembering.  Being grouchy and depressed.  Feeling drowsy.  Not being able to sleep.  Passing out (fainting) now and then.  Please continue to follow with your nephrologist and going to dialysis.  Please return to the Emergency Department if you begin to feel any of the sympoms listed above.      SECONDARY DISCHARGE DIAGNOSES  Diagnosis: Pneumonia, aspiration  Assessment and Plan of Treatment: You were diagnosed with pneumonia, or an infection of the lungs during your admission to [facility]. This was noted based on your symptom profile and findings on chest X-ray. You were treated with antibiotics throughout your hospital course. Please follow-up with your primary care physician when you leave the hospital. Should you experience symptoms such as but not limited to: worsening shortness of breath, wheezing, severe cough, coughing bloody sputum, unrelenting/high fever (>103 F or lasting >3 days), and chest pain, please return to the emergency department for interval evaluation.      Diagnosis: ESRD (end stage renal disease)  Assessment and Plan of Treatment: ESRD is a longstanding disease of the kidneys leading to renal failure. The kidneys filter waste and excess fluid from the blood. As kidneys fail, waste builds up. Symptoms develop slowly and aren't specific to the disease. Some people have no symptoms at all and are diagnosed by a lab test. Medications help manage symptoms. In later stages, filtering the blood with a machine (dialysis) or a transplant may be needed. Please continue to get dialysis on your scheduled days and follow up with a nephrologist and your PCP in 1-4 days.  Please continue to follow with your nephrologist and going to dialysis.  On dialysis days please do not take your blood pressure medications in the morning.  On dialysis days you should get a dose of epo.  Please continue to take phoslo 667mg three times a day with meals.  If you miss a dialysis day please come to the Emergency department for emergent dialysis.      Diagnosis: Hypertensive urgency  Assessment and Plan of Treatment: You have a known history of high blood pressure prior to your admission. To manage this you are on medication called coreg, hydralazine, nifedipine, and nitroglycerin. High blood pressure can cause damage to your heart and kidneys and increases your risk of heart attack and stroke. To avoid this, It is important that you continue to take this medication when you are discharged so that you can continue to control your blood pressure. Additionally be sure to follow up with your primary care physician on a regular basis to make sure your blood pressure continues to be well controlled. If you experience symptoms such as but not limited to: sudden onset blurry vision, nausea, vomiting, chest pain, shortness of breath, or palpitations, please go to the nearest emergency room.  Please continue to take: hydralazine 50 TID, nifedipine 90mg QD, coreg increased to 12.5 mg q12h       PRINCIPAL DISCHARGE DIAGNOSIS  Diagnosis: Metabolic encephalopathy  Assessment and Plan of Treatment: Metabolic encephalopathy is a problem in the brain. It is caused by a chemical imbalance in the blood. The imbalance is caused by an illness or organs that are not working as well as they should. It is not caused by a head injury. When the imbalance affects the brain, it can lead to personality changes. It can also make it harder to think clearly and remember things.  The problems may only last a short time if you get treatment right away. But this depends on the cause. If the imbalance has been building up because you've been sick for a long time, the mental changes may be more severe. They may also last longer.  When things are working right, your body has many ways to keep the chemicals in your blood in balance. For example, your liver and kidneys remove waste from your blood. The kidneys also help keep fluids and sodium in balance. And your pancreas makes insulin. It is a hormone that helps control the amount of sugar in your blood.  But the chemicals in your blood can get out of balance and damage parts of your body because of a medical problem. This may be kidney or liver failure. Or it could be diabetes that isn't controlled well. When the imbalance affects the brain, normal thinking and behaviour can change.  Symptoms may include:  Confusion.  Problems with thinking and remembering.  Being grouchy and depressed.  Feeling drowsy.  Not being able to sleep.  Passing out (fainting) now and then.  Please continue to follow with your nephrologist and going to dialysis.  Please return to the Emergency Department if you begin to feel any of the sympoms listed above.      SECONDARY DISCHARGE DIAGNOSES  Diagnosis: Pneumonia, aspiration  Assessment and Plan of Treatment: You were diagnosed with pneumonia, or an infection of the lungs during your admission to [facility]. This was noted based on your symptom profile and findings on chest X-ray. You were treated with antibiotics throughout your hospital course. Please follow-up with your primary care physician when you leave the hospital. Should you experience symptoms such as but not limited to: worsening shortness of breath, wheezing, severe cough, coughing bloody sputum, unrelenting/high fever (>103 F or lasting >3 days), and chest pain, please return to the emergency department for interval evaluation.      Diagnosis: ESRD (end stage renal disease)  Assessment and Plan of Treatment: ESRD is a longstanding disease of the kidneys leading to renal failure. The kidneys filter waste and excess fluid from the blood. As kidneys fail, waste builds up. Symptoms develop slowly and aren't specific to the disease. Some people have no symptoms at all and are diagnosed by a lab test. Medications help manage symptoms. In later stages, filtering the blood with a machine (dialysis) or a transplant may be needed. Please continue to get dialysis on your scheduled days and follow up with a nephrologist and your PCP in 1-4 days.  Please continue to follow with your nephrologist and going to dialysis.  On dialysis days please do not take your blood pressure medications in the morning.  On dialysis days you should get a dose of epo.  Please continue to take phoslo 667mg three times a day with meals.  If you miss a dialysis day please come to the Emergency department for emergent dialysis.      Diagnosis: Hypertensive urgency  Assessment and Plan of Treatment: You have a known history of high blood pressure prior to your admission. To manage this you are on medication called coreg, hydralazine, nifedipine, and nitroglycerin. High blood pressure can cause damage to your heart and kidneys and increases your risk of heart attack and stroke. To avoid this, It is important that you continue to take this medication when you are discharged so that you can continue to control your blood pressure. Additionally be sure to follow up with your primary care physician on a regular basis to make sure your blood pressure continues to be well controlled. If you experience symptoms such as but not limited to: sudden onset blurry vision, nausea, vomiting, chest pain, shortness of breath, or palpitations, please go to the nearest emergency room.  Please continue to take: hydralazine 50 TID, nifedipine 90mg QD, coreg increased to 12.5 mg q12h

## 2023-11-30 NOTE — CONSULT NOTE ADULT - CONSULT REASON
Complex decision making related to goals of care
Complex decision making related to goals of care
R foot wound
r/o ACS
request for tunneled dialysis catheter placement
CKD

## 2023-11-30 NOTE — OCCUPATIONAL THERAPY INITIAL EVALUATION ADULT - DIAGNOSIS, OT EVAL
Pt presents to [facility] with SOB. OT consulted 2/2 impaired strength and deconditioning impacting ADL and functional mob.

## 2023-11-30 NOTE — PROGRESS NOTE ADULT - SUBJECTIVE AND OBJECTIVE BOX
Patient is a 73y old  Female who presents with a chief complaint of Shortness of breath (30 Nov 2023 08:28)      INTERVAL HPI/ OVERNIGHT EVENTS. Pt seen and examined bedside. Denies any pedal pain at this time.       LABS                        8.2    8.08  )-----------( 209      ( 30 Nov 2023 05:30 )             25.9     11-30    143  |  104  |  46<H>  ----------------------------<  112<H>  3.4<L>   |  25  |  2.86<H>    Ca    8.2<L>      30 Nov 2023 05:30  Phos  4.8     11-30  Mg     2.0     11-30    TPro  5.8<L>  /  Alb  2.9<L>  /  TBili  <0.2  /  DBili  x   /  AST  19  /  ALT  49<H>  /  AlkPhos  104  11-30      ESR: 60  CRP: --  11-30 @ 05:30    ICU Vital Signs Last 24 Hrs  T(C): 36.8 (30 Nov 2023 05:11), Max: 36.9 (29 Nov 2023 14:07)  T(F): 98.2 (30 Nov 2023 05:11), Max: 98.5 (29 Nov 2023 14:07)  HR: 76 (30 Nov 2023 07:40) (63 - 97)  BP: 161/63 (30 Nov 2023 07:40) (150/62 - 201/70)  BP(mean): 101 (29 Nov 2023 15:42) (101 - 128)  ABP: --  ABP(mean): --  RR: 17 (30 Nov 2023 05:11) (16 - 23)  SpO2: 94% (30 Nov 2023 05:11) (92% - 99%)    O2 Parameters below as of 30 Nov 2023 05:11  Patient On (Oxygen Delivery Method): room air        RADIOLOGY  < from: Xray Foot AP + Lateral, Right (11.29.23 @ 15:30) >  IMPRESSION:    Overlying sock limits evaluation.    Suggestion of a soft tissue ulcer along the plantar aspect of the   forefoot/midfoot.    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 Stable abnormal morphology of theamputation margins of the   second and third metatarsals. Stable abnormal morphology of the distal   aspect of the first distal phalanx. Status post amputation of the   proximal fifth metatarsal base and distal fifth metatarsal with   heterotopic bone formation seen along the amputation margins. No definite   new cortical erosion or periostitis to suggest osteomyelitis. However,   MRI would be more sensitive test to evaluate for osteomyelitis.    No definite acute displaced fracture or dislocation. Osteoarthritic   changes throughout the foot.    Atherosclerotic calcifications are noted.  < end of copied text >    PHYSICAL EXAM  Lower Extremity Focused  Vasc: DP/PT 2/4, no edema or erythema, temp gradient warm to warm  Derm: sub met 2 3 cm x 2 cm lesion with central eschar - debrided to reveal 2.3 cm x 1.5 cm  superficial granular wound with sanginous drainage only, no purulence, does not probe deep, - PTB, no drainage, hyperkeratotic border  Neuro: protective sensation in tact  MSK: s/p R partial 2nd + 3rd ray amputation

## 2023-11-30 NOTE — OCCUPATIONAL THERAPY INITIAL EVALUATION ADULT - PLANNED THERAPY INTERVENTIONS, OT EVAL
ADL retraining/balance training/bed mobility training/strengthening/transfer training
Bladder non-tender and non-distended. Urine clear yellow.

## 2023-12-01 LAB
ALBUMIN SERPL ELPH-MCNC: 3.1 G/DL — LOW (ref 3.3–5)
ALBUMIN SERPL ELPH-MCNC: 3.1 G/DL — LOW (ref 3.3–5)
ALP SERPL-CCNC: 108 U/L — SIGNIFICANT CHANGE UP (ref 40–120)
ALP SERPL-CCNC: 108 U/L — SIGNIFICANT CHANGE UP (ref 40–120)
ALT FLD-CCNC: 39 U/L — SIGNIFICANT CHANGE UP (ref 10–45)
ALT FLD-CCNC: 39 U/L — SIGNIFICANT CHANGE UP (ref 10–45)
ANION GAP SERPL CALC-SCNC: 15 MMOL/L — SIGNIFICANT CHANGE UP (ref 5–17)
ANION GAP SERPL CALC-SCNC: 15 MMOL/L — SIGNIFICANT CHANGE UP (ref 5–17)
AST SERPL-CCNC: 17 U/L — SIGNIFICANT CHANGE UP (ref 10–40)
AST SERPL-CCNC: 17 U/L — SIGNIFICANT CHANGE UP (ref 10–40)
BASOPHILS # BLD AUTO: 0.04 K/UL — SIGNIFICANT CHANGE UP (ref 0–0.2)
BASOPHILS # BLD AUTO: 0.04 K/UL — SIGNIFICANT CHANGE UP (ref 0–0.2)
BASOPHILS NFR BLD AUTO: 0.4 % — SIGNIFICANT CHANGE UP (ref 0–2)
BASOPHILS NFR BLD AUTO: 0.4 % — SIGNIFICANT CHANGE UP (ref 0–2)
BILIRUB SERPL-MCNC: <0.2 MG/DL — SIGNIFICANT CHANGE UP (ref 0.2–1.2)
BILIRUB SERPL-MCNC: <0.2 MG/DL — SIGNIFICANT CHANGE UP (ref 0.2–1.2)
BUN SERPL-MCNC: 56 MG/DL — HIGH (ref 7–23)
BUN SERPL-MCNC: 56 MG/DL — HIGH (ref 7–23)
CALCIUM SERPL-MCNC: 8.6 MG/DL — SIGNIFICANT CHANGE UP (ref 8.4–10.5)
CALCIUM SERPL-MCNC: 8.6 MG/DL — SIGNIFICANT CHANGE UP (ref 8.4–10.5)
CHLORIDE SERPL-SCNC: 104 MMOL/L — SIGNIFICANT CHANGE UP (ref 96–108)
CHLORIDE SERPL-SCNC: 104 MMOL/L — SIGNIFICANT CHANGE UP (ref 96–108)
CO2 SERPL-SCNC: 24 MMOL/L — SIGNIFICANT CHANGE UP (ref 22–31)
CO2 SERPL-SCNC: 24 MMOL/L — SIGNIFICANT CHANGE UP (ref 22–31)
CREAT SERPL-MCNC: 3.75 MG/DL — HIGH (ref 0.5–1.3)
CREAT SERPL-MCNC: 3.75 MG/DL — HIGH (ref 0.5–1.3)
CULTURE RESULTS: SIGNIFICANT CHANGE UP
EGFR: 12 ML/MIN/1.73M2 — LOW
EGFR: 12 ML/MIN/1.73M2 — LOW
EOSINOPHIL # BLD AUTO: 0.21 K/UL — SIGNIFICANT CHANGE UP (ref 0–0.5)
EOSINOPHIL # BLD AUTO: 0.21 K/UL — SIGNIFICANT CHANGE UP (ref 0–0.5)
EOSINOPHIL NFR BLD AUTO: 2 % — SIGNIFICANT CHANGE UP (ref 0–6)
EOSINOPHIL NFR BLD AUTO: 2 % — SIGNIFICANT CHANGE UP (ref 0–6)
GLUCOSE BLDC GLUCOMTR-MCNC: 172 MG/DL — HIGH (ref 70–99)
GLUCOSE BLDC GLUCOMTR-MCNC: 172 MG/DL — HIGH (ref 70–99)
GLUCOSE BLDC GLUCOMTR-MCNC: 177 MG/DL — HIGH (ref 70–99)
GLUCOSE BLDC GLUCOMTR-MCNC: 177 MG/DL — HIGH (ref 70–99)
GLUCOSE BLDC GLUCOMTR-MCNC: 183 MG/DL — HIGH (ref 70–99)
GLUCOSE BLDC GLUCOMTR-MCNC: 183 MG/DL — HIGH (ref 70–99)
GLUCOSE SERPL-MCNC: 138 MG/DL — HIGH (ref 70–99)
GLUCOSE SERPL-MCNC: 138 MG/DL — HIGH (ref 70–99)
HCT VFR BLD CALC: 28.5 % — LOW (ref 34.5–45)
HCT VFR BLD CALC: 28.5 % — LOW (ref 34.5–45)
HGB BLD-MCNC: 9.2 G/DL — LOW (ref 11.5–15.5)
HGB BLD-MCNC: 9.2 G/DL — LOW (ref 11.5–15.5)
IMM GRANULOCYTES NFR BLD AUTO: 0.9 % — SIGNIFICANT CHANGE UP (ref 0–0.9)
IMM GRANULOCYTES NFR BLD AUTO: 0.9 % — SIGNIFICANT CHANGE UP (ref 0–0.9)
LYMPHOCYTES # BLD AUTO: 2.42 K/UL — SIGNIFICANT CHANGE UP (ref 1–3.3)
LYMPHOCYTES # BLD AUTO: 2.42 K/UL — SIGNIFICANT CHANGE UP (ref 1–3.3)
LYMPHOCYTES # BLD AUTO: 23.1 % — SIGNIFICANT CHANGE UP (ref 13–44)
LYMPHOCYTES # BLD AUTO: 23.1 % — SIGNIFICANT CHANGE UP (ref 13–44)
MAGNESIUM SERPL-MCNC: 2 MG/DL — SIGNIFICANT CHANGE UP (ref 1.6–2.6)
MAGNESIUM SERPL-MCNC: 2 MG/DL — SIGNIFICANT CHANGE UP (ref 1.6–2.6)
MCHC RBC-ENTMCNC: 29.2 PG — SIGNIFICANT CHANGE UP (ref 27–34)
MCHC RBC-ENTMCNC: 29.2 PG — SIGNIFICANT CHANGE UP (ref 27–34)
MCHC RBC-ENTMCNC: 32.3 GM/DL — SIGNIFICANT CHANGE UP (ref 32–36)
MCHC RBC-ENTMCNC: 32.3 GM/DL — SIGNIFICANT CHANGE UP (ref 32–36)
MCV RBC AUTO: 90.5 FL — SIGNIFICANT CHANGE UP (ref 80–100)
MCV RBC AUTO: 90.5 FL — SIGNIFICANT CHANGE UP (ref 80–100)
MONOCYTES # BLD AUTO: 1.23 K/UL — HIGH (ref 0–0.9)
MONOCYTES # BLD AUTO: 1.23 K/UL — HIGH (ref 0–0.9)
MONOCYTES NFR BLD AUTO: 11.8 % — SIGNIFICANT CHANGE UP (ref 2–14)
MONOCYTES NFR BLD AUTO: 11.8 % — SIGNIFICANT CHANGE UP (ref 2–14)
NEUTROPHILS # BLD AUTO: 6.47 K/UL — SIGNIFICANT CHANGE UP (ref 1.8–7.4)
NEUTROPHILS # BLD AUTO: 6.47 K/UL — SIGNIFICANT CHANGE UP (ref 1.8–7.4)
NEUTROPHILS NFR BLD AUTO: 61.8 % — SIGNIFICANT CHANGE UP (ref 43–77)
NEUTROPHILS NFR BLD AUTO: 61.8 % — SIGNIFICANT CHANGE UP (ref 43–77)
NRBC # BLD: 0 /100 WBCS — SIGNIFICANT CHANGE UP (ref 0–0)
NRBC # BLD: 0 /100 WBCS — SIGNIFICANT CHANGE UP (ref 0–0)
PHOSPHATE SERPL-MCNC: 5.5 MG/DL — HIGH (ref 2.5–4.5)
PHOSPHATE SERPL-MCNC: 5.5 MG/DL — HIGH (ref 2.5–4.5)
PLATELET # BLD AUTO: 242 K/UL — SIGNIFICANT CHANGE UP (ref 150–400)
PLATELET # BLD AUTO: 242 K/UL — SIGNIFICANT CHANGE UP (ref 150–400)
POTASSIUM SERPL-MCNC: 3.5 MMOL/L — SIGNIFICANT CHANGE UP (ref 3.5–5.3)
POTASSIUM SERPL-MCNC: 3.5 MMOL/L — SIGNIFICANT CHANGE UP (ref 3.5–5.3)
POTASSIUM SERPL-SCNC: 3.5 MMOL/L — SIGNIFICANT CHANGE UP (ref 3.5–5.3)
POTASSIUM SERPL-SCNC: 3.5 MMOL/L — SIGNIFICANT CHANGE UP (ref 3.5–5.3)
PROT SERPL-MCNC: 6 G/DL — SIGNIFICANT CHANGE UP (ref 6–8.3)
PROT SERPL-MCNC: 6 G/DL — SIGNIFICANT CHANGE UP (ref 6–8.3)
RBC # BLD: 3.15 M/UL — LOW (ref 3.8–5.2)
RBC # BLD: 3.15 M/UL — LOW (ref 3.8–5.2)
RBC # FLD: 13.2 % — SIGNIFICANT CHANGE UP (ref 10.3–14.5)
RBC # FLD: 13.2 % — SIGNIFICANT CHANGE UP (ref 10.3–14.5)
SODIUM SERPL-SCNC: 143 MMOL/L — SIGNIFICANT CHANGE UP (ref 135–145)
SODIUM SERPL-SCNC: 143 MMOL/L — SIGNIFICANT CHANGE UP (ref 135–145)
SPECIMEN SOURCE: SIGNIFICANT CHANGE UP
WBC # BLD: 10.46 K/UL — SIGNIFICANT CHANGE UP (ref 3.8–10.5)
WBC # BLD: 10.46 K/UL — SIGNIFICANT CHANGE UP (ref 3.8–10.5)
WBC # FLD AUTO: 10.46 K/UL — SIGNIFICANT CHANGE UP (ref 3.8–10.5)
WBC # FLD AUTO: 10.46 K/UL — SIGNIFICANT CHANGE UP (ref 3.8–10.5)

## 2023-12-01 PROCEDURE — 99232 SBSQ HOSP IP/OBS MODERATE 35: CPT | Mod: GC

## 2023-12-01 PROCEDURE — 99232 SBSQ HOSP IP/OBS MODERATE 35: CPT

## 2023-12-01 RX ADMIN — Medication 2: at 13:43

## 2023-12-01 RX ADMIN — Medication 3 MILLILITER(S): at 09:23

## 2023-12-01 RX ADMIN — Medication 1 PATCH: at 04:30

## 2023-12-01 RX ADMIN — DULOXETINE HYDROCHLORIDE 30 MILLIGRAM(S): 30 CAPSULE, DELAYED RELEASE ORAL at 09:23

## 2023-12-01 RX ADMIN — HEPARIN SODIUM 5000 UNIT(S): 5000 INJECTION INTRAVENOUS; SUBCUTANEOUS at 07:23

## 2023-12-01 RX ADMIN — Medication 50 MILLIGRAM(S): at 21:34

## 2023-12-01 RX ADMIN — Medication 2: at 00:08

## 2023-12-01 RX ADMIN — Medication 50 MILLIGRAM(S): at 17:35

## 2023-12-01 RX ADMIN — Medication 667 MILLIGRAM(S): at 17:35

## 2023-12-01 RX ADMIN — Medication 2: at 18:30

## 2023-12-01 RX ADMIN — CARVEDILOL PHOSPHATE 12.5 MILLIGRAM(S): 80 CAPSULE, EXTENDED RELEASE ORAL at 17:43

## 2023-12-01 RX ADMIN — POLYETHYLENE GLYCOL 3350 17 GRAM(S): 17 POWDER, FOR SOLUTION ORAL at 17:36

## 2023-12-01 RX ADMIN — Medication 3 MILLILITER(S): at 22:17

## 2023-12-01 RX ADMIN — HEPARIN SODIUM 5000 UNIT(S): 5000 INJECTION INTRAVENOUS; SUBCUTANEOUS at 21:35

## 2023-12-01 RX ADMIN — Medication 1 PATCH: at 12:27

## 2023-12-01 RX ADMIN — SENNA PLUS 2 TABLET(S): 8.6 TABLET ORAL at 21:34

## 2023-12-01 RX ADMIN — CARVEDILOL PHOSPHATE 12.5 MILLIGRAM(S): 80 CAPSULE, EXTENDED RELEASE ORAL at 03:00

## 2023-12-01 RX ADMIN — Medication 100 MILLIGRAM(S): at 22:44

## 2023-12-01 RX ADMIN — PANTOPRAZOLE SODIUM 40 MILLIGRAM(S): 20 TABLET, DELAYED RELEASE ORAL at 07:23

## 2023-12-01 RX ADMIN — Medication 90 MILLIGRAM(S): at 07:23

## 2023-12-01 RX ADMIN — Medication 1 PATCH: at 19:07

## 2023-12-01 RX ADMIN — Medication 667 MILLIGRAM(S): at 07:35

## 2023-12-01 RX ADMIN — Medication 50 MILLIGRAM(S): at 13:43

## 2023-12-01 RX ADMIN — Medication 3 MILLILITER(S): at 17:35

## 2023-12-01 RX ADMIN — Medication 667 MILLIGRAM(S): at 12:26

## 2023-12-01 RX ADMIN — ATORVASTATIN CALCIUM 40 MILLIGRAM(S): 80 TABLET, FILM COATED ORAL at 21:33

## 2023-12-01 RX ADMIN — Medication 50 MILLIGRAM(S): at 07:23

## 2023-12-01 RX ADMIN — HEPARIN SODIUM 5000 UNIT(S): 5000 INJECTION INTRAVENOUS; SUBCUTANEOUS at 13:43

## 2023-12-01 RX ADMIN — Medication 0.25 MILLIGRAM(S): at 21:35

## 2023-12-01 NOTE — PROGRESS NOTE ADULT - ASSESSMENT
73YF now ESRD initially admitted for SOB and ischemic w/u h/c c/b metabolic encephalopathy , acidosis, hyperkalmeia, upgraded to MICU for further management, now clinically stable      ESRD on HD, currently on TTS schedule:   EDW: TBD, patient refusing standing weight   Last HD 11/30: UF only session, 2L removed  BP improved today. Labs stable. No indication of urgent HD at this time  Next HD tomorrow 12/2  Renal diet   Fluid restriction to <1.2L   Daily BMP   Outpatient HD set up in progress     Access:  R TDC functional     HTN:  BP now improving with HD  UF with HD as tolerated   Hold BP meds on HD days to achieve optimal UF, can resume post HD if BP >140/80    Anemia:  Likely 2/2 ESRD   EPO with HD, no indication for IV iron at this time (iron sat 38%)    MBD:  Calcium: 8.6  Phos: 5.5  If phos persistently >5.5 can start on phos binders

## 2023-12-01 NOTE — PROGRESS NOTE ADULT - SUBJECTIVE AND OBJECTIVE BOX
Patient is a 73y Female seen and evaluated at bedside. No acute events overnight. Denies any N/V/D, SOB, CP. Complained of some neck pain.      Meds:    acetaminophen     Tablet .. 650 every 6 hours PRN  albuterol    90 MICROgram(s) HFA Inhaler 2 daily  albuterol/ipratropium for Nebulization 3 every 6 hours  atorvastatin 40 at bedtime  calcium acetate 667 three times a day with meals  carvedilol 12.5 every 12 hours  chlorhexidine 2% Cloths 1 <User Schedule>  clonazePAM  Tablet 0.25 at bedtime  dextrose 5%. 1000 <Continuous>  dextrose 5%. 1000 <Continuous>  dextrose 50% Injectable 25 once  dextrose 50% Injectable 12.5 once  dextrose 50% Injectable 25 once  dextrose Oral Gel 15 once PRN  DULoxetine 30 every 24 hours  glucagon  Injectable 1 once  heparin   Injectable 5000 every 8 hours  hydrALAZINE 50 three times a day  insulin lispro (ADMELOG) corrective regimen sliding scale  every 6 hours  NIFEdipine XL 90 every 24 hours  nitroglycerin    Patch 0.3 mG/Hr(s) 1 daily  pantoprazole    Tablet 40 before breakfast  polyethylene glycol 3350 17 two times a day  PPD  5 Tuberculin Unit(s) Injectable 5 once  senna 2 at bedtime  traZODone 50 every 24 hours      T(C): , Max: 37 (11-30-23 @ 17:36)  T(F): , Max: 98.6 (11-30-23 @ 17:36)  HR: 82 (12-01-23 @ 14:00)  BP: 130/70 (12-01-23 @ 14:00)  BP(mean): --  RR: 18 (12-01-23 @ 14:00)  SpO2: 96% (12-01-23 @ 14:00)  Wt(kg): --    11-30 @ 07:01  -  12-01 @ 07:00  --------------------------------------------------------  IN: 0 mL / OUT: 2000 mL / NET: -2000 mL          Review of Systems:  ROS negative except as per HPI      PHYSICAL EXAM:  GENERAL: NAD  NECK: supple, No JVD  CHEST/LUNG: Clear to auscultation bilaterally  HEART: normal S1S2, RRR  ABDOMEN: Soft, Nontender, +BS,    EXTREMITIES: No clubbing, cyanosis, or edema   NEUROLOGY: AAO x3, no focal neurological deficit  ACCESS: [facility]    LABS:                        9.2    10.46 )-----------( 242      ( 01 Dec 2023 06:02 )             28.5     12-01    143  |  104  |  56<H>  ----------------------------<  138<H>  3.5   |  24  |  3.75<H>    Ca    8.6      01 Dec 2023 06:02  Phos  5.5     12-01  Mg     2.0     12-01    TPro  6.0  /  Alb  3.1<L>  /  TBili  <0.2  /  DBili  x   /  AST  17  /  ALT  39  /  AlkPhos  108  12-01    Hepatitis B Surface Antibody: Nonreact (11-30 @ 16:34)  Hepatitis C Virus S/CO Ratio: 0.04 S/CO (11-30 @ 16:34)      Urinalysis Basic - ( 01 Dec 2023 06:02 )    Color: x / Appearance: x / SG: x / pH: x  Gluc: 138 mg/dL / Ketone: x  / Bili: x / Urobili: x   Blood: x / Protein: x / Nitrite: x   Leuk Esterase: x / RBC: x / WBC x   Sq Epi: x / Non Sq Epi: x / Bacteria: x            RADIOLOGY & ADDITIONAL STUDIES:

## 2023-12-01 NOTE — PROGRESS NOTE ADULT - PROBLEM SELECTOR PLAN 4
new LLL opacity seen on Xray 11/27. Febrile on 11/28. . Aspiration vs. atelectasis. on CTX 2g. pt continues to be afebrile with downtrending WBC.   - c/w CTX for total 5 days (11/26-12/1) new LLL opacity seen on Xray 11/27. Febrile on 11/28. . Aspiration vs. atelectasis. on CTX 2g. pt continues to be afebrile with downtrending WBC.   s/p CTX for total 5 days (11/26-12/1)

## 2023-12-01 NOTE — PROGRESS NOTE ADULT - ATTENDING COMMENTS
73F with PMH HTN, HLD, DM with OM s/p multiple amputations of toes; CAD with stress test planned for 11/28; CKD5 presenting with dyspnea, admitted for ischemic w/u, ELBERT on CKD5, stepped up to MICU for HAGMA metabolic encephalopathy 2/2 uremia vs. acidosis requiring intubation; now extubated, improving s/p CVVHD but course c/b hypertensive urgency. Mental status improving, on intermittent HD. BP improving w/ uptitrating BP meds. Medically stable to step down to RMF.    #Metabolic encephalopathy POA now resolved   #ESRD on HD  #ACD  #Major depression   #Aspiration PNA     Plan:   -Tunneled HD catheter placed by IR. Will c/w HD.  -Hold BP meds before HD    Dispo: ANT pending acceptances

## 2023-12-01 NOTE — PROGRESS NOTE ADULT - ATTENDING COMMENTS
I agree with the fellow's findings and plans as written above with the following additions/amendments:    Seen and examined at bedside, feels well, BP controlled, no CP or SOB, no urgent indication for HD. Next HD tomorrow, patient agreeable

## 2023-12-01 NOTE — PROGRESS NOTE ADULT - PROBLEM SELECTOR PLAN 8
POA with Cr 4.38. Cr 2.4 baseline with no previos HD hx. s/p CVVHD 2/2 HAGMA metabolic encephalopathy (uremia vs. acidosis). FENa 0.3% prerenal. Renal US: consistent with CKD.   s/p intermittent HD last 11/28    Home meds: NaHCO3 650mg BID, Phoslo 667mg PO TID    - discontinued NaHCO3 650mg BID since pt on RRT. Restart on discharge  - continue Phoslo 667mg PO TID  - Strict I/Os  - Renal diet

## 2023-12-01 NOTE — PROGRESS NOTE ADULT - PROBLEM SELECTOR PLAN 1
RESOLVED. [facility] metabolic encephalopathy 2/2 uremia vs. acidosis, requiring intubation in MICU. mental status improved s/p CVVHD with uremia and acidosis resolved. Now AOx3 with appropriate affect. CTH non cont 11/28 shows microvascular disease with lacunar infarcts. No acute pathology. Passed bedside dysphagia   - Klonopin at reduced dose 0.25 qhs.   - holding other   - c/w HD

## 2023-12-01 NOTE — PROGRESS NOTE ADULT - PROBLEM SELECTOR PLAN 5
POA w/ palpitation, SOB. EKG shows TWI in V1-V6, II, III, AVF and ST depressions in V4-V5. Peaked and downtrended 311->273, can be confounded by CKD/ESRD   Echo: no valvular dysfuction, EF 55-60%.   - c/w nitroglycerin patch 0.3  - f/u card recs

## 2023-12-01 NOTE — PROGRESS NOTE ADULT - PROBLEM SELECTOR PLAN 6
POA w/ palpitation, SOB. EKG shows TWI in V1-V6, II, III, AVF and ST depressions in V4-V5. Peaked and downtrended, can be confounded by CKD/ESRD  Echo: no valvular dysfunction, EF 55-60%.   - c/w nitroglycerin patch 0.3  - f/u card recs POA w/ palpitation, SOB. EKG shows TWI in V1-V6, II, III, AVF and ST depressions in V4-V5. Peaked and downtrended, can be confounded by CKD/ESRD  Echo: no valvular dysfunction, EF 55-60%.   - c/w nitroglycerin patch 0.3  - f/u card recs  - coreg increased to 12.5 q12h

## 2023-12-01 NOTE — PROGRESS NOTE ADULT - SUBJECTIVE AND OBJECTIVE BOX
O/N Events: NAEON    Subjective/ROS: Patient seen and examined at bedside. Patient states that she has some neck pain in the back of her neck. She states that she wants to work with PT more because she wants to be able to move around independently. States that she was upset about being woken up for AM labs.    Denies Fever/Chills, HA, CP, SOB, n/v, changes in bowel/urinary habits.  12pt ROS otherwise negative.    VITALS  Vital Signs Last 24 Hrs  T(C): 36.2 (01 Dec 2023 06:31), Max: 37 (30 Nov 2023 17:36)  T(F): 97.2 (01 Dec 2023 06:31), Max: 98.6 (30 Nov 2023 17:36)  HR: 76 (01 Dec 2023 06:31) (67 - 77)  BP: 141/78 (01 Dec 2023 06:31) (116/66 - 162/66)  BP(mean): --  RR: 18 (01 Dec 2023 06:31) (18 - 18)  SpO2: 95% (01 Dec 2023 06:31) (93% - 95%)    Parameters below as of 01 Dec 2023 09:28  Patient On (Oxygen Delivery Method): room air        CAPILLARY BLOOD GLUCOSE      POCT Blood Glucose.: 183 mg/dL (01 Dec 2023 09:11)  POCT Blood Glucose.: 194 mg/dL (30 Nov 2023 23:44)  POCT Blood Glucose.: 181 mg/dL (30 Nov 2023 22:12)  POCT Blood Glucose.: 165 mg/dL (30 Nov 2023 17:34)      PHYSICAL EXAM  General: NAD, lying comfortably in bed  Head: NC/AT; MMM; EOMI;  Neck: Supple; no JVD; catheter on right side of neck removed, no hematoma  Respiratory: CTAB; no wheezes/rales/rhonchi  Cardiovascular: Regular rhythm/rate; S1/S2+, no murmurs, rubs gallops   Gastrointestinal: Soft; NTND; bowel sounds normal and present  Extremities: WWP; no edema/cyanosis  Neurological: A&Ox3, CNII-XII grossly intact; no obvious focal deficits  Skin is a bronze color, small dime sized, shallow wound on back    Antimicrobials:  MEDICATIONS  (STANDING):      Standing Medications:  MEDICATIONS  (STANDING):  albuterol    90 MICROgram(s) HFA Inhaler 2 Puff(s) Inhalation daily  albuterol/ipratropium for Nebulization 3 milliLiter(s) Nebulizer every 6 hours  atorvastatin 40 milliGRAM(s) Oral at bedtime  calcium acetate 667 milliGRAM(s) Oral three times a day with meals  carvedilol 12.5 milliGRAM(s) Oral every 12 hours  chlorhexidine 2% Cloths 1 Application(s) Topical <User Schedule>  clonazePAM  Tablet 0.25 milliGRAM(s) Oral at bedtime  dextrose 5%. 1000 milliLiter(s) (50 mL/Hr) IV Continuous <Continuous>  dextrose 5%. 1000 milliLiter(s) (100 mL/Hr) IV Continuous <Continuous>  dextrose 50% Injectable 25 Gram(s) IV Push once  dextrose 50% Injectable 25 Gram(s) IV Push once  dextrose 50% Injectable 12.5 Gram(s) IV Push once  DULoxetine 30 milliGRAM(s) Oral every 24 hours  glucagon  Injectable 1 milliGRAM(s) IntraMuscular once  heparin   Injectable 5000 Unit(s) SubCutaneous every 8 hours  hydrALAZINE 50 milliGRAM(s) Oral three times a day  insulin lispro (ADMELOG) corrective regimen sliding scale   SubCutaneous every 6 hours  NIFEdipine XL 90 milliGRAM(s) Oral every 24 hours  nitroglycerin    Patch 0.3 mG/Hr(s) 1 patch Transdermal daily  pantoprazole    Tablet 40 milliGRAM(s) Oral before breakfast  polyethylene glycol 3350 17 Gram(s) Oral two times a day  PPD  5 Tuberculin Unit(s) Injectable 5 Unit(s) IntraDermal once  senna 2 Tablet(s) Oral at bedtime  traZODone 50 milliGRAM(s) Oral every 24 hours      PRN Medications:  MEDICATIONS  (PRN):  acetaminophen     Tablet .. 650 milliGRAM(s) Oral every 6 hours PRN Mild Pain (1 - 3)  dextrose Oral Gel 15 Gram(s) Oral once PRN Blood Glucose LESS THAN 70 milliGRAM(s)/deciliter      No Known Allergies      LABS                        9.2    10.46 )-----------( 242      ( 01 Dec 2023 06:02 )             28.5     12-01    143  |  104  |  56<H>  ----------------------------<  138<H>  3.5   |  24  |  3.75<H>    Ca    8.6      01 Dec 2023 06:02  Phos  5.5     12-01  Mg     2.0     12-01    TPro  6.0  /  Alb  3.1<L>  /  TBili  <0.2  /  DBili  x   /  AST  17  /  ALT  39  /  AlkPhos  108  12-01      Urinalysis Basic - ( 01 Dec 2023 06:02 )    Color: x / Appearance: x / SG: x / pH: x  Gluc: 138 mg/dL / Ketone: x  / Bili: x / Urobili: x   Blood: x / Protein: x / Nitrite: x   Leuk Esterase: x / RBC: x / WBC x   Sq Epi: x / Non Sq Epi: x / Bacteria: x              IMAGING/EKG/ETC

## 2023-12-01 NOTE — CHART NOTE - NSCHARTNOTEFT_GEN_A_CORE
Admitting Diagnosis:   Patient is a 73y old  Female who presents with a chief complaint of Shortness of breath (01 Dec 2023 06:34)      PAST MEDICAL & SURGICAL HISTORY:  Asthma    Depressive disorder    Hyperlipidemia    Type 2 diabetes mellitus    Essential hypertension    Local infection of skin and subcutaneous tissue  Toe infection    Type 2 diabetes mellitus with neurological manifestations  Neuropathy in diabetes    Cytomegalovirus infection not present  No significant past surgical history    S/P cholecystectomy          Current Nutrition Order: Consistent Carbohydrate, Renal        PO Intake: Good (%) [   ]  Fair (50-75%) [ x ] Poor (<25%) [   ]    GI Issues: denies n/v/d/c/abd pain, fecal incontinent, last BM 11/29 per EMR      Skin Integrity: no pressure injuries documented, noted with +2 generalized edema, anibal score 15    Labs:   12-01    143  |  104  |  56<H>  ----------------------------<  138<H>  3.5   |  24  |  3.75<H>    Ca    8.6      01 Dec 2023 06:02  Phos  5.5     12-01  Mg     2.0     12-01    TPro  6.0  /  Alb  3.1<L>  /  TBili  <0.2  /  DBili  x   /  AST  17  /  ALT  39  /  AlkPhos  108  12-01    CAPILLARY BLOOD GLUCOSE      POCT Blood Glucose.: 172 mg/dL (01 Dec 2023 13:12)  POCT Blood Glucose.: 183 mg/dL (01 Dec 2023 09:11)  POCT Blood Glucose.: 194 mg/dL (30 Nov 2023 23:44)  POCT Blood Glucose.: 181 mg/dL (30 Nov 2023 22:12)  POCT Blood Glucose.: 165 mg/dL (30 Nov 2023 17:34)      Medications:  MEDICATIONS  (STANDING):  albuterol    90 MICROgram(s) HFA Inhaler 2 Puff(s) Inhalation daily  albuterol/ipratropium for Nebulization 3 milliLiter(s) Nebulizer every 6 hours  atorvastatin 40 milliGRAM(s) Oral at bedtime  calcium acetate 667 milliGRAM(s) Oral three times a day with meals  carvedilol 12.5 milliGRAM(s) Oral every 12 hours  chlorhexidine 2% Cloths 1 Application(s) Topical <User Schedule>  clonazePAM  Tablet 0.25 milliGRAM(s) Oral at bedtime  dextrose 5%. 1000 milliLiter(s) (50 mL/Hr) IV Continuous <Continuous>  dextrose 5%. 1000 milliLiter(s) (100 mL/Hr) IV Continuous <Continuous>  dextrose 50% Injectable 25 Gram(s) IV Push once  dextrose 50% Injectable 12.5 Gram(s) IV Push once  dextrose 50% Injectable 25 Gram(s) IV Push once  DULoxetine 30 milliGRAM(s) Oral every 24 hours  glucagon  Injectable 1 milliGRAM(s) IntraMuscular once  heparin   Injectable 5000 Unit(s) SubCutaneous every 8 hours  hydrALAZINE 50 milliGRAM(s) Oral three times a day  insulin lispro (ADMELOG) corrective regimen sliding scale   SubCutaneous every 6 hours  NIFEdipine XL 90 milliGRAM(s) Oral every 24 hours  nitroglycerin    Patch 0.3 mG/Hr(s) 1 patch Transdermal daily  pantoprazole    Tablet 40 milliGRAM(s) Oral before breakfast  polyethylene glycol 3350 17 Gram(s) Oral two times a day  PPD  5 Tuberculin Unit(s) Injectable 5 Unit(s) IntraDermal once  senna 2 Tablet(s) Oral at bedtime  traZODone 50 milliGRAM(s) Oral every 24 hours    MEDICATIONS  (PRN):  acetaminophen     Tablet .. 650 milliGRAM(s) Oral every 6 hours PRN Mild Pain (1 - 3)  dextrose Oral Gel 15 Gram(s) Oral once PRN Blood Glucose LESS THAN 70 milliGRAM(s)/deciliter      Anthropometrics:  Dosing height: 61in  Dosing weight: 81.6kg/179.9#  BMI: 34  IBW: 105#            %IBW: 171%    Weight Change:   80.5kg/177.4# on 11/26  80.7kg/177.9# on 11/28  76.5kg/168.6# on 11/29   76.1kg/167.7# on 11/30     Nutrition Focused Physical Exam: Completed [ x ]  Not Pertinent [   ]  >>no overt signs of nutritional wasting    Estimated energy needs:   Weight used for calculations	IBW  Estimated Energy Needs Weight (lbs)	105.1 lb  Estimated Energy Needs Weight (kg)	47.7 kg  Estimated Energy Needs From (lupe/kg)	30  Estimated Energy Needs To (lupe/kg)	35  Estimated Energy Needs Calculated From (lupe/kg)	1431  Estimated Energy Needs Calculated To (lupe/kg)	1669  Weight used for calculations	IBW  Estimated Protein Needs Weight (lbs)	105.1 lb  Estimated Protein Needs Weight (kg)	47.7 kg  Estimated Protein Needs From (g/kg)	1.2  Estimated Protein Needs To (g/kg)	1.3  Estimated Protein Needs Calculated From (g/kg)	57.24  Estimated Protein Needs Calculated To (g/kg)	62.01  Other Calculations	Fluids per team. Estimated nutritional needs determined using [facility] Standards of Nutrition Care for HD using ideal body weight 47.7 kg (171%IBW).    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 Labs notable for Trop T notable for 311. . CKMB 6.8. Cr 4.38. BNP 62706. EKG reveals TWI in V1-V6, II, III, AVF and ST depressions in V4-V5.     Patient seen at bedside for follow up assessment. Now ordered for consistent carbohydrate renal diet. Endorses fair appetite and PO intake, however endorses intentionally trying to limit PO intake for weight loss. Patient states PTA she had been skipping breakfast to lose weight, and also began taking ozempic 4 months ago. Endorses UBW ~170-180#. Noted with fluctuating weights in-house likely related to fluid shifts/HD. Reviewed general healthful diet including importance of adequate protein and fiber intake for satiation, as well as dangers of skipping meals.  Provided Information on [place] [address] Outpatient Weight Loss Program. Outpatient contact provided for potential enrollment: [email or web address], [phone]. Also discussed renal diet recommendations including potassium and phosphorus restrictions. Patient receptive. Labs: K 3.4, Phos 4.8, BUN/Cr elevated, GFR 17, glucose trending 112-194 x 24 hours, HgbA1c 5.7%. Meds: bowel regimen, phoslo, protonix. RD to remain available for additional nutrition interventions as needed.     Previous Nutrition Diagnosis: Increased nutrient needs (energy/protein) related to HD as evidenced by increased metabolic demands for dialysis     Active [ x ]  Resolved [   ]    Goal: Pt to meet at least 75% of nutritional needs consistently, Pt to recall and verbalize at least 3 nutrition education points     Recommendations:  1. Recommend Renal diet   2. Encourage and monitor PO intake, honor preferences as able   3. Monitor labs, wt trends, GI function, and skin integrity   4. Pain and bowel regimen per team   5. RD to remain available for additional nutrition interventions and diet edu as needed     Education: Reviewed general healthful diet, renal diet recommendations, [facility] outpatient weight loss program     Risk Level: High [ x ] Moderate [   ] Low [   ]

## 2023-12-02 LAB
ANION GAP SERPL CALC-SCNC: 16 MMOL/L — SIGNIFICANT CHANGE UP (ref 5–17)
ANION GAP SERPL CALC-SCNC: 16 MMOL/L — SIGNIFICANT CHANGE UP (ref 5–17)
ANISOCYTOSIS BLD QL: SLIGHT — SIGNIFICANT CHANGE UP
ANISOCYTOSIS BLD QL: SLIGHT — SIGNIFICANT CHANGE UP
BASOPHILS # BLD AUTO: 0 K/UL — SIGNIFICANT CHANGE UP (ref 0–0.2)
BASOPHILS # BLD AUTO: 0 K/UL — SIGNIFICANT CHANGE UP (ref 0–0.2)
BASOPHILS NFR BLD AUTO: 0 % — SIGNIFICANT CHANGE UP (ref 0–2)
BASOPHILS NFR BLD AUTO: 0 % — SIGNIFICANT CHANGE UP (ref 0–2)
BUN SERPL-MCNC: 67 MG/DL — HIGH (ref 7–23)
BUN SERPL-MCNC: 67 MG/DL — HIGH (ref 7–23)
CALCIUM SERPL-MCNC: 9 MG/DL — SIGNIFICANT CHANGE UP (ref 8.4–10.5)
CALCIUM SERPL-MCNC: 9 MG/DL — SIGNIFICANT CHANGE UP (ref 8.4–10.5)
CHLORIDE SERPL-SCNC: 104 MMOL/L — SIGNIFICANT CHANGE UP (ref 96–108)
CHLORIDE SERPL-SCNC: 104 MMOL/L — SIGNIFICANT CHANGE UP (ref 96–108)
CO2 SERPL-SCNC: 22 MMOL/L — SIGNIFICANT CHANGE UP (ref 22–31)
CO2 SERPL-SCNC: 22 MMOL/L — SIGNIFICANT CHANGE UP (ref 22–31)
CREAT SERPL-MCNC: 4.43 MG/DL — HIGH (ref 0.5–1.3)
CREAT SERPL-MCNC: 4.43 MG/DL — HIGH (ref 0.5–1.3)
DACRYOCYTES BLD QL SMEAR: SLIGHT — SIGNIFICANT CHANGE UP
DACRYOCYTES BLD QL SMEAR: SLIGHT — SIGNIFICANT CHANGE UP
EGFR: 10 ML/MIN/1.73M2 — LOW
EGFR: 10 ML/MIN/1.73M2 — LOW
EOSINOPHIL # BLD AUTO: 0.12 K/UL — SIGNIFICANT CHANGE UP (ref 0–0.5)
EOSINOPHIL # BLD AUTO: 0.12 K/UL — SIGNIFICANT CHANGE UP (ref 0–0.5)
EOSINOPHIL NFR BLD AUTO: 0.9 % — SIGNIFICANT CHANGE UP (ref 0–6)
EOSINOPHIL NFR BLD AUTO: 0.9 % — SIGNIFICANT CHANGE UP (ref 0–6)
GLUCOSE BLDC GLUCOMTR-MCNC: 153 MG/DL — HIGH (ref 70–99)
GLUCOSE BLDC GLUCOMTR-MCNC: 153 MG/DL — HIGH (ref 70–99)
GLUCOSE BLDC GLUCOMTR-MCNC: 180 MG/DL — HIGH (ref 70–99)
GLUCOSE BLDC GLUCOMTR-MCNC: 180 MG/DL — HIGH (ref 70–99)
GLUCOSE BLDC GLUCOMTR-MCNC: 206 MG/DL — HIGH (ref 70–99)
GLUCOSE BLDC GLUCOMTR-MCNC: 206 MG/DL — HIGH (ref 70–99)
GLUCOSE BLDC GLUCOMTR-MCNC: 218 MG/DL — HIGH (ref 70–99)
GLUCOSE BLDC GLUCOMTR-MCNC: 218 MG/DL — HIGH (ref 70–99)
GLUCOSE BLDC GLUCOMTR-MCNC: 221 MG/DL — HIGH (ref 70–99)
GLUCOSE BLDC GLUCOMTR-MCNC: 221 MG/DL — HIGH (ref 70–99)
GLUCOSE SERPL-MCNC: 161 MG/DL — HIGH (ref 70–99)
GLUCOSE SERPL-MCNC: 161 MG/DL — HIGH (ref 70–99)
HCT VFR BLD CALC: 27.1 % — LOW (ref 34.5–45)
HCT VFR BLD CALC: 27.1 % — LOW (ref 34.5–45)
HGB BLD-MCNC: 9 G/DL — LOW (ref 11.5–15.5)
HGB BLD-MCNC: 9 G/DL — LOW (ref 11.5–15.5)
HYPOCHROMIA BLD QL: SLIGHT — SIGNIFICANT CHANGE UP
HYPOCHROMIA BLD QL: SLIGHT — SIGNIFICANT CHANGE UP
LYMPHOCYTES # BLD AUTO: 17.5 % — SIGNIFICANT CHANGE UP (ref 13–44)
LYMPHOCYTES # BLD AUTO: 17.5 % — SIGNIFICANT CHANGE UP (ref 13–44)
LYMPHOCYTES # BLD AUTO: 2.29 K/UL — SIGNIFICANT CHANGE UP (ref 1–3.3)
LYMPHOCYTES # BLD AUTO: 2.29 K/UL — SIGNIFICANT CHANGE UP (ref 1–3.3)
MAGNESIUM SERPL-MCNC: 2.1 MG/DL — SIGNIFICANT CHANGE UP (ref 1.6–2.6)
MAGNESIUM SERPL-MCNC: 2.1 MG/DL — SIGNIFICANT CHANGE UP (ref 1.6–2.6)
MANUAL SMEAR VERIFICATION: SIGNIFICANT CHANGE UP
MANUAL SMEAR VERIFICATION: SIGNIFICANT CHANGE UP
MCHC RBC-ENTMCNC: 29.6 PG — SIGNIFICANT CHANGE UP (ref 27–34)
MCHC RBC-ENTMCNC: 29.6 PG — SIGNIFICANT CHANGE UP (ref 27–34)
MCHC RBC-ENTMCNC: 33.2 GM/DL — SIGNIFICANT CHANGE UP (ref 32–36)
MCHC RBC-ENTMCNC: 33.2 GM/DL — SIGNIFICANT CHANGE UP (ref 32–36)
MCV RBC AUTO: 89.1 FL — SIGNIFICANT CHANGE UP (ref 80–100)
MCV RBC AUTO: 89.1 FL — SIGNIFICANT CHANGE UP (ref 80–100)
MICROCYTES BLD QL: SLIGHT — SIGNIFICANT CHANGE UP
MICROCYTES BLD QL: SLIGHT — SIGNIFICANT CHANGE UP
MONOCYTES # BLD AUTO: 1.15 K/UL — HIGH (ref 0–0.9)
MONOCYTES # BLD AUTO: 1.15 K/UL — HIGH (ref 0–0.9)
MONOCYTES NFR BLD AUTO: 8.8 % — SIGNIFICANT CHANGE UP (ref 2–14)
MONOCYTES NFR BLD AUTO: 8.8 % — SIGNIFICANT CHANGE UP (ref 2–14)
NEUTROPHILS # BLD AUTO: 9.54 K/UL — HIGH (ref 1.8–7.4)
NEUTROPHILS # BLD AUTO: 9.54 K/UL — HIGH (ref 1.8–7.4)
NEUTROPHILS NFR BLD AUTO: 72.8 % — SIGNIFICANT CHANGE UP (ref 43–77)
NEUTROPHILS NFR BLD AUTO: 72.8 % — SIGNIFICANT CHANGE UP (ref 43–77)
OVALOCYTES BLD QL SMEAR: SLIGHT — SIGNIFICANT CHANGE UP
OVALOCYTES BLD QL SMEAR: SLIGHT — SIGNIFICANT CHANGE UP
PHOSPHATE SERPL-MCNC: 6.1 MG/DL — HIGH (ref 2.5–4.5)
PHOSPHATE SERPL-MCNC: 6.1 MG/DL — HIGH (ref 2.5–4.5)
PLAT MORPH BLD: ABNORMAL
PLAT MORPH BLD: ABNORMAL
PLATELET # BLD AUTO: 239 K/UL — SIGNIFICANT CHANGE UP (ref 150–400)
PLATELET # BLD AUTO: 239 K/UL — SIGNIFICANT CHANGE UP (ref 150–400)
POLYCHROMASIA BLD QL SMEAR: SLIGHT — SIGNIFICANT CHANGE UP
POLYCHROMASIA BLD QL SMEAR: SLIGHT — SIGNIFICANT CHANGE UP
POTASSIUM SERPL-MCNC: 4.1 MMOL/L — SIGNIFICANT CHANGE UP (ref 3.5–5.3)
POTASSIUM SERPL-MCNC: 4.1 MMOL/L — SIGNIFICANT CHANGE UP (ref 3.5–5.3)
POTASSIUM SERPL-SCNC: 4.1 MMOL/L — SIGNIFICANT CHANGE UP (ref 3.5–5.3)
POTASSIUM SERPL-SCNC: 4.1 MMOL/L — SIGNIFICANT CHANGE UP (ref 3.5–5.3)
RBC # BLD: 3.04 M/UL — LOW (ref 3.8–5.2)
RBC # BLD: 3.04 M/UL — LOW (ref 3.8–5.2)
RBC # FLD: 13.1 % — SIGNIFICANT CHANGE UP (ref 10.3–14.5)
RBC # FLD: 13.1 % — SIGNIFICANT CHANGE UP (ref 10.3–14.5)
RBC BLD AUTO: ABNORMAL
RBC BLD AUTO: ABNORMAL
SODIUM SERPL-SCNC: 142 MMOL/L — SIGNIFICANT CHANGE UP (ref 135–145)
SODIUM SERPL-SCNC: 142 MMOL/L — SIGNIFICANT CHANGE UP (ref 135–145)
WBC # BLD: 13.1 K/UL — HIGH (ref 3.8–10.5)
WBC # BLD: 13.1 K/UL — HIGH (ref 3.8–10.5)
WBC # FLD AUTO: 13.1 K/UL — HIGH (ref 3.8–10.5)
WBC # FLD AUTO: 13.1 K/UL — HIGH (ref 3.8–10.5)

## 2023-12-02 PROCEDURE — 99233 SBSQ HOSP IP/OBS HIGH 50: CPT

## 2023-12-02 PROCEDURE — 99232 SBSQ HOSP IP/OBS MODERATE 35: CPT | Mod: GC

## 2023-12-02 RX ORDER — ERYTHROPOIETIN 10000 [IU]/ML
8000 INJECTION, SOLUTION INTRAVENOUS; SUBCUTANEOUS ONCE
Refills: 0 | Status: COMPLETED | OUTPATIENT
Start: 2023-12-02 | End: 2023-12-02

## 2023-12-02 RX ADMIN — Medication 4: at 18:05

## 2023-12-02 RX ADMIN — DULOXETINE HYDROCHLORIDE 30 MILLIGRAM(S): 30 CAPSULE, DELAYED RELEASE ORAL at 09:38

## 2023-12-02 RX ADMIN — Medication 650 MILLIGRAM(S): at 07:25

## 2023-12-02 RX ADMIN — ATORVASTATIN CALCIUM 40 MILLIGRAM(S): 80 TABLET, FILM COATED ORAL at 22:37

## 2023-12-02 RX ADMIN — Medication 3 MILLILITER(S): at 22:38

## 2023-12-02 RX ADMIN — Medication 1 PATCH: at 18:43

## 2023-12-02 RX ADMIN — Medication 50 MILLIGRAM(S): at 18:04

## 2023-12-02 RX ADMIN — POLYETHYLENE GLYCOL 3350 17 GRAM(S): 17 POWDER, FOR SOLUTION ORAL at 06:37

## 2023-12-02 RX ADMIN — CHLORHEXIDINE GLUCONATE 1 APPLICATION(S): 213 SOLUTION TOPICAL at 06:50

## 2023-12-02 RX ADMIN — Medication 90 MILLIGRAM(S): at 06:36

## 2023-12-02 RX ADMIN — CARVEDILOL PHOSPHATE 12.5 MILLIGRAM(S): 80 CAPSULE, EXTENDED RELEASE ORAL at 06:36

## 2023-12-02 RX ADMIN — Medication 3 MILLILITER(S): at 04:35

## 2023-12-02 RX ADMIN — Medication 100 MILLIGRAM(S): at 06:55

## 2023-12-02 RX ADMIN — POLYETHYLENE GLYCOL 3350 17 GRAM(S): 17 POWDER, FOR SOLUTION ORAL at 18:04

## 2023-12-02 RX ADMIN — HEPARIN SODIUM 5000 UNIT(S): 5000 INJECTION INTRAVENOUS; SUBCUTANEOUS at 22:38

## 2023-12-02 RX ADMIN — HEPARIN SODIUM 5000 UNIT(S): 5000 INJECTION INTRAVENOUS; SUBCUTANEOUS at 06:38

## 2023-12-02 RX ADMIN — SENNA PLUS 2 TABLET(S): 8.6 TABLET ORAL at 22:38

## 2023-12-02 RX ADMIN — Medication 50 MILLIGRAM(S): at 13:18

## 2023-12-02 RX ADMIN — Medication 2: at 07:18

## 2023-12-02 RX ADMIN — Medication 0.25 MILLIGRAM(S): at 22:37

## 2023-12-02 RX ADMIN — HEPARIN SODIUM 5000 UNIT(S): 5000 INJECTION INTRAVENOUS; SUBCUTANEOUS at 13:18

## 2023-12-02 RX ADMIN — ERYTHROPOIETIN 8000 UNIT(S): 10000 INJECTION, SOLUTION INTRAVENOUS; SUBCUTANEOUS at 12:00

## 2023-12-02 RX ADMIN — Medication 667 MILLIGRAM(S): at 18:05

## 2023-12-02 RX ADMIN — Medication 3 MILLILITER(S): at 15:55

## 2023-12-02 RX ADMIN — Medication 50 MILLIGRAM(S): at 06:37

## 2023-12-02 RX ADMIN — Medication 50 MILLIGRAM(S): at 22:37

## 2023-12-02 RX ADMIN — Medication 650 MILLIGRAM(S): at 06:46

## 2023-12-02 RX ADMIN — Medication 3 MILLILITER(S): at 09:39

## 2023-12-02 RX ADMIN — Medication 1 PATCH: at 13:16

## 2023-12-02 RX ADMIN — Medication 4: at 13:14

## 2023-12-02 RX ADMIN — Medication 667 MILLIGRAM(S): at 07:26

## 2023-12-02 RX ADMIN — Medication 667 MILLIGRAM(S): at 13:18

## 2023-12-02 RX ADMIN — CARVEDILOL PHOSPHATE 12.5 MILLIGRAM(S): 80 CAPSULE, EXTENDED RELEASE ORAL at 18:04

## 2023-12-02 RX ADMIN — PANTOPRAZOLE SODIUM 40 MILLIGRAM(S): 20 TABLET, DELAYED RELEASE ORAL at 06:47

## 2023-12-02 RX ADMIN — Medication 4: at 23:20

## 2023-12-02 NOTE — PROGRESS NOTE ADULT - SUBJECTIVE AND OBJECTIVE BOX
Seen and evaluated at the HD unit. Pte became anxious, asking for sleeping aid, wants to be disconnected from the machine.      Meds:    acetaminophen     Tablet .. 650 every 6 hours PRN  albuterol    90 MICROgram(s) HFA Inhaler 2 daily  albuterol/ipratropium for Nebulization 3 every 6 hours  atorvastatin 40 at bedtime  calcium acetate 667 three times a day with meals  carvedilol 12.5 every 12 hours  chlorhexidine 2% Cloths 1 <User Schedule>  clonazePAM  Tablet 0.25 at bedtime  dextrose 5%. 1000 <Continuous>  dextrose 5%. 1000 <Continuous>  dextrose 50% Injectable 25 once  dextrose 50% Injectable 12.5 once  dextrose 50% Injectable 25 once  dextrose Oral Gel 15 once PRN  DULoxetine 30 every 24 hours  glucagon  Injectable 1 once  heparin   Injectable 5000 every 8 hours  hydrALAZINE 50 three times a day  insulin lispro (ADMELOG) corrective regimen sliding scale  every 6 hours  NIFEdipine XL 90 every 24 hours  nitroglycerin    Patch 0.3 mG/Hr(s) 1 daily  pantoprazole    Tablet 40 before breakfast  polyethylene glycol 3350 17 two times a day  PPD  5 Tuberculin Unit(s) Injectable 5 once  senna 2 at bedtime  traZODone 50 every 24 hours      T(C): , Max: 37 (11-30-23 @ 17:36)  T(F): , Max: 98.6 (11-30-23 @ 17:36)  HR: 82 (12-01-23 @ 14:00)  BP: 130/70 (12-01-23 @ 14:00)  BP(mean): --  RR: 18 (12-01-23 @ 14:00)  SpO2: 96% (12-01-23 @ 14:00)  Wt(kg): --    11-30 @ 07:01  -  12-01 @ 07:00  --------------------------------------------------------  IN: 0 mL / OUT: 2000 mL / NET: -2000 mL          Review of Systems:  ROS negative except as per HPI      PHYSICAL EXAM:  GENERAL: NAD  NECK: supple, No JVD  CHEST/LUNG: Clear to auscultation bilaterally  HEART: normal S1S2, RRR  ABDOMEN: Soft, Nontender, +BS,    EXTREMITIES: No clubbing, cyanosis, or edema   NEUROLOGY: AAO x3, no focal neurological deficit  ACCESS: Merged with [facility]    LABS:                        9.2    10.46 )-----------( 242      ( 01 Dec 2023 06:02 )             28.5     12-01    143  |  104  |  56<H>  ----------------------------<  138<H>  3.5   |  24  |  3.75<H>    Ca    8.6      01 Dec 2023 06:02  Phos  5.5     12-01  Mg     2.0     12-01    TPro  6.0  /  Alb  3.1<L>  /  TBili  <0.2  /  DBili  x   /  AST  17  /  ALT  39  /  AlkPhos  108  12-01    Hepatitis B Surface Antibody: Nonreact (11-30 @ 16:34)  Hepatitis C Virus S/CO Ratio: 0.04 S/CO (11-30 @ 16:34)      Urinalysis Basic - ( 01 Dec 2023 06:02 )    Color: x / Appearance: x / SG: x / pH: x  Gluc: 138 mg/dL / Ketone: x  / Bili: x / Urobili: x   Blood: x / Protein: x / Nitrite: x   Leuk Esterase: x / RBC: x / WBC x   Sq Epi: x / Non Sq Epi: x / Bacteria: x            RADIOLOGY & ADDITIONAL STUDIES:

## 2023-12-02 NOTE — PROGRESS NOTE ADULT - ASSESSMENT
73YF now ESRD initially admitted for SOB and ischemic w/u h/c c/b metabolic encephalopathy , acidosis, hyperkalmeia, upgraded to MICU for further management, now clinically stable  Seen and evaluated at the HD unit. Pte became anxious, asking for sleeping aid, wants to be disconnected from the machine.      Tolerated 2h of HD today with the following parameters:   Hemodialysis Treatment.:     Schedule: Once, Modality: Hemodialysis, Access: Internal Jugular Central Venous Catheter    Dialyzer: Optiflux [id number], Time: 180 Min    Blood Flow: 400 mL/Min , Dialysate Flow: 500 mL/Min, Dialysate Temp: 36.5, Tubinmm (Adult)    Target Fluid Removal: 2 Liters    Dialysate Electrolytes (mEq/L): Potassium 2, Calcium 2.5, Sodium 138, Bicarbonate 35 (23 @ 08:49) [Completed]      ESRD on HD, currently on TTS schedule:   HD today  Next HD tomorrow 12/5  Renal diet   Daily BMP   Outpatient HD set up in progress     Access:  R TDC functional     HTN:  UF with HD as tolerated   Hold BP meds on HD days to achieve optimal UF, can resume post HD if BP >140/80    Anemia:  Likely 2/2 ESRD   EPO with HD, no indication for IV iron at this time (iron sat 38%)    MBD:  Calcium: 8.6  Phos: 5.5  If phos persistently >5.5 can start on phos binders   73YF now ESRD initially admitted for SOB and ischemic w/u h/c c/b metabolic encephalopathy , acidosis, hyperkalmeia, upgraded to MICU for further management, now clinically stable  Seen and evaluated at the HD unit. Pte became anxious, asking for sleeping aid, wants to be disconnected from the machine.      Tolerated 2h of HD today with the following parameters:   Hemodialysis Treatment.:     Schedule: Once, Modality: Hemodialysis, Access: Internal Jugular Central Venous Catheter    Dialyzer: Optiflux [id number], Time: 180 Min    Blood Flow: 400 mL/Min , Dialysate Flow: 500 mL/Min, Dialysate Temp: 36.5, Tubinmm (Adult)    Target Fluid Removal: 2 Liters    Dialysate Electrolytes (mEq/L): Potassium 2, Calcium 2.5, Sodium 138, Bicarbonate 35 (23 @ 08:49) [Completed]      ESRD on HD, currently on TTS schedule:   HD today  Next HD tomorrow 12/5  Renal diet   Daily BMP   Outpatient HD set up in progress c v?v 0r  Anemia:  Likely 2/2 ESRD   EPO with HD, no indication for IV iron at this time (iron sat 38%)    Patient must have psych eval, depression and anxiety now affecting medical interventions such as dialysis    MBD:  Calcium: 8.6  Phos: 5.5  If phos persistently >5.5 can start on phos binders

## 2023-12-02 NOTE — PROGRESS NOTE ADULT - PROBLEM SELECTOR PLAN 6
POA w/ palpitation, SOB. EKG shows TWI in V1-V6, II, III, AVF and ST depressions in V4-V5. Peaked and downtrended, can be confounded by CKD/ESRD  Echo: no valvular dysfunction, EF 55-60%.   - c/w nitroglycerin patch 0.3  - f/u card recs  - coreg increased to 12.5 q12h

## 2023-12-02 NOTE — PROGRESS NOTE ADULT - ATTENDING COMMENTS
I agree with the fellow's findings and plans as written above with the following additions/amendments:    Seen and examined at bedside and on HD, tolerating well but then had anxiety and needed to come off. Patient should have psych eval, patient willing. Further recs as above

## 2023-12-02 NOTE — PROGRESS NOTE ADULT - PROBLEM SELECTOR PLAN 4
new LLL opacity seen on Xray 11/27. Febrile on 11/28. . Aspiration vs. atelectasis. on CTX 2g. pt continues to be afebrile with downtrending WBC.   s/p CTX for total 5 days (11/26-12/1)

## 2023-12-02 NOTE — PROGRESS NOTE ADULT - PROBLEM SELECTOR PLAN 1
RESOLVED. [facility] metabolic encephalopathy 2/2 uremia vs. acidosis, requiring intubation in MICU. mental status improved s/p CVVHD with uremia and acidosis resolved. Now AOx3 with appropriate affect. CTH non cont 11/28 shows microvascular disease with lacunar infarcts. No acute pathology. Passed bedside dysphagia   - Klonopin at reduced dose 0.25 qhs.   - holding other   - c/w HD

## 2023-12-03 LAB
ANION GAP SERPL CALC-SCNC: 15 MMOL/L — SIGNIFICANT CHANGE UP (ref 5–17)
ANION GAP SERPL CALC-SCNC: 15 MMOL/L — SIGNIFICANT CHANGE UP (ref 5–17)
BASOPHILS # BLD AUTO: 0.05 K/UL — SIGNIFICANT CHANGE UP (ref 0–0.2)
BASOPHILS # BLD AUTO: 0.05 K/UL — SIGNIFICANT CHANGE UP (ref 0–0.2)
BASOPHILS NFR BLD AUTO: 0.4 % — SIGNIFICANT CHANGE UP (ref 0–2)
BASOPHILS NFR BLD AUTO: 0.4 % — SIGNIFICANT CHANGE UP (ref 0–2)
BUN SERPL-MCNC: 44 MG/DL — HIGH (ref 7–23)
BUN SERPL-MCNC: 44 MG/DL — HIGH (ref 7–23)
CALCIUM SERPL-MCNC: 8.5 MG/DL — SIGNIFICANT CHANGE UP (ref 8.4–10.5)
CALCIUM SERPL-MCNC: 8.5 MG/DL — SIGNIFICANT CHANGE UP (ref 8.4–10.5)
CHLORIDE SERPL-SCNC: 98 MMOL/L — SIGNIFICANT CHANGE UP (ref 96–108)
CHLORIDE SERPL-SCNC: 98 MMOL/L — SIGNIFICANT CHANGE UP (ref 96–108)
CO2 SERPL-SCNC: 26 MMOL/L — SIGNIFICANT CHANGE UP (ref 22–31)
CO2 SERPL-SCNC: 26 MMOL/L — SIGNIFICANT CHANGE UP (ref 22–31)
CREAT SERPL-MCNC: 3.64 MG/DL — HIGH (ref 0.5–1.3)
CREAT SERPL-MCNC: 3.64 MG/DL — HIGH (ref 0.5–1.3)
EGFR: 13 ML/MIN/1.73M2 — LOW
EGFR: 13 ML/MIN/1.73M2 — LOW
EOSINOPHIL # BLD AUTO: 0.17 K/UL — SIGNIFICANT CHANGE UP (ref 0–0.5)
EOSINOPHIL # BLD AUTO: 0.17 K/UL — SIGNIFICANT CHANGE UP (ref 0–0.5)
EOSINOPHIL NFR BLD AUTO: 1.2 % — SIGNIFICANT CHANGE UP (ref 0–6)
EOSINOPHIL NFR BLD AUTO: 1.2 % — SIGNIFICANT CHANGE UP (ref 0–6)
GLUCOSE BLDC GLUCOMTR-MCNC: 149 MG/DL — HIGH (ref 70–99)
GLUCOSE BLDC GLUCOMTR-MCNC: 149 MG/DL — HIGH (ref 70–99)
GLUCOSE BLDC GLUCOMTR-MCNC: 163 MG/DL — HIGH (ref 70–99)
GLUCOSE BLDC GLUCOMTR-MCNC: 163 MG/DL — HIGH (ref 70–99)
GLUCOSE BLDC GLUCOMTR-MCNC: 175 MG/DL — HIGH (ref 70–99)
GLUCOSE BLDC GLUCOMTR-MCNC: 175 MG/DL — HIGH (ref 70–99)
GLUCOSE BLDC GLUCOMTR-MCNC: 187 MG/DL — HIGH (ref 70–99)
GLUCOSE BLDC GLUCOMTR-MCNC: 187 MG/DL — HIGH (ref 70–99)
GLUCOSE BLDC GLUCOMTR-MCNC: 300 MG/DL — HIGH (ref 70–99)
GLUCOSE BLDC GLUCOMTR-MCNC: 300 MG/DL — HIGH (ref 70–99)
GLUCOSE SERPL-MCNC: 159 MG/DL — HIGH (ref 70–99)
GLUCOSE SERPL-MCNC: 159 MG/DL — HIGH (ref 70–99)
HCT VFR BLD CALC: 27 % — LOW (ref 34.5–45)
HCT VFR BLD CALC: 27 % — LOW (ref 34.5–45)
HGB BLD-MCNC: 8.6 G/DL — LOW (ref 11.5–15.5)
HGB BLD-MCNC: 8.6 G/DL — LOW (ref 11.5–15.5)
HIV 1+2 AB+HIV1 P24 AG SERPL QL IA: SIGNIFICANT CHANGE UP
HIV 1+2 AB+HIV1 P24 AG SERPL QL IA: SIGNIFICANT CHANGE UP
IMM GRANULOCYTES NFR BLD AUTO: 1.2 % — HIGH (ref 0–0.9)
IMM GRANULOCYTES NFR BLD AUTO: 1.2 % — HIGH (ref 0–0.9)
LYMPHOCYTES # BLD AUTO: 2.99 K/UL — SIGNIFICANT CHANGE UP (ref 1–3.3)
LYMPHOCYTES # BLD AUTO: 2.99 K/UL — SIGNIFICANT CHANGE UP (ref 1–3.3)
LYMPHOCYTES # BLD AUTO: 21.8 % — SIGNIFICANT CHANGE UP (ref 13–44)
LYMPHOCYTES # BLD AUTO: 21.8 % — SIGNIFICANT CHANGE UP (ref 13–44)
MAGNESIUM SERPL-MCNC: 1.9 MG/DL — SIGNIFICANT CHANGE UP (ref 1.6–2.6)
MAGNESIUM SERPL-MCNC: 1.9 MG/DL — SIGNIFICANT CHANGE UP (ref 1.6–2.6)
MCHC RBC-ENTMCNC: 29.1 PG — SIGNIFICANT CHANGE UP (ref 27–34)
MCHC RBC-ENTMCNC: 29.1 PG — SIGNIFICANT CHANGE UP (ref 27–34)
MCHC RBC-ENTMCNC: 31.9 GM/DL — LOW (ref 32–36)
MCHC RBC-ENTMCNC: 31.9 GM/DL — LOW (ref 32–36)
MCV RBC AUTO: 91.2 FL — SIGNIFICANT CHANGE UP (ref 80–100)
MCV RBC AUTO: 91.2 FL — SIGNIFICANT CHANGE UP (ref 80–100)
MONOCYTES # BLD AUTO: 1.64 K/UL — HIGH (ref 0–0.9)
MONOCYTES # BLD AUTO: 1.64 K/UL — HIGH (ref 0–0.9)
MONOCYTES NFR BLD AUTO: 12 % — SIGNIFICANT CHANGE UP (ref 2–14)
MONOCYTES NFR BLD AUTO: 12 % — SIGNIFICANT CHANGE UP (ref 2–14)
NEUTROPHILS # BLD AUTO: 8.7 K/UL — HIGH (ref 1.8–7.4)
NEUTROPHILS # BLD AUTO: 8.7 K/UL — HIGH (ref 1.8–7.4)
NEUTROPHILS NFR BLD AUTO: 63.4 % — SIGNIFICANT CHANGE UP (ref 43–77)
NEUTROPHILS NFR BLD AUTO: 63.4 % — SIGNIFICANT CHANGE UP (ref 43–77)
NRBC # BLD: 0 /100 WBCS — SIGNIFICANT CHANGE UP (ref 0–0)
NRBC # BLD: 0 /100 WBCS — SIGNIFICANT CHANGE UP (ref 0–0)
PHOSPHATE SERPL-MCNC: 5.4 MG/DL — HIGH (ref 2.5–4.5)
PHOSPHATE SERPL-MCNC: 5.4 MG/DL — HIGH (ref 2.5–4.5)
PLATELET # BLD AUTO: 223 K/UL — SIGNIFICANT CHANGE UP (ref 150–400)
PLATELET # BLD AUTO: 223 K/UL — SIGNIFICANT CHANGE UP (ref 150–400)
POTASSIUM SERPL-MCNC: 3.9 MMOL/L — SIGNIFICANT CHANGE UP (ref 3.5–5.3)
POTASSIUM SERPL-MCNC: 3.9 MMOL/L — SIGNIFICANT CHANGE UP (ref 3.5–5.3)
POTASSIUM SERPL-SCNC: 3.9 MMOL/L — SIGNIFICANT CHANGE UP (ref 3.5–5.3)
POTASSIUM SERPL-SCNC: 3.9 MMOL/L — SIGNIFICANT CHANGE UP (ref 3.5–5.3)
RBC # BLD: 2.96 M/UL — LOW (ref 3.8–5.2)
RBC # BLD: 2.96 M/UL — LOW (ref 3.8–5.2)
RBC # FLD: 13.2 % — SIGNIFICANT CHANGE UP (ref 10.3–14.5)
RBC # FLD: 13.2 % — SIGNIFICANT CHANGE UP (ref 10.3–14.5)
SODIUM SERPL-SCNC: 139 MMOL/L — SIGNIFICANT CHANGE UP (ref 135–145)
SODIUM SERPL-SCNC: 139 MMOL/L — SIGNIFICANT CHANGE UP (ref 135–145)
WBC # BLD: 13.71 K/UL — HIGH (ref 3.8–10.5)
WBC # BLD: 13.71 K/UL — HIGH (ref 3.8–10.5)
WBC # FLD AUTO: 13.71 K/UL — HIGH (ref 3.8–10.5)
WBC # FLD AUTO: 13.71 K/UL — HIGH (ref 3.8–10.5)

## 2023-12-03 PROCEDURE — 99232 SBSQ HOSP IP/OBS MODERATE 35: CPT | Mod: GC

## 2023-12-03 RX ADMIN — Medication 667 MILLIGRAM(S): at 13:21

## 2023-12-03 RX ADMIN — Medication 50 MILLIGRAM(S): at 17:31

## 2023-12-03 RX ADMIN — HEPARIN SODIUM 5000 UNIT(S): 5000 INJECTION INTRAVENOUS; SUBCUTANEOUS at 22:17

## 2023-12-03 RX ADMIN — Medication 90 MILLIGRAM(S): at 06:01

## 2023-12-03 RX ADMIN — Medication 3 MILLILITER(S): at 11:22

## 2023-12-03 RX ADMIN — CARVEDILOL PHOSPHATE 12.5 MILLIGRAM(S): 80 CAPSULE, EXTENDED RELEASE ORAL at 17:31

## 2023-12-03 RX ADMIN — Medication 2: at 18:33

## 2023-12-03 RX ADMIN — POLYETHYLENE GLYCOL 3350 17 GRAM(S): 17 POWDER, FOR SOLUTION ORAL at 06:01

## 2023-12-03 RX ADMIN — Medication 1 PATCH: at 11:22

## 2023-12-03 RX ADMIN — Medication 100 MILLIGRAM(S): at 07:36

## 2023-12-03 RX ADMIN — Medication 1 PATCH: at 18:38

## 2023-12-03 RX ADMIN — Medication 6: at 13:21

## 2023-12-03 RX ADMIN — Medication 100 MILLIGRAM(S): at 22:16

## 2023-12-03 RX ADMIN — DULOXETINE HYDROCHLORIDE 30 MILLIGRAM(S): 30 CAPSULE, DELAYED RELEASE ORAL at 09:31

## 2023-12-03 RX ADMIN — Medication 0.25 MILLIGRAM(S): at 22:17

## 2023-12-03 RX ADMIN — Medication 50 MILLIGRAM(S): at 22:17

## 2023-12-03 RX ADMIN — CARVEDILOL PHOSPHATE 12.5 MILLIGRAM(S): 80 CAPSULE, EXTENDED RELEASE ORAL at 06:01

## 2023-12-03 RX ADMIN — Medication 3 MILLILITER(S): at 17:31

## 2023-12-03 RX ADMIN — Medication 50 MILLIGRAM(S): at 13:22

## 2023-12-03 RX ADMIN — HEPARIN SODIUM 5000 UNIT(S): 5000 INJECTION INTRAVENOUS; SUBCUTANEOUS at 13:22

## 2023-12-03 RX ADMIN — Medication 50 MILLIGRAM(S): at 06:01

## 2023-12-03 RX ADMIN — Medication 3 MILLILITER(S): at 07:36

## 2023-12-03 RX ADMIN — Medication 100 MILLIGRAM(S): at 13:21

## 2023-12-03 RX ADMIN — HEPARIN SODIUM 5000 UNIT(S): 5000 INJECTION INTRAVENOUS; SUBCUTANEOUS at 06:00

## 2023-12-03 RX ADMIN — Medication 667 MILLIGRAM(S): at 18:33

## 2023-12-03 RX ADMIN — PANTOPRAZOLE SODIUM 40 MILLIGRAM(S): 20 TABLET, DELAYED RELEASE ORAL at 07:36

## 2023-12-03 RX ADMIN — Medication 667 MILLIGRAM(S): at 08:26

## 2023-12-03 RX ADMIN — CHLORHEXIDINE GLUCONATE 1 APPLICATION(S): 213 SOLUTION TOPICAL at 07:37

## 2023-12-03 RX ADMIN — ATORVASTATIN CALCIUM 40 MILLIGRAM(S): 80 TABLET, FILM COATED ORAL at 22:17

## 2023-12-03 NOTE — PROGRESS NOTE ADULT - ATTENDING COMMENTS
73F with PMH HTN, HLD, DM with OM s/p multiple amputations of toes; CAD with stress test planned for 11/28; CKD5 presenting with dyspnea, admitted for ischemic w/u, ELBERT on CKD5, stepped up to MICU for [facility] metabolic encephalopathy 2/2 uremia vs. acidosis requiring intubation; now extubated, improving s/p CVVHD but course c/b hypertensive urgency. Mental status resolved. Medically stable to step down to [facility].    #Metabolic encephalopathy POA now resolved   #ESRD on HD  #ACD  #Major depression   #Aspiration PNA     Plan:   -Tunneled HD catheter placed by IR. Will c/w HD.  -Hold BP meds before HD    Dispo: ANT pending acceptances.

## 2023-12-03 NOTE — PROGRESS NOTE ADULT - SUBJECTIVE AND OBJECTIVE BOX
**INCOMPLETE NOTE    OVERNIGHT EVENTS:    SUBJECTIVE:  Patient seen and examined at bedside.    Vital Signs Last 12 Hrs  T(F): 99.4 (12-03-23 @ 05:35), Max: 99.4 (12-03-23 @ 05:35)  HR: 77 (12-03-23 @ 05:35) (76 - 77)  BP: 115/67 (12-03-23 @ 05:35) (115/67 - 163/60)  BP(mean): --  RR: 18 (12-03-23 @ 05:35) (18 - 18)  SpO2: 94% (12-03-23 @ 05:35) (94% - 94%)  I&O's Summary    02 Dec 2023 07:01  -  03 Dec 2023 06:43  --------------------------------------------------------  IN: 0 mL / OUT: 700 mL / NET: -700 mL        PHYSICAL EXAM:  Constitutional: NAD, comfortable in bed.  HEENT: NC/AT, PERRLA, EOMI, no conjunctival pallor or scleral icterus, MMM  Neck: Supple, no JVD  Respiratory: CTA B/L. No w/r/r.   Cardiovascular: RRR, normal S1 and S2, no m/r/g.   Gastrointestinal: +BS, soft NTND, no guarding or rebound tenderness, no palpable masses   Extremities: wwp; no cyanosis, clubbing or edema.   Vascular: Pulses equal and strong throughout.   Neurological: AAOx3, no CN deficits, strength and sensation intact throughout.   Skin: No gross skin abnormalities or rashes        LABS:                        9.0    13.10 )-----------( 239      ( 02 Dec 2023 07:43 )             27.1     12-02    142  |  104  |  67<H>  ----------------------------<  161<H>  4.1   |  22  |  4.43<H>    Ca    9.0      02 Dec 2023 07:43  Phos  6.1     12-02  Mg     2.1     12-02        Urinalysis Basic - ( 02 Dec 2023 07:43 )    Color: x / Appearance: x / SG: x / pH: x  Gluc: 161 mg/dL / Ketone: x  / Bili: x / Urobili: x   Blood: x / Protein: x / Nitrite: x   Leuk Esterase: x / RBC: x / WBC x   Sq Epi: x / Non Sq Epi: x / Bacteria: x          RADIOLOGY & ADDITIONAL TESTS:    MEDICATIONS  (STANDING):  albuterol    90 MICROgram(s) HFA Inhaler 2 Puff(s) Inhalation daily  albuterol/ipratropium for Nebulization 3 milliLiter(s) Nebulizer every 6 hours  atorvastatin 40 milliGRAM(s) Oral at bedtime  benzonatate 100 milliGRAM(s) Oral three times a day  calcium acetate 667 milliGRAM(s) Oral three times a day with meals  carvedilol 12.5 milliGRAM(s) Oral every 12 hours  chlorhexidine 2% Cloths 1 Application(s) Topical <User Schedule>  clonazePAM  Tablet 0.25 milliGRAM(s) Oral at bedtime  dextrose 5%. 1000 milliLiter(s) (50 mL/Hr) IV Continuous <Continuous>  dextrose 5%. 1000 milliLiter(s) (100 mL/Hr) IV Continuous <Continuous>  dextrose 50% Injectable 25 Gram(s) IV Push once  dextrose 50% Injectable 12.5 Gram(s) IV Push once  dextrose 50% Injectable 25 Gram(s) IV Push once  DULoxetine 30 milliGRAM(s) Oral every 24 hours  glucagon  Injectable 1 milliGRAM(s) IntraMuscular once  heparin   Injectable 5000 Unit(s) SubCutaneous every 8 hours  hydrALAZINE 50 milliGRAM(s) Oral three times a day  insulin lispro (ADMELOG) corrective regimen sliding scale   SubCutaneous every 6 hours  NIFEdipine XL 90 milliGRAM(s) Oral every 24 hours  nitroglycerin    Patch 0.3 mG/Hr(s) 1 patch Transdermal daily  pantoprazole    Tablet 40 milliGRAM(s) Oral before breakfast  polyethylene glycol 3350 17 Gram(s) Oral two times a day  PPD  5 Tuberculin Unit(s) Injectable 5 Unit(s) IntraDermal once  senna 2 Tablet(s) Oral at bedtime  traZODone 50 milliGRAM(s) Oral every 24 hours    MEDICATIONS  (PRN):  acetaminophen     Tablet .. 650 milliGRAM(s) Oral every 6 hours PRN Mild Pain (1 - 3)  dextrose Oral Gel 15 Gram(s) Oral once PRN Blood Glucose LESS THAN 70 milliGRAM(s)/deciliter   Strong peripheral pulses OVERNIGHT EVENTS: NAEO    SUBJECTIVE: Patient seen and examined at bedside. Denies CP, SOB, abdominal pain.     Vital Signs Last 12 Hrs  T(F): 99.4 (12-03-23 @ 05:35), Max: 99.4 (12-03-23 @ 05:35)  HR: 77 (12-03-23 @ 05:35) (76 - 77)  BP: 115/67 (12-03-23 @ 05:35) (115/67 - 163/60)  BP(mean): --  RR: 18 (12-03-23 @ 05:35) (18 - 18)  SpO2: 94% (12-03-23 @ 05:35) (94% - 94%)  I&O's Summary    02 Dec 2023 07:01  -  03 Dec 2023 06:43  --------------------------------------------------------  IN: 0 mL / OUT: 700 mL / NET: -700 mL        PHYSICAL EXAM:  Constitutional: NAD, comfortable in bed.  HEENT: NC/AT, PERRLA, EOMI, MMM  Neck: Supple, no JVD  Respiratory: CTA B/L. No w/r/r.   Cardiovascular: RRR, normal S1 and S2, no m/r/g.   Gastrointestinal: +BS, soft NTND, no guarding or rebound tenderness  Extremities: wwp; no cyanosis, clubbing or edema.   Vascular: Pulses equal and strong throughout.   Neurological: AAOx3, no CN deficits, strength and sensation intact throughout.   Skin: No gross skin abnormalities or rashes, R chest catheter in place         LABS:                        9.0    13.10 )-----------( 239      ( 02 Dec 2023 07:43 )             27.1     12-02    142  |  104  |  67<H>  ----------------------------<  161<H>  4.1   |  22  |  4.43<H>    Ca    9.0      02 Dec 2023 07:43  Phos  6.1     12-02  Mg     2.1     12-02        Urinalysis Basic - ( 02 Dec 2023 07:43 )    Color: x / Appearance: x / SG: x / pH: x  Gluc: 161 mg/dL / Ketone: x  / Bili: x / Urobili: x   Blood: x / Protein: x / Nitrite: x   Leuk Esterase: x / RBC: x / WBC x   Sq Epi: x / Non Sq Epi: x / Bacteria: x          RADIOLOGY & ADDITIONAL TESTS:    MEDICATIONS  (STANDING):  albuterol    90 MICROgram(s) HFA Inhaler 2 Puff(s) Inhalation daily  albuterol/ipratropium for Nebulization 3 milliLiter(s) Nebulizer every 6 hours  atorvastatin 40 milliGRAM(s) Oral at bedtime  benzonatate 100 milliGRAM(s) Oral three times a day  calcium acetate 667 milliGRAM(s) Oral three times a day with meals  carvedilol 12.5 milliGRAM(s) Oral every 12 hours  chlorhexidine 2% Cloths 1 Application(s) Topical <User Schedule>  clonazePAM  Tablet 0.25 milliGRAM(s) Oral at bedtime  dextrose 5%. 1000 milliLiter(s) (50 mL/Hr) IV Continuous <Continuous>  dextrose 5%. 1000 milliLiter(s) (100 mL/Hr) IV Continuous <Continuous>  dextrose 50% Injectable 25 Gram(s) IV Push once  dextrose 50% Injectable 12.5 Gram(s) IV Push once  dextrose 50% Injectable 25 Gram(s) IV Push once  DULoxetine 30 milliGRAM(s) Oral every 24 hours  glucagon  Injectable 1 milliGRAM(s) IntraMuscular once  heparin   Injectable 5000 Unit(s) SubCutaneous every 8 hours  hydrALAZINE 50 milliGRAM(s) Oral three times a day  insulin lispro (ADMELOG) corrective regimen sliding scale   SubCutaneous every 6 hours  NIFEdipine XL 90 milliGRAM(s) Oral every 24 hours  nitroglycerin    Patch 0.3 mG/Hr(s) 1 patch Transdermal daily  pantoprazole    Tablet 40 milliGRAM(s) Oral before breakfast  polyethylene glycol 3350 17 Gram(s) Oral two times a day  PPD  5 Tuberculin Unit(s) Injectable 5 Unit(s) IntraDermal once  senna 2 Tablet(s) Oral at bedtime  traZODone 50 milliGRAM(s) Oral every 24 hours    MEDICATIONS  (PRN):  acetaminophen     Tablet .. 650 milliGRAM(s) Oral every 6 hours PRN Mild Pain (1 - 3)  dextrose Oral Gel 15 Gram(s) Oral once PRN Blood Glucose LESS THAN 70 milliGRAM(s)/deciliter

## 2023-12-03 NOTE — PROGRESS NOTE ADULT - PROBLEM SELECTOR PLAN 1
RESOLVED. [facility] metabolic encephalopathy 2/2 uremia vs. acidosis, requiring intubation in MICU. mental status improved s/p CVVHD with uremia and acidosis resolved. Now AOx3 with appropriate affect. CTH non cont 11/28 shows microvascular disease with lacunar infarcts. No acute pathology. Passed bedside dysphagia   - Klonopin at reduced dose 0.25 qhs.   - holding other   - c/w HD RESOLVED. [facility] metabolic encephalopathy 2/2 uremia vs. acidosis, requiring intubation in MICU. mental status improved s/p CVVHD with uremia and acidosis resolved. Now AOx3 with appropriate affect. CTH non cont 11/28 shows microvascular disease with lacunar infarcts. No acute pathology. Passed bedside dysphagia   - Klonopin at reduced dose 0.25 qhs.   - holding other   - c/w HD RESOLVED. [facility] metabolic encephalopathy 2/2 uremia vs. acidosis, requiring intubation in MICU. mental status improved s/p CVVHD with uremia and acidosis resolved. Now AOx3 with appropriate affect. CTH non cont 11/28 shows microvascular disease with lacunar infarcts. No acute pathology. Passed bedside dysphagia   - Klonopin at reduced dose 0.25 qhs.   - holding other   - c/w HD

## 2023-12-04 LAB
ANION GAP SERPL CALC-SCNC: 13 MMOL/L — SIGNIFICANT CHANGE UP (ref 5–17)
ANION GAP SERPL CALC-SCNC: 13 MMOL/L — SIGNIFICANT CHANGE UP (ref 5–17)
BASOPHILS # BLD AUTO: 0.06 K/UL — SIGNIFICANT CHANGE UP (ref 0–0.2)
BASOPHILS # BLD AUTO: 0.06 K/UL — SIGNIFICANT CHANGE UP (ref 0–0.2)
BASOPHILS NFR BLD AUTO: 0.4 % — SIGNIFICANT CHANGE UP (ref 0–2)
BASOPHILS NFR BLD AUTO: 0.4 % — SIGNIFICANT CHANGE UP (ref 0–2)
BUN SERPL-MCNC: 51 MG/DL — HIGH (ref 7–23)
BUN SERPL-MCNC: 51 MG/DL — HIGH (ref 7–23)
CALCIUM SERPL-MCNC: 9.2 MG/DL — SIGNIFICANT CHANGE UP (ref 8.4–10.5)
CALCIUM SERPL-MCNC: 9.2 MG/DL — SIGNIFICANT CHANGE UP (ref 8.4–10.5)
CHLORIDE SERPL-SCNC: 98 MMOL/L — SIGNIFICANT CHANGE UP (ref 96–108)
CHLORIDE SERPL-SCNC: 98 MMOL/L — SIGNIFICANT CHANGE UP (ref 96–108)
CO2 SERPL-SCNC: 25 MMOL/L — SIGNIFICANT CHANGE UP (ref 22–31)
CO2 SERPL-SCNC: 25 MMOL/L — SIGNIFICANT CHANGE UP (ref 22–31)
CREAT SERPL-MCNC: 5.2 MG/DL — HIGH (ref 0.5–1.3)
CREAT SERPL-MCNC: 5.2 MG/DL — HIGH (ref 0.5–1.3)
EGFR: 8 ML/MIN/1.73M2 — LOW
EGFR: 8 ML/MIN/1.73M2 — LOW
EOSINOPHIL # BLD AUTO: 0.21 K/UL — SIGNIFICANT CHANGE UP (ref 0–0.5)
EOSINOPHIL # BLD AUTO: 0.21 K/UL — SIGNIFICANT CHANGE UP (ref 0–0.5)
EOSINOPHIL NFR BLD AUTO: 1.5 % — SIGNIFICANT CHANGE UP (ref 0–6)
EOSINOPHIL NFR BLD AUTO: 1.5 % — SIGNIFICANT CHANGE UP (ref 0–6)
GAMMA INTERFERON BACKGROUND BLD IA-ACNC: 0.03 IU/ML — SIGNIFICANT CHANGE UP
GAMMA INTERFERON BACKGROUND BLD IA-ACNC: 0.03 IU/ML — SIGNIFICANT CHANGE UP
GLUCOSE BLDC GLUCOMTR-MCNC: 179 MG/DL — HIGH (ref 70–99)
GLUCOSE BLDC GLUCOMTR-MCNC: 179 MG/DL — HIGH (ref 70–99)
GLUCOSE BLDC GLUCOMTR-MCNC: 219 MG/DL — HIGH (ref 70–99)
GLUCOSE BLDC GLUCOMTR-MCNC: 219 MG/DL — HIGH (ref 70–99)
GLUCOSE BLDC GLUCOMTR-MCNC: 222 MG/DL — HIGH (ref 70–99)
GLUCOSE BLDC GLUCOMTR-MCNC: 222 MG/DL — HIGH (ref 70–99)
GLUCOSE BLDC GLUCOMTR-MCNC: 223 MG/DL — HIGH (ref 70–99)
GLUCOSE BLDC GLUCOMTR-MCNC: 223 MG/DL — HIGH (ref 70–99)
GLUCOSE BLDC GLUCOMTR-MCNC: 256 MG/DL — HIGH (ref 70–99)
GLUCOSE BLDC GLUCOMTR-MCNC: 256 MG/DL — HIGH (ref 70–99)
GLUCOSE SERPL-MCNC: 161 MG/DL — HIGH (ref 70–99)
GLUCOSE SERPL-MCNC: 161 MG/DL — HIGH (ref 70–99)
HCT VFR BLD CALC: 27.6 % — LOW (ref 34.5–45)
HCT VFR BLD CALC: 27.6 % — LOW (ref 34.5–45)
HGB BLD-MCNC: 8.7 G/DL — LOW (ref 11.5–15.5)
HGB BLD-MCNC: 8.7 G/DL — LOW (ref 11.5–15.5)
IMM GRANULOCYTES NFR BLD AUTO: 1.3 % — HIGH (ref 0–0.9)
IMM GRANULOCYTES NFR BLD AUTO: 1.3 % — HIGH (ref 0–0.9)
LYMPHOCYTES # BLD AUTO: 19.5 % — SIGNIFICANT CHANGE UP (ref 13–44)
LYMPHOCYTES # BLD AUTO: 19.5 % — SIGNIFICANT CHANGE UP (ref 13–44)
LYMPHOCYTES # BLD AUTO: 2.77 K/UL — SIGNIFICANT CHANGE UP (ref 1–3.3)
LYMPHOCYTES # BLD AUTO: 2.77 K/UL — SIGNIFICANT CHANGE UP (ref 1–3.3)
M TB IFN-G BLD-IMP: NEGATIVE — SIGNIFICANT CHANGE UP
M TB IFN-G BLD-IMP: NEGATIVE — SIGNIFICANT CHANGE UP
M TB IFN-G CD4+ BCKGRND COR BLD-ACNC: -0.01 IU/ML — SIGNIFICANT CHANGE UP
M TB IFN-G CD4+ BCKGRND COR BLD-ACNC: -0.01 IU/ML — SIGNIFICANT CHANGE UP
M TB IFN-G CD4+CD8+ BCKGRND COR BLD-ACNC: -0.01 IU/ML — SIGNIFICANT CHANGE UP
M TB IFN-G CD4+CD8+ BCKGRND COR BLD-ACNC: -0.01 IU/ML — SIGNIFICANT CHANGE UP
MAGNESIUM SERPL-MCNC: 2.2 MG/DL — SIGNIFICANT CHANGE UP (ref 1.6–2.6)
MAGNESIUM SERPL-MCNC: 2.2 MG/DL — SIGNIFICANT CHANGE UP (ref 1.6–2.6)
MCHC RBC-ENTMCNC: 29.1 PG — SIGNIFICANT CHANGE UP (ref 27–34)
MCHC RBC-ENTMCNC: 29.1 PG — SIGNIFICANT CHANGE UP (ref 27–34)
MCHC RBC-ENTMCNC: 31.5 GM/DL — LOW (ref 32–36)
MCHC RBC-ENTMCNC: 31.5 GM/DL — LOW (ref 32–36)
MCV RBC AUTO: 92.3 FL — SIGNIFICANT CHANGE UP (ref 80–100)
MCV RBC AUTO: 92.3 FL — SIGNIFICANT CHANGE UP (ref 80–100)
MONOCYTES # BLD AUTO: 1.59 K/UL — HIGH (ref 0–0.9)
MONOCYTES # BLD AUTO: 1.59 K/UL — HIGH (ref 0–0.9)
MONOCYTES NFR BLD AUTO: 11.2 % — SIGNIFICANT CHANGE UP (ref 2–14)
MONOCYTES NFR BLD AUTO: 11.2 % — SIGNIFICANT CHANGE UP (ref 2–14)
NEUTROPHILS # BLD AUTO: 9.41 K/UL — HIGH (ref 1.8–7.4)
NEUTROPHILS # BLD AUTO: 9.41 K/UL — HIGH (ref 1.8–7.4)
NEUTROPHILS NFR BLD AUTO: 66.1 % — SIGNIFICANT CHANGE UP (ref 43–77)
NEUTROPHILS NFR BLD AUTO: 66.1 % — SIGNIFICANT CHANGE UP (ref 43–77)
NRBC # BLD: 0 /100 WBCS — SIGNIFICANT CHANGE UP (ref 0–0)
NRBC # BLD: 0 /100 WBCS — SIGNIFICANT CHANGE UP (ref 0–0)
PHOSPHATE SERPL-MCNC: 5.6 MG/DL — HIGH (ref 2.5–4.5)
PHOSPHATE SERPL-MCNC: 5.6 MG/DL — HIGH (ref 2.5–4.5)
PLATELET # BLD AUTO: 255 K/UL — SIGNIFICANT CHANGE UP (ref 150–400)
PLATELET # BLD AUTO: 255 K/UL — SIGNIFICANT CHANGE UP (ref 150–400)
POTASSIUM SERPL-MCNC: 4.2 MMOL/L — SIGNIFICANT CHANGE UP (ref 3.5–5.3)
POTASSIUM SERPL-MCNC: 4.2 MMOL/L — SIGNIFICANT CHANGE UP (ref 3.5–5.3)
POTASSIUM SERPL-SCNC: 4.2 MMOL/L — SIGNIFICANT CHANGE UP (ref 3.5–5.3)
POTASSIUM SERPL-SCNC: 4.2 MMOL/L — SIGNIFICANT CHANGE UP (ref 3.5–5.3)
PROCALCITONIN SERPL-MCNC: 0.47 NG/ML — HIGH (ref 0.02–0.1)
PROCALCITONIN SERPL-MCNC: 0.47 NG/ML — HIGH (ref 0.02–0.1)
QUANT TB PLUS MITOGEN MINUS NIL: 2.44 IU/ML — SIGNIFICANT CHANGE UP
QUANT TB PLUS MITOGEN MINUS NIL: 2.44 IU/ML — SIGNIFICANT CHANGE UP
RAPID RVP RESULT: SIGNIFICANT CHANGE UP
RAPID RVP RESULT: SIGNIFICANT CHANGE UP
RBC # BLD: 2.99 M/UL — LOW (ref 3.8–5.2)
RBC # BLD: 2.99 M/UL — LOW (ref 3.8–5.2)
RBC # FLD: 13 % — SIGNIFICANT CHANGE UP (ref 10.3–14.5)
RBC # FLD: 13 % — SIGNIFICANT CHANGE UP (ref 10.3–14.5)
SARS-COV-2 RNA SPEC QL NAA+PROBE: SIGNIFICANT CHANGE UP
SARS-COV-2 RNA SPEC QL NAA+PROBE: SIGNIFICANT CHANGE UP
SODIUM SERPL-SCNC: 136 MMOL/L — SIGNIFICANT CHANGE UP (ref 135–145)
SODIUM SERPL-SCNC: 136 MMOL/L — SIGNIFICANT CHANGE UP (ref 135–145)
WBC # BLD: 14.23 K/UL — HIGH (ref 3.8–10.5)
WBC # BLD: 14.23 K/UL — HIGH (ref 3.8–10.5)
WBC # FLD AUTO: 14.23 K/UL — HIGH (ref 3.8–10.5)
WBC # FLD AUTO: 14.23 K/UL — HIGH (ref 3.8–10.5)

## 2023-12-04 PROCEDURE — 99232 SBSQ HOSP IP/OBS MODERATE 35: CPT

## 2023-12-04 PROCEDURE — 99233 SBSQ HOSP IP/OBS HIGH 50: CPT | Mod: GC

## 2023-12-04 PROCEDURE — 71045 X-RAY EXAM CHEST 1 VIEW: CPT | Mod: 26

## 2023-12-04 RX ORDER — HYDROXYZINE HCL 10 MG
25 TABLET ORAL
Refills: 0 | Status: DISCONTINUED | OUTPATIENT
Start: 2023-12-04 | End: 2023-12-07

## 2023-12-04 RX ORDER — CLONAZEPAM 1 MG
0.25 TABLET ORAL AT BEDTIME
Refills: 0 | Status: DISCONTINUED | OUTPATIENT
Start: 2023-12-04 | End: 2023-12-07

## 2023-12-04 RX ORDER — DULOXETINE HYDROCHLORIDE 30 MG/1
40 CAPSULE, DELAYED RELEASE ORAL DAILY
Refills: 0 | Status: DISCONTINUED | OUTPATIENT
Start: 2023-12-05 | End: 2023-12-07

## 2023-12-04 RX ADMIN — Medication 1 PATCH: at 18:48

## 2023-12-04 RX ADMIN — Medication 667 MILLIGRAM(S): at 09:14

## 2023-12-04 RX ADMIN — CARVEDILOL PHOSPHATE 12.5 MILLIGRAM(S): 80 CAPSULE, EXTENDED RELEASE ORAL at 17:02

## 2023-12-04 RX ADMIN — Medication 50 MILLIGRAM(S): at 05:52

## 2023-12-04 RX ADMIN — Medication 667 MILLIGRAM(S): at 18:33

## 2023-12-04 RX ADMIN — Medication 4: at 00:09

## 2023-12-04 RX ADMIN — Medication 3 MILLILITER(S): at 11:37

## 2023-12-04 RX ADMIN — Medication 4: at 18:33

## 2023-12-04 RX ADMIN — Medication 90 MILLIGRAM(S): at 05:53

## 2023-12-04 RX ADMIN — Medication 3 MILLILITER(S): at 00:05

## 2023-12-04 RX ADMIN — Medication 100 MILLIGRAM(S): at 13:07

## 2023-12-04 RX ADMIN — Medication 650 MILLIGRAM(S): at 17:29

## 2023-12-04 RX ADMIN — Medication 667 MILLIGRAM(S): at 13:07

## 2023-12-04 RX ADMIN — Medication 650 MILLIGRAM(S): at 23:16

## 2023-12-04 RX ADMIN — Medication 650 MILLIGRAM(S): at 22:16

## 2023-12-04 RX ADMIN — HEPARIN SODIUM 5000 UNIT(S): 5000 INJECTION INTRAVENOUS; SUBCUTANEOUS at 05:52

## 2023-12-04 RX ADMIN — Medication 3 MILLILITER(S): at 17:02

## 2023-12-04 RX ADMIN — CHLORHEXIDINE GLUCONATE 1 APPLICATION(S): 213 SOLUTION TOPICAL at 05:55

## 2023-12-04 RX ADMIN — Medication 1 PATCH: at 00:00

## 2023-12-04 RX ADMIN — ATORVASTATIN CALCIUM 40 MILLIGRAM(S): 80 TABLET, FILM COATED ORAL at 22:13

## 2023-12-04 RX ADMIN — HEPARIN SODIUM 5000 UNIT(S): 5000 INJECTION INTRAVENOUS; SUBCUTANEOUS at 13:07

## 2023-12-04 RX ADMIN — Medication 100 MILLIGRAM(S): at 22:13

## 2023-12-04 RX ADMIN — Medication 1 PATCH: at 11:36

## 2023-12-04 RX ADMIN — Medication 1 PATCH: at 23:29

## 2023-12-04 RX ADMIN — HEPARIN SODIUM 5000 UNIT(S): 5000 INJECTION INTRAVENOUS; SUBCUTANEOUS at 22:13

## 2023-12-04 RX ADMIN — Medication 50 MILLIGRAM(S): at 13:07

## 2023-12-04 RX ADMIN — DULOXETINE HYDROCHLORIDE 30 MILLIGRAM(S): 30 CAPSULE, DELAYED RELEASE ORAL at 09:14

## 2023-12-04 RX ADMIN — Medication 4: at 23:53

## 2023-12-04 RX ADMIN — Medication 50 MILLIGRAM(S): at 17:02

## 2023-12-04 RX ADMIN — Medication 20 MILLIGRAM(S): at 01:05

## 2023-12-04 RX ADMIN — CARVEDILOL PHOSPHATE 12.5 MILLIGRAM(S): 80 CAPSULE, EXTENDED RELEASE ORAL at 05:54

## 2023-12-04 RX ADMIN — Medication 650 MILLIGRAM(S): at 16:29

## 2023-12-04 RX ADMIN — PANTOPRAZOLE SODIUM 40 MILLIGRAM(S): 20 TABLET, DELAYED RELEASE ORAL at 05:53

## 2023-12-04 RX ADMIN — Medication 6: at 12:18

## 2023-12-04 RX ADMIN — Medication 100 MILLIGRAM(S): at 05:53

## 2023-12-04 RX ADMIN — Medication 0.25 MILLIGRAM(S): at 22:13

## 2023-12-04 RX ADMIN — Medication 2: at 06:31

## 2023-12-04 NOTE — BH CONSULTATION LIAISON PROGRESS NOTE - NSBHCONSULTFOLLOWAFTERCARE_PSY_A_CORE FT
Followup for an appointment with one of the following within 1 week of discharge:  •	Joyc[facility] Franc[facility]  o	[email or web address]  o	[address]  [place], [place] [address]   [phone] ([phone] for intakes)  o	Medicare, Medicaid, most private insurance (including [employer])  •	[facility]  o	[email or web address]  o	[address] [id number] [phone]  o	Medicare, Medicaid, Aetna, Affinity, [employer], BCBS, [facility] [employer], [place], GHI, Healthfirst, HealthPlus, MetroPlus, Wellcare  •	[facility]  o	[email or web address]  o	[address] [address] [phone]  Intake hours for new patients: Tuesdays and Thursdays at 8am  o	Medicare, Medicaid, most private insurance   Followup for an appointment with one of the following within 1 week of discharge:  •	Joycelyn Francois [place]  o	[email or web address]  o	[address]  [place], [place] [address]   [phone] ([phone] for intakes)  o	Medicare, Medicaid, most private insurance (including United)  •	[facility]  o	[email or web address]  o	[address] [phone] [phone]  o	Medicare, Medicaid, Aetna, Affinity, Am[employer] Care, BCBS, [email or web address] [email or web address], [place], GHI, Healthfirst, HealthPlus, MetroPlus, Wellcare  •	[facility]  o	[email or web address]  o	[address] [address] [phone]  Intake hours for new patients: Tuesdays and Thursdays at 8am  o	Medicare, Medicaid, most private insurance

## 2023-12-04 NOTE — BH CONSULTATION LIAISON PROGRESS NOTE - NSBHASSESSMENTFT_PSY_ALL_CORE
Ms. Lissett George is a 73 year old female, single with no children, retired  supported by intermediate and [employer] benefits, domiciled at [facility], PPH of MDD and hoarding disorder, with PMH of DM, HTN, ESRD, HLD, asthma, and obesity, who presented to the ED for dyspnea, and subsequently admitted for [facility] metabolic encephalopathy 2/2 uremia vs acidosis, and currently in treatment for multiple medical problems, including intermittent HD. Psychiatry was initially consulted for passive SI and was deemed psychiatrically stable with diagnosis of adjustment disorder with depressed and anxious mood. Psychiatry was reconsulted because patient could not finish dialysis session on 12/2/23 due to feeling anxious.     Upon evaluation, patient is alert, oriented x4, calm, cooperative, depressed and anxious mood and congruent affect, with reality based thought content and circumstantial and overinclusive thought process. Patient's account of dialysis session on 12/2/23 Saturday most resembles a panic attack, in which patient felt "trapped" and felt anxious for a short period of time with a specific trigger (unable to sit during dialysis).     Patient does not endorse any active suicidal ideation and is not an imminent danger to herself or others. Patient is stable in psychiatric standpoint for discharge after medical stabilization. Patient would most benefit from following up with outpatient psychiatrist and/or therapist to build frustration tolerance and healthy, adaptive coping mechanism while functioning and adapting to stressors in the community.      RECOMMENDATION:   1. Continue with Cymbalta 30mg PO QD   2.   3. Strict delirium precautions such as regulating sleep wake cycle, constant reorientation by staff, opening blinds during the day, out of bed to chair as tolerated, avoiding medications that can worsen delirium such as anticholinergics, antihistamines, benzodiazapines, opioids  4. Patient does not meet criteria for inpatient psychiatry admission.  .     Ms. Lissett George is a 73 year old female, single with no children, retired  supported by [employer] and [employer] benefits, domiciled at [facility], PPH of MDD and hoarding disorder, with PMH of DM, HTN, ESRD, HLD, asthma, and obesity, who presented to the ED for dyspnea, and subsequently admitted for [facility] metabolic encephalopathy 2/2 uremia vs acidosis, and currently in treatment for multiple medical problems, including intermittent HD. Psychiatry was initially consulted for passive SI and was deemed psychiatrically stable with diagnosis of adjustment disorder with depressed and anxious mood. Psychiatry was reconsulted because patient could not finish dialysis session on 12/2/23 due to feeling anxious.     Upon evaluation, patient is alert, oriented x4, calm, cooperative, depressed and anxious mood and congruent affect, with reality based thought content and circumstantial and overinclusive thought process. Patient's account of dialysis session on 12/2/23 Saturday most resembles a panic attack, in which patient felt "trapped" and felt anxious for a short period of time with a specific trigger (unable to sit during dialysis).     Patient does not endorse any active suicidal ideation and is not an imminent danger to herself or others. Patient is stable in psychiatric standpoint for discharge after medical stabilization. Patient would most benefit from following up with outpatient psychiatrist and/or therapist to build frustration tolerance and healthy, adaptive coping mechanism while functioning and adapting to stressors in the community.      RECOMMENDATION:   1. Continue with Cymbalta 30mg PO QD   2.   3. Strict delirium precautions such as regulating sleep wake cycle, constant reorientation by staff, opening blinds during the day, out of bed to chair as tolerated, avoiding medications that can worsen delirium such as anticholinergics, antihistamines, benzodiazapines, opioids  4. Patient does not meet criteria for inpatient psychiatry admission.  .     Ms. Lissett George is a 73 year old female, single with no children, retired  supported by [employer] and [employer] benefits, domiciled at [facility], PPH of MDD and hoarding disorder, with PMH of DM, HTN, ESRD, HLD, asthma, and obesity, who presented to the ED for dyspnea, and subsequently admitted for [facility] metabolic encephalopathy 2/2 uremia vs acidosis, and currently in treatment for multiple medical problems, including intermittent HD. Psychiatry was initially consulted for passive SI and was deemed psychiatrically stable with diagnosis of adjustment disorder with depressed and anxious mood. Psychiatry was reconsulted because patient could not finish dialysis session on 12/2/23 due to feeling anxious.     Upon evaluation, patient is alert, oriented x4, calm, cooperative, depressed and anxious mood and congruent affect, with reality based thought content and circumstantial and overinclusive thought process. Patient's account of dialysis session on 12/2/23 Saturday most resembles a panic attack, in which patient felt "trapped" and felt anxious for a short period of time with a specific trigger (unable to sit during dialysis).     Patient makes dramatic statements but is future oriented (stating she would like to come back to this hospital for outpatient hemodialysis and wants a better living situation) and is not acutely suicidal, without any intent or plans. Patient does not endorse any active suicidal ideation and is not an imminent danger to herself or others. Patient is stable in psychiatric standpoint for discharge after medical stabilization. Patient would most benefit from following up with outpatient psychiatrist and/or therapist to build frustration tolerance and healthy, adaptive coping mechanism while functioning and adapting to stressors in the community.      RECOMMENDATION:   1. Increase Cymbalta to 40mg PO daily  2. Would recommend hydroxyzine 25mg BID PRN for anxiety and 1 hour prior to hemodialysis  3. Please have patient seated or comfortable position as much as possible for hemodialysis for anxiety  4. Strict delirium precautions such as regulating sleep wake cycle, constant reorientation by staff, opening blinds during the day, out of bed to chair as tolerated, avoiding medications that can worsen delirium such as anticholinergics, benzodiazapines, opioids  5. Patient does not meet criteria for inpatient psychiatry admission.  .     Ms. Lissett George is a 73 year old female, single with no children, retired  supported by long term and [employer] benefits, domiciled at [facility], PPH of MDD and hoarding disorder, with PMH of DM, HTN, ESRD, HLD, asthma, and obesity, who presented to the ED for dyspnea, and subsequently admitted for [facility] metabolic encephalopathy 2/2 uremia vs acidosis, and currently in treatment for multiple medical problems, including intermittent HD. Psychiatry was initially consulted for passive SI and was deemed psychiatrically stable with diagnosis of adjustment disorder with depressed and anxious mood. Psychiatry was reconsulted because patient could not finish dialysis session on 12/2/23 due to feeling anxious.     Upon evaluation, patient is alert, oriented x4, calm, cooperative, depressed and anxious mood and congruent affect, with reality based thought content and circumstantial and overinclusive thought process. Patient's account of dialysis session on 12/2/23 Saturday most resembles a panic attack, in which patient felt "trapped" and felt anxious for a short period of time with a specific trigger (unable to sit during dialysis).     Patient makes dramatic statements but is future oriented (stating she would like to come back to this hospital for outpatient hemodialysis and wants a better living situation) and is not acutely suicidal, without any intent or plans. Patient does not endorse any active suicidal ideation and is not an imminent danger to herself or others. Patient is stable in psychiatric standpoint for discharge after medical stabilization. Patient would most benefit from following up with outpatient psychiatrist and/or therapist to build frustration tolerance and healthy, adaptive coping mechanism while functioning and adapting to stressors in the community.      RECOMMENDATION:   1. Increase Cymbalta to 40mg PO daily  2. Would recommend hydroxyzine 25mg BID PRN for anxiety and 1 hour prior to hemodialysis  3. Please have patient seated or comfortable position as much as possible for hemodialysis for anxiety  4. Strict delirium precautions such as regulating sleep wake cycle, constant reorientation by staff, opening blinds during the day, out of bed to chair as tolerated, avoiding medications that can worsen delirium such as anticholinergics, benzodiazapines, opioids  5. Patient does not meet criteria for inpatient psychiatry admission.  .     Ms. Lissett George is a 73 year old female, single with no children, retired  supported by group home and [employer] benefits, domiciled at [facility], PPH of MDD and hoarding disorder, with PMH of DM, HTN, ESRD, HLD, asthma, and obesity, who presented to the ED for dyspnea, and subsequently admitted for [facility] metabolic encephalopathy 2/2 uremia vs acidosis, and currently in treatment for multiple medical problems, including intermittent HD. Psychiatry was initially consulted for passive SI and was deemed psychiatrically stable with diagnosis of adjustment disorder with depressed and anxious mood. Psychiatry was reconsulted because patient could not finish dialysis session on 12/2/23 due to feeling anxious.     Upon evaluation, patient is alert, oriented x4, calm, cooperative, depressed and anxious mood and congruent affect, with reality based thought content and circumstantial and overinclusive thought process. Patient's account of dialysis session on 12/2/23 Saturday most resembles a panic attack, in which patient felt "trapped" and felt anxious for a short period of time with a specific trigger (unable to sit during dialysis).     Patient makes dramatic statements but is future oriented (stating she would like to come back to this hospital for outpatient hemodialysis and wants a better living situation) and is not acutely suicidal, without any intent or plans. Patient does not endorse any active suicidal ideation and is not an imminent danger to herself or others. Patient is stable in psychiatric standpoint for discharge after medical stabilization. Patient would most benefit from following up with outpatient psychiatrist and/or therapist to build frustration tolerance and healthy, adaptive coping mechanism while functioning and adapting to stressors in the community.      RECOMMENDATION:   1. Increase Cymbalta to 40mg PO daily  2. Would recommend hydroxyzine 25mg BID PO PRN for anxiety and 1 hour prior to hemodialysis  3. Please have patient seated or comfortable position as much as possible for hemodialysis for anxiety  4. Strict delirium precautions such as regulating sleep wake cycle, constant reorientation by staff, opening blinds during the day, out of bed to chair as tolerated, avoiding medications that can worsen delirium such as anticholinergics, benzodiazapines, opioids  5. Patient does not meet criteria for inpatient psychiatry admission.  .     Ms. Lissett George is a 73 year old female, single with no children, retired  supported by nursing home and [employer] benefits, domiciled at [facility], PPH of MDD and hoarding disorder, with PMH of DM, HTN, ESRD, HLD, asthma, and obesity, who presented to the ED for dyspnea, and subsequently admitted for [facility] metabolic encephalopathy 2/2 uremia vs acidosis, and currently in treatment for multiple medical problems, including intermittent HD. Psychiatry was initially consulted for passive SI and was deemed psychiatrically stable with diagnosis of adjustment disorder with depressed and anxious mood. Psychiatry was reconsulted because patient could not finish dialysis session on 12/2/23 due to feeling anxious.     Upon evaluation, patient is alert, oriented x4, calm, cooperative, depressed and anxious mood and congruent affect, with reality based thought content and circumstantial and overinclusive thought process. Patient's account of dialysis session on 12/2/23 Saturday most resembles a panic attack, in which patient felt "trapped" and felt anxious for a short period of time with a specific trigger (unable to sit during dialysis).     Patient makes dramatic statements but is future oriented (stating she would like to come back to this hospital for outpatient hemodialysis and wants a better living situation) and is not acutely suicidal, without any intent or plans. Patient does not endorse any active suicidal ideation and is not an imminent danger to herself or others. Patient is stable in psychiatric standpoint for discharge after medical stabilization. Patient would most benefit from following up with outpatient psychiatrist and/or therapist to build frustration tolerance and healthy, adaptive coping mechanism while functioning and adapting to stressors in the community.      RECOMMENDATION:   1. Increase Cymbalta to 40mg PO daily  2. Would recommend hydroxyzine 25mg BID PO PRN for anxiety and 1 hour prior to hemodialysis  3. Please have patient seated or comfortable position as much as possible for hemodialysis for anxiety  4. Strict delirium precautions such as regulating sleep wake cycle, constant reorientation by staff, opening blinds during the day, out of bed to chair as tolerated, avoiding medications that can worsen delirium such as anticholinergics, benzodiazapines, opioids  5. Patient does not meet criteria for inpatient psychiatry admission.  .     Ms. Lissett George is a 73 year old female, single with no children, retired  supported by [employer] and [employer] benefits, domiciled at [facility], PPH of MDD and hoarding disorder, with PMH of DM, HTN, ESRD, HLD, asthma, and obesity, who presented to the ED for dyspnea, and subsequently admitted for [facility] metabolic encephalopathy 2/2 uremia vs acidosis, and currently in treatment for multiple medical problems, including intermittent HD. Psychiatry was initially consulted for passive SI and was deemed psychiatrically stable with diagnosis of adjustment disorder with depressed and anxious mood. Psychiatry was reconsulted because patient could not finish dialysis session on 12/2/23 due to feeling anxious.     Upon evaluation, patient is alert, oriented x4, calm, cooperative, depressed and anxious mood and congruent affect, with reality based thought content and overinclusive thought process. Patient's account of dialysis session on 12/2/23 Saturday most resembles a panic attack, in which patient felt "trapped" and felt anxious for a short period of time with a specific trigger (unable to sit during dialysis).     Patient makes dramatic statements but is future oriented (stating she would like to come back to this hospital for outpatient hemodialysis and wants a better living situation) and is not acutely suicidal, without any intent or plans. Patient does not endorse any active suicidal ideation and is not an imminent danger to herself or others. Patient is stable in psychiatric standpoint for discharge after medical stabilization. Patient would most benefit from following up with outpatient psychiatrist and/or therapist to build frustration tolerance and healthy, adaptive coping mechanism while functioning and adapting to stressors in the community.      RECOMMENDATION:   1. Increase Cymbalta to 40mg PO daily  2. Would recommend hydroxyzine 25mg BID PO PRN for anxiety and 1 hour prior to hemodialysis  3. Please have patient seated or comfortable position as much as possible for hemodialysis for anxiety  4. Strict delirium precautions such as regulating sleep wake cycle, constant reorientation by staff, opening blinds during the day, out of bed to chair as tolerated, avoiding medications that can worsen delirium such as anticholinergics, benzodiazapines, opioids  5. Patient does not meet criteria for inpatient psychiatry admission.  .     Ms. Lissett George is a 73 year old female, single with no children, retired  supported by [employer] and [employer] benefits, domiciled at [facility], PPH of MDD and hoarding disorder, with PMH of DM, HTN, ESRD, HLD, asthma, and obesity, who presented to the ED for dyspnea, and subsequently admitted for [facility] metabolic encephalopathy 2/2 uremia vs acidosis, and currently in treatment for multiple medical problems, including intermittent HD. Psychiatry was initially consulted for passive SI and was deemed psychiatrically stable with diagnosis of adjustment disorder with depressed and anxious mood. Psychiatry was reconsulted because patient could not finish dialysis session on 12/2/23 due to feeling anxious.     Upon evaluation, patient is alert, oriented x4, calm, cooperative, depressed and anxious mood and congruent affect, with reality based thought content and overinclusive thought process. Patient's account of dialysis session on 12/2/23 Saturday most resembles a panic attack, in which patient felt "trapped" and felt anxious for a short period of time with a specific trigger (unable to sit during dialysis).     Patient makes dramatic statements but is future oriented (stating she would like to come back to this hospital for outpatient hemodialysis and wants a better living situation) and is not acutely suicidal, without any intent or plans. Patient does not endorse any active suicidal ideation and is not an imminent danger to herself or others. Patient is stable in psychiatric standpoint for discharge after medical stabilization. Patient would most benefit from following up with outpatient psychiatrist and/or therapist to build frustration tolerance and healthy, adaptive coping mechanism while functioning and adapting to stressors in the community.      RECOMMENDATION:   1. Increase Cymbalta to 40mg PO daily  2. Would recommend hydroxyzine 25mg BID PO PRN for anxiety and 1 hour prior to hemodialysis  3. Please have patient seated or comfortable position as much as possible for hemodialysis for anxiety  4. Strict delirium precautions such as regulating sleep wake cycle, constant reorientation by staff, opening blinds during the day, out of bed to chair as tolerated, avoiding medications that can worsen delirium such as anticholinergics, benzodiazapines, opioids  5. Patient does not meet criteria for inpatient psychiatry admission.  .

## 2023-12-04 NOTE — BH CONSULTATION LIAISON PROGRESS NOTE - NSBHATTESTCOMMENTATTENDFT_PSY_A_CORE
74yo F hx ESRD now getting HD, DM, HTN, OM s/p amputations, MDD, hoarding disorder, chronic SI, prior admission 5yrs ago, admitted for r/o ACS, seen by psychiatry 11/25 and cleared, restarted on Cymbalta.  Psychiatry called today as pt had anxiety during HD on 12/2, seeking med reccs.  Pt alert, attentive, described situational anxiety while lying supine for HD as well as leading up to procedure.  Anxiety controlled otherwise; pt describes continued low mood, WNL sleep.  Pt agreeable to HD and other treatments, seeks to increase comfort and alleviate panic which occurs sporadically, chronic low mood, has fleeting passive intermittent longstanding thoughts that pt clarifies she would never act on, no intent/plan.  Pt very agreeable with plan to inc Cymbalta dose, have PRN prior to dialysis.  No adverse effect of current meds.    -increase Cymbalta to 40mg PO daily,   continue trazodone 50mg PO qHS PRN,   continue clonazepam 0.25mg PO qHS and plan to further wean as tolerated;   can use hydroxyzine 25mg PO BID PRN anxiety.  No indication for heightened acute suicide risk above baseline.    Pt very future oriented, inquires about getting outpt HD at [facility] as she likes it here.  Please call if questions or change in clinical condition.  Pt is psychiatrically cleared for discharge with followup as above     72yo F hx ESRD now getting HD, DM, HTN, OM s/p amputations, MDD, hoarding disorder, chronic SI, prior admission 5yrs ago, admitted for r/o ACS, seen by psychiatry 11/25 and cleared, restarted on Cymbalta.  Psychiatry called today as pt had anxiety during HD on 12/2, seeking med reccs.  Pt alert, attentive, described situational anxiety while lying supine for HD as well as leading up to procedure.  Anxiety controlled otherwise; pt describes continued low mood, WNL sleep.  Pt agreeable to HD and other treatments, seeks to increase comfort and alleviate panic which occurs sporadically, chronic low mood, has fleeting passive intermittent longstanding thoughts that pt clarifies she would never act on, no intent/plan.  Pt very agreeable with plan to inc Cymbalta dose, have PRN prior to dialysis.  No adverse effect of current meds.    -increase Cymbalta to 40mg PO daily,   continue trazodone 50mg PO qHS PRN,   continue clonazepam 0.25mg PO qHS and plan to further wean as tolerated;   can use hydroxyzine 25mg PO BID PRN anxiety.  No indication for heightened acute suicide risk above baseline.    Pt very future oriented, inquires about getting outpt HD at [facility] as she likes it here.  Please call if questions or change in clinical condition.  Pt is psychiatrically cleared for discharge with followup as above

## 2023-12-04 NOTE — PROGRESS NOTE ADULT - PROBLEM SELECTOR PLAN 4
new LLL opacity seen on Xray 11/27. Febrile on 11/28. . Aspiration vs. atelectasis. on CTX 2g. pt continues to be afebrile with downtrending WBC.   s/p CTX for total 5 days (11/26-12/1) new LLL opacity seen on Xray 11/27. Febrile on 11/28. . Aspiration vs. atelectasis. on CTX 2g. pt continues to be afebrile with downtrending WBC.   s/p CTX for total 5 days (11/26-12/1)    #Leukocytosis  Patient WBC count up trending since 12/1/23  CXR: no consolidations  Procal 0.47, RVP (-)  - continuing to monitor off abx at this time

## 2023-12-04 NOTE — PROGRESS NOTE ADULT - ATTENDING COMMENTS
I agree with the fellow's findings and plans as written above with the following additions/amendments:    Seen and examined at bedside, depression still a major issue, does not feel much better but working on it, speaking with psych. No CP, palpitations. No acute indication for HD, planned on next tomorrow. Further recs as above

## 2023-12-04 NOTE — PROGRESS NOTE ADULT - ASSESSMENT
73YF w/ CKD5, now ESRD, admitted 11/25 for HTN urgency and NSTEMI after presenting w/ SOB and lightheadedness. 11/26 pt w/ AMS 2/2 ?toxic encephalopathy (clonazepam) vs uremia vs acidosis, requiring intubation and pressors, also aspiration PNA 11/27, UO dropped, started on CVVHD, followed by clinical improvement, transitioned to iHD 11/28    #ESRD on HD, currently on TTS schedule:   No indication of urgent HD today. Last HD 12/2  Next HD tomorrow 12/5  Renal diet   Daily BMP   Outpatient HD set up in progress. Hep panel negative. PPD negative.    Anemia:  Likely 2/2 ESRD   EPO with HD, no indication for IV iron at this time (iron sat 38%)      MBD:  Calcium: 9.2  Phos: 5.6  If phos persistently >5.5 can start on phos binders

## 2023-12-04 NOTE — BH CONSULTATION LIAISON PROGRESS NOTE - NSBHFUPINTERVALHXFT_PSY_A_CORE
Patient is evaluated by psychiatry attending, resident, and PA student for follow up consultation due to anxiety with dialysis. Upon approach, the patient was lying in bed watching television comfortably without distress. On evaluation, the patient was alert and oriented x 4, calm, cooperative, with good eye contact, and had circumstantial speech. Patient explained that during dialysis 12/2/23 (Saturday), she felt like the room was closing in on her and had immense anxiety. She stated that she had similar episodes previously. She noted that she wished to be seated in an upright position during dialysis but was declined. That incident had made her feel that she had lost control of her own body. Throughout the interview, she repeated her distress to her medical care and said “doctors tell me what I need to do, and I must listen. I can’t even say no to anything.” The patient had noted lack of communication with the medical team and stated she feels as if she “was meat tossed around”.  It was mentioned, by the patient, that she is depressed and endorses a feeling of loneliness in her residence and assisted living housing. She also notes that when she tried to call her sister last night, her sister had ignored her. When asked if she had any suicidal ideation, she explained that she doesn’t want to live anymore due to her “body breaking down”. She denies any intent, current plans of suicide, HI, AH, and VH. By the end of the interview, the patient is amendable to treatments if give she was given more communication on her medical care.   Patient is evaluated by psychiatry attending, resident, and PA student for follow up consultation due to anxiety with dialysis. Upon approach, the patient was lying in bed watching television comfortably without distress. On evaluation, the patient was alert and oriented x 4, calm, cooperative, with good eye contact, and had circumstantial speech. Patient explained that during dialysis 12/2/23 (Saturday), she felt like the room was closing in on her and had immense anxiety. She stated that she had similar episodes previously. She noted that she wished to be seated in an upright position during dialysis but was declined. That incident had made her feel that she had lost control of her own body. Throughout the interview, she repeated her distress to her medical care and said “doctors tell me what I need to do, and I must listen. I can’t even say no to anything.” The patient had noted lack of communication with the medical team and stated she feels as if she “was meat tossed around”.  It was mentioned, by the patient, that she is depressed and endorses a feeling of loneliness in her residence and assisted living housing. She also notes that when she tried to call her sister last night, her sister had ignored her. When asked if she had any suicidal ideation, she explained that at times she doesn’t want to live anymore but described future orientation and hope. She denies any intent, current plans of suicide, HI, AH, and VH. By the end of the interview, the patient is amendable to treatments if give she was given more communication on her medical care.

## 2023-12-04 NOTE — PROGRESS NOTE ADULT - SUBJECTIVE AND OBJECTIVE BOX
Patient is a 73y Female seen and evaluated at bedside. Denies any symptoms at this time. States wants to walk around. Overnight had multiple BM (normal stool), was given dicyclomine 20mg once.      Meds:    acetaminophen     Tablet .. 650 every 6 hours PRN  albuterol    90 MICROgram(s) HFA Inhaler 2 daily  albuterol/ipratropium for Nebulization 3 every 6 hours  atorvastatin 40 at bedtime  benzonatate 100 three times a day  calcium acetate 667 three times a day with meals  carvedilol 12.5 every 12 hours  chlorhexidine 2% Cloths 1 <User Schedule>  clonazePAM  Tablet 0.25 at bedtime  dextrose 5%. 1000 <Continuous>  dextrose 5%. 1000 <Continuous>  dextrose 50% Injectable 25 once  dextrose 50% Injectable 25 once  dextrose 50% Injectable 12.5 once  dextrose Oral Gel 15 once PRN  DULoxetine 30 every 24 hours  glucagon  Injectable 1 once  heparin   Injectable 5000 every 8 hours  hydrALAZINE 50 three times a day  insulin lispro (ADMELOG) corrective regimen sliding scale  every 6 hours  NIFEdipine XL 90 every 24 hours  nitroglycerin    Patch 0.3 mG/Hr(s) 1 daily  pantoprazole    Tablet 40 before breakfast  PPD  5 Tuberculin Unit(s) Injectable 5 once  traZODone 50 every 24 hours      T(C): , Max: 37.5 (12-04-23 @ 05:57)  T(F): , Max: 99.5 (12-04-23 @ 05:57)  HR: 66 (12-04-23 @ 12:01)  BP: 106/59 (12-04-23 @ 12:01)  BP(mean): --  RR: 18 (12-04-23 @ 12:01)  SpO2: 94% (12-04-23 @ 12:01)  Wt(kg): --    12-04 @ 07:01  -  12-04 @ 12:35  --------------------------------------------------------  IN: 540 mL / OUT: 0 mL / NET: 540 mL          Review of Systems:  ROS negative except as per HPI      PHYSICAL EXAM:  GENERAL: NAD  NECK: supple, No JVD  CHEST/LUNG: Clear to auscultation bilaterally  HEART: normal S1S2, RRR  ABDOMEN: Soft, Nontender, +BS,    EXTREMITIES: No clubbing, cyanosis, or edema   NEUROLOGY: AAO x3, no focal neurological deficit  ACCESS: [facility]      LABS:                        8.7    14.23 )-----------( 255      ( 04 Dec 2023 06:44 )             27.6     12-04    136  |  98  |  51<H>  ----------------------------<  161<H>  4.2   |  25  |  5.20<H>    Ca    9.2      04 Dec 2023 06:44  Phos  5.6     12-04  Mg     2.2     12-04          Urinalysis Basic - ( 04 Dec 2023 06:44 )    Color: x / Appearance: x / SG: x / pH: x  Gluc: 161 mg/dL / Ketone: x  / Bili: x / Urobili: x   Blood: x / Protein: x / Nitrite: x   Leuk Esterase: x / RBC: x / WBC x   Sq Epi: x / Non Sq Epi: x / Bacteria: x            RADIOLOGY & ADDITIONAL STUDIES:           Patient is a 73y Female seen and evaluated at bedside. Denies any symptoms at this time. States wants to walk around. Overnight had multiple BM (normal stool), was given dicyclomine 20mg once.      Meds:    acetaminophen     Tablet .. 650 every 6 hours PRN  albuterol    90 MICROgram(s) HFA Inhaler 2 daily  albuterol/ipratropium for Nebulization 3 every 6 hours  atorvastatin 40 at bedtime  benzonatate 100 three times a day  calcium acetate 667 three times a day with meals  carvedilol 12.5 every 12 hours  chlorhexidine 2% Cloths 1 <User Schedule>  clonazePAM  Tablet 0.25 at bedtime  dextrose 5%. 1000 <Continuous>  dextrose 5%. 1000 <Continuous>  dextrose 50% Injectable 25 once  dextrose 50% Injectable 25 once  dextrose 50% Injectable 12.5 once  dextrose Oral Gel 15 once PRN  DULoxetine 30 every 24 hours  glucagon  Injectable 1 once  heparin   Injectable 5000 every 8 hours  hydrALAZINE 50 three times a day  insulin lispro (ADMELOG) corrective regimen sliding scale  every 6 hours  NIFEdipine XL 90 every 24 hours  nitroglycerin    Patch 0.3 mG/Hr(s) 1 daily  pantoprazole    Tablet 40 before breakfast  PPD  5 Tuberculin Unit(s) Injectable 5 once  traZODone 50 every 24 hours      T(C): , Max: 37.5 (12-04-23 @ 05:57)  T(F): , Max: 99.5 (12-04-23 @ 05:57)  HR: 66 (12-04-23 @ 12:01)  BP: 106/59 (12-04-23 @ 12:01)  BP(mean): --  RR: 18 (12-04-23 @ 12:01)  SpO2: 94% (12-04-23 @ 12:01)  Wt(kg): --    12-04 @ 07:01  -  12-04 @ 12:35  --------------------------------------------------------  IN: 540 mL / OUT: 0 mL / NET: 540 mL          Review of Systems:  ROS negative except as per HPI      PHYSICAL EXAM:  GENERAL: NAD  NECK: supple, No JVD  CHEST/LUNG: Clear to auscultation bilaterally  HEART: normal S1S2, RRR  ABDOMEN: Soft, Nontender, +BS,    EXTREMITIES: No clubbing, cyanosis, or edema   NEUROLOGY: AAO x3, no focal neurological deficit  ACCESS: [facility]      LABS:                        8.7    14.23 )-----------( 255      ( 04 Dec 2023 06:44 )             27.6     12-04    136  |  98  |  51<H>  ----------------------------<  161<H>  4.2   |  25  |  5.20<H>    Ca    9.2      04 Dec 2023 06:44  Phos  5.6     12-04  Mg     2.2     12-04          Urinalysis Basic - ( 04 Dec 2023 06:44 )    Color: x / Appearance: x / SG: x / pH: x  Gluc: 161 mg/dL / Ketone: x  / Bili: x / Urobili: x   Blood: x / Protein: x / Nitrite: x   Leuk Esterase: x / RBC: x / WBC x   Sq Epi: x / Non Sq Epi: x / Bacteria: x            RADIOLOGY & ADDITIONAL STUDIES:           Patient is a 73y Female seen and evaluated at bedside. Denies any symptoms at this time. States wants to walk around. Overnight had multiple BM (normal stool), was given dicyclomine 20mg once.      Meds:    acetaminophen     Tablet .. 650 every 6 hours PRN  albuterol    90 MICROgram(s) HFA Inhaler 2 daily  albuterol/ipratropium for Nebulization 3 every 6 hours  atorvastatin 40 at bedtime  benzonatate 100 three times a day  calcium acetate 667 three times a day with meals  carvedilol 12.5 every 12 hours  chlorhexidine 2% Cloths 1 <User Schedule>  clonazePAM  Tablet 0.25 at bedtime  dextrose 5%. 1000 <Continuous>  dextrose 5%. 1000 <Continuous>  dextrose 50% Injectable 25 once  dextrose 50% Injectable 25 once  dextrose 50% Injectable 12.5 once  dextrose Oral Gel 15 once PRN  DULoxetine 30 every 24 hours  glucagon  Injectable 1 once  heparin   Injectable 5000 every 8 hours  hydrALAZINE 50 three times a day  insulin lispro (ADMELOG) corrective regimen sliding scale  every 6 hours  NIFEdipine XL 90 every 24 hours  nitroglycerin    Patch 0.3 mG/Hr(s) 1 daily  pantoprazole    Tablet 40 before breakfast  PPD  5 Tuberculin Unit(s) Injectable 5 once  traZODone 50 every 24 hours      T(C): , Max: 37.5 (12-04-23 @ 05:57)  T(F): , Max: 99.5 (12-04-23 @ 05:57)  HR: 66 (12-04-23 @ 12:01)  BP: 106/59 (12-04-23 @ 12:01)  BP(mean): --  RR: 18 (12-04-23 @ 12:01)  SpO2: 94% (12-04-23 @ 12:01)  Wt(kg): --    12-04 @ 07:01  -  12-04 @ 12:35  --------------------------------------------------------  IN: 540 mL / OUT: 0 mL / NET: 540 mL          Review of Systems:  ROS negative except as per HPI      PHYSICAL EXAM:  GENERAL: NAD, resting in bed  NECK: supple, No JVD  CHEST/LUNG: Clear to auscultation bilaterally  HEART: normal S1S2, RRR  ABDOMEN: Soft, Nontender, +BS,    EXTREMITIES: No clubbing, cyanosis, or edema   NEUROLOGY: AAO x3, no focal neurological deficit  ACCESS: [facility]      LABS:                        8.7    14.23 )-----------( 255      ( 04 Dec 2023 06:44 )             27.6     12-04    136  |  98  |  51<H>  ----------------------------<  161<H>  4.2   |  25  |  5.20<H>    Ca    9.2      04 Dec 2023 06:44  Phos  5.6     12-04  Mg     2.2     12-04          Urinalysis Basic - ( 04 Dec 2023 06:44 )    Color: x / Appearance: x / SG: x / pH: x  Gluc: 161 mg/dL / Ketone: x  / Bili: x / Urobili: x   Blood: x / Protein: x / Nitrite: x   Leuk Esterase: x / RBC: x / WBC x   Sq Epi: x / Non Sq Epi: x / Bacteria: x            RADIOLOGY & ADDITIONAL STUDIES:           Patient is a 73y Female seen and evaluated at bedside. Denies any symptoms at this time. States wants to walk around. Overnight had multiple BM (normal stool), was given dicyclomine 20mg once.      Meds:    acetaminophen     Tablet .. 650 every 6 hours PRN  albuterol    90 MICROgram(s) HFA Inhaler 2 daily  albuterol/ipratropium for Nebulization 3 every 6 hours  atorvastatin 40 at bedtime  benzonatate 100 three times a day  calcium acetate 667 three times a day with meals  carvedilol 12.5 every 12 hours  chlorhexidine 2% Cloths 1 <User Schedule>  clonazePAM  Tablet 0.25 at bedtime  dextrose 5%. 1000 <Continuous>  dextrose 5%. 1000 <Continuous>  dextrose 50% Injectable 25 once  dextrose 50% Injectable 25 once  dextrose 50% Injectable 12.5 once  dextrose Oral Gel 15 once PRN  DULoxetine 30 every 24 hours  glucagon  Injectable 1 once  heparin   Injectable 5000 every 8 hours  hydrALAZINE 50 three times a day  insulin lispro (ADMELOG) corrective regimen sliding scale  every 6 hours  NIFEdipine XL 90 every 24 hours  nitroglycerin    Patch 0.3 mG/Hr(s) 1 daily  pantoprazole    Tablet 40 before breakfast  PPD  5 Tuberculin Unit(s) Injectable 5 once  traZODone 50 every 24 hours      T(C): , Max: 37.5 (12-04-23 @ 05:57)  T(F): , Max: 99.5 (12-04-23 @ 05:57)  HR: 66 (12-04-23 @ 12:01)  BP: 106/59 (12-04-23 @ 12:01)  BP(mean): --  RR: 18 (12-04-23 @ 12:01)  SpO2: 94% (12-04-23 @ 12:01)  Wt(kg): --    12-04 @ 07:01  -  12-04 @ 12:35  --------------------------------------------------------  IN: 540 mL / OUT: 0 mL / NET: 540 mL          Review of Systems:  ROS negative except as per HPI      PHYSICAL EXAM:  GENERAL: NAD, resting in bed  NECK: supple, No JVD  CHEST/LUNG: Clear to auscultation bilaterally  HEART: normal S1S2, RRR  ABDOMEN: Soft, Nontender, +BS,    EXTREMITIES: No clubbing, cyanosis, or edema   NEUROLOGY: AAO x3, no focal neurological deficit  ACCESS: [facility]      LABS:                        8.7    14.23 )-----------( 255      ( 04 Dec 2023 06:44 )             27.6     12-04    136  |  98  |  51<H>  ----------------------------<  161<H>  4.2   |  25  |  5.20<H>    Ca    9.2      04 Dec 2023 06:44  Phos  5.6     12-04  Mg     2.2     12-04          Urinalysis Basic - ( 04 Dec 2023 06:44 )    Color: x / Appearance: x / SG: x / pH: x  Gluc: 161 mg/dL / Ketone: x  / Bili: x / Urobili: x   Blood: x / Protein: x / Nitrite: x   Leuk Esterase: x / RBC: x / WBC x   Sq Epi: x / Non Sq Epi: x / Bacteria: x            RADIOLOGY & ADDITIONAL STUDIES:

## 2023-12-04 NOTE — PROGRESS NOTE ADULT - PROBLEM SELECTOR PLAN 1
RESOLVED. [facility] metabolic encephalopathy 2/2 uremia vs. acidosis, requiring intubation in MICU. mental status improved s/p CVVHD with uremia and acidosis resolved. Now AOx3 with appropriate affect. CTH non cont 11/28 shows microvascular disease with lacunar infarcts. No acute pathology. Passed bedside dysphagia   - Klonopin at reduced dose 0.25 qhs.   - holding other   - c/w HD RESOLVED. [facility] metabolic encephalopathy 2/2 uremia vs. acidosis, requiring intubation in MICU. mental status improved s/p CVVHD with uremia and acidosis resolved. Now AOx3 with appropriate affect. CTH non cont 11/28 shows microvascular disease with lacunar infarcts. No acute pathology. Passed bedside dysphagia   - Klonopin at reduced dose 0.25 qhs.   - holding other   - c/w HD RESOLVED. [facility] metabolic encephalopathy 2/2 uremia vs. acidosis, requiring intubation in MICU. mental status improved s/p CVVHD with uremia and acidosis resolved. Now AOx3 with appropriate affect. CTH non cont 11/28 shows microvascular disease with lacunar infarcts. No acute pathology. Passed bedside dysphagia   - Klonopin at reduced dose 0.25 qhs.   - holding other   - c/w HD per nephro RESOLVED. [facility] metabolic encephalopathy 2/2 uremia vs. acidosis, requiring intubation in MICU. mental status improved s/p CVVHD with uremia and acidosis resolved. Now AOx3 with appropriate affect. CTH non cont 11/28 shows microvascular disease with lacunar infarcts. No acute pathology. Passed bedside dysphagia   - Klonopin at reduced dose 0.25 qhs.   - holding other   - c/w HD per nephro

## 2023-12-04 NOTE — BH CONSULTATION LIAISON PROGRESS NOTE - NSBHCHARTREVIEWVS_PSY_A_CORE FT
Vital Signs Last 24 Hrs  T(C): 37.4 (04 Dec 2023 12:01), Max: 37.5 (04 Dec 2023 05:57)  T(F): 99.3 (04 Dec 2023 12:01), Max: 99.5 (04 Dec 2023 05:57)  HR: 66 (04 Dec 2023 12:01) (66 - 76)  BP: 106/59 (04 Dec 2023 12:01) (106/59 - 161/73)  BP(mean): --  RR: 18 (04 Dec 2023 12:01) (17 - 19)  SpO2: 94% (04 Dec 2023 12:01) (93% - 94%)    Parameters below as of 04 Dec 2023 12:01  Patient On (Oxygen Delivery Method): room air

## 2023-12-04 NOTE — PROGRESS NOTE ADULT - PROBLEM SELECTOR PLAN 2
Psych consulted, no need for 1-1.  Denies any active plan to commit suicide. Home meds: Klonipin 0.5mg QD, Duloxetine 30mg QD, Trazodone 50mg QD.  - Klonopin at reduced dose 0.25 qhs.   - c/w Duloxetine 30mg QD  - c/w Trazodone 50 QD Psych consulted, no need for 1-1.  Denies any active plan to commit suicide. Home meds: Klonipin 0.5mg QD, Duloxetine 30mg QD, Trazodone 50mg QD.  - Psych consulted, appreciate recs  - Klonopin at reduced dose 0.25 qhs.   - c/w increased duloxetine to 40mg QD  - c/w Trazodone 50 QD  - started hydroxyzine 25 mg BID PRN for anxiety and 1 hour before HD

## 2023-12-04 NOTE — BH CONSULTATION LIAISON PROGRESS NOTE - CURRENT MEDICATION
MEDICATIONS  (STANDING):  albuterol    90 MICROgram(s) HFA Inhaler 2 Puff(s) Inhalation daily  albuterol/ipratropium for Nebulization 3 milliLiter(s) Nebulizer every 6 hours  atorvastatin 40 milliGRAM(s) Oral at bedtime  benzonatate 100 milliGRAM(s) Oral three times a day  calcium acetate 667 milliGRAM(s) Oral three times a day with meals  carvedilol 12.5 milliGRAM(s) Oral every 12 hours  chlorhexidine 2% Cloths 1 Application(s) Topical <User Schedule>  clonazePAM  Tablet 0.25 milliGRAM(s) Oral at bedtime  dextrose 5%. 1000 milliLiter(s) (50 mL/Hr) IV Continuous <Continuous>  dextrose 5%. 1000 milliLiter(s) (100 mL/Hr) IV Continuous <Continuous>  dextrose 50% Injectable 25 Gram(s) IV Push once  dextrose 50% Injectable 12.5 Gram(s) IV Push once  dextrose 50% Injectable 25 Gram(s) IV Push once  DULoxetine 30 milliGRAM(s) Oral every 24 hours  glucagon  Injectable 1 milliGRAM(s) IntraMuscular once  heparin   Injectable 5000 Unit(s) SubCutaneous every 8 hours  hydrALAZINE 50 milliGRAM(s) Oral three times a day  insulin lispro (ADMELOG) corrective regimen sliding scale   SubCutaneous every 6 hours  NIFEdipine XL 90 milliGRAM(s) Oral every 24 hours  nitroglycerin    Patch 0.3 mG/Hr(s) 1 patch Transdermal daily  pantoprazole    Tablet 40 milliGRAM(s) Oral before breakfast  PPD  5 Tuberculin Unit(s) Injectable 5 Unit(s) IntraDermal once  traZODone 50 milliGRAM(s) Oral every 24 hours    MEDICATIONS  (PRN):  acetaminophen     Tablet .. 650 milliGRAM(s) Oral every 6 hours PRN Mild Pain (1 - 3)  dextrose Oral Gel 15 Gram(s) Oral once PRN Blood Glucose LESS THAN 70 milliGRAM(s)/deciliter

## 2023-12-04 NOTE — PROGRESS NOTE ADULT - ATTENDING COMMENTS
73F with PMH HTN, HLD, DM with OM s/p multiple amputations of toes; CAD with stress test planned for 11/28; CKD5 presenting with dyspnea, admitted for ischemic w/u, ELBERT on CKD5, stepped up to MICU for [facility] metabolic encephalopathy 2/2 uremia vs. acidosis requiring intubation; now extubated, improving s/p CVVHD but course c/b hypertensive urgency. Mental status resolved. Medically stable to step down to [facility]F.    #Leukocytosis   #Anxiety and  #Metabolic encephalopathy POA now resolved   #ESRD on HD  #ACD  #Major depression   #Aspiration PNA     Plan:   -White count trending up over the weekend and this am, no active signs of infection. Patient completed 5 days od ceftriaxone for PNA in MICU. Will continue to monitor off abx   -Tunneled HD catheter placed by IR. Will c/w HD.   -Hold BP meds before HD. Spoke with psych,  Increase cymbalta, and start hydroxyzine as per psych note for anxiety     Dispo: ANT pending acceptances.

## 2023-12-04 NOTE — PROGRESS NOTE ADULT - SUBJECTIVE AND OBJECTIVE BOX
O/N Events: NAEON    Subjective/ROS: Patient seen and examined at bedside. Patient was happy about seeing PT yesterday, wants to regain former level of independence. States that neck pain she had over the weekend has resolved. Complains of frequent stooling, has 3-4 formed soft stools a day but denies diarrhea, abdominal pain or discomfort. Patient does complain of cough she relates to her asthma.    Denies Fever/Chills, HA, CP, SOB, n/v, changes in urinary habits.  12pt ROS otherwise negative.    VITALS  Vital Signs Last 24 Hrs  T(C): 37.4 (04 Dec 2023 12:01), Max: 37.5 (04 Dec 2023 05:57)  T(F): 99.3 (04 Dec 2023 12:01), Max: 99.5 (04 Dec 2023 05:57)  HR: 70 (04 Dec 2023 17:00) (66 - 72)  BP: 136/60 (04 Dec 2023 17:00) (106/59 - 136/63)  BP(mean): --  RR: 18 (04 Dec 2023 12:01) (17 - 19)  SpO2: 94% (04 Dec 2023 12:01) (93% - 94%)    Parameters below as of 04 Dec 2023 12:01  Patient On (Oxygen Delivery Method): room air        CAPILLARY BLOOD GLUCOSE      POCT Blood Glucose.: 222 mg/dL (04 Dec 2023 17:46)  POCT Blood Glucose.: 256 mg/dL (04 Dec 2023 11:49)  POCT Blood Glucose.: 179 mg/dL (04 Dec 2023 06:28)  POCT Blood Glucose.: 223 mg/dL (04 Dec 2023 00:05)  POCT Blood Glucose.: 187 mg/dL (03 Dec 2023 22:28)      PHYSICAL EXAM  General: NAD, lying in bed comfortably  Head: NC/AT; MMM; EOMI;  Neck: Supple; no JVD  Respiratory: CTAB; no wheezes/rales/rhonchi  Cardiovascular: Regular rhythm/rate; S1/S2+, no murmurs, rubs gallops   Gastrointestinal: Soft; NTND;   Extremities: WWP; no edema  Neurological: A&Ox3, CNII-XII grossly intact; no obvious focal deficits    Antimicrobials:  MEDICATIONS  (STANDING):      Standing Medications:  MEDICATIONS  (STANDING):  albuterol    90 MICROgram(s) HFA Inhaler 2 Puff(s) Inhalation daily  albuterol/ipratropium for Nebulization 3 milliLiter(s) Nebulizer every 6 hours  atorvastatin 40 milliGRAM(s) Oral at bedtime  benzonatate 100 milliGRAM(s) Oral three times a day  calcium acetate 667 milliGRAM(s) Oral three times a day with meals  carvedilol 12.5 milliGRAM(s) Oral every 12 hours  chlorhexidine 2% Cloths 1 Application(s) Topical <User Schedule>  clonazePAM  Tablet 0.25 milliGRAM(s) Oral at bedtime  dextrose 5%. 1000 milliLiter(s) (100 mL/Hr) IV Continuous <Continuous>  dextrose 5%. 1000 milliLiter(s) (50 mL/Hr) IV Continuous <Continuous>  dextrose 50% Injectable 25 Gram(s) IV Push once  dextrose 50% Injectable 25 Gram(s) IV Push once  dextrose 50% Injectable 12.5 Gram(s) IV Push once  glucagon  Injectable 1 milliGRAM(s) IntraMuscular once  heparin   Injectable 5000 Unit(s) SubCutaneous every 8 hours  hydrALAZINE 50 milliGRAM(s) Oral three times a day  insulin lispro (ADMELOG) corrective regimen sliding scale   SubCutaneous every 6 hours  NIFEdipine XL 90 milliGRAM(s) Oral every 24 hours  nitroglycerin    Patch 0.3 mG/Hr(s) 1 patch Transdermal daily  pantoprazole    Tablet 40 milliGRAM(s) Oral before breakfast  traZODone 50 milliGRAM(s) Oral every 24 hours      PRN Medications:  MEDICATIONS  (PRN):  acetaminophen     Tablet .. 650 milliGRAM(s) Oral every 6 hours PRN Mild Pain (1 - 3)  dextrose Oral Gel 15 Gram(s) Oral once PRN Blood Glucose LESS THAN 70 milliGRAM(s)/deciliter  hydrOXYzine hydrochloride 25 milliGRAM(s) Oral two times a day PRN Anxiety      No Known Allergies      LABS                        8.7    14.23 )-----------( 255      ( 04 Dec 2023 06:44 )             27.6     12-04    136  |  98  |  51<H>  ----------------------------<  161<H>  4.2   |  25  |  5.20<H>    Ca    9.2      04 Dec 2023 06:44  Phos  5.6     12-04  Mg     2.2     12-04        Urinalysis Basic - ( 04 Dec 2023 06:44 )    Color: x / Appearance: x / SG: x / pH: x  Gluc: 161 mg/dL / Ketone: x  / Bili: x / Urobili: x   Blood: x / Protein: x / Nitrite: x   Leuk Esterase: x / RBC: x / WBC x   Sq Epi: x / Non Sq Epi: x / Bacteria: x              IMAGING/EKG/ETC

## 2023-12-05 LAB
ANION GAP SERPL CALC-SCNC: 16 MMOL/L — SIGNIFICANT CHANGE UP (ref 5–17)
ANION GAP SERPL CALC-SCNC: 16 MMOL/L — SIGNIFICANT CHANGE UP (ref 5–17)
BASOPHILS # BLD AUTO: 0.04 K/UL — SIGNIFICANT CHANGE UP (ref 0–0.2)
BASOPHILS # BLD AUTO: 0.04 K/UL — SIGNIFICANT CHANGE UP (ref 0–0.2)
BASOPHILS NFR BLD AUTO: 0.3 % — SIGNIFICANT CHANGE UP (ref 0–2)
BASOPHILS NFR BLD AUTO: 0.3 % — SIGNIFICANT CHANGE UP (ref 0–2)
BUN SERPL-MCNC: 64 MG/DL — HIGH (ref 7–23)
BUN SERPL-MCNC: 64 MG/DL — HIGH (ref 7–23)
CALCIUM SERPL-MCNC: 9.3 MG/DL — SIGNIFICANT CHANGE UP (ref 8.4–10.5)
CALCIUM SERPL-MCNC: 9.3 MG/DL — SIGNIFICANT CHANGE UP (ref 8.4–10.5)
CHLORIDE SERPL-SCNC: 96 MMOL/L — SIGNIFICANT CHANGE UP (ref 96–108)
CHLORIDE SERPL-SCNC: 96 MMOL/L — SIGNIFICANT CHANGE UP (ref 96–108)
CO2 SERPL-SCNC: 24 MMOL/L — SIGNIFICANT CHANGE UP (ref 22–31)
CO2 SERPL-SCNC: 24 MMOL/L — SIGNIFICANT CHANGE UP (ref 22–31)
CREAT SERPL-MCNC: 6.29 MG/DL — HIGH (ref 0.5–1.3)
CREAT SERPL-MCNC: 6.29 MG/DL — HIGH (ref 0.5–1.3)
EGFR: 7 ML/MIN/1.73M2 — LOW
EGFR: 7 ML/MIN/1.73M2 — LOW
EOSINOPHIL # BLD AUTO: 0.28 K/UL — SIGNIFICANT CHANGE UP (ref 0–0.5)
EOSINOPHIL # BLD AUTO: 0.28 K/UL — SIGNIFICANT CHANGE UP (ref 0–0.5)
EOSINOPHIL NFR BLD AUTO: 1.9 % — SIGNIFICANT CHANGE UP (ref 0–6)
EOSINOPHIL NFR BLD AUTO: 1.9 % — SIGNIFICANT CHANGE UP (ref 0–6)
GLUCOSE BLDC GLUCOMTR-MCNC: 161 MG/DL — HIGH (ref 70–99)
GLUCOSE BLDC GLUCOMTR-MCNC: 161 MG/DL — HIGH (ref 70–99)
GLUCOSE BLDC GLUCOMTR-MCNC: 169 MG/DL — HIGH (ref 70–99)
GLUCOSE BLDC GLUCOMTR-MCNC: 169 MG/DL — HIGH (ref 70–99)
GLUCOSE BLDC GLUCOMTR-MCNC: 189 MG/DL — HIGH (ref 70–99)
GLUCOSE BLDC GLUCOMTR-MCNC: 189 MG/DL — HIGH (ref 70–99)
GLUCOSE BLDC GLUCOMTR-MCNC: 245 MG/DL — HIGH (ref 70–99)
GLUCOSE BLDC GLUCOMTR-MCNC: 245 MG/DL — HIGH (ref 70–99)
GLUCOSE SERPL-MCNC: 149 MG/DL — HIGH (ref 70–99)
GLUCOSE SERPL-MCNC: 149 MG/DL — HIGH (ref 70–99)
HCT VFR BLD CALC: 26.3 % — LOW (ref 34.5–45)
HCT VFR BLD CALC: 26.3 % — LOW (ref 34.5–45)
HGB BLD-MCNC: 8.4 G/DL — LOW (ref 11.5–15.5)
HGB BLD-MCNC: 8.4 G/DL — LOW (ref 11.5–15.5)
IMM GRANULOCYTES NFR BLD AUTO: 1.1 % — HIGH (ref 0–0.9)
IMM GRANULOCYTES NFR BLD AUTO: 1.1 % — HIGH (ref 0–0.9)
LYMPHOCYTES # BLD AUTO: 18.3 % — SIGNIFICANT CHANGE UP (ref 13–44)
LYMPHOCYTES # BLD AUTO: 18.3 % — SIGNIFICANT CHANGE UP (ref 13–44)
LYMPHOCYTES # BLD AUTO: 2.71 K/UL — SIGNIFICANT CHANGE UP (ref 1–3.3)
LYMPHOCYTES # BLD AUTO: 2.71 K/UL — SIGNIFICANT CHANGE UP (ref 1–3.3)
MAGNESIUM SERPL-MCNC: 2.1 MG/DL — SIGNIFICANT CHANGE UP (ref 1.6–2.6)
MAGNESIUM SERPL-MCNC: 2.1 MG/DL — SIGNIFICANT CHANGE UP (ref 1.6–2.6)
MCHC RBC-ENTMCNC: 28.6 PG — SIGNIFICANT CHANGE UP (ref 27–34)
MCHC RBC-ENTMCNC: 28.6 PG — SIGNIFICANT CHANGE UP (ref 27–34)
MCHC RBC-ENTMCNC: 31.9 GM/DL — LOW (ref 32–36)
MCHC RBC-ENTMCNC: 31.9 GM/DL — LOW (ref 32–36)
MCV RBC AUTO: 89.5 FL — SIGNIFICANT CHANGE UP (ref 80–100)
MCV RBC AUTO: 89.5 FL — SIGNIFICANT CHANGE UP (ref 80–100)
MONOCYTES # BLD AUTO: 1.88 K/UL — HIGH (ref 0–0.9)
MONOCYTES # BLD AUTO: 1.88 K/UL — HIGH (ref 0–0.9)
MONOCYTES NFR BLD AUTO: 12.7 % — SIGNIFICANT CHANGE UP (ref 2–14)
MONOCYTES NFR BLD AUTO: 12.7 % — SIGNIFICANT CHANGE UP (ref 2–14)
NEUTROPHILS # BLD AUTO: 9.75 K/UL — HIGH (ref 1.8–7.4)
NEUTROPHILS # BLD AUTO: 9.75 K/UL — HIGH (ref 1.8–7.4)
NEUTROPHILS NFR BLD AUTO: 65.7 % — SIGNIFICANT CHANGE UP (ref 43–77)
NEUTROPHILS NFR BLD AUTO: 65.7 % — SIGNIFICANT CHANGE UP (ref 43–77)
NRBC # BLD: 0 /100 WBCS — SIGNIFICANT CHANGE UP (ref 0–0)
NRBC # BLD: 0 /100 WBCS — SIGNIFICANT CHANGE UP (ref 0–0)
PHOSPHATE SERPL-MCNC: 6.7 MG/DL — HIGH (ref 2.5–4.5)
PHOSPHATE SERPL-MCNC: 6.7 MG/DL — HIGH (ref 2.5–4.5)
PLATELET # BLD AUTO: 282 K/UL — SIGNIFICANT CHANGE UP (ref 150–400)
PLATELET # BLD AUTO: 282 K/UL — SIGNIFICANT CHANGE UP (ref 150–400)
POTASSIUM SERPL-MCNC: 4.3 MMOL/L — SIGNIFICANT CHANGE UP (ref 3.5–5.3)
POTASSIUM SERPL-MCNC: 4.3 MMOL/L — SIGNIFICANT CHANGE UP (ref 3.5–5.3)
POTASSIUM SERPL-SCNC: 4.3 MMOL/L — SIGNIFICANT CHANGE UP (ref 3.5–5.3)
POTASSIUM SERPL-SCNC: 4.3 MMOL/L — SIGNIFICANT CHANGE UP (ref 3.5–5.3)
RBC # BLD: 2.94 M/UL — LOW (ref 3.8–5.2)
RBC # BLD: 2.94 M/UL — LOW (ref 3.8–5.2)
RBC # FLD: 13.1 % — SIGNIFICANT CHANGE UP (ref 10.3–14.5)
RBC # FLD: 13.1 % — SIGNIFICANT CHANGE UP (ref 10.3–14.5)
SODIUM SERPL-SCNC: 136 MMOL/L — SIGNIFICANT CHANGE UP (ref 135–145)
SODIUM SERPL-SCNC: 136 MMOL/L — SIGNIFICANT CHANGE UP (ref 135–145)
WBC # BLD: 14.83 K/UL — HIGH (ref 3.8–10.5)
WBC # BLD: 14.83 K/UL — HIGH (ref 3.8–10.5)
WBC # FLD AUTO: 14.83 K/UL — HIGH (ref 3.8–10.5)
WBC # FLD AUTO: 14.83 K/UL — HIGH (ref 3.8–10.5)

## 2023-12-05 PROCEDURE — 99233 SBSQ HOSP IP/OBS HIGH 50: CPT | Mod: GC

## 2023-12-05 PROCEDURE — 90935 HEMODIALYSIS ONE EVALUATION: CPT

## 2023-12-05 RX ORDER — ACETAMINOPHEN 500 MG
1000 TABLET ORAL ONCE
Refills: 0 | Status: COMPLETED | OUTPATIENT
Start: 2023-12-05 | End: 2023-12-05

## 2023-12-05 RX ORDER — LIDOCAINE 4 G/100G
1 CREAM TOPICAL DAILY
Refills: 0 | Status: DISCONTINUED | OUTPATIENT
Start: 2023-12-05 | End: 2023-12-07

## 2023-12-05 RX ORDER — OLANZAPINE 15 MG/1
7.5 TABLET, FILM COATED ORAL ONCE
Refills: 0 | Status: COMPLETED | OUTPATIENT
Start: 2023-12-05 | End: 2023-12-05

## 2023-12-05 RX ORDER — ERYTHROPOIETIN 10000 [IU]/ML
8000 INJECTION, SOLUTION INTRAVENOUS; SUBCUTANEOUS ONCE
Refills: 0 | Status: COMPLETED | OUTPATIENT
Start: 2023-12-05 | End: 2023-12-05

## 2023-12-05 RX ORDER — LIDOCAINE 4 G/100G
1 CREAM TOPICAL ONCE
Refills: 0 | Status: COMPLETED | OUTPATIENT
Start: 2023-12-05 | End: 2023-12-05

## 2023-12-05 RX ADMIN — HEPARIN SODIUM 5000 UNIT(S): 5000 INJECTION INTRAVENOUS; SUBCUTANEOUS at 21:22

## 2023-12-05 RX ADMIN — Medication 3 MILLILITER(S): at 11:40

## 2023-12-05 RX ADMIN — Medication 650 MILLIGRAM(S): at 06:27

## 2023-12-05 RX ADMIN — Medication 3 MILLILITER(S): at 23:10

## 2023-12-05 RX ADMIN — Medication 50 MILLIGRAM(S): at 06:08

## 2023-12-05 RX ADMIN — Medication 3 MILLILITER(S): at 17:53

## 2023-12-05 RX ADMIN — Medication 2: at 22:51

## 2023-12-05 RX ADMIN — Medication 100 MILLIGRAM(S): at 06:08

## 2023-12-05 RX ADMIN — Medication 1 PATCH: at 23:12

## 2023-12-05 RX ADMIN — DULOXETINE HYDROCHLORIDE 40 MILLIGRAM(S): 30 CAPSULE, DELAYED RELEASE ORAL at 11:40

## 2023-12-05 RX ADMIN — Medication 650 MILLIGRAM(S): at 11:45

## 2023-12-05 RX ADMIN — OLANZAPINE 7.5 MILLIGRAM(S): 15 TABLET, FILM COATED ORAL at 15:10

## 2023-12-05 RX ADMIN — Medication 667 MILLIGRAM(S): at 07:37

## 2023-12-05 RX ADMIN — Medication 50 MILLIGRAM(S): at 17:53

## 2023-12-05 RX ADMIN — Medication 0.25 MILLIGRAM(S): at 21:21

## 2023-12-05 RX ADMIN — Medication 650 MILLIGRAM(S): at 05:27

## 2023-12-05 RX ADMIN — Medication 2: at 07:37

## 2023-12-05 RX ADMIN — LIDOCAINE 1 PATCH: 4 CREAM TOPICAL at 17:50

## 2023-12-05 RX ADMIN — CARVEDILOL PHOSPHATE 12.5 MILLIGRAM(S): 80 CAPSULE, EXTENDED RELEASE ORAL at 06:07

## 2023-12-05 RX ADMIN — Medication 3 MILLILITER(S): at 00:01

## 2023-12-05 RX ADMIN — Medication 667 MILLIGRAM(S): at 11:40

## 2023-12-05 RX ADMIN — Medication 50 MILLIGRAM(S): at 21:22

## 2023-12-05 RX ADMIN — ATORVASTATIN CALCIUM 40 MILLIGRAM(S): 80 TABLET, FILM COATED ORAL at 21:22

## 2023-12-05 RX ADMIN — Medication 3 MILLILITER(S): at 06:08

## 2023-12-05 RX ADMIN — Medication 400 MILLIGRAM(S): at 15:09

## 2023-12-05 RX ADMIN — Medication 100 MILLIGRAM(S): at 21:21

## 2023-12-05 RX ADMIN — Medication 650 MILLIGRAM(S): at 22:22

## 2023-12-05 RX ADMIN — Medication 650 MILLIGRAM(S): at 12:45

## 2023-12-05 RX ADMIN — HEPARIN SODIUM 5000 UNIT(S): 5000 INJECTION INTRAVENOUS; SUBCUTANEOUS at 06:08

## 2023-12-05 RX ADMIN — Medication 90 MILLIGRAM(S): at 06:08

## 2023-12-05 RX ADMIN — Medication 2: at 17:54

## 2023-12-05 RX ADMIN — ERYTHROPOIETIN 8000 UNIT(S): 10000 INJECTION, SOLUTION INTRAVENOUS; SUBCUTANEOUS at 17:00

## 2023-12-05 RX ADMIN — Medication 1 PATCH: at 11:40

## 2023-12-05 RX ADMIN — PANTOPRAZOLE SODIUM 40 MILLIGRAM(S): 20 TABLET, DELAYED RELEASE ORAL at 06:07

## 2023-12-05 RX ADMIN — LIDOCAINE 1 PATCH: 4 CREAM TOPICAL at 23:10

## 2023-12-05 RX ADMIN — Medication 667 MILLIGRAM(S): at 17:53

## 2023-12-05 RX ADMIN — CHLORHEXIDINE GLUCONATE 1 APPLICATION(S): 213 SOLUTION TOPICAL at 06:55

## 2023-12-05 RX ADMIN — Medication 1 PATCH: at 17:50

## 2023-12-05 RX ADMIN — Medication 25 MILLIGRAM(S): at 09:35

## 2023-12-05 RX ADMIN — LIDOCAINE 1 PATCH: 4 CREAM TOPICAL at 15:09

## 2023-12-05 RX ADMIN — Medication 4: at 13:20

## 2023-12-05 RX ADMIN — Medication 650 MILLIGRAM(S): at 21:22

## 2023-12-05 NOTE — PROGRESS NOTE ADULT - PROBLEM SELECTOR PLAN 2
Psych consulted, no need for 1-1.  Denies any active plan to commit suicide. Home meds: Klonipin 0.5mg QD, Duloxetine 30mg QD, Trazodone 50mg QD.  - Psych consulted, appreciate recs  - Klonopin at reduced dose 0.25 qhs.   - c/w increased duloxetine to 40mg QD  - c/w Trazodone 50 QD  - started hydroxyzine 25 mg BID PRN for anxiety and 1 hour before HD

## 2023-12-05 NOTE — PROGRESS NOTE ADULT - TIME BILLING
Review of hospital course, labs, vitals, medical records.   Bedside exam and interview   Discussed plan of care with primary team ACP and housestaff   Documenting the encounter

## 2023-12-05 NOTE — PROGRESS NOTE ADULT - PROBLEM SELECTOR PLAN 1
RESOLVED. [facility] metabolic encephalopathy 2/2 uremia vs. acidosis, requiring intubation in MICU. mental status improved s/p CVVHD with uremia and acidosis resolved. Now AOx3 with appropriate affect. CTH non cont 11/28 shows microvascular disease with lacunar infarcts. No acute pathology. Passed bedside dysphagia   - Klonopin at reduced dose 0.25 qhs.   - holding other   - c/w HD per nephro RESOLVED. [facility] metabolic encephalopathy 2/2 uremia vs. acidosis, requiring intubation in MICU. mental status improved s/p CVVHD with uremia and acidosis resolved. Now AOx3 with appropriate affect. CTH non cont 11/28 shows microvascular disease with lacunar infarcts. No acute pathology. Passed bedside dysphagia   - Klonopin at reduced dose 0.25 qhs.   - holding other   - c/w HD per nephro

## 2023-12-05 NOTE — PROGRESS NOTE ADULT - ASSESSMENT
********INCOMPLETE************        73YF w/ CKD5, now ESRD, admitted 11/25 for HTN urgency and NSTEMI after presenting w/ SOB and lightheadedness. 11/26 pt w/ AMS 2/2 ?toxic encephalopathy (clonazepam) vs uremia vs acidosis, requiring intubation and pressors, also aspiration PNA 11/27, UO dropped, started on CVVHD, followed by clinical improvement, transitioned to iHD 11/28    #ESRD on HD, currently on TTS schedule:   No indication of urgent HD today. Last HD 12/2  Next HD tomorrow 12/5  Renal diet   Daily BMP   Outpatient HD set up in progress. Hep panel negative. PPD negative.    Anemia:  Likely 2/2 ESRD   EPO with HD, no indication for IV iron at this time (iron sat 38%)      MBD:  Calcium: 9.2  Phos: 5.6  If phos persistently >5.5 can start on phos binders   73YF w/ CKD5, now ESRD, admitted 11/25 for HTN urgency and NSTEMI after presenting w/ SOB and lightheadedness. 11/26 pt w/ AMS 2/2 ?toxic encephalopathy (clonazepam) vs uremia vs acidosis, requiring intubation and pressors, also aspiration PNA 11/27, UO dropped, started on CVVHD, followed by clinical improvement, transitioned to iHD 11/28    #ESRD on HD, currently on TTS schedule:   HD today. Seen on HD. Tolerating well. BP stable. Continue HD as above.  Next HD 12/7  Renal diet   Daily BMP   Outpatient HD set up in progress. Hep panel negative. PPD negative.    Anemia:  Likely 2/2 ESRD   EPO with HD, no indication for IV iron at this time (iron sat 38%)      MBD:  Calcium: 9.3  Phos: 6.7  If phos persistently >5.5 can start on phos binders

## 2023-12-05 NOTE — ADVANCED PRACTICE NURSE CONSULT - REASON FOR CONSULT
sacrum    Per chart review, 73F with PMH HTN, HLD, DM with OM s/p multiple amputations of toes; CAD with stress test planned for 11/28; CKD5 presenting with dyspnea, admitted for ischemic w/u, ELBERT on CKD5, stepped up to MICU for [facility] metabolic encephalopathy 2/2 uremia vs. acidosis requiring intubation; now extubated, improving s/p CVVHD but course c/b hypertensive urgency. Mental status improving, on intermittent HD. BP improving w/ uptitrating BP meds. Medically stable to step down to RMF.

## 2023-12-05 NOTE — ADVANCED PRACTICE NURSE CONSULT - ASSESSMENT
Pt awake, alert, laying on her right side. Denies any sacral wounds or pain. Incontinent of bladder. On ESRD.    Sacrum and back intact.   Heels intact. Right plantar foot healed wound.  Pt awake, alert, laying on her right side. Denies any sacral wounds or pain. Incontinent of bladder. On ESRD. Raul 14.    Sacrum and back intact.   Heels intact. Right plantar foot healed wound.

## 2023-12-05 NOTE — PROGRESS NOTE ADULT - ATTENDING COMMENTS
73F with PMH HTN, HLD, DM with OM s/p multiple amputations of toes; CAD with stress test planned for 11/28; CKD5 presenting with dyspnea, admitted for ischemic w/u, ELBERT on CKD5, stepped up to MICU for [facility] metabolic encephalopathy 2/2 uremia vs. acidosis requiring intubation; now extubated, improving s/p CVVHD but course c/b hypertensive urgency. Mental status resolved. Medically stable to step down to RMF.    #Leukocytosis   #Anxiety and  #Metabolic encephalopathy POA now resolved   #ESRD on HD  #ACD  #Major depression   #Aspiration PNA     Plan:   -White count trending up, no active signs of infection. Patient completed 5 days od ceftriaxone for PNA in MICU. Nontoxic, no fever, chills, tachycardia  or hypotension. Will continue to monitor off abx   -Tunneled HD catheter placed by IR. Will c/w HD.   -Hold BP meds before HD.   -c/w Cymbalta, trazodone 50mg PO qHS PRN, clonazepam and hydroxyzine 25mg PO BID PRN anxiety.       Dispo: ANT pending acceptances.

## 2023-12-05 NOTE — PROGRESS NOTE ADULT - SUBJECTIVE AND OBJECTIVE BOX
O/N Events: NAEON    Subjective/ROS: Patient seen and examined at bedside. Patient complained of right-sided back and flank aspirin. Patient expressed sadness over lack of involvement from family during her health problems.     Denies Fever/Chills, HA, CP, SOB, n/v, changes in bowel/urinary habits.  12pt ROS otherwise negative.    VITALS  Vital Signs Last 24 Hrs  T(C): 36.4 (05 Dec 2023 17:00), Max: 37.1 (04 Dec 2023 20:46)  T(F): 97.5 (05 Dec 2023 17:00), Max: 98.8 (04 Dec 2023 20:46)  HR: 57 (05 Dec 2023 17:00) (57 - 67)  BP: 106/57 (05 Dec 2023 17:00) (106/57 - 138/70)  BP(mean): --  RR: 18 (05 Dec 2023 17:00) (17 - 19)  SpO2: 97% (05 Dec 2023 17:00) (94% - 97%)    Parameters below as of 05 Dec 2023 17:00  Patient On (Oxygen Delivery Method): room air        CAPILLARY BLOOD GLUCOSE      POCT Blood Glucose.: 245 mg/dL (05 Dec 2023 13:00)  POCT Blood Glucose.: 169 mg/dL (05 Dec 2023 07:00)  POCT Blood Glucose.: 219 mg/dL (04 Dec 2023 23:51)  POCT Blood Glucose.: 222 mg/dL (04 Dec 2023 17:46)      PHYSICAL EXAM  General: NAD  Head: NC/AT; MMM; EOMI;  Neck: Supple; no JVD  Respiratory: CTAB; no wheezes/rales/rhonchi  Cardiovascular: Regular rhythm/rate; S1/S2+, no murmurs, rubs gallops   Gastrointestinal: Soft; NTND;  Extremities: WWP;   Neurological: A&Ox3, CNII-XII grossly intact; no obvious focal deficits    Antimicrobials:  MEDICATIONS  (STANDING):      Standing Medications:  MEDICATIONS  (STANDING):  albuterol    90 MICROgram(s) HFA Inhaler 2 Puff(s) Inhalation daily  albuterol/ipratropium for Nebulization 3 milliLiter(s) Nebulizer every 6 hours  atorvastatin 40 milliGRAM(s) Oral at bedtime  benzonatate 100 milliGRAM(s) Oral three times a day  calcium acetate 667 milliGRAM(s) Oral three times a day with meals  carvedilol 12.5 milliGRAM(s) Oral every 12 hours  chlorhexidine 2% Cloths 1 Application(s) Topical <User Schedule>  clonazePAM  Tablet 0.25 milliGRAM(s) Oral at bedtime  dextrose 5%. 1000 milliLiter(s) (50 mL/Hr) IV Continuous <Continuous>  dextrose 5%. 1000 milliLiter(s) (100 mL/Hr) IV Continuous <Continuous>  dextrose 50% Injectable 25 Gram(s) IV Push once  dextrose 50% Injectable 25 Gram(s) IV Push once  dextrose 50% Injectable 12.5 Gram(s) IV Push once  DULoxetine 40 milliGRAM(s) Oral daily  glucagon  Injectable 1 milliGRAM(s) IntraMuscular once  heparin   Injectable 5000 Unit(s) SubCutaneous every 8 hours  hydrALAZINE 50 milliGRAM(s) Oral three times a day  insulin lispro (ADMELOG) corrective regimen sliding scale   SubCutaneous Before meals and at bedtime  lidocaine   4% Patch 1 Patch Transdermal daily  NIFEdipine XL 90 milliGRAM(s) Oral every 24 hours  nitroglycerin    Patch 0.3 mG/Hr(s) 1 patch Transdermal daily  pantoprazole    Tablet 40 milliGRAM(s) Oral before breakfast  traZODone 50 milliGRAM(s) Oral every 24 hours      PRN Medications:  MEDICATIONS  (PRN):  acetaminophen     Tablet .. 650 milliGRAM(s) Oral every 6 hours PRN Mild Pain (1 - 3)  dextrose Oral Gel 15 Gram(s) Oral once PRN Blood Glucose LESS THAN 70 milliGRAM(s)/deciliter  hydrOXYzine hydrochloride 25 milliGRAM(s) Oral two times a day PRN Anxiety      No Known Allergies      LABS                        8.4    14.83 )-----------( 282      ( 05 Dec 2023 06:36 )             26.3     12-05    136  |  96  |  64<H>  ----------------------------<  149<H>  4.3   |  24  |  6.29<H>    Ca    9.3      05 Dec 2023 06:36  Phos  6.7     12-05  Mg     2.1     12-05        Urinalysis Basic - ( 05 Dec 2023 06:36 )    Color: x / Appearance: x / SG: x / pH: x  Gluc: 149 mg/dL / Ketone: x  / Bili: x / Urobili: x   Blood: x / Protein: x / Nitrite: x   Leuk Esterase: x / RBC: x / WBC x   Sq Epi: x / Non Sq Epi: x / Bacteria: x              IMAGING/EKG/ETC

## 2023-12-05 NOTE — PROGRESS NOTE ADULT - SUBJECTIVE AND OBJECTIVE BOX
Patient is a 73y Female seen and evaluated at bedside.       Meds:    acetaminophen     Tablet .. 650 every 6 hours PRN  albuterol    90 MICROgram(s) HFA Inhaler 2 daily  albuterol/ipratropium for Nebulization 3 every 6 hours  atorvastatin 40 at bedtime  benzonatate 100 three times a day  calcium acetate 667 three times a day with meals  carvedilol 12.5 every 12 hours  chlorhexidine 2% Cloths 1 <User Schedule>  clonazePAM  Tablet 0.25 at bedtime  dextrose 5%. 1000 <Continuous>  dextrose 5%. 1000 <Continuous>  dextrose 50% Injectable 25 once  dextrose 50% Injectable 25 once  dextrose 50% Injectable 12.5 once  dextrose Oral Gel 15 once PRN  DULoxetine 40 daily  epoetin jeannie-epbx (RETACRIT) Injectable 8000 once  glucagon  Injectable 1 once  heparin   Injectable 5000 every 8 hours  hydrALAZINE 50 three times a day  hydrOXYzine hydrochloride 25 two times a day PRN  insulin lispro (ADMELOG) corrective regimen sliding scale  every 6 hours  NIFEdipine XL 90 every 24 hours  nitroglycerin    Patch 0.3 mG/Hr(s) 1 daily  pantoprazole    Tablet 40 before breakfast  traZODone 50 every 24 hours      T(C): , Max: 37.4 (12-04-23 @ 12:01)  T(F): , Max: 99.3 (12-04-23 @ 12:01)  HR: 65 (12-05-23 @ 05:57)  BP: 138/70 (12-05-23 @ 05:57)  BP(mean): --  RR: 18 (12-05-23 @ 05:57)  SpO2: 94% (12-05-23 @ 05:57)  Wt(kg): --    12-04 @ 07:01  -  12-05 @ 07:00  --------------------------------------------------------  IN: 540 mL / OUT: 0 mL / NET: 540 mL          Review of Systems:  ROS negative except as per HPI      PHYSICAL EXAM:  GENERAL: NAD, resting in bed  NECK: supple, No JVD  CHEST/LUNG: Clear to auscultation bilaterally  HEART: normal S1S2, RRR  ABDOMEN: Soft, Nontender, +BS,    EXTREMITIES: No clubbing, cyanosis, or edema   NEUROLOGY: AAO x3, no focal neurological deficit  ACCESS: [facility]      LABS:                        8.4    14.83 )-----------( 282      ( 05 Dec 2023 06:36 )             26.3     12-05    136  |  96  |  64<H>  ----------------------------<  149<H>  4.3   |  24  |  6.29<H>    Ca    9.3      05 Dec 2023 06:36  Phos  6.7     12-05  Mg     2.1     12-05          Urinalysis Basic - ( 05 Dec 2023 06:36 )    Color: x / Appearance: x / SG: x / pH: x  Gluc: 149 mg/dL / Ketone: x  / Bili: x / Urobili: x   Blood: x / Protein: x / Nitrite: x   Leuk Esterase: x / RBC: x / WBC x   Sq Epi: x / Non Sq Epi: x / Bacteria: x            RADIOLOGY & ADDITIONAL STUDIES:           Patient is a 73y Female seen and evaluated at bedside.       Meds:    acetaminophen     Tablet .. 650 every 6 hours PRN  albuterol    90 MICROgram(s) HFA Inhaler 2 daily  albuterol/ipratropium for Nebulization 3 every 6 hours  atorvastatin 40 at bedtime  benzonatate 100 three times a day  calcium acetate 667 three times a day with meals  carvedilol 12.5 every 12 hours  chlorhexidine 2% Cloths 1 <User Schedule>  clonazePAM  Tablet 0.25 at bedtime  dextrose 5%. 1000 <Continuous>  dextrose 5%. 1000 <Continuous>  dextrose 50% Injectable 25 once  dextrose 50% Injectable 25 once  dextrose 50% Injectable 12.5 once  dextrose Oral Gel 15 once PRN  DULoxetine 40 daily  epoetin jeannie-epbx (RETACRIT) Injectable 8000 once  glucagon  Injectable 1 once  heparin   Injectable 5000 every 8 hours  hydrALAZINE 50 three times a day  hydrOXYzine hydrochloride 25 two times a day PRN  insulin lispro (ADMELOG) corrective regimen sliding scale  every 6 hours  NIFEdipine XL 90 every 24 hours  nitroglycerin    Patch 0.3 mG/Hr(s) 1 daily  pantoprazole    Tablet 40 before breakfast  traZODone 50 every 24 hours      T(C): , Max: 37.4 (12-04-23 @ 12:01)  T(F): , Max: 99.3 (12-04-23 @ 12:01)  HR: 65 (12-05-23 @ 05:57)  BP: 138/70 (12-05-23 @ 05:57)  BP(mean): --  RR: 18 (12-05-23 @ 05:57)  SpO2: 94% (12-05-23 @ 05:57)  Wt(kg): --    12-04 @ 07:01  -  12-05 @ 07:00  --------------------------------------------------------  IN: 540 mL / OUT: 0 mL / NET: 540 mL          Review of Systems:  ROS negative except as per HPI      PHYSICAL EXAM:  GENERAL: NAD, resting in bed  NECK: supple, No JVD  CHEST/LUNG: Clear to auscultation bilaterally  HEART: normal S1S2, RRR  ABDOMEN: Soft, Nontender, +BS,    EXTREMITIES: No clubbing, cyanosis, or edema   NEUROLOGY: AAO x3, no focal neurological deficit  ACCESS: [facility]      LABS:                        8.4    14.83 )-----------( 282      ( 05 Dec 2023 06:36 )             26.3     12-05    136  |  96  |  64<H>  ----------------------------<  149<H>  4.3   |  24  |  6.29<H>    Ca    9.3      05 Dec 2023 06:36  Phos  6.7     12-05  Mg     2.1     12-05          Urinalysis Basic - ( 05 Dec 2023 06:36 )    Color: x / Appearance: x / SG: x / pH: x  Gluc: 149 mg/dL / Ketone: x  / Bili: x / Urobili: x   Blood: x / Protein: x / Nitrite: x   Leuk Esterase: x / RBC: x / WBC x   Sq Epi: x / Non Sq Epi: x / Bacteria: x            RADIOLOGY & ADDITIONAL STUDIES:           Patient is a 73y Female seen and evaluated at bedside. No acute events overnight noted.  This morning patient complaining of right hip pain.      Meds:    acetaminophen     Tablet .. 650 every 6 hours PRN  albuterol    90 MICROgram(s) HFA Inhaler 2 daily  albuterol/ipratropium for Nebulization 3 every 6 hours  atorvastatin 40 at bedtime  benzonatate 100 three times a day  calcium acetate 667 three times a day with meals  carvedilol 12.5 every 12 hours  chlorhexidine 2% Cloths 1 <User Schedule>  clonazePAM  Tablet 0.25 at bedtime  dextrose 5%. 1000 <Continuous>  dextrose 5%. 1000 <Continuous>  dextrose 50% Injectable 25 once  dextrose 50% Injectable 25 once  dextrose 50% Injectable 12.5 once  dextrose Oral Gel 15 once PRN  DULoxetine 40 daily  epoetin jeannie-epbx (RETACRIT) Injectable 8000 once  glucagon  Injectable 1 once  heparin   Injectable 5000 every 8 hours  hydrALAZINE 50 three times a day  hydrOXYzine hydrochloride 25 two times a day PRN  insulin lispro (ADMELOG) corrective regimen sliding scale  every 6 hours  NIFEdipine XL 90 every 24 hours  nitroglycerin    Patch 0.3 mG/Hr(s) 1 daily  pantoprazole    Tablet 40 before breakfast  traZODone 50 every 24 hours      T(C): , Max: 37.4 (23 @ 12:01)  T(F): , Max: 99.3 (23 @ 12:01)  HR: 65 (23 @ 05:57)  BP: 138/70 (23 @ 05:57)  BP(mean): --  RR: 18 (23 @ 05:57)  SpO2: 94% (23 @ 05:57)  Wt(kg): --     @ 07:01  -   @ 07:00  --------------------------------------------------------  IN: 540 mL / OUT: 0 mL / NET: 540 mL          Review of Systems:  ROS negative except as per HPI      PHYSICAL EXAM:  GENERAL: NAD, resting in bed  NECK: supple, No JVD  CHEST/LUNG: Clear to auscultation bilaterally  HEART: normal S1S2, RRR  ABDOMEN: Soft, Nontender, +BS,    EXTREMITIES: No clubbing, cyanosis, or edema   NEUROLOGY: AAO x3, no focal neurological deficit  ACCESS: [facility]      LABS:                        8.4    14.83 )-----------( 282      ( 05 Dec 2023 06:36 )             26.3     12-    136  |  96  |  64<H>  ----------------------------<  149<H>  4.3   |  24  |  6.29<H>    Ca    9.3      05 Dec 2023 06:36  Phos  6.7       Mg     2.1               Urinalysis Basic - ( 05 Dec 2023 06:36 )    Color: x / Appearance: x / SG: x / pH: x  Gluc: 149 mg/dL / Ketone: x  / Bili: x / Urobili: x   Blood: x / Protein: x / Nitrite: x   Leuk Esterase: x / RBC: x / WBC x   Sq Epi: x / Non Sq Epi: x / Bacteria: x            RADIOLOGY & ADDITIONAL STUDIES:      Hemodialysis Treatment.:     Schedule: Once, Modality: Hemodialysis, Access: Internal Jugular Central Venous Catheter    Dialyzer: Optiflux [id number], Time: 180 Min    Blood Flow: 400 mL/Min , Dialysate Flow: 500 mL/Min, Dialysate Temp: 36.5, Tubinmm (Adult)    Target Fluid Removal: 2 Liters    Dialysate Electrolytes (mEq/L): Potassium 3, Calcium 2.5, Sodium 138, Bicarbonate 35 (23 @ 07:54) [Completed]     Patient is a 73y Female seen and evaluated at bedside. No acute events overnight noted.  This morning patient complaining of right hip pain.      Meds:    acetaminophen     Tablet .. 650 every 6 hours PRN  albuterol    90 MICROgram(s) HFA Inhaler 2 daily  albuterol/ipratropium for Nebulization 3 every 6 hours  atorvastatin 40 at bedtime  benzonatate 100 three times a day  calcium acetate 667 three times a day with meals  carvedilol 12.5 every 12 hours  chlorhexidine 2% Cloths 1 <User Schedule>  clonazePAM  Tablet 0.25 at bedtime  dextrose 5%. 1000 <Continuous>  dextrose 5%. 1000 <Continuous>  dextrose 50% Injectable 25 once  dextrose 50% Injectable 25 once  dextrose 50% Injectable 12.5 once  dextrose Oral Gel 15 once PRN  DULoxetine 40 daily  epoetin jeannie-epbx (RETACRIT) Injectable 8000 once  glucagon  Injectable 1 once  heparin   Injectable 5000 every 8 hours  hydrALAZINE 50 three times a day  hydrOXYzine hydrochloride 25 two times a day PRN  insulin lispro (ADMELOG) corrective regimen sliding scale  every 6 hours  NIFEdipine XL 90 every 24 hours  nitroglycerin    Patch 0.3 mG/Hr(s) 1 daily  pantoprazole    Tablet 40 before breakfast  traZODone 50 every 24 hours      T(C): , Max: 37.4 (23 @ 12:01)  T(F): , Max: 99.3 (23 @ 12:01)  HR: 65 (23 @ 05:57)  BP: 138/70 (23 @ 05:57)  BP(mean): --  RR: 18 (23 @ 05:57)  SpO2: 94% (23 @ 05:57)  Wt(kg): --     @ 07:01  -   @ 07:00  --------------------------------------------------------  IN: 540 mL / OUT: 0 mL / NET: 540 mL          Review of Systems:  ROS negative except as per HPI      PHYSICAL EXAM:  GENERAL: NAD, resting in bed  NECK: supple, No JVD  CHEST/LUNG: Clear to auscultation bilaterally  HEART: normal S1S2, RRR  ABDOMEN: Soft, Nontender, +BS,    EXTREMITIES: No clubbing, cyanosis, or edema   NEUROLOGY: AAO x3, no focal neurological deficit  ACCESS: [facility]      LABS:                        8.4    14.83 )-----------( 282      ( 05 Dec 2023 06:36 )             26.3     12-    136  |  96  |  64<H>  ----------------------------<  149<H>  4.3   |  24  |  6.29<H>    Ca    9.3      05 Dec 2023 06:36  Phos  6.7       Mg     2.1               Urinalysis Basic - ( 05 Dec 2023 06:36 )    Color: x / Appearance: x / SG: x / pH: x  Gluc: 149 mg/dL / Ketone: x  / Bili: x / Urobili: x   Blood: x / Protein: x / Nitrite: x   Leuk Esterase: x / RBC: x / WBC x   Sq Epi: x / Non Sq Epi: x / Bacteria: x            RADIOLOGY & ADDITIONAL STUDIES:      Hemodialysis Treatment.:     Schedule: Once, Modality: Hemodialysis, Access: Internal Jugular Central Venous Catheter    Dialyzer: Optiflux [id number], Time: 180 Min    Blood Flow: 400 mL/Min , Dialysate Flow: 500 mL/Min, Dialysate Temp: 36.5, Tubinmm (Adult)    Target Fluid Removal: 2 Liters    Dialysate Electrolytes (mEq/L): Potassium 3, Calcium 2.5, Sodium 138, Bicarbonate 35 (23 @ 07:54) [Completed]

## 2023-12-05 NOTE — PROGRESS NOTE ADULT - PROBLEM SELECTOR PLAN 4
new LLL opacity seen on Xray 11/27. Febrile on 11/28. . Aspiration vs. atelectasis. on CTX 2g. pt continues to be afebrile with downtrending WBC.   s/p CTX for total 5 days (11/26-12/1)    #Leukocytosis  Patient WBC count up trending since 12/1/23  CXR: no consolidations  Procal 0.47, RVP (-)  - continuing to monitor off abx at this time

## 2023-12-06 LAB
ALBUMIN SERPL ELPH-MCNC: 2.7 G/DL — LOW (ref 3.3–5)
ALBUMIN SERPL ELPH-MCNC: 2.7 G/DL — LOW (ref 3.3–5)
ALP SERPL-CCNC: 90 U/L — SIGNIFICANT CHANGE UP (ref 40–120)
ALP SERPL-CCNC: 90 U/L — SIGNIFICANT CHANGE UP (ref 40–120)
ALT FLD-CCNC: 17 U/L — SIGNIFICANT CHANGE UP (ref 10–45)
ALT FLD-CCNC: 17 U/L — SIGNIFICANT CHANGE UP (ref 10–45)
ANION GAP SERPL CALC-SCNC: 14 MMOL/L — SIGNIFICANT CHANGE UP (ref 5–17)
ANION GAP SERPL CALC-SCNC: 14 MMOL/L — SIGNIFICANT CHANGE UP (ref 5–17)
ANISOCYTOSIS BLD QL: SLIGHT — SIGNIFICANT CHANGE UP
ANISOCYTOSIS BLD QL: SLIGHT — SIGNIFICANT CHANGE UP
AST SERPL-CCNC: 11 U/L — SIGNIFICANT CHANGE UP (ref 10–40)
AST SERPL-CCNC: 11 U/L — SIGNIFICANT CHANGE UP (ref 10–40)
BASOPHILS # BLD AUTO: 0.23 K/UL — HIGH (ref 0–0.2)
BASOPHILS # BLD AUTO: 0.23 K/UL — HIGH (ref 0–0.2)
BASOPHILS NFR BLD AUTO: 1.7 % — SIGNIFICANT CHANGE UP (ref 0–2)
BASOPHILS NFR BLD AUTO: 1.7 % — SIGNIFICANT CHANGE UP (ref 0–2)
BILIRUB SERPL-MCNC: 0.3 MG/DL — SIGNIFICANT CHANGE UP (ref 0.2–1.2)
BILIRUB SERPL-MCNC: 0.3 MG/DL — SIGNIFICANT CHANGE UP (ref 0.2–1.2)
BUN SERPL-MCNC: 32 MG/DL — HIGH (ref 7–23)
BUN SERPL-MCNC: 32 MG/DL — HIGH (ref 7–23)
CALCIUM SERPL-MCNC: 9.4 MG/DL — SIGNIFICANT CHANGE UP (ref 8.4–10.5)
CALCIUM SERPL-MCNC: 9.4 MG/DL — SIGNIFICANT CHANGE UP (ref 8.4–10.5)
CHLORIDE SERPL-SCNC: 95 MMOL/L — LOW (ref 96–108)
CHLORIDE SERPL-SCNC: 95 MMOL/L — LOW (ref 96–108)
CO2 SERPL-SCNC: 25 MMOL/L — SIGNIFICANT CHANGE UP (ref 22–31)
CO2 SERPL-SCNC: 25 MMOL/L — SIGNIFICANT CHANGE UP (ref 22–31)
CREAT SERPL-MCNC: 3.77 MG/DL — HIGH (ref 0.5–1.3)
CREAT SERPL-MCNC: 3.77 MG/DL — HIGH (ref 0.5–1.3)
EGFR: 12 ML/MIN/1.73M2 — LOW
EGFR: 12 ML/MIN/1.73M2 — LOW
EOSINOPHIL # BLD AUTO: 0.12 K/UL — SIGNIFICANT CHANGE UP (ref 0–0.5)
EOSINOPHIL # BLD AUTO: 0.12 K/UL — SIGNIFICANT CHANGE UP (ref 0–0.5)
EOSINOPHIL NFR BLD AUTO: 0.9 % — SIGNIFICANT CHANGE UP (ref 0–6)
EOSINOPHIL NFR BLD AUTO: 0.9 % — SIGNIFICANT CHANGE UP (ref 0–6)
GIANT PLATELETS BLD QL SMEAR: PRESENT — SIGNIFICANT CHANGE UP
GIANT PLATELETS BLD QL SMEAR: PRESENT — SIGNIFICANT CHANGE UP
GLUCOSE BLDC GLUCOMTR-MCNC: 132 MG/DL — HIGH (ref 70–99)
GLUCOSE BLDC GLUCOMTR-MCNC: 132 MG/DL — HIGH (ref 70–99)
GLUCOSE BLDC GLUCOMTR-MCNC: 181 MG/DL — HIGH (ref 70–99)
GLUCOSE BLDC GLUCOMTR-MCNC: 181 MG/DL — HIGH (ref 70–99)
GLUCOSE BLDC GLUCOMTR-MCNC: 185 MG/DL — HIGH (ref 70–99)
GLUCOSE BLDC GLUCOMTR-MCNC: 185 MG/DL — HIGH (ref 70–99)
GLUCOSE BLDC GLUCOMTR-MCNC: 186 MG/DL — HIGH (ref 70–99)
GLUCOSE BLDC GLUCOMTR-MCNC: 186 MG/DL — HIGH (ref 70–99)
GLUCOSE BLDC GLUCOMTR-MCNC: 250 MG/DL — HIGH (ref 70–99)
GLUCOSE BLDC GLUCOMTR-MCNC: 250 MG/DL — HIGH (ref 70–99)
GLUCOSE SERPL-MCNC: 172 MG/DL — HIGH (ref 70–99)
GLUCOSE SERPL-MCNC: 172 MG/DL — HIGH (ref 70–99)
HCT VFR BLD CALC: 26.7 % — LOW (ref 34.5–45)
HCT VFR BLD CALC: 26.7 % — LOW (ref 34.5–45)
HGB BLD-MCNC: 8.3 G/DL — LOW (ref 11.5–15.5)
HGB BLD-MCNC: 8.3 G/DL — LOW (ref 11.5–15.5)
HYPOCHROMIA BLD QL: SLIGHT — SIGNIFICANT CHANGE UP
HYPOCHROMIA BLD QL: SLIGHT — SIGNIFICANT CHANGE UP
LYMPHOCYTES # BLD AUTO: 17.2 % — SIGNIFICANT CHANGE UP (ref 13–44)
LYMPHOCYTES # BLD AUTO: 17.2 % — SIGNIFICANT CHANGE UP (ref 13–44)
LYMPHOCYTES # BLD AUTO: 2.31 K/UL — SIGNIFICANT CHANGE UP (ref 1–3.3)
LYMPHOCYTES # BLD AUTO: 2.31 K/UL — SIGNIFICANT CHANGE UP (ref 1–3.3)
MAGNESIUM SERPL-MCNC: 2.1 MG/DL — SIGNIFICANT CHANGE UP (ref 1.6–2.6)
MAGNESIUM SERPL-MCNC: 2.1 MG/DL — SIGNIFICANT CHANGE UP (ref 1.6–2.6)
MANUAL SMEAR VERIFICATION: SIGNIFICANT CHANGE UP
MANUAL SMEAR VERIFICATION: SIGNIFICANT CHANGE UP
MCHC RBC-ENTMCNC: 28.5 PG — SIGNIFICANT CHANGE UP (ref 27–34)
MCHC RBC-ENTMCNC: 28.5 PG — SIGNIFICANT CHANGE UP (ref 27–34)
MCHC RBC-ENTMCNC: 31.1 GM/DL — LOW (ref 32–36)
MCHC RBC-ENTMCNC: 31.1 GM/DL — LOW (ref 32–36)
MCV RBC AUTO: 91.8 FL — SIGNIFICANT CHANGE UP (ref 80–100)
MCV RBC AUTO: 91.8 FL — SIGNIFICANT CHANGE UP (ref 80–100)
MICROCYTES BLD QL: SLIGHT — SIGNIFICANT CHANGE UP
MICROCYTES BLD QL: SLIGHT — SIGNIFICANT CHANGE UP
MONOCYTES # BLD AUTO: 1.15 K/UL — HIGH (ref 0–0.9)
MONOCYTES # BLD AUTO: 1.15 K/UL — HIGH (ref 0–0.9)
MONOCYTES NFR BLD AUTO: 8.6 % — SIGNIFICANT CHANGE UP (ref 2–14)
MONOCYTES NFR BLD AUTO: 8.6 % — SIGNIFICANT CHANGE UP (ref 2–14)
NEUTROPHILS # BLD AUTO: 9.62 K/UL — HIGH (ref 1.8–7.4)
NEUTROPHILS # BLD AUTO: 9.62 K/UL — HIGH (ref 1.8–7.4)
NEUTROPHILS NFR BLD AUTO: 71.6 % — SIGNIFICANT CHANGE UP (ref 43–77)
NEUTROPHILS NFR BLD AUTO: 71.6 % — SIGNIFICANT CHANGE UP (ref 43–77)
OVALOCYTES BLD QL SMEAR: SLIGHT — SIGNIFICANT CHANGE UP
OVALOCYTES BLD QL SMEAR: SLIGHT — SIGNIFICANT CHANGE UP
PHOSPHATE SERPL-MCNC: 4.8 MG/DL — HIGH (ref 2.5–4.5)
PHOSPHATE SERPL-MCNC: 4.8 MG/DL — HIGH (ref 2.5–4.5)
PLAT MORPH BLD: ABNORMAL
PLAT MORPH BLD: ABNORMAL
PLATELET # BLD AUTO: 288 K/UL — SIGNIFICANT CHANGE UP (ref 150–400)
PLATELET # BLD AUTO: 288 K/UL — SIGNIFICANT CHANGE UP (ref 150–400)
POTASSIUM SERPL-MCNC: 4.3 MMOL/L — SIGNIFICANT CHANGE UP (ref 3.5–5.3)
POTASSIUM SERPL-MCNC: 4.3 MMOL/L — SIGNIFICANT CHANGE UP (ref 3.5–5.3)
POTASSIUM SERPL-SCNC: 4.3 MMOL/L — SIGNIFICANT CHANGE UP (ref 3.5–5.3)
POTASSIUM SERPL-SCNC: 4.3 MMOL/L — SIGNIFICANT CHANGE UP (ref 3.5–5.3)
PROT SERPL-MCNC: 6.5 G/DL — SIGNIFICANT CHANGE UP (ref 6–8.3)
PROT SERPL-MCNC: 6.5 G/DL — SIGNIFICANT CHANGE UP (ref 6–8.3)
RBC # BLD: 2.91 M/UL — LOW (ref 3.8–5.2)
RBC # BLD: 2.91 M/UL — LOW (ref 3.8–5.2)
RBC # FLD: 13 % — SIGNIFICANT CHANGE UP (ref 10.3–14.5)
RBC # FLD: 13 % — SIGNIFICANT CHANGE UP (ref 10.3–14.5)
RBC BLD AUTO: ABNORMAL
RBC BLD AUTO: ABNORMAL
SODIUM SERPL-SCNC: 134 MMOL/L — LOW (ref 135–145)
SODIUM SERPL-SCNC: 134 MMOL/L — LOW (ref 135–145)
WBC # BLD: 13.43 K/UL — HIGH (ref 3.8–10.5)
WBC # BLD: 13.43 K/UL — HIGH (ref 3.8–10.5)
WBC # FLD AUTO: 13.43 K/UL — HIGH (ref 3.8–10.5)
WBC # FLD AUTO: 13.43 K/UL — HIGH (ref 3.8–10.5)

## 2023-12-06 PROCEDURE — 99232 SBSQ HOSP IP/OBS MODERATE 35: CPT | Mod: GC

## 2023-12-06 RX ADMIN — CHLORHEXIDINE GLUCONATE 1 APPLICATION(S): 213 SOLUTION TOPICAL at 07:42

## 2023-12-06 RX ADMIN — PANTOPRAZOLE SODIUM 40 MILLIGRAM(S): 20 TABLET, DELAYED RELEASE ORAL at 06:26

## 2023-12-06 RX ADMIN — Medication 650 MILLIGRAM(S): at 07:40

## 2023-12-06 RX ADMIN — Medication 650 MILLIGRAM(S): at 22:35

## 2023-12-06 RX ADMIN — Medication 100 MILLIGRAM(S): at 06:26

## 2023-12-06 RX ADMIN — LIDOCAINE 1 PATCH: 4 CREAM TOPICAL at 13:10

## 2023-12-06 RX ADMIN — HEPARIN SODIUM 5000 UNIT(S): 5000 INJECTION INTRAVENOUS; SUBCUTANEOUS at 13:12

## 2023-12-06 RX ADMIN — Medication 2: at 18:38

## 2023-12-06 RX ADMIN — Medication 3 MILLILITER(S): at 13:11

## 2023-12-06 RX ADMIN — DULOXETINE HYDROCHLORIDE 40 MILLIGRAM(S): 30 CAPSULE, DELAYED RELEASE ORAL at 13:13

## 2023-12-06 RX ADMIN — Medication 1 PATCH: at 18:48

## 2023-12-06 RX ADMIN — ATORVASTATIN CALCIUM 40 MILLIGRAM(S): 80 TABLET, FILM COATED ORAL at 21:36

## 2023-12-06 RX ADMIN — Medication 100 MILLIGRAM(S): at 13:12

## 2023-12-06 RX ADMIN — Medication 100 MILLIGRAM(S): at 21:35

## 2023-12-06 RX ADMIN — Medication 50 MILLIGRAM(S): at 13:21

## 2023-12-06 RX ADMIN — Medication 0.25 MILLIGRAM(S): at 21:36

## 2023-12-06 RX ADMIN — Medication 50 MILLIGRAM(S): at 21:51

## 2023-12-06 RX ADMIN — LIDOCAINE 1 PATCH: 4 CREAM TOPICAL at 10:07

## 2023-12-06 RX ADMIN — Medication 667 MILLIGRAM(S): at 13:13

## 2023-12-06 RX ADMIN — Medication 2: at 22:33

## 2023-12-06 RX ADMIN — Medication 50 MILLIGRAM(S): at 06:26

## 2023-12-06 RX ADMIN — Medication 2: at 07:37

## 2023-12-06 RX ADMIN — LIDOCAINE 1 PATCH: 4 CREAM TOPICAL at 18:50

## 2023-12-06 RX ADMIN — CARVEDILOL PHOSPHATE 12.5 MILLIGRAM(S): 80 CAPSULE, EXTENDED RELEASE ORAL at 18:36

## 2023-12-06 RX ADMIN — Medication 50 MILLIGRAM(S): at 18:36

## 2023-12-06 RX ADMIN — LIDOCAINE 1 PATCH: 4 CREAM TOPICAL at 13:13

## 2023-12-06 RX ADMIN — Medication 667 MILLIGRAM(S): at 18:35

## 2023-12-06 RX ADMIN — Medication 667 MILLIGRAM(S): at 07:37

## 2023-12-06 RX ADMIN — Medication 650 MILLIGRAM(S): at 21:35

## 2023-12-06 RX ADMIN — Medication 90 MILLIGRAM(S): at 06:26

## 2023-12-06 RX ADMIN — Medication 3 MILLILITER(S): at 06:25

## 2023-12-06 RX ADMIN — CARVEDILOL PHOSPHATE 12.5 MILLIGRAM(S): 80 CAPSULE, EXTENDED RELEASE ORAL at 06:26

## 2023-12-06 RX ADMIN — HEPARIN SODIUM 5000 UNIT(S): 5000 INJECTION INTRAVENOUS; SUBCUTANEOUS at 06:26

## 2023-12-06 RX ADMIN — Medication 3 MILLILITER(S): at 23:20

## 2023-12-06 RX ADMIN — Medication 1 PATCH: at 13:25

## 2023-12-06 RX ADMIN — HEPARIN SODIUM 5000 UNIT(S): 5000 INJECTION INTRAVENOUS; SUBCUTANEOUS at 21:35

## 2023-12-06 NOTE — CHART NOTE - NSCHARTNOTEFT_GEN_A_CORE
Admitting Diagnosis:   Patient is a 73y old  Female who presents with a chief complaint of Shortness of breath (06 Dec 2023 07:00)      PAST MEDICAL & SURGICAL HISTORY:  Asthma    Depressive disorder    Hyperlipidemia    Type 2 diabetes mellitus    Essential hypertension    Local infection of skin and subcutaneous tissue    Type 2 diabetes mellitus with neurological manifestations    Cytomegalovirus infection not present    S/P cholecystectomy          Current Nutrition Order: Renal        PO Intake: Good (%) [   ]  Fair (50-75%) [ x ] Poor (<25%) [   ]    GI Issues: no report of n/v/d/c/abd pain, last BM 12/6 per EMR     Skin Integrity: no pressure injuries documented, noted with trace edema to bilateral legs     Labs:   12-06    134<L>  |  95<L>  |  32<H>  ----------------------------<  172<H>  4.3   |  25  |  3.77<H>    Ca    9.4      06 Dec 2023 05:30  Phos  4.8     12-06  Mg     2.1     12-06    TPro  6.5  /  Alb  2.7<L>  /  TBili  0.3  /  DBili  x   /  AST  11  /  ALT  17  /  AlkPhos  90  12-06    CAPILLARY BLOOD GLUCOSE      POCT Blood Glucose.: 132 mg/dL (06 Dec 2023 13:02)  POCT Blood Glucose.: 250 mg/dL (06 Dec 2023 08:55)  POCT Blood Glucose.: 181 mg/dL (06 Dec 2023 07:32)  POCT Blood Glucose.: 189 mg/dL (05 Dec 2023 22:27)  POCT Blood Glucose.: 161 mg/dL (05 Dec 2023 17:49)      Medications:  MEDICATIONS  (STANDING):  albuterol    90 MICROgram(s) HFA Inhaler 2 Puff(s) Inhalation daily  albuterol/ipratropium for Nebulization 3 milliLiter(s) Nebulizer every 6 hours  atorvastatin 40 milliGRAM(s) Oral at bedtime  benzonatate 100 milliGRAM(s) Oral three times a day  calcium acetate 667 milliGRAM(s) Oral three times a day with meals  carvedilol 12.5 milliGRAM(s) Oral every 12 hours  chlorhexidine 2% Cloths 1 Application(s) Topical <User Schedule>  clonazePAM  Tablet 0.25 milliGRAM(s) Oral at bedtime  dextrose 5%. 1000 milliLiter(s) (50 mL/Hr) IV Continuous <Continuous>  dextrose 5%. 1000 milliLiter(s) (100 mL/Hr) IV Continuous <Continuous>  dextrose 50% Injectable 25 Gram(s) IV Push once  dextrose 50% Injectable 12.5 Gram(s) IV Push once  dextrose 50% Injectable 25 Gram(s) IV Push once  DULoxetine 40 milliGRAM(s) Oral daily  glucagon  Injectable 1 milliGRAM(s) IntraMuscular once  heparin   Injectable 5000 Unit(s) SubCutaneous every 8 hours  hydrALAZINE 50 milliGRAM(s) Oral three times a day  insulin lispro (ADMELOG) corrective regimen sliding scale   SubCutaneous Before meals and at bedtime  lidocaine   4% Patch 1 Patch Transdermal daily  NIFEdipine XL 90 milliGRAM(s) Oral every 24 hours  nitroglycerin    Patch 0.3 mG/Hr(s) 1 patch Transdermal daily  pantoprazole    Tablet 40 milliGRAM(s) Oral before breakfast  traZODone 50 milliGRAM(s) Oral every 24 hours    MEDICATIONS  (PRN):  acetaminophen     Tablet .. 650 milliGRAM(s) Oral every 6 hours PRN Mild Pain (1 - 3)  dextrose Oral Gel 15 Gram(s) Oral once PRN Blood Glucose LESS THAN 70 milliGRAM(s)/deciliter  hydrOXYzine hydrochloride 25 milliGRAM(s) Oral two times a day PRN Anxiety      Anthropometrics:  Dosing height: 61in  Dosing weight: 81.6kg/179.9#  BMI: 34  IBW: 105#            %IBW: 171%    Weight Change:   80.5kg/177.4# on 11/26  80.7kg/177.9# on 11/28  76.5kg/168.6# on 11/29   76.1kg/167.7# on 11/30   75.5kg/166.4# on 12/5     Nutrition Focused Physical Exam: Completed [ x ]  Not Pertinent [   ]  >>no overt signs of nutritional wasting    Estimated energy needs:   Weight used for calculations	IBW  Estimated Energy Needs Weight (lbs)	105.1 lb  Estimated Energy Needs Weight (kg)	47.7 kg  Estimated Energy Needs From (lupe/kg)	30  Estimated Energy Needs To (lupe/kg)	35  Estimated Energy Needs Calculated From (lupe/kg)	1431  Estimated Energy Needs Calculated To (lupe/kg)	1669  Weight used for calculations	IBW  Estimated Protein Needs Weight (lbs)	105.1 lb  Estimated Protein Needs Weight (kg)	47.7 kg  Estimated Protein Needs From (g/kg)	1.2  Estimated Protein Needs To (g/kg)	1.3  Estimated Protein Needs Calculated From (g/kg)	57.24  Estimated Protein Needs Calculated To (g/kg)	62.01  Other Calculations	Fluids per team. Estimated nutritional needs determined using [facility] Standards of Nutrition Care for HD using ideal body weight 47.7 kg (171%IBW).    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 Labs notable for Trop T notable for 311. . CKMB 6.8. Cr 4.38. BNP 64406. EKG reveals TWI in V1-V6, II, III, AVF and ST depressions in V4-V5.     Patient seen at bedside for follow up assessment, however interview limited as patient seemed lethargic/continued to fall asleep during interview. States appetite is "not great," however did not elaborate further. Encouraged PO intake and reviewed food preferences: muffin, roll with butter, chicken. No report of n/v/d/c/abd pain, last BM 12/6 per EMR. No pressure injuries documented, noted with trace edema to bilateral legs. Labs: Na 134, BUN/Cr elevated, GFR 12, Phos 4.8, glucose trending 132-250 x 24 hours. Meds: insulin, phoslo, protonix. RD to remain available for additional nutrition interventions as needed.     Previous Nutrition Diagnosis: Increased nutrient needs (energy/protein) related to HD as evidenced by increased metabolic demands for dialysis     Active [ x ]  Resolved [   ]    Goal: Pt to meet at least 75% of nutritional needs consistently     Recommendations:  1. Continue Renal diet   2. Encourage and monitor PO intake, honor preferences as able   >> Consistently meet >75% of estimated needs during admission   >> Consider oral nutrition supplement or liberalizing diet should intake decline </= 50%   3. Monitor labs, wt trends, GI function, and skin integrity   4. Pain and bowel regimen per team   5. RD to remain available for additional nutrition interventions and diet edu as needed       Risk Level: High [ x ] Moderate [   ] Low [   ] Admitting Diagnosis:   Patient is a 73y old  Female who presents with a chief complaint of Shortness of breath (06 Dec 2023 07:00)      PAST MEDICAL & SURGICAL HISTORY:  Asthma    Depressive disorder    Hyperlipidemia    Type 2 diabetes mellitus    Essential hypertension    Local infection of skin and subcutaneous tissue    Type 2 diabetes mellitus with neurological manifestations    Cytomegalovirus infection not present    S/P cholecystectomy          Current Nutrition Order: Renal        PO Intake: Good (%) [   ]  Fair (50-75%) [ x ] Poor (<25%) [   ]    GI Issues: no report of n/v/d/c/abd pain, last BM 12/6 per EMR     Skin Integrity: no pressure injuries documented, noted with trace edema to bilateral legs     Labs:   12-06    134<L>  |  95<L>  |  32<H>  ----------------------------<  172<H>  4.3   |  25  |  3.77<H>    Ca    9.4      06 Dec 2023 05:30  Phos  4.8     12-06  Mg     2.1     12-06    TPro  6.5  /  Alb  2.7<L>  /  TBili  0.3  /  DBili  x   /  AST  11  /  ALT  17  /  AlkPhos  90  12-06    CAPILLARY BLOOD GLUCOSE      POCT Blood Glucose.: 132 mg/dL (06 Dec 2023 13:02)  POCT Blood Glucose.: 250 mg/dL (06 Dec 2023 08:55)  POCT Blood Glucose.: 181 mg/dL (06 Dec 2023 07:32)  POCT Blood Glucose.: 189 mg/dL (05 Dec 2023 22:27)  POCT Blood Glucose.: 161 mg/dL (05 Dec 2023 17:49)      Medications:  MEDICATIONS  (STANDING):  albuterol    90 MICROgram(s) HFA Inhaler 2 Puff(s) Inhalation daily  albuterol/ipratropium for Nebulization 3 milliLiter(s) Nebulizer every 6 hours  atorvastatin 40 milliGRAM(s) Oral at bedtime  benzonatate 100 milliGRAM(s) Oral three times a day  calcium acetate 667 milliGRAM(s) Oral three times a day with meals  carvedilol 12.5 milliGRAM(s) Oral every 12 hours  chlorhexidine 2% Cloths 1 Application(s) Topical <User Schedule>  clonazePAM  Tablet 0.25 milliGRAM(s) Oral at bedtime  dextrose 5%. 1000 milliLiter(s) (50 mL/Hr) IV Continuous <Continuous>  dextrose 5%. 1000 milliLiter(s) (100 mL/Hr) IV Continuous <Continuous>  dextrose 50% Injectable 25 Gram(s) IV Push once  dextrose 50% Injectable 12.5 Gram(s) IV Push once  dextrose 50% Injectable 25 Gram(s) IV Push once  DULoxetine 40 milliGRAM(s) Oral daily  glucagon  Injectable 1 milliGRAM(s) IntraMuscular once  heparin   Injectable 5000 Unit(s) SubCutaneous every 8 hours  hydrALAZINE 50 milliGRAM(s) Oral three times a day  insulin lispro (ADMELOG) corrective regimen sliding scale   SubCutaneous Before meals and at bedtime  lidocaine   4% Patch 1 Patch Transdermal daily  NIFEdipine XL 90 milliGRAM(s) Oral every 24 hours  nitroglycerin    Patch 0.3 mG/Hr(s) 1 patch Transdermal daily  pantoprazole    Tablet 40 milliGRAM(s) Oral before breakfast  traZODone 50 milliGRAM(s) Oral every 24 hours    MEDICATIONS  (PRN):  acetaminophen     Tablet .. 650 milliGRAM(s) Oral every 6 hours PRN Mild Pain (1 - 3)  dextrose Oral Gel 15 Gram(s) Oral once PRN Blood Glucose LESS THAN 70 milliGRAM(s)/deciliter  hydrOXYzine hydrochloride 25 milliGRAM(s) Oral two times a day PRN Anxiety      Anthropometrics:  Dosing height: 61in  Dosing weight: 81.6kg/179.9#  BMI: 34  IBW: 105#            %IBW: 171%    Weight Change:   80.5kg/177.4# on 11/26  80.7kg/177.9# on 11/28  76.5kg/168.6# on 11/29   76.1kg/167.7# on 11/30   75.5kg/166.4# on 12/5     Nutrition Focused Physical Exam: Completed [ x ]  Not Pertinent [   ]  >>no overt signs of nutritional wasting    Estimated energy needs:   Weight used for calculations	IBW  Estimated Energy Needs Weight (lbs)	105.1 lb  Estimated Energy Needs Weight (kg)	47.7 kg  Estimated Energy Needs From (lupe/kg)	30  Estimated Energy Needs To (lupe/kg)	35  Estimated Energy Needs Calculated From (lupe/kg)	1431  Estimated Energy Needs Calculated To (lupe/kg)	1669  Weight used for calculations	IBW  Estimated Protein Needs Weight (lbs)	105.1 lb  Estimated Protein Needs Weight (kg)	47.7 kg  Estimated Protein Needs From (g/kg)	1.2  Estimated Protein Needs To (g/kg)	1.3  Estimated Protein Needs Calculated From (g/kg)	57.24  Estimated Protein Needs Calculated To (g/kg)	62.01  Other Calculations	Fluids per team. Estimated nutritional needs determined using [facility] Standards of Nutrition Care for HD using ideal body weight 47.7 kg (171%IBW).    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 Labs notable for Trop T notable for 311. . CKMB 6.8. Cr 4.38. BNP 73682. EKG reveals TWI in V1-V6, II, III, AVF and ST depressions in V4-V5.     Patient seen at bedside for follow up assessment, however interview limited as patient seemed lethargic/continued to fall asleep during interview. States appetite is "not great," however did not elaborate further. Encouraged PO intake and reviewed food preferences: muffin, roll with butter, chicken. No report of n/v/d/c/abd pain, last BM 12/6 per EMR. No pressure injuries documented, noted with trace edema to bilateral legs. Labs: Na 134, BUN/Cr elevated, GFR 12, Phos 4.8, glucose trending 132-250 x 24 hours. Meds: insulin, phoslo, protonix. RD to remain available for additional nutrition interventions as needed.     Previous Nutrition Diagnosis: Increased nutrient needs (energy/protein) related to HD as evidenced by increased metabolic demands for dialysis     Active [ x ]  Resolved [   ]    Goal: Pt to meet at least 75% of nutritional needs consistently     Recommendations:  1. Continue Renal diet   2. Encourage and monitor PO intake, honor preferences as able   >> Consistently meet >75% of estimated needs during admission   >> Consider oral nutrition supplement or liberalizing diet should intake decline </= 50%   3. Monitor labs, wt trends, GI function, and skin integrity   4. Pain and bowel regimen per team   5. RD to remain available for additional nutrition interventions and diet edu as needed       Risk Level: High [ x ] Moderate [   ] Low [   ]

## 2023-12-06 NOTE — PROGRESS NOTE ADULT - SUBJECTIVE AND OBJECTIVE BOX
O/N Events: NAEON    Subjective/ROS: Patient seen and examined at bedside. Patient is complaining of pain in the right hip and BL thighs. Pain is reproducible to palpation. States that it is a dull pain. Patient states that she will try to move and get out of bed more.    Denies Fever/Chills, HA, CP, SOB, n/v, changes in bowel/urinary habits.  12pt ROS otherwise negative.    VITALS  Vital Signs Last 24 Hrs  T(C): 37.1 (06 Dec 2023 05:47), Max: 37.1 (05 Dec 2023 21:00)  T(F): 98.7 (06 Dec 2023 05:47), Max: 98.8 (05 Dec 2023 21:00)  HR: 65 (06 Dec 2023 05:47) (57 - 67)  BP: 121/64 (06 Dec 2023 05:47) (106/57 - 137/70)  BP(mean): --  RR: 18 (06 Dec 2023 05:47) (17 - 19)  SpO2: 95% (06 Dec 2023 05:47) (94% - 97%)    Parameters below as of 06 Dec 2023 05:47  Patient On (Oxygen Delivery Method): room air        CAPILLARY BLOOD GLUCOSE      POCT Blood Glucose.: 250 mg/dL (06 Dec 2023 08:55)  POCT Blood Glucose.: 181 mg/dL (06 Dec 2023 07:32)  POCT Blood Glucose.: 189 mg/dL (05 Dec 2023 22:27)  POCT Blood Glucose.: 161 mg/dL (05 Dec 2023 17:49)  POCT Blood Glucose.: 245 mg/dL (05 Dec 2023 13:00)      PHYSICAL EXAM  General: NAD  Head: NC/AT; MMM; PERRL; EOMI;  Neck: Supple; no JVD  Respiratory: CTAB; no wheezes/rales/rhonchi  Cardiovascular: Regular rhythm/rate; S1/S2+, no murmurs, rubs gallops   Gastrointestinal: Soft; NTND; bowel sounds normal and present  Extremities: WWP; no edema/cyanosis  Neurological: A&Ox3, CNII-XII grossly intact; no obvious focal deficits    Antimicrobials:  MEDICATIONS  (STANDING):      Standing Medications:  MEDICATIONS  (STANDING):  albuterol    90 MICROgram(s) HFA Inhaler 2 Puff(s) Inhalation daily  albuterol/ipratropium for Nebulization 3 milliLiter(s) Nebulizer every 6 hours  atorvastatin 40 milliGRAM(s) Oral at bedtime  benzonatate 100 milliGRAM(s) Oral three times a day  calcium acetate 667 milliGRAM(s) Oral three times a day with meals  carvedilol 12.5 milliGRAM(s) Oral every 12 hours  chlorhexidine 2% Cloths 1 Application(s) Topical <User Schedule>  clonazePAM  Tablet 0.25 milliGRAM(s) Oral at bedtime  dextrose 5%. 1000 milliLiter(s) (50 mL/Hr) IV Continuous <Continuous>  dextrose 5%. 1000 milliLiter(s) (100 mL/Hr) IV Continuous <Continuous>  dextrose 50% Injectable 25 Gram(s) IV Push once  dextrose 50% Injectable 12.5 Gram(s) IV Push once  dextrose 50% Injectable 25 Gram(s) IV Push once  DULoxetine 40 milliGRAM(s) Oral daily  glucagon  Injectable 1 milliGRAM(s) IntraMuscular once  heparin   Injectable 5000 Unit(s) SubCutaneous every 8 hours  hydrALAZINE 50 milliGRAM(s) Oral three times a day  insulin lispro (ADMELOG) corrective regimen sliding scale   SubCutaneous Before meals and at bedtime  lidocaine   4% Patch 1 Patch Transdermal daily  NIFEdipine XL 90 milliGRAM(s) Oral every 24 hours  nitroglycerin    Patch 0.3 mG/Hr(s) 1 patch Transdermal daily  pantoprazole    Tablet 40 milliGRAM(s) Oral before breakfast  traZODone 50 milliGRAM(s) Oral every 24 hours      PRN Medications:  MEDICATIONS  (PRN):  acetaminophen     Tablet .. 650 milliGRAM(s) Oral every 6 hours PRN Mild Pain (1 - 3)  dextrose Oral Gel 15 Gram(s) Oral once PRN Blood Glucose LESS THAN 70 milliGRAM(s)/deciliter  hydrOXYzine hydrochloride 25 milliGRAM(s) Oral two times a day PRN Anxiety      No Known Allergies      LABS                        8.3    13.43 )-----------( 288      ( 06 Dec 2023 05:30 )             26.7     12-06    134<L>  |  95<L>  |  32<H>  ----------------------------<  172<H>  4.3   |  25  |  3.77<H>    Ca    9.4      06 Dec 2023 05:30  Phos  4.8     12-06  Mg     2.1     12-06    TPro  6.5  /  Alb  2.7<L>  /  TBili  0.3  /  DBili  x   /  AST  11  /  ALT  17  /  AlkPhos  90  12-06      Urinalysis Basic - ( 06 Dec 2023 05:30 )    Color: x / Appearance: x / SG: x / pH: x  Gluc: 172 mg/dL / Ketone: x  / Bili: x / Urobili: x   Blood: x / Protein: x / Nitrite: x   Leuk Esterase: x / RBC: x / WBC x   Sq Epi: x / Non Sq Epi: x / Bacteria: x              IMAGING/EKG/ETC

## 2023-12-06 NOTE — PROGRESS NOTE ADULT - PROBLEM SELECTOR PLAN 4
new LLL opacity seen on Xray 11/27. Febrile on 11/28. . Aspiration vs. atelectasis. on CTX 2g. pt continues to be afebrile with downtrending WBC.   s/p CTX for total 5 days (11/26-12/1)    #Leukocytosis  Patient WBC count up trending since 12/1/23  CXR: no consolidations  Procal 0.47, RVP (-)  - continuing to monitor off abx at this time new LLL opacity seen on Xray 11/27. Febrile on 11/28. . Aspiration vs. atelectasis. on CTX 2g. pt continues to be afebrile with downtrending WBC.   s/p CTX for total 5 days (11/26-12/1)    #Leukocytosis  Patient WBC count up trending since 12/1/23-> down trend started 12/6/23  CXR: no consolidations  Procal 0.47, RVP (-)  - continuing to monitor off abx at this time

## 2023-12-06 NOTE — PROGRESS NOTE ADULT - ATTENDING COMMENTS
73F with PMH HTN, HLD, DM with OM s/p multiple amputations of toes; CAD with stress test planned for 11/28; CKD5 presenting with dyspnea, admitted for ischemic w/u, ELBERT on CKD5, stepped up to MICU for [facility] metabolic encephalopathy 2/2 uremia vs. acidosis requiring intubation; now extubated, improving s/p CVVHD but course c/b hypertensive urgency. Mental status resolved. Medically stable to step down to RMF.    #Leukocytosis   #Anxiety and  #Metabolic encephalopathy POA now resolved   #ESRD on HD  #ACD  #Major depression   #Aspiration PNA     Plan:   -White count trending down, no active signs of infection. Patient completed 5 days od ceftriaxone for PNA in MICU. Nontoxic, no fever, chills, tachycardia  or hypotension. Will continue to monitor off abx   -Tunneled HD catheter placed by IR. Will c/w HD.   -Hold BP meds before HD.   -c/w Cymbalta, trazodone 50mg PO qHS PRN, clonazepam and hydroxyzine 25mg PO BID PRN anxiety.

## 2023-12-06 NOTE — CHART NOTE - NSCHARTNOTESELECT_GEN_ALL_CORE
Event Note
Event Note
Nutrition Services
PPD Placement/Event Note
Event Note
Nutrition Services
Rapid Response

## 2023-12-06 NOTE — PROGRESS NOTE ADULT - PROBLEM SELECTOR PLAN 1
RESOLVED. [facility] metabolic encephalopathy 2/2 uremia vs. acidosis, requiring intubation in MICU. mental status improved s/p CVVHD with uremia and acidosis resolved. Now AOx3 with appropriate affect. CTH non cont 11/28 shows microvascular disease with lacunar infarcts. No acute pathology. Passed bedside dysphagia   - Klonopin at reduced dose 0.25 qhs.   - holding other   - c/w HD per nephro RESOLVED. [facility] metabolic encephalopathy 2/2 uremia vs. acidosis, requiring intubation in MICU. mental status improved s/p CVVHD with uremia and acidosis resolved. Now AOx3 with appropriate affect. CTH non cont 11/28 shows microvascular disease with lacunar infarcts. No acute pathology. Passed bedside dysphagia   - Klonopin at reduced dose 0.25 qhs.   - holding other   - c/w HD per nephro

## 2023-12-07 ENCOUNTER — TRANSCRIPTION ENCOUNTER (OUTPATIENT)
Age: 73
End: 2023-12-07

## 2023-12-07 VITALS
HEART RATE: 74 BPM | RESPIRATION RATE: 17 BRPM | OXYGEN SATURATION: 97 % | DIASTOLIC BLOOD PRESSURE: 72 MMHG | SYSTOLIC BLOOD PRESSURE: 135 MMHG

## 2023-12-07 LAB
ANION GAP SERPL CALC-SCNC: 13 MMOL/L — SIGNIFICANT CHANGE UP (ref 5–17)
ANION GAP SERPL CALC-SCNC: 13 MMOL/L — SIGNIFICANT CHANGE UP (ref 5–17)
BASOPHILS # BLD AUTO: 0.06 K/UL — SIGNIFICANT CHANGE UP (ref 0–0.2)
BASOPHILS # BLD AUTO: 0.06 K/UL — SIGNIFICANT CHANGE UP (ref 0–0.2)
BASOPHILS NFR BLD AUTO: 0.5 % — SIGNIFICANT CHANGE UP (ref 0–2)
BASOPHILS NFR BLD AUTO: 0.5 % — SIGNIFICANT CHANGE UP (ref 0–2)
BUN SERPL-MCNC: 47 MG/DL — HIGH (ref 7–23)
BUN SERPL-MCNC: 47 MG/DL — HIGH (ref 7–23)
CALCIUM SERPL-MCNC: 9.4 MG/DL — SIGNIFICANT CHANGE UP (ref 8.4–10.5)
CALCIUM SERPL-MCNC: 9.4 MG/DL — SIGNIFICANT CHANGE UP (ref 8.4–10.5)
CHLORIDE SERPL-SCNC: 98 MMOL/L — SIGNIFICANT CHANGE UP (ref 96–108)
CHLORIDE SERPL-SCNC: 98 MMOL/L — SIGNIFICANT CHANGE UP (ref 96–108)
CO2 SERPL-SCNC: 27 MMOL/L — SIGNIFICANT CHANGE UP (ref 22–31)
CO2 SERPL-SCNC: 27 MMOL/L — SIGNIFICANT CHANGE UP (ref 22–31)
CREAT SERPL-MCNC: 5.14 MG/DL — HIGH (ref 0.5–1.3)
CREAT SERPL-MCNC: 5.14 MG/DL — HIGH (ref 0.5–1.3)
EGFR: 8 ML/MIN/1.73M2 — LOW
EGFR: 8 ML/MIN/1.73M2 — LOW
EOSINOPHIL # BLD AUTO: 0.2 K/UL — SIGNIFICANT CHANGE UP (ref 0–0.5)
EOSINOPHIL # BLD AUTO: 0.2 K/UL — SIGNIFICANT CHANGE UP (ref 0–0.5)
EOSINOPHIL NFR BLD AUTO: 1.8 % — SIGNIFICANT CHANGE UP (ref 0–6)
EOSINOPHIL NFR BLD AUTO: 1.8 % — SIGNIFICANT CHANGE UP (ref 0–6)
GLUCOSE BLDC GLUCOMTR-MCNC: 135 MG/DL — HIGH (ref 70–99)
GLUCOSE BLDC GLUCOMTR-MCNC: 135 MG/DL — HIGH (ref 70–99)
GLUCOSE SERPL-MCNC: 170 MG/DL — HIGH (ref 70–99)
GLUCOSE SERPL-MCNC: 170 MG/DL — HIGH (ref 70–99)
HCT VFR BLD CALC: 25.5 % — LOW (ref 34.5–45)
HCT VFR BLD CALC: 25.5 % — LOW (ref 34.5–45)
HGB BLD-MCNC: 7.9 G/DL — LOW (ref 11.5–15.5)
HGB BLD-MCNC: 7.9 G/DL — LOW (ref 11.5–15.5)
IMM GRANULOCYTES NFR BLD AUTO: 0.5 % — SIGNIFICANT CHANGE UP (ref 0–0.9)
IMM GRANULOCYTES NFR BLD AUTO: 0.5 % — SIGNIFICANT CHANGE UP (ref 0–0.9)
LYMPHOCYTES # BLD AUTO: 2.64 K/UL — SIGNIFICANT CHANGE UP (ref 1–3.3)
LYMPHOCYTES # BLD AUTO: 2.64 K/UL — SIGNIFICANT CHANGE UP (ref 1–3.3)
LYMPHOCYTES # BLD AUTO: 24 % — SIGNIFICANT CHANGE UP (ref 13–44)
LYMPHOCYTES # BLD AUTO: 24 % — SIGNIFICANT CHANGE UP (ref 13–44)
MAGNESIUM SERPL-MCNC: 2.2 MG/DL — SIGNIFICANT CHANGE UP (ref 1.6–2.6)
MAGNESIUM SERPL-MCNC: 2.2 MG/DL — SIGNIFICANT CHANGE UP (ref 1.6–2.6)
MCHC RBC-ENTMCNC: 28.2 PG — SIGNIFICANT CHANGE UP (ref 27–34)
MCHC RBC-ENTMCNC: 28.2 PG — SIGNIFICANT CHANGE UP (ref 27–34)
MCHC RBC-ENTMCNC: 31 GM/DL — LOW (ref 32–36)
MCHC RBC-ENTMCNC: 31 GM/DL — LOW (ref 32–36)
MCV RBC AUTO: 91.1 FL — SIGNIFICANT CHANGE UP (ref 80–100)
MCV RBC AUTO: 91.1 FL — SIGNIFICANT CHANGE UP (ref 80–100)
MONOCYTES # BLD AUTO: 1.41 K/UL — HIGH (ref 0–0.9)
MONOCYTES # BLD AUTO: 1.41 K/UL — HIGH (ref 0–0.9)
MONOCYTES NFR BLD AUTO: 12.8 % — SIGNIFICANT CHANGE UP (ref 2–14)
MONOCYTES NFR BLD AUTO: 12.8 % — SIGNIFICANT CHANGE UP (ref 2–14)
NEUTROPHILS # BLD AUTO: 6.63 K/UL — SIGNIFICANT CHANGE UP (ref 1.8–7.4)
NEUTROPHILS # BLD AUTO: 6.63 K/UL — SIGNIFICANT CHANGE UP (ref 1.8–7.4)
NEUTROPHILS NFR BLD AUTO: 60.4 % — SIGNIFICANT CHANGE UP (ref 43–77)
NEUTROPHILS NFR BLD AUTO: 60.4 % — SIGNIFICANT CHANGE UP (ref 43–77)
NRBC # BLD: 0 /100 WBCS — SIGNIFICANT CHANGE UP (ref 0–0)
NRBC # BLD: 0 /100 WBCS — SIGNIFICANT CHANGE UP (ref 0–0)
PHOSPHATE SERPL-MCNC: 5.6 MG/DL — HIGH (ref 2.5–4.5)
PHOSPHATE SERPL-MCNC: 5.6 MG/DL — HIGH (ref 2.5–4.5)
PLATELET # BLD AUTO: 334 K/UL — SIGNIFICANT CHANGE UP (ref 150–400)
PLATELET # BLD AUTO: 334 K/UL — SIGNIFICANT CHANGE UP (ref 150–400)
POTASSIUM SERPL-MCNC: 3.9 MMOL/L — SIGNIFICANT CHANGE UP (ref 3.5–5.3)
POTASSIUM SERPL-MCNC: 3.9 MMOL/L — SIGNIFICANT CHANGE UP (ref 3.5–5.3)
POTASSIUM SERPL-SCNC: 3.9 MMOL/L — SIGNIFICANT CHANGE UP (ref 3.5–5.3)
POTASSIUM SERPL-SCNC: 3.9 MMOL/L — SIGNIFICANT CHANGE UP (ref 3.5–5.3)
RBC # BLD: 2.8 M/UL — LOW (ref 3.8–5.2)
RBC # BLD: 2.8 M/UL — LOW (ref 3.8–5.2)
RBC # FLD: 12.9 % — SIGNIFICANT CHANGE UP (ref 10.3–14.5)
RBC # FLD: 12.9 % — SIGNIFICANT CHANGE UP (ref 10.3–14.5)
SODIUM SERPL-SCNC: 138 MMOL/L — SIGNIFICANT CHANGE UP (ref 135–145)
SODIUM SERPL-SCNC: 138 MMOL/L — SIGNIFICANT CHANGE UP (ref 135–145)
WBC # BLD: 11 K/UL — HIGH (ref 3.8–10.5)
WBC # BLD: 11 K/UL — HIGH (ref 3.8–10.5)
WBC # FLD AUTO: 11 K/UL — HIGH (ref 3.8–10.5)
WBC # FLD AUTO: 11 K/UL — HIGH (ref 3.8–10.5)

## 2023-12-07 PROCEDURE — 87040 BLOOD CULTURE FOR BACTERIA: CPT

## 2023-12-07 PROCEDURE — 93970 EXTREMITY STUDY: CPT

## 2023-12-07 PROCEDURE — 82010 KETONE BODYS QUAN: CPT

## 2023-12-07 PROCEDURE — 76937 US GUIDE VASCULAR ACCESS: CPT

## 2023-12-07 PROCEDURE — 80053 COMPREHEN METABOLIC PANEL: CPT

## 2023-12-07 PROCEDURE — 86140 C-REACTIVE PROTEIN: CPT

## 2023-12-07 PROCEDURE — 97165 OT EVAL LOW COMPLEX 30 MIN: CPT

## 2023-12-07 PROCEDURE — P9016: CPT

## 2023-12-07 PROCEDURE — 99239 HOSP IP/OBS DSCHRG MGMT >30: CPT

## 2023-12-07 PROCEDURE — 90937 HEMODIALYSIS REPEATED EVAL: CPT

## 2023-12-07 PROCEDURE — C1769: CPT

## 2023-12-07 PROCEDURE — 36430 TRANSFUSION BLD/BLD COMPNT: CPT

## 2023-12-07 PROCEDURE — 83880 ASSAY OF NATRIURETIC PEPTIDE: CPT

## 2023-12-07 PROCEDURE — 87389 HIV-1 AG W/HIV-1&-2 AB AG IA: CPT

## 2023-12-07 PROCEDURE — 36558 INSERT TUNNELED CV CATH: CPT

## 2023-12-07 PROCEDURE — 81001 URINALYSIS AUTO W/SCOPE: CPT

## 2023-12-07 PROCEDURE — 84132 ASSAY OF SERUM POTASSIUM: CPT

## 2023-12-07 PROCEDURE — 84156 ASSAY OF PROTEIN URINE: CPT

## 2023-12-07 PROCEDURE — 80307 DRUG TEST PRSMV CHEM ANLYZR: CPT

## 2023-12-07 PROCEDURE — 82728 ASSAY OF FERRITIN: CPT

## 2023-12-07 PROCEDURE — 82803 BLOOD GASES ANY COMBINATION: CPT

## 2023-12-07 PROCEDURE — C1750: CPT

## 2023-12-07 PROCEDURE — 83690 ASSAY OF LIPASE: CPT

## 2023-12-07 PROCEDURE — 76775 US EXAM ABDO BACK WALL LIM: CPT

## 2023-12-07 PROCEDURE — 85025 COMPLETE CBC W/AUTO DIFF WBC: CPT

## 2023-12-07 PROCEDURE — 87340 HEPATITIS B SURFACE AG IA: CPT

## 2023-12-07 PROCEDURE — 86709 HEPATITIS A IGM ANTIBODY: CPT

## 2023-12-07 PROCEDURE — 82553 CREATINE MB FRACTION: CPT

## 2023-12-07 PROCEDURE — 85652 RBC SED RATE AUTOMATED: CPT

## 2023-12-07 PROCEDURE — 97535 SELF CARE MNGMENT TRAINING: CPT

## 2023-12-07 PROCEDURE — 84300 ASSAY OF URINE SODIUM: CPT

## 2023-12-07 PROCEDURE — 36415 COLL VENOUS BLD VENIPUNCTURE: CPT

## 2023-12-07 PROCEDURE — 93306 TTE W/DOPPLER COMPLETE: CPT

## 2023-12-07 PROCEDURE — 97116 GAIT TRAINING THERAPY: CPT

## 2023-12-07 PROCEDURE — 84133 ASSAY OF URINE POTASSIUM: CPT

## 2023-12-07 PROCEDURE — 84100 ASSAY OF PHOSPHORUS: CPT

## 2023-12-07 PROCEDURE — 84484 ASSAY OF TROPONIN QUANT: CPT

## 2023-12-07 PROCEDURE — 86900 BLOOD TYPING SEROLOGIC ABO: CPT

## 2023-12-07 PROCEDURE — 83550 IRON BINDING TEST: CPT

## 2023-12-07 PROCEDURE — 94640 AIRWAY INHALATION TREATMENT: CPT

## 2023-12-07 PROCEDURE — 82962 GLUCOSE BLOOD TEST: CPT

## 2023-12-07 PROCEDURE — 86850 RBC ANTIBODY SCREEN: CPT

## 2023-12-07 PROCEDURE — 86901 BLOOD TYPING SEROLOGIC RH(D): CPT

## 2023-12-07 PROCEDURE — 90935 HEMODIALYSIS ONE EVALUATION: CPT

## 2023-12-07 PROCEDURE — 86923 COMPATIBILITY TEST ELECTRIC: CPT

## 2023-12-07 PROCEDURE — 85610 PROTHROMBIN TIME: CPT

## 2023-12-07 PROCEDURE — 0225U NFCT DS DNA&RNA 21 SARSCOV2: CPT

## 2023-12-07 PROCEDURE — 83036 HEMOGLOBIN GLYCOSYLATED A1C: CPT

## 2023-12-07 PROCEDURE — 82570 ASSAY OF URINE CREATININE: CPT

## 2023-12-07 PROCEDURE — 83540 ASSAY OF IRON: CPT

## 2023-12-07 PROCEDURE — 82330 ASSAY OF CALCIUM: CPT

## 2023-12-07 PROCEDURE — 99285 EMERGENCY DEPT VISIT HI MDM: CPT

## 2023-12-07 PROCEDURE — 85027 COMPLETE CBC AUTOMATED: CPT

## 2023-12-07 PROCEDURE — 83605 ASSAY OF LACTIC ACID: CPT

## 2023-12-07 PROCEDURE — 84540 ASSAY OF URINE/UREA-N: CPT

## 2023-12-07 PROCEDURE — 83735 ASSAY OF MAGNESIUM: CPT

## 2023-12-07 PROCEDURE — 93005 ELECTROCARDIOGRAM TRACING: CPT

## 2023-12-07 PROCEDURE — 84295 ASSAY OF SERUM SODIUM: CPT

## 2023-12-07 PROCEDURE — 86480 TB TEST CELL IMMUN MEASURE: CPT

## 2023-12-07 PROCEDURE — 86705 HEP B CORE ANTIBODY IGM: CPT

## 2023-12-07 PROCEDURE — 97530 THERAPEUTIC ACTIVITIES: CPT

## 2023-12-07 PROCEDURE — 77001 FLUOROGUIDE FOR VEIN DEVICE: CPT

## 2023-12-07 PROCEDURE — 73620 X-RAY EXAM OF FOOT: CPT

## 2023-12-07 PROCEDURE — 86706 HEP B SURFACE ANTIBODY: CPT

## 2023-12-07 PROCEDURE — 82550 ASSAY OF CK (CPK): CPT

## 2023-12-07 PROCEDURE — 97161 PT EVAL LOW COMPLEX 20 MIN: CPT

## 2023-12-07 PROCEDURE — 85730 THROMBOPLASTIN TIME PARTIAL: CPT

## 2023-12-07 PROCEDURE — 86803 HEPATITIS C AB TEST: CPT

## 2023-12-07 PROCEDURE — 74018 RADEX ABDOMEN 1 VIEW: CPT

## 2023-12-07 PROCEDURE — 86704 HEP B CORE ANTIBODY TOTAL: CPT

## 2023-12-07 PROCEDURE — 80048 BASIC METABOLIC PNL TOTAL CA: CPT

## 2023-12-07 PROCEDURE — 84145 PROCALCITONIN (PCT): CPT

## 2023-12-07 PROCEDURE — 83935 ASSAY OF URINE OSMOLALITY: CPT

## 2023-12-07 PROCEDURE — 83930 ASSAY OF BLOOD OSMOLALITY: CPT

## 2023-12-07 PROCEDURE — 71045 X-RAY EXAM CHEST 1 VIEW: CPT

## 2023-12-07 PROCEDURE — 70450 CT HEAD/BRAIN W/O DYE: CPT

## 2023-12-07 PROCEDURE — 80061 LIPID PANEL: CPT

## 2023-12-07 RX ORDER — CLONAZEPAM 1 MG
0.25 TABLET ORAL AT BEDTIME
Refills: 0 | Status: DISCONTINUED | OUTPATIENT
Start: 2023-12-07 | End: 2023-12-07

## 2023-12-07 RX ORDER — ACETAMINOPHEN 500 MG
2 TABLET ORAL
Qty: 0 | Refills: 0 | DISCHARGE
Start: 2023-12-07

## 2023-12-07 RX ORDER — INSULIN GLARGINE 100 [IU]/ML
10 INJECTION, SOLUTION SUBCUTANEOUS AT BEDTIME
Refills: 0 | Status: DISCONTINUED | OUTPATIENT
Start: 2023-12-07 | End: 2023-12-07

## 2023-12-07 RX ORDER — INSULIN GLARGINE 100 [IU]/ML
10 INJECTION, SOLUTION SUBCUTANEOUS
Qty: 0 | Refills: 0 | DISCHARGE
Start: 2023-12-07

## 2023-12-07 RX ORDER — CLONAZEPAM 1 MG
0.25 TABLET ORAL
Qty: 0 | Refills: 0 | DISCHARGE
Start: 2023-12-07

## 2023-12-07 RX ORDER — INSULIN LISPRO 100/ML
2 VIAL (ML) SUBCUTANEOUS
Refills: 0 | Status: DISCONTINUED | OUTPATIENT
Start: 2023-12-07 | End: 2023-12-07

## 2023-12-07 RX ORDER — METOPROLOL TARTRATE 50 MG
1 TABLET ORAL
Refills: 0 | DISCHARGE

## 2023-12-07 RX ORDER — HYDRALAZINE HCL 50 MG
1 TABLET ORAL
Qty: 0 | Refills: 0 | DISCHARGE
Start: 2023-12-07

## 2023-12-07 RX ORDER — FUROSEMIDE 40 MG
1 TABLET ORAL
Refills: 0 | DISCHARGE

## 2023-12-07 RX ORDER — TRAZODONE HCL 50 MG
1 TABLET ORAL
Qty: 0 | Refills: 0 | DISCHARGE
Start: 2023-12-07

## 2023-12-07 RX ORDER — CLONAZEPAM 1 MG
1 TABLET ORAL
Refills: 0 | DISCHARGE

## 2023-12-07 RX ORDER — ATORVASTATIN CALCIUM 80 MG/1
1 TABLET, FILM COATED ORAL
Qty: 0 | Refills: 0 | DISCHARGE
Start: 2023-12-07

## 2023-12-07 RX ORDER — NIFEDIPINE 30 MG
1 TABLET, EXTENDED RELEASE 24 HR ORAL
Refills: 0 | DISCHARGE

## 2023-12-07 RX ORDER — IPRATROPIUM/ALBUTEROL SULFATE 18-103MCG
3 AEROSOL WITH ADAPTER (GRAM) INHALATION
Qty: 0 | Refills: 0 | DISCHARGE
Start: 2023-12-07

## 2023-12-07 RX ORDER — INSULIN LISPRO 100/ML
2 VIAL (ML) SUBCUTANEOUS
Qty: 0 | Refills: 0 | DISCHARGE
Start: 2023-12-07

## 2023-12-07 RX ORDER — DULOXETINE HYDROCHLORIDE 30 MG/1
1 CAPSULE, DELAYED RELEASE ORAL
Qty: 0 | Refills: 0 | DISCHARGE
Start: 2023-12-07

## 2023-12-07 RX ORDER — ACETAMINOPHEN 500 MG
2 TABLET ORAL
Refills: 0 | DISCHARGE

## 2023-12-07 RX ORDER — SODIUM BICARBONATE 1 MEQ/ML
2 SYRINGE (ML) INTRAVENOUS
Refills: 0 | DISCHARGE

## 2023-12-07 RX ORDER — ATORVASTATIN CALCIUM 80 MG/1
1 TABLET, FILM COATED ORAL
Refills: 0 | DISCHARGE

## 2023-12-07 RX ORDER — OLANZAPINE 15 MG/1
2.5 TABLET, FILM COATED ORAL ONCE
Refills: 0 | Status: COMPLETED | OUTPATIENT
Start: 2023-12-07 | End: 2023-12-07

## 2023-12-07 RX ORDER — BUDESONIDE AND FORMOTEROL FUMARATE DIHYDRATE 160; 4.5 UG/1; UG/1
2 AEROSOL RESPIRATORY (INHALATION)
Refills: 0 | Status: DISCONTINUED | OUTPATIENT
Start: 2023-12-07 | End: 2023-12-07

## 2023-12-07 RX ORDER — HYDRALAZINE HCL 50 MG
1 TABLET ORAL
Refills: 0 | DISCHARGE

## 2023-12-07 RX ORDER — ERYTHROPOIETIN 10000 [IU]/ML
8000 INJECTION, SOLUTION INTRAVENOUS; SUBCUTANEOUS ONCE
Refills: 0 | Status: DISCONTINUED | OUTPATIENT
Start: 2023-12-07 | End: 2023-12-07

## 2023-12-07 RX ORDER — CHLORHEXIDINE GLUCONATE 213 G/1000ML
1 SOLUTION TOPICAL
Qty: 0 | Refills: 0 | DISCHARGE
Start: 2023-12-07

## 2023-12-07 RX ORDER — TRAZODONE HCL 50 MG
1 TABLET ORAL
Refills: 0 | DISCHARGE

## 2023-12-07 RX ORDER — DULOXETINE HYDROCHLORIDE 30 MG/1
1 CAPSULE, DELAYED RELEASE ORAL
Refills: 0 | DISCHARGE

## 2023-12-07 RX ORDER — ERYTHROPOIETIN 10000 [IU]/ML
8000 INJECTION, SOLUTION INTRAVENOUS; SUBCUTANEOUS
Qty: 0 | Refills: 0 | DISCHARGE
Start: 2023-12-07

## 2023-12-07 RX ORDER — TAMSULOSIN HYDROCHLORIDE 0.4 MG/1
1 CAPSULE ORAL
Refills: 0 | DISCHARGE

## 2023-12-07 RX ORDER — CARVEDILOL PHOSPHATE 80 MG/1
1 CAPSULE, EXTENDED RELEASE ORAL
Qty: 0 | Refills: 0 | DISCHARGE
Start: 2023-12-07

## 2023-12-07 RX ORDER — HYDROXYZINE HCL 10 MG
1 TABLET ORAL
Qty: 0 | Refills: 0 | DISCHARGE
Start: 2023-12-07

## 2023-12-07 RX ORDER — LIDOCAINE 4 G/100G
1 CREAM TOPICAL
Qty: 0 | Refills: 0 | DISCHARGE
Start: 2023-12-07

## 2023-12-07 RX ORDER — LOPERAMIDE HCL 2 MG
1 TABLET ORAL
Refills: 0 | DISCHARGE

## 2023-12-07 RX ORDER — NIFEDIPINE 30 MG
1 TABLET, EXTENDED RELEASE 24 HR ORAL
Qty: 0 | Refills: 0 | DISCHARGE
Start: 2023-12-07

## 2023-12-07 RX ADMIN — CARVEDILOL PHOSPHATE 12.5 MILLIGRAM(S): 80 CAPSULE, EXTENDED RELEASE ORAL at 17:39

## 2023-12-07 RX ADMIN — Medication 100 MILLIGRAM(S): at 14:17

## 2023-12-07 RX ADMIN — OLANZAPINE 2.5 MILLIGRAM(S): 15 TABLET, FILM COATED ORAL at 14:18

## 2023-12-07 RX ADMIN — HEPARIN SODIUM 5000 UNIT(S): 5000 INJECTION INTRAVENOUS; SUBCUTANEOUS at 06:11

## 2023-12-07 RX ADMIN — LIDOCAINE 1 PATCH: 4 CREAM TOPICAL at 01:23

## 2023-12-07 RX ADMIN — Medication 1 PATCH: at 14:18

## 2023-12-07 RX ADMIN — Medication 3 MILLILITER(S): at 06:12

## 2023-12-07 RX ADMIN — Medication 100 MILLIGRAM(S): at 06:11

## 2023-12-07 RX ADMIN — Medication 50 MILLIGRAM(S): at 06:12

## 2023-12-07 RX ADMIN — Medication 1 PATCH: at 01:24

## 2023-12-07 RX ADMIN — Medication 50 MILLIGRAM(S): at 17:39

## 2023-12-07 RX ADMIN — CARVEDILOL PHOSPHATE 12.5 MILLIGRAM(S): 80 CAPSULE, EXTENDED RELEASE ORAL at 06:11

## 2023-12-07 RX ADMIN — DULOXETINE HYDROCHLORIDE 40 MILLIGRAM(S): 30 CAPSULE, DELAYED RELEASE ORAL at 14:17

## 2023-12-07 RX ADMIN — Medication 667 MILLIGRAM(S): at 17:39

## 2023-12-07 RX ADMIN — Medication 3 MILLILITER(S): at 14:18

## 2023-12-07 RX ADMIN — PANTOPRAZOLE SODIUM 40 MILLIGRAM(S): 20 TABLET, DELAYED RELEASE ORAL at 06:12

## 2023-12-07 RX ADMIN — Medication 90 MILLIGRAM(S): at 06:11

## 2023-12-07 RX ADMIN — HEPARIN SODIUM 5000 UNIT(S): 5000 INJECTION INTRAVENOUS; SUBCUTANEOUS at 14:17

## 2023-12-07 RX ADMIN — CHLORHEXIDINE GLUCONATE 1 APPLICATION(S): 213 SOLUTION TOPICAL at 06:13

## 2023-12-07 RX ADMIN — Medication 667 MILLIGRAM(S): at 06:42

## 2023-12-07 RX ADMIN — Medication 25 MILLIGRAM(S): at 12:01

## 2023-12-07 NOTE — PROGRESS NOTE ADULT - SUBJECTIVE AND OBJECTIVE BOX
Hemoglobin: 7.9 g/dL (23 @ 05:30)  Phosphorus: 5.6 mg/dL (23 @ 05:30)  Hemoglobin: 8.3 g/dL (23 @ 05:30)  Phosphorus: 4.8 mg/dL (23 @ 05:30)    Albumin: 2.7 g/dL (23 @ 05:30)  Albumin: 3.1 g/dL (23 @ 06:02)    T(C): 36.5 (23 @ 11:10), Max: 37.3 (23 @ 06:01)  HR: 62 (23 @ 11:10) (62 - 74)  BP: 129/57 (23 @ 11:10) (102/61 - 136/81)  RR: 17 (23 @ 11:10) (17 - 18)  SpO2: 95% (23 @ 11:10) (94% - 98%)  calcium acetate 667 milliGRAM(s) Oral three times a day with meals, 23 @ 23:23, Routine  epoetin jeannie-epbx (RETACRIT) Injectable 8000 Unit(s) IV Push once, 23 @ 08:15, Routine, Stop order after: 1 Doses  epoetin jeannie-epbx (RETACRIT) Injectable 8000 Unit(s) IV Push once, 23 @ 07:53, Routine, Stop order after: 1 Doses  epoetin jeannie-epbx (RETACRIT) Injectable 8000 Unit(s) IV Push once, 23 @ 09:23, Routine, Stop order after: 1 Doses      Hemodialysis Treatment.:     Schedule: Once, Modality: Hemodialysis, Access: Internal Jugular Central Venous Catheter    Dialyzer: Optiflux [id number], Time: 180 Min    Blood Flow: 400 mL/Min , Dialysate Flow: 500 mL/Min, Dialysate Temp: 36.5, Tubinmm (Adult)    Target Fluid Removal: 2 Liters    Dialysate Electrolytes (mEq/L): Potassium 3, Calcium 2.5, Sodium 138, Bicarbonate 35 (23 @ 08:16) [Completed]      Pt seen on HD. BP drop to 90s systolic, pt asymptomatic. UF goal lowered to 1L. Continue HD. Hemoglobin: 7.9 g/dL (23 @ 05:30)  Phosphorus: 5.6 mg/dL (23 @ 05:30)  Hemoglobin: 8.3 g/dL (23 @ 05:30)  Phosphorus: 4.8 mg/dL (23 @ 05:30)    Albumin: 2.7 g/dL (23 @ 05:30)  Albumin: 3.1 g/dL (23 @ 06:02)    T(C): 36.5 (23 @ 11:10), Max: 37.3 (23 @ 06:01)  HR: 62 (23 @ 11:10) (62 - 74)  BP: 129/57 (23 @ 11:10) (102/61 - 136/81)  RR: 17 (23 @ 11:10) (17 - 18)  SpO2: 95% (23 @ 11:10) (94% - 98%)  calcium acetate 667 milliGRAM(s) Oral three times a day with meals, 23 @ 23:23, Routine  epoetin jeannie-epbx (RETACRIT) Injectable 8000 Unit(s) IV Push once, 23 @ 08:15, Routine, Stop order after: 1 Doses  epoetin jeannie-epbx (RETACRIT) Injectable 8000 Unit(s) IV Push once, 23 @ 07:53, Routine, Stop order after: 1 Doses  epoetin jeannie-epbx (RETACRIT) Injectable 8000 Unit(s) IV Push once, 23 @ 09:23, Routine, Stop order after: 1 Doses      Hemodialysis Treatment.:     Schedule: Once, Modality: Hemodialysis, Access: Internal Jugular Central Venous Catheter    Dialyzer: Optiflux [id number], Time: 180 Min    Blood Flow: 400 mL/Min , Dialysate Flow: 500 mL/Min, Dialysate Temp: 36.5, Tubinmm (Adult)    Target Fluid Removal: 2 Liters    Dialysate Electrolytes (mEq/L): Potassium 3, Calcium 2.5, Sodium 138, Bicarbonate 35 (23 @ 08:16) [Completed]      Pt seen on HD. BP drop to 90s systolic, pt asymptomatic. UF goal lowered to 1L. Continue HD. Hemoglobin: 7.9 g/dL (23 @ 05:30)  Phosphorus: 5.6 mg/dL (23 @ 05:30)  Hemoglobin: 8.3 g/dL (23 @ 05:30)  Phosphorus: 4.8 mg/dL (23 @ 05:30)    Albumin: 2.7 g/dL (23 @ 05:30)  Albumin: 3.1 g/dL (23 @ 06:02)    T(C): 36.5 (23 @ 11:10), Max: 37.3 (23 @ 06:01)  HR: 62 (23 @ 11:10) (62 - 74)  BP: 129/57 (23 @ 11:10) (102/61 - 136/81)  RR: 17 (23 @ 11:10) (17 - 18)  SpO2: 95% (23 @ 11:10) (94% - 98%)  calcium acetate 667 milliGRAM(s) Oral three times a day with meals, 23 @ 23:23, Routine  epoetin jeannie-epbx (RETACRIT) Injectable 8000 Unit(s) IV Push once, 23 @ 08:15, Routine, Stop order after: 1 Doses  epoetin jeannie-epbx (RETACRIT) Injectable 8000 Unit(s) IV Push once, 23 @ 07:53, Routine, Stop order after: 1 Doses  epoetin jeannie-epbx (RETACRIT) Injectable 8000 Unit(s) IV Push once, 23 @ 09:23, Routine, Stop order after: 1 Doses      Hemodialysis Treatment.:     Schedule: Once, Modality: Hemodialysis, Access: Internal Jugular Central Venous Catheter    Dialyzer: Optiflux [id number], Time: 180 Min    Blood Flow: 400 mL/Min , Dialysate Flow: 500 mL/Min, Dialysate Temp: 36.5, Tubinmm (Adult)    Target Fluid Removal: 2 Liters    Dialysate Electrolytes (mEq/L): Potassium 3, Calcium 2.5, Sodium 138, Bicarbonate 35 (23 @ 08:16) [Completed]      Pt seen on HD. BP drop to 90s systolic, pt asymptomatic. UF goal lowered to 1L. Continue HD.      Pt reassessed. Around 2hr into treatment, pt complaining of feeling very anxious and wanting to stop HD. VS stable. No other symptoms. HD was terminated early per pt's request. Hemoglobin: 7.9 g/dL (23 @ 05:30)  Phosphorus: 5.6 mg/dL (23 @ 05:30)  Hemoglobin: 8.3 g/dL (23 @ 05:30)  Phosphorus: 4.8 mg/dL (23 @ 05:30)    Albumin: 2.7 g/dL (23 @ 05:30)  Albumin: 3.1 g/dL (23 @ 06:02)    T(C): 36.5 (23 @ 11:10), Max: 37.3 (23 @ 06:01)  HR: 62 (23 @ 11:10) (62 - 74)  BP: 129/57 (23 @ 11:10) (102/61 - 136/81)  RR: 17 (23 @ 11:10) (17 - 18)  SpO2: 95% (23 @ 11:10) (94% - 98%)  calcium acetate 667 milliGRAM(s) Oral three times a day with meals, 23 @ 23:23, Routine  epoetin jeannie-epbx (RETACRIT) Injectable 8000 Unit(s) IV Push once, 23 @ 08:15, Routine, Stop order after: 1 Doses  epoetin jeannie-epbx (RETACRIT) Injectable 8000 Unit(s) IV Push once, 23 @ 07:53, Routine, Stop order after: 1 Doses  epoetin jeannie-epbx (RETACRIT) Injectable 8000 Unit(s) IV Push once, 23 @ 09:23, Routine, Stop order after: 1 Doses      Hemodialysis Treatment.:     Schedule: Once, Modality: Hemodialysis, Access: Internal Jugular Central Venous Catheter    Dialyzer: Optiflux [id number], Time: 180 Min    Blood Flow: 400 mL/Min , Dialysate Flow: 500 mL/Min, Dialysate Temp: 36.5, Tubinmm (Adult)    Target Fluid Removal: 2 Liters    Dialysate Electrolytes (mEq/L): Potassium 3, Calcium 2.5, Sodium 138, Bicarbonate 35 (23 @ 08:16) [Completed]      Pt seen on HD. BP drop to 90s systolic, pt asymptomatic. UF goal lowered to 1L. Continue HD.      Pt reassessed. Around 2hr into treatment, pt complaining of feeling very anxious and wanting to stop HD. VS stable. No other symptoms. HD was terminated early per pt's request.

## 2023-12-07 NOTE — PROGRESS NOTE ADULT - ATTENDING SUPERVISION STATEMENT
Fellow
Resident
Fellow
Resident
Resident
Fellow
Resident

## 2023-12-07 NOTE — PROGRESS NOTE ADULT - PROBLEM SELECTOR PROBLEM 4
Pneumonia, aspiration
Pneumonia, aspiration
Asthma
Pneumonia, aspiration

## 2023-12-07 NOTE — PROGRESS NOTE ADULT - PROBLEM SELECTOR PROBLEM 7
HLD (hyperlipidemia)
Leukocytosis
HLD (hyperlipidemia)

## 2023-12-07 NOTE — PROGRESS NOTE ADULT - PROBLEM SELECTOR PROBLEM 1
Metabolic encephalopathy
Metabolic encephalopathy
Elevated troponin
Metabolic encephalopathy

## 2023-12-07 NOTE — DISCHARGE NOTE NURSING/CASE MANAGEMENT/SOCIAL WORK - NSDCFUADDAPPT_GEN_ALL_CORE_FT
You have an appointment to establish primary care at:  [facility] at [address]  [address] [place] [address]  Phone: [phone]  Fax: [phone]    Please bring your Insurance card, Photo ID and Discharge paperwork to the following appointments:    (1) Please follow up with your Cardiology Provider, Dr. Duong Piña at [address] [id number] on 12/14/2023 at 10:00am.    Appointment was scheduled by Ms. VARINDER Dowd, Referral Coordinator.      (2) Please follow up your Nephrology Provider, Dr. Cassie Kessler at [address] [place] [address] on 12/21/2023 at 11:20am.    Appointment was scheduled by Ms. VARINDER Dowd, Referral Coordinator.   You have an appointment to establish primary care at:  [facility] at [address]  [address] [place] [address]  Phone: [phone]  Fax: [phone]    Please bring your Insurance card, Photo ID and Discharge paperwork to the following appointments:    (1) Please follow up with your Cardiology Provider, Dr. Duong Piña at [address] [id number] on 12/14/2023 at 10:00am.    Appointment was scheduled by Ms. VARINDER Dowd, Referral Coordinator.      (2) Please follow up your Nephrology Provider, Dr. Cassie Kessler at [address] [place] [address] on 12/21/2023 at 11:20am.    Appointment was scheduled by Ms. VARINDER Dowd, Referral Coordinator.

## 2023-12-07 NOTE — PROGRESS NOTE ADULT - PROBLEM SELECTOR PLAN 11
F: None  E: Replete PRN   N: advance to renal as tolerated  VTEppx: heparin 5000U subQ Q8hr  GIppx: Protonix 40mg PO QD  Dispo: MICU -> [facility]   Bowel regimen: miralax senna

## 2023-12-07 NOTE — PROGRESS NOTE ADULT - PROBLEM SELECTOR PLAN 1
RESOLVED. [facility] metabolic encephalopathy 2/2 uremia vs. acidosis, requiring intubation in MICU. mental status improved s/p CVVHD with uremia and acidosis resolved. Now AOx3 with appropriate affect. CTH non cont 11/28 shows microvascular disease with lacunar infarcts. No acute pathology. Passed bedside dysphagia   - Klonopin at reduced dose 0.25 qhs.   - holding other   - c/w HD per nephro RESOLVED. [facility] metabolic encephalopathy 2/2 uremia vs. acidosis, requiring intubation in MICU. mental status improved s/p CVVHD with uremia and acidosis resolved. Now AOx3 with appropriate affect. CTH non cont 11/28 shows microvascular disease with lacunar infarcts. No acute pathology. Passed bedside dysphagia   - Klonopin at reduced dose 0.25 qhs.   - holding other   - c/w HD per nephro

## 2023-12-07 NOTE — PROGRESS NOTE ADULT - PROBLEM SELECTOR PLAN 4
new LLL opacity seen on Xray 11/27. Febrile on 11/28. . Aspiration vs. atelectasis. on CTX 2g. pt continues to be afebrile with downtrending WBC.   s/p CTX for total 5 days (11/26-12/1)    #Leukocytosis  Patient WBC count up trending since 12/1/23-> down trend started 12/6/23  CXR: no consolidations  Procal 0.47, RVP (-)  - continuing to monitor off abx at this time

## 2023-12-07 NOTE — PROGRESS NOTE ADULT - PROBLEM SELECTOR PROBLEM 6
Hypertensive urgency
HTN (hypertension)
Hypertensive urgency

## 2023-12-07 NOTE — PROGRESS NOTE ADULT - SUBJECTIVE AND OBJECTIVE BOX
Patient is a 73y Female seen and evaluated at bedside during dialysis. Laying in bed. Denies any symptoms at present.      Meds:    acetaminophen     Tablet .. 650 every 6 hours PRN  albuterol    90 MICROgram(s) HFA Inhaler 2 daily  albuterol/ipratropium for Nebulization 3 every 6 hours  atorvastatin 40 at bedtime  benzonatate 100 three times a day  calcium acetate 667 three times a day with meals  carvedilol 12.5 every 12 hours  chlorhexidine 2% Cloths 1 <User Schedule>  clonazePAM  Tablet 0.25 at bedtime  dextrose 5%. 1000 <Continuous>  dextrose 5%. 1000 <Continuous>  dextrose 50% Injectable 25 once  dextrose 50% Injectable 25 once  dextrose 50% Injectable 12.5 once  dextrose Oral Gel 15 once PRN  DULoxetine 40 daily  epoetin jeannie-epbx (RETACRIT) Injectable 8000 once  glucagon  Injectable 1 once  heparin   Injectable 5000 every 8 hours  hydrALAZINE 50 three times a day  hydrOXYzine hydrochloride 25 two times a day PRN  insulin lispro (ADMELOG) corrective regimen sliding scale  Before meals and at bedtime  lidocaine   4% Patch 1 daily  NIFEdipine XL 90 every 24 hours  nitroglycerin    Patch 0.3 mG/Hr(s) 1 daily  pantoprazole    Tablet 40 before breakfast  traZODone 50 every 24 hours      T(C): , Max: 37.3 (12-07-23 @ 06:01)  T(F): , Max: 99.1 (12-07-23 @ 06:01)  HR: 62 (12-07-23 @ 11:10)  BP: 129/57 (12-07-23 @ 11:10)  BP(mean): --  RR: 17 (12-07-23 @ 11:10)  SpO2: 95% (12-07-23 @ 11:10)  Wt(kg): --        Review of Systems:  ROS negative except as per HPI      PHYSICAL EXAM:  GENERAL: NAD, resting in bed  NECK: supple, No JVD  CHEST/LUNG: Clear to auscultation bilaterally  HEART: normal S1S2, RRR  ABDOMEN: Soft, Nontender, +BS,    EXTREMITIES: No clubbing, cyanosis, or edema   NEUROLOGY: AAO x3, no focal neurological deficit  ACCESS: [facility]      LABS:                        7.9    11.00 )-----------( 334      ( 07 Dec 2023 05:30 )             25.5     12-07    138  |  98  |  47<H>  ----------------------------<  170<H>  3.9   |  27  |  5.14<H>    Ca    9.4      07 Dec 2023 05:30  Phos  5.6     12-07  Mg     2.2     12-07    TPro  6.5  /  Alb  2.7<L>  /  TBili  0.3  /  DBili  x   /  AST  11  /  ALT  17  /  AlkPhos  90  12-06        Urinalysis Basic - ( 07 Dec 2023 05:30 )    Color: x / Appearance: x / SG: x / pH: x  Gluc: 170 mg/dL / Ketone: x  / Bili: x / Urobili: x   Blood: x / Protein: x / Nitrite: x   Leuk Esterase: x / RBC: x / WBC x   Sq Epi: x / Non Sq Epi: x / Bacteria: x            RADIOLOGY & ADDITIONAL STUDIES:           Patient is a 73y Female seen and evaluated at bedside during dialysis. Laying in bed. Denies any symptoms at present.      Meds:    acetaminophen     Tablet .. 650 every 6 hours PRN  albuterol    90 MICROgram(s) HFA Inhaler 2 daily  albuterol/ipratropium for Nebulization 3 every 6 hours  atorvastatin 40 at bedtime  benzonatate 100 three times a day  calcium acetate 667 three times a day with meals  carvedilol 12.5 every 12 hours  chlorhexidine 2% Cloths 1 <User Schedule>  clonazePAM  Tablet 0.25 at bedtime  dextrose 5%. 1000 <Continuous>  dextrose 5%. 1000 <Continuous>  dextrose 50% Injectable 25 once  dextrose 50% Injectable 25 once  dextrose 50% Injectable 12.5 once  dextrose Oral Gel 15 once PRN  DULoxetine 40 daily  epoetin jeannie-epbx (RETACRIT) Injectable 8000 once  glucagon  Injectable 1 once  heparin   Injectable 5000 every 8 hours  hydrALAZINE 50 three times a day  hydrOXYzine hydrochloride 25 two times a day PRN  insulin lispro (ADMELOG) corrective regimen sliding scale  Before meals and at bedtime  lidocaine   4% Patch 1 daily  NIFEdipine XL 90 every 24 hours  nitroglycerin    Patch 0.3 mG/Hr(s) 1 daily  pantoprazole    Tablet 40 before breakfast  traZODone 50 every 24 hours      T(C): , Max: 37.3 (12-07-23 @ 06:01)  T(F): , Max: 99.1 (12-07-23 @ 06:01)  HR: 62 (12-07-23 @ 11:10)  BP: 129/57 (12-07-23 @ 11:10)  BP(mean): --  RR: 17 (12-07-23 @ 11:10)  SpO2: 95% (12-07-23 @ 11:10)  Wt(kg): --        Review of Systems:  ROS negative except as per HPI      PHYSICAL EXAM:  GENERAL: NAD, resting in bed  NECK: supple, No JVD  CHEST/LUNG: Clear to auscultation bilaterally  HEART: normal S1S2, RRR  ABDOMEN: Soft, Nontender, +BS,    EXTREMITIES: No clubbing, cyanosis, or edema   NEUROLOGY: AAO x3, no focal neurological deficit  ACCESS: [facility]      LABS:                        7.9    11.00 )-----------( 334      ( 07 Dec 2023 05:30 )             25.5     12-07    138  |  98  |  47<H>  ----------------------------<  170<H>  3.9   |  27  |  5.14<H>    Ca    9.4      07 Dec 2023 05:30  Phos  5.6     12-07  Mg     2.2     12-07    TPro  6.5  /  Alb  2.7<L>  /  TBili  0.3  /  DBili  x   /  AST  11  /  ALT  17  /  AlkPhos  90  12-06        Urinalysis Basic - ( 07 Dec 2023 05:30 )    Color: x / Appearance: x / SG: x / pH: x  Gluc: 170 mg/dL / Ketone: x  / Bili: x / Urobili: x   Blood: x / Protein: x / Nitrite: x   Leuk Esterase: x / RBC: x / WBC x   Sq Epi: x / Non Sq Epi: x / Bacteria: x            RADIOLOGY & ADDITIONAL STUDIES:           Patient is a 73y Female seen and evaluated at bedside during dialysis. Laying in bed. Denies any symptoms at present.      Meds:    acetaminophen     Tablet .. 650 every 6 hours PRN  albuterol    90 MICROgram(s) HFA Inhaler 2 daily  albuterol/ipratropium for Nebulization 3 every 6 hours  atorvastatin 40 at bedtime  benzonatate 100 three times a day  calcium acetate 667 three times a day with meals  carvedilol 12.5 every 12 hours  chlorhexidine 2% Cloths 1 <User Schedule>  clonazePAM  Tablet 0.25 at bedtime  dextrose 5%. 1000 <Continuous>  dextrose 5%. 1000 <Continuous>  dextrose 50% Injectable 25 once  dextrose 50% Injectable 25 once  dextrose 50% Injectable 12.5 once  dextrose Oral Gel 15 once PRN  DULoxetine 40 daily  epoetin jeannie-epbx (RETACRIT) Injectable 8000 once  glucagon  Injectable 1 once  heparin   Injectable 5000 every 8 hours  hydrALAZINE 50 three times a day  hydrOXYzine hydrochloride 25 two times a day PRN  insulin lispro (ADMELOG) corrective regimen sliding scale  Before meals and at bedtime  lidocaine   4% Patch 1 daily  NIFEdipine XL 90 every 24 hours  nitroglycerin    Patch 0.3 mG/Hr(s) 1 daily  pantoprazole    Tablet 40 before breakfast  traZODone 50 every 24 hours      T(C): , Max: 37.3 (23 @ 06:01)  T(F): , Max: 99.1 (23 @ 06:01)  HR: 62 (23 @ 11:10)  BP: 129/57 (23 @ 11:10)  BP(mean): --  RR: 17 (23 @ 11:10)  SpO2: 95% (23 @ 11:10)  Wt(kg): --        Review of Systems:  ROS negative except as per HPI      PHYSICAL EXAM:  GENERAL: NAD, resting in bed  NECK: supple, No JVD  CHEST/LUNG: Clear to auscultation bilaterally  HEART: normal S1S2, RRR  ABDOMEN: Soft, Nontender, +BS,    EXTREMITIES: No clubbing, cyanosis, or edema   NEUROLOGY: AAO x3, no focal neurological deficit  ACCESS: [facility]      LABS:                        7.9    11.00 )-----------( 334      ( 07 Dec 2023 05:30 )             25.5         138  |  98  |  47<H>  ----------------------------<  170<H>  3.9   |  27  |  5.14<H>    Ca    9.4      07 Dec 2023 05:30  Phos  5.6       Mg     2.2         TPro  6.5  /  Alb  2.7<L>  /  TBili  0.3  /  DBili  x   /  AST  11  /  ALT  17  /  AlkPhos  90          Urinalysis Basic - ( 07 Dec 2023 05:30 )    Color: x / Appearance: x / SG: x / pH: x  Gluc: 170 mg/dL / Ketone: x  / Bili: x / Urobili: x   Blood: x / Protein: x / Nitrite: x   Leuk Esterase: x / RBC: x / WBC x   Sq Epi: x / Non Sq Epi: x / Bacteria: x            RADIOLOGY & ADDITIONAL STUDIES:      Hemoglobin: 7.9 g/dL (23 @ 05:30)  Phosphorus: 5.6 mg/dL (23 @ 05:30)  Hemoglobin: 8.3 g/dL (23 @ 05:30)  Phosphorus: 4.8 mg/dL (23 @ 05:30)    Albumin: 2.7 g/dL (23 @ 05:30)  Albumin: 3.1 g/dL (23 @ 06:02)    calcium acetate 667 milliGRAM(s) Oral three times a day with meals, 23 @ 23:23, Routine  epoetin jeannie-epbx (RETACRIT) Injectable 8000 Unit(s) IV Push once, 23 @ 07:53, Routine, Stop order after: 1 Doses  epoetin jeannie-epbx (RETACRIT) Injectable 8000 Unit(s) IV Push once, 23 @ 09:23, Routine, Stop order after: 1 Doses  epoetin jeannie-epbx (RETACRIT) Injectable 8000 Unit(s) IV Push once, 23 @ 08:15, Routine, Stop order after: 1 Doses      Hemodialysis Treatment.:     Schedule: Once, Modality: Hemodialysis, Access: Internal Jugular Central Venous Catheter    Dialyzer: Optiflux [id number], Time: 180 Min    Blood Flow: 400 mL/Min , Dialysate Flow: 500 mL/Min, Dialysate Temp: 36.5, Tubinmm (Adult)    Target Fluid Removal: 2 Liters    Dialysate Electrolytes (mEq/L): Potassium 3, Calcium 2.5, Sodium 138, Bicarbonate 35 (23 @ 08:16) [Completed]       Patient is a 73y Female seen and evaluated at bedside during dialysis. Laying in bed. Denies any symptoms at present.      Meds:    acetaminophen     Tablet .. 650 every 6 hours PRN  albuterol    90 MICROgram(s) HFA Inhaler 2 daily  albuterol/ipratropium for Nebulization 3 every 6 hours  atorvastatin 40 at bedtime  benzonatate 100 three times a day  calcium acetate 667 three times a day with meals  carvedilol 12.5 every 12 hours  chlorhexidine 2% Cloths 1 <User Schedule>  clonazePAM  Tablet 0.25 at bedtime  dextrose 5%. 1000 <Continuous>  dextrose 5%. 1000 <Continuous>  dextrose 50% Injectable 25 once  dextrose 50% Injectable 25 once  dextrose 50% Injectable 12.5 once  dextrose Oral Gel 15 once PRN  DULoxetine 40 daily  epoetin jeannie-epbx (RETACRIT) Injectable 8000 once  glucagon  Injectable 1 once  heparin   Injectable 5000 every 8 hours  hydrALAZINE 50 three times a day  hydrOXYzine hydrochloride 25 two times a day PRN  insulin lispro (ADMELOG) corrective regimen sliding scale  Before meals and at bedtime  lidocaine   4% Patch 1 daily  NIFEdipine XL 90 every 24 hours  nitroglycerin    Patch 0.3 mG/Hr(s) 1 daily  pantoprazole    Tablet 40 before breakfast  traZODone 50 every 24 hours      T(C): , Max: 37.3 (23 @ 06:01)  T(F): , Max: 99.1 (23 @ 06:01)  HR: 62 (23 @ 11:10)  BP: 129/57 (23 @ 11:10)  BP(mean): --  RR: 17 (23 @ 11:10)  SpO2: 95% (23 @ 11:10)  Wt(kg): --        Review of Systems:  ROS negative except as per HPI      PHYSICAL EXAM:  GENERAL: NAD, resting in bed  NECK: supple, No JVD  CHEST/LUNG: Clear to auscultation bilaterally  HEART: normal S1S2, RRR  ABDOMEN: Soft, Nontender, +BS,    EXTREMITIES: No clubbing, cyanosis, or edema   NEUROLOGY: AAO x3, no focal neurological deficit  ACCESS: [facility]      LABS:                        7.9    11.00 )-----------( 334      ( 07 Dec 2023 05:30 )             25.5         138  |  98  |  47<H>  ----------------------------<  170<H>  3.9   |  27  |  5.14<H>    Ca    9.4      07 Dec 2023 05:30  Phos  5.6       Mg     2.2         TPro  6.5  /  Alb  2.7<L>  /  TBili  0.3  /  DBili  x   /  AST  11  /  ALT  17  /  AlkPhos  90          Urinalysis Basic - ( 07 Dec 2023 05:30 )    Color: x / Appearance: x / SG: x / pH: x  Gluc: 170 mg/dL / Ketone: x  / Bili: x / Urobili: x   Blood: x / Protein: x / Nitrite: x   Leuk Esterase: x / RBC: x / WBC x   Sq Epi: x / Non Sq Epi: x / Bacteria: x            RADIOLOGY & ADDITIONAL STUDIES:      Hemoglobin: 7.9 g/dL (23 @ 05:30)  Phosphorus: 5.6 mg/dL (23 @ 05:30)  Hemoglobin: 8.3 g/dL (23 @ 05:30)  Phosphorus: 4.8 mg/dL (23 @ 05:30)    Albumin: 2.7 g/dL (23 @ 05:30)  Albumin: 3.1 g/dL (23 @ 06:02)    calcium acetate 667 milliGRAM(s) Oral three times a day with meals, 23 @ 23:23, Routine  epoetin jeannie-epbx (RETACRIT) Injectable 8000 Unit(s) IV Push once, 23 @ 07:53, Routine, Stop order after: 1 Doses  epoetin jeannie-epbx (RETACRIT) Injectable 8000 Unit(s) IV Push once, 23 @ 09:23, Routine, Stop order after: 1 Doses  epoetin jeannie-epbx (RETACRIT) Injectable 8000 Unit(s) IV Push once, 23 @ 08:15, Routine, Stop order after: 1 Doses      Hemodialysis Treatment.:     Schedule: Once, Modality: Hemodialysis, Access: Internal Jugular Central Venous Catheter    Dialyzer: Optiflux [id number], Time: 180 Min    Blood Flow: 400 mL/Min , Dialysate Flow: 500 mL/Min, Dialysate Temp: 36.5, Tubinmm (Adult)    Target Fluid Removal: 2 Liters    Dialysate Electrolytes (mEq/L): Potassium 3, Calcium 2.5, Sodium 138, Bicarbonate 35 (23 @ 08:16) [Completed]

## 2023-12-07 NOTE — PROGRESS NOTE ADULT - PROBLEM SELECTOR PLAN 3
Not in acute exacerbation. Home med Duoneb Symbicort   - c/w duoneb q6h PRN
Pt endorsed thoughts of suicide/hopeless  -pt denies any active plan to commit suicide   -psych consulted, pt does not need 1-1 observation and deemed pt a low suicide risk  -continue with home klonopin 0.5mg daily, cymbalta 30mg daily and trazodone 50mg daily
Not in acute exacerbation. Home med Duoneb Symbicort   - c/w duoneb q6h PRN

## 2023-12-07 NOTE — DISCHARGE NOTE NURSING/CASE MANAGEMENT/SOCIAL WORK - NSDCPEFALRISK_GEN_ALL_CORE
For information on Fall & Injury Prevention, visit: [email or web address] OR  [email or web address]/[email or web address] OR  https://www.cdc.gov/steadi/patient.html For information on Fall & Injury Prevention, visit: [email or web address]-[email or web address]-health-[email or web address]-mobility OR  [email or web address]-[email or web address]-to-[email or web address]-injury OR  https://www.cdc.gov/steadi/patient.html

## 2023-12-07 NOTE — PROGRESS NOTE ADULT - ASSESSMENT
73YF w/ CKD5, now ESRD, admitted 11/25 for HTN urgency and NSTEMI after presenting w/ SOB and lightheadedness. 11/26 pt w/ AMS 2/2 ?toxic encephalopathy (clonazepam) vs uremia vs acidosis, requiring intubation and pressors, also aspiration PNA 11/27, UO dropped, started on CVVHD, followed by clinical improvement, transitioned to iHD 11/28    #ESRD on HD, currently on TTS schedule:   HD today. Seen on HD. Tolerating well. BP stable. Continue HD as above.  Next HD 12/9  Renal diet   Daily BMP   Outpatient HD set up in progress. Hep panel negative. PPD negative.    Anemia:  Likely 2/2 ESRD   EPO with HD, no indication for IV iron at this time (iron sat 38%)      MBD:  Calcium: 9.4  Phos: 5.6. C/w calcium acetate 667 mg TID. Goal 3-5.5

## 2023-12-07 NOTE — PROGRESS NOTE ADULT - PROVIDER SPECIALTY LIST ADULT
MICU
Podiatry
Cardiology
MICU
MICU
Nephrology
MICU
Nephrology
Internal Medicine
Hospitalist
Internal Medicine

## 2023-12-07 NOTE — PROGRESS NOTE ADULT - PROBLEM SELECTOR PLAN 10
Hx of DIMITRIS. Iron studies with low TIBC indicative of AOCD. S/p 1u pRBC on 11/27. Likely 2/2 DIMITRIS and CKD   - EPO with dialysis  - Transfuse <7

## 2023-12-07 NOTE — DISCHARGE NOTE NURSING/CASE MANAGEMENT/SOCIAL WORK - PATIENT PORTAL LINK FT
You can access the FollowMyHealth Patient Portal offered by [facility] by registering at the following website: [email or web address]. By joining [facility]’s FollowMyHealth portal, you will also be able to view your health information using other applications (apps) compatible with our system. You can access the FollowMyHealth Patient Portal offered by [facility] by registering at the following website: [email or web address]. By joining [email or web address]’s FollowMyHealth portal, you will also be able to view your health information using other applications (apps) compatible with our system.

## 2023-12-07 NOTE — PROGRESS NOTE ADULT - ATTENDING COMMENTS
I agree with the fellow's findings and plans as written above with the following additions/amendments:    Seen and examined on HD second time, anxious and is cutting off HD early. Further recs as above

## 2023-12-07 NOTE — PROGRESS NOTE ADULT - PROBLEM/PLAN-2
Called and left a message with results.    
DISPLAY PLAN FREE TEXT

## 2023-12-07 NOTE — PROGRESS NOTE ADULT - PROBLEM SELECTOR PROBLEM 2
Depression with suicidal ideation
Depression with suicidal ideation
Acute kidney injury superimposed on CKD
Depression with suicidal ideation

## 2023-12-07 NOTE — PROGRESS NOTE ADULT - ATTENDING COMMENTS
I agree with the fellow's findings and plans as written above with the following additions/amendments:    Seen and examined on HD, tolerating well, continue hd as above, further recs as above

## 2023-12-07 NOTE — PROGRESS NOTE ADULT - PROBLEM SELECTOR PLAN 9
POA w/  A1c this admission 5.7.   Home meds: Januvia 25mg QD, Lispro 2U premeal, Lantus 10U.   - c/w mISS inpt.   - restart home Januvia, Lispro 2U premeal, and Lantus 10U on discharge
last A1c 9.7 (5/2022)  -f/u AM A1c  -continue with lantus 10U at bedtime and lispro 2U with meals (per endocrine rec on last admission)  -confirm insulin dose with [facility] in AM -> called multiple times, no response, left voice messages        F: None  E: Replete if K<4 or Mag<2  N: DASH Diet, consistent carb  VTEppx: SCD  GIppx: PPI  Dispo: pending clinical course
POA w/  A1c this admission 5.7.   Home meds: Januvia 25mg QD, Lispro 2U premeal, Lantus 10U.   - c/w mISS inpt.   - restart home Januvia, Lispro 2U premeal, and Lantus 10U on discharge

## 2023-12-14 ENCOUNTER — APPOINTMENT (OUTPATIENT)
Dept: HEART AND VASCULAR | Facility: CLINIC | Age: 73
End: 2023-12-14

## 2023-12-14 DIAGNOSIS — E87.70 FLUID OVERLOAD, UNSPECIFIED: ICD-10-CM

## 2023-12-14 DIAGNOSIS — J69.0 PNEUMONITIS DUE TO INHALATION OF FOOD AND VOMIT: ICD-10-CM

## 2023-12-14 DIAGNOSIS — F32.A DEPRESSION, UNSPECIFIED: ICD-10-CM

## 2023-12-14 DIAGNOSIS — E87.20 ACIDOSIS, UNSPECIFIED: ICD-10-CM

## 2023-12-14 DIAGNOSIS — I16.0 HYPERTENSIVE URGENCY: ICD-10-CM

## 2023-12-14 DIAGNOSIS — K59.00 CONSTIPATION, UNSPECIFIED: ICD-10-CM

## 2023-12-14 DIAGNOSIS — E87.5 HYPERKALEMIA: ICD-10-CM

## 2023-12-14 DIAGNOSIS — R45.851 SUICIDAL IDEATIONS: ICD-10-CM

## 2023-12-14 DIAGNOSIS — J96.01 ACUTE RESPIRATORY FAILURE WITH HYPOXIA: ICD-10-CM

## 2023-12-14 DIAGNOSIS — F41.9 ANXIETY DISORDER, UNSPECIFIED: ICD-10-CM

## 2023-12-14 DIAGNOSIS — J98.11 ATELECTASIS: ICD-10-CM

## 2023-12-14 DIAGNOSIS — D63.1 ANEMIA IN CHRONIC KIDNEY DISEASE: ICD-10-CM

## 2023-12-14 DIAGNOSIS — I12.9 HYPERTENSIVE CHRONIC KIDNEY DISEASE WITH STAGE 1 THROUGH STAGE 4 CHRONIC KIDNEY DISEASE, OR UNSPECIFIED CHRONIC KIDNEY DISEASE: ICD-10-CM

## 2023-12-14 DIAGNOSIS — Z87.891 PERSONAL HISTORY OF NICOTINE DEPENDENCE: ICD-10-CM

## 2023-12-14 DIAGNOSIS — Z90.49 ACQUIRED ABSENCE OF OTHER SPECIFIED PARTS OF DIGESTIVE TRACT: ICD-10-CM

## 2023-12-14 DIAGNOSIS — Z89.422 ACQUIRED ABSENCE OF OTHER LEFT TOE(S): ICD-10-CM

## 2023-12-14 DIAGNOSIS — L97.519 NON-PRESSURE CHRONIC ULCER OF OTHER PART OF RIGHT FOOT WITH UNSPECIFIED SEVERITY: ICD-10-CM

## 2023-12-14 DIAGNOSIS — Z83.3 FAMILY HISTORY OF DIABETES MELLITUS: ICD-10-CM

## 2023-12-14 DIAGNOSIS — M19.90 UNSPECIFIED OSTEOARTHRITIS, UNSPECIFIED SITE: ICD-10-CM

## 2023-12-14 DIAGNOSIS — E11.22 TYPE 2 DIABETES MELLITUS WITH DIABETIC CHRONIC KIDNEY DISEASE: ICD-10-CM

## 2023-12-14 DIAGNOSIS — E11.40 TYPE 2 DIABETES MELLITUS WITH DIABETIC NEUROPATHY, UNSPECIFIED: ICD-10-CM

## 2023-12-14 DIAGNOSIS — Z82.49 FAMILY HISTORY OF ISCHEMIC HEART DISEASE AND OTHER DISEASES OF THE CIRCULATORY SYSTEM: ICD-10-CM

## 2023-12-14 DIAGNOSIS — Z79.4 LONG TERM (CURRENT) USE OF INSULIN: ICD-10-CM

## 2023-12-14 DIAGNOSIS — N18.6 END STAGE RENAL DISEASE: ICD-10-CM

## 2023-12-14 DIAGNOSIS — Z82.5 FAMILY HISTORY OF ASTHMA AND OTHER CHRONIC LOWER RESPIRATORY DISEASES: ICD-10-CM

## 2023-12-14 DIAGNOSIS — I12.0 HYPERTENSIVE CHRONIC KIDNEY DISEASE WITH STAGE 5 CHRONIC KIDNEY DISEASE OR END STAGE RENAL DISEASE: ICD-10-CM

## 2023-12-14 DIAGNOSIS — I21.A1 MYOCARDIAL INFARCTION TYPE 2: ICD-10-CM

## 2023-12-14 DIAGNOSIS — N17.9 ACUTE KIDNEY FAILURE, UNSPECIFIED: ICD-10-CM

## 2023-12-14 DIAGNOSIS — G93.41 METABOLIC ENCEPHALOPATHY: ICD-10-CM

## 2023-12-14 DIAGNOSIS — J45.909 UNSPECIFIED ASTHMA, UNCOMPLICATED: ICD-10-CM

## 2023-12-14 DIAGNOSIS — I25.10 ATHEROSCLEROTIC HEART DISEASE OF NATIVE CORONARY ARTERY WITHOUT ANGINA PECTORIS: ICD-10-CM

## 2023-12-14 DIAGNOSIS — D50.9 IRON DEFICIENCY ANEMIA, UNSPECIFIED: ICD-10-CM

## 2023-12-14 DIAGNOSIS — E11.621 TYPE 2 DIABETES MELLITUS WITH FOOT ULCER: ICD-10-CM

## 2023-12-14 DIAGNOSIS — E83.42 HYPOMAGNESEMIA: ICD-10-CM

## 2023-12-14 DIAGNOSIS — Z89.421 ACQUIRED ABSENCE OF OTHER RIGHT TOE(S): ICD-10-CM

## 2023-12-18 ENCOUNTER — APPOINTMENT (OUTPATIENT)
Age: 73
End: 2023-12-18

## 2023-12-21 ENCOUNTER — APPOINTMENT (OUTPATIENT)
Dept: NEPHROLOGY | Facility: CLINIC | Age: 73
End: 2023-12-21

## 2024-01-03 ENCOUNTER — APPOINTMENT (OUTPATIENT)
Dept: HEART AND VASCULAR | Facility: CLINIC | Age: 74
End: 2024-01-03

## 2024-01-03 NOTE — HISTORY OF PRESENT ILLNESS
[FreeTextEntry1] : Hospital Course: Discharge Date	07-Dec-2023 Admission Date	25-Nov-2023 17:52 Reason for Admission	Shortness of breath  73F with PMH HTN, HLD, DM with OM s/p multiple amputations of toes; CAD with stress test planned for 11/28; CKD5 presenting with dyspnea, admitted for ischemic w/u, ELBERT on CKD5, stepped up to MICU for HAGMA metabolic encephalopathy 2/2 uremia vs. acidosis requiring intubation; now extubated, improving s/p CVVHD but course c/b hypertensive urgency. Mental status improving, on HD. BP improving w/ uptitrating BP meds. Medically stable for transfer to [facility] with HD.  # Metabolic encephalopathy.  HAGMA metabolic encephalopathy 2/2 uremia vs. acidosis, requiring intubation in MICU. mental status improved s/p CVVHD with uremia and acidosis resolved. Now AOx3 with appropriate affect. CTH non cont 11/28 shows microvascular disease with lacunar infarcts. No acute pathology. Passed bedside dysphagia. - Klonopin at reduced dose 0.25 qhs.  - c/w HD.  # Depression with suicidal ideation.  Psych consulted, no need for 1-1.  Denies any active plan to commit suicide. Home meds: Klonipin 0.5mg QD, Duloxetine 30mg QD, Trazodone 50mg QD. - Klonopin at reduced dose 0.25 qhs.  - c/w Duloxetine 40mg QD - c/w Trazodone 50 QD. - c/w hydroxyzine 25 mg up to 2 times a day PRN for anxiety - Premedication for HD days: Ativan 0.5 mg and hydroxyzine 50 mg  # Asthma.  Not in acute exacerbation. Home med Duoneb, Symbicort, arinet, albuterol - c/w duoneb q6h PRN. - c/w symbicort 2 puffs twice a day, arinet, albuterol  # Pneumonia, aspiration.  RESOLVED New LLL opacity seen on Xray 11/27. Febrile on 11/28. Aspiration vs. atelectasis. on CTX 2g. pt continues to be afebrile with downtrending WBC.  s/p CTX for total 5 days (11/26-12/1).  # Type 2 myocardial infarction.  POA w/ palpitation, SOB. EKG shows TWI in V1-V6, II, III, AVF and ST depressions in V4-V5. Peaked and downtrended 311->273, can be confounded by CKD/ESRD  Echo: no valvular dysfuction, EF 55-60%.  - f/u outpatient cardiology   # Hypertensive urgency.  POA w/ palpitation, SOB. EKG shows TWI in V1-V6, II, III, AVF and ST depressions in V4-V5. Peaked and downtrended, can be confounded by CKD/ESRD Echo: no valvular dysfunction, EF 55-60%.  - c/w Hydralazine 50 TID  - c/w Nifedipine 90mg QD  - c/w coreg increased to 12.5 mg q12h  # HLD (hyperlipidemia).  Home med: Atorvastatin 20mg QD - increased atorvastatin to 40mg QD.  # ESRD (end stage renal disease).  POA with Cr 4.38. Cr 2.4 baseline with no previos HD hx. s/p CVVHD 2/2 HAGMA metabolic encephalopathy (uremia vs. acidosis). FENa 0.3% prerenal. Renal US: consistent with CKD.  s/p intermittent HD last 11/28   Home meds: NaHCO3 650mg BID, Phoslo 667mg PO TID - discontinued NaHCO3 650mg BID since pt on RRT. - continue Phoslo 667mg PO TID - continue EPO with HD - Hold blood pressure meds on HD days. - Renal diet.  # DM2 (diabetes mellitus, type 2).  POA w/  A1c this admission 5.7.  Home meds: Januvia 25mg QD, Lispro 2U premeal, Lantus 10U.  - c/w mISS inpt.  - restart home Januvia, Lispro 2U premeal, and Lantus 10U on discharge.  # Anemia.  Hx of DIMITRIS. Iron studies with low TIBC indicative of AOCD. S/p 1u pRBC on 11/27. Likely 2/2 DIMITRIS and CKD  - EPO with dialysis - Transfuse <7.  Patient was discharged to: [facility] with HD  New medications: hydroxyzine 25 mg up to 2 times a day PRN for anxiety Changes to old medications: increased atorvastatin to 40 mg qd, Klonopin at reduced dose 0.25 Medications that were stopped: metoprolol succinate  Items to follow up as outpatient: podiatry (Van), cardiology (Ayana), nephrology (Yonathan Kessler)

## 2024-01-17 NOTE — BEHAVIORAL HEALTH ASSESSMENT NOTE - NSBHMEDSOTHERFT_PSY_A_CORE
verified with pharmacy [facility] - duloxetine 30mg QID rx by PMD, last dispensed 12/3/19. She was previously on duloxetine 30mg bid, last filled on 11/26/19. [facility] (immediate)...

## 2024-04-25 NOTE — PHYSICAL THERAPY INITIAL EVALUATION ADULT - PHYSICAL ASSIST/NONPHYSICAL ASSIST: SIT/STAND, REHAB EVAL
Findings consistent with right knee osteoarthritis.  Findings and treatment options were discussed with the patient.  The 2nd of 3 right knee Euflexxa injections were given today.  She tolerated the procedure well.  Advised to apply cold compress today.  Follow-up in 1 week for the third injection.  All questions were answered to patient's satisfaction.  
1 person assist/verbal cues

## 2024-06-28 NOTE — PATIENT PROFILE ADULT - BILL PAYMENT
Problem: Adult Inpatient Plan of Care  Goal: Plan of Care Review  6/28/2024 1702 by Sasha Cormier RN  Outcome: Met  6/28/2024 1328 by Sasha Cormier RN  Outcome: Ongoing, Progressing  Goal: Patient-Specific Goal (Individualized)  6/28/2024 1702 by Sasha Cormier RN  Outcome: Met  6/28/2024 1328 by Sasha Cormier RN  Outcome: Ongoing, Progressing  Goal: Absence of Hospital-Acquired Illness or Injury  6/28/2024 1702 by Sasha Cormier RN  Outcome: Met  6/28/2024 1328 by Sasha Cormier RN  Outcome: Ongoing, Progressing  Intervention: Identify and Manage Fall Risk  Recent Flowsheet Documentation  Taken 6/28/2024 1200 by Sasha Cormier RN  Safety Promotion/Fall Prevention:   fall prevention program maintained   safety round/check completed  Intervention: Prevent Infection  Recent Flowsheet Documentation  Taken 6/28/2024 1200 by Sasha Cormier RN  Infection Prevention: rest/sleep promoted  Goal: Optimal Comfort and Wellbeing  6/28/2024 1702 by Sasha Cormier RN  Outcome: Met  6/28/2024 1328 by Sasha Cormier RN  Outcome: Ongoing, Progressing  Goal: Readiness for Transition of Care  6/28/2024 1702 by Sasha Cormier RN  Outcome: Met  6/28/2024 1328 by Sasha Cormier RN  Outcome: Ongoing, Progressing     Problem: Fall Injury Risk  Goal: Absence of Fall and Fall-Related Injury  6/28/2024 1702 by Sasha Cormier RN  Outcome: Met  6/28/2024 1328 by Sasha Cormier RN  Outcome: Ongoing, Progressing  Intervention: Promote Injury-Free Environment  Recent Flowsheet Documentation  Taken 6/28/2024 1200 by Sasha Cormier RN  Safety Promotion/Fall Prevention:   fall prevention program maintained   safety round/check completed     Problem: Pain Acute  Goal: Acceptable Pain Control and Functional Ability  6/28/2024 1702 by Sasha Cormier RN  Outcome: Met  6/28/2024 1328 by Sasha Cormier RN  Outcome: Ongoing, Progressing   Goal Outcome Evaluation:                                               no

## 2024-07-06 NOTE — OCCUPATIONAL THERAPY INITIAL EVALUATION ADULT - REFERRAL TO ANOTHER SERVICE NEEDED, OT EVAL
Chief Complaint: Cold, flu, COVID, sinus, hay fever, or seasonal allergies   Patient introduction   Patient is 47-year-old female with cough, sore throat, voice hoarseness, headache, sweats, myalgia, and fatigue that started 6 to 9 days ago.   COVID-19 testing history, vaccination status, and exposure:    Patient was tested for COVID-19 within the last 24 hours. Test result was positive.    Has not received an updated [id number] COVID-19 vaccination (Pfizer-BioNTech or Moderna vaccine after September 12, 2023; or Novavax vaccine after October 3, 2023).   Risk factors for severe disease from COVID-19 infection    Higher risk for severe disease from COVID-19 because of BMI >= 25.    Mostly sedentary lifestyle.    Smoked tobacco in the past.    A mood disorder.   General presentation   Partial improvement of symptoms. Symptoms came on suddenly.   Fever:    No fever.   Sinus and nasal symptoms:    Sinus pain or pressure on or around the forehead and nose.    Patient first noticed sinus pain 5 to 9 days ago.    Sinus pain is not worse with Valsalva.    No nasal or sinus congestion.    No nasal discharge.    No itchy nose or sneezing.   Throat symptoms:    Sore throat. Unilateral pain described as severe on the right side and minimal to absent on the left side. No redness in the back of the throat.    Previous tonsillectomy.    Has discomfort when swallowing but can swallow liquids and solid foods.    Voice is mildly hoarse. Patient does not believe hoarseness is due to voice strain.   Head and body aches:    Headache described as moderate (4 to 6 on a scale of 1 to 10).    Sweats.    Myalgia.    Fatigue.    No chills.   Cough:    Cough is not noticeably worse depending on time of day    Cough is not productive of sputum.   Wheezing and shortness of breath:    Has previously used an albuterol inhaler for URI, bronchitis, or pneumonia.    Has previously used a steroid inhaler for URI, bronchitis, or pneumonia.    Not  "using an albuterol inhaler for current symptoms because they do not have any available.    No COPD diagnosis.    No asthma diagnosis.    No wheezing.    No shortness of breath.   Chest pain:    No chest pain.   Ear symptoms:    Current symptoms include pain and a plugged or blocked sensation in the ear(s).   Dizziness:    No dizziness.   Flu exposure:    Has not had a flu vaccine this season.   Patient is taking over-the-counter medications for current symptoms, including fluticasone and loratadine.   Review of red flags/alarm symptoms:    No changes in alertness or awareness.    No paroxysmal cough followed by whoop on inspiration    No symptoms suggesting airway obstruction.    No symptoms suggesting intracranial hemorrhage.    No decreased urination.    No blue or gray coloring present in face, lips, or nail beds.    No swelling, pain, redness, or increased warmth in the calf or lower part of ONE leg only.    No proptosis.   Pregnancy/menstrual status/breastfeeding:    Patient is menopausal.   Self-exam:    Height: 5' 5\"    Weight: 192 lbs    No palatal petechiae.    Swollen/painful neck lymph nodes.   Current medications   Currently taking ondansetron ODT 8 MG disintegrating tablet, buPROPion  MG 24 hr tablet, SM Allergy Relief 50 MCG/ACT nasal spray, multivitamin with minerals tablet tablet, pseudoephedrine 120 MG 12 hr tablet, dicyclomine 20 MG tablet, escitalopram 20 MG tablet, ferrous sulfate 325 (65 FE) MG tablet, pantoprazole 40 MG EC tablet, busPIRone 15 MG tablet, Vitamin D 50 MCG (2000 UT) tablet, Wegovy 1.7 MG/0.75ML solution auto-injector, and diclofenac 75 MG EC tablet.   Medication allergies    Penicillins (reaction: raised, red, itchy spots with each spot lasting less than 24 hours)   Medication contraindication review   No history of anaphylactic reaction to beta-lactam antibiotics; aspirin triad; blood dyscrasia; bone marrow depression; catecholamine-releasing paraganglioma; coronary artery " disease; coagulation disorder; congenital long QT syndrome; depression; electrolyte abnormalities; fungal infection; GI bleeding; GI obstruction; G6PD deficiency; heart arrhythmia; hypertension; mononucleosis; myasthenia; recent myocardial infarction; NSAID-induced asthma/urticaria; Parkinson's disease; pheochromocytoma; porphyria; Reye syndrome; seizure disorder; ulcerative colitis; and urinary retention.   No history of metoclopramide-associated dystonic reaction and tardive dyskinesia.   No known history of azithromycin-associated cholestatic jaundice or hepatic impairment.   Past medical history   Immune conditions:   No immunocompromising conditions.   No history of cancer.   Social history   Does not smoke tobacco; smoked previously.   High-risk household contacts:    Household contact who is Black.   Patient-submitted comments   Patient was asked if they had anything to add about their symptoms. Patient writes: Achy and fatigue .   Patient requests a 5-day excuse note.   Assessment   COVID-19, confirmed by viral test.   This is the likely diagnosis based on patient's interview responses, including:    Symptom profile    Positive viral COVID-19 test result in the last 14 days   Plan   Medications:    ibuprofen 800 mg tablet RX 800mg 1 tab PO q8h PRN 10d for any fever, pain, or discomfort associated with your condition. Do not exceed 3200mg (4 tablets) in a 24-hour period. Amount is 30 tab.    benzonatate 200 mg capsule RX 200mg 1 cap PO tid PRN 7d for cough. Do not chew or cut these capsules. Amount is 21 cap.    pseudoephedrine  mg tablet,extended release OTC 120mg 1 tab PO q12h PRN 5d for nasal congestion. Do not exceed 240mg (2 pills) in a 24-hour period. Amount is 10 tab.    Mucus Relief  mg tablet, extended release OTC 600mg 1 tab PO q12h PRN 7d for cough and congestion. Amount is 14 tab.   The patient's prescriptions will be sent to:   [facility] [address] [place] [address]  [place] [address]   Phone: [phone]     Fax: [phone]   Patient informed to purchase OTC medications.   Other:   Patient was given an excuse note for 3 days.   Education:    Condition and causes    Prevention    Treatment and self-care    When to call provider   ----------   Electronically signed by CECILIA Masterson on 2024-07-06 at 18:22PM   ----------   Patient Interview Transcript:   Which of these symptoms are bothering you? Select all that apply.    Cough    Sore throat    Hoarse voice or loss of voice    Headache    Sweats    Muscle or body aches    Fatigue or tiredness   Not selected:    Shortness of breath    Stuffed-up nose or sinuses    Runny nose    Itchy or watery eyes    Itchy nose or sneezing    Loss of smell or taste    Fever    Chills    Nausea or vomiting    Diarrhea    I don't have any of these symptoms   When did your current symptoms start? Select one.    6 to 9 days ago   Not selected:    Less than 48 hours ago    3 to 5 days ago    10 to 14 days ago    2 to 3 weeks ago    3 to 4 weeks ago    More than a month ago   Did your symptoms come on suddenly or gradually? Select one.    Suddenly   Not selected:    Gradually    I'm not sure   Have your symptoms improved at all since they began? Select one.    Yes, but they haven't gone away completely   Not selected:    Yes, but then they came back worse than before    No   Since your current symptoms started, have you been tested for COVID-19? This includes home self-tests as well as nose swab or saliva tests done at a doctor's office, lab, or testing site. Select one.    Yes   Not selected:    No   When was your most recent COVID-19 test? Select one.    Within the last 24 hours   Not selected:    Within the last week (specify date as MM/DD/YY)    7 to 14 days ago    15 to 30 days ago    More than 1 month ago   What was the result of your most recent COVID-19 test? Select one.    Positive   Not selected:    Negative   Have you gotten the [id number]  updated COVID-19 vaccine? This means either the updated Pfizer-BioNTech or Moderna vaccine after September 12, 2023; or the updated Novavax vaccine after October 3, 2023. Select one.    No   Not selected:    Yes   Since your symptoms started, have you felt dizzy? Select one.    No   Not selected:    Yes, but I can still do my regular daily activities    Yes, and it makes it hard to stand, walk, or do daily activities   Do you have chest pain? You might also feel it as discomfort, aching, tightness, or squeezing in the chest. Select one.    No   Not selected:    Yes   You mentioned having a headache. On a scale of 1 to 10, how severe is your headache pain? Select one.    Moderate (4 to 6)   Not selected:    Mild (1 to 3)    Severe (7 to 9)    Unbearable (10)    The worst headache of my life (10+)   Do you cough so hard that it's made you gag or vomit? By gag, we mean has your coughing made you choke or dry heave? Select all that apply.    No   Not selected:    Yes, my coughing has made me gag    Yes, my coughing has made me vomit   Does your cough come in spasms of 10 to 20 coughs in a row, followed by a loud whoop when breathing in? Select one.    No   Not selected:    Yes   When is your cough the worst? Select all that apply.    I haven't noticed a difference depending on time of day   Not selected:    In the morning, or when I wake up    During the day    At nighttime, or while I'm sleeping   Are you coughing up mucus or phlegm? Select one.    No, my cough is dry   Not selected:    Yes, a little    Yes, a lot   Do you feel sinus pain or pressure in any of these areas?    In my forehead    Behind my nose   Not selected:    Around my eyes    In my cheeks    In my upper teeth or jaw    No   When did you first notice your sinus pain or pressure? Select one.    5 to 9 days ago   Not selected:    Less than 5 days ago    10 to 14 days ago    2 to 4 weeks ago    1 month ago or longer   Does coughing, sneezing, or leaning  forward make your sinuses feel worse? Select one.    No   Not selected:    Yes   Can you swallow liquids and solid foods? A sore throat may be painful when swallowing, but it shouldn't prevent you from swallowing. Select one.    Yes, but it's uncomfortable   Not selected:    Yes, with ease    Yes, but it's painful    It's hard to swallow anything because it feels like liquids and food get stuck in my throat    No, I can't swallow anything, liquid or solid foods   Is your throat pain worse on one side than the other? Select one.    Yes, it's worse on the right side   Not selected:    Yes, it's worse on the left side    No, it's the same on both sides   Which of these best describes your throat pain? Select one.    Severe pain on the right, and little to no pain on the left   Not selected:    Pain on both sides, but worse on the right   To recommend the best treatment, we need to see photos of your throat. You can have someone else take the photo, or use a mirror. If you choose not to send photos, you can still continue this interview, but your treatment options may be affected. Select one.    I'd rather not send photos   Not selected:    Someone can take the photos for me    I'll take the photos myself   Have you urinated at least 3 times in the last 24 hours? Select one.    Yes   Not selected:    No   Do your face, lips, or nail beds appear blue or gray? Select one.    No   Not selected:    Yes   Do you have any swelling, pain, redness, or increased warmth in the calf or lower part of ONE leg only? Select one.    No   Not selected:    Yes   Changes in alertness or awareness may mean you need emergency care. Since your symptoms started, have you had any of these? Select all that apply.    None of the above   Not selected:    Confusion    Slurred speech    Not knowing where you are or what day it is    Difficulty staying conscious    Fainting or passing out   Do your symptoms include a whistling sound, or wheezing,  when you breathe? Select one.    No   Not selected:    Yes   Early in this interview, you told us you were hoarse or you'd lost your voice. How would you describe the changes to your voice? Select one.    It just sounds a little raspy   Not selected:    It's harder than usual to talk    I can barely talk at all   Is it possible that you strained your voice? Singing, yelling, or talking more or louder than usual can cause voice strain. Select one.    No, not that I know of   Not selected:    Yes   Since your symptoms started, have you noticed that one or both of your eyes is bulging or poking out? Select one.    No   Not selected:    Yes   Do you have any of these symptoms in your ear(s)? Select all that apply.    Pain    Plugged or blocked sensation   Not selected:    Pressure    Fullness    Crackling or popping    None of the above   Are your tonsils larger than usual?    I've had my tonsils removed   Not selected:    Yes    No, not that I can tell   Is there redness in the back of your throat? Select one.    No, not that I can see   Not selected:    Yes   Are there red spots on the roof of your mouth or the back of your throat?    No, not that I can see   Not selected:    Yes   Are your glands/lymph nodes swollen, or does it hurt when you touch them?    Yes   Not selected:    No, not that I can tell   Have you ever been diagnosed with asthma? Select one.    No   Not selected:    Yes   Have you ever been prescribed albuterol to use for wheezing, cough, or shortness of breath caused by a cold, bronchitis, or pneumonia? Albuterol (ProAir, Proventil, Ventolin) is prescribed as an inhaler or a solution to be used with a nebulizer machine. Select one.    Yes   Not selected:    No, not that I know of   Have you ever been prescribed a steroid inhaler to use for wheezing, cough, or shortness of breath caused by a cold, bronchitis, or pneumonia? Some examples of steroid inhalers include Pulmicort, Flovent, Qvar, and  Alvesco. Select one.    Yes   Not selected:    No, not that I know of   Have you used albuterol for your current symptoms? This includes both albuterol inhalers and albuterol solution in a nebulizer machine. Select one.    No, I don't have any, or it's    Not selected:    Yes    No, I don't feel like I need it   Do you need a refill of albuterol? Select one.    No   Not selected:    Yes   Have you ever been diagnosed with chronic obstructive pulmonary disease (COPD)? Select one.    No, not that I know of   Not selected:    Yes   Do any of these apply to you? Select all that apply.    None of the above   Not selected:    I've been hospitalized within the last 5 days    I have diabetes    I'm in close contact with a child in    Have you had a flu shot this season? Select one.    No   Not selected:    Yes, less than 2 weeks ago    Yes, 2 to 4 weeks ago    Yes, 1 to 3 months ago    Yes, 3 to 6 months ago    Yes, more than 6 months ago   Have you gone through menopause? Select one.    I'm going through it now   Not selected:    Yes    No   The flu and COVID-19 can be more serious for people in certain groups. The next few questions help us figure out if you or anyone you live with is at higher risk for complications from these infections. Do any of these statements apply to you? Select all that apply.    None of the above   Not selected:    I'm     I'm     I'm Black    I'm  or    Do you smoke tobacco? Select one.    No, I quit   Not selected:    Yes, every day    Yes, some days    No   Do you have a mostly inactive lifestyle? Answer yes if all of these are true: - You spend at least 6 hours a day sitting or lying down - You get less than 2 and a half hours per week of moderate exercise such as walking fast - You get less than 1 hour and 15 minutes per week of intense exercise such as jogging or running Select one.    Yes   Not selected:    No   Do you have any of  these conditions? Select all that apply.    Mood disorder, including depression or schizophrenia spectrum disorders   Not selected:    Chronic lung disease, such as cystic fibrosis or interstitial fibrosis    Heart disease, such as congenital heart disease, congestive heart failure, or coronary artery disease    Disorder of the brain, spinal cord, or nerves and muscles, such as dementia, cerebral palsy, epilepsy, muscular dystrophy, or developmental delay    Metabolic disorder or mitochondrial disease    Cerebrovascular disease, such as stroke or another condition affecting the blood vessels or blood supply to the brain    Down syndrome    Substance use disorder, such as alcohol, opioid, or cocaine use disorder    Tuberculosis    Primary immunodeficiency    None of the above   Do you live in a group care setting? Examples include: - Nursing home - Residential care - Psychiatric treatment facility - Group home - [facility] - [facility] and care home - Homeless shelter - Foster care setting Select one.    No   Not selected:    Yes   Are you a healthcare worker? Select one.    No   Not selected:    Yes   People with a very high body mass index (BMI) are at higher risk for developing complications from the flu and severe illness from COVID-19. To determine your BMI, we need to know your weight and height. Please enter your weight (in pounds).    Weight   Please enter your height.    Height   Do you have any of these conditions that can affect the immune system? Scroll to see all options. Select all that apply.    None of these   Not selected:    History of bone marrow transplant    Chronic kidney disease    Chronic liver disease (including cirrhosis)    HIV/AIDS    Inflammatory bowel disease (Crohn's disease or ulcerative colitis)    Lupus    Moderate to severe plaque psoriasis    Multiple sclerosis    Rheumatoid arthritis    Sickle cell anemia    Alpha or beta thalassemia    History of kidney, liver, heart, or other solid  organ transplant    History of liver, heart, or other solid organ transplant    History of spleen removal    An autoimmune disorder not listed here (specify)    A condition requiring treatment with long-term use of oral steroids (such as prednisone, prednisolone, or dexamethasone) (specify)   Have you ever been diagnosed with cancer? Select one.    No   Not selected:    Yes, I have cancer now    Yes, but I'm in remission   The flu and COVID-19 can be more serious for people in certain groups. Do any of these apply to the people who live with you? Select all that apply.    Black   Not selected:    Under age 5    Over age 65             or     Pregnant    Has given birth, had a miscarriage, had a pregnancy loss, or had an  in the last 2 weeks    None of the above   Does any member of your household have any of these medical conditions? Select all that apply.    None of the above   Not selected:    Asthma    Disorders of the brain, spinal cord, or nerves and muscles, such as dementia, cerebral palsy, epilepsy, muscular dystrophy, or developmental delay    Chronic lung disease, such as COPD or cystic fibrosis    Heart disease, such as congenital heart disease, congestive heart failure, or coronary artery disease    Cerebrovascular disease, such as stroke or another condition affecting the blood vessels or blood supply to the brain    Blood disorders, such as sickle cell disease    Diabetes    Metabolic disorders such as inherited metabolic disorders or mitochondrial disease    Kidney disorders    Liver disorders    Weakened immune system due to illness or medications such as chemotherapy or steroids    Children under the age of 19 who are on long-term aspirin therapy    Extreme obesity (BMI > 40)   Do you have any of these conditions? Scroll to see all options. Select all that apply.    None of the above   Not selected:    Blockage or narrowing of the blood vessels of  the heart    Blood dyscrasia, such anemia, leukemia, lymphoma, or myeloma    Bone marrow depression    Catecholamine-releasing paraganglioma    Congenital long QT syndrome    Depression    Difficulty urinating or completely emptying your bladder    Uncorrected electrolyte abnormalities    Fungal infection    Gastrointestinal (GI) bleeding    Gastrointestinal (GI) obstruction    Recent heart attack    High blood pressure    Irregular heartbeat or heart rhythm    Mononucleosis (mono)    Myasthenia gravis    Parkinson's disease    Pheochromocytoma    Reye syndrome    Seizure disorder    Thyroid disease    Ulcerative colitis   Have you ever had either of these conditions? Select all that apply.    No   Not selected:    Metoclopramide-associated dystonic reaction    Tardive dyskinesia   Just a few more questions about medications, and then you're finished. Have you used any non-prescription medications or nasal sprays for your current symptoms? Examples include saline sprays, decongestants, NyQuil, and Tylenol. Select one.    Yes   Not selected:    No   Which of these non-prescription medications have you tried? Scroll to see all options. Select all that apply.    Fluticasone (Flonase)    Loratadine (Alavert, Claritin)   Not selected:    Acetaminophen (Tylenol)    Budesonide (Rhinocort)    Cetirizine (Zyrtec)    Chlorpheniramine (Aller-chlor, Chlor-Trimeton)    Cromolyn (NasalCrom)    Dextromethorphan (Delsym, Robitussin, Vicks DayQuil Cough)    Diphenhydramine (Benadryl)    Fexofenadine (Allegra)    Guaifenesin (Mucinex)    Guaifenesin/dextromethorphan (Delsym DM, Mucinex DM, Robitussin DM)    Ibuprofen (Advil, Motrin, Midol)    Ketotifen (Alaway, Zaditor)    Naphazoline-pheniramine (Naphcon-A, Opcon-A, Visine-A)    Omeprazole (Prilosec)    Oxymetazoline (Afrin)    Triamcinolone (Nasacort)    None of the above   Have you taken any monoamine oxidase inhibitor (MAOI) medications in the last 14 days? Examples include  "rasagiline (Azilect), selegiline (Eldepryl, Zelapar), isocarboxazid (Marplan), phenelzine (Nardil), and tranylcypromine (Parnate). Select one.    No, not that I know of   Not selected:    Yes   Do you take Kynmobi or Apokyn (apomorphine)? Select one.    No   Not selected:    Yes   Are you still taking these medications listed in your medical record? If you're not taking any of these, click Next. Select all that apply.    ondansetron ODT 8 MG disintegrating tablet    buPROPion  MG 24 hr tablet    SM Allergy Relief 50 MCG/ACT nasal spray    multivitamin with minerals tablet tablet    pseudoephedrine 120 MG 12 hr tablet    dicyclomine 20 MG tablet    escitalopram 20 MG tablet    ferrous sulfate 325 (65 FE) MG tablet    pantoprazole 40 MG EC tablet    busPIRone 15 MG tablet    Vitamin D 50 MCG (2000 UT) tablet    Wegovy 1.7 MG/0.75ML solution auto-injector    diclofenac 75 MG EC tablet   Are you taking any other medications, vitamins, or supplements? Select one.    No   Not selected:    Yes   Have you ever had an allergic or bad reaction to any medication? Select one.    Yes   Not selected:    No   Have you had an allergic or bad reaction to any of these medications? Select all that apply.    No, not that I know of   Not selected:    Baloxavir (Xofluza)    Benzonatate (Tessalon Perles)    Fluconazole, itraconazole, or terconazole (brands include Diflucan, Sporanox, Terazol)    Oseltamivir (Tamiflu) or zanamivir (Relenza)    Paxlovid, nirmatrelvir, or ritonavir (Norvir)   Have you had an allergic or bad reaction to any of these antibiotic medications? Select all that apply.    Penicillin or any \"-cillin\" antibiotic, such as amoxicillin, ampicillin, dicloxacillin, nafcillin, or piperacillin (Brands include Augmentin, Unasyn, and Zosyn)   Not selected:    Tetracycline or any \"-cycline\" antibiotic, such as doxycycline, demeclocycline, minocycline (Brands include Declomycin, Doryx, Dynacin, Oracea, Monodox, Panmycin, " "and Vibramycin)    Ciprofloxacin or any \"-floxacin\" antibiotic, such as gemifloxacin, levofloxacin, moxifloxacin, or ofloxacin (Brands include Factive, Cipro, Floxin, and Levaquin)    Cephalexin or any \"cef-\" antibiotic, such as cefazolin, cefdinir, cefuroxime, ceftriaxone, ceftazidime, or cefepime (Brands include Ancef, Ceftin, Fortaz, Keflex, Maxipime, Rocephin, and Simplicef)    Azithromycin or any \"-thromycin\" antibiotic, such as erythromycin or clarithromycin (Brands include Biaxin, Erythrocin, Z-mariel, and Zithromax)    Clindamycin or lincomycin (Brands include Cleocin and Lincocin)    No, not that I know of   When you had an allergic or bad reaction to penicillin or another \"-cillin\" antibiotic, did you have any of these symptoms? Select all that apply.    Raised, red, itchy spots with each spot lasting less than 24 hours   Not selected:    Swelling of the mouth, eyes, lips, or tongue    Blisters or ulcers on the lips, mouth, eyes, or vagina    _Peeling skin _    Breathing difficulties    Feeling light-headed or faint    A fast heartbeat    Clammy skin    Confusion and anxiety    Collapsing or losing consciousness    Joint pains    Problems with kidneys, lungs, or liver    None of the above   Have you had an allergic or bad reaction to any of these medications? Select all that apply.    No, not that I know of   Not selected:    Albuterol or a similar medication    Atropine    Corticosteroid (steroid) medication, including topical steroids, inhaled steroids, nasal steroids, or oral steroids (budesonide, ciclesonide, dexamethasone, flunisolide, fluticasone, methylprednisolone, triamcinolone, prednisone (or brand names Alvesco, Deltasone, Flovent, Medrol, Nasacort, Rhinocort, or Veramyst)    Metoclopramide (Reglan)    Ondansetron (Zuplenz, Zofran ODT, Zofran)    Prochlorperazine (Compazine)   Have you had an allergic or bad reaction to any of these eye drops, nasal sprays, or inhalers? Scroll to see all options. " Select all that apply.    No, not that I know of   Not selected:    Azelastine (Astelin, Astepro, Optivar)    Cromolyn (Crolom, NasalCrom)    Ipratropium (Atrovent)    Ketotifen (Alaway, Zaditor)    Pheniramine/naphazoline (Naphcon-A, Opcon-A, Visine-A)    Olopatadine (Pataday, Patanol, Pazeo)   Have you had an allergic or bad reaction to any of these non-prescription medications? Scroll to see all options. Select all that apply.    No, not that I know of   Not selected:    Acetaminophen (Tylenol)    Cetirizine (Zyrtec)    Dextromethorphan (Delsym, Robitussin, Vicks DayQuil Cough)    Diphenhydramine (Benadryl)    Fexofenadine (Allegra)    Guaifenesin (Mucinex)    Dextromethorphan (Delsym)    Ibuprofen (Advil, Motrin, Midol)    Loratadine (Alavert, Claritin)    Oxymetazoline (Afrin)    Pseudoephedrine (Sudafed)   Are you allergic to milk or to the proteins found in milk (for example, whey or casein)? A milk allergy is different from lactose intolerance. Select one.    No, not that I know of   Not selected:    Yes   Have you ever had jaundice or liver problems as a result of taking azithromycin (Zithromax, Zmax)? Jaundice is a condition in which the skin and the whites of the eyes turn yellow. Select all that apply.    No, not that I know of   Not selected:    Yes, jaundice    Yes, liver problems   Do you need a doctor's note? A doctor's note confirms that you received care today and states when you can return to school or work. It does not contain information about your diagnosis or treatment plan. Your provider will make the final decision on whether to give you a doctor's note and for how long. Doctor's notes CANNOT be backdated. We can't provide medical leave paperwork through this type of visit. If more paperwork is needed to request time off, contact your primary care provider. Select one.    5 days   Not selected:    Today only (1 day)    Today and tomorrow (2 days)    3 days    7 days    No   Is there anything  you'd like to add about your symptoms? Please limit your comments to the symptoms covered in this interview. If you include comments about other concerns, your provider may recommend that you be seen in person.    __Achy and fatigue __   ----------   Medical history   Medical history data does not currently exist for this patient.     following/physical therapy

## 2025-01-16 NOTE — DISCHARGE NOTE PROVIDER - CARE PROVIDER_API CALL
Seema Fonseca  Nephrology  [address], [address]  [place] [address]  Phone: [phone]  Fax: [phone]  Follow Up Time: 2 weeks    Edson Artis  Podiatric Medicine and Surgery  [address] [place] [address], [facility] [address]  [place] [address]  Phone: [phone]  Fax: [phone]  Follow Up Time: 2 weeks    Rosalva Lyons  Cardiovascular Disease  [address] [address]  Phone: [phone]  Fax: [phone]  Follow Up Time: 2 weeks   Seema Fonseca  Nephrology  [address], [address]  [place] [address]  Phone: [phone]  Fax: [phone]  Follow Up Time: 2 weeks    Edson Artis  Podiatric Medicine and Surgery  [address] [place] [address], [facility] [address]  [place] [address]  Phone: [phone]  Fax: [phone]  Follow Up Time: 2 weeks    Rosalva Lyons  Cardiovascular Disease  [address] [address]  Phone: [phone]  Fax: [phone]  Follow Up Time: 2 weeks   Seema Fonseca  Nephrology  [address], [address]  [place] [address]  Phone: [phone]  Fax: [phone]  Follow Up Time: 2 weeks    Edson Artis  Podiatric Medicine and Surgery  [address] [place] [address], [facility] [address]  [place] [address]  Phone: [phone]  Fax: [phone]  Follow Up Time: 2 weeks    Rosalva Lyons  Cardiovascular Disease  [address] [address]  Phone: [phone]  Fax: [phone]  Follow Up Time: 2 weeks   16-Jan-2025 20:08 Edson Artis  Podiatric Medicine and Surgery  [address] [place] [address], [address]  [place] [address]  Phone: [phone]  Fax: [phone]  Follow Up Time: 2 weeks    Rosalva Lyons  Cardiovascular Disease  [address] [address]  Phone: [phone]  Fax: [phone]  Follow Up Time: 2 weeks    Cassie Matthews  Nephrology  [address], [address]  [place] [address]  Phone: [phone]  Fax: [phone]  Scheduled Appointment: 12/21/2023 11:20 AM   Edson Artis  Podiatric Medicine and Surgery  [address] [place] [address], [address]  [place] [address]  Phone: [phone]  Fax: [phone]  Follow Up Time: 2 weeks    Rosalva Lyons  Cardiovascular Disease  [address] [address]  Phone: [phone]  Fax: [phone]  Follow Up Time: 2 weeks    Cassie Matthews  Nephrology  [address], [address]  [place] [address]  Phone: [phone]  Fax: [phone]  Scheduled Appointment: 12/21/2023 11:20 AM   Edson Artis  Podiatric Medicine and Surgery  [address] [place] [address], [address]  [place] [address]  Phone: [phone]  Fax: [phone]  Follow Up Time: 2 weeks    Rosalva Lyons  Cardiovascular Disease  [address] [address]  Phone: [phone]  Fax: [phone]  Follow Up Time: 2 weeks    Cassie Matthews  Nephrology  [address], [address]  [place] [address]  Phone: [phone]  Fax: [phone]  Scheduled Appointment: 12/21/2023 11:20 AM   Edson Artis  Podiatric Medicine and Surgery  [address] [place] [address], [address]  [place] [address]  Phone: [phone]  Fax: [phone]  Follow Up Time: 2 weeks    Cassie Matthews  Nephrology  [address], [address]  [place] [address]  Phone: [phone]  Fax: [phone]  Scheduled Appointment: 12/21/2023 11:20 AM    Duong Piña  Cardiology  [address] [address]  Phone: [phone]  Fax: (   )    -  Scheduled Appointment: 12/14/2023 10:00 AM   Edson Artis  Podiatric Medicine and Surgery  [address] [place] [address], [address]  [place] [address]  Phone: [phone]  Fax: [phone]  Follow Up Time: 2 weeks    Cassie Matthews  Nephrology  [address], [address]  [place] [address]  Phone: [phone]  Fax: [phone]  Scheduled Appointment: 12/21/2023 11:20 AM    Duong Piña  Cardiology  [address] [address]  Phone: [phone]  Fax: (   )    -  Scheduled Appointment: 12/14/2023 10:00 AM   Edson Artis  Podiatric Medicine and Surgery  [address] [place] [address], [address]  [place] [address]  Phone: [phone]  Fax: [phone]  Follow Up Time: 2 weeks    Cassie Matthews  Nephrology  [address], [address]  [place] [address]  Phone: [phone]  Fax: [phone]  Scheduled Appointment: 12/21/2023 11:20 AM    Duong Piña  Cardiology  [address] [address]  Phone: [phone]  Fax: (   )    -  Scheduled Appointment: 12/14/2023 10:00 AM   Edson Artis  Podiatric Medicine and Surgery  [address] [place] [address], [address]  [place] [address]  Phone: [phone]  Fax: [phone]  Scheduled Appointment: 12/18/2023 01:30 PM    Cassie Matthews  Nephrology  [address], [address]  [place] [address]  Phone: [phone]  Fax: [phone]  Scheduled Appointment: 12/21/2023 11:20 AM    Duong Piña  Cardiology  [address] [address]  Phone: [phone]  Fax: (   )    -  Scheduled Appointment: 12/14/2023 10:00 AM   Edson Artis  Podiatric Medicine and Surgery  [address] [place] [address], [address]  [place] [address]  Phone: [phone]  Fax: [phone]  Scheduled Appointment: 12/18/2023 01:30 PM    Cassie Matthews  Nephrology  [address], [address]  [place] [address]  Phone: [phone]  Fax: [phone]  Scheduled Appointment: 12/21/2023 11:20 AM    Duong Piña  Cardiology  [address] [address]  Phone: [phone]  Fax: (   )    -  Scheduled Appointment: 12/14/2023 10:00 AM   Edson Artis  Podiatric Medicine and Surgery  [address] [place] [address], [address]  [place] [address]  Phone: [phone]  Fax: [phone]  Scheduled Appointment: 12/18/2023 01:30 PM    Cassie Matthews  Nephrology  [address], [address]  [place] [address]  Phone: [phone]  Fax: [phone]  Scheduled Appointment: 12/21/2023 11:20 AM    Duong Piña  Cardiology  [address] [address]  Phone: [phone]  Fax: (   )    -  Scheduled Appointment: 12/14/2023 10:00 AM   Edson Artis  Podiatric Medicine and Surgery  [address] [place] [address], [address]  [place] [address]  Phone: [phone]  Fax: [phone]  Scheduled Appointment: 12/18/2023 01:30 PM    Cassie Matthews  Nephrology  [address], [address]  [place] [address]  Phone: [phone]  Fax: [phone]  Scheduled Appointment: 12/21/2023 11:20 AM    Duong Piña  Cardiology  [address] [address]  Phone: [phone]  Fax: (   )    -  Scheduled Appointment: 12/14/2023 10:00 AM    Alberto Canales  [address]  [place] [address]  [id number]  Phone: [phone]  Fax: (   )    -  Established Patient  Follow Up Time:    Edson Artis  Podiatric Medicine and Surgery  [address] [place] [address], [address]  [place] [address]  Phone: [phone]  Fax: [phone]  Scheduled Appointment: 12/18/2023 01:30 PM    Cassie Matthews  Nephrology  [address], [address]  [place] [address]  Phone: [phone]  Fax: [phone]  Scheduled Appointment: 12/21/2023 11:20 AM    Duong Piña  Cardiology  [address] [address]  Phone: [phone]  Fax: (   )    -  Scheduled Appointment: 12/14/2023 10:00 AM    Alberto Canales  [address]  [place] [address]  [id number]  Phone: [phone]  Fax: (   )    -  Established Patient  Follow Up Time:    Edson Artis  Podiatric Medicine and Surgery  [address] [place] [address], [address]  [place] [address]  Phone: [phone]  Fax: [phone]  Scheduled Appointment: 12/18/2023 01:30 PM    Cassie Matthews  Nephrology  [address], [address]  [place] [address]  Phone: [phone]  Fax: [phone]  Scheduled Appointment: 12/21/2023 11:20 AM    Duong Piña  Cardiology  [address] [address]  Phone: [phone]  Fax: (   )    -  Scheduled Appointment: 12/14/2023 10:00 AM    Alberto Canales  [address]  [place] [address]  [id number]  Phone: [phone]  Fax: (   )    -  Established Patient  Follow Up Time:    Edson Artis  Podiatric Medicine and Surgery  [address] [place] [address], [address]  [place] [address]  Phone: [phone]  Fax: [phone]  Scheduled Appointment: 12/18/2023 01:30 PM    Cassie Matthews  Nephrology  [address], [address]  [place] [address]  Phone: [phone]  Fax: [phone]  Scheduled Appointment: 12/21/2023 11:20 AM    Duong Piña  Cardiology  [address] [address]  Phone: [phone]  Fax: (   )    -  Scheduled Appointment: 12/14/2023 10:00 AM   Edson Artis  Podiatric Medicine and Surgery  [address] [place] [address], [address]  [place] [address]  Phone: [phone]  Fax: [phone]  Scheduled Appointment: 12/18/2023 01:30 PM    Cassie Matthews  Nephrology  [address], [address]  [place] [address]  Phone: [phone]  Fax: [phone]  Scheduled Appointment: 12/21/2023 11:20 AM    Duong Piña  Cardiology  [address] [address]  Phone: [phone]  Fax: (   )    -  Scheduled Appointment: 12/14/2023 10:00 AM   Edson Artis  Podiatric Medicine and Surgery  [address] [place] [address], [address]  [place] [address]  Phone: [phone]  Fax: [phone]  Scheduled Appointment: 12/18/2023 01:30 PM    Cassie Matthews  Nephrology  [address], [address]  [place] [address]  Phone: [phone]  Fax: [phone]  Scheduled Appointment: 12/21/2023 11:20 AM    Duong Piña  Cardiology  [address] [address]  Phone: [phone]  Fax: (   )    -  Scheduled Appointment: 12/14/2023 10:00 AM

## (undated) DEVICE — WARMING BLANKET UPPER ADULT

## (undated) DEVICE — PACK ORTHO FOOT ANKLE

## (undated) DEVICE — VENODYNE/SCD SLEEVE CALF MEDIUM

## (undated) DEVICE — IRR SYS BONE CLNNG INTERPULSE